# Patient Record
Sex: MALE | Race: WHITE | NOT HISPANIC OR LATINO | Employment: OTHER | URBAN - METROPOLITAN AREA
[De-identification: names, ages, dates, MRNs, and addresses within clinical notes are randomized per-mention and may not be internally consistent; named-entity substitution may affect disease eponyms.]

---

## 2018-08-07 ENCOUNTER — TRANSCRIBE ORDERS (OUTPATIENT)
Dept: LAB | Facility: MEDICAL CENTER | Age: 83
End: 2018-08-07

## 2018-08-07 ENCOUNTER — APPOINTMENT (OUTPATIENT)
Dept: LAB | Facility: MEDICAL CENTER | Age: 83
End: 2018-08-07
Payer: MEDICARE

## 2018-08-07 DIAGNOSIS — E13.8 DIABETES MELLITUS OF OTHER TYPE WITH COMPLICATION, UNSPECIFIED WHETHER LONG TERM INSULIN USE: ICD-10-CM

## 2018-08-07 DIAGNOSIS — I25.10 ASCVD (ARTERIOSCLEROTIC CARDIOVASCULAR DISEASE): ICD-10-CM

## 2018-08-07 DIAGNOSIS — E78.5 HYPERLIPIDEMIA, UNSPECIFIED HYPERLIPIDEMIA TYPE: ICD-10-CM

## 2018-08-07 DIAGNOSIS — I25.10 ASCVD (ARTERIOSCLEROTIC CARDIOVASCULAR DISEASE): Primary | ICD-10-CM

## 2018-08-07 DIAGNOSIS — I10 HYPERTENSION, UNSPECIFIED TYPE: ICD-10-CM

## 2018-08-07 LAB
ALBUMIN SERPL BCP-MCNC: 3.7 G/DL (ref 3.5–5)
ALP SERPL-CCNC: 74 U/L (ref 46–116)
ALT SERPL W P-5'-P-CCNC: 26 U/L (ref 12–78)
ANION GAP SERPL CALCULATED.3IONS-SCNC: 6 MMOL/L (ref 4–13)
AST SERPL W P-5'-P-CCNC: 21 U/L (ref 5–45)
BILIRUB SERPL-MCNC: 0.48 MG/DL (ref 0.2–1)
BUN SERPL-MCNC: 26 MG/DL (ref 5–25)
CALCIUM ALBUM COR SERPL-MCNC: 10.4 MG/DL (ref 8.3–10.1)
CALCIUM SERPL-MCNC: 10.2 MG/DL (ref 8.3–10.1)
CHLORIDE SERPL-SCNC: 106 MMOL/L (ref 100–108)
CHOLEST SERPL-MCNC: 79 MG/DL (ref 50–200)
CO2 SERPL-SCNC: 26 MMOL/L (ref 21–32)
CREAT SERPL-MCNC: 1.71 MG/DL (ref 0.6–1.3)
ERYTHROCYTE [DISTWIDTH] IN BLOOD BY AUTOMATED COUNT: 13.5 % (ref 11.6–15.1)
EST. AVERAGE GLUCOSE BLD GHB EST-MCNC: 126 MG/DL
GFR SERPL CREATININE-BSD FRML MDRD: 37 ML/MIN/1.73SQ M
GLUCOSE P FAST SERPL-MCNC: 129 MG/DL (ref 65–99)
HBA1C MFR BLD: 6 % (ref 4.2–6.3)
HCT VFR BLD AUTO: 42.1 % (ref 36.5–49.3)
HDLC SERPL-MCNC: 29 MG/DL (ref 40–60)
HGB BLD-MCNC: 13.5 G/DL (ref 12–17)
LDLC SERPL CALC-MCNC: 36 MG/DL (ref 0–100)
MCH RBC QN AUTO: 28 PG (ref 26.8–34.3)
MCHC RBC AUTO-ENTMCNC: 32.1 G/DL (ref 31.4–37.4)
MCV RBC AUTO: 87 FL (ref 82–98)
NONHDLC SERPL-MCNC: 50 MG/DL
PLATELET # BLD AUTO: 179 THOUSANDS/UL (ref 149–390)
PMV BLD AUTO: 10.3 FL (ref 8.9–12.7)
POTASSIUM SERPL-SCNC: 4.5 MMOL/L (ref 3.5–5.3)
PROT SERPL-MCNC: 7.4 G/DL (ref 6.4–8.2)
RBC # BLD AUTO: 4.82 MILLION/UL (ref 3.88–5.62)
SODIUM SERPL-SCNC: 138 MMOL/L (ref 136–145)
TRIGL SERPL-MCNC: 70 MG/DL
WBC # BLD AUTO: 8.36 THOUSAND/UL (ref 4.31–10.16)

## 2018-08-07 PROCEDURE — 80061 LIPID PANEL: CPT

## 2018-08-07 PROCEDURE — 85027 COMPLETE CBC AUTOMATED: CPT

## 2018-08-07 PROCEDURE — 80053 COMPREHEN METABOLIC PANEL: CPT

## 2018-08-07 PROCEDURE — 36415 COLL VENOUS BLD VENIPUNCTURE: CPT

## 2018-08-07 PROCEDURE — 83036 HEMOGLOBIN GLYCOSYLATED A1C: CPT

## 2018-08-15 ENCOUNTER — TRANSCRIBE ORDERS (OUTPATIENT)
Dept: ADMINISTRATIVE | Facility: HOSPITAL | Age: 83
End: 2018-08-15

## 2018-08-15 ENCOUNTER — HOSPITAL ENCOUNTER (OUTPATIENT)
Dept: RADIOLOGY | Facility: HOSPITAL | Age: 83
Discharge: HOME | End: 2018-08-15
Attending: INTERNAL MEDICINE
Payer: MEDICARE

## 2018-08-15 DIAGNOSIS — R06.00 DYSPNEA, UNSPECIFIED TYPE: Primary | ICD-10-CM

## 2018-08-15 DIAGNOSIS — R06.00 DYSPNEA, UNSPECIFIED TYPE: ICD-10-CM

## 2018-08-15 PROCEDURE — 71046 X-RAY EXAM CHEST 2 VIEWS: CPT

## 2018-08-26 PROBLEM — Z95.1 PRESENCE OF AORTOCORONARY BYPASS GRAFT: Status: ACTIVE | Noted: 2018-08-26

## 2018-08-26 PROBLEM — E66.9 OBESE: Status: ACTIVE | Noted: 2018-08-26

## 2018-08-26 PROBLEM — I65.23 OCCLUSION AND STENOSIS OF BILATERAL CAROTID ARTERIES: Status: ACTIVE | Noted: 2018-08-26

## 2018-08-26 PROBLEM — I10 BENIGN ESSENTIAL HYPERTENSION: Status: ACTIVE | Noted: 2018-08-26

## 2018-08-26 PROBLEM — E11.9 DIABETES MELLITUS, TYPE II (HCC): Status: ACTIVE | Noted: 2018-08-26

## 2018-08-26 PROBLEM — I25.10 CAD (CORONARY ARTERY DISEASE): Status: ACTIVE | Noted: 2018-08-26

## 2018-08-26 PROBLEM — K21.9 GERD (GASTROESOPHAGEAL REFLUX DISEASE): Status: ACTIVE | Noted: 2018-08-26

## 2018-08-26 PROBLEM — I25.2 OLD MI (MYOCARDIAL INFARCTION): Status: ACTIVE | Noted: 2018-08-26

## 2018-08-26 PROBLEM — E78.5 DYSLIPIDEMIA: Status: ACTIVE | Noted: 2018-08-26

## 2018-08-26 PROBLEM — I73.9 PVD (PERIPHERAL VASCULAR DISEASE) (HCC): Status: ACTIVE | Noted: 2018-08-26

## 2018-08-26 PROBLEM — E78.2 MIXED HYPERLIPIDEMIA: Status: ACTIVE | Noted: 2018-08-26

## 2018-08-26 RX ORDER — ESCITALOPRAM OXALATE 10 MG/1
10 TABLET ORAL DAILY
COMMUNITY

## 2018-08-26 RX ORDER — SUCRALFATE 1 G/1
1 TABLET ORAL 4 TIMES DAILY
Status: ON HOLD | COMMUNITY
End: 2019-01-23 | Stop reason: ALTCHOICE

## 2018-08-26 RX ORDER — ISOSORBIDE MONONITRATE 60 MG/1
60 TABLET, EXTENDED RELEASE ORAL DAILY
Status: ON HOLD | COMMUNITY
End: 2021-03-06 | Stop reason: SDUPTHER

## 2018-08-26 RX ORDER — CLOPIDOGREL BISULFATE 75 MG/1
75 TABLET ORAL DAILY
Status: ON HOLD | COMMUNITY
End: 2019-09-21 | Stop reason: SDUPTHER

## 2018-08-26 RX ORDER — OMEPRAZOLE 20 MG/1
20 CAPSULE, DELAYED RELEASE ORAL DAILY
COMMUNITY
End: 2022-02-22 | Stop reason: HOSPADM

## 2018-08-26 RX ORDER — ASPIRIN 81 MG/1
81 TABLET ORAL DAILY
Status: ON HOLD | COMMUNITY
End: 2021-03-25

## 2018-08-26 RX ORDER — ATORVASTATIN CALCIUM 20 MG/1
20 TABLET, FILM COATED ORAL DAILY
COMMUNITY
End: 2022-02-22 | Stop reason: HOSPADM

## 2018-08-26 RX ORDER — AMLODIPINE BESYLATE 5 MG/1
5 TABLET ORAL DAILY
Status: ON HOLD | COMMUNITY
End: 2021-03-06 | Stop reason: SDUPTHER

## 2018-08-26 RX ORDER — METOPROLOL TARTRATE 100 MG/1
100 TABLET ORAL 2 TIMES DAILY
COMMUNITY
End: 2019-09-19

## 2018-09-04 ENCOUNTER — TRANSCRIBE ORDERS (OUTPATIENT)
Dept: LAB | Facility: MEDICAL CENTER | Age: 83
End: 2018-09-04

## 2018-09-04 ENCOUNTER — APPOINTMENT (OUTPATIENT)
Dept: LAB | Facility: MEDICAL CENTER | Age: 83
End: 2018-09-04
Payer: MEDICARE

## 2018-09-04 DIAGNOSIS — E13.8 DIABETES MELLITUS OF OTHER TYPE WITH COMPLICATION, UNSPECIFIED WHETHER LONG TERM INSULIN USE: Primary | ICD-10-CM

## 2018-09-04 DIAGNOSIS — N19 RENAL FAILURE, UNSPECIFIED CHRONICITY: ICD-10-CM

## 2018-09-04 DIAGNOSIS — E13.8 DIABETES MELLITUS OF OTHER TYPE WITH COMPLICATION, UNSPECIFIED WHETHER LONG TERM INSULIN USE: ICD-10-CM

## 2018-09-04 LAB
ANION GAP SERPL CALCULATED.3IONS-SCNC: 6 MMOL/L (ref 4–13)
BUN SERPL-MCNC: 20 MG/DL (ref 5–25)
CALCIUM SERPL-MCNC: 9.7 MG/DL (ref 8.3–10.1)
CHLORIDE SERPL-SCNC: 106 MMOL/L (ref 100–108)
CO2 SERPL-SCNC: 29 MMOL/L (ref 21–32)
CREAT SERPL-MCNC: 1.49 MG/DL (ref 0.6–1.3)
GFR SERPL CREATININE-BSD FRML MDRD: 43 ML/MIN/1.73SQ M
GLUCOSE P FAST SERPL-MCNC: 127 MG/DL (ref 65–99)
POTASSIUM SERPL-SCNC: 3.9 MMOL/L (ref 3.5–5.3)
SODIUM SERPL-SCNC: 141 MMOL/L (ref 136–145)

## 2018-09-04 PROCEDURE — 36415 COLL VENOUS BLD VENIPUNCTURE: CPT

## 2018-09-04 PROCEDURE — 80048 BASIC METABOLIC PNL TOTAL CA: CPT

## 2018-09-18 ENCOUNTER — OFFICE VISIT (OUTPATIENT)
Dept: CARDIOLOGY CLINIC | Facility: CLINIC | Age: 83
End: 2018-09-18
Payer: MEDICARE

## 2018-09-18 VITALS
HEART RATE: 80 BPM | WEIGHT: 171 LBS | SYSTOLIC BLOOD PRESSURE: 128 MMHG | DIASTOLIC BLOOD PRESSURE: 72 MMHG | HEIGHT: 66 IN | BODY MASS INDEX: 27.48 KG/M2

## 2018-09-18 DIAGNOSIS — Z95.1 PRESENCE OF AORTOCORONARY BYPASS GRAFT: ICD-10-CM

## 2018-09-18 DIAGNOSIS — E78.5 DYSLIPIDEMIA: ICD-10-CM

## 2018-09-18 DIAGNOSIS — I65.23 OCCLUSION AND STENOSIS OF BILATERAL CAROTID ARTERIES: ICD-10-CM

## 2018-09-18 DIAGNOSIS — I25.10 CORONARY ARTERY DISEASE INVOLVING NATIVE CORONARY ARTERY OF NATIVE HEART WITHOUT ANGINA PECTORIS: Primary | ICD-10-CM

## 2018-09-18 DIAGNOSIS — I10 BENIGN ESSENTIAL HYPERTENSION: ICD-10-CM

## 2018-09-18 PROCEDURE — 99214 OFFICE O/P EST MOD 30 MIN: CPT | Performed by: INTERNAL MEDICINE

## 2018-09-18 PROCEDURE — 93000 ELECTROCARDIOGRAM COMPLETE: CPT | Performed by: INTERNAL MEDICINE

## 2018-09-18 RX ORDER — GLIMEPIRIDE 1 MG/1
1 TABLET ORAL 2 TIMES DAILY
Refills: 0 | COMMUNITY
Start: 2018-09-11 | End: 2022-02-22 | Stop reason: HOSPADM

## 2018-09-18 RX ORDER — NITROGLYCERIN 0.4 MG/1
TABLET SUBLINGUAL
Refills: 0 | COMMUNITY
Start: 2018-07-17 | End: 2022-02-22 | Stop reason: HOSPADM

## 2018-09-18 NOTE — PROGRESS NOTES
Patient ID: Aldo Anton is a 82 y o  male  Plan:      Presence of aortocoronary bypass graft  No clear symptoms  Benign essential hypertension  Well controlled  Occlusion and stenosis of bilateral carotid arteries  Prior L CEA  Followed by [facility]  Dyslipidemia  On atorvastatin  Couldn't take higher dose  Follow up Plan:  1 year EKG and follow-up visit  Given the extent of his original coronary disease, I do not plan on testing unless more anginal type symptoms dictate  HPI:   The patient is seen in follow-up today regarding CAD, hyperlipidemia, carotid stenosis, and hypertension  He continues to have atypical chest pain  Sometimes there are stickers in the front or pressure in the back but no association with effort  This is not dissimilar to prior descriptions  Results for orders placed or performed in visit on 09/18/18   POCT ECG    Impression    NSR  NSSTs  Past Surgical History:   Procedure Laterality Date    CARDIAC CATHETERIZATION  10/07/2009    Stent 99% stenosis SVG from the aorta to OM1  Patent mammary graft to the LAD    CORONARY ARTERY BYPASS GRAFT  1992    VG-CX, VG-RCA    CORONARY ARTERY BYPASS GRAFT  11/04/2005    LIMA-D1-LAD, VG-PDA-PLB, [id number]-[id number] TMR 9 Lesions     CMP:   Lab Results   Component Value Date     09/04/2018    K 3 9 09/04/2018     09/04/2018    CO2 29 09/04/2018    BUN 20 09/04/2018    CREATININE 1 49 (H) 09/04/2018    EGFR 43 09/04/2018       Lipid Profile:   Lab Results   Component Value Date    TRIG 70 08/07/2018    HDL 29 (L) 08/07/2018         Review of Systems   10  point ROS  was otherwise non pertinent or negative except as per HPI or as below  Gait:   Normal        Objective:     /72 (BP Location: Left arm)   Pulse 80   Ht 5' 6" (1 676 m)   Wt 77 6 kg (171 lb)   BMI 27 60 kg/m²     PHYSICAL EXAM:    General:  Normal appearance in no distress  Eyes:  Anicteric    Oral mucosa:  Moist   Neck: No JVD  Carotid upstrokes are brisk without bruits  No masses  Prior left carotid endarterectomy scar  Chest:  Clear to auscultation and percussion  Intact midline sternotomy scar  Cardiac:  Normal PMI  Normal S1 and S2  No murmur gallop or rub  Abdomen:  Soft and nontender  No palpable organomegaly or aortic enlargement  Extremities:  No peripheral edema  Musculoskeletal:  Symmetric  Vascular:  Femoral pulses are brisk without bruits  Popliteal pulses are intact bilaterally  Pedal pulses are intact  Neuro:  Grossly symmetric  Psych:  Alert and oriented x3  Current Outpatient Prescriptions:     amLODIPine (NORVASC) 5 mg tablet, Take 5 mg by mouth daily, Disp: , Rfl:     aspirin (ECOTRIN LOW STRENGTH) 81 mg EC tablet, Take 81 mg by mouth daily, Disp: , Rfl:     atorvastatin (LIPITOR) 20 mg tablet, Take 20 mg by mouth daily, Disp: , Rfl:     clopidogrel (PLAVIX) 75 mg tablet, Take 75 mg by mouth daily, Disp: , Rfl:     escitalopram (LEXAPRO) 10 mg tablet, Take 10 mg by mouth daily, Disp: , Rfl:     glimepiride (AMARYL) 1 mg tablet, daily, Disp: , Rfl: 0    isosorbide mononitrate (IMDUR) 60 mg 24 hr tablet, Take 60 mg by mouth daily In the morning, Disp: , Rfl:     metoprolol tartrate (LOPRESSOR) 100 mg tablet, Take 100 mg by mouth 2 (two) times a day, Disp: , Rfl:     nitroglycerin (NITROSTAT) 0 4 mg SL tablet, one tablet under tongue for angina, may repeat in 15 min  no more than three times, Disp: , Rfl: 0    omeprazole (PriLOSEC) 20 mg delayed release capsule, Take 20 mg by mouth daily, Disp: , Rfl:     sucralfate (CARAFATE) 1 g tablet, Take 1 g by mouth 4 (four) times a day On an empty stomach 1 hour before meals and at bedtime, Disp: , Rfl:   No Known Allergies  Past Medical History:   Diagnosis Date    Athscl 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     Stent OM1   Patent mammary graft to LAD 10/7/2009; CABG 1992 & 2005    Diabetes mellitus, type II ([facility] [address] ) without complication    Essential (primary) hypertension     Hx of cardiovascular stress test 07/23/2014    Eddie MPI; EF0 52 (52%) Lexiscan, mild LV systolic dysfunction; evidence for prior inferior wall MI; perfusion imaging consistant w mild lat wall ischemia and min torin-infart inferior ischemia   / EF0 51 (51%) evidence for prior inferior wall MI  Mild inferior and mild-moderate lateral wall ischemia  7/29/15    Hx of echocardiogram 11/07/2017    2D w/CFD;EF0 55 (55%) mild LVH, mitral valve prolapse with moderate regurgitation  left atrial enlargement  Mild tricuspid regurgitation        Mixed hyperlipidemia     Occlusion and stenosis of bilateral carotid arteries     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

## 2018-12-11 ENCOUNTER — TRANSCRIBE ORDERS (OUTPATIENT)
Dept: LAB | Facility: MEDICAL CENTER | Age: 83
End: 2018-12-11

## 2018-12-11 ENCOUNTER — APPOINTMENT (OUTPATIENT)
Dept: LAB | Facility: MEDICAL CENTER | Age: 83
End: 2018-12-11
Payer: MEDICARE

## 2018-12-11 DIAGNOSIS — I10 HYPERTENSION, UNSPECIFIED TYPE: ICD-10-CM

## 2018-12-11 DIAGNOSIS — Z79.4 OTHER SPECIFIED DIABETES MELLITUS WITH COMPLICATION, WITH LONG-TERM CURRENT USE OF INSULIN: ICD-10-CM

## 2018-12-11 DIAGNOSIS — M25.50 ARTHRALGIA, UNSPECIFIED JOINT: ICD-10-CM

## 2018-12-11 DIAGNOSIS — K74.60 CIRRHOSIS OF LIVER WITHOUT ASCITES, UNSPECIFIED HEPATIC CIRRHOSIS TYPE (HCC): ICD-10-CM

## 2018-12-11 DIAGNOSIS — E13.8 OTHER SPECIFIED DIABETES MELLITUS WITH COMPLICATION, WITH LONG-TERM CURRENT USE OF INSULIN: ICD-10-CM

## 2018-12-11 DIAGNOSIS — I25.10 ASCVD (ARTERIOSCLEROTIC CARDIOVASCULAR DISEASE): ICD-10-CM

## 2018-12-11 DIAGNOSIS — I25.10 ASCVD (ARTERIOSCLEROTIC CARDIOVASCULAR DISEASE): Primary | ICD-10-CM

## 2018-12-11 DIAGNOSIS — E78.5 HYPERLIPIDEMIA, UNSPECIFIED HYPERLIPIDEMIA TYPE: ICD-10-CM

## 2018-12-11 LAB
AFP-TM SERPL-MCNC: 1.8 NG/ML (ref 0.5–8)
ALBUMIN SERPL BCP-MCNC: 3.3 G/DL (ref 3.5–5)
ALP SERPL-CCNC: 81 U/L (ref 46–116)
ALT SERPL W P-5'-P-CCNC: 20 U/L (ref 12–78)
ANION GAP SERPL CALCULATED.3IONS-SCNC: 8 MMOL/L (ref 4–13)
AST SERPL W P-5'-P-CCNC: 14 U/L (ref 5–45)
BILIRUB SERPL-MCNC: 0.5 MG/DL (ref 0.2–1)
BUN SERPL-MCNC: 17 MG/DL (ref 5–25)
CALCIUM SERPL-MCNC: 9.1 MG/DL (ref 8.3–10.1)
CHLORIDE SERPL-SCNC: 105 MMOL/L (ref 100–108)
CHOLEST SERPL-MCNC: 142 MG/DL (ref 50–200)
CO2 SERPL-SCNC: 29 MMOL/L (ref 21–32)
CREAT SERPL-MCNC: 1.72 MG/DL (ref 0.6–1.3)
ERYTHROCYTE [DISTWIDTH] IN BLOOD BY AUTOMATED COUNT: 14 % (ref 11.6–15.1)
EST. AVERAGE GLUCOSE BLD GHB EST-MCNC: 157 MG/DL
GFR SERPL CREATININE-BSD FRML MDRD: 36 ML/MIN/1.73SQ M
GLUCOSE P FAST SERPL-MCNC: 135 MG/DL (ref 65–99)
HBA1C MFR BLD: 7.1 % (ref 4.2–6.3)
HCT VFR BLD AUTO: 44.2 % (ref 36.5–49.3)
HDLC SERPL-MCNC: 39 MG/DL (ref 40–60)
HGB BLD-MCNC: 13.6 G/DL (ref 12–17)
LDLC SERPL CALC-MCNC: 85 MG/DL (ref 0–100)
MCH RBC QN AUTO: 27.1 PG (ref 26.8–34.3)
MCHC RBC AUTO-ENTMCNC: 30.8 G/DL (ref 31.4–37.4)
MCV RBC AUTO: 88 FL (ref 82–98)
NONHDLC SERPL-MCNC: 103 MG/DL
PLATELET # BLD AUTO: 166 THOUSANDS/UL (ref 149–390)
PMV BLD AUTO: 9.6 FL (ref 8.9–12.7)
POTASSIUM SERPL-SCNC: 4.4 MMOL/L (ref 3.5–5.3)
PROT SERPL-MCNC: 7.3 G/DL (ref 6.4–8.2)
RBC # BLD AUTO: 5.01 MILLION/UL (ref 3.88–5.62)
SODIUM SERPL-SCNC: 142 MMOL/L (ref 136–145)
TRIGL SERPL-MCNC: 90 MG/DL
WBC # BLD AUTO: 8 THOUSAND/UL (ref 4.31–10.16)

## 2018-12-11 PROCEDURE — 80053 COMPREHEN METABOLIC PANEL: CPT

## 2018-12-11 PROCEDURE — 80061 LIPID PANEL: CPT

## 2018-12-11 PROCEDURE — 85027 COMPLETE CBC AUTOMATED: CPT

## 2018-12-11 PROCEDURE — 36415 COLL VENOUS BLD VENIPUNCTURE: CPT

## 2018-12-11 PROCEDURE — 83036 HEMOGLOBIN GLYCOSYLATED A1C: CPT

## 2018-12-11 PROCEDURE — 82105 ALPHA-FETOPROTEIN SERUM: CPT

## 2019-01-17 ENCOUNTER — TRANSCRIBE ORDERS (OUTPATIENT)
Dept: ADMINISTRATIVE | Facility: HOSPITAL | Age: 84
End: 2019-01-17

## 2019-01-17 DIAGNOSIS — K74.60 HEPATIC CIRRHOSIS, UNSPECIFIED HEPATIC CIRRHOSIS TYPE, UNSPECIFIED WHETHER ASCITES PRESENT (HCC): Primary | ICD-10-CM

## 2019-01-21 ENCOUNTER — LAB REQUISITION (OUTPATIENT)
Dept: LAB | Facility: HOSPITAL | Age: 84
End: 2019-01-21
Payer: MEDICARE

## 2019-01-21 ENCOUNTER — ANESTHESIA EVENT (OUTPATIENT)
Dept: PERIOP | Facility: HOSPITAL | Age: 84
End: 2019-01-21
Payer: MEDICARE

## 2019-01-21 DIAGNOSIS — D48.5 NEOPLASM OF UNCERTAIN BEHAVIOR OF SKIN: ICD-10-CM

## 2019-01-21 PROCEDURE — 88305 TISSUE EXAM BY PATHOLOGIST: CPT | Performed by: PATHOLOGY

## 2019-01-21 PROCEDURE — 1123F ACP DISCUSS/DSCN MKR DOCD: CPT | Performed by: PATHOLOGY

## 2019-01-21 NOTE — ANESTHESIA PREPROCEDURE EVALUATION
Review of Systems/Medical History  Patient summary reviewed  Chart reviewed      Cardiovascular  EKG reviewed, Hyperlipidemia, Hypertension , Past MI , CAD ,   Comment: EKG NSR  NSSTs ,  Pulmonary    Comment: 08/15/18 1057 XR chest pa & lateral   Impression:   COPD  Cardiomegaly  Otherwise normal chest evaluation for age  82 years  The lungs are clear  The chest is stable in the interval of more  than one year  GI/Hepatic    GERD ,             Endo/Other  Diabetes ,      GYN       Hematology   Musculoskeletal       Neurology   Psychology         Lab Results   Component Value Date    WBC 8 00 12/11/2018    HGB 13 6 12/11/2018    HCT 44 2 12/11/2018    MCV 88 12/11/2018     12/11/2018     Lab Results   Component Value Date    CALCIUM 9 1 12/11/2018    K 4 4 12/11/2018    CO2 29 12/11/2018     12/11/2018    BUN 17 12/11/2018    CREATININE 1 72 (H) 12/11/2018     No results found for: INR, PROTIME  No results found for: PTT    Physical Exam    Airway    Mallampati score: III  TM Distance: >3 FB  Neck ROM: full     Dental   lower dentures and upper dentures,     Cardiovascular  Cardiovascular exam normal    Pulmonary  Pulmonary exam normal     Other Findings        Anesthesia Plan  ASA Score- 3     Anesthesia Type- IV sedation with anesthesia with ASA Monitors  Additional Monitors:   Airway Plan:     Comment: Plan discussed is MAC with GA as backup  I personally discussed risks and benefits to this anesthetic  All patient questions were answered  Pt agrees with anesthesia plan        Plan Factors-    Induction- intravenous  Postoperative Plan-     Informed Consent- Anesthetic plan and risks discussed with patient

## 2019-01-23 ENCOUNTER — HOSPITAL ENCOUNTER (OUTPATIENT)
Facility: HOSPITAL | Age: 84
Setting detail: OUTPATIENT SURGERY
Discharge: HOME | End: 2019-01-23
Attending: INTERNAL MEDICINE | Admitting: INTERNAL MEDICINE
Payer: MEDICARE

## 2019-01-23 ENCOUNTER — ANESTHESIA (OUTPATIENT)
Dept: PERIOP | Facility: HOSPITAL | Age: 84
End: 2019-01-23
Payer: MEDICARE

## 2019-01-23 ENCOUNTER — APPOINTMENT (OUTPATIENT)
Dept: LAB | Facility: HOSPITAL | Age: 84
End: 2019-01-23
Attending: INTERNAL MEDICINE
Payer: MEDICARE

## 2019-01-23 ENCOUNTER — TRANSCRIBE ORDERS (OUTPATIENT)
Dept: ADMINISTRATIVE | Facility: HOSPITAL | Age: 84
End: 2019-01-23

## 2019-01-23 VITALS
DIASTOLIC BLOOD PRESSURE: 72 MMHG | TEMPERATURE: 97.8 F | WEIGHT: 175 LBS | OXYGEN SATURATION: 96 % | HEART RATE: 72 BPM | RESPIRATION RATE: 20 BRPM | SYSTOLIC BLOOD PRESSURE: 126 MMHG | HEIGHT: 66 IN | BODY MASS INDEX: 28.12 KG/M2

## 2019-01-23 DIAGNOSIS — R13.10 DYSPHAGIA, UNSPECIFIED TYPE: ICD-10-CM

## 2019-01-23 DIAGNOSIS — R18.8 CIRRHOSIS OF LIVER WITH ASCITES, UNSPECIFIED HEPATIC CIRRHOSIS TYPE (HCC): ICD-10-CM

## 2019-01-23 DIAGNOSIS — R10.13 EPIGASTRIC PAIN: ICD-10-CM

## 2019-01-23 DIAGNOSIS — K74.60 CIRRHOSIS OF LIVER (HCC): ICD-10-CM

## 2019-01-23 DIAGNOSIS — K74.60 CIRRHOSIS OF LIVER WITH ASCITES, UNSPECIFIED HEPATIC CIRRHOSIS TYPE (HCC): ICD-10-CM

## 2019-01-23 DIAGNOSIS — R10.13 EPIGASTRIC PAIN: Primary | ICD-10-CM

## 2019-01-23 LAB
ALBUMIN SERPL BCP-MCNC: 4.3 G/DL (ref 3.5–5.7)
ALP SERPL-CCNC: 68 U/L (ref 55–165)
ALT SERPL W P-5'-P-CCNC: 19 U/L (ref 7–52)
ANION GAP SERPL CALCULATED.3IONS-SCNC: 6 MMOL/L (ref 4–13)
AST SERPL W P-5'-P-CCNC: 17 U/L (ref 13–39)
BASOPHILS # BLD AUTO: 0.1 THOUSANDS/ΜL (ref 0–0.1)
BASOPHILS NFR BLD AUTO: 1 % (ref 0–2)
BILIRUB SERPL-MCNC: 0.6 MG/DL (ref 0.2–1)
BUN SERPL-MCNC: 29 MG/DL (ref 7–25)
CALCIUM ALBUM COR SERPL-MCNC: 10 MG/DL (ref 8.3–10.1)
CALCIUM SERPL-MCNC: 10.2 MG/DL (ref 8.6–10.5)
CHLORIDE SERPL-SCNC: 104 MMOL/L (ref 98–107)
CO2 SERPL-SCNC: 29 MMOL/L (ref 21–31)
CREAT SERPL-MCNC: 1.82 MG/DL (ref 0.7–1.3)
EOSINOPHIL # BLD AUTO: 0.4 THOUSAND/ΜL (ref 0–0.61)
EOSINOPHIL NFR BLD AUTO: 5 % (ref 0–5)
ERYTHROCYTE [DISTWIDTH] IN BLOOD BY AUTOMATED COUNT: 14.7 % (ref 11.5–14.5)
GFR SERPL CREATININE-BSD FRML MDRD: 34 ML/MIN/1.73SQ M
GLUCOSE P FAST SERPL-MCNC: 148 MG/DL (ref 65–99)
GLUCOSE SERPL-MCNC: 152 MG/DL (ref 65–140)
HCT VFR BLD AUTO: 45.9 % (ref 36.5–49.3)
HGB BLD-MCNC: 14.9 G/DL (ref 14–18)
INR PPP: 1.06 (ref 0.9–1.5)
IRON SATN MFR SERPL: 18 %
IRON SERPL-MCNC: 57 UG/DL (ref 65–175)
LYMPHOCYTES # BLD AUTO: 1.8 THOUSANDS/ΜL (ref 0.6–4.47)
LYMPHOCYTES NFR BLD AUTO: 21 % (ref 21–51)
MCH RBC QN AUTO: 27.4 PG (ref 26–34)
MCHC RBC AUTO-ENTMCNC: 32.5 G/DL (ref 31–37)
MCV RBC AUTO: 84 FL (ref 81–99)
MONOCYTES # BLD AUTO: 0.6 THOUSAND/ΜL (ref 0.17–1.22)
MONOCYTES NFR BLD AUTO: 7 % (ref 2–12)
NEUTROPHILS # BLD AUTO: 5.7 THOUSANDS/ΜL (ref 1.4–6.5)
NEUTS SEG NFR BLD AUTO: 66 % (ref 42–75)
NRBC BLD AUTO-RTO: 0 /100 WBCS
PLATELET # BLD AUTO: 182 THOUSANDS/UL (ref 149–390)
PMV BLD AUTO: 7.2 FL (ref 8.6–11.7)
POTASSIUM SERPL-SCNC: 4.7 MMOL/L (ref 3.5–5.5)
PROT SERPL-MCNC: 7.3 G/DL (ref 6.4–8.9)
PROTHROMBIN TIME: 12.3 SECONDS (ref 10.2–13)
RBC # BLD AUTO: 5.44 MILLION/UL (ref 4.3–5.9)
SODIUM SERPL-SCNC: 139 MMOL/L (ref 134–143)
TIBC SERPL-MCNC: 316 UG/DL (ref 250–450)
WBC # BLD AUTO: 8.7 THOUSAND/UL (ref 4.8–10.8)

## 2019-01-23 PROCEDURE — 88305 TISSUE EXAM BY PATHOLOGIST: CPT | Performed by: PATHOLOGY

## 2019-01-23 PROCEDURE — 83550 IRON BINDING TEST: CPT

## 2019-01-23 PROCEDURE — 85610 PROTHROMBIN TIME: CPT

## 2019-01-23 PROCEDURE — 83540 ASSAY OF IRON: CPT

## 2019-01-23 PROCEDURE — 82948 REAGENT STRIP/BLOOD GLUCOSE: CPT

## 2019-01-23 PROCEDURE — 85025 COMPLETE CBC W/AUTO DIFF WBC: CPT

## 2019-01-23 PROCEDURE — 88342 IMHCHEM/IMCYTCHM 1ST ANTB: CPT | Performed by: PATHOLOGY

## 2019-01-23 PROCEDURE — 36415 COLL VENOUS BLD VENIPUNCTURE: CPT

## 2019-01-23 PROCEDURE — 80053 COMPREHEN METABOLIC PANEL: CPT

## 2019-01-23 RX ORDER — SODIUM CHLORIDE, SODIUM LACTATE, POTASSIUM CHLORIDE, CALCIUM CHLORIDE 600; 310; 30; 20 MG/100ML; MG/100ML; MG/100ML; MG/100ML
125 INJECTION, SOLUTION INTRAVENOUS CONTINUOUS
Status: DISCONTINUED | OUTPATIENT
Start: 2019-01-23 | End: 2019-01-23 | Stop reason: HOSPADM

## 2019-01-23 RX ORDER — PROPOFOL 10 MG/ML
INJECTION, EMULSION INTRAVENOUS AS NEEDED
Status: DISCONTINUED | OUTPATIENT
Start: 2019-01-23 | End: 2019-01-23 | Stop reason: SURG

## 2019-01-23 RX ORDER — LIDOCAINE HYDROCHLORIDE 20 MG/ML
INJECTION, SOLUTION EPIDURAL; INFILTRATION; INTRACAUDAL; PERINEURAL AS NEEDED
Status: DISCONTINUED | OUTPATIENT
Start: 2019-01-23 | End: 2019-01-23 | Stop reason: SURG

## 2019-01-23 RX ADMIN — PROPOFOL 30 MG: 10 INJECTION, EMULSION INTRAVENOUS at 11:55

## 2019-01-23 RX ADMIN — PROPOFOL 110 MG: 10 INJECTION, EMULSION INTRAVENOUS at 11:50

## 2019-01-23 RX ADMIN — LIDOCAINE HYDROCHLORIDE 100 MG: 20 INJECTION, SOLUTION EPIDURAL; INFILTRATION; INTRACAUDAL; PERINEURAL at 11:50

## 2019-01-23 NOTE — OP NOTE
OPERATIVE REPORT  PATIENT NAME: Tiago Gamboa    :  1935  MRN: [id number]  Pt Location: [address] [place] [address] GI ROOM 01    SURGERY DATE: 2019    Surgeon(s) and Role:     * Alexi Larkin MD - Primary    Preop Diagnosis:  Epigastric pain [R10 13]  Cirrhosis of liver ([address] ) [K74 60]    Post-Op Diagnosis Codes:     * Epigastric pain [R10 13]     * Cirrhosis of liver (HCC) [K74 60]    Procedure(s) (LRB):  ESOPHAGOGASTRODUODENOSCOPY (EGD) with biopsy (N/A)    Specimen(s):  ID Type Source Tests Collected by Time Destination   1 : gastric polyp Tissue Stomach TISSUE EXAM Alexi Larkin MD 2019 115        Estimated Blood Loss:   Minimal    Drains:       Anesthesia Type:   IV Sedation with Anesthesia    Operative Indications:  Epigastric pain [R10 13]  Cirrhosis of liver (HCC) [K74 60]  History of gastric polyp    Operative Findings:  Antral erythema-mild, upper gastric body polyp-semi pedunculated-8 mm-biopsied, small hiatal hernia, grade 2 esophageal varices involving distal 2/3 of the esophagus    Complications:   None    Procedure and Technique:  After premedication, the Olympus video endoscope was passed without difficulty to the descending duodenum  Duodenum was normal   The scope was then withdrawn into the stomach where there was mild antral erythema  The remainder of the stomach revealed a small axial hiatal hernia and an 8 mm polyp in the upper gastric body, lesser curvature which was biopsied  This had been seen on previous EGD  The esophagus was notable for grade 2 esophageal varices that seemed to be scattered involving the distal 2/3 of the esophagus  These were not banded as there was no history of bleeding  I was present for the entire procedure    Patient Disposition:  PACU  continue Prilosec  Consider switching from metoprolol to either Inderal LA or nadolol, as a non cardioselective beta-blocker may help to prevent esophageal varices or bleeding in the future    Would recommend discussing with family doctor and/or cardiologist   Note --patient's baseline heart rate is approximately 58 today  Check pathology      SIGNATURE: Esperanza Alex MD  DATE: January 23, 2019  TIME: 12:02 PM

## 2019-01-23 NOTE — DISCHARGE INSTRUCTIONS
Upper Endoscopy   WHAT YOU NEED TO KNOW:   An upper endoscopy is also called an upper gastrointestinal (GI) endoscopy, or an esophagogastroduodenoscopy (EGD)  You may feel bloated, gassy, or have some abdominal discomfort after your procedure  Your throat may be sore for 24 to 36 hours  You may burp or pass gas from air that is still inside your body  DISCHARGE INSTRUCTIONS:   Call 911 if:   · You have sudden chest pain or trouble breathing  Seek care immediately if:   · You feel dizzy or faint  · You have trouble swallowing  · You have severe throat pain  · Your bowel movements are very dark or black  · Your abdomen is hard and firm and you have severe pain  · You vomit blood  Contact your healthcare provider if:   · You feel full or bloated and cannot burp or pass gas  · You have not had a bowel movement for 3 days after your procedure  · You have neck pain  · You have a fever or chills  · You have nausea or are vomiting  · You have a rash or hives  · You have questions or concerns about your endoscopy  Relieve a sore throat:  Suck on throat lozenges or crushed ice  Gargle with a small amount of warm salt water  Mix 1 teaspoon of salt and 1 cup of warm water to make salt water  Relieve gas and discomfort from bloating:  Lie on your right side with a heating pad on your abdomen  Take short walks to help pass gas  Eat small meals until bloating is relieved  Rest after your procedure:  Do not drive or make important decisions until the day after your procedure  Return to your normal activity as directed  You can usually return to work the day after your procedure  Follow up with your healthcare provider as directed:  Write down your questions so you remember to ask them during your visits  © 2017 [employer]  Information is for End User's use only and may not be sold, redistributed or otherwise used for commercial purposes   All illustrations and images included in GFRANQ 605 are the copyrighted property of [employer]  or [employer]  The above information is an  only  It is not intended as medical advice for individual conditions or treatments  Talk to your doctor, nurse or pharmacist before following any medical regimen to see if it is safe and effective for you

## 2019-01-23 NOTE — ANESTHESIA POSTPROCEDURE EVALUATION
Post-Op Assessment Note      CV Status:  Stable    Mental Status:  Alert    Hydration Status:  Stable    PONV Controlled:  None    Airway Patency:  Patent    Post Op Vitals Reviewed: Yes          Staff: Anesthesiologist           BP   114/60   Temp  97 8   Pulse  66   Resp   20   SpO2   93

## 2019-01-24 ENCOUNTER — HOSPITAL ENCOUNTER (OUTPATIENT)
Dept: ULTRASOUND IMAGING | Facility: HOSPITAL | Age: 84
Discharge: HOME | End: 2019-01-24
Payer: MEDICARE

## 2019-01-24 DIAGNOSIS — K74.60 HEPATIC CIRRHOSIS, UNSPECIFIED HEPATIC CIRRHOSIS TYPE, UNSPECIFIED WHETHER ASCITES PRESENT (HCC): ICD-10-CM

## 2019-01-24 PROCEDURE — 76700 US EXAM ABDOM COMPLETE: CPT

## 2019-04-23 ENCOUNTER — APPOINTMENT (OUTPATIENT)
Dept: LAB | Facility: MEDICAL CENTER | Age: 84
End: 2019-04-23
Payer: MEDICARE

## 2019-04-23 ENCOUNTER — TRANSCRIBE ORDERS (OUTPATIENT)
Dept: LAB | Facility: MEDICAL CENTER | Age: 84
End: 2019-04-23

## 2019-04-23 DIAGNOSIS — I25.10 ASCVD (ARTERIOSCLEROTIC CARDIOVASCULAR DISEASE): ICD-10-CM

## 2019-04-23 DIAGNOSIS — I10 HYPERTENSION, UNSPECIFIED TYPE: ICD-10-CM

## 2019-04-23 DIAGNOSIS — E13.8 OTHER SPECIFIED DIABETES MELLITUS WITH COMPLICATION, WITH LONG-TERM CURRENT USE OF INSULIN: ICD-10-CM

## 2019-04-23 DIAGNOSIS — Z79.4 OTHER SPECIFIED DIABETES MELLITUS WITH COMPLICATION, WITH LONG-TERM CURRENT USE OF INSULIN: ICD-10-CM

## 2019-04-23 DIAGNOSIS — E78.5 HYPERLIPIDEMIA, UNSPECIFIED HYPERLIPIDEMIA TYPE: ICD-10-CM

## 2019-04-23 DIAGNOSIS — I25.10 ASCVD (ARTERIOSCLEROTIC CARDIOVASCULAR DISEASE): Primary | ICD-10-CM

## 2019-04-23 LAB
ALBUMIN SERPL BCP-MCNC: 3.8 G/DL (ref 3.5–5)
ALP SERPL-CCNC: 88 U/L (ref 46–116)
ALT SERPL W P-5'-P-CCNC: 28 U/L (ref 12–78)
ANION GAP SERPL CALCULATED.3IONS-SCNC: 1 MMOL/L (ref 4–13)
AST SERPL W P-5'-P-CCNC: 18 U/L (ref 5–45)
BILIRUB SERPL-MCNC: 0.52 MG/DL (ref 0.2–1)
BUN SERPL-MCNC: 23 MG/DL (ref 5–25)
CALCIUM SERPL-MCNC: 9.7 MG/DL (ref 8.3–10.1)
CHLORIDE SERPL-SCNC: 107 MMOL/L (ref 100–108)
CHOLEST SERPL-MCNC: 142 MG/DL (ref 50–200)
CO2 SERPL-SCNC: 30 MMOL/L (ref 21–32)
CREAT SERPL-MCNC: 1.8 MG/DL (ref 0.6–1.3)
ERYTHROCYTE [DISTWIDTH] IN BLOOD BY AUTOMATED COUNT: 14 % (ref 11.6–15.1)
EST. AVERAGE GLUCOSE BLD GHB EST-MCNC: 154 MG/DL
GFR SERPL CREATININE-BSD FRML MDRD: 34 ML/MIN/1.73SQ M
GLUCOSE P FAST SERPL-MCNC: 148 MG/DL (ref 65–99)
HBA1C MFR BLD: 7 % (ref 4.2–6.3)
HCT VFR BLD AUTO: 47 % (ref 36.5–49.3)
HDLC SERPL-MCNC: 31 MG/DL (ref 40–60)
HGB BLD-MCNC: 14.8 G/DL (ref 12–17)
LDLC SERPL CALC-MCNC: 60 MG/DL (ref 0–100)
MCH RBC QN AUTO: 27.6 PG (ref 26.8–34.3)
MCHC RBC AUTO-ENTMCNC: 31.5 G/DL (ref 31.4–37.4)
MCV RBC AUTO: 88 FL (ref 82–98)
NONHDLC SERPL-MCNC: 111 MG/DL
PLATELET # BLD AUTO: 168 THOUSANDS/UL (ref 149–390)
PMV BLD AUTO: 9.8 FL (ref 8.9–12.7)
POTASSIUM SERPL-SCNC: 4.7 MMOL/L (ref 3.5–5.3)
PROT SERPL-MCNC: 7.5 G/DL (ref 6.4–8.2)
RBC # BLD AUTO: 5.36 MILLION/UL (ref 3.88–5.62)
SODIUM SERPL-SCNC: 138 MMOL/L (ref 136–145)
TRIGL SERPL-MCNC: 257 MG/DL
WBC # BLD AUTO: 7.45 THOUSAND/UL (ref 4.31–10.16)

## 2019-04-23 PROCEDURE — 80053 COMPREHEN METABOLIC PANEL: CPT

## 2019-04-23 PROCEDURE — 80061 LIPID PANEL: CPT

## 2019-04-23 PROCEDURE — 85027 COMPLETE CBC AUTOMATED: CPT

## 2019-04-23 PROCEDURE — 83036 HEMOGLOBIN GLYCOSYLATED A1C: CPT

## 2019-04-23 PROCEDURE — 36415 COLL VENOUS BLD VENIPUNCTURE: CPT

## 2019-07-16 ENCOUNTER — OFFICE VISIT (OUTPATIENT)
Dept: CARDIOLOGY CLINIC | Facility: CLINIC | Age: 84
End: 2019-07-16
Payer: MEDICARE

## 2019-07-16 VITALS
HEART RATE: 72 BPM | BODY MASS INDEX: 30.05 KG/M2 | SYSTOLIC BLOOD PRESSURE: 144 MMHG | WEIGHT: 187 LBS | HEIGHT: 66 IN | DIASTOLIC BLOOD PRESSURE: 78 MMHG

## 2019-07-16 DIAGNOSIS — I25.10 CORONARY ARTERY DISEASE INVOLVING NATIVE CORONARY ARTERY OF NATIVE HEART WITHOUT ANGINA PECTORIS: Primary | ICD-10-CM

## 2019-07-16 PROCEDURE — 93000 ELECTROCARDIOGRAM COMPLETE: CPT | Performed by: INTERNAL MEDICINE

## 2019-07-16 PROCEDURE — 99214 OFFICE O/P EST MOD 30 MIN: CPT | Performed by: INTERNAL MEDICINE

## 2019-07-16 PROCEDURE — 1124F ACP DISCUSS-NO DSCNMKR DOCD: CPT | Performed by: INTERNAL MEDICINE

## 2019-07-16 RX ORDER — SUCRALFATE 1 G/1
1 TABLET ORAL 2 TIMES DAILY
COMMUNITY
End: 2019-09-19

## 2019-07-16 RX ORDER — PROPRANOLOL HYDROCHLORIDE 120 MG/1
120 CAPSULE, EXTENDED RELEASE ORAL DAILY
Status: ON HOLD | COMMUNITY
End: 2022-05-09

## 2019-07-16 NOTE — PROGRESS NOTES
[facility]     Saritha Lainez / 82 y o  male / : 1935 / MRN: [id number]  Date of Visit: 19    ASSESSMENT/PLAN  1  Multivessel CAD   · S/p VG-CX, VG-RCA in   · S/p LIMA-D1-LAD, VG-PDA-PLB, [id number]-[id number]-VW6 in   · S/p PCI/stenting to SVG from the aorta to OM1; Patent LIMA 10/2009    · No plans to repeat ischemic testing unless symptoms become more prominent  · Continue DAPT, BB, statin and nitrate therapy    2  HTN  · Adequately controlled  Continue metoprolol, isosorbide and amlodipine    3  HLD  · On atorvastatin 20mg daily - he had intolerance to higher doses    4  Carotid artery stenosis - s/p left CEA   · Follows with [facility] (Dr Sloane Walsh)  · Continue anti-platelet and statin therapy      SUBJECTIVE:  HPI: Saritha Lainez is a 82 y o  male who presents for follow-up regarding CAD, HTN, HLD and carotid artery stenosis  He continues to have atypical chest pain - stabbing pain for seconds at a time, not associated with exertion  Recently he had 2 episodes of this pain while walking, also lasting only for seconds  He did not take NTG because it had resolved before he was able to take it  He has no other complaints today       Cardiac testing:    Carotid duplex 19- widely patent left CEA, 50-69% right carotid artery stenosis       Allergies   Allergen Reactions    Advil [Ibuprofen] Fatigue         Current Outpatient Medications:     amLODIPine (NORVASC) 5 mg tablet, Take 5 mg by mouth daily, Disp: , Rfl:     aspirin (ECOTRIN LOW STRENGTH) 81 mg EC tablet, Take 81 mg by mouth daily, Disp: , Rfl:     atorvastatin (LIPITOR) 20 mg tablet, Take 20 mg by mouth daily, Disp: , Rfl:     clopidogrel (PLAVIX) 75 mg tablet, Take 75 mg by mouth daily, Disp: , Rfl:     escitalopram (LEXAPRO) 10 mg tablet, Take 10 mg by mouth daily, Disp: , Rfl:     glimepiride (AMARYL) 1 mg tablet, daily, Disp: , Rfl: 0    isosorbide mononitrate (IMDUR) 60 mg 24 hr tablet, Take 60 mg by mouth daily In the morning, Disp: , Rfl:     nitroglycerin (NITROSTAT) 0 4 mg SL tablet, one tablet under tongue for angina, may repeat in 15 min  no more than three times, Disp: , Rfl: 0    propranolol (INDERAL LA) 120 mg 24 hr capsule, Take 120 mg by mouth daily, Disp: , Rfl:     sucralfate (CARAFATE) 1 g tablet, Take 1 g by mouth 2 (two) times a day, Disp: , Rfl:     metoprolol tartrate (LOPRESSOR) 100 mg tablet, Take 100 mg by mouth 2 (two) times a day, Disp: , Rfl:     omeprazole (PriLOSEC) 20 mg delayed release capsule, Take 20 mg by mouth daily, Disp: , Rfl:     Past Medical History:   Diagnosis Date    Athscl 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     Stent OM1  Patent mammary graft to LAD 10/7/2009; CABG 1992 & 2005    Cancer [facility])     skin    Diabetes mellitus, type II ([address] )     without complication    Essential (primary) hypertension     GERD (gastroesophageal reflux disease)     Hx of cardiovascular stress test 07/23/2014    Eddie MPI; EF0 52 (52%) Lexiscan, mild LV systolic dysfunction; evidence for prior inferior wall MI; perfusion imaging consistant w mild lat wall ischemia and min torin-infart inferior ischemia   / EF0 51 (51%) evidence for prior inferior wall MI  Mild inferior and mild-moderate lateral wall ischemia  7/29/15    Hx of echocardiogram 11/07/2017    2D w/CFD;EF0 55 (55%) mild LVH, mitral valve prolapse with moderate regurgitation  left atrial enlargement  Mild tricuspid regurgitation   Mixed hyperlipidemia     Occlusion and stenosis of bilateral carotid arteries     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 infarction)     Presence of aortocoronary bypass graft        Family History   Problem Relation Age of Onset    Heart disease Brother     Tuberculosis Mother     Tuberculosis Father        Past Surgical History:   Procedure Laterality Date    CARDIAC CATHETERIZATION  10/07/2009    Stent 99% stenosis SVG from the aorta to OM1   Patent mammary graft to the LAD    CORONARY ARTERY BYPASS GRAFT      VG-CX, VG-RCA    CORONARY ARTERY BYPASS GRAFT  2005    LIMA-D1-LAD, VG-PDA-PLB, Uflncg-IG5-RD2 TMR 9 Lesions    MD EGD TRANSORAL BIOPSY SINGLE/MULTIPLE N/A 2019    Procedure: ESOPHAGOGASTRODUODENOSCOPY (EGD) with biopsy;  Surgeon: Ubaldo Libman, MD;  Location: [facility]; Service: Gastroenterology       Social History     Socioeconomic History    Marital status:      Spouse name: Not on file    Number of children: Not on file    Years of education: Not on file    Highest education level: Not on file   Occupational History    Not on file   Social Needs    Financial resource strain: Not on file    Food insecurity:     Worry: Not on file     Inability: Not on file    Transportation needs:     Medical: Not on file     Non-medical: Not on file   Tobacco Use    Smoking status: Former Smoker     Packs/day: 1 00     Years: 40 00     Pack years: 40 00     Last attempt to quit: 1977     Years since quittin 5    Smokeless tobacco: Never Used   Substance and Sexual Activity    Alcohol use: No    Drug use: No    Sexual activity: Not on file   Lifestyle    Physical activity:     Days per week: Not on file     Minutes per session: Not on file    Stress: Not on file   Relationships    Social connections:     Talks on phone: Not on file     Gets together: Not on file     Attends Anabaptist service: Not on file     Active member of club or organization: Not on file     Attends meetings of clubs or organizations: Not on file     Relationship status: Not on file    Intimate partner violence:     Fear of current or ex partner: Not on file     Emotionally abused: Not on file     Physically abused: Not on file     Forced sexual activity: Not on file   Other Topics Concern    Not on file   Social History Narrative    Not on file       Review of Systems   Constitution: Negative  HENT: Negative  Eyes: Negative      Cardiovascular: Positive for chest pain (chronic, unchanged) and dyspnea on exertion  Negative for claudication, irregular heartbeat, leg swelling, near-syncope, orthopnea, palpitations, paroxysmal nocturnal dyspnea and syncope  Respiratory: Negative for cough, shortness of breath and wheezing  Endocrine: Negative  Skin: Negative  Musculoskeletal: Positive for joint pain  Gastrointestinal: Negative  Genitourinary: Negative  Neurological: Negative for dizziness and light-headedness  Psychiatric/Behavioral: Negative  OBJECTIVE:  Vitals: /78   Pulse 72   Ht 5' 6" (1 676 m)   Wt 84 8 kg (187 lb)   BMI 30 18 kg/m²     Physical exam:   Physical Exam   Constitutional: He is oriented to person, place, and time  He appears well-developed and well-nourished  No distress  HENT:   Head: Normocephalic and atraumatic  Eyes: EOM are normal  No scleral icterus  Neck: Normal range of motion  Neck supple  No JVD present  Left CEA scar   Cardiovascular: Normal rate, regular rhythm, S1 normal and S2 normal    No murmur heard  Midline sternotomy scar   Pulmonary/Chest: Effort normal and breath sounds normal  He has no wheezes  He has no rales  Abdominal: Soft  Bowel sounds are normal    Musculoskeletal: He exhibits no edema  Neurological: He is alert and oriented to person, place, and time  Skin: Skin is warm and dry  Psychiatric: He has a normal mood and affect   His behavior is normal        Lab Results:   Lab Results   Component Value Date    HGBA1C 7 0 (H) 04/23/2019    HGBA1C 7 1 (H) 12/11/2018    HGBA1C 6 0 08/07/2018     No results found for: CHOL  Lab Results   Component Value Date    HDL 31 (L) 04/23/2019    HDL 39 (L) 12/11/2018    HDL 29 (L) 08/07/2018     Lab Results   Component Value Date    LDLCALC 60 04/23/2019    LDLCALC 85 12/11/2018    LDLCALC 36 08/07/2018     Lab Results   Component Value Date    TRIG 257 (H) 04/23/2019    TRIG 90 12/11/2018    TRIG 70 08/07/2018     No results found for: CHOLHDL

## 2019-09-18 ENCOUNTER — HOSPITAL ENCOUNTER (EMERGENCY)
Facility: HOSPITAL | Age: 84
End: 2019-09-19
Attending: EMERGENCY MEDICINE | Admitting: EMERGENCY MEDICINE
Payer: MEDICARE

## 2019-09-18 DIAGNOSIS — R04.0 RIGHT-SIDED EPISTAXIS: Primary | ICD-10-CM

## 2019-09-18 LAB
ANION GAP SERPL CALCULATED.3IONS-SCNC: 9 MMOL/L (ref 4–13)
APTT PPP: 33 SECONDS (ref 23–37)
BASOPHILS # BLD AUTO: 0.08 THOUSANDS/ΜL (ref 0–0.1)
BASOPHILS NFR BLD AUTO: 1 % (ref 0–1)
BUN SERPL-MCNC: 22 MG/DL (ref 5–25)
CALCIUM SERPL-MCNC: 9.5 MG/DL (ref 8.3–10.1)
CHLORIDE SERPL-SCNC: 101 MMOL/L (ref 100–108)
CO2 SERPL-SCNC: 25 MMOL/L (ref 21–32)
CREAT SERPL-MCNC: 1.84 MG/DL (ref 0.6–1.3)
EOSINOPHIL # BLD AUTO: 1.07 THOUSAND/ΜL (ref 0–0.61)
EOSINOPHIL NFR BLD AUTO: 12 % (ref 0–6)
ERYTHROCYTE [DISTWIDTH] IN BLOOD BY AUTOMATED COUNT: 13.4 % (ref 11.6–15.1)
GFR SERPL CREATININE-BSD FRML MDRD: 33 ML/MIN/1.73SQ M
GLUCOSE SERPL-MCNC: 212 MG/DL (ref 65–140)
HCT VFR BLD AUTO: 46.1 % (ref 36.5–49.3)
HGB BLD-MCNC: 14.6 G/DL (ref 12–17)
IMM GRANULOCYTES # BLD AUTO: 0.04 THOUSAND/UL (ref 0–0.2)
IMM GRANULOCYTES NFR BLD AUTO: 1 % (ref 0–2)
INR PPP: 1.01 (ref 0.84–1.19)
LYMPHOCYTES # BLD AUTO: 1.66 THOUSANDS/ΜL (ref 0.6–4.47)
LYMPHOCYTES NFR BLD AUTO: 19 % (ref 14–44)
MCH RBC QN AUTO: 27.2 PG (ref 26.8–34.3)
MCHC RBC AUTO-ENTMCNC: 31.7 G/DL (ref 31.4–37.4)
MCV RBC AUTO: 86 FL (ref 82–98)
MONOCYTES # BLD AUTO: 0.58 THOUSAND/ΜL (ref 0.17–1.22)
MONOCYTES NFR BLD AUTO: 7 % (ref 4–12)
NEUTROPHILS # BLD AUTO: 5.35 THOUSANDS/ΜL (ref 1.85–7.62)
NEUTS SEG NFR BLD AUTO: 60 % (ref 43–75)
NRBC BLD AUTO-RTO: 0 /100 WBCS
PLATELET # BLD AUTO: 207 THOUSANDS/UL (ref 149–390)
PMV BLD AUTO: 8.3 FL (ref 8.9–12.7)
POTASSIUM SERPL-SCNC: 4.3 MMOL/L (ref 3.5–5.3)
PROTHROMBIN TIME: 13.3 SECONDS (ref 11.6–14.5)
RBC # BLD AUTO: 5.36 MILLION/UL (ref 3.88–5.62)
SODIUM SERPL-SCNC: 135 MMOL/L (ref 136–145)
WBC # BLD AUTO: 8.78 THOUSAND/UL (ref 4.31–10.16)

## 2019-09-18 PROCEDURE — 80048 BASIC METABOLIC PNL TOTAL CA: CPT | Performed by: PHYSICIAN ASSISTANT

## 2019-09-18 PROCEDURE — 85610 PROTHROMBIN TIME: CPT | Performed by: PHYSICIAN ASSISTANT

## 2019-09-18 PROCEDURE — 30903 CONTROL OF NOSEBLEED: CPT | Performed by: PHYSICIAN ASSISTANT

## 2019-09-18 PROCEDURE — 99284 EMERGENCY DEPT VISIT MOD MDM: CPT | Performed by: PHYSICIAN ASSISTANT

## 2019-09-18 PROCEDURE — 36415 COLL VENOUS BLD VENIPUNCTURE: CPT | Performed by: PHYSICIAN ASSISTANT

## 2019-09-18 PROCEDURE — 85025 COMPLETE CBC W/AUTO DIFF WBC: CPT | Performed by: PHYSICIAN ASSISTANT

## 2019-09-18 PROCEDURE — 85730 THROMBOPLASTIN TIME PARTIAL: CPT | Performed by: PHYSICIAN ASSISTANT

## 2019-09-18 PROCEDURE — 99284 EMERGENCY DEPT VISIT MOD MDM: CPT

## 2019-09-18 RX ORDER — TRANEXAMIC ACID 100 MG/ML
1000 INJECTION, SOLUTION INTRAVENOUS ONCE
Status: COMPLETED | OUTPATIENT
Start: 2019-09-18 | End: 2019-09-18

## 2019-09-18 RX ORDER — AMOXICILLIN 250 MG/1
500 CAPSULE ORAL ONCE
Status: COMPLETED | OUTPATIENT
Start: 2019-09-18 | End: 2019-09-18

## 2019-09-18 RX ORDER — OXYMETAZOLINE HYDROCHLORIDE 0.05 G/100ML
2 SPRAY NASAL ONCE
Status: COMPLETED | OUTPATIENT
Start: 2019-09-18 | End: 2019-09-18

## 2019-09-18 RX ADMIN — AMOXICILLIN 500 MG: 250 CAPSULE ORAL at 21:53

## 2019-09-18 RX ADMIN — OXYMETAZOLINE HYDROCHLORIDE 2 SPRAY: 5 SPRAY NASAL at 20:19

## 2019-09-18 RX ADMIN — TRANEXAMIC ACID 1000 MG: 1 INJECTION, SOLUTION INTRAVENOUS at 21:53

## 2019-09-19 ENCOUNTER — HOSPITAL ENCOUNTER (INPATIENT)
Facility: HOSPITAL | Age: 84
LOS: 2 days | Discharge: HOME | DRG: 813 | End: 2019-09-21
Attending: EMERGENCY MEDICINE | Admitting: INTERNAL MEDICINE
Payer: MEDICARE

## 2019-09-19 VITALS
TEMPERATURE: 98.1 F | RESPIRATION RATE: 18 BRPM | WEIGHT: 185.19 LBS | DIASTOLIC BLOOD PRESSURE: 84 MMHG | SYSTOLIC BLOOD PRESSURE: 178 MMHG | OXYGEN SATURATION: 93 % | HEART RATE: 91 BPM | BODY MASS INDEX: 29.89 KG/M2

## 2019-09-19 DIAGNOSIS — R04.0 RIGHT-SIDED EPISTAXIS: Primary | ICD-10-CM

## 2019-09-19 DIAGNOSIS — I25.810 CORONARY ARTERY DISEASE INVOLVING CORONARY BYPASS GRAFT OF NATIVE HEART WITHOUT ANGINA PECTORIS: ICD-10-CM

## 2019-09-19 PROBLEM — N18.9 CHRONIC KIDNEY DISEASE: Status: ACTIVE | Noted: 2019-09-19

## 2019-09-19 LAB
ANION GAP SERPL CALCULATED.3IONS-SCNC: 9 MMOL/L (ref 4–13)
BASOPHILS # BLD AUTO: 0.1 THOUSANDS/ΜL (ref 0–0.1)
BASOPHILS NFR BLD AUTO: 1 % (ref 0–1)
BUN SERPL-MCNC: 25 MG/DL (ref 5–25)
CALCIUM SERPL-MCNC: 9.6 MG/DL (ref 8.3–10.1)
CHLORIDE SERPL-SCNC: 101 MMOL/L (ref 100–108)
CO2 SERPL-SCNC: 27 MMOL/L (ref 21–32)
CREAT SERPL-MCNC: 1.57 MG/DL (ref 0.6–1.3)
EOSINOPHIL # BLD AUTO: 0.84 THOUSAND/ΜL (ref 0–0.61)
EOSINOPHIL NFR BLD AUTO: 9 % (ref 0–6)
ERYTHROCYTE [DISTWIDTH] IN BLOOD BY AUTOMATED COUNT: 13.3 % (ref 11.6–15.1)
GFR SERPL CREATININE-BSD FRML MDRD: 40 ML/MIN/1.73SQ M
GLUCOSE P FAST SERPL-MCNC: 199 MG/DL (ref 65–99)
GLUCOSE SERPL-MCNC: 127 MG/DL (ref 65–140)
GLUCOSE SERPL-MCNC: 177 MG/DL (ref 65–140)
GLUCOSE SERPL-MCNC: 199 MG/DL (ref 65–140)
GLUCOSE SERPL-MCNC: 216 MG/DL (ref 65–140)
GLUCOSE SERPL-MCNC: 268 MG/DL (ref 65–140)
HCT VFR BLD AUTO: 45.1 % (ref 36.5–49.3)
HGB BLD-MCNC: 14.2 G/DL (ref 12–17)
IMM GRANULOCYTES # BLD AUTO: 0.05 THOUSAND/UL (ref 0–0.2)
IMM GRANULOCYTES NFR BLD AUTO: 1 % (ref 0–2)
LYMPHOCYTES # BLD AUTO: 1.62 THOUSANDS/ΜL (ref 0.6–4.47)
LYMPHOCYTES NFR BLD AUTO: 17 % (ref 14–44)
MCH RBC QN AUTO: 27.4 PG (ref 26.8–34.3)
MCHC RBC AUTO-ENTMCNC: 31.5 G/DL (ref 31.4–37.4)
MCV RBC AUTO: 87 FL (ref 82–98)
MONOCYTES # BLD AUTO: 0.71 THOUSAND/ΜL (ref 0.17–1.22)
MONOCYTES NFR BLD AUTO: 8 % (ref 4–12)
NEUTROPHILS # BLD AUTO: 6.19 THOUSANDS/ΜL (ref 1.85–7.62)
NEUTS SEG NFR BLD AUTO: 64 % (ref 43–75)
NRBC BLD AUTO-RTO: 0 /100 WBCS
PLATELET # BLD AUTO: 202 THOUSANDS/UL (ref 149–390)
PMV BLD AUTO: 9 FL (ref 8.9–12.7)
POTASSIUM SERPL-SCNC: 4.3 MMOL/L (ref 3.5–5.3)
RBC # BLD AUTO: 5.19 MILLION/UL (ref 3.88–5.62)
SODIUM SERPL-SCNC: 137 MMOL/L (ref 136–145)
WBC # BLD AUTO: 9.51 THOUSAND/UL (ref 4.31–10.16)

## 2019-09-19 PROCEDURE — 30901 CONTROL OF NOSEBLEED: CPT | Performed by: PHYSICIAN ASSISTANT

## 2019-09-19 PROCEDURE — 80048 BASIC METABOLIC PNL TOTAL CA: CPT | Performed by: PHYSICIAN ASSISTANT

## 2019-09-19 PROCEDURE — 82948 REAGENT STRIP/BLOOD GLUCOSE: CPT

## 2019-09-19 PROCEDURE — G8979 MOBILITY GOAL STATUS: HCPCS

## 2019-09-19 PROCEDURE — 99232 SBSQ HOSP IP/OBS MODERATE 35: CPT | Performed by: INTERNAL MEDICINE

## 2019-09-19 PROCEDURE — G8980 MOBILITY D/C STATUS: HCPCS

## 2019-09-19 PROCEDURE — 99220 PR INITIAL OBSERVATION CARE/DAY 70 MINUTES: CPT | Performed by: INTERNAL MEDICINE

## 2019-09-19 PROCEDURE — 99284 EMERGENCY DEPT VISIT MOD MDM: CPT

## 2019-09-19 PROCEDURE — G8978 MOBILITY CURRENT STATUS: HCPCS

## 2019-09-19 PROCEDURE — 97163 PT EVAL HIGH COMPLEX 45 MIN: CPT

## 2019-09-19 PROCEDURE — 85025 COMPLETE CBC W/AUTO DIFF WBC: CPT | Performed by: PHYSICIAN ASSISTANT

## 2019-09-19 PROCEDURE — 99284 EMERGENCY DEPT VISIT MOD MDM: CPT | Performed by: PHYSICIAN ASSISTANT

## 2019-09-19 RX ORDER — AMOXICILLIN AND CLAVULANATE POTASSIUM 500; 125 MG/1; MG/1
1 TABLET, FILM COATED ORAL EVERY 12 HOURS SCHEDULED
Status: DISCONTINUED | OUTPATIENT
Start: 2019-09-19 | End: 2019-09-21 | Stop reason: HOSPADM

## 2019-09-19 RX ORDER — PROPRANOLOL HCL 60 MG
120 CAPSULE, EXTENDED RELEASE 24HR ORAL DAILY
Status: DISCONTINUED | OUTPATIENT
Start: 2019-09-19 | End: 2019-09-21 | Stop reason: HOSPADM

## 2019-09-19 RX ORDER — ACETAMINOPHEN 325 MG/1
650 TABLET ORAL EVERY 6 HOURS PRN
Status: DISCONTINUED | OUTPATIENT
Start: 2019-09-19 | End: 2019-09-21 | Stop reason: HOSPADM

## 2019-09-19 RX ORDER — ESCITALOPRAM OXALATE 10 MG/1
10 TABLET ORAL DAILY
Status: DISCONTINUED | OUTPATIENT
Start: 2019-09-19 | End: 2019-09-21 | Stop reason: HOSPADM

## 2019-09-19 RX ORDER — AMLODIPINE BESYLATE 5 MG/1
5 TABLET ORAL DAILY
Status: DISCONTINUED | OUTPATIENT
Start: 2019-09-19 | End: 2019-09-21 | Stop reason: HOSPADM

## 2019-09-19 RX ORDER — ATORVASTATIN CALCIUM 20 MG/1
20 TABLET, FILM COATED ORAL
Status: DISCONTINUED | OUTPATIENT
Start: 2019-09-19 | End: 2019-09-21 | Stop reason: HOSPADM

## 2019-09-19 RX ORDER — PANTOPRAZOLE SODIUM 40 MG/1
40 TABLET, DELAYED RELEASE ORAL
Status: DISCONTINUED | OUTPATIENT
Start: 2019-09-19 | End: 2019-09-21 | Stop reason: HOSPADM

## 2019-09-19 RX ORDER — ONDANSETRON 2 MG/ML
4 INJECTION INTRAMUSCULAR; INTRAVENOUS EVERY 6 HOURS PRN
Status: DISCONTINUED | OUTPATIENT
Start: 2019-09-19 | End: 2019-09-21 | Stop reason: HOSPADM

## 2019-09-19 RX ORDER — ISOSORBIDE MONONITRATE 60 MG/1
60 TABLET, EXTENDED RELEASE ORAL DAILY
Status: DISCONTINUED | OUTPATIENT
Start: 2019-09-19 | End: 2019-09-21 | Stop reason: HOSPADM

## 2019-09-19 RX ADMIN — PROPRANOLOL HYDROCHLORIDE 120 MG: 60 CAPSULE, EXTENDED RELEASE ORAL at 08:33

## 2019-09-19 RX ADMIN — ESCITALOPRAM OXALATE 10 MG: 10 TABLET ORAL at 08:33

## 2019-09-19 RX ADMIN — ISOSORBIDE MONONITRATE 60 MG: 60 TABLET, EXTENDED RELEASE ORAL at 08:33

## 2019-09-19 RX ADMIN — AMOXICILLIN AND CLAVULANATE POTASSIUM 1 TABLET: 500; 125 TABLET, FILM COATED ORAL at 09:00

## 2019-09-19 RX ADMIN — INSULIN LISPRO 2 UNITS: 100 INJECTION, SOLUTION INTRAVENOUS; SUBCUTANEOUS at 05:50

## 2019-09-19 RX ADMIN — AMOXICILLIN AND CLAVULANATE POTASSIUM 1 TABLET: 500; 125 TABLET, FILM COATED ORAL at 22:03

## 2019-09-19 RX ADMIN — AMLODIPINE BESYLATE 5 MG: 5 TABLET ORAL at 08:33

## 2019-09-19 RX ADMIN — ATORVASTATIN CALCIUM 20 MG: 20 TABLET, FILM COATED ORAL at 16:59

## 2019-09-19 RX ADMIN — PANTOPRAZOLE SODIUM 40 MG: 40 TABLET, DELAYED RELEASE ORAL at 05:51

## 2019-09-19 RX ADMIN — INSULIN LISPRO 1 UNITS: 100 INJECTION, SOLUTION INTRAVENOUS; SUBCUTANEOUS at 16:59

## 2019-09-19 RX ADMIN — INSULIN LISPRO 2 UNITS: 100 INJECTION, SOLUTION INTRAVENOUS; SUBCUTANEOUS at 12:07

## 2019-09-19 NOTE — PLAN OF CARE
Problem: INFECTION - ADULT  Goal: Absence or prevention of progression during hospitalization  Description  INTERVENTIONS:  - Assess and monitor for signs and symptoms of infection  - Monitor lab/diagnostic results  - Monitor all insertion sites, i e  indwelling lines, tubes, and drains  - Monitor endotracheal if appropriate and nasal secretions for changes in amount and color  - Ryder appropriate cooling/warming therapies per order  - Administer medications as ordered  - Instruct and encourage patient and family to use good hand hygiene technique  - Identify and instruct in appropriate isolation precautions for identified infection/condition  Outcome: Progressing  Goal: Absence of fever/infection during neutropenic period  Description  INTERVENTIONS:  - Monitor WBC    Outcome: Progressing     Problem: SAFETY ADULT  Goal: Patient will remain free of falls  Description  INTERVENTIONS:  - Assess patient frequently for physical needs  -  Identify cognitive and physical deficits and behaviors that affect risk of falls    -  Ryder fall precautions as indicated by assessment   - Educate patient/family on patient safety including physical limitations  - Instruct patient to call for assistance with activity based on assessment  - Modify environment to reduce risk of injury  - Consider OT/PT consult to assist with strengthening/mobility  Outcome: Progressing  Goal: Maintain or return to baseline ADL function  Description  INTERVENTIONS:  -  Assess patient's ability to carry out ADLs; assess patient's baseline for ADL function and identify physical deficits which impact ability to perform ADLs (bathing, care of mouth/teeth, toileting, grooming, dressing, etc )  - Assess/evaluate cause of self-care deficits   - Assess range of motion  - Assess patient's mobility; develop plan if impaired  - Assess patient's need for assistive devices and provide as appropriate  - Encourage maximum independence but intervene and supervise when necessary  - Involve family in performance of ADLs  - Assess for home care needs following discharge   - Consider OT consult to assist with ADL evaluation and planning for discharge  - Provide patient education as appropriate  Outcome: Progressing     Problem: DISCHARGE PLANNING  Goal: Discharge to home or other facility with appropriate resources  Description  INTERVENTIONS:  - Identify barriers to discharge w/patient and caregiver  - Arrange for needed discharge resources and transportation as appropriate  - Identify discharge learning needs (meds, wound care, etc )  - Arrange for interpretive services to assist at discharge as needed  - Refer to Case Management Department for coordinating discharge planning if the patient needs post-hospital services based on physician/advanced practitioner order or complex needs related to functional status, cognitive ability, or social support system  Outcome: Progressing     Problem: Knowledge Deficit  Goal: Patient/family/caregiver demonstrates understanding of disease process, treatment plan, medications, and discharge instructions  Description  Complete learning assessment and assess knowledge base    Interventions:  - Provide teaching at level of understanding  - Provide teaching via preferred learning methods  Outcome: Progressing     Problem: SKIN/TISSUE INTEGRITY - ADULT  Goal: Skin integrity remains intact  Description  INTERVENTIONS  - Identify patients at risk for skin breakdown  - Assess and monitor skin integrity  - Assess and monitor nutrition and hydration status  - Monitor labs (i e  albumin)  - Assess for incontinence   - Turn and reposition patient  - Assist with mobility/ambulation  - Relieve pressure over bony prominences  - Avoid friction and shearing  - Provide appropriate hygiene as needed including keeping skin clean and dry  - Evaluate need for skin moisturizer/barrier cream  - Collaborate with interdisciplinary team (i e  Nutrition, Rehabilitation, etc )   - Patient/family teaching  Outcome: Progressing  Goal: Incision(s), wounds(s) or drain site(s) healing without S/S of infection  Description  INTERVENTIONS  - Assess and document risk factors for skin impairment   - Assess and document dressing, incision, wound bed, drain sites and surrounding tissue  - Consider nutrition services referral as needed  - Oral mucous membranes remain intact  - Provide patient/ family education  Outcome: Progressing  Goal: Oral mucous membranes remain intact  Description  INTERVENTIONS  - Assess oral mucosa and hygiene practices  - Implement preventative oral hygiene regimen  - Implement oral medicated treatments as ordered  - Initiate Nutrition services referral as needed  Outcome: Progressing     Problem: HEMATOLOGIC - ADULT  Goal: Maintains hematologic stability  Description  INTERVENTIONS  - Assess for signs and symptoms of bleeding or hemorrhage  - Monitor labs  - Administer supportive blood products/factors as ordered and appropriate  Outcome: Progressing

## 2019-09-19 NOTE — CONSULTS
Consultation - ENT   Saritha Lainez 82 y o  male MRN: [id number]  Unit/Bed#: [facility] 68 2 -01 Encounter: [id number]      Assessment/Plan     Assessment:  Epistaxis  Plan:  Since pack was changed to rhino rocket from prior Merocel placed at [facility], patient has had no bleeding  Recommend antibiotic prophylaxis while packing in place  May advance diet today; Make patient NPO overnight starting at midnight  Will see patient 1st thing 09/20/2019 and remove pack and determine if cautery in OR is necessary  History of Present Illness   Physician Requesting Consult: Bryn Holliday MD  Reason for Consult / Principal Problem:  Epistaxis  HPI: Saritha Lainez is a 82y o  year old male who presents with right-sided epistaxis that began at 7:30 a m  On 09/18/2019  Patient has no history of nose bleeds  He does take aspirin and Plavix chronically  He was seen at [place] [address] Emergency room where the patient was packed, T HX was applied and after he sneezed developed bleeding anteriorly around the pack  I was contacted by the transfer service and accepted the patient with request to have the patient delivered to the emergency room at [address] [address] to determine if he required to go to the OR  On arrival to the ER, persistent oozing from the right side was noted  Case discussed with ER attending this morning  Pack in place was a Merocel which was removed and a rhino rocket was placed with all bleeding stopped  Inpatient consult to ENT  Consult performed by: Eusebio Acosta DO  Consult ordered by: Gaurang Lerner PA-C          Review of Systems   Constitutional: Negative  HENT: Positive for nosebleeds  Respiratory: Negative  Cardiovascular: Negative  Historical Information   Past Medical History:   Diagnosis Date    Athscl 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     Stent OM1   Patent mammary graft to LAD 10/7/2009; CABG 1992 & 2005    Cancer [facility]) skin    Diabetes mellitus, type II ([facility] [address] )     without complication    Essential (primary) hypertension     GERD (gastroesophageal reflux disease)     Hx of cardiovascular stress test 2014    Eddie MPI; EF0 52 (52%) Lexiscan, mild LV systolic dysfunction; evidence for prior inferior wall MI; perfusion imaging consistant w mild lat wall ischemia and min torin-infart inferior ischemia   / EF0 51 (51%) evidence for prior inferior wall MI  Mild inferior and mild-moderate lateral wall ischemia  7/29/15    Hx of echocardiogram 2017    2D w/CFD;EF0 55 (55%) mild LVH, mitral valve prolapse with moderate regurgitation  left atrial enlargement  Mild tricuspid regurgitation   Mixed hyperlipidemia     Occlusion and stenosis of bilateral carotid arteries     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 infarction)     Presence of aortocoronary bypass graft      Past Surgical History:   Procedure Laterality Date    CARDIAC CATHETERIZATION  10/07/2009    Stent 99% stenosis SVG from the aorta to OM1  Patent mammary graft to the LAD    CORONARY ARTERY BYPASS GRAFT      VG-CX, VG-RCA    CORONARY ARTERY BYPASS GRAFT  2005    LIMA-D1-LAD, VG-PDA-PLB, Ojtghg-JF2-IC1 TMR 9 Lesions    FL EGD TRANSORAL BIOPSY SINGLE/MULTIPLE N/A 2019    Procedure: ESOPHAGOGASTRODUODENOSCOPY (EGD) with biopsy;  Surgeon: Mariah Fajardo MD;  Location: [facility] GI [facility];   Service: Gastroenterology     Social History   Social History     Substance and Sexual Activity   Alcohol Use No     Social History     Substance and Sexual Activity   Drug Use No     Social History     Tobacco Use   Smoking Status Former Smoker    Packs/day: 1 00    Years: 40 00    Pack years: 40 00    Last attempt to quit: 1977    Years since quittin 6   Smokeless Tobacco Never Used     Family History: non-contributory    Meds/Allergies   all current active meds have been reviewed, current meds:   Current Facility-Administered Medications   Medication Dose Route Frequency    acetaminophen (TYLENOL) tablet 650 mg  650 mg Oral Q6H PRN    amLODIPine (NORVASC) tablet 5 mg  5 mg Oral Daily    amoxicillin-clavulanate (AUGMENTIN) 500-125 mg per tablet 1 tablet  1 tablet Oral Q12H [address]    atorvastatin (LIPITOR) tablet 20 mg  20 mg Oral Daily With Dinner    escitalopram (LEXAPRO) tablet 10 mg  10 mg Oral Daily    insulin lispro (HumaLOG) 100 units/mL subcutaneous injection 1-5 Units  1-5 Units Subcutaneous Q6H [address]    isosorbide mononitrate (IMDUR) 24 hr tablet 60 mg  60 mg Oral Daily    ondansetron (ZOFRAN) injection 4 mg  4 mg Intravenous Q6H PRN    pantoprazole (PROTONIX) EC tablet 40 mg  40 mg Oral Early Morning    propranolol (INDERAL LA) 24 hr capsule 120 mg  120 mg Oral Daily    and PTA meds:   Prior to Admission Medications   Prescriptions Last Dose Informant Patient Reported? Taking? amLODIPine (NORVASC) 5 mg tablet   Yes Yes   Sig: Take 5 mg by mouth daily   aspirin (ECOTRIN LOW STRENGTH) 81 mg EC tablet   Yes Yes   Sig: Take 81 mg by mouth daily   atorvastatin (LIPITOR) 20 mg tablet   Yes Yes   Sig: Take 20 mg by mouth daily   clopidogrel (PLAVIX) 75 mg tablet   Yes Yes   Sig: Take 75 mg by mouth daily   escitalopram (LEXAPRO) 10 mg tablet   Yes Yes   Sig: Take 10 mg by mouth daily   glimepiride (AMARYL) 1 mg tablet   Yes Yes   Sig: daily   isosorbide mononitrate (IMDUR) 60 mg 24 hr tablet   Yes Yes   Sig: Take 60 mg by mouth daily In the morning   nitroglycerin (NITROSTAT) 0 4 mg SL tablet   Yes No   Sig: one tablet under tongue for angina, may repeat in 15 min   no more than three times   omeprazole (PriLOSEC) 20 mg delayed release capsule   Yes No   Sig: Take 20 mg by mouth daily   propranolol (INDERAL LA) 120 mg 24 hr capsule   Yes No   Sig: Take 120 mg by mouth daily      Facility-Administered Medications: None       Allergies   Allergen Reactions    Advil [Ibuprofen] Fatigue       Objective     No intake or output data in the 24 hours ending 09/19/19 0544    Invasive Devices     Peripheral Intravenous Line            Peripheral IV 09/19/19 Left Wrist less than 1 day                Physical Exam   Constitutional: He appears well-developed and well-nourished  No distress  HENT:   Head: Normocephalic and atraumatic  Right Ear: External ear normal    Left Ear: External ear normal    Skin: He is not diaphoretic  Nose: pack in place right - no active bleeding  Oropharynx: clear and moist  Patient edentulous  Neck supple, no lymphadenopathy  Lab Results:   CBC:   Lab Results   Component Value Date    WBC 8 78 09/18/2019    HGB 14 6 09/18/2019    HCT 46 1 09/18/2019    MCV 86 09/18/2019     09/18/2019    MCH 27 2 09/18/2019    MCHC 31 7 09/18/2019    RDW 13 4 09/18/2019    MPV 8 3 (L) 09/18/2019    NRBC 0 09/18/2019   , CMP:   Lab Results   Component Value Date    SODIUM 135 (L) 09/18/2019    K 4 3 09/18/2019     09/18/2019    CO2 25 09/18/2019    BUN 22 09/18/2019    CREATININE 1 84 (H) 09/18/2019    CALCIUM 9 5 09/18/2019    EGFR 33 09/18/2019   , Coags:   Lab Results   Component Value Date    PTT 33 09/18/2019    INR 1 01 09/18/2019     Imaging Studies: I have personally reviewed pertinent reports  EKG, Pathology, and Other Studies: I have personally reviewed pertinent reports  Code Status: Level 1 - Full Code  Advance Directive and Living Will:      Power of :    POLST:      Counseling/Coordination of Care: Total floor / unit time spent today 25  minutes  Greater than 50% of total time was spent with the patient and / or family counseling and / or coordination of care  A description of the counseling / coordination of care: Plan reviewed with patient  info provided so his daughter may call once she is awake to discuss treatment plan

## 2019-09-19 NOTE — EMTALA/ACUTE CARE TRANSFER
[facility] EMERGENCY DEPARTMENT  [address] [place] [id number]  Dept: [phone]      EMTALA TRANSFER CONSENT    NAME Angel KING 1935                              MRN [id number]    I have been informed of my rights regarding examination, treatment, and transfer   by Dr Jim Benson DO, Gomez Ho PA-C    Benefits:    Specialty consultation by ENT    Risks:   delay in receiving care, traffic risks associated with transfer, continued bleeding during transfer, deterioration of condition      Consent for Transfer:  I acknowledge that my medical condition has been evaluated and explained to me by the emergency department physician or other qualified medical person and/or my attending physician, who has recommended that I be transferred to the service of    at [facility]    The above potential benefits of such transfer, the potential risks associated with such transfer, and the probable risks of not being transferred have been explained to me, and I fully understand them  The doctor has explained that, in my case, the benefits of transfer outweigh the risks  I agree to be transferred  I authorize the performance of emergency medical procedures and treatments upon me in both transit and upon arrival at the receiving facility  Additionally, I authorize the release of any and all medical records to the receiving facility and request they be transported with me, if possible  I understand that the safest mode of transportation during a medical emergency is an ambulance and that the Hospital advocates the use of this mode of transport  Risks of traveling to the receiving facility by car, including absence of medical control, life sustaining equipment, such as oxygen, and medical personnel has been explained to me and I fully understand them      (TU CORRECT BOX BELOW)  [  ]  I consent to the stated transfer and to be transported by ambulance/helicopter  [  ]  I consent to the stated transfer, but refuse transportation by ambulance and accept full responsibility for my transportation by car  I understand the risks of non-ambulance transfers and I exonerate the Hospital and its staff from any deterioration in my condition that results from this refusal     X___________________________________________    DATE  19  TIME________  Signature of patient or legally responsible individual signing on patient behalf           RELATIONSHIP TO PATIENT_________________________          Provider Certification    NAME Joni Gross                                         1935                              MRN [id number]    A medical screening exam was performed on the above named patient  Based on the examination:    Condition Necessitating Transfer The encounter diagnosis was Right-sided epistaxis  Patient Condition:   stable    Reason for Transfer:   need for ENT consultation, speciality not available here at [facility]    Transfer Requirements: Facility     · Space available and qualified personnel available for treatment as acknowledged by   [facility]  · Agreed to accept transfer and to provide appropriate medical treatment as acknowledged by          · Appropriate medical records of the examination and treatment of the patient are provided at the time of transfer   [address], [address] _______  · Transfer will be performed by qualified personnel from    and appropriate transfer equipment as required, including the use of necessary and appropriate life support measures      Provider Certification: I have examined the patient and explained the following risks and benefits of being transferred/refusing transfer to the patient/family:         Based on these reasonable risks and benefits to the patient and/or the unborn child(hermilo), and based upon the information available at the time of the patients examination, I certify that the medical benefits reasonably to be expected from the provision of appropriate medical treatments at another medical facility outweigh the increasing risks, if any, to the individuals medical condition, and in the case of labor to the unborn child, from effecting the transfer      X____________________________________________ DATE 09/18/19        TIME_______      ORIGINAL - SEND TO MEDICAL RECORDS   COPY - SEND WITH PATIENT DURING TRANSFER

## 2019-09-19 NOTE — PLAN OF CARE
Problem: INFECTION - ADULT  Goal: Absence or prevention of progression during hospitalization  Description  INTERVENTIONS:  - Assess and monitor for signs and symptoms of infection  - Monitor lab/diagnostic results  - Monitor all insertion sites, i e  indwelling lines, tubes, and drains  - Monitor endotracheal if appropriate and nasal secretions for changes in amount and color  - [place] appropriate cooling/warming therapies per order  - Administer medications as ordered  - Instruct and encourage patient and family to use good hand hygiene technique  - Identify and instruct in appropriate isolation precautions for identified infection/condition  Outcome: Progressing  Goal: Absence of fever/infection during neutropenic period  Description  INTERVENTIONS:  - Monitor WBC    Outcome: Progressing     Problem: SAFETY ADULT  Goal: Patient will remain free of falls  Description  INTERVENTIONS:  - Assess patient frequently for physical needs  -  Identify cognitive and physical deficits and behaviors that affect risk of falls    -  [place] fall precautions as indicated by assessment   - Educate patient/family on patient safety including physical limitations  - Instruct patient to call for assistance with activity based on assessment  - Modify environment to reduce risk of injury  - Consider OT/PT consult to assist with strengthening/mobility  Outcome: Progressing  Goal: Maintain or return to baseline ADL function  Description  INTERVENTIONS:  -  Assess patient's ability to carry out ADLs; assess patient's baseline for ADL function and identify physical deficits which impact ability to perform ADLs (bathing, care of mouth/teeth, toileting, grooming, dressing, etc )  - Assess/evaluate cause of self-care deficits   - Assess range of motion  - Assess patient's mobility; develop plan if impaired  - Assess patient's need for assistive devices and provide as appropriate  - Encourage maximum independence but intervene and supervise when necessary  - Involve family in performance of ADLs  - Assess for home care needs following discharge   - Consider OT consult to assist with ADL evaluation and planning for discharge  - Provide patient education as appropriate  Outcome: Progressing     Problem: DISCHARGE PLANNING  Goal: Discharge to home or other facility with appropriate resources  Description  INTERVENTIONS:  - Identify barriers to discharge w/patient and caregiver  - Arrange for needed discharge resources and transportation as appropriate  - Identify discharge learning needs (meds, wound care, etc )  - Arrange for interpretive services to assist at discharge as needed  - Refer to Case Management Department for coordinating discharge planning if the patient needs post-hospital services based on physician/advanced practitioner order or complex needs related to functional status, cognitive ability, or social support system  Outcome: Progressing     Problem: Knowledge Deficit  Goal: Patient/family/caregiver demonstrates understanding of disease process, treatment plan, medications, and discharge instructions  Description  Complete learning assessment and assess knowledge base    Interventions:  - Provide teaching at level of understanding  - Provide teaching via preferred learning methods  Outcome: Progressing     Problem: SKIN/TISSUE INTEGRITY - ADULT  Goal: Skin integrity remains intact  Description  INTERVENTIONS  - Identify patients at risk for skin breakdown  - Assess and monitor skin integrity  - Assess and monitor nutrition and hydration status  - Monitor labs (i e  albumin)  - Assess for incontinence   - Turn and reposition patient  - Assist with mobility/ambulation  - Relieve pressure over bony prominences  - Avoid friction and shearing  - Provide appropriate hygiene as needed including keeping skin clean and dry  - Evaluate need for skin moisturizer/barrier cream  - Collaborate with interdisciplinary team (i e  Nutrition, Rehabilitation, etc )   - Patient/family teaching  Outcome: Progressing  Goal: Incision(s), wounds(s) or drain site(s) healing without S/S of infection  Description  INTERVENTIONS  - Assess and document risk factors for skin impairment   - Assess and document dressing, incision, wound bed, drain sites and surrounding tissue  - Consider nutrition services referral as needed  - Oral mucous membranes remain intact  - Provide patient/ family education  Outcome: Progressing  Goal: Oral mucous membranes remain intact  Description  INTERVENTIONS  - Assess oral mucosa and hygiene practices  - Implement preventative oral hygiene regimen  - Implement oral medicated treatments as ordered  - Initiate Nutrition services referral as needed  Outcome: Progressing     Problem: HEMATOLOGIC - ADULT  Goal: Maintains hematologic stability  Description  INTERVENTIONS  - Assess for signs and symptoms of bleeding or hemorrhage  - Monitor labs  - Administer supportive blood products/factors as ordered and appropriate  Outcome: Progressing     Problem: Potential for Falls  Goal: Patient will remain free of falls  Description  INTERVENTIONS:  - Assess patient frequently for physical needs  -  Identify cognitive and physical deficits and behaviors that affect risk of falls    -  [place] fall precautions as indicated by assessment   - Educate patient/family on patient safety including physical limitations  - Instruct patient to call for assistance with activity based on assessment  - Modify environment to reduce risk of injury  - Consider OT/PT consult to assist with strengthening/mobility  Outcome: Progressing

## 2019-09-19 NOTE — H&P
H&P- Brooke Gibson 1935, 82 y o  male MRN: [id number]    Unit/Bed#: TRAY Encounter: [id number]    Primary Care Provider: Skinny Gaston DO   Date and time admitted to hospital: 9/19/2019  1:49 AM    * Right-sided epistaxis  Assessment & Plan  · Presented to the emergency department at [facility] with right-sided epistaxis that was difficult to control  ED at [facility] had discussed with ENT and recommended transfer for follow up while inpatient  · Vital signs stable at time of admission  · CBC and CMP stable from prior  · Re-packed in the ED and observed for 1 hour  · Re-check CBC in AM  · Hold ASA and plavix for now in setting of epistaxis  · Continue Augmentin while packing is in place  · Consult ENT    Chronic kidney disease  Assessment & Plan  · Baseline creatinine appears to be 1 7-1 8  · Creatinine stable at time of admission  · Avoid nephrotoxic agents    CAD (coronary artery disease)  Assessment & Plan  · History of prior stent and bypass graft  · Maintained on ASA, plavix, statin, beta-blocker  · Hold ASA and plavix for now in setting of epistaxis consider restarting in AM    Diabetes mellitus, type II ([facility] [address] )  Assessment & Plan  Lab Results   Component Value Date    HGBA1C 7 0 (H) 04/23/2019       No results for input(s): POCGLU in the last 72 hours  Blood Sugar Average: Last 72 hrs:  · On glimepiride as an outpatient  · SSI + accuchek while inpatient    Benign essential hypertension  Assessment & Plan  · Blood pressure initially elevated on arrival to ED  · Currently improved without intervention  · Continue amlodipine, propanolol    VTE Prophylaxis: Pharmacologic VTE Prophylaxis contraindicated due to epistaxis  / sequential compression device   Code Status: Level 1 - Full Code  POLST: There is no POLST form on file for this patient (pre-hospital)  Discussion with family: Discussed with patient directly at bedside      Anticipated Length of Stay:  Patient will be admitted on an Observation basis with an anticipated length of stay of  Less than 2 midnights  Justification for Hospital Stay: epistaxis    Total Time for Visit, including Counseling / Coordination of Care: 45 minutes  Greater than 50% of this total time spent on direct patient counseling and coordination of care  Chief Complaint:   "I blew my nose and it started bleeding"    History of Present Illness:    Marcia Abad is a 82 y o  male who presents with nose bleed  Past medical history significant for coronary artery disease, type 2 diabetes, essential hypertension, GERD, hyperlipidemia  Patient states around 6:00 PM yesterday he blew his nose while eating dinner  His nose started to spontaneously bleed at that time, primarily from the anterior portion  He denies feeling any blood trickling down the back of his throat  He attempted to apply pressure but when this did not improve, he presented to the ED  Otherwise denies any headaches, visual changes, chest pain/palpitations, shortness of breath, nausea/vomiting, abdominal pain  Denies any prior significant nose bleeds however notes he is on aspirin and plavix  Only complaint currently is mild discomfort from the nasal packing  Review of Systems:    Review of Systems   Constitutional: Negative for chills, fatigue, fever and unexpected weight change  HENT: Positive for nosebleeds  Negative for congestion, sore throat and trouble swallowing  Eyes: Negative for photophobia, pain and visual disturbance  Respiratory: Negative for cough, shortness of breath and wheezing  Cardiovascular: Negative for chest pain, palpitations and leg swelling  Gastrointestinal: Negative for abdominal pain, constipation, diarrhea, nausea and vomiting  Endocrine: Negative for polyuria  Genitourinary: Negative for difficulty urinating, dysuria, flank pain, hematuria and urgency  Musculoskeletal: Negative for back pain, myalgias, neck pain and neck stiffness     Skin: Negative for pallor and rash  Neurological: Negative for dizziness, tremors, syncope, speech difficulty, weakness, light-headedness and headaches  Hematological: Does not bruise/bleed easily  Psychiatric/Behavioral: Negative for agitation and confusion  Past Medical and Surgical History:     Past Medical History:   Diagnosis Date    Athscl 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     Stent OM1  Patent mammary graft to LAD 10/7/2009; CABG 1992 & 2005    Cancer [facility])     skin    Diabetes mellitus, type II ([address] )     without complication    Essential (primary) hypertension     GERD (gastroesophageal reflux disease)     Hx of cardiovascular stress test 07/23/2014    Eddie MPI; EF0 52 (52%) Lexiscan, mild LV systolic dysfunction; evidence for prior inferior wall MI; perfusion imaging consistant w mild lat wall ischemia and min torin-infart inferior ischemia   / EF0 51 (51%) evidence for prior inferior wall MI  Mild inferior and mild-moderate lateral wall ischemia  7/29/15    Hx of echocardiogram 11/07/2017    2D w/CFD;EF0 55 (55%) mild LVH, mitral valve prolapse with moderate regurgitation  left atrial enlargement  Mild tricuspid regurgitation   Mixed hyperlipidemia     Occlusion and stenosis of bilateral carotid arteries     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 infarction)     Presence of aortocoronary bypass graft        Past Surgical History:   Procedure Laterality Date    CARDIAC CATHETERIZATION  10/07/2009    Stent 99% stenosis SVG from the aorta to OM1  Patent mammary graft to the LAD    CORONARY ARTERY BYPASS GRAFT  1992    VG-CX, VG-RCA    CORONARY ARTERY BYPASS GRAFT  11/04/2005    LIMA-D1-LAD, VG-PDA-PLB, [id number] TMR 9 Lesions    [place] EGD TRANSORAL BIOPSY SINGLE/MULTIPLE N/A 1/23/2019    Procedure: ESOPHAGOGASTRODUODENOSCOPY (EGD) with biopsy;  Surgeon: Ronald Ventura MD;  Location: [address] [place] [address] GI LAB;   Service: Gastroenterology       Meds/Allergies:    Prior to Admission medications    Medication Sig Start Date End Date Taking? Authorizing Provider   amLODIPine (NORVASC) 5 mg tablet Take 5 mg by mouth daily   Yes Historical Provider, MD   aspirin (ECOTRIN LOW STRENGTH) 81 mg EC tablet Take 81 mg by mouth daily   Yes Historical Provider, MD   atorvastatin (LIPITOR) 20 mg tablet Take 20 mg by mouth daily   Yes Historical Provider, MD   clopidogrel (PLAVIX) 75 mg tablet Take 75 mg by mouth daily   Yes Historical Provider, MD   escitalopram (LEXAPRO) 10 mg tablet Take 10 mg by mouth daily   Yes Historical Provider, MD   glimepiride (AMARYL) 1 mg tablet daily 9/11/18  Yes Historical Provider, MD   isosorbide mononitrate (IMDUR) 60 mg 24 hr tablet Take 60 mg by mouth daily In the morning   Yes Historical Provider, MD   nitroglycerin (NITROSTAT) 0 4 mg SL tablet one tablet under tongue for angina, may repeat in 15 min  no more than three times 7/17/18   Historical Provider, MD   omeprazole (PriLOSEC) 20 mg delayed release capsule Take 20 mg by mouth daily    Historical Provider, MD   propranolol (INDERAL LA) 120 mg 24 hr capsule Take 120 mg by mouth daily    Historical Provider, MD   metoprolol tartrate (LOPRESSOR) 100 mg tablet Take 100 mg by mouth 2 (two) times a day  9/19/19  Historical Provider, MD   sucralfate (CARAFATE) 1 g tablet Take 1 g by mouth 2 (two) times a day  9/19/19  Historical Provider, MD     I have reviewed home medications with patient personally  Allergies: Allergies   Allergen Reactions    Advil [Ibuprofen] Fatigue       Social History:     Marital Status:     Occupation: Retired  Patient Pre-hospital Living Situation: Lives alone  Patient Pre-hospital Level of Mobility: Ambulatory  Patient Pre-hospital Diet Restrictions: None  Substance Use History:   Social History     Substance and Sexual Activity   Alcohol Use No     Social History     Tobacco Use   Smoking Status Former Smoker    Packs/day: 1 00    Years: 40 00    Pack years: 40 00    Last attempt to quit: 1/22/1977    Years since quittin 6   Smokeless Tobacco Never Used     Social History     Substance and Sexual Activity   Drug Use No       Family History:    Family History   Problem Relation Age of Onset    Heart disease Brother     Tuberculosis Mother     Tuberculosis Father        Physical Exam:     Vitals:   Blood Pressure: 153/70 (19)  Pulse: 90 (19)  Temperature: 98 5 °F (36 9 °C) (19)  Temp Source: Oral (19)  Respirations: 18 (19)  Weight - Scale: 80 1 kg (176 lb 9 4 oz) (19)  SpO2: 93 % (19)    Physical Exam   Constitutional: He is oriented to person, place, and time  Vital signs are normal  He appears well-developed  Appears comfortable, no acute distress   HENT:   Head: Normocephalic  Nasal packing in place, right nare  Dried blood surrounding external nare  Dried blood posterior pharynx, no sign of active bleeding currently   Eyes: Pupils are equal, round, and reactive to light  Conjunctivae and EOM are normal  No scleral icterus  Neck: Normal range of motion  Cardiovascular: Normal rate, regular rhythm and normal heart sounds  No murmur heard  Pulmonary/Chest: Effort normal and breath sounds normal  No respiratory distress  He has no wheezes  He has no rhonchi  He has no rales  Abdominal: Soft  Bowel sounds are normal  There is no tenderness  There is no rigidity, no rebound and no guarding  Musculoskeletal: He exhibits no edema  Able to move upper and lower extremities bilaterally   Neurological: He is alert and oriented to person, place, and time  Skin: Skin is warm and dry  Psychiatric: He has a normal mood and affect  His speech is normal and behavior is normal    Nursing note and vitals reviewed  Additional Data:     Lab Results: I have personally reviewed pertinent reports        Results from last 7 days   Lab Units 19  2054   WBC Thousand/uL 8 78   HEMOGLOBIN g/dL 14 6   HEMATOCRIT % 46 1   PLATELETS Thousands/uL 207   NEUTROS PCT % 60   LYMPHS PCT % 19   MONOS PCT % 7   EOS PCT % 12*     Results from last 7 days   Lab Units 09/18/19 2054   SODIUM mmol/L 135*   POTASSIUM mmol/L 4 3   CHLORIDE mmol/L 101   CO2 mmol/L 25   BUN mg/dL 22   CREATININE mg/dL 1 84*   ANION GAP mmol/L 9   CALCIUM mg/dL 9 5   GLUCOSE RANDOM mg/dL 212*     Results from last 7 days   Lab Units 09/18/19 2054   INR  1 01                   Imaging: I have personally reviewed pertinent reports  No orders to display       EKG, Pathology, and Other Studies Reviewed on Admission:   · Labs reviewed    [facility]\"" / Epic Records Reviewed: Yes     ** Please Note: This note has been constructed using a voice recognition system   **

## 2019-09-19 NOTE — ASSESSMENT & PLAN NOTE
· Blood pressure initially elevated on arrival to ED  · Currently improved without intervention  · Continue amlodipine, propanolol

## 2019-09-19 NOTE — ASSESSMENT & PLAN NOTE
Lab Results   Component Value Date    HGBA1C 7 0 (H) 04/23/2019       Recent Labs     09/19/19  0549 09/19/19  1125 09/19/19  1619   POCGLU 216* 268* 177*       Blood Sugar Average: Last 72 hrs:  · (P) 220 [id number] glimepiride as an outpatient  · SSI + accuchek while inpatient

## 2019-09-19 NOTE — PHYSICIAN ADVISOR
Current patient class: Observation  The patient is currently on Hospital Day: 1 at [facility]      This patient was originally admitted to the hospital under observation status  After admission the patient was reevaluated and determined to require further hospitalization  The patient is now documented to require at least a 2nd midnight in the hospital  As such the patient is now expected to satisfy the 2 midnight benchmark and is therefore eligible for inpatient admission  After review of the relevant documentation, labs, vital signs and test results, the patient is appropriate for INPATIENT ADMISSION  Admission to the hospital as an inpatient is a complex decision making process which requires the practitioner to consider the patients presenting complaint, history and physical examination and all relevant testing  With this in mind, in this case, the patient was deemed appropriate for INPATIENT ADMISSION  After review of the documentation and testing available at the time of the admission I concur with this clinical determination of medical necessity  Rationale is as follows: The patient is an 82-year-old male who began care yesterday in the emergency department  He presented with epistaxis  He is on aspirin and Plavix  No prior epistaxis  Bleeding was described as difficult to control  The patient had rebleeding  He was transferred to [facility] for ENT evaluation  He had nasal packing  It seems that packing was changed to rhino rocket  The patient will be NPO overnight  ENT will reassess tomorrow and determine if cautery in the OR as necessary  The patient will remain in the hospital given his difficult to control epistaxis  He is not yet medically ready for discharge and requires reassessment tomorrow  The patient has coronary artery disease and other comorbidities  He is at significant risk for rebleeding or other complications      The patients vitals on arrival were ED Triage Vitals [09/19/19 0204]   Temperature Pulse Respirations Blood Pressure SpO2   98 5 °F (36 9 °C) 93 18 (!) 180/84 94 %      Temp Source Heart Rate Source Patient Position - Orthostatic VS BP Location FiO2 (%)   Oral Monitor Sitting Right arm --      Pain Score       No Pain           Past Medical History:   Diagnosis Date    Athscl 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     Stent OM1  Patent mammary graft to LAD 10/7/2009; CABG 1992 & 2005    Cancer [facility])     skin    Diabetes mellitus, type II ([address] )     without complication    Essential (primary) hypertension     GERD (gastroesophageal reflux disease)     Hx of cardiovascular stress test 07/23/2014    Eddie MPI; EF0 52 (52%) Lexiscan, mild LV systolic dysfunction; evidence for prior inferior wall MI; perfusion imaging consistant w mild lat wall ischemia and min torin-infart inferior ischemia   / EF0 51 (51%) evidence for prior inferior wall MI  Mild inferior and mild-moderate lateral wall ischemia  7/29/15    Hx of echocardiogram 11/07/2017    2D w/CFD;EF0 55 (55%) mild LVH, mitral valve prolapse with moderate regurgitation  left atrial enlargement  Mild tricuspid regurgitation   Mixed hyperlipidemia     Occlusion and stenosis of bilateral carotid arteries     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 infarction)     Presence of aortocoronary bypass graft      Past Surgical History:   Procedure Laterality Date    CARDIAC CATHETERIZATION  10/07/2009    Stent 99% stenosis SVG from the aorta to OM1  Patent mammary graft to the LAD    CORONARY ARTERY BYPASS GRAFT  1992    VG-CX, VG-RCA    CORONARY ARTERY BYPASS GRAFT  11/04/2005    LIMA-D1-LAD, VG-PDA-PLB, [id number] TMR 9 Lesions    [place] EGD TRANSORAL BIOPSY SINGLE/MULTIPLE N/A 1/23/2019    Procedure: ESOPHAGOGASTRODUODENOSCOPY (EGD) with biopsy;  Surgeon: Kathy Smith MD;  Location: [address] [place] [address] GI LAB;   Service: Gastroenterology       The patient was admitted to the hospital at N/A on N/A for the following diagnosis:  Bleeding from the nose [R04 0]  Right-sided epistaxis [R04 0]    Consults have been placed to:   IP CONSULT TO ENT    Vitals:    09/19/19 0357 09/19/19 0600 09/19/19 0706 09/19/19 1503   BP: 147/85  147/85 135/70   BP Location: Right arm      Pulse: 88  82 76   Resp: 18  18    Temp: 98 1 °F (36 7 °C)  97 9 °F (36 6 °C) 97 6 °F (36 4 °C)   TempSrc: Oral      SpO2: 95%  93% 94%   Weight:  82 7 kg (182 lb 5 1 oz)         Most recent labs:    Recent Labs     09/18/19 2054 09/19/19  0442   WBC 8 78 9 51   HGB 14 6 14 2   HCT 46 1 45 1    202   K 4 3 4 3   CALCIUM 9 5 9 6   BUN 22 25   CREATININE 1 84* 1 57*   INR 1 01  --        Scheduled Meds:  Current Facility-Administered Medications:  acetaminophen 650 mg Oral Q6H PRN Severino Chapman PA-C   amLODIPine 5 mg Oral Daily Jack Chaney PA-C   amoxicillin-clavulanate 1 tablet Oral Q12H [facility] Sevreino Chapman PA-C   atorvastatin 20 mg Oral Daily With Darrelfarzana Mendieta PA-C   escitalopram 10 mg Oral Daily Jack Chaney PA-C   insulin lispro 1-5 Units Subcutaneous Q6H [facility] Jack Mendieta PA-C   isosorbide mononitrate 60 mg Oral Daily Jack Chaney PA-C   ondansetron 4 mg Intravenous Q6H PRN Jack Mendieta PA-C   pantoprazole 40 mg Oral Early Morning Jack Chaney PA-C   propranolol 120 mg Oral Daily Jack Mendieta PA-C     Continuous Infusions:   PRN Meds:   acetaminophen    ondansetron    Surgical procedures (if appropriate):

## 2019-09-19 NOTE — ASSESSMENT & PLAN NOTE
· Baseline creatinine appears to be 1 7-1 8  · Creatinine stable at time of admission  · Avoid nephrotoxic agents

## 2019-09-19 NOTE — ASSESSMENT & PLAN NOTE
· History of prior stent and bypass graft  · Maintained on ASA, plavix, statin, beta-blocker  · Hold ASA and plavix for now in setting of epistaxis consider restarting in AM

## 2019-09-19 NOTE — ASSESSMENT & PLAN NOTE
Patient has a history of prior stent and bypass graft  Patient on aspirin and Plavix, he took his medications yesterday, it was held today due to bleeding  Will restarted tomorrow after his evaluated by ENT  Bleeding stoped versus cauterization    Continue statin, Imdur and propranolol

## 2019-09-19 NOTE — ED RE-EVALUATION NOTE
2346: Dr Natanael Renteria accepting to [facility] ED  PAC to arrange transport  19 Sept 2019  0058: EMS in ED to transfer patient       Shania DO Sree  09/19/19 150

## 2019-09-19 NOTE — PROGRESS NOTES
Progress Note - Magdalena Sommers 1935, 82 y o  male MRN: [id number]    Unit/Bed#: [address] 216[address]01 Encounter: [id number]    Primary Care Provider: Preet Ramos DO   Date and time admitted to hospital: 9/19/2019  1:49 AM        * Right-sided epistaxis  Assessment & Plan  Presented to the emergency department at [facility] with right-sided epistaxis that was difficult to control  ED at [facility] had discussed with ENT and recommended transfer for follow up while inpatient  Seen by ENT in the morning, appreciate recommendations  His back was changed at [facility] ED, patient has had no bleeding  ENT recommended antibiotic prophylaxis while packing in place  He will be evaluated tomorrow morning, remove the pack and see if cauterization in OR as necessary  Will continue to monitor    CAD (coronary artery disease)  Assessment & Plan  Patient has a history of prior stent and bypass graft  Patient on aspirin and Plavix, he took his medications yesterday, it was held today due to bleeding  Will restarted tomorrow after his evaluated by ENT  Bleeding stoped versus cauterization    Continue statin, Imdur and propranolol    Benign essential hypertension  Assessment & Plan  Blood pressure is stable continue amlodipine  Will continue to monitor    Diabetes mellitus, type II [facility])  Assessment & Plan  Lab Results   Component Value Date    HGBA1C 7 0 (H) 04/23/2019       Recent Labs     09/19/19  0549 09/19/19  1125 09/19/19  1619   POCGLU 216* 268* 177*       Blood Sugar Average: Last 72 hrs:  · (P) 220 [id number] glimepiride as an outpatient  · SSI + accuchek while inpatient    Chronic kidney disease  Assessment & Plan  Baseline creatinine appears to be 1 7-1 8  Creatinine 157 on admission  Continue to monitor      VTE Pharmacologic Prophylaxis:   Pharmacologic: No pharmacologic anticoagulation due to epistaxis  Mechanical VTE Prophylaxis in Place: Yes    Patient Centered Rounds: I have performed bedside rounds with nursing staff today  Discussions with Specialists or Other Care Team Provider: nursing    Education and Discussions with Family / Patient:  Patient    Time Spent for Care: 30 minutes  More than 50% of total time spent on counseling and coordination of care as described above  Current Length of Stay: 0 day(s)    Current Patient Status: Inpatient   Certification Statement: The patient will continue to require additional inpatient hospital stay due to Further evaluation by ENT tomorrow morning    Discharge Plan: keep inpatient    Code Status: Level 1 - Full Code      Subjective:   Patient was seen examined this morning bedside, has no issues  Objective:     Vitals:   Temp (24hrs), Av °F (36 7 °C), Min:97 6 °F (36 4 °C), Max:98 5 °F (36 9 °C)    Temp:  [97 6 °F (36 4 °C)-98 5 °F (36 9 °C)] 97 6 °F (36 4 °C)  HR:  [76-93] 76  Resp:  [18-19] 18  BP: (123-180)/(63-85) 135/70  SpO2:  [90 %-95 %] 94 %  Body mass index is 29 43 kg/m²  Input and Output Summary (last 24 hours): Intake/Output Summary (Last 24 hours) at 2019 1824  Last data filed at 2019 1630  Gross per 24 hour   Intake 840 ml   Output    Net 840 ml       Physical Exam:     Physical Exam   Constitutional: He is oriented to person, place, and time  He appears well-developed and well-nourished  No distress  HENT:   Packing in the right nostril   Eyes: EOM are normal    Neck: Normal range of motion  Cardiovascular: Normal rate, regular rhythm and normal heart sounds  Exam reveals no gallop and no friction rub  No murmur heard  Pulmonary/Chest: Effort normal and breath sounds normal  No respiratory distress  He has no wheezes  He has no rales  Abdominal: Soft  Bowel sounds are normal  He exhibits no distension  There is no tenderness  Musculoskeletal: Normal range of motion  Neurological: He is alert and oriented to person, place, and time  No cranial nerve deficit  Skin: Skin is warm and dry     Psychiatric: He has a normal mood and affect  Additional Data:     Labs:    Results from last 7 days   Lab Units 09/19/19  0442   WBC Thousand/uL 9 51   HEMOGLOBIN g/dL 14 2   HEMATOCRIT % 45 1   PLATELETS Thousands/uL 202   NEUTROS PCT % 64   LYMPHS PCT % 17   MONOS PCT % 8   EOS PCT % 9*     Results from last 7 days   Lab Units 09/19/19  0442   SODIUM mmol/L 137   POTASSIUM mmol/L 4 3   CHLORIDE mmol/L 101   CO2 mmol/L 27   BUN mg/dL 25   CREATININE mg/dL 1 57*   ANION GAP mmol/L 9   CALCIUM mg/dL 9 6   GLUCOSE RANDOM mg/dL 199*     Results from last 7 days   Lab Units 09/18/19  2054   INR  1 01     Results from last 7 days   Lab Units 09/19/19  1619 09/19/19  1125 09/19/19  0549   POC GLUCOSE mg/dl 177* 268* 216*                   * I Have Reviewed All Lab Data Listed Above  * Additional Pertinent Lab Tests Reviewed: [address] Admission Reviewed    Imaging:    Imaging Reports Reviewed Today Include: all  Imaging Personally Reviewed by Myself Includes:      Recent Cultures (last 7 days):           Last 24 Hours Medication List:     Current Facility-Administered Medications:  acetaminophen 650 mg Oral Q6H PRN [facility], PA-C   amLODIPine 5 mg Oral Daily [facility], PA-C   amoxicillin-clavulanate 1 tablet Oral Q12H [facility], PA-C   atorvastatin 20 mg Oral Daily With [employer]  Anam, PA-C   escitalopram 10 mg Oral Daily [facility], PA-C   insulin lispro 1-5 Units Subcutaneous Q6H [facility], PA-C   isosorbide mononitrate 60 mg Oral Daily [facility], PA-C   ondansetron 4 mg Intravenous Q6H PRN [facility], PA-C   pantoprazole 40 mg Oral Early Morning [facility], PA-C   propranolol 120 mg Oral Daily [facility], PA-C        Today, Patient Was Seen By: Monie Leavitt MD    ** Please Note: Dictation voice to text software may have been used in the creation of this document   **

## 2019-09-19 NOTE — ED PROVIDER NOTES
History  Chief Complaint   Patient presents with   [address] [facility]     Patient reports a nose bleed that started at approximately 1800 tonight while he was eating soup  Patient denies any pain and reports that he is on aspirin and plavix  Patient is an 65-year-old male who presents today from [address] [facility] after right-sided epistaxis which was uncontrolled  Patient takes aspirin and Plavix on a daily basis  Ear nose and throat specialist was contacted in regards this patient and recommended admission  Patient arrived in stable condition however bleeding around the [place] packing was noted  Patient's right nare was packed with a rhino rocket, please see procedure note  Patient reports his nose bleed started spontaneously at approximately 5:30 p m  This evening when he was drinking soup  Patient denies any trauma noted to the area and denies any sneezing or digital disruption  Patient reports pressure at home was not enough to staunch the bleeding which prompted his presentation to the emergency department earlier this evening  Patient denies any lightheadedness, dizziness, shortness of breath or palpitations  History provided by:  Patient  Nose Bleed   Location:  R nare  Severity:  Moderate  Duration:  5 hours  Timing:  Constant  Progression:  Partially resolved  Chronicity:  New  Context: aspirin use    Relieved by:  Nasal tampon  Worsened by:  Nothing  Ineffective treatments:  Applying pressure and nasal tampon  Associated symptoms: blood in oropharynx    Associated symptoms: no congestion, no dizziness, no fever and no sore throat        Prior to Admission Medications   Prescriptions Last Dose Informant Patient Reported? Taking?    amLODIPine (NORVASC) 5 mg tablet   Yes Yes   Sig: Take 5 mg by mouth daily   aspirin (ECOTRIN LOW STRENGTH) 81 mg EC tablet   Yes Yes   Sig: Take 81 mg by mouth daily   atorvastatin (LIPITOR) 20 mg tablet   Yes Yes   Sig: Take 20 mg by mouth daily   clopidogrel (PLAVIX) 75 mg tablet   Yes Yes   Sig: Take 75 mg by mouth daily   escitalopram (LEXAPRO) 10 mg tablet   Yes Yes   Sig: Take 10 mg by mouth daily   glimepiride (AMARYL) 1 mg tablet   Yes Yes   Sig: daily   isosorbide mononitrate (IMDUR) 60 mg 24 hr tablet   Yes Yes   Sig: Take 60 mg by mouth daily In the morning   nitroglycerin (NITROSTAT) 0 4 mg SL tablet   Yes No   Sig: one tablet under tongue for angina, may repeat in 15 min  no more than three times   omeprazole (PriLOSEC) 20 mg delayed release capsule   Yes No   Sig: Take 20 mg by mouth daily   propranolol (INDERAL LA) 120 mg 24 hr capsule   Yes No   Sig: Take 120 mg by mouth daily      Facility-Administered Medications: None       Past Medical History:   Diagnosis Date    Athscl 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     Stent OM1  Patent mammary graft to LAD 10/7/2009; CABG 1992 & 2005    Cancer [facility])     skin    Diabetes mellitus, type II ([address] )     without complication    Essential (primary) hypertension     GERD (gastroesophageal reflux disease)     Hx of cardiovascular stress test 07/23/2014    Eddie MPI; EF0 52 (52%) Lexiscan, mild LV systolic dysfunction; evidence for prior inferior wall MI; perfusion imaging consistant w mild lat wall ischemia and min torin-infart inferior ischemia   / EF0 51 (51%) evidence for prior inferior wall MI  Mild inferior and mild-moderate lateral wall ischemia  7/29/15    Hx of echocardiogram 11/07/2017    2D w/CFD;EF0 55 (55%) mild LVH, mitral valve prolapse with moderate regurgitation  left atrial enlargement  Mild tricuspid regurgitation   Mixed hyperlipidemia     Occlusion and stenosis of bilateral carotid arteries     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 infarction)     Presence of aortocoronary bypass graft        Past Surgical History:   Procedure Laterality Date    CARDIAC CATHETERIZATION  10/07/2009    Stent 99% stenosis SVG from the aorta to OM1   Patent mammary graft to the LAD    CORONARY ARTERY BYPASS GRAFT  1992    VG-CX, VG-RCA    CORONARY ARTERY BYPASS GRAFT  2005    LIMA-D1-LAD, VG-PDA-PLB, Zaxvyu-LC3-IQ6 TMR 9 Lesions    ME EGD TRANSORAL BIOPSY SINGLE/MULTIPLE N/A 2019    Procedure: ESOPHAGOGASTRODUODENOSCOPY (EGD) with biopsy;  Surgeon: Jeremy Momin MD;  Location: [address] [place] [address] GI LAB; Service: Gastroenterology       Family History   Problem Relation Age of Onset    Heart disease Brother     Tuberculosis Mother     Tuberculosis Father      I have reviewed and agree with the history as documented  Social History     Tobacco Use    Smoking status: Former Smoker     Packs/day: 1 00     Years: 40 00     Pack years: 40 00     Last attempt to quit: 1977     Years since quittin 6    Smokeless tobacco: Never Used   Substance Use Topics    Alcohol use: No    Drug use: No        Review of Systems   Constitutional: Negative for chills, fatigue and fever  HENT: Positive for nosebleeds  Negative for congestion, ear pain, rhinorrhea and sore throat  Eyes: Negative for redness  Respiratory: Negative for chest tightness and shortness of breath  Cardiovascular: Negative for chest pain and palpitations  Gastrointestinal: Negative for abdominal pain, nausea and vomiting  Genitourinary: Negative for dysuria and hematuria  Musculoskeletal: Negative  Skin: Negative for rash  Neurological: Negative for dizziness, syncope, light-headedness and numbness  Physical Exam  Physical Exam   Constitutional: He is oriented to person, place, and time  He appears well-developed and well-nourished  HENT:   Head: Normocephalic and atraumatic  Nose:       No blood noted in the posterior pharynx  Patient reports he can feel blood dripping down the posterior pharynx prior to repacking  Eyes: Pupils are equal, round, and reactive to light  Neck: Normal range of motion  Cardiovascular: Normal rate, regular rhythm and normal heart sounds     Pulmonary/Chest: Effort normal and breath sounds normal    Abdominal: Soft  There is no tenderness  There is no guarding  Lymphadenopathy:     He has no cervical adenopathy  Neurological: He is alert and oriented to person, place, and time  Skin: Skin is warm and dry  Capillary refill takes less than 2 seconds  Psychiatric: He has a normal mood and affect  Nursing note and vitals reviewed        Vital Signs  ED Triage Vitals [09/19/19 0204]   Temperature Pulse Respirations Blood Pressure SpO2   98 5 °F (36 9 °C) 93 18 (!) 180/84 94 %      Temp Source Heart Rate Source Patient Position - Orthostatic VS BP Location FiO2 (%)   Oral Monitor Sitting Right arm --      Pain Score       No Pain           Vitals:    09/19/19 0204 09/19/19 0305 09/19/19 0357   BP: (!) 180/84 153/70 147/85   Pulse: 93 90 88   Patient Position - Orthostatic VS: Sitting Lying          Visual Acuity      ED Medications  Medications   amLODIPine (NORVASC) tablet 5 mg (has no administration in time range)   atorvastatin (LIPITOR) tablet 20 mg (has no administration in time range)   escitalopram (LEXAPRO) tablet 10 mg (has no administration in time range)   isosorbide mononitrate (IMDUR) 24 hr tablet 60 mg (has no administration in time range)   pantoprazole (PROTONIX) EC tablet 40 mg (has no administration in time range)   propranolol (INDERAL LA) 24 hr capsule 120 mg (has no administration in time range)   ondansetron (ZOFRAN) injection 4 mg (has no administration in time range)   acetaminophen (TYLENOL) tablet 650 mg (has no administration in time range)   insulin lispro (HumaLOG) 100 units/mL subcutaneous injection 1-5 Units (has no administration in time range)   amoxicillin-clavulanate (AUGMENTIN) 500-125 mg per tablet 1 tablet (has no administration in time range)       Diagnostic Studies  Results Reviewed     Procedure Component Value Units Date/Time    Basic metabolic panel [[id number]]     Lab Status:  No result Specimen:  Blood     CBC and differential [[id number]]     Lab Status:  No result Specimen:  Blood                  No orders to display              Procedures  Epistaxis management  Date/Time: 9/19/2019 4:17 AM  Performed by: Hardeep Stephens PA-C  Authorized by: Hardeep Stephens PA-C     Patient location:  ED  Other Assisting Provider: Yes (comment) (Dr Comfort Hermosillo)    Consent:     Consent obtained:  Verbal    Consent given by:  Patient    Risks discussed:  Bleeding, nasal injury and pain    Alternatives discussed:  No treatment  Universal protocol:     Procedure explained and questions answered to patient or proxy's satisfaction: yes      Patient identity confirmed:  Verbally with patient  Anesthesia (see MAR for exact dosages): Anesthesia method:  None  Procedure details:     Treatment site:  R anterior    Hemostasis method:  Rhino rocket    Approach:  External    Spec Headlamp used: No      Treatment complexity:  Limited  Post-procedure details:     Assessment:  Bleeding stopped    Patient tolerance of procedure: Tolerated well, no immediate complications           ED Course                               MDM    Disposition  Final diagnoses:   Right-sided epistaxis     Time reflects when diagnosis was documented in both MDM as applicable and the Disposition within this note     Time User Action Codes Description Comment    9/19/2019  3:23 AM Smiley Crane Add [R04 0] Right-sided epistaxis       ED Disposition     ED Disposition Condition Date/Time Comment    Admit Stable Thu Sep 19, 2019  3:23 AM Case was discussed with Gely Ramirez and the patient's admission status was agreed to be Admission Status: observation status to the service of Dr Catracho Middleton           Follow-up Information    None         Current Discharge Medication List      CONTINUE these medications which have NOT CHANGED    Details   amLODIPine (NORVASC) 5 mg tablet Take 5 mg by mouth daily      aspirin (ECOTRIN LOW STRENGTH) 81 mg EC tablet Take 81 mg by mouth daily      atorvastatin (LIPITOR) 20 mg tablet Take 20 mg by mouth daily      clopidogrel (PLAVIX) 75 mg tablet Take 75 mg by mouth daily      escitalopram (LEXAPRO) 10 mg tablet Take 10 mg by mouth daily      glimepiride (AMARYL) 1 mg tablet daily  Refills: 0      isosorbide mononitrate (IMDUR) 60 mg 24 hr tablet Take 60 mg by mouth daily In the morning      nitroglycerin (NITROSTAT) 0 4 mg SL tablet one tablet under tongue for angina, may repeat in 15 min  no more than three times  Refills: 0      omeprazole (PriLOSEC) 20 mg delayed release capsule Take 20 mg by mouth daily      propranolol (INDERAL LA) 120 mg 24 hr capsule Take 120 mg by mouth daily           No discharge procedures on file      ED Provider  Electronically Signed by           Radha Summers PA-C  09/19/19 2208

## 2019-09-19 NOTE — ASSESSMENT & PLAN NOTE
Presented to the emergency department at [facility] with right-sided epistaxis that was difficult to control  ED at [facility] had discussed with ENT and recommended transfer for follow up while inpatient  Seen by ENT in the morning, appreciate recommendations  His back was changed at [facility] ED, patient has had no bleeding    ENT recommended antibiotic prophylaxis while packing in place  He will be evaluated tomorrow morning, remove the pack and see if cauterization in OR as necessary  Will continue to monitor

## 2019-09-19 NOTE — ED PROVIDER NOTES
History  Chief Complaint   Patient presents with   [address]     Patient presents to ED with nose bleed since 1930  Admits to being on blood thinners and denies injury/trauma      82year old male presents from home with granddaughter for evaluation of a right sided nosebleed  He notes this started around 7:30 pm while he was eating soup  This occurred spontaneously  Denies injury or trauma to the area  Has not been sick recently  Denies fever, chills, cough or congestion  Denies prior history of nosebleeds  Pt is on aspirin and plavix daily  Denies any recent change in medication  He notes the area continues to drip from the right side however has improved from initial onset  He tried applying pressure without success  He notes initially it felt like he had some blood in his throat when the area was bleeding heavier, but denies that at this time  History provided by:  Patient   used: No    Nose Bleed   Location:  R nare  Duration:  45 minutes  Progression:  Partially resolved  Chronicity:  New  Context: anticoagulants, aspirin use and hypertension    Context: not bleeding disorder, not nose picking, not recent infection, not thrombocytopenia and not trauma    Relieved by:  Applying pressure  Associated symptoms: no blood in oropharynx, no congestion, no cough, no dizziness, no facial pain, no fever, no headaches, no sinus pain, no sneezing, no sore throat and no syncope    Risk factors: no change in medication, no frequent nosebleeds, no intranasal steroids, no recent nasal surgery and no sinus problems        Prior to Admission Medications   Prescriptions Last Dose Informant Patient Reported? Taking?    amLODIPine (NORVASC) 5 mg tablet 9/18/2019 at Unknown time  Yes Yes   Sig: Take 5 mg by mouth daily   aspirin (ECOTRIN LOW STRENGTH) 81 mg EC tablet 9/18/2019 at Unknown time  Yes Yes   Sig: Take 81 mg by mouth daily   atorvastatin (LIPITOR) 20 mg tablet 9/17/2019 at Unknown time  Yes Yes   Sig: Take 20 mg by mouth daily   clopidogrel (PLAVIX) 75 mg tablet 9/18/2019 at Unknown time  Yes Yes   Sig: Take 75 mg by mouth daily   escitalopram (LEXAPRO) 10 mg tablet 9/18/2019 at Unknown time  Yes Yes   Sig: Take 10 mg by mouth daily   glimepiride (AMARYL) 1 mg tablet 9/18/2019 at Unknown time  Yes Yes   Sig: daily   isosorbide mononitrate (IMDUR) 60 mg 24 hr tablet 9/18/2019 at Unknown time  Yes Yes   Sig: Take 60 mg by mouth daily In the morning   metoprolol tartrate (LOPRESSOR) 100 mg tablet Not Taking at Unknown time  Yes No   Sig: Take 100 mg by mouth 2 (two) times a day   nitroglycerin (NITROSTAT) 0 4 mg SL tablet  at prn  Yes Yes   Sig: one tablet under tongue for angina, may repeat in 15 min  no more than three times   omeprazole (PriLOSEC) 20 mg delayed release capsule 9/18/2019 at Unknown time  Yes Yes   Sig: Take 20 mg by mouth daily   propranolol (INDERAL LA) 120 mg 24 hr capsule 9/18/2019 at Unknown time  Yes Yes   Sig: Take 120 mg by mouth daily   sucralfate (CARAFATE) 1 g tablet 9/17/2019 at Unknown time  Yes Yes   Sig: Take 1 g by mouth 2 (two) times a day      Facility-Administered Medications: None       Past Medical History:   Diagnosis Date    Athscl 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     Stent OM1  Patent mammary graft to LAD 10/7/2009; CABG 1992 & 2005    Cancer [facility])     skin    Diabetes mellitus, type II ([facility] [address] )     without complication    Essential (primary) hypertension     GERD (gastroesophageal reflux disease)     Hx of cardiovascular stress test 07/23/2014    Eddie MPI; EF0 52 (52%) Lexiscan, mild LV systolic dysfunction; evidence for prior inferior wall MI; perfusion imaging consistant w mild lat wall ischemia and min torin-infart inferior ischemia   / EF0 51 (51%) evidence for prior inferior wall MI  Mild inferior and mild-moderate lateral wall ischemia   7/29/15    Hx of echocardiogram 11/07/2017    2D w/CFD;EF0 55 (55%) mild LVH, mitral valve prolapse with moderate regurgitation  left atrial enlargement  Mild tricuspid regurgitation   Mixed hyperlipidemia     Occlusion and stenosis of bilateral carotid arteries     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 infarction)     Presence of aortocoronary bypass graft        Past Surgical History:   Procedure Laterality Date    CARDIAC CATHETERIZATION  10/07/2009    Stent 99% stenosis SVG from the aorta to OM1  Patent mammary graft to the LAD    CORONARY ARTERY BYPASS GRAFT      VG-CX, VG-RCA    CORONARY ARTERY BYPASS GRAFT  2005    LIMA-D1-LAD, VG-PDA-PLB, Busial-MK1-HS4 TMR 9 Lesions    HI EGD TRANSORAL BIOPSY SINGLE/MULTIPLE N/A 2019    Procedure: ESOPHAGOGASTRODUODENOSCOPY (EGD) with biopsy;  Surgeon: Kavitha Choi MD;  Location: [address] [place] [address] GI LAB; Service: Gastroenterology       Family History   Problem Relation Age of Onset    Heart disease Brother     Tuberculosis Mother     Tuberculosis Father      I have reviewed and agree with the history as documented  Social History     Tobacco Use    Smoking status: Former Smoker     Packs/day: 1 00     Years: 40 00     Pack years: 40 00     Last attempt to quit: 1977     Years since quittin 6    Smokeless tobacco: Never Used   Substance Use Topics    Alcohol use: No    Drug use: No        Review of Systems   Constitutional: Negative  Negative for chills, fatigue and fever  HENT: Positive for nosebleeds  Negative for congestion, ear pain, rhinorrhea, sinus pain, sneezing and sore throat  Eyes: Negative  Negative for visual disturbance  Respiratory: Negative  Negative for cough, shortness of breath and wheezing  Cardiovascular: Negative  Negative for chest pain, palpitations, leg swelling and syncope  Gastrointestinal: Negative  Negative for abdominal pain, constipation, diarrhea, nausea and vomiting  Genitourinary: Negative  Negative for dysuria, flank pain and hematuria  Musculoskeletal: Negative    Negative for back pain, myalgias and neck pain  Skin: Negative  Negative for rash  Neurological: Negative  Negative for dizziness, light-headedness, numbness and headaches  Psychiatric/Behavioral: Negative  Negative for confusion  All other systems reviewed and are negative  Physical Exam  Physical Exam   Constitutional: He is oriented to person, place, and time  He appears well-developed and well-nourished  Non-toxic appearance  No distress  Elderly male   HENT:   Head: Normocephalic and atraumatic  Right Ear: Hearing, tympanic membrane, external ear and ear canal normal    Left Ear: Hearing, tympanic membrane, external ear and ear canal normal    Nose: No nasal septal hematoma  Epistaxis (dripping anteriorly from right nares) is observed  Mouth/Throat: Uvula is midline, oropharynx is clear and moist and mucous membranes are normal  Abnormal dentition (edentulous)  No oropharyngeal exudate  No active posterior pharyngeal bleeding noted although pt does have some clots present posteriorly; dried blood present on face, around mouth and on shirt  Eyes: Pupils are equal, round, and reactive to light  Conjunctivae and EOM are normal  No scleral icterus  Neck: Trachea normal and normal range of motion  Neck supple  No JVD present  No tracheal deviation present  Cardiovascular: Normal rate, regular rhythm, normal heart sounds and normal pulses  No murmur heard  Pulmonary/Chest: Effort normal and breath sounds normal  No respiratory distress  He has no wheezes  He has no rhonchi  He has no rales  Abdominal: Soft  Normal appearance and bowel sounds are normal  There is no hepatosplenomegaly  There is no tenderness  There is no rebound, no guarding and no CVA tenderness  No hernia  Musculoskeletal: Normal range of motion  He exhibits no edema or tenderness  Neurological: He is alert and oriented to person, place, and time  No cranial nerve deficit or sensory deficit  He exhibits normal muscle tone  Skin: Skin is warm and dry  No rash noted  He is not diaphoretic  No cyanosis  Nails show no clubbing  Psychiatric: He has a normal mood and affect  His behavior is normal    Nursing note and vitals reviewed        Vital Signs  ED Triage Vitals [09/18/19 2003]   Temperature Pulse Respirations Blood Pressure SpO2   98 1 °F (36 7 °C) 87 19 123/63 90 %      Temp Source Heart Rate Source Patient Position - Orthostatic VS BP Location FiO2 (%)   Temporal Monitor Sitting Left arm --      Pain Score       No Pain           Vitals:    09/18/19 2003 09/18/19 2030 09/18/19 2130 09/18/19 2200   BP: 123/63 130/65 145/67 (!) 171/79   Pulse: 87 82 80 84   Patient Position - Orthostatic VS: Sitting Sitting Lying Lying         Visual Acuity      ED Medications  Medications   oxymetazoline (AFRIN) 0 05 % nasal spray 2 spray (2 sprays Right Nare Given 9/18/19 2019)   tranexamic acid 100mg/mL (for epistaxis) 1,000 mg (1,000 mg Nasal Given 9/18/19 2153)   amoxicillin (AMOXIL) capsule 500 mg (500 mg Oral Given 9/18/19 2153)       Diagnostic Studies  Results Reviewed     Procedure Component Value Units Date/Time    Basic metabolic panel [[id number]]  (Abnormal) Collected:  09/18/19 2054    Lab Status:  Final result Specimen:  Blood from Arm, Left Updated:  09/18/19 2111     Sodium 135 mmol/L      Potassium 4 3 mmol/L      Chloride 101 mmol/L      CO2 25 mmol/L      ANION GAP 9 mmol/L      BUN 22 mg/dL      Creatinine 1 84 mg/dL      Glucose 212 mg/dL      Calcium 9 5 mg/dL      eGFR 33 ml/min/1 73sq m     Narrative:       [facility] guidelines for Chronic Kidney Disease (CKD):     Stage 1 with normal or high GFR (GFR > 90 mL/min/1 73 square meters)    Stage 2 Mild CKD (GFR = 60-89 mL/min/1 73 square meters)    Stage 3A Moderate CKD (GFR = 45-59 mL/min/1 73 square meters)    Stage 3B Moderate CKD (GFR = 30-44 mL/min/1 73 square meters)    Stage 4 Severe CKD (GFR = 15-29 mL/min/1 73 square meters)    Stage 5 End Stage CKD (GFR <15 mL/min/1 73 square meters)  Note: GFR calculation is accurate only with a steady state creatinine    Protime-INR [[id number]]  (Normal) Collected:  09/18/19 2054    Lab Status:  Final result Specimen:  Blood from Arm, Left Updated:  09/18/19 2109     Protime 13 3 seconds      INR 1 01    APTT [[id number]]  (Normal) Collected:  09/18/19 2054    Lab Status:  Final result Specimen:  Blood from Arm, Left Updated:  09/18/19 2109     PTT 33 seconds     CBC and differential [[id number]]  (Abnormal) Collected:  09/18/19 2054    Lab Status:  Final result Specimen:  Blood from Arm, Left Updated:  09/18/19 2100     WBC 8 78 Thousand/uL      RBC 5 36 Million/uL      Hemoglobin 14 6 g/dL      Hematocrit 46 1 %      MCV 86 fL      MCH 27 2 pg      MCHC 31 7 g/dL      RDW 13 4 %      MPV 8 3 fL      Platelets 763 Thousands/uL      nRBC 0 /100 WBCs      Neutrophils Relative 60 %      Immat GRANS % 1 %      Lymphocytes Relative 19 %      Monocytes Relative 7 %      Eosinophils Relative 12 %      Basophils Relative 1 %      Neutrophils Absolute 5 35 Thousands/µL      Immature Grans Absolute 0 04 Thousand/uL      Lymphocytes Absolute 1 66 Thousands/µL      Monocytes Absolute 0 58 Thousand/µL      Eosinophils Absolute 1 07 Thousand/µL      Basophils Absolute 0 08 Thousands/µL                  No orders to display              Procedures  Epistaxis management  Date/Time: 9/18/2019 8:15 PM  Performed by: Eileen Carrel, PA-C  Authorized by: Eileen Carrel, PA-C     Patient location:  ED  Other Assisting Provider: Yes (comment) (RN)    Consent:     Consent obtained:  Verbal    Consent given by:  Patient    Risks discussed:  Bleeding, infection, nasal injury and pain    Alternatives discussed:  Alternative treatment, referral and observation  Universal protocol:     Procedure explained and questions answered to patient or proxy's satisfaction: yes      Site/side marked: yes      Time out called: yes      Patient identity confirmed:  Verbally with patient and arm band  Anesthesia (see MAR for exact dosages): Anesthesia method:  Topical application    Local Therapeutics:  Oxymetazoline (Afrin)  Procedure details:     Treatment site: R anterior/posterior  Hemostasis method:  Rhino rocket (large)    Approach:  External    Treatment complexity:  Limited    Treatment episode: initial    Post-procedure details:     Assessment:  Bleeding stopped    Patient tolerance of procedure: Tolerated well, no immediate complications  Comments:      Approximately 20 minutes after insertion of nasal packing, area began to rebleed and leak out around the packing  Epistaxis management  Date/Time: 9/18/2019 9:00 PM  Performed by: Paolo Augustin PA-C  Authorized by: Paolo Augustin PA-C     Patient location:  ED  Consent:     Consent obtained:  Verbal    Consent given by:  Patient    Risks discussed:  Bleeding, infection, nasal injury and pain    Alternatives discussed:  Alternative treatment and referral  Universal protocol:     Procedure explained and questions answered to patient or proxy's satisfaction: yes      Site/side marked: yes      Patient identity confirmed:  Arm band and verbally with patient  Anesthesia (see MAR for exact dosages): Anesthesia method:  Topical application    Topical anesthesia: 4x4 gauze soaked in tranexamic acid placed in right nares  Procedure details:     Treatment site: R anterior/posterior  Hemostasis method:  Rhino rocket (large)    Approach:  External    Treatment episode: recurring    Post-procedure details:     Assessment:  Bleeding stopped    Patient tolerance of procedure: Tolerated well, no immediate complications           ED Course  ED Course as of Sep 18 2316   Wed Sep 18, 2019   2037 A large rhino rocket was placed in the right nares  At this time bleeding stopped  Will monitor here and check basic labs  2048 Area began to rebleed around the nasal packing externally  Case discussed with attending  Will remove packing, apply saturated gauze with TXA, pressure x 20 min, reassess and then reinsert packing  Pt agreeable to treatment plan  2107 A single piece of 4x4 gauze soaked in tranexamic acid was placed into right nares  A nose clip also placed to apply pressure  Pt tolerated well  Will allow this in place for 20 minutes, reassess  2110 WBC: 8 78   2110 Hemoglobin: 14 6   2110 Platelet Count: 550   2110 INR: 1 01   2110 Protime: 13 3   2110 PTT: 33   2114 Glucose, Random(!): 212   2114 Chronic and stable from baseline  Creatinine(!): 1 84   2115 BUN: 22   2115 Sodium(!): 135   2115 Potassium: 4 3   2115 Chloride: 101   2115 CO2: 25   2115 Anion Gap: 9   2115 Calcium: 9 5   2115 eGFR: 33   2115 Findings reviewed with pt and caregiver  He is resting comfortably in no distress  He is tolerating the gauze with TXA, no rebleeding noted  2142 The TXA soaked gauze was removed  A large clot was removed with this  Another large rhino rocket was placed  Pt tolerated well  A few minutes after this was placed, pt sneezed and area began to rebleed around the distal packing  Direct pressure was applied, 2 sprays afrin applied to distal end of packing  Hemostasis re-achieved  200 Pt was monitored here, he is awaiting his granddaughter to return  While waiting, area began to ooze/rebleed around the packing  Again direct pressure applied with nasal clip  2219 I spoke with attending here, Dr Denisse Robertson who also personally evaluated pt  Recommended ENT consult and transfer as specialty unavailable here  At this time, area is controlled with packing and pressure, however not satisfactorily controlled with measures performed here as area continues to ooze around the packing and the blood drips from his upper lip  No posterior bleeding noted on re-examination        2224 [facility], appropriate pt information relayed, will reach out to ENT and call us back  2242 Spoke to on call ENT Dr Jagruti Smith, will consult on patient and accept to their service, however due to active bleeding requests transfer to ED for evaluation/consultation  2250 Patient and granddaughter updated on conservation with ENT; pending call back from ER physician  2253 Dr Marisela Faith attending updated on pt condition/pending transfer arrangements and my conservation with ENT       [phone] At this time, still pending call back from ER physician  2314 Dr Marisela Faith updated; will take call from ER physician to accept in transfer  Pt and family updated  He is resting comfortably with mustache dressing in place  He has not bleed through this at this time  Pt hemodynamically stable  Pt and family in agreeance with proposed transfer/treatment plan  MDM  Number of Diagnoses or Management Options  Right-sided epistaxis: new and requires workup     Amount and/or Complexity of Data Reviewed  Clinical lab tests: ordered and reviewed  Decide to obtain previous medical records or to obtain history from someone other than the patient: yes  Obtain history from someone other than the patient: yes  Review and summarize past medical records: yes  Discuss the patient with other providers: yes    Patient Progress  Patient progress: stable      Disposition  Final diagnoses:   Right-sided epistaxis     Time reflects when diagnosis was documented in both MDM as applicable and the Disposition within this note     Time User Action Codes Description Comment    9/18/2019  8:32 PM Agustin Sandhoff Add [R04 0] Right-sided epistaxis       ED Disposition     ED Disposition Condition Date/Time Comment    Transfer to Another Facility-In Network  Wed Sep 18, 2019 10:56 PM Michelle Grider should be transferred out to [facility]        Follow-up Information    None         Patient's Medications   Discharge Prescriptions    No medications on file     No discharge procedures on file     ED Provider  Electronically Signed by           Darshan Vázquez PA-C  09/18/19 4637

## 2019-09-19 NOTE — EMTALA/ACUTE CARE TRANSFER
[address] [facility] EMERGENCY DEPARTMENT  [address] [facility] [address]  Dept: [phone]      EMTALA TRANSFER CONSENT    NAME Philly KING 1935                              MRN [id number]    I have been informed of my rights regarding examination, treatment, and transfer   by Dr Milton Hankins DO    Benefits: Specialized equipment and/or services available at the receiving facility (Include comment)________________________(ENT)    Risks: Potential for delay in receiving treatment, Possible worsening of condition or death during transfer, Other: (Include comment)__________________________, Potential deterioration of medical condition, Loss of IV, Increased discomfort during transfer(motor vehicle collision)      Consent for Transfer:  I acknowledge that my medical condition has been evaluated and explained to me by the emergency department physician or other qualified medical person and/or my attending physician, who has recommended that I be transferred to the service of  Accepting Physician: Dr Sinan Milligan at [address] [facility] Name, [address] : [facility]  The above potential benefits of such transfer, the potential risks associated with such transfer, and the probable risks of not being transferred have been explained to me, and I fully understand them  The doctor has explained that, in my case, the benefits of transfer outweigh the risks  I agree to be transferred  I authorize the performance of emergency medical procedures and treatments upon me in both transit and upon arrival at the receiving facility  Additionally, I authorize the release of any and all medical records to the receiving facility and request they be transported with me, if possible  I understand that the safest mode of transportation during a medical emergency is an ambulance and that the Hospital advocates the use of this mode of transport   Risks of traveling to the receiving facility by car, including absence of medical control, life sustaining equipment, such as oxygen, and medical personnel has been explained to me and I fully understand them  (TU CORRECT BOX BELOW)  [  ]  I consent to the stated transfer and to be transported by ambulance/helicopter  [  ]  I consent to the stated transfer, but refuse transportation by ambulance and accept full responsibility for my transportation by car  I understand the risks of non-ambulance transfers and I exonerate the Hospital and its staff from any deterioration in my condition that results from this refusal     X___________________________________________    DATE  19  TIME________  Signature of patient or legally responsible individual signing on patient behalf           RELATIONSHIP TO PATIENT_________________________          Provider Certification    NAME Terrell Murillo                                         1935                              MRN [id number]    A medical screening exam was performed on the above named patient  Based on the examination:    Condition Necessitating Transfer The encounter diagnosis was Right-sided epistaxis      Patient Condition: The patient has been stabilized such that within reasonable medical probability, no material deterioration of the patient condition or the condition of the unborn child(hermilo) is likely to result from the transfer    Reason for Transfer: Level of Care needed not available at this facility(ENT)    [facility] [place]   · Space available and qualified personnel available for treatment as acknowledged by    · Agreed to accept transfer and to provide appropriate medical treatment as acknowledged by       Dr Grayson Cid  · Appropriate medical records of the examination and treatment of the patient are provided at the time of transfer   [address], [address] _______  · Transfer will be performed by qualified personnel from    and appropriate transfer equipment as required, including the use of necessary and appropriate life support measures  Provider Certification: I have examined the patient and explained the following risks and benefits of being transferred/refusing transfer to the patient/family:  General risk, such as traffic hazards, adverse weather conditions, rough terrain or turbulence, possible failure of equipment (including vehicle or aircraft), or consequences of actions of persons outside the control of the transport personnel      Based on these reasonable risks and benefits to the patient and/or the unborn child(hermilo), and based upon the information available at the time of the patients examination, I certify that the medical benefits reasonably to be expected from the provision of appropriate medical treatments at another medical facility outweigh the increasing risks, if any, to the individuals medical condition, and in the case of labor to the unborn child, from effecting the transfer      X____________________________________________ DATE 09/18/19        TIME_______      ORIGINAL - SEND TO MEDICAL RECORDS   COPY - SEND WITH PATIENT DURING TRANSFER

## 2019-09-19 NOTE — ASSESSMENT & PLAN NOTE
· Presented to the emergency department at [facility] with right-sided epistaxis that was difficult to control  ED at [facility] had discussed with ENT and recommended transfer for follow up while inpatient  · Vital signs stable at time of admission  · CBC and CMP stable from prior  · Re-packed in the ED and observed for 1 hour      · Re-check CBC in AM  · Hold ASA and plavix for now in setting of epistaxis  · Continue Augmentin while packing is in place  · Consult ENT

## 2019-09-19 NOTE — UTILIZATION REVIEW
Initial Clinical Review    Admission: Date/Time/Statement:  9/19 @ 0303 to OBS     Orders Placed This Encounter   Procedures    Place in Observation (expected length of stay for this patient is less than two midnights)     Standing Status:   Standing     Number of Occurrences:   1     Order Specific Question:   Admitting Physician     Answer:   Suellyn Landau     Order Specific Question:   Level of Care     Answer:   Med Surg [16]     ED Arrival Information     Expected Arrival Acuity Means of Arrival Escorted By Service Admission Type    - 9/19/2019 01:49 Urgent Ambulance - Hospitalist Urgent    Arrival Complaint    right sided epistaxis         Chief Complaint   Patient presents with   [address]     Patient reports a nose bleed that started at approximately 1800 tonight while he was eating soup  Patient denies any pain and reports that he is on aspirin and plavix  Pt Transferred from [facility] ED  to ED @ [facility] for further management by ENT    Assessment/Plan:   81 yo male presented to ED @ [facility] with R sided nosebleed that started about 7:30 pm  He tried applying pressure w/o success  He is on asa & plavix daily  Rhino rocket was placed in the ED  and bleeding stopped but began to rebleed around nasal packing  Removed packing and applied saturated gauze with TXA, pressure x 20 min, reassess and then reinsert packing  Rebled again  ENT recommended consult & transfer  Admitted to OBS with Right sided Epistaxis  Hold asa & plavix, continue Augmentin while packing in place  Consult ENT, CBC in am   H/O CAD, CKD - Cr @ baseline, DM - accu checks with SSI  Continue amlodipine, propanolol for HTN  Per ENT 9/19:  No further bleeding noted  Recommend antibiotic prophylaxis while packing in place  OK to advance diet but make NPO @ midnite    Pt to be seen 9/20 am and have packing removed and determine if cautery in OR is necessary         ED Triage Vitals [09/19/19 0204] Temperature Pulse Respirations Blood Pressure SpO2   98 5 °F (36 9 °C) 93 18 (!) 180/84 94 %      Temp Source Heart Rate Source Patient Position - Orthostatic VS BP Location FiO2 (%)   Oral Monitor Sitting Right arm --      Pain Score       No Pain        Wt Readings from Last 1 Encounters:   09/19/19 82 7 kg (182 lb 5 1 oz)     Additional Vital Signs:   09/19/19 07:06:10  97 9 °F (36 6 °C)  82  18  147/85  106  93 %       09/19/19 03:57:22  98 1 °F (36 7 °C)  88  18  147/85  106  95 %    Lying       Pertinent Labs/Diagnostic Test Results:   Results from last 7 days   Lab Units 09/19/19  0442 09/18/19 2054   WBC Thousand/uL 9 51 8 78   HEMOGLOBIN g/dL 14 2 14 6   HEMATOCRIT % 45 1 46 1   PLATELETS Thousands/uL 202 207   NEUTROS ABS Thousands/µL 6 19 5 35         Results from last 7 days   Lab Units 09/19/19  0442 09/18/19 2054   SODIUM mmol/L 137 135*   POTASSIUM mmol/L 4 3 4 3   CHLORIDE mmol/L 101 101   CO2 mmol/L 27 25   ANION GAP mmol/L 9 9   BUN mg/dL 25 22   CREATININE mg/dL 1 57* 1 84*   EGFR ml/min/1 73sq m 40 33   CALCIUM mg/dL 9 6 9 5         Results from last 7 days   Lab Units 09/19/19  1125 09/19/19  0549   POC GLUCOSE mg/dl 268* 216*     Results from last 7 days   Lab Units 09/19/19  0442 09/18/19 2054   GLUCOSE RANDOM mg/dL 199* 212*     Results from last 7 days   Lab Units 09/18/19 2054   PROTIME seconds 13 3   INR  1 01   PTT seconds 33       Past Medical History:   Diagnosis Date    Athscl 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     Stent OM1   Patent mammary graft to LAD 10/7/2009; CABG 1992 & 2005    Cancer [facility])     skin    Diabetes mellitus, type II ([address] )     without complication    Essential (primary) hypertension     GERD (gastroesophageal reflux disease)     Hx of cardiovascular stress test 07/23/2014    Eddie MPI; EF0 52 (52%) Lexiscan, mild LV systolic dysfunction; evidence for prior inferior wall MI; perfusion imaging consistant w mild lat wall ischemia and min torin-infart inferior ischemia   / EF0 51 (51%) evidence for prior inferior wall MI  Mild inferior and mild-moderate lateral wall ischemia  7/29/15    Hx of echocardiogram 11/07/2017    2D w/CFD;EF0 55 (55%) mild LVH, mitral valve prolapse with moderate regurgitation  left atrial enlargement  Mild tricuspid regurgitation   Mixed hyperlipidemia     Occlusion and stenosis of bilateral carotid arteries     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 infarction)     Presence of aortocoronary bypass graft      Present on Admission:   Right-sided epistaxis   Diabetes mellitus, type II ([address] )   CAD (coronary artery disease)   Benign essential hypertension   Chronic kidney disease      Admitting Diagnosis: Bleeding from the nose [R04 0]  Right-sided epistaxis [R04 0]  Age/Sex: 82 y o  male  Admission Orders:    Current Facility-Administered Medications:  acetaminophen 650 mg Oral Q6H PRN    amLODIPine 5 mg Oral Daily    amoxicillin-clavulanate 1 tablet Oral Q12H [address]    atorvastatin 20 mg Oral Daily With Dinner    escitalopram 10 mg Oral Daily    insulin lispro 1-5 Units Subcutaneous Q6H [address]    isosorbide mononitrate 60 mg Oral Daily    ondansetron 4 mg Intravenous Q6H PRN    pantoprazole 40 mg Oral Early Morning    propranolol 120 mg Oral Daily        IP CONSULT TO ENT  SCD's    Network Utilization Review Department  Phone: [phone]; Fax [phone]  [email or web address]  ATTENTION: Please call with any questions or concerns to [phone]  and carefully listen to the prompts so that you are directed to the right person  Send all requests for admission clinical reviews, approved or denied determinations and any other requests to fax [phone]   All voicemails are confidential

## 2019-09-19 NOTE — ASSESSMENT & PLAN NOTE
Lab Results   Component Value Date    HGBA1C 7 0 (H) 04/23/2019       No results for input(s): POCGLU in the last 72 hours      Blood Sugar Average: Last 72 hrs:  · On glimepiride as an outpatient  · SSI + accuchek while inpatient

## 2019-09-19 NOTE — PHYSICAL THERAPY NOTE
PT EVALUATION    Pt  Name: Royce Duncan  Pt  Age: 82 y o  MRN: [id number]  LENGTH OF STAY: 0    Patient Active Problem List   Diagnosis    Benign essential hypertension    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 infarction)    Mixed hyperlipidemia    Diabetes mellitus, type II [address] [address] )    CAD (coronary artery disease)    GERD (gastroesophageal reflux disease)    Obese    Presence of aortocoronary bypass graft    Occlusion and stenosis of bilateral carotid arteries    Dyslipidemia    Right-sided epistaxis    Chronic kidney disease       Admitting Diagnoses:   Bleeding from the nose [R04 0]  Right-sided epistaxis [R04 0]    Past Medical History:   Diagnosis Date    Athscl 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     Stent OM1  Patent mammary graft to LAD 10/7/2009; CABG 1992 & 2005    Cancer [facility])     skin    Diabetes mellitus, type II ([facility] )     without complication    Essential (primary) hypertension     GERD (gastroesophageal reflux disease)     Hx of cardiovascular stress test 07/23/2014    Eddie MPI; EF0 52 (52%) Lexiscan, mild LV systolic dysfunction; evidence for prior inferior wall MI; perfusion imaging consistant w mild lat wall ischemia and min torin-infart inferior ischemia   / EF0 51 (51%) evidence for prior inferior wall MI  Mild inferior and mild-moderate lateral wall ischemia  7/29/15    Hx of echocardiogram 11/07/2017    2D w/CFD;EF0 55 (55%) mild LVH, mitral valve prolapse with moderate regurgitation  left atrial enlargement  Mild tricuspid regurgitation   Mixed hyperlipidemia     Occlusion and stenosis of bilateral carotid arteries     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 infarction)     Presence of aortocoronary bypass graft        Past Surgical History:   Procedure Laterality Date    CARDIAC CATHETERIZATION  10/07/2009    Stent 99% stenosis SVG from the aorta to OM1   Patent mammary graft to the LAD    CORONARY ARTERY BYPASS GRAFT  1992    VG-CX, VG-RCA    CORONARY ARTERY BYPASS GRAFT 11/04/2005    LIMA-D1-LAD, VG-PDA-PLB, Hjphjo-HV6-XZ1 TMR 9 Lesions    PA EGD TRANSORAL BIOPSY SINGLE/MULTIPLE N/A 1/23/2019    Procedure: ESOPHAGOGASTRODUODENOSCOPY (EGD) with biopsy;  Surgeon: Nadia Blanca MD;  Location: [address] [place] [address] GI LAB; Service: Gastroenterology       Imaging Studies:  No orders to display        09/19/19 1503   Note Type   Note type Eval only   Pain Assessment   Pain Score No Pain   Home Living   Type of [address] One level;Stairs to enter with rails  ([address])   Home Equipment Other (Comment)  (no DME)   Prior Function   Level of [place] Independent with ADLs and functional mobility  (w/o AD)   Lives With Alone   Receives Help From Family   ADL Assistance Independent   Falls in the last 6 months 0   Comments (-) ; pt's daughter takes him shopping & to his appointments   Restrictions/Precautions   Weight Bearing Precautions Per Order No   General   Family/Caregiver Present No   Cognition   Overall Cognitive Status WFL   Arousal/Participation Alert   Orientation Level Oriented X4   Following Commands Follows multistep commands without difficulty   RUE Assessment   RUE Assessment WFL  (4+/5 grossly)   LUE Assessment   LUE Assessment WFL  (4+/5 grossly)   RLE Assessment   RLE Assessment WFL  (4+/5 grossly)   LLE Assessment   LLE Assessment WFL  (4+/5 grossly)   Coordination   Movements are Fluid and Coordinated 1   Sensation WFL   Bed Mobility   Supine to Sit Unable to assess   Additional Comments pt OOB in chair pre & post session   Transfers   Sit to Stand 6  Modified independent   Stand to Sit 6  Modified independent   Ambulation/Elevation   Gait pattern WNL   Gait Assistance 7  Independent   Assistive Device None   Distance 180'x1   Stair Management Assistance 6  Modified independent   Stair Management Technique One rail R;Alternating pattern; Foreward;Reciprocal   Number of Stairs 9   Balance   Static Sitting Normal   Static Standing Normal   Ambulatory Good Endurance Deficit   Endurance Deficit No   Activity Tolerance   Activity Tolerance Patient tolerated treatment well   Nurse Made Aware STUART Clayton Slight   Assessment   Prognosis Excellent   Assessment Pt 82 y o  male admitted for Right-sided epistaxis  Pending final POC re: cauterization in OR  PTA, pt reports being I w/o AD  Pt lives alone on the 2nd level apartment w/ [address]STE  Pt referred to PT for mobility assessment & D/C planning  Pt demonstrates no significant decline in function to warrant skilled PT at this time  Pt  I w/ overall functional mobility including amb w/o AD & stair management  Balance & gait WFL  Pt able to negotiate stairs w/ modified I  No dizziness reported t/o session  Pt states being at his functional baseline and states no concerns about going back home when medically cleared  Will D/C PT  Pt may return home when medically cleared  Please advise PT when needs change  Pt to ambulate in unit as tolerated to prevent decline in function  Nsg notified      Barriers to Discharge None   Goals   Patient Goals to go home   Treatment Day 0   Plan   Treatment/Interventions Spoke to nursing  (PT eval only)   PT Frequency Other (Comment)  (D/C PT)   Recommendation   Recommendation Home independently;D/C PT   PT - OK to Discharge Yes  (when medically cleared)   Barthel Index   Feeding 10   Bathing 5   Grooming Score 5   Dressing Score 10   Bladder Score 10   Bowels Score 10   Toilet Use Score 10   Transfers (Bed/Chair) Score 15   Mobility (Level Surface) Score 15   Stairs Score 10   Barthel Index Score 100   Hx/personal factors: co-morbidities, inaccessible home, home alone, advanced age and fall risk  Examination: low fall risk, assessed body system, balance, endurance, amb, D/C disposition & fall risk  Clinical: unpredictable (ongoing medical status, abnormal lab values and low fall risk)  Complexity: high     Larrie Pac, PT

## 2019-09-20 LAB
ANION GAP SERPL CALCULATED.3IONS-SCNC: 8 MMOL/L (ref 4–13)
BASOPHILS # BLD AUTO: 0.08 THOUSANDS/ΜL (ref 0–0.1)
BASOPHILS NFR BLD AUTO: 1 % (ref 0–1)
BUN SERPL-MCNC: 26 MG/DL (ref 5–25)
CALCIUM SERPL-MCNC: 10 MG/DL (ref 8.3–10.1)
CHLORIDE SERPL-SCNC: 102 MMOL/L (ref 100–108)
CO2 SERPL-SCNC: 28 MMOL/L (ref 21–32)
CREAT SERPL-MCNC: 1.62 MG/DL (ref 0.6–1.3)
EOSINOPHIL # BLD AUTO: 0.69 THOUSAND/ΜL (ref 0–0.61)
EOSINOPHIL NFR BLD AUTO: 8 % (ref 0–6)
ERYTHROCYTE [DISTWIDTH] IN BLOOD BY AUTOMATED COUNT: 13.5 % (ref 11.6–15.1)
GFR SERPL CREATININE-BSD FRML MDRD: 39 ML/MIN/1.73SQ M
GLUCOSE SERPL-MCNC: 122 MG/DL (ref 65–140)
GLUCOSE SERPL-MCNC: 130 MG/DL (ref 65–140)
GLUCOSE SERPL-MCNC: 136 MG/DL (ref 65–140)
GLUCOSE SERPL-MCNC: 198 MG/DL (ref 65–140)
GLUCOSE SERPL-MCNC: 237 MG/DL (ref 65–140)
HCT VFR BLD AUTO: 44.7 % (ref 36.5–49.3)
HGB BLD-MCNC: 14 G/DL (ref 12–17)
IMM GRANULOCYTES # BLD AUTO: 0.04 THOUSAND/UL (ref 0–0.2)
IMM GRANULOCYTES NFR BLD AUTO: 0 % (ref 0–2)
LYMPHOCYTES # BLD AUTO: 2.18 THOUSANDS/ΜL (ref 0.6–4.47)
LYMPHOCYTES NFR BLD AUTO: 24 % (ref 14–44)
MCH RBC QN AUTO: 27.2 PG (ref 26.8–34.3)
MCHC RBC AUTO-ENTMCNC: 31.3 G/DL (ref 31.4–37.4)
MCV RBC AUTO: 87 FL (ref 82–98)
MONOCYTES # BLD AUTO: 0.71 THOUSAND/ΜL (ref 0.17–1.22)
MONOCYTES NFR BLD AUTO: 8 % (ref 4–12)
NEUTROPHILS # BLD AUTO: 5.33 THOUSANDS/ΜL (ref 1.85–7.62)
NEUTS SEG NFR BLD AUTO: 59 % (ref 43–75)
NRBC BLD AUTO-RTO: 0 /100 WBCS
PLATELET # BLD AUTO: 184 THOUSANDS/UL (ref 149–390)
PMV BLD AUTO: 9.3 FL (ref 8.9–12.7)
POTASSIUM SERPL-SCNC: 4.6 MMOL/L (ref 3.5–5.3)
RBC # BLD AUTO: 5.14 MILLION/UL (ref 3.88–5.62)
SODIUM SERPL-SCNC: 138 MMOL/L (ref 136–145)
WBC # BLD AUTO: 9.03 THOUSAND/UL (ref 4.31–10.16)

## 2019-09-20 PROCEDURE — 80048 BASIC METABOLIC PNL TOTAL CA: CPT | Performed by: INTERNAL MEDICINE

## 2019-09-20 PROCEDURE — 093K7ZZ CONTROL BLEEDING IN NASAL MUCOSA AND SOFT TISSUE, VIA NATURAL OR ARTIFICIAL OPENING: ICD-10-PCS | Performed by: INTERNAL MEDICINE

## 2019-09-20 PROCEDURE — 09JK8ZZ INSPECTION OF NASAL MUCOSA AND SOFT TISSUE, VIA NATURAL OR ARTIFICIAL OPENING ENDOSCOPIC: ICD-10-PCS | Performed by: INTERNAL MEDICINE

## 2019-09-20 PROCEDURE — 82948 REAGENT STRIP/BLOOD GLUCOSE: CPT

## 2019-09-20 PROCEDURE — 99232 SBSQ HOSP IP/OBS MODERATE 35: CPT | Performed by: INTERNAL MEDICINE

## 2019-09-20 PROCEDURE — 85025 COMPLETE CBC W/AUTO DIFF WBC: CPT | Performed by: INTERNAL MEDICINE

## 2019-09-20 RX ADMIN — AMOXICILLIN AND CLAVULANATE POTASSIUM 1 TABLET: 500; 125 TABLET, FILM COATED ORAL at 22:02

## 2019-09-20 RX ADMIN — PROPRANOLOL HYDROCHLORIDE 120 MG: 60 CAPSULE, EXTENDED RELEASE ORAL at 08:43

## 2019-09-20 RX ADMIN — ATORVASTATIN CALCIUM 20 MG: 20 TABLET, FILM COATED ORAL at 16:01

## 2019-09-20 RX ADMIN — ISOSORBIDE MONONITRATE 60 MG: 60 TABLET, EXTENDED RELEASE ORAL at 13:02

## 2019-09-20 RX ADMIN — INSULIN LISPRO 1 UNITS: 100 INJECTION, SOLUTION INTRAVENOUS; SUBCUTANEOUS at 17:03

## 2019-09-20 RX ADMIN — ESCITALOPRAM OXALATE 10 MG: 10 TABLET ORAL at 08:43

## 2019-09-20 RX ADMIN — AMOXICILLIN AND CLAVULANATE POTASSIUM 1 TABLET: 500; 125 TABLET, FILM COATED ORAL at 10:37

## 2019-09-20 RX ADMIN — AMLODIPINE BESYLATE 5 MG: 5 TABLET ORAL at 13:02

## 2019-09-20 NOTE — ASSESSMENT & PLAN NOTE
Lab Results   Component Value Date    HGBA1C 7 0 (H) 04/23/2019       Recent Labs     09/19/19  2329 09/20/19  0621 09/20/19  1158 09/20/19  1606   POCGLU 127 130 136 198*       Blood Sugar Average: Last 72 hrs:  · (P) 178 [id number] glimepiride as an outpatient  · SSI + accuchek while inpatient

## 2019-09-20 NOTE — DISCHARGE INSTRUCTIONS
Home Treatment of Epistaxis / Nosebleed    1  Do not blow the nose  If you must sneeze please sneeze with mouth open  Do not pick the nose  2   If the nose was cauterized do not pick at or blow out the scab which will form  3   Begin using saline or  Ayr Nasal Gel in the nose 2-3 times daily to keep the mucosal membrane moist     4   Use saline lavage in the nose - you may use ocean nasal spray, leonidas pot or Fluidigm Med sinus rinse  You should rinse 2-3 times daily  5   If bleeding starts, soak a cotton ball with over the counter Afrin (Oxymetazoline) and place this in the affected side  Pinch the nose and lean forward  If bleeding does not stop within 10 minutes call [facility] at [phone] or [phone]  If bleeding is severe call 911 or go to closest Emergency Room  6   Stop taking any Asprin, NSAIDs or Blood Thinners if instructed by the doctor  7   Call [facility] at the above number for any questions or concerns  8   No smoking  Avoid any second hand smoke exposure

## 2019-09-20 NOTE — ASSESSMENT & PLAN NOTE
Presented to the emergency department at [facility] with right-sided epistaxis that was difficult to control after blowing his nose  patient is also on ASA and Plavix which could contribute to the uncontrolled bleeding  ED at [facility] had discussed with ENT and recommended transfer for follow up while inpatient  Seen by ENT, appreciate recommendations  He had rigid nasal endoscopy with cautery right septum and floor spur  We can resume aspirin tomorrow  He will have a follow-up appointment with ENT on Monday and if there is no bleeding he can resume his Plavix on Monday    Will continue to monitor

## 2019-09-20 NOTE — PROGRESS NOTES
Progress Note - [facility] 1935, 82 y o  male MRN: [id number]    Unit/Bed#: [address] Encounter: [id number]    Primary Care Provider: Willy Fonesca DO   Date and time admitted to hospital: 9/19/2019  1:49 AM        * Right-sided epistaxis  Assessment & Plan  Presented to the emergency department at [place] with right-sided epistaxis that was difficult to control after blowing his nose  patient is also on ASA and Plavix which could contribute to the uncontrolled bleeding  ED at [place] had discussed with ENT and recommended transfer for follow up while inpatient  Seen by ENT, appreciate recommendations  He had rigid nasal endoscopy with cautery right septum and floor spur  We can resume aspirin tomorrow  He will have a follow-up appointment with ENT on Monday and if there is no bleeding he can resume his Plavix on Monday  Will continue to monitor    CAD (coronary artery disease)  Assessment & Plan  Patient has a history of prior stents and bypass graft  Patient on aspirin and Plavix  Will restarted ASA tomorrow  Then he will resume Plavix on Monday if there is no bleeding    Continue statin, Imdur and propranolol    Benign essential hypertension  Assessment & Plan  Blood pressure is stable continue amlodipine  Will continue to monitor    Diabetes mellitus, type II [facility])  Assessment & Plan  Lab Results   Component Value Date    HGBA1C 7 0 (H) 04/23/2019       Recent Labs     09/19/19  2329 09/20/19  0621 09/20/19  1158 09/20/19  1606   POCGLU 127 130 136 198*       Blood Sugar Average: Last 72 hrs:  · (P) 178 [id number] glimepiride as an outpatient  · SSI + accuchek while inpatient    Chronic kidney disease  Assessment & Plan  Baseline creatinine appears to be 1 7-1 8  Creatinine at baseline  Continue to monitor  Encourage oral intake        VTE Pharmacologic Prophylaxis:   Pharmacologic: No pharmacologic DVT prophylaxis due to epistaxis  Mechanical VTE Prophylaxis in Place: Yes    Patient Centered Rounds: I have performed bedside rounds with nursing staff today  Discussions with Specialists or Other Care Team Provider: ENT    Education and Discussions with Family / Patient:  Patient    Time Spent for Care: 30 minutes  More than 50% of total time spent on counseling and coordination of care as described above  Current Length of Stay: 1 day(s)    Current Patient Status: Inpatient   Certification Statement: The patient will continue to require additional inpatient hospital stay due to Monitor for bleeding    Discharge Plan: keep inpatient    Code Status: Level 1 - Full Code      Subjective:   Patient was seen and examined at bedside this morning with nursing staff in ENT  He is feeling well and denies any issues  Objective:     Vitals:   Temp (24hrs), Av 1 °F (36 7 °C), Min:98 °F (36 7 °C), Max:98 3 °F (36 8 °C)    Temp:  [98 °F (36 7 °C)-98 3 °F (36 8 °C)] 98 3 °F (36 8 °C)  HR:  [77-82] 78  Resp:  [16-18] 16  BP: (130-148)/(79-81) 130/79  SpO2:  [91 %-93 %] 92 %  Body mass index is 29 82 kg/m²  Input and Output Summary (last 24 hours): Intake/Output Summary (Last 24 hours) at 2019 1615  Last data filed at 2019 1630  Gross per 24 hour   Intake 480 ml   Output    Net 480 ml       Physical Exam:     Physical Exam   Constitutional: He is oriented to person, place, and time  He appears well-developed and well-nourished  No distress  HENT:   Head: Normocephalic and atraumatic  Nasal pack in place   Eyes: EOM are normal    Neck: Normal range of motion  Cardiovascular: Normal rate, regular rhythm and normal heart sounds  Exam reveals no gallop and no friction rub  No murmur heard  Pulmonary/Chest: Effort normal and breath sounds normal  No respiratory distress  He has no wheezes  Abdominal: Soft  Bowel sounds are normal  He exhibits no distension  There is no tenderness  There is no rebound and no guarding     Musculoskeletal: Normal range of motion  Neurological: He is alert and oriented to person, place, and time  No cranial nerve deficit  Skin: Skin is warm and dry  Psychiatric: He has a normal mood and affect  Additional Data:     Labs:    Results from last 7 days   Lab Units 09/20/19  0444   WBC Thousand/uL 9 03   HEMOGLOBIN g/dL 14 0   HEMATOCRIT % 44 7   PLATELETS Thousands/uL 184   NEUTROS PCT % 59   LYMPHS PCT % 24   MONOS PCT % 8   EOS PCT % 8*     Results from last 7 days   Lab Units 09/20/19  0444   SODIUM mmol/L 138   POTASSIUM mmol/L 4 6   CHLORIDE mmol/L 102   CO2 mmol/L 28   BUN mg/dL 26*   CREATININE mg/dL 1 62*   ANION GAP mmol/L 8   CALCIUM mg/dL 10 0   GLUCOSE RANDOM mg/dL 122     Results from last 7 days   Lab Units 09/18/19  2054   INR  1 01     Results from last 7 days   Lab Units 09/20/19  1606 09/20/19  1158 09/20/19  0621 09/19/19  2329 09/19/19  1619 09/19/19  1125 09/19/19  0549   POC GLUCOSE mg/dl 198* 136 130 127 177* 268* 216*                   * I Have Reviewed All Lab Data Listed Above  * Additional Pertinent Lab Tests Reviewed:  [address] Admission Reviewed    Imaging:    Imaging Reports Reviewed Today Include: all  Imaging Personally Reviewed by Myself Includes:      Recent Cultures (last 7 days):           Last 24 Hours Medication List:     Current Facility-Administered Medications:  acetaminophen 650 mg Oral Q6H PRN Ignacio Mayo PA-C   amLODIPine 5 mg Oral Daily Vedia Hand Ritu, PA-C   amoxicillin-clavulanate 1 tablet Oral Q12H [facility] Ignacio Mayo PA-C   atorvastatin 20 mg Oral Daily With Flight Steward M [facility] Mendieta   escitalopram 10 mg Oral Daily Vedia Hand Ritu, PA-MILES   insulin lispro 1-5 Units Subcutaneous Q6H [facility] Vedia Hand Gyarmamena, PA-C   isosorbide mononitrate 60 mg Oral Daily Vedia Hand Ritu, PA-C   ondansetron 4 mg Intravenous Q6H PRN Vedia Hand Gyarmaty, PA-C   pantoprazole 40 mg Oral Early Morning Vedia Hand Gyarmaty, PA-C   propranolol 120 mg Oral Daily Vedia Hand Justine Vasquez PA-C        Today, Patient Was Seen By: July Sahni MD    ** Please Note: Dictation voice to text software may have been used in the creation of this document   **

## 2019-09-20 NOTE — OCCUPATIONAL THERAPY NOTE
Occupational Therapy Screen        Patient Name: Jeremy Lozano  [id number] Date: 9/20/2019      OT consult received  Per PT pt independent w/ functional transfers and mobility and independent w/ ADLs  D/C OT      Abbey Larson MS, OTR/L

## 2019-09-20 NOTE — ASSESSMENT & PLAN NOTE
Patient has a history of prior stents and bypass graft  Patient on aspirin and Plavix  Will restarted ASA tomorrow  Then he will resume Plavix on Monday if there is no bleeding    Continue statin, Imdur and propranolol

## 2019-09-20 NOTE — PROGRESS NOTES
Progress Note - ENT Surgery   Sona Rodríguez 82 y o  male MRN: [id number]  Unit/Bed#: [place] 68 2 -01 Encounter: [id number]    Assessment:  Epistaxis    Plan:  Patient tolerated rigid nasal endoscopy with cautery right septum and floor spur  Patient was observed for over 3 hours with no active bleeding  May resume his aspirin tomorrow  We will check him on Monday in the office and if there is no bleeding he will resume his Plavix on Monday  My office will contact his daughter to make arrangements for the office visit on Monday  May advance patient's diet  I will follow the patient as an outpatient  Subjective/Objective   Chief Complaint: nosebleed right    Subjective:  Patient doing well with no bleeding with pack in place    Objective:     Blood pressure 138/81, pulse 77, temperature 98 °F (36 7 °C), resp  rate 18, weight 83 8 kg (184 lb 11 9 oz), SpO2 91 %  ,Body mass index is 29 82 kg/m²  Intake/Output Summary (Last 24 hours) at 9/20/2019 1302  Last data filed at 9/19/2019 1630  Gross per 24 hour   Intake 480 ml   Output    Net 480 ml       Invasive Devices     Peripheral Intravenous Line            Peripheral IV 09/19/19 Left Wrist 1 day                Physical Exam:     Rhino rocket right naris was deflated and removed at 8 AM this morning  Consent was charted for endoscopic nasal cautery with possible sphenoid palatine artery ligation  Patient was aware that if I could not control the bleeding, he would require to go to the operating room     Informed time-out was completed and confirmed by floor nurse and patient  Nasal Cautery - Complex with Endoscopy    Consent was obtained from the patient / parent  Risks of the procedure including continued bleeding and septal perforation were discussed  The right side of the nose was sprayed with Phenylephrine:Lidocaine (1:1) mixture  The solution was then placed on a cottonoid pledget and inserted into the same side of the nose    After an appropriate amount of time had passed for anesthetic effect the cottonoid was removed  Nasal endoscopy was completed to evaluate the entire nasal cavity including the mid and posterior septum, nasal floor, middle turbinate, superior turbinate, middle meatus, nasal roof and nasopharyngeal area  Significant findings included large clots suctioned from sphenoid face with apparent source of bleeding right mid septum at posterior margin of floor spur, on posterior inferior surface with arborized vessels on anterior septum      Visible vessels and a bleeding site was identified on the spur and septum noted above     The  cottonoid was reinserted for another 5 minutes  The cottonoid was then removed and using endoscopic guidance the site was cauterized with AgNO3 75%  No further bleeding was noted at the site  The patient tolerated the procedure  Postoperative cauterization instructions were reviewed and a copy was given to the patient   Lab, Imaging and other studies:  I have personally reviewed pertinent lab results    , CBC:   Lab Results   Component Value Date    WBC 9 03 09/20/2019    HGB 14 0 09/20/2019    HCT 44 7 09/20/2019    MCV 87 09/20/2019     09/20/2019    MCH 27 2 09/20/2019    MCHC 31 3 (L) 09/20/2019    RDW 13 5 09/20/2019    MPV 9 3 09/20/2019    NRBC 0 09/20/2019   , CMP:   Lab Results   Component Value Date    SODIUM 138 09/20/2019    K 4 6 09/20/2019     09/20/2019    CO2 28 09/20/2019    BUN 26 (H) 09/20/2019    CREATININE 1 62 (H) 09/20/2019    CALCIUM 10 0 09/20/2019    EGFR 39 09/20/2019   , Coagulation: No results found for: PT, INR, APTT  VTE Pharmacologic Prophylaxis: Sequential compression device (Venodyne)   VTE Mechanical Prophylaxis: sequential compression device

## 2019-09-20 NOTE — ASSESSMENT & PLAN NOTE
Baseline creatinine appears to be 1 7-1 8  Creatinine at baseline  Continue to monitor  Encourage oral intake

## 2019-09-21 VITALS
HEART RATE: 99 BPM | BODY MASS INDEX: 29.43 KG/M2 | OXYGEN SATURATION: 93 % | TEMPERATURE: 98.4 F | WEIGHT: 182.32 LBS | DIASTOLIC BLOOD PRESSURE: 76 MMHG | SYSTOLIC BLOOD PRESSURE: 139 MMHG | RESPIRATION RATE: 24 BRPM

## 2019-09-21 PROBLEM — R04.0 RIGHT-SIDED EPISTAXIS: Status: RESOLVED | Noted: 2019-09-19 | Resolved: 2019-09-21

## 2019-09-21 LAB
ANION GAP SERPL CALCULATED.3IONS-SCNC: 8 MMOL/L (ref 4–13)
BASOPHILS # BLD AUTO: 0.08 THOUSANDS/ΜL (ref 0–0.1)
BASOPHILS NFR BLD AUTO: 1 % (ref 0–1)
BUN SERPL-MCNC: 29 MG/DL (ref 5–25)
CALCIUM SERPL-MCNC: 10.3 MG/DL (ref 8.3–10.1)
CHLORIDE SERPL-SCNC: 100 MMOL/L (ref 100–108)
CO2 SERPL-SCNC: 30 MMOL/L (ref 21–32)
CREAT SERPL-MCNC: 1.69 MG/DL (ref 0.6–1.3)
EOSINOPHIL # BLD AUTO: 0.69 THOUSAND/ΜL (ref 0–0.61)
EOSINOPHIL NFR BLD AUTO: 6 % (ref 0–6)
ERYTHROCYTE [DISTWIDTH] IN BLOOD BY AUTOMATED COUNT: 13.5 % (ref 11.6–15.1)
GFR SERPL CREATININE-BSD FRML MDRD: 37 ML/MIN/1.73SQ M
GLUCOSE SERPL-MCNC: 119 MG/DL (ref 65–140)
GLUCOSE SERPL-MCNC: 143 MG/DL (ref 65–140)
GLUCOSE SERPL-MCNC: 153 MG/DL (ref 65–140)
GLUCOSE SERPL-MCNC: 336 MG/DL (ref 65–140)
HCT VFR BLD AUTO: 44.8 % (ref 36.5–49.3)
HGB BLD-MCNC: 14.4 G/DL (ref 12–17)
IMM GRANULOCYTES # BLD AUTO: 0.05 THOUSAND/UL (ref 0–0.2)
IMM GRANULOCYTES NFR BLD AUTO: 0 % (ref 0–2)
LYMPHOCYTES # BLD AUTO: 2.43 THOUSANDS/ΜL (ref 0.6–4.47)
LYMPHOCYTES NFR BLD AUTO: 21 % (ref 14–44)
MCH RBC QN AUTO: 27.8 PG (ref 26.8–34.3)
MCHC RBC AUTO-ENTMCNC: 32.1 G/DL (ref 31.4–37.4)
MCV RBC AUTO: 87 FL (ref 82–98)
MONOCYTES # BLD AUTO: 0.97 THOUSAND/ΜL (ref 0.17–1.22)
MONOCYTES NFR BLD AUTO: 8 % (ref 4–12)
NEUTROPHILS # BLD AUTO: 7.36 THOUSANDS/ΜL (ref 1.85–7.62)
NEUTS SEG NFR BLD AUTO: 64 % (ref 43–75)
NRBC BLD AUTO-RTO: 0 /100 WBCS
PLATELET # BLD AUTO: 216 THOUSANDS/UL (ref 149–390)
PMV BLD AUTO: 9 FL (ref 8.9–12.7)
POTASSIUM SERPL-SCNC: 4.7 MMOL/L (ref 3.5–5.3)
RBC # BLD AUTO: 5.18 MILLION/UL (ref 3.88–5.62)
SODIUM SERPL-SCNC: 138 MMOL/L (ref 136–145)
WBC # BLD AUTO: 11.58 THOUSAND/UL (ref 4.31–10.16)

## 2019-09-21 PROCEDURE — 85025 COMPLETE CBC W/AUTO DIFF WBC: CPT | Performed by: INTERNAL MEDICINE

## 2019-09-21 PROCEDURE — 82948 REAGENT STRIP/BLOOD GLUCOSE: CPT

## 2019-09-21 PROCEDURE — 80048 BASIC METABOLIC PNL TOTAL CA: CPT | Performed by: INTERNAL MEDICINE

## 2019-09-21 PROCEDURE — 99239 HOSP IP/OBS DSCHRG MGMT >30: CPT | Performed by: INTERNAL MEDICINE

## 2019-09-21 RX ORDER — ASPIRIN 81 MG/1
81 TABLET ORAL DAILY
Status: DISCONTINUED | OUTPATIENT
Start: 2019-09-21 | End: 2019-09-21 | Stop reason: HOSPADM

## 2019-09-21 RX ORDER — CLOPIDOGREL BISULFATE 75 MG/1
75 TABLET ORAL DAILY
Qty: 30 TABLET | Refills: 0 | Status: SHIPPED | OUTPATIENT
Start: 2019-09-23 | End: 2020-02-14

## 2019-09-21 RX ORDER — CLOPIDOGREL BISULFATE 75 MG/1
75 TABLET ORAL DAILY
Qty: 30 TABLET | Refills: 0 | Status: SHIPPED | OUTPATIENT
Start: 2019-09-21 | End: 2019-09-21 | Stop reason: SDUPTHER

## 2019-09-21 RX ADMIN — ISOSORBIDE MONONITRATE 60 MG: 60 TABLET, EXTENDED RELEASE ORAL at 08:12

## 2019-09-21 RX ADMIN — PANTOPRAZOLE SODIUM 40 MG: 40 TABLET, DELAYED RELEASE ORAL at 05:44

## 2019-09-21 RX ADMIN — PROPRANOLOL HYDROCHLORIDE 120 MG: 60 CAPSULE, EXTENDED RELEASE ORAL at 08:11

## 2019-09-21 RX ADMIN — ESCITALOPRAM OXALATE 10 MG: 10 TABLET ORAL at 08:11

## 2019-09-21 RX ADMIN — ASPIRIN 81 MG: 81 TABLET, COATED ORAL at 09:27

## 2019-09-21 RX ADMIN — AMOXICILLIN AND CLAVULANATE POTASSIUM 1 TABLET: 500; 125 TABLET, FILM COATED ORAL at 09:27

## 2019-09-21 RX ADMIN — AMLODIPINE BESYLATE 5 MG: 5 TABLET ORAL at 08:12

## 2019-09-21 NOTE — PLAN OF CARE
Problem: INFECTION - ADULT  Goal: Absence or prevention of progression during hospitalization  Description  INTERVENTIONS:  - Assess and monitor for signs and symptoms of infection  - Monitor lab/diagnostic results  - Monitor all insertion sites, i e  indwelling lines, tubes, and drains  - Monitor endotracheal if appropriate and nasal secretions for changes in amount and color  - [place] appropriate cooling/warming therapies per order  - Administer medications as ordered  - Instruct and encourage patient and family to use good hand hygiene technique  - Identify and instruct in appropriate isolation precautions for identified infection/condition  Outcome: Progressing     Problem: SAFETY ADULT  Goal: Patient will remain free of falls  Description  INTERVENTIONS:  - Assess patient frequently for physical needs  -  Identify cognitive and physical deficits and behaviors that affect risk of falls    -  [place] fall precautions as indicated by assessment   - Educate patient/family on patient safety including physical limitations  - Instruct patient to call for assistance with activity based on assessment  - Modify environment to reduce risk of injury  - Consider OT/PT consult to assist with strengthening/mobility  Outcome: Progressing  Goal: Maintain or return to baseline ADL function  Description  INTERVENTIONS:  -  Assess patient's ability to carry out ADLs; assess patient's baseline for ADL function and identify physical deficits which impact ability to perform ADLs (bathing, care of mouth/teeth, toileting, grooming, dressing, etc )  - Assess/evaluate cause of self-care deficits   - Assess range of motion  - Assess patient's mobility; develop plan if impaired  - Assess patient's need for assistive devices and provide as appropriate  - Encourage maximum independence but intervene and supervise when necessary  - Involve family in performance of ADLs  - Assess for home care needs following discharge   - Consider OT consult to assist with ADL evaluation and planning for discharge  - Provide patient education as appropriate  Outcome: Progressing     Problem: DISCHARGE PLANNING  Goal: Discharge to home or other facility with appropriate resources  Description  INTERVENTIONS:  - Identify barriers to discharge w/patient and caregiver  - Arrange for needed discharge resources and transportation as appropriate  - Identify discharge learning needs (meds, wound care, etc )  - Arrange for interpretive services to assist at discharge as needed  - Refer to Case Management Department for coordinating discharge planning if the patient needs post-hospital services based on physician/advanced practitioner order or complex needs related to functional status, cognitive ability, or social support system  Outcome: Progressing     Problem: Knowledge Deficit  Goal: Patient/family/caregiver demonstrates understanding of disease process, treatment plan, medications, and discharge instructions  Description  Complete learning assessment and assess knowledge base    Interventions:  - Provide teaching at level of understanding  - Provide teaching via preferred learning methods  Outcome: Progressing     Problem: SKIN/TISSUE INTEGRITY - ADULT  Goal: Skin integrity remains intact  Description  INTERVENTIONS  - Identify patients at risk for skin breakdown  - Assess and monitor skin integrity  - Assess and monitor nutrition and hydration status  - Monitor labs (i e  albumin)  - Assess for incontinence   - Turn and reposition patient  - Assist with mobility/ambulation  - Relieve pressure over bony prominences  - Avoid friction and shearing  - Provide appropriate hygiene as needed including keeping skin clean and dry  - Evaluate need for skin moisturizer/barrier cream  - Collaborate with interdisciplinary team (i e  Nutrition, Rehabilitation, etc )   - Patient/family teaching  Outcome: Progressing  Goal: Incision(s), wounds(s) or drain site(s) healing without S/S of infection  Description  INTERVENTIONS  - Assess and document risk factors for skin impairment   - Assess and document dressing, incision, wound bed, drain sites and surrounding tissue  - Consider nutrition services referral as needed  - Oral mucous membranes remain intact  - Provide patient/ family education  Outcome: Progressing  Goal: Oral mucous membranes remain intact  Description  INTERVENTIONS  - Assess oral mucosa and hygiene practices  - Implement preventative oral hygiene regimen  - Implement oral medicated treatments as ordered  - Initiate Nutrition services referral as needed  Outcome: Progressing     Problem: HEMATOLOGIC - ADULT  Goal: Maintains hematologic stability  Description  INTERVENTIONS  - Assess for signs and symptoms of bleeding or hemorrhage  - Monitor labs  - Administer supportive blood products/factors as ordered and appropriate  Outcome: Progressing     Problem: Potential for Falls  Goal: Patient will remain free of falls  Description  INTERVENTIONS:  - Assess patient frequently for physical needs  -  Identify cognitive and physical deficits and behaviors that affect risk of falls    -  [place] fall precautions as indicated by assessment   - Educate patient/family on patient safety including physical limitations  - Instruct patient to call for assistance with activity based on assessment  - Modify environment to reduce risk of injury  - Consider OT/PT consult to assist with strengthening/mobility  Outcome: Progressing

## 2019-09-21 NOTE — PLAN OF CARE
Problem: INFECTION - ADULT  Goal: Absence or prevention of progression during hospitalization  Description  INTERVENTIONS:  - Assess and monitor for signs and symptoms of infection  - Monitor lab/diagnostic results  - Monitor all insertion sites, i e  indwelling lines, tubes, and drains  - Monitor endotracheal if appropriate and nasal secretions for changes in amount and color  - [place] appropriate cooling/warming therapies per order  - Administer medications as ordered  - Instruct and encourage patient and family to use good hand hygiene technique  - Identify and instruct in appropriate isolation precautions for identified infection/condition  Outcome: Progressing     Problem: SAFETY ADULT  Goal: Patient will remain free of falls  Description  INTERVENTIONS:  - Assess patient frequently for physical needs  -  Identify cognitive and physical deficits and behaviors that affect risk of falls    -  [place] fall precautions as indicated by assessment   - Educate patient/family on patient safety including physical limitations  - Instruct patient to call for assistance with activity based on assessment  - Modify environment to reduce risk of injury  - Consider OT/PT consult to assist with strengthening/mobility  Outcome: Progressing  Goal: Maintain or return to baseline ADL function  Description  INTERVENTIONS:  -  Assess patient's ability to carry out ADLs; assess patient's baseline for ADL function and identify physical deficits which impact ability to perform ADLs (bathing, care of mouth/teeth, toileting, grooming, dressing, etc )  - Assess/evaluate cause of self-care deficits   - Assess range of motion  - Assess patient's mobility; develop plan if impaired  - Assess patient's need for assistive devices and provide as appropriate  - Encourage maximum independence but intervene and supervise when necessary  - Involve family in performance of ADLs  - Assess for home care needs following discharge   - Consider OT consult to assist with ADL evaluation and planning for discharge  - Provide patient education as appropriate  Outcome: Progressing     Problem: DISCHARGE PLANNING  Goal: Discharge to home or other facility with appropriate resources  Description  INTERVENTIONS:  - Identify barriers to discharge w/patient and caregiver  - Arrange for needed discharge resources and transportation as appropriate  - Identify discharge learning needs (meds, wound care, etc )  - Arrange for interpretive services to assist at discharge as needed  - Refer to Case Management Department for coordinating discharge planning if the patient needs post-hospital services based on physician/advanced practitioner order or complex needs related to functional status, cognitive ability, or social support system  Outcome: Progressing     Problem: Knowledge Deficit  Goal: Patient/family/caregiver demonstrates understanding of disease process, treatment plan, medications, and discharge instructions  Description  Complete learning assessment and assess knowledge base    Interventions:  - Provide teaching at level of understanding  - Provide teaching via preferred learning methods  Outcome: Progressing     Problem: SKIN/TISSUE INTEGRITY - ADULT  Goal: Skin integrity remains intact  Description  INTERVENTIONS  - Identify patients at risk for skin breakdown  - Assess and monitor skin integrity  - Assess and monitor nutrition and hydration status  - Monitor labs (i e  albumin)  - Assess for incontinence   - Turn and reposition patient  - Assist with mobility/ambulation  - Relieve pressure over bony prominences  - Avoid friction and shearing  - Provide appropriate hygiene as needed including keeping skin clean and dry  - Evaluate need for skin moisturizer/barrier cream  - Collaborate with interdisciplinary team (i e  Nutrition, Rehabilitation, etc )   - Patient/family teaching  Outcome: Progressing  Goal: Incision(s), wounds(s) or drain site(s) healing without S/S of infection  Description  INTERVENTIONS  - Assess and document risk factors for skin impairment   - Assess and document dressing, incision, wound bed, drain sites and surrounding tissue  - Consider nutrition services referral as needed  - Oral mucous membranes remain intact  - Provide patient/ family education  Outcome: Progressing  Goal: Oral mucous membranes remain intact  Description  INTERVENTIONS  - Assess oral mucosa and hygiene practices  - Implement preventative oral hygiene regimen  - Implement oral medicated treatments as ordered  - Initiate Nutrition services referral as needed  Outcome: Progressing     Problem: HEMATOLOGIC - ADULT  Goal: Maintains hematologic stability  Description  INTERVENTIONS  - Assess for signs and symptoms of bleeding or hemorrhage  - Monitor labs  - Administer supportive blood products/factors as ordered and appropriate  Outcome: Progressing     Problem: Potential for Falls  Goal: Patient will remain free of falls  Description  INTERVENTIONS:  - Assess patient frequently for physical needs  -  Identify cognitive and physical deficits and behaviors that affect risk of falls    -  [place] fall precautions as indicated by assessment   - Educate patient/family on patient safety including physical limitations  - Instruct patient to call for assistance with activity based on assessment  - Modify environment to reduce risk of injury  - Consider OT/PT consult to assist with strengthening/mobility  Outcome: Progressing

## 2019-09-21 NOTE — DISCHARGE SUMMARY
Discharge Summary - Purvi Moore 82 y o  male MRN: [id number]    Unit/Bed#: [place] 2 [place] 216-01 Encounter: [id number]    Admission Date:   Admission Orders (From admission, onward)     Ordered        09/19/19 1517  Inpatient Admission  Once         09/19/19 0303  Place in Observation (expected length of stay for this patient is less than two midnights)  Once                     Admitting Diagnosis: Bleeding from the nose [R04 0]  Right-sided epistaxis [R04 0]    HPI: Purvi Moore is a 82 y o  male who presents with nose bleed      Past medical history significant for coronary artery disease, type 2 diabetes, essential hypertension, GERD, hyperlipidemia  Patient states around 6:00 PM yesterday he blew his nose while eating dinner  His nose started to spontaneously bleed at that time, primarily from the anterior portion  He denies feeling any blood trickling down the back of his throat  He attempted to apply pressure but when this did not improve, he presented to the ED  Otherwise denies any headaches, visual changes, chest pain/palpitations, shortness of breath, nausea/vomiting, abdominal pain  Denies any prior significant nose bleeds however notes he is on aspirin and plavix  Only complaint currently is mild discomfort from the nasal packing        Procedures Performed:   Orders Placed This Encounter   Procedures    Epistaxis management       Summary of Hospital Course:  Patient developed right-sided epistaxis after blowing his nose  Presented to [facility] emergency department and he was transferred to [facility] ED to be evaluated by ENT   09/20/2019 patient had rigid nasal endoscopy with cautery of right septum and floor spur  Patient tolerated the procedure well  No bleeding in the past 24 hours  Aspirin and Plavix was held during inpatient stay  We resumed aspirin today 09/21/2019    Patient will have a follow-up appointment with ENT on Monday 09/23/2019 and if there is no bleeding he can resume his Plavix too  Follow-up with ENT as scheduled  Follow up with PCP     Significant Findings, Care, Treatment and Services Provided:  Rigid nasal endoscopy with cautery of right septum and floor spur  Complications:  None    Discharge Diagnosis:   Right-sided epistaxis-resolved  Coronary artery disease  Hypertension  Type 2 diabetes  CKD stage IIIB    Resolved Problems  Date Reviewed: 9/20/2019          Resolved    * (Principal) Right-sided epistaxis 9/21/2019     Resolved by  Ivana Harvey MD          Condition at Discharge: good         Discharge instructions/Information to patient and family:   See after visit summary for information provided to patient and family  Provisions for Follow-Up Care:  See after visit summary for information related to follow-up care and any pertinent home health orders  PCP: Nati Lawler DO    Disposition: Home    Planned Readmission: No      Discharge Statement   I spent 34 minutes discharging the patient  This time was spent on the day of discharge  I had direct contact with the patient on the day of discharge  Additional documentation is required if more than 30 minutes were spent on discharge  Discharge Medications:  See after visit summary for reconciled discharge medications provided to patient and family

## 2019-09-26 ENCOUNTER — HOSPITAL ENCOUNTER (EMERGENCY)
Facility: HOSPITAL | Age: 84
Discharge: HOME | End: 2019-09-26
Attending: EMERGENCY MEDICINE
Payer: MEDICARE

## 2019-09-26 ENCOUNTER — TELEPHONE (OUTPATIENT)
Dept: CARDIOLOGY CLINIC | Facility: CLINIC | Age: 84
End: 2019-09-26

## 2019-09-26 VITALS
DIASTOLIC BLOOD PRESSURE: 68 MMHG | OXYGEN SATURATION: 93 % | TEMPERATURE: 98.8 F | RESPIRATION RATE: 17 BRPM | HEIGHT: 66 IN | BODY MASS INDEX: 28.42 KG/M2 | WEIGHT: 176.81 LBS | SYSTOLIC BLOOD PRESSURE: 140 MMHG | HEART RATE: 82 BPM

## 2019-09-26 DIAGNOSIS — R04.0 RECURRENT EPISTAXIS: Primary | ICD-10-CM

## 2019-09-26 LAB
HOLD SPECIMEN: NORMAL

## 2019-09-26 PROCEDURE — 99283 EMERGENCY DEPT VISIT LOW MDM: CPT | Performed by: EMERGENCY MEDICINE

## 2019-09-26 PROCEDURE — 99284 EMERGENCY DEPT VISIT MOD MDM: CPT

## 2019-09-26 PROCEDURE — 36415 COLL VENOUS BLD VENIPUNCTURE: CPT

## 2019-09-26 PROCEDURE — 30901 CONTROL OF NOSEBLEED: CPT | Performed by: EMERGENCY MEDICINE

## 2019-09-26 RX ORDER — SUCRALFATE 1 G/1
1 TABLET ORAL 2 TIMES DAILY
COMMUNITY

## 2019-09-26 RX ORDER — TRANEXAMIC ACID 100 MG/ML
500 INJECTION, SOLUTION INTRAVENOUS ONCE
Status: COMPLETED | OUTPATIENT
Start: 2019-09-26 | End: 2019-09-26

## 2019-09-26 RX ADMIN — TRANEXAMIC ACID 500 MG: 1 INJECTION, SOLUTION INTRAVENOUS at 07:38

## 2019-09-26 NOTE — ED PROVIDER NOTES
History  Chief Complaint   Patient presents with    Nose Bleed     Pt reports he woke up with a nosebleed; pt reports it was cauterized on Saturday  Pt is on blood thinners     Patient returns to the emergency department for recurrent epistaxis right  Patient was seen on 18 September for similar complaint after blowing his nose and having spontaneous bleeding  Patient takes aspirin and Plavix daily  Nasal packing, first soaked with Afrin then TXA, was placed at that time, with persistent bleeding  Therefore he was transferred to [facility] for ENT evaluation  Dr Katlyn Meek cauterized his nose and he was allowed to resume his aspirin and Plavix  He was discharged 5 days ago  Today he woke in his normal state states he is taking his medications when the no started to spontaneously bleed again  He minimally applied pressure and called 911  EMS applied a nasal clamp with good clot formation upon arrival to the ED  There is minimal oozing bleeding at this time  He has a moderate dried blood on his face and his pharynx but none from the left nare  He denies any other symptoms of acute blood loss or bleeding from other sites  During the admission last week, his hemoglobin remained in the 14s and his platelets remained in the 200s without significant variation  No recent travel or sick contacts  Denies f/c, HA, CP, SOB, abdominal pain, n/v/d  12 system ROS o/w negative  History provided by:  Patient and medical records  Nose Bleed   Location:  R nare  Duration:  30 minutes  Timing:  Sporadic  Progression:  Partially resolved  Chronicity:  Recurrent  Context: anticoagulants and aspirin use    Context: not bleeding disorder and not thrombocytopenia  Nose-picking: Denies      Relieved by:  Applying pressure  Associated symptoms: blood in oropharynx    Associated symptoms: no congestion, no cough, no dizziness, no facial pain, no fever, no headaches, no sinus pain, no sneezing, no sore throat and no syncope    Risk factors: no alcohol use, no change in medication, no head and neck surgery, no head and neck tumor, no intranasal steroids, no radiation treatment, no recent chemotherapy and no sinus problems        Prior to Admission Medications   Prescriptions Last Dose Informant Patient Reported? Taking? amLODIPine (NORVASC) 5 mg tablet  Self Yes Yes   Sig: Take 5 mg by mouth daily   aspirin (ECOTRIN LOW STRENGTH) 81 mg EC tablet  Self Yes Yes   Sig: Take 81 mg by mouth daily   atorvastatin (LIPITOR) 20 mg tablet  Self Yes Yes   Sig: Take 20 mg by mouth daily   clopidogrel (PLAVIX) 75 mg tablet  Self No Yes   Sig: Take 1 tablet (75 mg total) by mouth daily Hold until seen by ENT on 9/23/19  Patient taking differently: Take 75 mg by mouth daily Pt did not take this morning 9/26   escitalopram (LEXAPRO) 10 mg tablet  Self Yes Yes   Sig: Take 10 mg by mouth daily   glimepiride (AMARYL) 1 mg tablet  Self Yes Yes   Sig: Take 1 mg by mouth daily with breakfast    isosorbide mononitrate (IMDUR) 60 mg 24 hr tablet  Self Yes Yes   Sig: Take 60 mg by mouth daily In the morning   nitroglycerin (NITROSTAT) 0 4 mg SL tablet  Self Yes Yes   Sig: one tablet under tongue for angina, may repeat in 15 min  no more than three times   omeprazole (PriLOSEC) 20 mg delayed release capsule  Self Yes Yes   Sig: Take 20 mg by mouth daily   propranolol (INDERAL LA) 120 mg 24 hr capsule  Self Yes Yes   Sig: Take 120 mg by mouth daily   sucralfate (CARAFATE) 1 g tablet   Yes Yes   Sig: Take 1 g by mouth 2 (two) times a day      Facility-Administered Medications: None       Past Medical History:   Diagnosis Date    Athscl 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     Stent OM1   Patent mammary graft to LAD 10/7/2009; CABG 1992 & 2005    Cancer [facility])     skin    Diabetes mellitus, type II ([address] )     without complication    Essential (primary) hypertension     GERD (gastroesophageal reflux disease)     Hx of cardiovascular stress test 2014    Eddie MPI; EF0 52 (52%) Lexiscan, mild LV systolic dysfunction; evidence for prior inferior wall MI; perfusion imaging consistant w mild lat wall ischemia and min torin-infart inferior ischemia   / EF0 51 (51%) evidence for prior inferior wall MI  Mild inferior and mild-moderate lateral wall ischemia  7/29/15    Hx of echocardiogram 2017    2D w/CFD;EF0 55 (55%) mild LVH, mitral valve prolapse with moderate regurgitation  left atrial enlargement  Mild tricuspid regurgitation   Mixed hyperlipidemia     Occlusion and stenosis of bilateral carotid arteries     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 infarction)     Presence of aortocoronary bypass graft        Past Surgical History:   Procedure Laterality Date    CARDIAC CATHETERIZATION  10/07/2009    Stent 99% stenosis SVG from the aorta to OM1  Patent mammary graft to the LAD    CORONARY ARTERY BYPASS GRAFT      VG-CX, VG-RCA    CORONARY ARTERY BYPASS GRAFT  2005    LIMA-D1-LAD, VG-PDA-PLB, [id number] TMR 9 Lesions    [place] EGD TRANSORAL BIOPSY SINGLE/MULTIPLE N/A 2019    Procedure: ESOPHAGOGASTRODUODENOSCOPY (EGD) with biopsy;  Surgeon: Elías Sanders MD;  Location: [address] [place] [address] GI LAB; Service: Gastroenterology    TONSILLECTOMY         Family History   Problem Relation Age of Onset    Heart disease Brother     Tuberculosis Mother     Tuberculosis Father      I have reviewed and agree with the history as documented  Social History     Tobacco Use    Smoking status: Former Smoker     Packs/day: 1 00     Years: 40 00     Pack years: 40 00     Last attempt to quit: 1977     Years since quittin 7    Smokeless tobacco: Never Used   Substance Use Topics    Alcohol use: No    Drug use: No        Review of Systems   Constitutional: Negative for fever  HENT: Positive for nosebleeds  Negative for congestion, mouth sores, sinus pain, sneezing, sore throat and trouble swallowing  Eyes: Negative      Respiratory: Negative for cough, chest tightness and shortness of breath  Cardiovascular: Negative for chest pain, palpitations and syncope  Gastrointestinal: Negative for abdominal pain, blood in stool, nausea and vomiting  Genitourinary: Negative for hematuria  Musculoskeletal: Negative for back pain and neck pain  Skin: Negative for pallor  Neurological: Negative for dizziness, syncope, weakness, light-headedness and headaches  Psychiatric/Behavioral: Negative  Physical Exam  Physical Exam   Constitutional: He is oriented to person, place, and time  He appears well-developed and well-nourished  No distress  HENT:   Head: Normocephalic and atraumatic  Large round clot in right nare with minimal oozing bleeding  Moderate dried blood on the face, extending from the right nare, as well as in the posterior oropharynx and coding the tongue  No bleeding in the left nare  No active bleeding in the posterior pharynx suggestive of posterior epistaxis  Eyes: Pupils are equal, round, and reactive to light  Conjunctivae and EOM are normal  No scleral icterus  Neck: Normal range of motion  Neck supple  Cardiovascular: Normal rate and regular rhythm  No murmur heard  Pulmonary/Chest: Effort normal and breath sounds normal    Abdominal: Soft  Bowel sounds are normal  He exhibits no distension  There is no tenderness  Musculoskeletal: Normal range of motion  He exhibits no edema or tenderness  Lymphadenopathy:     He has no cervical adenopathy  Neurological: He is alert and oriented to person, place, and time  He has normal reflexes  No cranial nerve deficit  Skin: Skin is warm and dry  No rash noted  He is not diaphoretic  No erythema  No pallor  Psychiatric: He has a normal mood and affect  His behavior is normal  Thought content normal    Vitals reviewed        Vital Signs  ED Triage Vitals [09/26/19 0701]   Temperature Pulse Respirations Blood Pressure SpO2   98 8 °F (37 1 °C) 78 17 166/76 95 % Temp Source Heart Rate Source Patient Position - Orthostatic VS BP Location FiO2 (%)   Temporal Monitor Lying Left arm --      Pain Score       3           Vitals:    09/26/19 0701 09/26/19 0730   BP: 166/76 149/80   Pulse: 78 77   Patient Position - Orthostatic VS: Lying Sitting         Visual Acuity      ED Medications  Medications   tranexamic acid 100mg/mL (for epistaxis) 500 mg (500 mg Nasal Given 9/26/19 0738)       Diagnostic Studies  Results Reviewed     Procedure Component Value Units Date/Time    [place] draw [[id number]] Collected:  09/26/19 0705    Lab Status: In process Specimen:  Blood from Arm, Right Updated:  09/26/19 8181    Narrative: The following orders were created for panel order [place] draw  Procedure                               Abnormality         Status                     ---------                               -----------         ------                     Mondamin  Top on XZHY[[id number]]                           In process                 Gold top on JAND[[id number]]                                 In process                 Green / Yellow tube on KZPL[[id number]]                      In process                 Green / Black tube on TRWM[[id number]]                       In process                 Lavender Top 3 ml on ZLSN[[id number]]                        In process                 Lavender Top 7ml on DXIE[[id number]]                         Final result                 Please view results for these tests on the individual orders  No orders to display              Procedures  Procedures       ED Course  ED Course as of Sep 26 0813   u Sep 26, 2019   4937 Despite nasal clamping for 30 minutes, patient continues to have minor oozing from the left nare  Will reinsert a TXA-Merocele sponge and refer to ENT        0253 Spoke with Dr Marissa Olguin  Called [facility] and scheduled an appointment for 0900 on Monday 30 September  [facility]  Number of Diagnoses or Management Options  Diagnosis management comments: DDx: Epistaxis - spontaneous, dry mucosa, digital exploration, doubt elevated INR, abnormal coags or significant blood loss  A/P: Will treat symptoms, reevaluate  Amount and/or Complexity of Data Reviewed  Clinical lab tests: reviewed  Review and summarize past medical records: yes        Disposition  Final diagnoses:   Recurrent right-sided     Time reflects when diagnosis was documented in both MDM as applicable and the Disposition within this note     Time User Action Codes Description Comment    9/26/2019  8:11 AM [address] [place] [address] [address] [R04 0] Recurrent epistaxis     9/26/2019  8:11 AM Elizabeth Schilling Modify [R04 0] Recurrent right-sided       ED Disposition     ED Disposition Condition Date/Time Comment    Discharge Stable u Sep 26, 2019  8:11 AM Alfie Bradley discharge to home/self care  Follow-up Information     Follow up With Specialties Details Why [address] [facility] Go on 9/30/2019 as scheduled, for further evaluation and treatment [address] [facility]  [phone]            Patient's Medications   Discharge Prescriptions    No medications on file     No discharge procedures on file      ED Provider  Electronically Signed by           Milka Kendrick DO  09/26/19 5596

## 2019-09-26 NOTE — TELEPHONE ENCOUNTER
Jose Isaac had very bad nose bleeds, went to the ER  They cauterized it    But it happened again  Went to ER again, saw ENT, they said to check with cardio regarding his ASA and plavix  They packed it  He held both meds for 1 day

## 2019-09-26 NOTE — ED PROCEDURE NOTE
PROCEDURE  Epistaxis management  Date/Time: 9/26/2019 7:50 AM  Performed by: Fadumo Mayberry DO  Authorized by: Fadumo Mayberry DO     Patient location:  ED  Consent:     Consent obtained:  Verbal    Consent given by:  Patient    Risks discussed:  Bleeding, infection, nasal injury and pain  Universal protocol:     Procedure explained and questions answered to patient or proxy's satisfaction: yes      Relevant documents present and verified: yes      Patient identity confirmed:  Verbally with patient and arm band  Anesthesia (see MAR for exact dosages): Anesthesia method:  None  Procedure details:     Treatment site:  R anterior    Hemostasis method:  Merocel sponge and thrombin    Approach:  External    Treatment complexity:  Limited    Treatment episode: recurring    Post-procedure details:     Assessment:  Bleeding stopped    Patient tolerance of procedure:   Tolerated well, no immediate complications         Fadumo Mayberry DO  09/26/19 4380

## 2019-10-21 ENCOUNTER — TRANSCRIBE ORDERS (OUTPATIENT)
Dept: LAB | Facility: MEDICAL CENTER | Age: 84
End: 2019-10-21

## 2019-10-21 ENCOUNTER — APPOINTMENT (OUTPATIENT)
Dept: LAB | Facility: MEDICAL CENTER | Age: 84
End: 2019-10-21
Payer: MEDICARE

## 2019-10-21 DIAGNOSIS — I10 HYPERTENSION, UNSPECIFIED TYPE: ICD-10-CM

## 2019-10-21 DIAGNOSIS — I25.10 ASCVD (ARTERIOSCLEROTIC CARDIOVASCULAR DISEASE): ICD-10-CM

## 2019-10-21 DIAGNOSIS — E78.5 HYPERLIPIDEMIA, UNSPECIFIED HYPERLIPIDEMIA TYPE: ICD-10-CM

## 2019-10-21 DIAGNOSIS — I25.10 ASCVD (ARTERIOSCLEROTIC CARDIOVASCULAR DISEASE): Primary | ICD-10-CM

## 2019-10-21 DIAGNOSIS — R53.83 OTHER FATIGUE: ICD-10-CM

## 2019-10-21 LAB
ALBUMIN SERPL BCP-MCNC: 4 G/DL (ref 3.5–5)
ALP SERPL-CCNC: 99 U/L (ref 46–116)
ALT SERPL W P-5'-P-CCNC: 16 U/L (ref 12–78)
ANION GAP SERPL CALCULATED.3IONS-SCNC: 5 MMOL/L (ref 4–13)
AST SERPL W P-5'-P-CCNC: 8 U/L (ref 5–45)
BILIRUB SERPL-MCNC: 0.78 MG/DL (ref 0.2–1)
BUN SERPL-MCNC: 19 MG/DL (ref 5–25)
CALCIUM SERPL-MCNC: 10 MG/DL (ref 8.3–10.1)
CHLORIDE SERPL-SCNC: 105 MMOL/L (ref 100–108)
CHOLEST SERPL-MCNC: 124 MG/DL (ref 50–200)
CO2 SERPL-SCNC: 28 MMOL/L (ref 21–32)
CREAT SERPL-MCNC: 2.17 MG/DL (ref 0.6–1.3)
ERYTHROCYTE [DISTWIDTH] IN BLOOD BY AUTOMATED COUNT: 13.8 % (ref 11.6–15.1)
EST. AVERAGE GLUCOSE BLD GHB EST-MCNC: 166 MG/DL
GFR SERPL CREATININE-BSD FRML MDRD: 27 ML/MIN/1.73SQ M
GLUCOSE P FAST SERPL-MCNC: 156 MG/DL (ref 65–99)
HBA1C MFR BLD: 7.4 % (ref 4.2–6.3)
HCT VFR BLD AUTO: 45.5 % (ref 36.5–49.3)
HDLC SERPL-MCNC: 36 MG/DL
HGB BLD-MCNC: 14 G/DL (ref 12–17)
LDLC SERPL CALC-MCNC: 59 MG/DL (ref 0–100)
MCH RBC QN AUTO: 26.8 PG (ref 26.8–34.3)
MCHC RBC AUTO-ENTMCNC: 30.8 G/DL (ref 31.4–37.4)
MCV RBC AUTO: 87 FL (ref 82–98)
NONHDLC SERPL-MCNC: 88 MG/DL
PLATELET # BLD AUTO: 215 THOUSANDS/UL (ref 149–390)
PMV BLD AUTO: 9.2 FL (ref 8.9–12.7)
POTASSIUM SERPL-SCNC: 4.2 MMOL/L (ref 3.5–5.3)
PROT SERPL-MCNC: 7.7 G/DL (ref 6.4–8.2)
RBC # BLD AUTO: 5.22 MILLION/UL (ref 3.88–5.62)
SODIUM SERPL-SCNC: 138 MMOL/L (ref 136–145)
TRIGL SERPL-MCNC: 147 MG/DL
WBC # BLD AUTO: 9.69 THOUSAND/UL (ref 4.31–10.16)

## 2019-10-21 PROCEDURE — 80053 COMPREHEN METABOLIC PANEL: CPT

## 2019-10-21 PROCEDURE — 80061 LIPID PANEL: CPT

## 2019-10-21 PROCEDURE — 36415 COLL VENOUS BLD VENIPUNCTURE: CPT

## 2019-10-21 PROCEDURE — 83036 HEMOGLOBIN GLYCOSYLATED A1C: CPT

## 2019-10-21 PROCEDURE — 85027 COMPLETE CBC AUTOMATED: CPT

## 2020-02-11 ENCOUNTER — TRANSCRIBE ORDERS (OUTPATIENT)
Dept: LAB | Facility: MEDICAL CENTER | Age: 85
End: 2020-02-11

## 2020-02-11 ENCOUNTER — APPOINTMENT (OUTPATIENT)
Dept: LAB | Facility: MEDICAL CENTER | Age: 85
End: 2020-02-11
Payer: MEDICARE

## 2020-02-11 DIAGNOSIS — I25.10 ASCVD (ARTERIOSCLEROTIC CARDIOVASCULAR DISEASE): ICD-10-CM

## 2020-02-11 DIAGNOSIS — E78.5 HYPERLIPIDEMIA, UNSPECIFIED HYPERLIPIDEMIA TYPE: ICD-10-CM

## 2020-02-11 DIAGNOSIS — Z79.4 TYPE 2 DIABETES MELLITUS WITH OTHER SPECIFIED COMPLICATION, WITH LONG-TERM CURRENT USE OF INSULIN (HCC): ICD-10-CM

## 2020-02-11 DIAGNOSIS — K74.60 CIRRHOSIS OF LIVER WITHOUT ASCITES, UNSPECIFIED HEPATIC CIRRHOSIS TYPE (HCC): ICD-10-CM

## 2020-02-11 DIAGNOSIS — I10 HYPERTENSION, UNSPECIFIED TYPE: ICD-10-CM

## 2020-02-11 DIAGNOSIS — E11.69 TYPE 2 DIABETES MELLITUS WITH OTHER SPECIFIED COMPLICATION, WITH LONG-TERM CURRENT USE OF INSULIN (HCC): ICD-10-CM

## 2020-02-11 DIAGNOSIS — I25.10 ASCVD (ARTERIOSCLEROTIC CARDIOVASCULAR DISEASE): Primary | ICD-10-CM

## 2020-02-11 LAB
ALBUMIN SERPL BCP-MCNC: 3.6 G/DL (ref 3.5–5)
ALP SERPL-CCNC: 101 U/L (ref 46–116)
ALT SERPL W P-5'-P-CCNC: 44 U/L (ref 12–78)
ANION GAP SERPL CALCULATED.3IONS-SCNC: 6 MMOL/L (ref 4–13)
AST SERPL W P-5'-P-CCNC: 29 U/L (ref 5–45)
BILIRUB SERPL-MCNC: 0.56 MG/DL (ref 0.2–1)
BUN SERPL-MCNC: 27 MG/DL (ref 5–25)
CALCIUM SERPL-MCNC: 9.7 MG/DL (ref 8.3–10.1)
CHLORIDE SERPL-SCNC: 108 MMOL/L (ref 100–108)
CHOLEST SERPL-MCNC: 150 MG/DL (ref 50–200)
CO2 SERPL-SCNC: 28 MMOL/L (ref 21–32)
CREAT SERPL-MCNC: 1.53 MG/DL (ref 0.6–1.3)
CREAT UR-MCNC: 81.8 MG/DL
ERYTHROCYTE [DISTWIDTH] IN BLOOD BY AUTOMATED COUNT: 14.7 % (ref 11.6–15.1)
EST. AVERAGE GLUCOSE BLD GHB EST-MCNC: 177 MG/DL
GFR SERPL CREATININE-BSD FRML MDRD: 42 ML/MIN/1.73SQ M
GLUCOSE P FAST SERPL-MCNC: 154 MG/DL (ref 65–99)
HBA1C MFR BLD: 7.8 %
HCT VFR BLD AUTO: 47.2 % (ref 36.5–49.3)
HDLC SERPL-MCNC: 38 MG/DL
HGB BLD-MCNC: 14.6 G/DL (ref 12–17)
LDLC SERPL CALC-MCNC: 84 MG/DL (ref 0–100)
MCH RBC QN AUTO: 27 PG (ref 26.8–34.3)
MCHC RBC AUTO-ENTMCNC: 30.9 G/DL (ref 31.4–37.4)
MCV RBC AUTO: 87 FL (ref 82–98)
MICROALBUMIN UR-MCNC: 37.3 MG/L (ref 0–20)
MICROALBUMIN/CREAT 24H UR: 46 MG/G CREATININE (ref 0–30)
NONHDLC SERPL-MCNC: 112 MG/DL
PLATELET # BLD AUTO: 182 THOUSANDS/UL (ref 149–390)
PMV BLD AUTO: 9.4 FL (ref 8.9–12.7)
POTASSIUM SERPL-SCNC: 4.6 MMOL/L (ref 3.5–5.3)
PROT SERPL-MCNC: 7.6 G/DL (ref 6.4–8.2)
RBC # BLD AUTO: 5.4 MILLION/UL (ref 3.88–5.62)
SODIUM SERPL-SCNC: 142 MMOL/L (ref 136–145)
TRIGL SERPL-MCNC: 140 MG/DL
WBC # BLD AUTO: 10.13 THOUSAND/UL (ref 4.31–10.16)

## 2020-02-11 PROCEDURE — 80061 LIPID PANEL: CPT

## 2020-02-11 PROCEDURE — 83036 HEMOGLOBIN GLYCOSYLATED A1C: CPT

## 2020-02-11 PROCEDURE — 82043 UR ALBUMIN QUANTITATIVE: CPT

## 2020-02-11 PROCEDURE — 85027 COMPLETE CBC AUTOMATED: CPT

## 2020-02-11 PROCEDURE — 80053 COMPREHEN METABOLIC PANEL: CPT

## 2020-02-11 PROCEDURE — 36415 COLL VENOUS BLD VENIPUNCTURE: CPT

## 2020-02-11 PROCEDURE — 82570 ASSAY OF URINE CREATININE: CPT

## 2020-02-14 ENCOUNTER — CONSULT (OUTPATIENT)
Dept: NEPHROLOGY | Facility: CLINIC | Age: 85
End: 2020-02-14
Payer: MEDICARE

## 2020-02-14 VITALS
SYSTOLIC BLOOD PRESSURE: 130 MMHG | HEART RATE: 76 BPM | OXYGEN SATURATION: 98 % | DIASTOLIC BLOOD PRESSURE: 64 MMHG | BODY MASS INDEX: 29.6 KG/M2 | HEIGHT: 66 IN | WEIGHT: 184.2 LBS

## 2020-02-14 DIAGNOSIS — E55.9 VITAMIN D DEFICIENCY: ICD-10-CM

## 2020-02-14 DIAGNOSIS — E11.21 DIABETIC NEPHROPATHY ASSOCIATED WITH TYPE 2 DIABETES MELLITUS (HCC): ICD-10-CM

## 2020-02-14 DIAGNOSIS — R60.9 EDEMA, UNSPECIFIED TYPE: ICD-10-CM

## 2020-02-14 DIAGNOSIS — K21.9 GASTROESOPHAGEAL REFLUX DISEASE WITHOUT ESOPHAGITIS: ICD-10-CM

## 2020-02-14 DIAGNOSIS — N11.9 CHRONIC TUBULOINTERSTITIAL NEPHRITIS: ICD-10-CM

## 2020-02-14 DIAGNOSIS — N18.30 STAGE 3 CHRONIC KIDNEY DISEASE (HCC): Primary | ICD-10-CM

## 2020-02-14 DIAGNOSIS — R80.9 PROTEINURIA, UNSPECIFIED TYPE: ICD-10-CM

## 2020-02-14 DIAGNOSIS — I70.90 ATHEROSCLEROSIS: ICD-10-CM

## 2020-02-14 DIAGNOSIS — I25.810 CORONARY ARTERY DISEASE INVOLVING CORONARY BYPASS GRAFT OF NATIVE HEART WITHOUT ANGINA PECTORIS: ICD-10-CM

## 2020-02-14 DIAGNOSIS — N25.81 SECONDARY HYPERPARATHYROIDISM OF RENAL ORIGIN (HCC): ICD-10-CM

## 2020-02-14 DIAGNOSIS — N20.0 NEPHROLITHIASIS: ICD-10-CM

## 2020-02-14 PROCEDURE — 99204 OFFICE O/P NEW MOD 45 MIN: CPT | Performed by: INTERNAL MEDICINE

## 2020-02-14 RX ORDER — CLOPIDOGREL BISULFATE 75 MG/1
75 TABLET ORAL DAILY
Start: 2020-02-14 | End: 2022-05-13

## 2020-02-14 NOTE — ASSESSMENT & PLAN NOTE
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

## 2020-02-14 NOTE — ASSESSMENT & PLAN NOTE
Patient is on high sodium diet, he will try to transition to lower sodium foods  He enjoys eating a lot of breads as well as cheese

## 2020-02-14 NOTE — ASSESSMENT & PLAN NOTE
Continue current medications, patient's diet needs to be significantly improved from the carbohydrate standpoint  He is aware that he should not be eating breads as well as other starches on a regular basis  Ultimately, the patient may benefit from being placed on angiotensin receptor blocker versus an ACE-inhibitor, but will defer for now  Will follow patient's labs and if appropriate at his next appointment will potentially initiate 1 of these medications      Lab Results   Component Value Date    HGBA1C 7 8 (H) 02/11/2020

## 2020-02-14 NOTE — ASSESSMENT & PLAN NOTE
Patient appears to have an estimated GFR around 40 mL/minute as baseline  He was noted to have decreased kidney function back in October of 2019 which bounced back to his baseline as mentioned above  Avoid all nonsteroidal anti-inflammatory medications, thankfully the patient has an allergy to Advil which assists in that capacity  Today we discussed the importance of maintaining proper blood sugar control as is the most likely cause and driving force of the patient's chronic kidney disease

## 2020-02-14 NOTE — PATIENT INSTRUCTIONS
Patient most likely has chronic kidney disease due to diabetic effect on the kidneys as well as long-term use of Omeprazole  More likely the diabetes affecting greater than the Omeprazole though  If we can discontinue the omeprazole at some point in the future, that would be great and it will hopefully allow patient's kidney function to be prolonged for as long as possible  Of the 2 issues, however, the diabetes is the most pressing issue  His blood sugar control will need to be as good as it could possibly be as this will be the driving force of how well his kidneys perform over the next several years  We will consider starting a different type of blood pressure pill which may protect his kidneys, but may ultimately cause more issues given his age  I will defer starting this medication at this time and wants to potentially started at her next appointment

## 2020-02-14 NOTE — PROGRESS NOTES
[facility] [place], [place]    Name: Gael Harris  YOB: 1935      Assessment/Plan:    Stage 3 chronic kidney disease [facility])  Patient appears to have an estimated GFR around 40 mL/minute as baseline  He was noted to have decreased kidney function back in October of 2019 which bounced back to his baseline as mentioned above  Avoid all nonsteroidal anti-inflammatory medications, thankfully the patient has an allergy to Advil which assists in that capacity  Today we discussed the importance of maintaining proper blood sugar control as is the most likely cause and driving force of the patient's chronic kidney disease  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 ([facility] [address] )  Continue current medications, patient's diet needs to be significantly improved from the carbohydrate standpoint  He is aware that he should not be eating breads as well as other starches on a regular basis  Ultimately, the patient may benefit from being placed on angiotensin receptor blocker versus an ACE-inhibitor, but will defer for now  Will follow patient's labs and if appropriate at his next appointment will potentially initiate 1 of these medications  Lab Results   Component Value Date    HGBA1C 7 8 (H) 02/11/2020       Proteinuria  Will rule out a monoclonal gammopathy by checking the patient's urine serum electrophoresis  Anticipate that this will be probably ruled out and the likely cause the proteinuria will be diabetic nephropathy  Chronic tubulointerstitial nephritis  Patient most likely has at least a mild-to-moderate component of chronic tubulointerstitial nephritis  Etiology is chronic use of Omeprazole  If possible, but patient would benefit from being transition to an H2 blocker from a proton pump inhibitor  Will need to defer to Gastroenterology whether this is possible      GERD (gastroesophageal reflux disease)  As mentioned above, if possible, transition the patient home Omeprazole to an H2 blocker  Edema  Patient is on high sodium diet, he will try to transition to lower sodium foods  He enjoys eating a lot of breads as well as cheese  Problem List Items Addressed This Visit        Digestive    GERD (gastroesophageal reflux disease)     As mentioned above, if possible, transition the patient home Omeprazole to an H2 blocker  Endocrine    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 ([facility] )     Continue current medications, patient's diet needs to be significantly improved from the carbohydrate standpoint  He is aware that he should not be eating breads as well as other starches on a regular basis  Ultimately, the patient may benefit from being placed on angiotensin receptor blocker versus an ACE-inhibitor, but will defer for now  Will follow patient's labs and if appropriate at his next appointment will potentially initiate 1 of these medications  Lab Results   Component Value Date    HGBA1C 7 8 (H) 02/11/2020               Cardiovascular and Mediastinum    CAD (coronary artery disease)    Relevant Medications    clopidogrel (PLAVIX) 75 mg tablet       Genitourinary    Stage 3 chronic kidney disease ([facility] ) - Primary     Patient appears to have an estimated GFR around 40 mL/minute as baseline  He was noted to have decreased kidney function back in October of 2019 which bounced back to his baseline as mentioned above  Avoid all nonsteroidal anti-inflammatory medications, thankfully the patient has an allergy to Advil which assists in that capacity  Today we discussed the importance of maintaining proper blood sugar control as is the most likely cause and driving force of the patient's chronic kidney disease           Relevant Orders    Protein electrophoresis, urine    Protein electrophoresis, serum    Nephrolithiasis    Chronic tubulointerstitial nephritis     Patient most likely has at least a mild-to-moderate component of chronic tubulointerstitial nephritis  Etiology is chronic use of Omeprazole  If possible, but patient would benefit from being transition to an H2 blocker from a proton pump inhibitor  Will need to defer to Gastroenterology whether this is possible  Other    Edema     Patient is on high sodium diet, he will try to transition to lower sodium foods  He enjoys eating a lot of breads as well as cheese  Proteinuria     Will rule out a monoclonal gammopathy by checking the patient's urine serum electrophoresis  Anticipate that this will be probably ruled out and the likely cause the proteinuria will be diabetic nephropathy  Relevant Orders    Protein electrophoresis, urine    Protein electrophoresis, serum      Other Visit Diagnoses     Secondary hyperparathyroidism of renal origin ([facility] )        Relevant Orders    PTH, intact    Vitamin D deficiency        Relevant Orders    Vitamin D 25 hydroxy          Thank you for allowing us to participate in the care of your patient  We will see him back for regular appointment in approximately 1 month to review labs  Subjective:      Patient ID: Lenora James is a 82 y o  male  Patient appears to have fairly stable chronic kidney disease from blood work which is available over last 2 years  That being said, I was able to find a chemistry from 2013 which noted a creatinine around 0 9 mg/dL  Since 2018, patient's creatinine has tend to be around 1 5-1 6 mg/dL  Patient had an episode of acute kidney injury previously which appeared to have spontaneously recovered  Ultrasound of the kidneys was done that time which did not note any anatomic issues  Patient was noted to have a benign cyst unchanged when he was checked 8 years prior  Patient had a microalbumin creatinine ratio performed which noted mild albuminuria of less than 300 mcg/mg  Patient has a history of DM II for several years now    He denies proliferative retinopathy, no photocoagulation  Patient's significant cardiac history, status post initial bypass surgery x1 bypass, followed by stent placement followed by a x6 bypass  Patient denies significant use of nonsteroidal inflammatory medications for pain control  Tends to use Tylenol  Hypertension   This is a chronic problem  The current episode started more than 1 year ago  The problem is unchanged  The problem is controlled  Pertinent negatives include no chest pain or orthopnea  There are no associated agents to hypertension  Risk factors for coronary artery disease include male gender and diabetes mellitus  Past treatments include calcium channel blockers and beta blockers  Compliance problems include diet  Hypertensive end-organ damage includes kidney disease  Identifiable causes of hypertension include chronic renal disease  The following portions of the patient's history were reviewed and updated as appropriate: allergies, current medications, past family history, past medical history, past social history, past surgical history and problem list     Review of Systems   Cardiovascular: Negative for chest pain and orthopnea  All other systems reviewed and are negative  Social History     Socioeconomic History    Marital status:       Spouse name: None    Number of children: None    Years of education: None    Highest education level: None   Occupational History    None   Social Needs    Financial resource strain: None    Food insecurity:     Worry: None     Inability: None    Transportation needs:     Medical: None     Non-medical: None   Tobacco Use    Smoking status: Former Smoker     Packs/day: 1 00     Years: 40 00     Pack years: 40 00     Last attempt to quit: 1977     Years since quittin 0    Smokeless tobacco: Never Used   Substance and Sexual Activity    Alcohol use: No    Drug use: No    Sexual activity: None   Lifestyle    Physical activity:     Days per week: None     Minutes per session: None    Stress: None   Relationships    Social connections:     Talks on phone: None     Gets together: None     Attends [employer] service: None     Active member of club or organization: None     Attends meetings of clubs or organizations: None     Relationship status: None    Intimate partner violence:     Fear of current or ex partner: None     Emotionally abused: None     Physically abused: None     Forced sexual activity: None   Other Topics Concern    None   Social History Narrative    None     Past Medical History:   Diagnosis Date    Athscl 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     Stent OM1  Patent mammary graft to LAD 10/7/2009; CABG 1992 & 2005    Cancer [facility])     skin    Diabetes mellitus, type II ([address] )     without complication    Epistaxis     Essential (primary) hypertension     GERD (gastroesophageal reflux disease)     Hx of cardiovascular stress test 07/23/2014    [facility]; EF0 52 (52%) Lexiscan, mild LV systolic dysfunction; evidence for prior inferior wall MI; perfusion imaging consistant w mild lat wall ischemia and min torin-infart inferior ischemia   / EF0 51 (51%) evidence for prior inferior wall MI  Mild inferior and mild-moderate lateral wall ischemia  7/29/15    Hx of echocardiogram 11/07/2017    2D w/CFD;EF0 55 (55%) mild LVH, mitral valve prolapse with moderate regurgitation  left atrial enlargement  Mild tricuspid regurgitation   Mixed hyperlipidemia     Occlusion and stenosis of bilateral carotid arteries     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 infarction)     Presence of aortocoronary bypass graft      Past Surgical History:   Procedure Laterality Date    CARDIAC CATHETERIZATION  10/07/2009    Stent 99% stenosis SVG from the aorta to OM1   Patent mammary graft to the LAD    CORONARY ARTERY BYPASS GRAFT  1992    VG-CX, VG-RCA    CORONARY ARTERY BYPASS GRAFT  11/04/2005    LIMA-D1-LAD, VG-PDA-PLB, Ekwitv-RP1-OO5 TMR 9 Lesions    [place] EGD TRANSORAL BIOPSY SINGLE/MULTIPLE N/A 1/23/2019    Procedure: ESOPHAGOGASTRODUODENOSCOPY (EGD) with biopsy;  Surgeon: Demar Hazel MD;  Location: [facility]; Service: Gastroenterology    TONSILLECTOMY         Current Outpatient Medications:     amLODIPine (NORVASC) 5 mg tablet, Take 5 mg by mouth daily, Disp: , Rfl:     aspirin (ECOTRIN LOW STRENGTH) 81 mg EC tablet, Take 81 mg by mouth daily, Disp: , Rfl:     atorvastatin (LIPITOR) 20 mg tablet, Take 20 mg by mouth daily, Disp: , Rfl:     clopidogrel (PLAVIX) 75 mg tablet, Take 1 tablet (75 mg total) by mouth daily Hold until seen by ENT on 9/23/19 , Disp: , Rfl:     escitalopram (LEXAPRO) 10 mg tablet, Take 10 mg by mouth daily, Disp: , Rfl:     glimepiride (AMARYL) 1 mg tablet, Take 1 mg by mouth daily with breakfast , Disp: , Rfl: 0    isosorbide mononitrate (IMDUR) 60 mg 24 hr tablet, Take 60 mg by mouth daily In the morning, Disp: , Rfl:     nitroglycerin (NITROSTAT) 0 4 mg SL tablet, one tablet under tongue for angina, may repeat in 15 min   no more than three times, Disp: , Rfl: 0    omeprazole (PriLOSEC) 20 mg delayed release capsule, Take 20 mg by mouth daily, Disp: , Rfl:     sucralfate (CARAFATE) 1 g tablet, Take 1 g by mouth 2 (two) times a day, Disp: , Rfl:     propranolol (INDERAL LA) 120 mg 24 hr capsule, Take 120 mg by mouth daily, Disp: , Rfl:     Lab Results   Component Value Date    SODIUM 142 02/11/2020    K 4 6 02/11/2020     02/11/2020    CO2 28 02/11/2020    AGAP 6 02/11/2020    BUN 27 (H) 02/11/2020    CREATININE 1 53 (H) 02/11/2020    GLUC 119 09/21/2019    GLUF 154 (H) 02/11/2020    CALCIUM 9 7 02/11/2020    AST 29 02/11/2020    ALT 44 02/11/2020    ALKPHOS 101 02/11/2020    TP 7 6 02/11/2020    TBILI 0 56 02/11/2020    EGFR 42 02/11/2020     Lab Results   Component Value Date    WBC 10 13 02/11/2020    HGB 14 6 02/11/2020    HCT 47 2 02/11/2020    MCV 87 02/11/2020     02/11/2020     Lab Results   Component Value Date    CHOLESTEROL 150 02/11/2020    CHOLESTEROL 124 10/21/2019    CHOLESTEROL 142 04/23/2019     Lab Results   Component Value Date    HDL 38 (L) 02/11/2020    HDL 36 (L) 10/21/2019    HDL 31 (L) 04/23/2019     Lab Results   Component Value Date    LDLCALC 84 02/11/2020    LDLCALC 59 10/21/2019    LDLCALC 60 04/23/2019     Lab Results   Component Value Date    TRIG 140 02/11/2020    TRIG 147 10/21/2019    TRIG 257 (H) 04/23/2019     No results found for: CHOLHDL  No results found for: [id number], TSH  Lab Results   Component Value Date    CALCIUM 9 7 02/11/2020     No results found for: SPEP, UPEP  No results found for: ANAIS WILLIAMSON4HUR        Objective:      /64   Pulse 76   Ht 5' 6" (1 676 m)   Wt 83 6 kg (184 lb 3 2 oz)   SpO2 98%   BMI 29 73 kg/m²          Physical Exam   Constitutional: He is oriented to person, place, and time  He appears well-developed and well-nourished  No distress  HENT:   Head: Normocephalic and atraumatic  Eyes: Conjunctivae are normal    Neck: Neck supple  Cardiovascular: Normal rate and regular rhythm  Pulmonary/Chest: Effort normal and breath sounds normal    Abdominal: Soft  Musculoskeletal: He exhibits edema (Moderate bilateral lower extremity edema)  Neurological: He is alert and oriented to person, place, and time  No cranial nerve deficit  Skin: Skin is warm  No rash noted  Psychiatric: He has a normal mood and affect   His behavior is normal

## 2020-02-14 NOTE — ASSESSMENT & PLAN NOTE
Will rule out a monoclonal gammopathy by checking the patient's urine serum electrophoresis  Anticipate that this will be probably ruled out and the likely cause the proteinuria will be diabetic nephropathy

## 2020-02-28 ENCOUNTER — APPOINTMENT (OUTPATIENT)
Dept: LAB | Facility: MEDICAL CENTER | Age: 85
End: 2020-02-28
Payer: MEDICARE

## 2020-02-28 DIAGNOSIS — R80.9 PROTEINURIA, UNSPECIFIED TYPE: ICD-10-CM

## 2020-02-28 DIAGNOSIS — N18.30 STAGE 3 CHRONIC KIDNEY DISEASE (HCC): ICD-10-CM

## 2020-02-28 DIAGNOSIS — E55.9 VITAMIN D DEFICIENCY: ICD-10-CM

## 2020-02-28 DIAGNOSIS — N25.81 SECONDARY HYPERPARATHYROIDISM OF RENAL ORIGIN (HCC): ICD-10-CM

## 2020-02-28 LAB
25(OH)D3 SERPL-MCNC: 13.7 NG/ML (ref 30–100)
PTH-INTACT SERPL-MCNC: 227.9 PG/ML (ref 18.4–80.1)

## 2020-02-28 PROCEDURE — 36415 COLL VENOUS BLD VENIPUNCTURE: CPT

## 2020-02-28 PROCEDURE — 84165 PROTEIN E-PHORESIS SERUM: CPT | Performed by: PATHOLOGY

## 2020-02-28 PROCEDURE — 84165 PROTEIN E-PHORESIS SERUM: CPT

## 2020-02-28 PROCEDURE — 86334 IMMUNOFIX E-PHORESIS SERUM: CPT | Performed by: PATHOLOGY

## 2020-02-28 PROCEDURE — 84166 PROTEIN E-PHORESIS/URINE/CSF: CPT | Performed by: INTERNAL MEDICINE

## 2020-02-28 PROCEDURE — 83970 ASSAY OF PARATHORMONE: CPT

## 2020-02-28 PROCEDURE — 84166 PROTEIN E-PHORESIS/URINE/CSF: CPT | Performed by: PATHOLOGY

## 2020-02-28 PROCEDURE — 86334 IMMUNOFIX E-PHORESIS SERUM: CPT

## 2020-02-28 PROCEDURE — 82306 VITAMIN D 25 HYDROXY: CPT

## 2020-03-03 LAB
ALBUMIN SERPL ELPH-MCNC: 4.19 G/DL (ref 3.5–5)
ALBUMIN SERPL ELPH-MCNC: 57.4 % (ref 52–65)
ALBUMIN UR ELPH-MCNC: 14.3 %
ALPHA1 GLOB MFR UR ELPH: 41.4 %
ALPHA1 GLOB SERPL ELPH-MCNC: 0.32 G/DL (ref 0.1–0.4)
ALPHA1 GLOB SERPL ELPH-MCNC: 4.4 % (ref 2.5–5)
ALPHA2 GLOB MFR UR ELPH: 10.3 %
ALPHA2 GLOB SERPL ELPH-MCNC: 0.89 G/DL (ref 0.4–1.2)
ALPHA2 GLOB SERPL ELPH-MCNC: 12.2 % (ref 7–13)
B-GLOBULIN MFR UR ELPH: 16.2 %
BETA GLOB ABNORMAL SERPL ELPH-MCNC: 0.42 G/DL (ref 0.4–0.8)
BETA1 GLOB SERPL ELPH-MCNC: 5.8 % (ref 5–13)
BETA2 GLOB SERPL ELPH-MCNC: 5.8 % (ref 2–8)
BETA2+GAMMA GLOB SERPL ELPH-MCNC: 0.42 G/DL (ref 0.2–0.5)
GAMMA GLOB ABNORMAL SERPL ELPH-MCNC: 1.05 G/DL (ref 0.5–1.6)
GAMMA GLOB MFR UR ELPH: 17.8 %
GAMMA GLOB SERPL ELPH-MCNC: 14.4 % (ref 12–22)
IGG/ALB SER: 1.35 {RATIO} (ref 1.1–1.8)
INTERPRETATION UR IFE-IMP: NORMAL
M PEAK ID 2: 1.3 %
M PROTEIN 2 SERPL ELPH-MCNC: 0.09 G/DL
M PROTEIN 3 MFR SERPL ELPH: 1.8 %
M PROTEIN 3 SERPL ELPH-MCNC: 0.13 G/DL
PROT PATTERN SERPL ELPH-IMP: NORMAL
PROT PATTERN UR ELPH-IMP: ABNORMAL
PROT SERPL-MCNC: 7.3 G/DL (ref 6.4–8.2)
PROT UR-MCNC: 46 MG/DL

## 2020-03-04 ENCOUNTER — TELEPHONE (OUTPATIENT)
Dept: NEPHROLOGY | Facility: CLINIC | Age: 85
End: 2020-03-04

## 2020-03-04 DIAGNOSIS — E55.9 VITAMIN D DEFICIENCY: Primary | ICD-10-CM

## 2020-03-04 NOTE — TELEPHONE ENCOUNTER
----- Message from Sydna Bosworth, DO sent at 3/4/2020  9:37 AM EST -----    Labs reviewed, vitamin-D is low and I recommend that he start high dose of vitamin D once a week, if he is okay with this I was sent in to the pharmacy to see if it is covered by his insurance  His other labs showed a mild positive, we will review this in detail at his upcoming appointment later this month  No medication changes or other intervention currently needed for that finding  Will simply review it and discussed plan

## 2020-03-04 NOTE — TELEPHONE ENCOUNTER
Supplement sent in  If his insurance does not cover then I would recommend that he take 2 tablets of vitamin D3 5000 units on Monday Tuesday Wednesday Thursday and Friday, this will give him a total of 62607 units for the week  I sent in for the patient take once a week 08699 unit tablet, but if unavailable please ask him to do what was mentioned above

## 2020-03-27 ENCOUNTER — TELEMEDICINE (OUTPATIENT)
Dept: NEPHROLOGY | Facility: CLINIC | Age: 85
End: 2020-03-27
Payer: MEDICARE

## 2020-03-27 DIAGNOSIS — E55.9 VITAMIN D DEFICIENCY: ICD-10-CM

## 2020-03-27 DIAGNOSIS — N11.9 CHRONIC TUBULOINTERSTITIAL NEPHRITIS: ICD-10-CM

## 2020-03-27 DIAGNOSIS — I10 BENIGN ESSENTIAL HYPERTENSION: ICD-10-CM

## 2020-03-27 DIAGNOSIS — N25.81 SECONDARY HYPERPARATHYROIDISM OF RENAL ORIGIN (HCC): ICD-10-CM

## 2020-03-27 DIAGNOSIS — R80.9 PROTEINURIA, UNSPECIFIED TYPE: ICD-10-CM

## 2020-03-27 DIAGNOSIS — N18.30 STAGE 3 CHRONIC KIDNEY DISEASE (HCC): Primary | ICD-10-CM

## 2020-03-27 DIAGNOSIS — R60.9 EDEMA, UNSPECIFIED TYPE: ICD-10-CM

## 2020-03-27 DIAGNOSIS — E11.21 DIABETIC NEPHROPATHY ASSOCIATED WITH TYPE 2 DIABETES MELLITUS (HCC): ICD-10-CM

## 2020-03-27 PROCEDURE — 99442 PR PHYS/QHP TELEPHONE EVALUATION 11-20 MIN: CPT | Performed by: INTERNAL MEDICINE

## 2020-03-27 NOTE — ASSESSMENT & PLAN NOTE
Patient continue to work on reducing sodium in his diet  In addition to this he has chronic bilateral lower extremity edema potentially due to reduction in kidney function complicated by high sodium diet in addition to this he also has bilateral trauma to both legs, fractures, in the past   Diet control was stressed, once again I feel hesitant placing the patient on loop diuretics as we may volume deplete the patient in effort to control edema which may cause decreased kidney function and other hemodynamic issues  Of note the patient is on amlodipine which tends to worsen underlying edema, this may be medication to eliminate once we are able to start the patient on Ace inhibitor or angiotensin receptor blocker

## 2020-03-27 NOTE — ASSESSMENT & PLAN NOTE
Reviewed the patient's A1c which was increased compared to prior checks  He is aware that his diet needs to be improved  This is the most likely cause of the patient's overall declining kidney function  I stressed the importance of trying to control A1c as well as postprandial blood sugars as best as possible  With respect initiation of Ace inhibitor/angiotensin receptor blocker  Due to the nature this specific encounter, number not able to confirm that blood pressures were appropriate and we will instead wait until our next appointment in August 2020 potentially initiate use of these medications      Lab Results   Component Value Date    HGBA1C 7 8 (H) 02/11/2020

## 2020-03-27 NOTE — ASSESSMENT & PLAN NOTE
Patient's blood work from February noted a mild improvement in his overall estimated GFR, currently up to 42 mL/minute  Continue avoid all nonsteroidal anti-inflammatory medications and control of blood sugars as best as possible

## 2020-03-27 NOTE — PROGRESS NOTES
Virtual Regular Visit    Problem List Items Addressed This Visit        Endocrine    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 ([facility] [address] )     Reviewed the patient's A1c which was increased compared to prior checks  He is aware that his diet needs to be improved  This is the most likely cause of the patient's overall declining kidney function  I stressed the importance of trying to control A1c as well as postprandial blood sugars as best as possible  With respect initiation of Ace inhibitor/angiotensin receptor blocker  Due to the nature this specific encounter, number not able to confirm that blood pressures were appropriate and we will instead wait until our next appointment in August 2020 potentially initiate use of these medications  Lab Results   Component Value Date    HGBA1C 7 8 (H) 02/11/2020            Secondary hyperparathyroidism of renal origin [facility])     Patient noted to have elevated parathyroid hormone  Will not directly intervene with this abnormality at this time, instead allow for supplementation of vitamin-D deficiency and simply continue to monitor PTH levels  It may be appropriate some point in the future to initiate the patient on activated vitamin D agent to more directly address  Relevant Orders    PTH, intact       Cardiovascular and Mediastinum    Benign essential hypertension       Genitourinary    Stage 3 chronic kidney disease ([facility] ) - Primary     Patient's blood work from February noted a mild improvement in his overall estimated GFR, currently up to 42 mL/minute  Continue avoid all nonsteroidal anti-inflammatory medications and control of blood sugars as best as possible  Relevant Orders    Microalbumin / creatinine urine ratio    Urinalysis with microscopic    Comprehensive metabolic panel    Immunofixation IgA,IgG,IgM Qt  Immunofixation Serum Immunoglobulin    Protein electrophoresis, urine    Protein electrophoresis, serum    IMMUNOFIXATION (KODAK) AND PROTEIN ELECTROPHORESIS, RANDOM URINE    Chronic tubulointerstitial nephritis       Other    Edema     Patient continue to work on reducing sodium in his diet  In addition to this he has chronic bilateral lower extremity edema potentially due to reduction in kidney function complicated by high sodium diet in addition to this he also has bilateral trauma to both legs, fractures, in the past   Diet control was stressed, once again I feel hesitant placing the patient on loop diuretics as we may volume deplete the patient in effort to control edema which may cause decreased kidney function and other hemodynamic issues  Of note the patient is on amlodipine which tends to worsen underlying edema, this may be medication to eliminate once we are able to start the patient on Ace inhibitor or angiotensin receptor blocker  Proteinuria     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  This will need to be re-evaluated, if this persists or a monoclonal gammopathy is found, patient will be a refer to Hematology  Otherwise my hope is to be able to rule us out as a potential issue and go from there  Relevant Orders    Microalbumin / creatinine urine ratio    Urinalysis with microscopic    Comprehensive metabolic panel    Immunofixation IgA,IgG,IgM Qt  Immunofixation Serum Immunoglobulin    Protein electrophoresis, urine    Protein electrophoresis, serum    IMMUNOFIXATION (KODAK) AND PROTEIN ELECTROPHORESIS, RANDOM URINE    Vitamin D deficiency     Continue with once a week vitamin-D supplementation, reassess vitamin D next calendar year  Patient is doing well at this time, we will see him back in 5 months for regular appointment, sooner if indicated  Labs again prior to next appointment         Reason for visit is CKD 3/Diabetic nephropathy    Encounter provider Julio Alegria DO    Provider located at [place] [facility] [place] [id number] [address] [facility] [id number] [phone]      Recent Visits  No visits were found meeting these conditions  Showing recent visits within past 7 days and meeting all other requirements     Today's Visits  Date Type Provider Dept   03/27/20 Telemedicine Jillian Sutherland, Stephanie Abramsemmanuel today's visits and meeting all other requirements     Future Appointments  No visits were found meeting these conditions  Showing future appointments within next 150 days and meeting all other requirements        After connecting through [employer], the patient was identified by name and date of birth  Andrés Jama was informed that this is a telemedicine visit and that the visit is being conducted through telephone and patient was informed that this is not a secure, HIPAA-complaint platform  he agrees to proceed  which may not be secure and therefore, might not be HIPAA-compliant  My office door was closed  No one else was in the room  He acknowledged consent and understanding of privacy and security of the video platform  The patient has agreed to participate and understands they can discontinue the visit at any time  Subjective  Andrés Jama is a 82 y o  male  We reviewed the patient's labs and medications  Patient has no significant complaints at this time  He is taking all medications as prescribed  We reviewed his labs which had the notable issues of a biclonal immunofixation test, elevated parathyroid hormone test, and decreased vitamin-D level  Past Medical History:   Diagnosis Date    Athscl 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     Stent OM1   Patent mammary graft to LAD 10/7/2009; CABG 1992 & 2005    Cancer [facility])     skin    Diabetes mellitus, type II ([address] )     without complication    Epistaxis     Essential (primary) hypertension     GERD (gastroesophageal reflux disease)     Hx of cardiovascular stress test 07/23/2014    Eddie MPI; EF0 52 (52%) Lexiscan, mild LV systolic dysfunction; evidence for prior inferior wall MI; perfusion imaging consistant w mild lat wall ischemia and min torin-infart inferior ischemia   / EF0 51 (51%) evidence for prior inferior wall MI  Mild inferior and mild-moderate lateral wall ischemia  7/29/15    Hx of echocardiogram 11/07/2017    2D w/CFD;EF0 55 (55%) mild LVH, mitral valve prolapse with moderate regurgitation  left atrial enlargement  Mild tricuspid regurgitation   Mixed hyperlipidemia     Occlusion and stenosis of bilateral carotid arteries     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 infarction)     Presence of aortocoronary bypass graft        Past Surgical History:   Procedure Laterality Date    CARDIAC CATHETERIZATION  10/07/2009    Stent 99% stenosis SVG from the aorta to OM1  Patent mammary graft to the LAD    CORONARY ARTERY BYPASS GRAFT  1992    VG-CX, VG-RCA    CORONARY ARTERY BYPASS GRAFT  11/04/2005    LIMA-D1-LAD, VG-PDA-PLB, [id number] TMR 9 Lesions    ME EGD TRANSORAL BIOPSY SINGLE/MULTIPLE N/A 1/23/2019    Procedure: ESOPHAGOGASTRODUODENOSCOPY (EGD) with biopsy;  Surgeon: Kendra Woodruff MD;  Location: [address] [place] [address] GI LAB; Service: Gastroenterology    TONSILLECTOMY         Current Outpatient Medications   Medication Sig Dispense Refill    amLODIPine (NORVASC) 5 mg tablet Take 5 mg by mouth daily      aspirin (ECOTRIN LOW STRENGTH) 81 mg EC tablet Take 81 mg by mouth daily      atorvastatin (LIPITOR) 20 mg tablet Take 20 mg by mouth daily      Cholecalciferol (VITAMIN D3) 1 25 MG (50250 UT) TABS Take 1 tablet (50,000 Units total) by mouth once a week 4 tablet 5    clopidogrel (PLAVIX) 75 mg tablet Take 1 tablet (75 mg total) by mouth daily Hold until seen by ENT on 9/23/19        escitalopram (LEXAPRO) 10 mg tablet Take 10 mg by mouth daily      glimepiride (AMARYL) 1 mg tablet Take 1 mg by mouth daily with breakfast   0    isosorbide mononitrate (IMDUR) 60 mg 24 hr tablet Take 60 mg by mouth daily In the morning      nitroglycerin (NITROSTAT) 0 4 mg SL tablet one tablet under tongue for angina, may repeat in 15 min  no more than three times  0    omeprazole (PriLOSEC) 20 mg delayed release capsule Take 20 mg by mouth daily      propranolol (INDERAL LA) 120 mg 24 hr capsule Take 120 mg by mouth daily      sucralfate (CARAFATE) 1 g tablet Take 1 g by mouth 2 (two) times a day       No current facility-administered medications for this visit  Allergies   Allergen Reactions    Advil [Ibuprofen] Fatigue       Review of Systems   Respiratory: Negative for shortness of breath  Cardiovascular: Positive for leg swelling  All other systems reviewed and are negative  Physical exam  Patient in no acute distress  Affect is appropriate  No audible wheezing noted over the phone    I spent 12 minutes with the patient during this visit

## 2020-03-27 NOTE — ASSESSMENT & PLAN NOTE
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

## 2020-03-27 NOTE — ASSESSMENT & PLAN NOTE
Patient noted to have elevated parathyroid hormone  Will not directly intervene with this abnormality at this time, instead allow for supplementation of vitamin-D deficiency and simply continue to monitor PTH levels  It may be appropriate some point in the future to initiate the patient on activated vitamin D agent to more directly address

## 2020-06-24 ENCOUNTER — OFFICE VISIT (OUTPATIENT)
Dept: CARDIOLOGY CLINIC | Facility: CLINIC | Age: 85
End: 2020-06-24
Payer: MEDICARE

## 2020-06-24 VITALS
WEIGHT: 194 LBS | SYSTOLIC BLOOD PRESSURE: 140 MMHG | HEIGHT: 66 IN | DIASTOLIC BLOOD PRESSURE: 80 MMHG | BODY MASS INDEX: 31.18 KG/M2 | HEART RATE: 68 BPM

## 2020-06-24 DIAGNOSIS — E78.2 MIXED HYPERLIPIDEMIA: ICD-10-CM

## 2020-06-24 DIAGNOSIS — I25.810 CORONARY ARTERY DISEASE INVOLVING CORONARY BYPASS GRAFT OF NATIVE HEART WITHOUT ANGINA PECTORIS: Primary | ICD-10-CM

## 2020-06-24 DIAGNOSIS — I10 BENIGN ESSENTIAL HYPERTENSION: ICD-10-CM

## 2020-06-24 PROCEDURE — 93000 ELECTROCARDIOGRAM COMPLETE: CPT | Performed by: INTERNAL MEDICINE

## 2020-06-24 PROCEDURE — 99214 OFFICE O/P EST MOD 30 MIN: CPT | Performed by: INTERNAL MEDICINE

## 2020-06-24 RX ORDER — ERGOCALCIFEROL 1.25 MG/1
50000 CAPSULE ORAL WEEKLY
COMMUNITY
Start: 2020-05-24 | End: 2020-11-24 | Stop reason: SDUPTHER

## 2020-07-24 ENCOUNTER — TRANSCRIBE ORDERS (OUTPATIENT)
Dept: LAB | Facility: MEDICAL CENTER | Age: 85
End: 2020-07-24

## 2020-07-24 ENCOUNTER — APPOINTMENT (OUTPATIENT)
Dept: LAB | Facility: MEDICAL CENTER | Age: 85
End: 2020-07-24
Payer: MEDICARE

## 2020-07-24 DIAGNOSIS — E78.5 HYPERLIPIDEMIA, UNSPECIFIED HYPERLIPIDEMIA TYPE: ICD-10-CM

## 2020-07-24 DIAGNOSIS — I10 HYPERTENSION, UNSPECIFIED TYPE: ICD-10-CM

## 2020-07-24 DIAGNOSIS — I25.10 ASCVD (ARTERIOSCLEROTIC CARDIOVASCULAR DISEASE): Primary | ICD-10-CM

## 2020-07-24 DIAGNOSIS — R80.9 PROTEINURIA, UNSPECIFIED TYPE: ICD-10-CM

## 2020-07-24 DIAGNOSIS — I25.10 ASCVD (ARTERIOSCLEROTIC CARDIOVASCULAR DISEASE): ICD-10-CM

## 2020-07-24 DIAGNOSIS — E11.9 TYPE 2 DIABETES MELLITUS WITHOUT COMPLICATION, UNSPECIFIED WHETHER LONG TERM INSULIN USE (HCC): ICD-10-CM

## 2020-07-24 DIAGNOSIS — N18.30 STAGE 3 CHRONIC KIDNEY DISEASE (HCC): ICD-10-CM

## 2020-07-24 DIAGNOSIS — N25.81 SECONDARY HYPERPARATHYROIDISM OF RENAL ORIGIN (HCC): ICD-10-CM

## 2020-07-24 LAB
ALBUMIN SERPL BCP-MCNC: 3.7 G/DL (ref 3.5–5)
ALP SERPL-CCNC: 91 U/L (ref 46–116)
ALT SERPL W P-5'-P-CCNC: 23 U/L (ref 12–78)
ANION GAP SERPL CALCULATED.3IONS-SCNC: 7 MMOL/L (ref 4–13)
AST SERPL W P-5'-P-CCNC: 14 U/L (ref 5–45)
BACTERIA UR QL AUTO: ABNORMAL /HPF
BASOPHILS # BLD AUTO: 0.08 THOUSANDS/ΜL (ref 0–0.1)
BASOPHILS NFR BLD AUTO: 1 % (ref 0–1)
BILIRUB SERPL-MCNC: 0.99 MG/DL (ref 0.2–1)
BILIRUB UR QL STRIP: NEGATIVE
BUN SERPL-MCNC: 30 MG/DL (ref 5–25)
CALCIUM SERPL-MCNC: 10.1 MG/DL (ref 8.3–10.1)
CHLORIDE SERPL-SCNC: 103 MMOL/L (ref 100–108)
CHOLEST SERPL-MCNC: 119 MG/DL (ref 50–200)
CLARITY UR: CLEAR
CO2 SERPL-SCNC: 27 MMOL/L (ref 21–32)
COLOR UR: YELLOW
CREAT SERPL-MCNC: 2.21 MG/DL (ref 0.6–1.3)
CREAT UR-MCNC: 97.5 MG/DL
EOSINOPHIL # BLD AUTO: 0.96 THOUSAND/ΜL (ref 0–0.61)
EOSINOPHIL NFR BLD AUTO: 9 % (ref 0–6)
ERYTHROCYTE [DISTWIDTH] IN BLOOD BY AUTOMATED COUNT: 13.4 % (ref 11.6–15.1)
EST. AVERAGE GLUCOSE BLD GHB EST-MCNC: 232 MG/DL
GFR SERPL CREATININE-BSD FRML MDRD: 27 ML/MIN/1.73SQ M
GLUCOSE P FAST SERPL-MCNC: 272 MG/DL (ref 65–99)
GLUCOSE UR STRIP-MCNC: ABNORMAL MG/DL
HBA1C MFR BLD: 9.7 %
HCT VFR BLD AUTO: 51.4 % (ref 36.5–49.3)
HDLC SERPL-MCNC: 33 MG/DL
HGB BLD-MCNC: 16.6 G/DL (ref 12–17)
HGB UR QL STRIP.AUTO: NEGATIVE
HYALINE CASTS #/AREA URNS LPF: ABNORMAL /LPF
IMM GRANULOCYTES # BLD AUTO: 0.05 THOUSAND/UL (ref 0–0.2)
IMM GRANULOCYTES NFR BLD AUTO: 1 % (ref 0–2)
KETONES UR STRIP-MCNC: NEGATIVE MG/DL
LDLC SERPL CALC-MCNC: 64 MG/DL (ref 0–100)
LEUKOCYTE ESTERASE UR QL STRIP: NEGATIVE
LYMPHOCYTES # BLD AUTO: 1.75 THOUSANDS/ΜL (ref 0.6–4.47)
LYMPHOCYTES NFR BLD AUTO: 17 % (ref 14–44)
MCH RBC QN AUTO: 28.4 PG (ref 26.8–34.3)
MCHC RBC AUTO-ENTMCNC: 32.3 G/DL (ref 31.4–37.4)
MCV RBC AUTO: 88 FL (ref 82–98)
MICROALBUMIN UR-MCNC: 45.1 MG/L (ref 0–20)
MICROALBUMIN/CREAT 24H UR: 46 MG/G CREATININE (ref 0–30)
MONOCYTES # BLD AUTO: 0.84 THOUSAND/ΜL (ref 0.17–1.22)
MONOCYTES NFR BLD AUTO: 8 % (ref 4–12)
NEUTROPHILS # BLD AUTO: 6.81 THOUSANDS/ΜL (ref 1.85–7.62)
NEUTS SEG NFR BLD AUTO: 64 % (ref 43–75)
NITRITE UR QL STRIP: NEGATIVE
NON-SQ EPI CELLS URNS QL MICRO: ABNORMAL /HPF
NONHDLC SERPL-MCNC: 86 MG/DL
NRBC BLD AUTO-RTO: 0 /100 WBCS
PH UR STRIP.AUTO: 6 [PH]
PLATELET # BLD AUTO: 218 THOUSANDS/UL (ref 149–390)
PMV BLD AUTO: 9.6 FL (ref 8.9–12.7)
POTASSIUM SERPL-SCNC: 4.3 MMOL/L (ref 3.5–5.3)
PROT SERPL-MCNC: 8.1 G/DL (ref 6.4–8.2)
PROT UR STRIP-MCNC: ABNORMAL MG/DL
PTH-INTACT SERPL-MCNC: 87.7 PG/ML (ref 18.4–80.1)
RBC # BLD AUTO: 5.84 MILLION/UL (ref 3.88–5.62)
RBC #/AREA URNS AUTO: ABNORMAL /HPF
SODIUM SERPL-SCNC: 137 MMOL/L (ref 136–145)
SP GR UR STRIP.AUTO: 1.02 (ref 1–1.03)
TRIGL SERPL-MCNC: 111 MG/DL
UROBILINOGEN UR QL STRIP.AUTO: 0.2 E.U./DL
WBC # BLD AUTO: 10.49 THOUSAND/UL (ref 4.31–10.16)
WBC #/AREA URNS AUTO: ABNORMAL /HPF

## 2020-07-24 PROCEDURE — 84166 PROTEIN E-PHORESIS/URINE/CSF: CPT | Performed by: PATHOLOGY

## 2020-07-24 PROCEDURE — 80053 COMPREHEN METABOLIC PANEL: CPT

## 2020-07-24 PROCEDURE — 82570 ASSAY OF URINE CREATININE: CPT | Performed by: INTERNAL MEDICINE

## 2020-07-24 PROCEDURE — 36415 COLL VENOUS BLD VENIPUNCTURE: CPT

## 2020-07-24 PROCEDURE — 82043 UR ALBUMIN QUANTITATIVE: CPT | Performed by: INTERNAL MEDICINE

## 2020-07-24 PROCEDURE — 84166 PROTEIN E-PHORESIS/URINE/CSF: CPT | Performed by: INTERNAL MEDICINE

## 2020-07-24 PROCEDURE — 84165 PROTEIN E-PHORESIS SERUM: CPT | Performed by: PATHOLOGY

## 2020-07-24 PROCEDURE — 84165 PROTEIN E-PHORESIS SERUM: CPT

## 2020-07-24 PROCEDURE — 81001 URINALYSIS AUTO W/SCOPE: CPT | Performed by: INTERNAL MEDICINE

## 2020-07-24 PROCEDURE — 83970 ASSAY OF PARATHORMONE: CPT

## 2020-07-24 PROCEDURE — 80061 LIPID PANEL: CPT

## 2020-07-24 PROCEDURE — 85025 COMPLETE CBC W/AUTO DIFF WBC: CPT

## 2020-07-24 PROCEDURE — 83036 HEMOGLOBIN GLYCOSYLATED A1C: CPT

## 2020-07-27 DIAGNOSIS — N17.9 AKI (ACUTE KIDNEY INJURY) (HCC): Primary | ICD-10-CM

## 2020-07-27 LAB
ALBUMIN SERPL ELPH-MCNC: 4.3 G/DL (ref 3.5–5)
ALBUMIN SERPL ELPH-MCNC: 56.6 % (ref 52–65)
ALBUMIN UR ELPH-MCNC: 28.6 %
ALPHA1 GLOB MFR UR ELPH: 27.5 %
ALPHA1 GLOB SERPL ELPH-MCNC: 0.33 G/DL (ref 0.1–0.4)
ALPHA1 GLOB SERPL ELPH-MCNC: 4.4 % (ref 2.5–5)
ALPHA2 GLOB MFR UR ELPH: 9.8 %
ALPHA2 GLOB SERPL ELPH-MCNC: 0.91 G/DL (ref 0.4–1.2)
ALPHA2 GLOB SERPL ELPH-MCNC: 12 % (ref 7–13)
B-GLOBULIN MFR UR ELPH: 6.6 %
BETA GLOB ABNORMAL SERPL ELPH-MCNC: 0.43 G/DL (ref 0.4–0.8)
BETA1 GLOB SERPL ELPH-MCNC: 5.6 % (ref 5–13)
BETA2 GLOB SERPL ELPH-MCNC: 5.5 % (ref 2–8)
BETA2+GAMMA GLOB SERPL ELPH-MCNC: 0.42 G/DL (ref 0.2–0.5)
GAMMA GLOB ABNORMAL SERPL ELPH-MCNC: 1.21 G/DL (ref 0.5–1.6)
GAMMA GLOB MFR UR ELPH: 27.5 %
GAMMA GLOB SERPL ELPH-MCNC: 15.9 % (ref 12–22)
IGG/ALB SER: 1.3 {RATIO} (ref 1.1–1.8)
M PEAK ID 2: 1.5 %
M PROTEIN 1 MFR SERPL ELPH: 2.4 %
M PROTEIN 1 SERPL ELPH-MCNC: 0.18 G/DL
M PROTEIN 2 SERPL ELPH-MCNC: 0.11 G/DL
PROT PATTERN SERPL ELPH-IMP: NORMAL
PROT PATTERN UR ELPH-IMP: ABNORMAL
PROT SERPL-MCNC: 7.6 G/DL (ref 6.4–8.2)
PROT UR-MCNC: 29 MG/DL

## 2020-08-10 ENCOUNTER — TRANSCRIBE ORDERS (OUTPATIENT)
Dept: NON INVASIVE DIAGNOSTICS | Facility: CLINIC | Age: 85
End: 2020-08-10

## 2020-08-10 DIAGNOSIS — R09.89 BRUIT OF LEFT CAROTID ARTERY: Primary | ICD-10-CM

## 2020-08-12 ENCOUNTER — TRANSCRIBE ORDERS (OUTPATIENT)
Dept: URGENT CARE | Facility: CLINIC | Age: 85
End: 2020-08-12

## 2020-08-12 ENCOUNTER — APPOINTMENT (OUTPATIENT)
Dept: RADIOLOGY | Facility: CLINIC | Age: 85
End: 2020-08-12
Payer: MEDICARE

## 2020-08-12 DIAGNOSIS — R06.02 SOB (SHORTNESS OF BREATH): Primary | ICD-10-CM

## 2020-08-12 PROCEDURE — 71046 X-RAY EXAM CHEST 2 VIEWS: CPT

## 2020-09-09 ENCOUNTER — OFFICE VISIT (OUTPATIENT)
Dept: NEPHROLOGY | Facility: CLINIC | Age: 85
End: 2020-09-09
Payer: MEDICARE

## 2020-09-09 VITALS
HEART RATE: 76 BPM | SYSTOLIC BLOOD PRESSURE: 130 MMHG | DIASTOLIC BLOOD PRESSURE: 74 MMHG | WEIGHT: 188.8 LBS | OXYGEN SATURATION: 98 % | BODY MASS INDEX: 30.34 KG/M2 | TEMPERATURE: 98.2 F | HEIGHT: 66 IN

## 2020-09-09 DIAGNOSIS — E55.9 VITAMIN D DEFICIENCY: ICD-10-CM

## 2020-09-09 DIAGNOSIS — N25.81 SECONDARY HYPERPARATHYROIDISM OF RENAL ORIGIN (HCC): ICD-10-CM

## 2020-09-09 DIAGNOSIS — N40.1 BENIGN PROSTATIC HYPERPLASIA WITH LOWER URINARY TRACT SYMPTOMS, SYMPTOM DETAILS UNSPECIFIED: ICD-10-CM

## 2020-09-09 DIAGNOSIS — R80.9 PROTEINURIA, UNSPECIFIED TYPE: Primary | ICD-10-CM

## 2020-09-09 DIAGNOSIS — E11.21 DIABETIC NEPHROPATHY ASSOCIATED WITH TYPE 2 DIABETES MELLITUS (HCC): ICD-10-CM

## 2020-09-09 DIAGNOSIS — K21.9 GASTROESOPHAGEAL REFLUX DISEASE WITHOUT ESOPHAGITIS: ICD-10-CM

## 2020-09-09 DIAGNOSIS — E11.65 TYPE 2 DIABETES MELLITUS WITH HYPERGLYCEMIA, WITHOUT LONG-TERM CURRENT USE OF INSULIN (HCC): ICD-10-CM

## 2020-09-09 DIAGNOSIS — N18.30 STAGE 3 CHRONIC KIDNEY DISEASE (HCC): ICD-10-CM

## 2020-09-09 PROCEDURE — 99215 OFFICE O/P EST HI 40 MIN: CPT | Performed by: INTERNAL MEDICINE

## 2020-09-09 NOTE — PATIENT INSTRUCTIONS
Your blood pressure is really elevated which caused her creatinine to be elevated  After blood sugar is better controlled in 2 weeks repeat your blood work renal function panel as ordered  If creatinine consult down will consider Lisinopril to be added which will decrease your protein in the urine  To dose so we might have to take Norvasc to be removed from your regimen to avoid hypotension  Please keep your blood pressure record at home so that we can adjust the medication dose accordingly  Return to clinic with blood work and urine were in 2 months  As well as avoid any kind of over-the-counter pain medication including ibuprofen, Motrin, Aleve, diclofenac etc   Avoid IV contrast dye for any test   Mainstay of the treatment plan is to control the blood sugar and body weight  Ultrasound of the kidney was ordered to see whether chronicity of your kidney as well as the prostate for your frequent micturition at night  Will follow it  Chronic Kidney Disease Diet   AMBULATORY CARE:   A chronic kidney disease diet  limits protein, phosphorus, sodium, and potassium  Liquids may also need to be limited in later stages of chronic kidney disease  This diet can help slow down the rate of damage to your kidneys  Your diet may change over time as your health condition changes  You may also need to make other diet changes if you have other health problems, such as diabetes  Diet changes: There are 5 stages of chronic kidney disease  The diet changes you need to make are based on your stage of kidney disease  Work with your dietitian or healthcare provider to plan meals that are right for you  You may need any of the following:  · Limit protein  in all stages of kidney disease  Limit the portion sizes of protein you eat to limit the amount of work your kidneys have to do  Foods that are high in protein are meat, poultry (chicken and turkey), fish, eggs, and dairy (milk, cheese, yogurt)   Your healthcare provider will tell you how much protein to eat each day  · Limit sodium  if you have high blood pressure  Limit your sodium intake to less than 2,300 milligrams (mg) each day  Ask your dietitian or healthcare provider how much sodium you can have each day  The amount of sodium you should have depends on your stage of kidney disease  Table salt, canned foods, soups, salted snacks, and processed meats, like deli meats and sausage, are high in sodium  · Limit the amount of phosphorus  you eat  Your kidneys cannot get rid of extra phosphorus that builds up in your blood  This may cause your bones to lose calcium and weaken  Foods that are high in phosphorus are dairy products, beans, peas, nuts, and whole grains  Phosphorus is also found in cocoa, beer, and cola drinks  Your healthcare provider will tell you how much phosphorus you can have each day  · Limit potassium  if your potassium blood levels are too high  Your dietitian or healthcare provider will tell you if you need to limit potassium  Potassium is found in fruits and vegetables  · Limit liquids  as directed  Your healthcare provider may recommend that you limit liquids in stages 4 and 5 of kidney disease  If your body retains fluids, you will have swelling and fluid may build up in your lungs  This can cause other health problems, such as shortness of breath  Foods you may include: Your dietitian will tell you how many servings you can have from each of the food groups below  The approximate amount of these nutrients is listed next to each food group  Read the food label to find the exact amount  · Bread, cereal, and grains: These foods contain about 80 calories, 2 grams (g) of protein, 150 mg of sodium, 50 mg of potassium, and 30 mg of phosphorus      ¨ 1 slice (1 ounce) of bread ([place], [place], raisin, light rye, or sourdough white), small dinner roll, or 6-inch tortilla    ¨ ½ of a hamburger bun, hot dog bun, or English muffin or ¼ of a bagel    ¨ 1 cup of unsweetened cereal or ½ cup of cooked cereal, such as cream of wheat    ¨ ? cup of cooked pasta (noodles, macaroni, or spaghetti) or rice    ¨ 4 (2-inch) unsalted crackers or 3 squares of cristiano crackers    ¨ 3 cups of air-popped, unsalted popcorn    ¨ ¾ ounce of unsalted pretzels    · Vegetables:  A serving of these foods contains about 30 calories, 2 g of protein, 50 mg of sodium, and 50 mg of phosphorus  ¨ Low potassium (less than 150 mg):      ¨ ½ cup cooked green beans, cabbage, cauliflower, beets, or corn    ¨ 1 cup raw cucumber, endive, alfalfa sprouts, cabbage, cauliflower, or watercress    ¨ 1 cup of all types of lettuce    ¨ ¼ cup cooked or ½ cup raw mushrooms or onions    ¨ 1 cup cooked eggplant    ¨ Medium potassium (150 to 250 mg):      ¨ 1 cup raw broccoli, celery, or zucchini    ¨ ½ cup cooked asparagus, broccoli, celery, green peas, summer squash, zucchini, or peppers    ¨ 1 cup cooked kale or turnips    · Fruits:  A serving of these foods contains about 60 calories, 0 g protein, 0 mg sodium, and 150 mg of phosphorus  Each serving is ½ cup, unless another amount is given  ¨ Low potassium (less than 150 mg): ¨ Apple juice, applesauce, or 1 small apple    ¨ Blueberries    ¨ Cranberries or cranberry juice cocktail    ¨ Fresh or canned pears (light syrup or packed in water)    ¨ Grapes or grape juice    ¨ Canned peaches (light syrup or packed in water)    ¨ Pineapple or strawberries    ¨ 1 tangerine     ¨ Watermelon    ¨ Medium potassium (150 to 250 mg):      ¨ Fresh peaches or pears    ¨ Cherries    ¨ Cantaloupe, jae, or papaya    ¨ Grapefruit or grapefruit juice    · Meat, poultry, and fish: These foods have about 75 calories, 7 g of protein, an average of 65 mg of sodium, 115 mg of potassium, and 70 mg of phosphorus  Do not use salt to prepare these foods       ¨ 1 ounce of cooked beef, pork, or poultry    ¨ 1 ounce of any fresh or frozen fish, lobster, shrimp, crab, clams, tuna, unsalted canned salmon, or unsalted sardines    · Other protein foods: These foods have about 90 calories, 7 g of protein, an average of 100 mg of sodium, 100 mg of potassium, and 120 mg of phosphorus  ¨ 1 large whole egg or ¼ cup of low-cholesterol egg substitute    ¨ 1 ounce of cheese    ¨ ¼ cup of cottage cheese or tofu    ¨ 1 ounce of unsalted nuts or 2 tablespoons of peanut butter    · Fats: These foods have very little protein and about 45 calories, 55 milligrams of sodium, 10 milligrams of potassium, and 5 milligrams of phosphorus  Include healthy fats, such as unsaturated fats, which are listed below  ¨ 1 teaspoon margarine or mayonnaise     ¨ 1 teaspoon oil (safflower, sunflower, corn, soybean, olive, peanut, canola)    ¨ 1 tablespoon oil-based salad dressing (such as [place]) or 2 tablespoons mayonnaise-based salad dressing (such as ranch)  Foods to limit or avoid:   · Starches: The following foods have higher amounts of sodium, potassium, or phosphorus  ¨ Biscuits, muffins, pancakes, and waffles     ¨ Cake and cornbread from boxed mixes    ¨ Oatmeal and whole-wheat cereals    ¨ Salted pretzel sticks or rings and sandwich cookies    · Meat and protein foods: The following are high in sodium and phosphorus  ¨ Deli-style meat, such as roast beef, ham, and turkey    ¨ Canned salmon and sardines    ¨ Processed cheese, such as American cheese and cheese spreads    ¨ Smoked or cured meat, such as corned beef, lewis, ham, hot dogs, and sausage    · Legumes: These foods have about 90 calories, 6 g of protein, less than 10 mg of sodium, 250 mg of potassium, and 100 mg of phosphorus  ¨ ? cup of black beans, red kidney beans, black-eye peas, garbanzos, and lentils    ¨ ¼ cup of green or mature soybeans    · Dairy: The following foods have about 8 g of protein, an average of 120 mg of sodium, 350 mg of potassium, and 220 mg of phosphorus       ¨ 1 cup of milk (fat-free, low-fat, whole, buttermilk, or chocolate milk)    ¨ 1 cup of low-fat plain or sugar-free yogurt or ice cream    ¨ ½ cup of pudding or custard    ¨ Nondairy milk substitutes: These foods have 75 calories, 1 gram of protein, and an average of 40 mg of sodium, 60 mg of potassium, and 60 mg of phosphorus  A serving is ½ cup of almond, rice, or soy milk, or nondairy creamer  · Vegetables: The following vegetables are high in potassium  Each serving has more than 250 mg of potassium  A serving is ½ cup, unless another amount is given  ¨ Artichoke or ¼ of a medium avocado    ¨ [place] sprouts, beets, chard, jackie or mustard greens    ¨ Potatoes, sweet potatoes, pumpkin, and yams    ¨ ¾ cup of okra    ¨ Raw tomatoes and low-sodium tomato juice, or tomato sauce    ¨ Winter squash, cooked asparagus, and cooked spinach    · Fruit:  The following fruits are high in potassium  Each serving has more than 250 mg of potassium  ¨ 3 fresh apricots    ¨ 1 small nectarine (2 inches across)    ¨ 1 small orange and ½ cup of orange juice    ¨ ¼ cup of dates     ¨ ? of a small honeydew melon    ¨ 1 six-inch banana     ¨ ½ cup of prune juice or prunes and kiwifruit    · Fats:  Limit unhealthy fats, such as saturated fats, which are listed below  ¨ Butter, lard, cream cheese, whipped cream, and sour cream    ¨ Powdered coffee creamer    · Other: The following foods are high in sodium  ¨ Frozen dinners, soups, and fast foods, such as hamburgers and pizza (see the food label for serving sizes)    ¨ Table salt and seasoned salts, such as onion or garlic salt    ¨ Barbecue sauce, ketchup, mustard, soy sauce, steak sauce, and teriyaki sauce  Contact your healthcare provider if:   · You are gaining or losing weight very quickly  · You have shortness of breath  · You have nausea and are vomiting  · You feel very weak and tired  · You are having trouble following the chronic kidney disease diet    © 2017 [employer] [facility] is for End User's use only and may not be sold, redistributed or otherwise used for commercial purposes  All illustrations and images included in CareNotes® are the copyrighted property of A D [employer] M [employer]  or [employer]  The above information is an  only  It is not intended as medical advice for individual conditions or treatments  Talk to your doctor, nurse or pharmacist before following any medical regimen to see if it is safe and effective for you

## 2020-09-09 NOTE — PROGRESS NOTES
Nephrology   Office [address] 82 y o  male MRN: [id number]    Encounter: [id number]        [address] was seen today for follow-up  Diagnoses and all orders for this visit:    Stage 3 chronic kidney disease ([address] )  Most likely due to diabetic nephropathy, hemoglobin A1c is running higher even 9 7  With a recent blood work which was done in July 24th did show elevated blood sugar with elevated creatinine  Since then he was advised to have repeat blood work after blood sugar better control which is not done yet  Baseline creatinine 1 5-1 8 mg/dL  Which peaked 2 2 1 mg/dL the last blood work  CKD stage IIIB GFR 37-42 cc per minute per 1 73 sq meter  IV stressed the blood sugar control is mainstay of treatment  Stat Hakeem contrast not complying with diet probably which is making blood sugar be uncontrolled  Once euglycemic state has been achieved will repeat the blood work in 2 weeks and if creatinine remains stable and for proteinuria ACE-inhibitor can be considered  Requested to keep blood pressure record at home to being at next visit so it can be adjusted accordingly on top of Norvasc and Propranolol what his takes currently for blood pressure control  Although proteinuria noted not significant with total protein is not elevated  No anemia  SPEP noted biclonal gammopathy  Will continue to monitor for any anemia or renal failure worsening in the next visit  Or hypercalcemia  Acid-base electrolytes are stable  Bone mineral disease stable  Avoid all nonsteroidal anti-inflammatory medications  Type 2 diabetes mellitus with hyperglycemia, without long-term current use of insulin ([facility])  -     Renal function panel; Future  -     Uric acid; Future  -     Urinalysis with microscopic; Future  -     Protein, urine, random; Future  -     Protein / creatinine ratio, urine; Future  -     Microalbumin / creatinine urine ratio;  Future    Secondary hyperparathyroidism of renal origin [facility])  Secondary hyperparathyroidism is better controlled stable at this time  Vitamin-D supplementation continued since February  PTH is normalized now  Will recheck the vitamin-D level and most likely we can decrease the dose to lower dose the next visit  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 ([address] )  As I mentioned above please continue working with primary care physician Dr Abdiel Zamarripa for better sugar controlled  Comply with the medication and diet  Dietary counseling and list of dietary information was given  Benign prostatic hyperplasia with lower urinary tract symptoms, symptom details unspecified  Nocturia suspect hyperglycemia versus prostate  Will get a ultrasound to check chronicity of CKD as well as prostate enlargement  May need Urology involvement if BPH diagnosed  -    Vitamin D deficiency  As above will check vitamin D  PTH came down  Will decrease the dose vitamin D if levels are normalized  Vitamin D 25 hydroxy; Future    Proteinuria  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  Will order free light chain for next visit  If it is elevated need hematology referral     Edema  Continue low-sodium diet  Chronic bilateral lower extremity edema  Feel has attended again to place on loop diuretics at this time given the hyperglycemic episodes     Will address it in the next visit  Hemodynamically stable  If patient continued to have underlying edema with amlodipine on board it can be substituted with ACE-inhibitor next visit  I do not see any echocardiogram on CHART follows with Dr Lety Cutler in will ask to evaluate for cardiac ejection fraction and then probably will address further      HPI: Macho Bronson is a 82 y o  male with an active problem list significant for CKD stage 3 who is here for Follow-up   Chayoguillermo Maria De Jesus has history of extensive vascular disease, coronary artery disease in 1990 to has history of CABG but no ejection fraction or echo done recently  He has some lower extremity edema and mild rales at the bases but denies any orthopnea or paroxysmal nocturnal dyspnea  Denies any dysuria but has frequent urination at night  Blood sugar is not well controlled  Is been following with nephrology because of proteinuria CKD  He denies any nausea vomiting or volume loss recently, no signs of asterixis or uremia  The medical record, including Care Everywhere and media tabs were reviewed  ROS:   All the systems were reviewed and were negative except as documented on the HPI  Allergies: Advil [ibuprofen]    Medications:   Current Outpatient Medications:     amLODIPine (NORVASC) 5 mg tablet, Take 5 mg by mouth daily, Disp: , Rfl:     aspirin (ECOTRIN LOW STRENGTH) 81 mg EC tablet, Take 81 mg by mouth daily, Disp: , Rfl:     atorvastatin (LIPITOR) 20 mg tablet, Take 20 mg by mouth daily, Disp: , Rfl:     Cholecalciferol (VITAMIN D3) 1 25 MG (47557 UT) TABS, Take 1 tablet (50,000 Units total) by mouth once a week, Disp: 4 tablet, Rfl: 5    clopidogrel (PLAVIX) 75 mg tablet, Take 1 tablet (75 mg total) by mouth daily Hold until seen by ENT on 9/23/19 , Disp: , Rfl:     ergocalciferol (VITAMIN D2) 50,000 units, Take 50,000 Units by mouth once a week, Disp: , Rfl:     escitalopram (LEXAPRO) 10 mg tablet, Take 10 mg by mouth daily, Disp: , Rfl:     glimepiride (AMARYL) 1 mg tablet, Take 1 mg by mouth daily with breakfast , Disp: , Rfl: 0    isosorbide mononitrate (IMDUR) 60 mg 24 hr tablet, Take 60 mg by mouth daily In the morning, Disp: , Rfl:     nitroglycerin (NITROSTAT) 0 4 mg SL tablet, one tablet under tongue for angina, may repeat in 15 min   no more than three times, Disp: , Rfl: 0    omeprazole (PriLOSEC) 20 mg delayed release capsule, Take 20 mg by mouth daily, Disp: , Rfl:     propranolol (INDERAL LA) 120 mg 24 hr capsule, Take 120 mg by mouth daily, Disp: , Rfl:     sucralfate (CARAFATE) 1 g tablet, Take 1 g by mouth 2 (two) times a day, Disp: , Rfl:     Past Medical History:   Diagnosis Date    Athscl 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     Stent OM1  Patent mammary graft to LAD 10/7/2009; CABG 1992 & 2005    Cancer [facility])     skin    Diabetes mellitus, type II ([address] )     without complication    Epistaxis     Essential (primary) hypertension     GERD (gastroesophageal reflux disease)     Hx of cardiovascular stress test 07/23/2014    Eddie MPI; EF0 52 (52%) Lexiscan, mild LV systolic dysfunction; evidence for prior inferior wall MI; perfusion imaging consistant w mild lat wall ischemia and min torin-infart inferior ischemia   / EF0 51 (51%) evidence for prior inferior wall MI  Mild inferior and mild-moderate lateral wall ischemia  7/29/15    Hx of echocardiogram 11/07/2017    2D w/CFD;EF0 55 (55%) mild LVH, mitral valve prolapse with moderate regurgitation  left atrial enlargement  Mild tricuspid regurgitation   Mixed hyperlipidemia     Occlusion and stenosis of bilateral carotid arteries     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 infarction)     Presence of aortocoronary bypass graft      Past Surgical History:   Procedure Laterality Date    CARDIAC CATHETERIZATION  10/07/2009    Stent 99% stenosis SVG from the aorta to OM1  Patent mammary graft to the LAD    CORONARY ARTERY BYPASS GRAFT  1992    VG-CX, VG-RCA    CORONARY ARTERY BYPASS GRAFT  11/04/2005    LIMA-D1-LAD, VG-PDA-PLB, [id number] TMR 9 Lesions    [place] EGD TRANSORAL BIOPSY SINGLE/MULTIPLE N/A 1/23/2019    Procedure: ESOPHAGOGASTRODUODENOSCOPY (EGD) with biopsy;  Surgeon: Tobias Washington MD;  Location: [address] [place] [address] GI LAB; Service: Gastroenterology    TONSILLECTOMY       Family History   Problem Relation Age of Onset    Heart disease Brother     Tuberculosis Mother     Tuberculosis Father       reports that he quit smoking about 43 years ago  He has a 40 00 pack-year smoking history   He has never used smokeless tobacco  He reports that he does not drink alcohol or use drugs  Physical Exam:   Vitals:    09/09/20 1134   BP: 130/74   Pulse: 76   Temp: 98 2 °F (36 8 °C)   SpO2: 98%   Weight: 85 6 kg (188 lb 12 8 oz)   Height: 5' 6" (1 676 m)     Body mass index is 30 47 kg/m²  General: conscious, cooperative, in no acute distress  Eyes: conjunctivae pink, anicteric sclerae, left eye cataract status post surgery  ENT: lips and mucous membranes moist  Neck: no masses, flat neck veins  Chest:  P rhonchi breath sounds bilateral, no crackles, no wheezings  CVS: distinct S1 & S2, normal rate, regular rhythm  Abdomen: soft, non-tender, non-distended, normoactive bowel sounds  Extremities chronic stasis with 1+ edema of both legs  Skin:  Chronic stasis dermatitis rash bilateral lower extremity  Neuro: awake, alert, oriented      Diagnostic Data:  Lab: I have personally reviewed pertinent lab results  ,   CBC:       CMP: No results found for: SODIUM, K, CL, CO2, ANIONGAP, BUN, CREATININE, GLUCOSE, CALCIUM, AST, ALT, ALKPHOS, PROT, BILITOT, EGFR,   PT/INR: No results found for: PT, INR,   Magnesium: No components found for: MAG,  Phosphorous: No results found for: PHOS    Discussion:    Patient Instructions     Your blood pressure is really elevated which caused her creatinine to be elevated  After blood sugar is better controlled in 2 weeks repeat your blood work renal function panel as ordered  If creatinine consult down will consider Lisinopril to be added which will decrease your protein in the urine  To dose so we might have to take Norvasc to be removed from your regimen to avoid hypotension  Please keep your blood pressure record at home so that we can adjust the medication dose accordingly  Return to clinic with blood work and urine were in 2 months    As well as avoid any kind of over-the-counter pain medication including ibuprofen, Motrin, Aleve, diclofenac etc   Avoid IV contrast dye for any test   Mainstay of the treatment plan is to control the blood sugar and body weight  Ultrasound of the kidney was ordered to see whether chronicity of your kidney as well as the prostate for your frequent micturition at night  Will follow it  Chronic Kidney Disease Diet   AMBULATORY CARE:   A chronic kidney disease diet  limits protein, phosphorus, sodium, and potassium  Liquids may also need to be limited in later stages of chronic kidney disease  This diet can help slow down the rate of damage to your kidneys  Your diet may change over time as your health condition changes  You may also need to make other diet changes if you have other health problems, such as diabetes  Diet changes: There are 5 stages of chronic kidney disease  The diet changes you need to make are based on your stage of kidney disease  Work with your dietitian or healthcare provider to plan meals that are right for you  You may need any of the following:  · Limit protein  in all stages of kidney disease  Limit the portion sizes of protein you eat to limit the amount of work your kidneys have to do  Foods that are high in protein are meat, poultry (chicken and turkey), fish, eggs, and dairy (milk, cheese, yogurt)  Your healthcare provider will tell you how much protein to eat each day  · Limit sodium  if you have high blood pressure  Limit your sodium intake to less than 2,300 milligrams (mg) each day  Ask your dietitian or healthcare provider how much sodium you can have each day  The amount of sodium you should have depends on your stage of kidney disease  Table salt, canned foods, soups, salted snacks, and processed meats, like deli meats and sausage, are high in sodium  · Limit the amount of phosphorus  you eat  Your kidneys cannot get rid of extra phosphorus that builds up in your blood  This may cause your bones to lose calcium and weaken  Foods that are high in phosphorus are dairy products, beans, peas, nuts, and whole grains  Phosphorus is also found in cocoa, beer, and cola drinks  Your healthcare provider will tell you how much phosphorus you can have each day  · Limit potassium  if your potassium blood levels are too high  Your dietitian or healthcare provider will tell you if you need to limit potassium  Potassium is found in fruits and vegetables  · Limit liquids  as directed  Your healthcare provider may recommend that you limit liquids in stages 4 and 5 of kidney disease  If your body retains fluids, you will have swelling and fluid may build up in your lungs  This can cause other health problems, such as shortness of breath  Foods you may include: Your dietitian will tell you how many servings you can have from each of the food groups below  The approximate amount of these nutrients is listed next to each food group  Read the food label to find the exact amount  · Bread, cereal, and grains: These foods contain about 80 calories, 2 grams (g) of protein, 150 mg of sodium, 50 mg of potassium, and 30 mg of phosphorus  ¨ 1 slice (1 ounce) of bread ([place], [place], raisin, light rye, or sourdough white), small dinner roll, or 6-inch tortilla    ¨ ½ of a hamburger bun, hot dog bun, or English muffin or ¼ of a bagel    ¨ 1 cup of unsweetened cereal or ½ cup of cooked cereal, such as cream of wheat    ¨ ? cup of cooked pasta (noodles, macaroni, or spaghetti) or rice    ¨ 4 (2-inch) unsalted crackers or 3 squares of cristiano crackers    ¨ 3 cups of air-popped, unsalted popcorn    ¨ ¾ ounce of unsalted pretzels    · Vegetables:  A serving of these foods contains about 30 calories, 2 g of protein, 50 mg of sodium, and 50 mg of phosphorus       ¨ Low potassium (less than 150 mg):      ¨ ½ cup cooked green beans, cabbage, cauliflower, beets, or corn    ¨ 1 cup raw cucumber, endive, alfalfa sprouts, cabbage, cauliflower, or watercress    ¨ 1 cup of all types of lettuce    ¨ ¼ cup cooked or ½ cup raw mushrooms or onions    ¨ 1 cup cooked eggplant    ¨ Medium potassium (150 to 250 mg): ¨ 1 cup raw broccoli, celery, or zucchini    ¨ ½ cup cooked asparagus, broccoli, celery, green peas, summer squash, zucchini, or peppers    ¨ 1 cup cooked kale or turnips    · Fruits:  A serving of these foods contains about 60 calories, 0 g protein, 0 mg sodium, and 150 mg of phosphorus  Each serving is ½ cup, unless another amount is given  ¨ Low potassium (less than 150 mg): ¨ Apple juice, applesauce, or 1 small apple    ¨ Blueberries    ¨ Cranberries or cranberry juice cocktail    ¨ Fresh or canned pears (light syrup or packed in water)    ¨ Grapes or grape juice    ¨ Canned peaches (light syrup or packed in water)    ¨ Pineapple or strawberries    ¨ 1 tangerine     ¨ Watermelon    ¨ Medium potassium (150 to 250 mg):      ¨ Fresh peaches or pears    ¨ Cherries    ¨ Cantaloupe, jae, or papaya    ¨ Grapefruit or grapefruit juice    · Meat, poultry, and fish: These foods have about 75 calories, 7 g of protein, an average of 65 mg of sodium, 115 mg of potassium, and 70 mg of phosphorus  Do not use salt to prepare these foods  ¨ 1 ounce of cooked beef, pork, or poultry    ¨ 1 ounce of any fresh or frozen fish, lobster, shrimp, crab, clams, tuna, unsalted canned salmon, or unsalted sardines    · Other protein foods: These foods have about 90 calories, 7 g of protein, an average of 100 mg of sodium, 100 mg of potassium, and 120 mg of phosphorus  ¨ 1 large whole egg or ¼ cup of low-cholesterol egg substitute    ¨ 1 ounce of cheese    ¨ ¼ cup of cottage cheese or tofu    ¨ 1 ounce of unsalted nuts or 2 tablespoons of peanut butter    · Fats: These foods have very little protein and about 45 calories, 55 milligrams of sodium, 10 milligrams of potassium, and 5 milligrams of phosphorus  Include healthy fats, such as unsaturated fats, which are listed below      ¨ 1 teaspoon margarine or mayonnaise     ¨ 1 teaspoon oil (safflower, sunflower, corn, soybean, olive, peanut, canola)    ¨ 1 tablespoon oil-based salad dressing (such as [place]) or 2 tablespoons mayonnaise-based salad dressing (such as ranch)  Foods to limit or avoid:   · Starches: The following foods have higher amounts of sodium, potassium, or phosphorus  ¨ Biscuits, muffins, pancakes, and waffles     ¨ Cake and cornbread from boxed mixes    ¨ Oatmeal and whole-wheat cereals    ¨ Salted pretzel sticks or rings and sandwich cookies    · Meat and protein foods: The following are high in sodium and phosphorus  ¨ Deli-style meat, such as roast beef, ham, and turkey    ¨ Canned salmon and sardines    ¨ Processed cheese, such as American cheese and cheese spreads    ¨ Smoked or cured meat, such as corned beef, lewis, ham, hot dogs, and sausage    · Legumes: These foods have about 90 calories, 6 g of protein, less than 10 mg of sodium, 250 mg of potassium, and 100 mg of phosphorus  ¨ ? cup of black beans, red kidney beans, black-eye peas, garbanzos, and lentils    ¨ ¼ cup of green or mature soybeans    · Dairy: The following foods have about 8 g of protein, an average of 120 mg of sodium, 350 mg of potassium, and 220 mg of phosphorus  ¨ 1 cup of milk (fat-free, low-fat, whole, buttermilk, or chocolate milk)    ¨ 1 cup of low-fat plain or sugar-free yogurt or ice cream    ¨ ½ cup of pudding or custard    ¨ Nondairy milk substitutes: These foods have 75 calories, 1 gram of protein, and an average of 40 mg of sodium, 60 mg of potassium, and 60 mg of phosphorus  A serving is ½ cup of almond, rice, or soy milk, or nondairy creamer  · Vegetables: The following vegetables are high in potassium  Each serving has more than 250 mg of potassium  A serving is ½ cup, unless another amount is given      ¨ Artichoke or ¼ of a medium avocado    ¨ [place] sprouts, beets, chard, jackie or mustard greens    ¨ Potatoes, sweet potatoes, pumpkin, and yams    ¨ ¾ cup of okra    ¨ Raw tomatoes and low-sodium tomato juice, or tomato sauce    ¨ Winter squash, cooked asparagus, and cooked spinach    · Fruit:  The following fruits are high in potassium  Each serving has more than 250 mg of potassium  ¨ 3 fresh apricots    ¨ 1 small nectarine (2 inches across)    ¨ 1 small orange and ½ cup of orange juice    ¨ ¼ cup of dates     ¨ ? of a small honeydew melon    ¨ 1 six-inch banana     ¨ ½ cup of prune juice or prunes and kiwifruit    · Fats:  Limit unhealthy fats, such as saturated fats, which are listed below  ¨ Butter, lard, cream cheese, whipped cream, and sour cream    ¨ Powdered coffee creamer    · Other: The following foods are high in sodium  ¨ Frozen dinners, soups, and fast foods, such as hamburgers and pizza (see the food label for serving sizes)    ¨ Table salt and seasoned salts, such as onion or garlic salt    ¨ Barbecue sauce, ketchup, mustard, soy sauce, steak sauce, and teriyaki sauce  Contact your healthcare provider if:   · You are gaining or losing weight very quickly  · You have shortness of breath  · You have nausea and are vomiting  · You feel very weak and tired  · You are having trouble following the chronic kidney disease diet  © 2017 [employer]  Information is for End User's use only and may not be sold, redistributed or otherwise used for commercial purposes  All illustrations and images included in CareNotes® are the copyrighted property of [employer]  or [employer]  The above information is an  only  It is not intended as medical advice for individual conditions or treatments  Talk to your doctor, nurse or pharmacist before following any medical regimen to see if it is safe and effective for you  Portions of the record may have been created with voice recognition software  Occasional wrong word or "sound a like" substitutions may have occurred due to the inherent limitations of voice recognition software   Read the chart carefully and recognize, using context, where substitutions have occurred  If you have any questions, please contact the dictating provider

## 2020-09-11 DIAGNOSIS — E55.9 VITAMIN D DEFICIENCY: ICD-10-CM

## 2020-09-14 RX ORDER — ERGOCALCIFEROL 1.25 MG/1
CAPSULE ORAL
Qty: 4 CAPSULE | Refills: 5 | Status: ON HOLD | OUTPATIENT
Start: 2020-09-14 | End: 2021-03-25

## 2020-09-28 DIAGNOSIS — E55.9 VITAMIN D DEFICIENCY: ICD-10-CM

## 2020-09-28 DIAGNOSIS — E11.65 TYPE 2 DIABETES MELLITUS WITH HYPERGLYCEMIA, WITHOUT LONG-TERM CURRENT USE OF INSULIN (HCC): ICD-10-CM

## 2020-09-28 DIAGNOSIS — E11.21 DIABETIC NEPHROPATHY ASSOCIATED WITH TYPE 2 DIABETES MELLITUS (HCC): ICD-10-CM

## 2020-09-28 LAB
25(OH)D3 SERPL-MCNC: 47.6 NG/ML (ref 30–100)
ALBUMIN SERPL BCP-MCNC: 3.7 G/DL (ref 3.5–5)
ANION GAP SERPL CALCULATED.3IONS-SCNC: 5 MMOL/L (ref 4–13)
BUN SERPL-MCNC: 35 MG/DL (ref 5–25)
CALCIUM SERPL-MCNC: 10.7 MG/DL (ref 8.3–10.1)
CHLORIDE SERPL-SCNC: 104 MMOL/L (ref 100–108)
CO2 SERPL-SCNC: 27 MMOL/L (ref 21–32)
CREAT SERPL-MCNC: 2.1 MG/DL (ref 0.6–1.3)
GFR SERPL CREATININE-BSD FRML MDRD: 28 ML/MIN/1.73SQ M
GLUCOSE P FAST SERPL-MCNC: 190 MG/DL (ref 65–99)
PHOSPHATE SERPL-MCNC: 2.8 MG/DL (ref 2.3–4.1)
POTASSIUM SERPL-SCNC: 5.6 MMOL/L (ref 3.5–5.3)
SODIUM SERPL-SCNC: 136 MMOL/L (ref 136–145)

## 2020-09-28 PROCEDURE — 82306 VITAMIN D 25 HYDROXY: CPT | Performed by: INTERNAL MEDICINE

## 2020-09-28 PROCEDURE — 80069 RENAL FUNCTION PANEL: CPT | Performed by: INTERNAL MEDICINE

## 2020-10-15 ENCOUNTER — TELEPHONE (OUTPATIENT)
Dept: NEPHROLOGY | Facility: CLINIC | Age: 85
End: 2020-10-15

## 2020-10-16 DIAGNOSIS — N18.32 STAGE 3B CHRONIC KIDNEY DISEASE (HCC): Primary | ICD-10-CM

## 2020-11-23 DIAGNOSIS — N18.32 STAGE 3B CHRONIC KIDNEY DISEASE (HCC): ICD-10-CM

## 2020-11-23 LAB
ANION GAP SERPL CALCULATED.3IONS-SCNC: 3 MMOL/L (ref 4–13)
BUN SERPL-MCNC: 32 MG/DL (ref 5–25)
CALCIUM SERPL-MCNC: 10.3 MG/DL (ref 8.3–10.1)
CHLORIDE SERPL-SCNC: 108 MMOL/L (ref 100–108)
CO2 SERPL-SCNC: 29 MMOL/L (ref 21–32)
CREAT SERPL-MCNC: 2.1 MG/DL (ref 0.6–1.3)
GFR SERPL CREATININE-BSD FRML MDRD: 28 ML/MIN/1.73SQ M
GLUCOSE P FAST SERPL-MCNC: 209 MG/DL (ref 65–99)
POTASSIUM SERPL-SCNC: 5.4 MMOL/L (ref 3.5–5.3)
SODIUM SERPL-SCNC: 140 MMOL/L (ref 136–145)

## 2020-11-23 PROCEDURE — 80048 BASIC METABOLIC PNL TOTAL CA: CPT | Performed by: INTERNAL MEDICINE

## 2020-11-24 DIAGNOSIS — E55.9 VITAMIN D DEFICIENCY: ICD-10-CM

## 2020-11-24 DIAGNOSIS — N18.32 STAGE 3B CHRONIC KIDNEY DISEASE (HCC): Primary | ICD-10-CM

## 2020-11-24 RX ORDER — ERGOCALCIFEROL 1.25 MG/1
50000 CAPSULE ORAL WEEKLY
Qty: 5 CAPSULE | Refills: 5 | Status: ON HOLD | OUTPATIENT
Start: 2020-11-24 | End: 2021-03-05

## 2020-12-31 ENCOUNTER — TRANSCRIBE ORDERS (OUTPATIENT)
Dept: LAB | Facility: MEDICAL CENTER | Age: 85
End: 2020-12-31

## 2020-12-31 DIAGNOSIS — N18.32 STAGE 3B CHRONIC KIDNEY DISEASE (HCC): ICD-10-CM

## 2020-12-31 DIAGNOSIS — E78.5 HYPERLIPIDEMIA, UNSPECIFIED HYPERLIPIDEMIA TYPE: ICD-10-CM

## 2020-12-31 DIAGNOSIS — I10 HYPERTENSION, UNSPECIFIED TYPE: ICD-10-CM

## 2020-12-31 DIAGNOSIS — I25.119 ATHEROSCLEROSIS OF NATIVE CORONARY ARTERY OF NATIVE HEART WITH ANGINA PECTORIS (HCC): Primary | ICD-10-CM

## 2020-12-31 DIAGNOSIS — E11.9 TYPE 2 DIABETES MELLITUS WITHOUT COMPLICATION, WITHOUT LONG-TERM CURRENT USE OF INSULIN (HCC): ICD-10-CM

## 2021-01-07 ENCOUNTER — LAB (OUTPATIENT)
Dept: LAB | Facility: MEDICAL CENTER | Age: 86
End: 2021-01-07
Payer: MEDICARE

## 2021-01-07 DIAGNOSIS — I25.10 ASCVD (ARTERIOSCLEROTIC CARDIOVASCULAR DISEASE): ICD-10-CM

## 2021-01-07 DIAGNOSIS — E78.5 HYPERLIPIDEMIA, UNSPECIFIED HYPERLIPIDEMIA TYPE: ICD-10-CM

## 2021-01-07 DIAGNOSIS — E11.65 TYPE 2 DIABETES MELLITUS WITH HYPERGLYCEMIA, WITHOUT LONG-TERM CURRENT USE OF INSULIN (HCC): Primary | ICD-10-CM

## 2021-01-07 DIAGNOSIS — I10 HYPERTENSION, UNSPECIFIED TYPE: ICD-10-CM

## 2021-01-07 LAB
ALBUMIN SERPL BCP-MCNC: 4 G/DL (ref 3.5–5)
ALP SERPL-CCNC: 99 U/L (ref 46–116)
ALT SERPL W P-5'-P-CCNC: 32 U/L (ref 12–78)
ANION GAP SERPL CALCULATED.3IONS-SCNC: 6 MMOL/L (ref 4–13)
AST SERPL W P-5'-P-CCNC: 20 U/L (ref 5–45)
BILIRUB SERPL-MCNC: 0.69 MG/DL (ref 0.2–1)
BUN SERPL-MCNC: 30 MG/DL (ref 5–25)
CALCIUM SERPL-MCNC: 11.8 MG/DL (ref 8.3–10.1)
CHLORIDE SERPL-SCNC: 107 MMOL/L (ref 100–108)
CO2 SERPL-SCNC: 27 MMOL/L (ref 21–32)
CREAT SERPL-MCNC: 1.69 MG/DL (ref 0.6–1.3)
GFR SERPL CREATININE-BSD FRML MDRD: 37 ML/MIN/1.73SQ M
GLUCOSE P FAST SERPL-MCNC: 191 MG/DL (ref 65–99)
POTASSIUM SERPL-SCNC: 4.7 MMOL/L (ref 3.5–5.3)
PROT SERPL-MCNC: 8.4 G/DL (ref 6.4–8.2)
SODIUM SERPL-SCNC: 140 MMOL/L (ref 136–145)

## 2021-01-07 PROCEDURE — 80053 COMPREHEN METABOLIC PANEL: CPT

## 2021-02-24 ENCOUNTER — TRANSCRIBE ORDERS (OUTPATIENT)
Dept: URGENT CARE | Facility: CLINIC | Age: 86
End: 2021-02-24

## 2021-02-24 ENCOUNTER — APPOINTMENT (OUTPATIENT)
Dept: RADIOLOGY | Facility: CLINIC | Age: 86
End: 2021-02-24
Payer: MEDICARE
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
## 2021-02-24 PROCEDURE — 72100 X-RAY EXAM L-S SPINE 2/3 VWS: CPT

## 2021-03-04 ENCOUNTER — APPOINTMENT (EMERGENCY)
Dept: RADIOLOGY | Facility: HOSPITAL | Age: 86
DRG: 305 | End: 2021-03-04
Payer: MEDICARE

## 2021-03-04 ENCOUNTER — HOSPITAL ENCOUNTER (INPATIENT)
Facility: HOSPITAL | Age: 86
LOS: 2 days | Discharge: HOME HEALTH | DRG: 305 | End: 2021-03-06
Attending: EMERGENCY MEDICINE | Admitting: STUDENT IN AN ORGANIZED HEALTH CARE EDUCATION/TRAINING PROGRAM
Payer: MEDICARE

## 2021-03-04 ENCOUNTER — APPOINTMENT (INPATIENT)
Dept: CT IMAGING | Facility: HOSPITAL | Age: 86
DRG: 305 | End: 2021-03-04
Payer: MEDICARE

## 2021-03-04 DIAGNOSIS — I16.9 HYPERTENSIVE CRISIS: ICD-10-CM

## 2021-03-04 DIAGNOSIS — R06.02 SHORTNESS OF BREATH: ICD-10-CM

## 2021-03-04 DIAGNOSIS — R06.00 DOE (DYSPNEA ON EXERTION): Primary | ICD-10-CM

## 2021-03-04 LAB
ALBUMIN SERPL BCP-MCNC: 4.4 G/DL (ref 3.5–5.7)
ALP SERPL-CCNC: 76 U/L (ref 55–165)
ALT SERPL W P-5'-P-CCNC: 17 U/L (ref 7–52)
ANION GAP SERPL CALCULATED.3IONS-SCNC: 9 MMOL/L (ref 4–13)
APTT PPP: 30 SECONDS (ref 23–37)
AST SERPL W P-5'-P-CCNC: 17 U/L (ref 13–39)
BASOPHILS # BLD AUTO: 0.1 THOUSANDS/ΜL (ref 0–0.1)
BASOPHILS NFR BLD AUTO: 1 % (ref 0–2)
BILIRUB SERPL-MCNC: 1 MG/DL (ref 0.2–1)
BNP SERPL-MCNC: 138 PG/ML (ref 1–100)
BUN SERPL-MCNC: 26 MG/DL (ref 7–25)
CALCIUM SERPL-MCNC: 10.8 MG/DL (ref 8.6–10.5)
CHLORIDE SERPL-SCNC: 98 MMOL/L (ref 98–107)
CO2 SERPL-SCNC: 29 MMOL/L (ref 21–31)
CREAT SERPL-MCNC: 1.7 MG/DL (ref 0.7–1.3)
EOSINOPHIL # BLD AUTO: 0.7 THOUSAND/ΜL (ref 0–0.61)
EOSINOPHIL NFR BLD AUTO: 8 % (ref 0–5)
ERYTHROCYTE [DISTWIDTH] IN BLOOD BY AUTOMATED COUNT: 14.1 % (ref 11.5–14.5)
FLUAV RNA RESP QL NAA+PROBE: NEGATIVE
FLUBV RNA RESP QL NAA+PROBE: NEGATIVE
GFR SERPL CREATININE-BSD FRML MDRD: 36 ML/MIN/1.73SQ M
GLUCOSE SERPL-MCNC: 156 MG/DL (ref 65–140)
GLUCOSE SERPL-MCNC: 233 MG/DL (ref 65–99)
HCT VFR BLD AUTO: 50.8 % (ref 42–47)
HGB BLD-MCNC: 16.7 G/DL (ref 14–18)
INR PPP: 1.07 (ref 0.84–1.19)
LYMPHOCYTES # BLD AUTO: 1.9 THOUSANDS/ΜL (ref 0.6–4.47)
LYMPHOCYTES NFR BLD AUTO: 20 % (ref 21–51)
MAGNESIUM SERPL-MCNC: 2 MG/DL (ref 1.9–2.7)
MCH RBC QN AUTO: 28.2 PG (ref 26–34)
MCHC RBC AUTO-ENTMCNC: 32.9 G/DL (ref 31–37)
MCV RBC AUTO: 86 FL (ref 81–99)
MONOCYTES # BLD AUTO: 0.9 THOUSAND/ΜL (ref 0.17–1.22)
MONOCYTES NFR BLD AUTO: 9 % (ref 2–12)
NEUTROPHILS # BLD AUTO: 5.9 THOUSANDS/ΜL (ref 1.4–6.5)
NEUTS SEG NFR BLD AUTO: 62 % (ref 42–75)
PLATELET # BLD AUTO: 206 THOUSANDS/UL (ref 149–390)
PMV BLD AUTO: 7.2 FL (ref 8.6–11.7)
POTASSIUM SERPL-SCNC: 4.6 MMOL/L (ref 3.5–5.5)
PROT SERPL-MCNC: 7.7 G/DL (ref 6.4–8.9)
PROTHROMBIN TIME: 13.8 SECONDS (ref 11.6–14.5)
RBC # BLD AUTO: 5.93 MILLION/UL (ref 4.3–5.9)
RSV RNA RESP QL NAA+PROBE: NEGATIVE
SARS-COV-2 RNA RESP QL NAA+PROBE: NEGATIVE
SODIUM SERPL-SCNC: 136 MMOL/L (ref 134–143)
TROPONIN I SERPL-MCNC: <0.03 NG/ML
WBC # BLD AUTO: 9.5 THOUSAND/UL (ref 4.8–10.8)

## 2021-03-04 PROCEDURE — 0241U HB NFCT DS VIR RESP RNA 4 TRGT: CPT | Performed by: PHYSICIAN ASSISTANT

## 2021-03-04 PROCEDURE — 84484 ASSAY OF TROPONIN QUANT: CPT | Performed by: PHYSICIAN ASSISTANT

## 2021-03-04 PROCEDURE — 83735 ASSAY OF MAGNESIUM: CPT | Performed by: PHYSICIAN ASSISTANT

## 2021-03-04 PROCEDURE — 85730 THROMBOPLASTIN TIME PARTIAL: CPT | Performed by: PHYSICIAN ASSISTANT

## 2021-03-04 PROCEDURE — 85610 PROTHROMBIN TIME: CPT | Performed by: PHYSICIAN ASSISTANT

## 2021-03-04 PROCEDURE — 99223 1ST HOSP IP/OBS HIGH 75: CPT | Performed by: STUDENT IN AN ORGANIZED HEALTH CARE EDUCATION/TRAINING PROGRAM

## 2021-03-04 PROCEDURE — 99285 EMERGENCY DEPT VISIT HI MDM: CPT | Performed by: PHYSICIAN ASSISTANT

## 2021-03-04 PROCEDURE — 82948 REAGENT STRIP/BLOOD GLUCOSE: CPT

## 2021-03-04 PROCEDURE — 36415 COLL VENOUS BLD VENIPUNCTURE: CPT | Performed by: PHYSICIAN ASSISTANT

## 2021-03-04 PROCEDURE — 99285 EMERGENCY DEPT VISIT HI MDM: CPT

## 2021-03-04 PROCEDURE — 83880 ASSAY OF NATRIURETIC PEPTIDE: CPT | Performed by: PHYSICIAN ASSISTANT

## 2021-03-04 PROCEDURE — 71101 X-RAY EXAM UNILAT RIBS/CHEST: CPT

## 2021-03-04 PROCEDURE — 85025 COMPLETE CBC W/AUTO DIFF WBC: CPT | Performed by: PHYSICIAN ASSISTANT

## 2021-03-04 PROCEDURE — 93005 ELECTROCARDIOGRAM TRACING: CPT

## 2021-03-04 PROCEDURE — 80053 COMPREHEN METABOLIC PANEL: CPT | Performed by: PHYSICIAN ASSISTANT

## 2021-03-04 PROCEDURE — 1123F ACP DISCUSS/DSCN MKR DOCD: CPT | Performed by: PHYSICIAN ASSISTANT

## 2021-03-04 PROCEDURE — G1004 CDSM NDSC: HCPCS

## 2021-03-04 PROCEDURE — 71250 CT THORAX DX C-: CPT

## 2021-03-04 RX ORDER — ASPIRIN 81 MG/1
81 TABLET ORAL DAILY
Status: DISCONTINUED | OUTPATIENT
Start: 2021-03-05 | End: 2021-03-06 | Stop reason: HOSPADM

## 2021-03-04 RX ORDER — ISOSORBIDE MONONITRATE 30 MG/1
60 TABLET, EXTENDED RELEASE ORAL DAILY
Status: DISCONTINUED | OUTPATIENT
Start: 2021-03-05 | End: 2021-03-06 | Stop reason: HOSPADM

## 2021-03-04 RX ORDER — ONDANSETRON 2 MG/ML
4 INJECTION INTRAMUSCULAR; INTRAVENOUS EVERY 6 HOURS PRN
Status: DISCONTINUED | OUTPATIENT
Start: 2021-03-04 | End: 2021-03-06 | Stop reason: HOSPADM

## 2021-03-04 RX ORDER — ATORVASTATIN CALCIUM 20 MG/1
20 TABLET, FILM COATED ORAL DAILY
Status: DISCONTINUED | OUTPATIENT
Start: 2021-03-05 | End: 2021-03-06 | Stop reason: HOSPADM

## 2021-03-04 RX ORDER — DOCUSATE SODIUM 100 MG/1
100 CAPSULE, LIQUID FILLED ORAL 2 TIMES DAILY
Status: DISCONTINUED | OUTPATIENT
Start: 2021-03-04 | End: 2021-03-06 | Stop reason: HOSPADM

## 2021-03-04 RX ORDER — ACETAMINOPHEN 325 MG/1
650 TABLET ORAL EVERY 6 HOURS PRN
Status: DISCONTINUED | OUTPATIENT
Start: 2021-03-04 | End: 2021-03-06 | Stop reason: HOSPADM

## 2021-03-04 RX ORDER — SUCRALFATE 1 G/1
1 TABLET ORAL 2 TIMES DAILY
Status: DISCONTINUED | OUTPATIENT
Start: 2021-03-04 | End: 2021-03-06 | Stop reason: HOSPADM

## 2021-03-04 RX ORDER — CALCIUM CARBONATE 200(500)MG
1000 TABLET,CHEWABLE ORAL DAILY PRN
Status: DISCONTINUED | OUTPATIENT
Start: 2021-03-04 | End: 2021-03-06 | Stop reason: HOSPADM

## 2021-03-04 RX ORDER — FUROSEMIDE 10 MG/ML
20 INJECTION INTRAMUSCULAR; INTRAVENOUS ONCE
Status: DISCONTINUED | OUTPATIENT
Start: 2021-03-04 | End: 2021-03-04

## 2021-03-04 RX ORDER — ESCITALOPRAM OXALATE 10 MG/1
10 TABLET ORAL DAILY
Status: DISCONTINUED | OUTPATIENT
Start: 2021-03-05 | End: 2021-03-06 | Stop reason: HOSPADM

## 2021-03-04 RX ORDER — PROPRANOLOL HCL 60 MG
120 CAPSULE, EXTENDED RELEASE 24HR ORAL DAILY
Status: DISCONTINUED | OUTPATIENT
Start: 2021-03-05 | End: 2021-03-06 | Stop reason: HOSPADM

## 2021-03-04 RX ORDER — AMLODIPINE BESYLATE 5 MG/1
5 TABLET ORAL DAILY
Status: DISCONTINUED | OUTPATIENT
Start: 2021-03-04 | End: 2021-03-06 | Stop reason: HOSPADM

## 2021-03-04 RX ORDER — NITROGLYCERIN 0.4 MG/1
0.4 TABLET SUBLINGUAL
Status: DISCONTINUED | OUTPATIENT
Start: 2021-03-04 | End: 2021-03-06 | Stop reason: HOSPADM

## 2021-03-04 RX ORDER — CLOPIDOGREL BISULFATE 75 MG/1
75 TABLET ORAL DAILY
Status: DISCONTINUED | OUTPATIENT
Start: 2021-03-05 | End: 2021-03-06 | Stop reason: HOSPADM

## 2021-03-04 RX ORDER — HYDRALAZINE HYDROCHLORIDE 20 MG/ML
5 INJECTION INTRAMUSCULAR; INTRAVENOUS EVERY 6 HOURS PRN
Status: DISCONTINUED | OUTPATIENT
Start: 2021-03-04 | End: 2021-03-06 | Stop reason: HOSPADM

## 2021-03-04 RX ORDER — HEPARIN SODIUM 5000 [USP'U]/ML
5000 INJECTION, SOLUTION INTRAVENOUS; SUBCUTANEOUS EVERY 8 HOURS SCHEDULED
Status: DISCONTINUED | OUTPATIENT
Start: 2021-03-04 | End: 2021-03-06 | Stop reason: HOSPADM

## 2021-03-04 RX ORDER — SENNOSIDES 8.6 MG
1 TABLET ORAL DAILY
Status: DISCONTINUED | OUTPATIENT
Start: 2021-03-05 | End: 2021-03-06 | Stop reason: HOSPADM

## 2021-03-04 RX ORDER — PANTOPRAZOLE SODIUM 40 MG/1
40 TABLET, DELAYED RELEASE ORAL
Status: DISCONTINUED | OUTPATIENT
Start: 2021-03-05 | End: 2021-03-06 | Stop reason: HOSPADM

## 2021-03-04 RX ADMIN — HEPARIN SODIUM 5000 UNITS: 5000 INJECTION INTRAVENOUS; SUBCUTANEOUS at 17:12

## 2021-03-04 RX ADMIN — HYDRALAZINE HYDROCHLORIDE 5 MG: 20 INJECTION INTRAMUSCULAR; INTRAVENOUS at 17:15

## 2021-03-04 RX ADMIN — DOCUSATE SODIUM 100 MG: 100 CAPSULE, LIQUID FILLED ORAL at 17:10

## 2021-03-04 RX ADMIN — INSULIN LISPRO 1 UNITS: 100 INJECTION, SOLUTION INTRAVENOUS; SUBCUTANEOUS at 17:12

## 2021-03-04 RX ADMIN — INSULIN LISPRO 1 UNITS: 100 INJECTION, SOLUTION INTRAVENOUS; SUBCUTANEOUS at 21:36

## 2021-03-04 RX ADMIN — SUCRALFATE 1 G: 1 TABLET ORAL at 17:11

## 2021-03-04 RX ADMIN — HEPARIN SODIUM 5000 UNITS: 5000 INJECTION INTRAVENOUS; SUBCUTANEOUS at 21:36

## 2021-03-04 NOTE — H&P
H&P- London Garo 1935, 82 y o  male MRN: [id number]    Unit/Bed#: -02 Encounter: [id number]    Primary Care Provider: Ryder Grissom DO   Date and time admitted to hospital: 3/4/2021  1:52 PM        Hypertensive crisis  Assessment & Plan  · Did take home meds prior to ED arrival  · Continue home dose amlodipine and propanolol   · Start hydralazine prn for BP>180    * Shortness of breath  Assessment & Plan  · Unclear etiology, CXR documented as negative acute cardiopulmonary disease but Left lower lung base pleural effusion with vascular congestion  · BNP is minimally elevated, doubtful of chf exacerbation  · Covid 19 negative, no leukocytosis, no history of copd  · Order CT chest, follow up results  · Monitor oxygen status for now   · Could be flash pulmonary edema in setting of hypertensive crisis  · monitor intake/output, daily weights    Stage 3 chronic kidney disease  Assessment & Plan  Lab Results   Component Value Date    EGFR 36 03/04/2021    EGFR 37 01/07/2021    EGFR 28 11/23/2020    CREATININE 1 70 (H) 03/04/2021    CREATININE 1 69 (H) 01/07/2021    CREATININE 2 10 (H) 11/23/2020   · Creatinine at baseline  · Monitor daily while admitted    GERD (gastroesophageal reflux disease)  Assessment & Plan  · Controlled, continue protonix and carafate    CAD (coronary artery disease)  Assessment & Plan  · History of CAD status post CABG in the 1990's  · Follows with cardiology as an outpatient  · Continue home statin and aspirin    Diabetes mellitus, type II ([facility] [address] )  Assessment & Plan  Lab Results   Component Value Date    HGBA1C 8 9 (H) 01/07/2021       No results for input(s): POCGLU in the last 72 hours      Blood Sugar Average: Last 72 hrs:  · Start insulin sliding scale, diabetic diet, monitor glucose with meals/bedtime    Mixed hyperlipidemia  Assessment & Plan  · Continue home dose statin      VTE Prophylaxis: Heparin  / sequential compression device   Code Status: Full code  POLST: POLST form is not discussed and not completed at this time  Anticipated Length of Stay:  Patient will be admitted on an Inpatient basis with an anticipated length of stay of   2 midnights  Justification for Hospital Stay: shortness of breath     Total Time for Visit, including Counseling / Coordination of Care: 30 minutes  Greater than 50% of this total time spent on direct patient counseling and coordination of care  Chief Complaint:   Shortness of breath     History of Present Illness:    Paolo Ledezma is a 82 y o  male who presents with worsening shortness of breath over last several days  Had a fall on the ice several weeks ago and has been experiencing associated rib pain  He has been following up with his PCP for pain management  Due to persistent symptoms he came to the ED for further management  No other associated symptoms  Reports compliance with all his medications  Non smoker, no weight loss, no chest pain or chest palpitations  In the ED, there was concern for pleural effusions and elevated blood pressure, and he was admitted to medicine for further management       Review of Systems:    Review of Systems   Constitutional: Negative for chills and fever  HENT: Negative for ear pain and sore throat  Eyes: Negative for pain and visual disturbance  Respiratory: Positive for shortness of breath  Negative for cough  Cardiovascular: Negative for chest pain and palpitations  Gastrointestinal: Negative for abdominal pain and vomiting  Genitourinary: Negative for dysuria and hematuria  Musculoskeletal: Negative for arthralgias and back pain  Skin: Negative for color change and rash  Neurological: Negative for seizures and syncope  Psychiatric/Behavioral: Negative for agitation and behavioral problems  All other systems reviewed and are negative        Past Medical and Surgical History:     Past Medical History:   Diagnosis Date    Arthritis     Athscl 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     Stent OM1  Patent mammary graft to LAD 10/7/2009; CABG 1992 & 2005    Cancer [facility])     skin    Coronary artery disease     Diabetes mellitus ([facility] [address] )     Diabetes mellitus, type II ([facility] [address] )     without complication    Epistaxis     Essential (primary) hypertension     GERD (gastroesophageal reflux disease)     Hx of cardiovascular stress test 07/23/2014    Eddie MPI; EF0 52 (52%) Lexiscan, mild LV systolic dysfunction; evidence for prior inferior wall MI; perfusion imaging consistant w mild lat wall ischemia and min torin-infart inferior ischemia   / EF0 51 (51%) evidence for prior inferior wall MI  Mild inferior and mild-moderate lateral wall ischemia  7/29/15    Hx of echocardiogram 11/07/2017    2D w/CFD;EF0 55 (55%) mild LVH, mitral valve prolapse with moderate regurgitation  left atrial enlargement  Mild tricuspid regurgitation   Hypertension     Mixed hyperlipidemia     Occlusion and stenosis of bilateral carotid arteries     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 infarction)     Presence of aortocoronary bypass graft        Past Surgical History:   Procedure Laterality Date    CARDIAC CATHETERIZATION  10/07/2009    Stent 99% stenosis SVG from the aorta to OM1  Patent mammary graft to the LAD    CORONARY ARTERY BYPASS GRAFT  1992    VG-CX, VG-RCA    CORONARY ARTERY BYPASS GRAFT  11/04/2005    LIMA-D1-LAD, VG-PDA-PLB, [id number] TMR 9 Lesions    AL EGD TRANSORAL BIOPSY SINGLE/MULTIPLE N/A 1/23/2019    Procedure: ESOPHAGOGASTRODUODENOSCOPY (EGD) with biopsy;  Surgeon: Talon Fonseca MD;  Location: [facility] GI [facility]; Service: Gastroenterology    TONSILLECTOMY         Meds/Allergies:    Prior to Admission medications    Medication Sig Start Date End Date Taking?  Authorizing Provider   amLODIPine (NORVASC) 5 mg tablet Take 5 mg by mouth daily    Historical Provider, MD   aspirin (ECOTRIN LOW STRENGTH) 81 mg EC tablet Take 81 mg by mouth daily    Historical Provider, MD   atorvastatin (LIPITOR) 20 mg tablet Take 20 mg by mouth daily    Historical Provider, MD   clopidogrel (PLAVIX) 75 mg tablet Take 1 tablet (75 mg total) by mouth daily Hold until seen by ENT on 19   Luann Market, DO   ergocalciferol (VITAMIN D2) 50,000 units take 1 capsule by mouth every week 20   Luann Market, DO   ergocalciferol (VITAMIN D2) 50,000 units Take 1 capsule (50,000 Units total) by mouth once a week 20   Luann Market, DO   escitalopram (LEXAPRO) 10 mg tablet Take 10 mg by mouth daily    Historical Provider, MD   glimepiride (AMARYL) 1 mg tablet Take 1 mg by mouth daily with breakfast  18   Historical Provider, MD   isosorbide mononitrate (IMDUR) 60 mg 24 hr tablet Take 60 mg by mouth daily In the morning    Historical Provider, MD   nitroglycerin (NITROSTAT) 0 4 mg SL tablet one tablet under tongue for angina, may repeat in 15 min  no more than three times 18   Historical Provider, MD   omeprazole (PriLOSEC) 20 mg delayed release capsule Take 20 mg by mouth daily    Historical Provider, MD   propranolol (INDERAL LA) 120 mg 24 hr capsule Take 120 mg by mouth daily    Historical Provider, MD   sucralfate (CARAFATE) 1 g tablet Take 1 g by mouth 2 (two) times a day    Historical Provider, MD     I have reviewed home medications with patient personally  Allergies: Allergies   Allergen Reactions    Advil [Ibuprofen] Fatigue       Social History:     Marital Status:     Occupation: na  Patient Pre-hospital Living Situation: home  Patient Pre-hospital Level of Mobility: fully ambulatory without assistance   Patient Pre-hospital Diet Restrictions: heart healthy diet   Substance Use History:   Social History     Substance and Sexual Activity   Alcohol Use No     Social History     Tobacco Use   Smoking Status Former Smoker    Packs/day: 1 00    Years: 40 00    Pack years: 40 00    Quit date: 1977    Years since quittin 1   Smokeless Tobacco Never Used Social History     Substance and Sexual Activity   Drug Use No       Family History:    Family History   Problem Relation Age of Onset    Heart disease Brother     Tuberculosis Mother     Tuberculosis Father        Physical Exam:     Vitals:   Blood Pressure: (!) 188/85 (03/04/21 1627)  Pulse: 86 (03/04/21 1627)  Temperature: 97 5 °F (36 4 °C) (03/04/21 1627)  Temp Source: Temporal (03/04/21 1627)  Respirations: 22 (03/04/21 1627)  Height: 5' 6" (167 6 cm) (03/04/21 1353)  Weight - Scale: 78 9 kg (173 lb 15 1 oz) (03/04/21 1500)  SpO2: 97 % (03/04/21 1627)    Physical Exam  Vitals signs and nursing note reviewed  Constitutional:       Appearance: Normal appearance  HENT:      Head: Normocephalic and atraumatic  Nose: Nose normal  No congestion  Mouth/Throat:      Mouth: Mucous membranes are dry  Pharynx: Oropharynx is clear  Eyes:      General:         Right eye: No discharge  Left eye: No discharge  Neck:      Musculoskeletal: Normal range of motion and neck supple  Cardiovascular:      Rate and Rhythm: Normal rate and regular rhythm  Pulmonary:      Effort: Pulmonary effort is normal  No respiratory distress  Breath sounds: Rales present  Abdominal:      General: Abdomen is flat  Palpations: Abdomen is soft  Musculoskeletal:      Right lower leg: No edema  Left lower leg: No edema  Skin:     General: Skin is warm and dry  Neurological:      General: No focal deficit present  Mental Status: He is alert  Mental status is at baseline  Psychiatric:         Mood and Affect: Mood normal          Behavior: Behavior normal              Additional Data:     Lab Results: I have personally reviewed pertinent reports        Results from last 7 days   Lab Units 03/04/21  1417   WBC Thousand/uL 9 50   HEMOGLOBIN g/dL 16 7   HEMATOCRIT % 50 8*   PLATELETS Thousands/uL 206   NEUTROS PCT % 62   LYMPHS PCT % 20*   MONOS PCT % 9   EOS PCT % 8*     Results from last 7 days   Lab Units 03/04/21  1417   SODIUM mmol/L 136   POTASSIUM mmol/L 4 6   CHLORIDE mmol/L 98   CO2 mmol/L 29   BUN mg/dL 26*   CREATININE mg/dL 1 70*   ANION GAP mmol/L 9   CALCIUM mg/dL 10 8*   ALBUMIN g/dL 4 4   TOTAL BILIRUBIN mg/dL 1 00   ALK PHOS U/L 76   ALT U/L 17   AST U/L 17   GLUCOSE RANDOM mg/dL 233*     Results from last 7 days   Lab Units 03/04/21  1417   INR  1 07                   Imaging: I have personally reviewed pertinent reports  XR ribs with pa chest min 3 views RIGHT   Final Result by Honey Fatima MD (03/04 1539)      No acute cardiopulmonary disease  No evidence of rib fractures  Workstation performed: [id number]         CT chest wo contrast    (Results Pending)       EKG, Pathology, and Other Studies Reviewed on Admission:   · EKG: na    Allscripts / Epic Records Reviewed: Yes     ** Please Note: This note has been constructed using a voice recognition system   **

## 2021-03-04 NOTE — ASSESSMENT & PLAN NOTE
· History of CAD status post CABG in the 1990's  · Follows with cardiology as an outpatient  · Continue home statin and aspirin

## 2021-03-04 NOTE — ASSESSMENT & PLAN NOTE
· Unclear etiology, CXR documented as negative acute cardiopulmonary disease but Left lower lung base pleural effusion with vascular congestion  · BNP is minimally elevated, doubtful of chf exacerbation  · Covid 19 negative, no leukocytosis, no history of copd  · Order CT chest, follow up results  · Monitor oxygen status for now   · Could be flash pulmonary edema in setting of hypertensive crisis  · monitor intake/output, daily weights

## 2021-03-04 NOTE — ASSESSMENT & PLAN NOTE
· Did take home meds prior to ED arrival  · Continue home dose amlodipine and propanolol   · Start hydralazine prn for BP>180

## 2021-03-04 NOTE — ASSESSMENT & PLAN NOTE
Lab Results   Component Value Date    HGBA1C 8 9 (H) 01/07/2021       No results for input(s): POCGLU in the last 72 hours      Blood Sugar Average: Last 72 hrs:  · Start insulin sliding scale, diabetic diet, monitor glucose with meals/bedtime

## 2021-03-04 NOTE — ASSESSMENT & PLAN NOTE
Lab Results   Component Value Date    EGFR 36 03/04/2021    EGFR 37 01/07/2021    EGFR 28 11/23/2020    CREATININE 1 70 (H) 03/04/2021    CREATININE 1 69 (H) 01/07/2021    CREATININE 2 10 (H) 11/23/2020   · Creatinine at baseline  · Monitor daily while admitted

## 2021-03-04 NOTE — ED PROVIDER NOTES
History  Chief Complaint   Patient presents with    Shortness of Breath     SOB for 2 days, intermittently  "It happens every once in a while "     47-year-old male history of CAD presents complaining of shortness of breath  Patient reports that this happens intermittently for the past few years  He reports that is worse with exertion  He denies any chest pain but states that when he is having difficulty breathing that he feels pain in his epigastric region  Denies nausea, vomiting, constipation or diarrhea  Per chart review, patient has a history of cardiac catheterization and prior MI however he states Shari Blind say had a heart attack but I dont think I did  Patient reports that he slipped and fell on ice approximately 2 weeks ago  Saw his family doctor that obtain x-rays that were negative for any fractures but he states having they said may have bruised my right side while pointing to his ribs however per chart review, patient had lumbar spine x-rays performed  He has been taking a medication for pain that per PDMP review demonstrates is likely tramadol  Patient denies any head strike during his fall 2 weeks ago and reports that it was mechanical in nature from slipping on ice  Prior to Admission Medications   Prescriptions Last Dose Informant Patient Reported? Taking? amLODIPine (NORVASC) 5 mg tablet  Self Yes No   Sig: Take 5 mg by mouth daily   aspirin (ECOTRIN LOW STRENGTH) 81 mg EC tablet  Self Yes No   Sig: Take 81 mg by mouth daily   atorvastatin (LIPITOR) 20 mg tablet  Self Yes No   Sig: Take 20 mg by mouth daily   clopidogrel (PLAVIX) 75 mg tablet   No No   Sig: Take 1 tablet (75 mg total) by mouth daily Hold until seen by ENT on 9/23/19     ergocalciferol (VITAMIN D2) 50,000 units   No No   Sig: take 1 capsule by mouth every week   ergocalciferol (VITAMIN D2) 50,000 units   No No   Sig: Take 1 capsule (50,000 Units total) by mouth once a week   escitalopram (LEXAPRO) 10 mg tablet Self Yes No   Sig: Take 10 mg by mouth daily   glimepiride (AMARYL) 1 mg tablet  Self Yes No   Sig: Take 1 mg by mouth daily with breakfast    isosorbide mononitrate (IMDUR) 60 mg 24 hr tablet  Self Yes No   Sig: Take 60 mg by mouth daily In the morning   nitroglycerin (NITROSTAT) 0 4 mg SL tablet  Self Yes No   Sig: one tablet under tongue for angina, may repeat in 15 min  no more than three times   omeprazole (PriLOSEC) 20 mg delayed release capsule  Self Yes No   Sig: Take 20 mg by mouth daily   propranolol (INDERAL LA) 120 mg 24 hr capsule  Self Yes No   Sig: Take 120 mg by mouth daily   sucralfate (CARAFATE) 1 g tablet  Self Yes No   Sig: Take 1 g by mouth 2 (two) times a day      Facility-Administered Medications: None       Past Medical History:   Diagnosis Date    Athscl 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     Stent OM1  Patent mammary graft to LAD 10/7/2009; CABG 1992 & 2005    Cancer [facility])     skin    Diabetes mellitus, type II ([address] )     without complication    Epistaxis     Essential (primary) hypertension     GERD (gastroesophageal reflux disease)     Hx of cardiovascular stress test 07/23/2014    Eddie MPI; EF0 52 (52%) Lexiscan, mild LV systolic dysfunction; evidence for prior inferior wall MI; perfusion imaging consistant w mild lat wall ischemia and min torin-infart inferior ischemia   / EF0 51 (51%) evidence for prior inferior wall MI  Mild inferior and mild-moderate lateral wall ischemia  7/29/15    Hx of echocardiogram 11/07/2017    2D w/CFD;EF0 55 (55%) mild LVH, mitral valve prolapse with moderate regurgitation  left atrial enlargement  Mild tricuspid regurgitation        Mixed hyperlipidemia     Occlusion and stenosis of bilateral carotid arteries     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 infarction)     Presence of aortocoronary bypass graft        Past Surgical History:   Procedure Laterality Date    CARDIAC CATHETERIZATION  10/07/2009    Stent 99% stenosis SVG from the aorta to OM1  Patent mammary graft to the LAD    CORONARY ARTERY BYPASS GRAFT      VG-CX, VG-RCA    CORONARY ARTERY BYPASS GRAFT  2005    LIMA-D1-LAD, VG-PDA-PLB, [place]-KR8-CY9 TMR 9 Lesions    TX EGD TRANSORAL BIOPSY SINGLE/MULTIPLE N/A 2019    Procedure: ESOPHAGOGASTRODUODENOSCOPY (EGD) with biopsy;  Surgeon: Pamela Anguiano MD;  Location: [address] [place] [address] GI LAB; Service: Gastroenterology    TONSILLECTOMY         Family History   Problem Relation Age of Onset    Heart disease Brother     Tuberculosis Mother     Tuberculosis Father      I have reviewed and agree with the history as documented  E-Cigarette/Vaping     E-Cigarette/Vaping Substances     Social History     Tobacco Use    Smoking status: Former Smoker     Packs/day: 1 00     Years: 40 00     Pack years: 40 00     Quit date: 1977     Years since quittin 1    Smokeless tobacco: Never Used   Substance Use Topics    Alcohol use: No    Drug use: No       Review of Systems   Constitutional: Negative for chills, fatigue and fever  HENT: Negative for congestion and sore throat  Eyes: Negative for pain  Respiratory: Positive for shortness of breath  Negative for cough, chest tightness and wheezing  Cardiovascular: Negative for chest pain, palpitations and leg swelling  Gastrointestinal: Negative for abdominal pain, constipation, diarrhea, nausea and vomiting  Endocrine: Negative for polyuria  Genitourinary: Negative for dysuria  Musculoskeletal: Negative for arthralgias, back pain, myalgias and neck pain  Skin: Negative for rash  Neurological: Negative for dizziness, syncope, light-headedness and headaches  All other systems reviewed and are negative  Physical Exam  Physical Exam  Vitals signs reviewed  Constitutional:       Appearance: He is well-developed  HENT:      Head: Normocephalic and atraumatic        Mouth/Throat:      Mouth: Mucous membranes are moist    Eyes:      Conjunctiva/sclera: Conjunctivae normal    Neck:      Musculoskeletal: Normal range of motion  Cardiovascular:      Rate and Rhythm: Normal rate and regular rhythm  Heart sounds: Normal heart sounds  Pulmonary:      Effort: Pulmonary effort is normal       Breath sounds: Normal breath sounds  Abdominal:      General: Bowel sounds are normal       Palpations: Abdomen is soft  Tenderness: There is no abdominal tenderness  Musculoskeletal: Normal range of motion  Skin:     General: Skin is warm and dry  Capillary Refill: Capillary refill takes less than 2 seconds  Neurological:      General: No focal deficit present  Mental Status: He is alert and oriented to person, place, and time  Vital Signs  ED Triage Vitals [03/04/21 1353]   Temperature Pulse Respirations Blood Pressure SpO2   97 5 °F (36 4 °C) 87 (!) 24 (!) 193/92 91 %      Temp Source Heart Rate Source Patient Position - Orthostatic VS BP Location FiO2 (%)   Temporal -- Lying Left arm --      Pain Score       --           Vitals:    03/04/21 1353   BP: (!) 193/92   Pulse: 87   Patient Position - Orthostatic VS: Lying         Visual Acuity      ED Medications  Medications - No data to display    Diagnostic Studies  Results Reviewed     Procedure Component Value Units Date/Time    COVID19, Influenza A/B, RSV PCR, [facility] [[id number]]  (Normal) Collected: 03/04/21 1417    Lab Status: Final result Specimen: Nasopharyngeal Swab Updated: 03/04/21 1504     SARS-CoV-2 Negative     INFLUENZA A PCR Negative     INFLUENZA B PCR Negative     RSV PCR Negative    Narrative: This test has been authorized by FDA under an EUA (Emergency Use Assay) for use by authorized laboratories  Clinical caution and judgement should be used with the interpretation of these results with consideration of the clinical impression and other laboratory testing    Testing reported as "Positive" or "Negative" has been proven to be accurate according to standard laboratory validation requirements  All testing is performed with control materials showing appropriate reactivity at standard intervals      B-Type Natriuretic Peptide [facility] ONLY) [[id number]]  (Abnormal) Collected: 03/04/21 1417    Lab Status: Final result Specimen: Blood from Arm, Right Updated: 03/04/21 1451      pg/mL     Comprehensive metabolic panel [[id number]]  (Abnormal) Collected: 03/04/21 1417    Lab Status: Final result Specimen: Blood from Arm, Right Updated: 03/04/21 1447     Sodium 136 mmol/L      Potassium 4 6 mmol/L      Chloride 98 mmol/L      CO2 29 mmol/L      ANION GAP 9 mmol/L      BUN 26 mg/dL      Creatinine 1 70 mg/dL      Glucose 233 mg/dL      Calcium 10 8 mg/dL      AST 17 U/L      ALT 17 U/L      Alkaline Phosphatase 76 U/L      Total Protein 7 7 g/dL      Albumin 4 4 g/dL      Total Bilirubin 1 00 mg/dL      eGFR 36 ml/min/1 73sq m     Narrative:      [place] guidelines for Chronic Kidney Disease (CKD):     Stage 1 with normal or high GFR (GFR > 90 mL/min/1 73 square meters)    Stage 2 Mild CKD (GFR = 60-89 mL/min/1 73 square meters)    Stage 3A Moderate CKD (GFR = 45-59 mL/min/1 73 square meters)    Stage 3B Moderate CKD (GFR = 30-44 mL/min/1 73 square meters)    Stage 4 Severe CKD (GFR = 15-29 mL/min/1 73 square meters)    Stage 5 End Stage CKD (GFR <15 mL/min/1 73 square meters)  Note: GFR calculation is accurate only with a steady state creatinine    Magnesium [[id number]]  (Normal) Collected: 03/04/21 1417    Lab Status: Final result Specimen: Blood from Arm, Right Updated: 03/04/21 1446     Magnesium 2 0 mg/dL     Troponin I [[id number]]  (Normal) Collected: 03/04/21 1417    Lab Status: Final result Specimen: Blood from Arm, Right Updated: 03/04/21 1446     Troponin I <0 03 ng/mL     Protime-INR [[id number]]  (Normal) Collected: 03/04/21 1417    Lab Status: Final result Specimen: Blood from Arm, Right Updated: 03/04/21 1439     Protime 13 8 seconds INR 1 07    APTT [[id number]]  (Normal) Collected: 03/04/21 1417    Lab Status: Final result Specimen: Blood from Arm, Right Updated: 03/04/21 1439     PTT 30 seconds     CBC and differential [[id number]]  (Abnormal) Collected: 03/04/21 1417    Lab Status: Final result Specimen: Blood from Arm, Right Updated: 03/04/21 1430     WBC 9 50 Thousand/uL      RBC 5 93 Million/uL      Hemoglobin 16 7 g/dL      Hematocrit 50 8 %      MCV 86 fL      MCH 28 2 pg      MCHC 32 9 g/dL      RDW 14 1 %      MPV 7 2 fL      Platelets 160 Thousands/uL      Neutrophils Relative 62 %      Lymphocytes Relative 20 %      Monocytes Relative 9 %      Eosinophils Relative 8 %      Basophils Relative 1 %      Neutrophils Absolute 5 90 Thousands/µL      Lymphocytes Absolute 1 90 Thousands/µL      Monocytes Absolute 0 90 Thousand/µL      Eosinophils Absolute 0 70 Thousand/µL      Basophils Absolute 0 10 Thousands/µL                  XR ribs with pa chest min 3 views RIGHT   Final Result by Abbie Miller MD (03/04 1539)      No acute cardiopulmonary disease  No evidence of rib fractures  Workstation performed: [id number]                    Procedures  ECG 12 Lead Documentation Only    Date/Time: 3/4/2021 3:48 PM  Performed by: Senia Bishop PA-C  Authorized by:  Seina Bishop PA-C     ECG reviewed by me, the ED Provider: yes    Patient location:  ED  Previous ECG:     Previous ECG:  Compared to current    Similarity:  No change    Comparison to cardiac monitor: Yes    Interpretation:     Interpretation: normal    Rate:     ECG rate assessment: normal    Rhythm:     Rhythm: sinus rhythm    Ectopy:     Ectopy: none    QRS:     QRS axis:  Normal  Conduction:     Conduction: normal    ST segments:     ST segments:  Abnormal    Elevation:  II, III, V4, V5 and V6  T waves:     T waves: normal    Comments:      No evidence of acute cardiac ischemia             ED Course                                           MDM  Number of Diagnoses or Management Options  BREAUX (dyspnea on exertion):   Diagnosis management comments: Patient presented complaining of dyspnea on exertion  Chest x-ray appears mildly congested  Patient denied chest pain but he did report pain in his epigastric region with exertion which was concerning for unstable angina  Patient has nitroglycerin tabs in his med list but denies using them recently  Patient is relatively poor historian denying significant cardiac history however per chart review has had prior cardiac catheterizations and sternotomy wires seen on chest x-ray  Patient is agreeable to admission  Disposition  Final diagnoses:   BREAUX (dyspnea on exertion)     Time reflects when diagnosis was documented in both MDM as applicable and the Disposition within this note     Time User Action Codes Description Comment    3/4/2021  3:46 PM Rise Locker [R06 00] BREAUX (dyspnea on exertion)       ED Disposition     ED Disposition Condition Date/Time Comment    Admit Stable Thu Mar 4, 2021  3:46 PM Case was discussed with Dr Daly Weiss and the patient's admission status was agreed to be Admission Status: inpatient status to the service of Dr Saknet Will  Follow-up Information    None         Patient's Medications   Discharge Prescriptions    No medications on file     No discharge procedures on file      PDMP Review     None          ED Provider  Electronically Signed by           Lanette Castro PA-C  03/04/21 7763

## 2021-03-05 LAB
ANION GAP SERPL CALCULATED.3IONS-SCNC: 10 MMOL/L (ref 4–13)
ATRIAL RATE: 83 BPM
BUN SERPL-MCNC: 24 MG/DL (ref 7–25)
CALCIUM SERPL-MCNC: 10.4 MG/DL (ref 8.6–10.5)
CHLORIDE SERPL-SCNC: 99 MMOL/L (ref 98–107)
CO2 SERPL-SCNC: 26 MMOL/L (ref 21–31)
CREAT SERPL-MCNC: 1.53 MG/DL (ref 0.7–1.3)
ERYTHROCYTE [DISTWIDTH] IN BLOOD BY AUTOMATED COUNT: 14 % (ref 11.5–14.5)
GFR SERPL CREATININE-BSD FRML MDRD: 41 ML/MIN/1.73SQ M
GLUCOSE SERPL-MCNC: 153 MG/DL (ref 65–140)
GLUCOSE SERPL-MCNC: 156 MG/DL (ref 65–140)
GLUCOSE SERPL-MCNC: 161 MG/DL (ref 65–140)
GLUCOSE SERPL-MCNC: 161 MG/DL (ref 65–140)
GLUCOSE SERPL-MCNC: 162 MG/DL (ref 65–140)
GLUCOSE SERPL-MCNC: 164 MG/DL (ref 65–140)
GLUCOSE SERPL-MCNC: 166 MG/DL (ref 65–140)
GLUCOSE SERPL-MCNC: 177 MG/DL (ref 65–99)
GLUCOSE SERPL-MCNC: 272 MG/DL (ref 65–140)
HCT VFR BLD AUTO: 49 % (ref 42–47)
HGB BLD-MCNC: 16.6 G/DL (ref 14–18)
MAGNESIUM SERPL-MCNC: 1.9 MG/DL (ref 1.9–2.7)
MCH RBC QN AUTO: 28.7 PG (ref 26–34)
MCHC RBC AUTO-ENTMCNC: 33.9 G/DL (ref 31–37)
MCV RBC AUTO: 85 FL (ref 81–99)
P AXIS: 41 DEGREES
PLATELET # BLD AUTO: 181 THOUSANDS/UL (ref 149–390)
PMV BLD AUTO: 7.6 FL (ref 8.6–11.7)
POTASSIUM SERPL-SCNC: 4 MMOL/L (ref 3.5–5.5)
PR INTERVAL: 192 MS
QRS AXIS: 42 DEGREES
QRSD INTERVAL: 86 MS
QT INTERVAL: 380 MS
QTC INTERVAL: 446 MS
RBC # BLD AUTO: 5.77 MILLION/UL (ref 4.3–5.9)
SODIUM SERPL-SCNC: 135 MMOL/L (ref 134–143)
T WAVE AXIS: -11 DEGREES
VENTRICULAR RATE: 83 BPM
WBC # BLD AUTO: 9.6 THOUSAND/UL (ref 4.8–10.8)

## 2021-03-05 PROCEDURE — 85027 COMPLETE CBC AUTOMATED: CPT | Performed by: STUDENT IN AN ORGANIZED HEALTH CARE EDUCATION/TRAINING PROGRAM

## 2021-03-05 PROCEDURE — 97166 OT EVAL MOD COMPLEX 45 MIN: CPT

## 2021-03-05 PROCEDURE — 99232 SBSQ HOSP IP/OBS MODERATE 35: CPT | Performed by: HOSPITALIST

## 2021-03-05 PROCEDURE — 97163 PT EVAL HIGH COMPLEX 45 MIN: CPT

## 2021-03-05 PROCEDURE — 82948 REAGENT STRIP/BLOOD GLUCOSE: CPT

## 2021-03-05 PROCEDURE — 83735 ASSAY OF MAGNESIUM: CPT | Performed by: STUDENT IN AN ORGANIZED HEALTH CARE EDUCATION/TRAINING PROGRAM

## 2021-03-05 PROCEDURE — 80048 BASIC METABOLIC PNL TOTAL CA: CPT | Performed by: STUDENT IN AN ORGANIZED HEALTH CARE EDUCATION/TRAINING PROGRAM

## 2021-03-05 PROCEDURE — 93010 ELECTROCARDIOGRAM REPORT: CPT | Performed by: INTERNAL MEDICINE

## 2021-03-05 RX ORDER — HYDRALAZINE HYDROCHLORIDE 25 MG/1
25 TABLET, FILM COATED ORAL EVERY 8 HOURS SCHEDULED
Status: DISCONTINUED | OUTPATIENT
Start: 2021-03-05 | End: 2021-03-06 | Stop reason: HOSPADM

## 2021-03-05 RX ORDER — LANOLIN ALCOHOL/MO/W.PET/CERES
6 CREAM (GRAM) TOPICAL
Status: DISCONTINUED | OUTPATIENT
Start: 2021-03-06 | End: 2021-03-05

## 2021-03-05 RX ORDER — LANOLIN ALCOHOL/MO/W.PET/CERES
6 CREAM (GRAM) TOPICAL
Status: DISCONTINUED | OUTPATIENT
Start: 2021-03-05 | End: 2021-03-06 | Stop reason: HOSPADM

## 2021-03-05 RX ORDER — HYDROCODONE POLISTIREX AND CHLORPHENIRAMINE POLISTIREX 10; 8 MG/5ML; MG/5ML
5 SUSPENSION, EXTENDED RELEASE ORAL EVERY 12 HOURS PRN
Status: DISCONTINUED | OUTPATIENT
Start: 2021-03-05 | End: 2021-03-06 | Stop reason: HOSPADM

## 2021-03-05 RX ORDER — DIPHENHYDRAMINE HYDROCHLORIDE 50 MG/ML
25 INJECTION INTRAMUSCULAR; INTRAVENOUS ONCE
Status: COMPLETED | OUTPATIENT
Start: 2021-03-05 | End: 2021-03-05

## 2021-03-05 RX ADMIN — HEPARIN SODIUM 5000 UNITS: 5000 INJECTION INTRAVENOUS; SUBCUTANEOUS at 21:22

## 2021-03-05 RX ADMIN — ISOSORBIDE MONONITRATE 60 MG: 30 TABLET, EXTENDED RELEASE ORAL at 10:09

## 2021-03-05 RX ADMIN — MELATONIN 6 MG: at 21:22

## 2021-03-05 RX ADMIN — HEPARIN SODIUM 5000 UNITS: 5000 INJECTION INTRAVENOUS; SUBCUTANEOUS at 05:05

## 2021-03-05 RX ADMIN — HYDROCODONE POLISTIREX AND CHLORPHENIRAMINE POLISTIREX 5 ML: 10; 8 SUSPENSION, EXTENDED RELEASE ORAL at 02:38

## 2021-03-05 RX ADMIN — CLOPIDOGREL BISULFATE 75 MG: 75 TABLET ORAL at 10:10

## 2021-03-05 RX ADMIN — INSULIN LISPRO 1 UNITS: 100 INJECTION, SOLUTION INTRAVENOUS; SUBCUTANEOUS at 10:08

## 2021-03-05 RX ADMIN — HEPARIN SODIUM 5000 UNITS: 5000 INJECTION INTRAVENOUS; SUBCUTANEOUS at 14:27

## 2021-03-05 RX ADMIN — AMLODIPINE BESYLATE 5 MG: 5 TABLET ORAL at 10:10

## 2021-03-05 RX ADMIN — HYDRALAZINE HYDROCHLORIDE 25 MG: 25 TABLET, FILM COATED ORAL at 21:23

## 2021-03-05 RX ADMIN — INSULIN LISPRO 2 UNITS: 100 INJECTION, SOLUTION INTRAVENOUS; SUBCUTANEOUS at 12:02

## 2021-03-05 RX ADMIN — HYDRALAZINE HYDROCHLORIDE 25 MG: 25 TABLET, FILM COATED ORAL at 14:27

## 2021-03-05 RX ADMIN — SUCRALFATE 1 G: 1 TABLET ORAL at 10:09

## 2021-03-05 RX ADMIN — INSULIN LISPRO 1 UNITS: 100 INJECTION, SOLUTION INTRAVENOUS; SUBCUTANEOUS at 17:21

## 2021-03-05 RX ADMIN — MELATONIN 6 MG: at 01:34

## 2021-03-05 RX ADMIN — PROPRANOLOL HYDROCHLORIDE 120 MG: 60 CAPSULE, EXTENDED RELEASE ORAL at 10:10

## 2021-03-05 RX ADMIN — ESCITALOPRAM OXALATE 10 MG: 10 TABLET ORAL at 10:10

## 2021-03-05 RX ADMIN — CALCIUM CARBONATE (ANTACID) CHEW TAB 500 MG 1000 MG: 500 CHEW TAB at 03:24

## 2021-03-05 RX ADMIN — ATORVASTATIN CALCIUM 20 MG: 20 TABLET, FILM COATED ORAL at 10:10

## 2021-03-05 RX ADMIN — SUCRALFATE 1 G: 1 TABLET ORAL at 17:21

## 2021-03-05 RX ADMIN — INSULIN LISPRO 1 UNITS: 100 INJECTION, SOLUTION INTRAVENOUS; SUBCUTANEOUS at 21:22

## 2021-03-05 RX ADMIN — PANTOPRAZOLE SODIUM 40 MG: 40 TABLET, DELAYED RELEASE ORAL at 05:05

## 2021-03-05 RX ADMIN — DIPHENHYDRAMINE HYDROCHLORIDE 25 MG: 50 INJECTION, SOLUTION INTRAMUSCULAR; INTRAVENOUS at 00:33

## 2021-03-05 NOTE — CASE MANAGEMENT
Pt is not a 30 day readmission , risk score 12, pt was admitted ofr SOB, cm met with the pt and he was made aware of cm role, pt is independent except for driving, his family transports the pt, pt lives alone in a 2nd floor apartment 25 steps outside and no steps inside, DME: cane , glucometer, RX plan [facility] [place], no hx of HHC, pt does not smoke or drink alcohol, pt's family will transport the pt home, A post acute care recommendation was made by your care team for [address]  Discussed Freedom of Choice with patient  List of agencies given to patient via in person  patient aware the list is custom filtered for them by zip code location and that [facility] post acute providers are designated  Cm met again with the pt and gave him the hhc list, he requested a referral be placed to [facility], referral was sentt, pt is not stable for d/c , pt remains on oxygen and he does not have home oxygen, family will transport when stable for d/ c, cm will continue to work on a safe d/c plan, CM reviewed d/c planning process including the following: identifying help at home, patient preference for d/c planning needs, availability of treatment team to discuss questions or concerns patient and/or family may have regarding understanding medications and recognizing signs and symptoms once discharged  CM also encouraged patient to follow up with all recommended appointments after discharge  Patient advised of importance for patient and family to participate in managing patients medical well being  Patient/caregiver received discharge checklist   Content reviewed  Patient/caregiver encouraged to participate in discharge plan of care prior to discharge home

## 2021-03-05 NOTE — ASSESSMENT & PLAN NOTE
Lab Results   Component Value Date    HGBA1C 8 9 (H) 01/07/2021       Recent Labs     03/04/21 2030 03/05/21  0600   POCGLU 156* 161*       Blood Sugar Average: Last 72 hrs:  · (P) 158 5   · -continue SSI, diabetic diet, monitor glucose with meals/bedtime

## 2021-03-05 NOTE — PHYSICAL THERAPY NOTE
Physical Therapy Evaluation     Patient's Name: Carlos Rios    Admitting Diagnosis  Shortness of breath [R06 02]  BREAUX (dyspnea on exertion) [R06 00]    Problem List  Patient Active Problem List   Diagnosis    Hypertensive crisis    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 infarction)    Mixed hyperlipidemia    Diabetes mellitus, type II ([facility] )    CAD (coronary artery disease)    GERD (gastroesophageal reflux disease)    Obese    Presence of aortocoronary bypass graft    Occlusion and stenosis of bilateral carotid arteries    Dyslipidemia    Stage 3 chronic kidney disease    Nephrolithiasis    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 (HCC)    Chronic tubulointerstitial nephritis    Edema    Proteinuria    Atherosclerosis    Secondary hyperparathyroidism of renal origin ([facility] )    Vitamin D deficiency    Shortness of breath       Past Medical History  Past Medical History:   Diagnosis Date    Arthritis     Athscl 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     Stent OM1  Patent mammary graft to LAD 10/7/2009; CABG 1992 & 2005    Cancer [facility])     skin    Coronary artery disease     Diabetes mellitus ([place] [address] )     Diabetes mellitus, type II ([place] [address] )     without complication    Epistaxis     Essential (primary) hypertension     GERD (gastroesophageal reflux disease)     Hx of cardiovascular stress test 07/23/2014    Eddie MPI; EF0 52 (52%) Lexiscan, mild LV systolic dysfunction; evidence for prior inferior wall MI; perfusion imaging consistant w mild lat wall ischemia and min torin-infart inferior ischemia   / EF0 51 (51%) evidence for prior inferior wall MI  Mild inferior and mild-moderate lateral wall ischemia  7/29/15    Hx of echocardiogram 11/07/2017    2D w/CFD;EF0 55 (55%) mild LVH, mitral valve prolapse with moderate regurgitation  left atrial enlargement  Mild tricuspid regurgitation        Hypertension     Mixed hyperlipidemia     Occlusion and stenosis of bilateral carotid arteries     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 infarction)     Presence of aortocoronary bypass graft        Past Surgical History  Past Surgical History:   Procedure Laterality Date    CARDIAC CATHETERIZATION  10/07/2009    Stent 99% stenosis SVG from the aorta to OM1  Patent mammary graft to the LAD    CORONARY ARTERY BYPASS GRAFT  1992    VG-CX, VG-RCA    CORONARY ARTERY BYPASS GRAFT  11/04/2005    LIMA-D1-LAD, VG-PDA-PLB, [id number] TMR 9 Lesions    NC EGD TRANSORAL BIOPSY SINGLE/MULTIPLE N/A 1/23/2019    Procedure: ESOPHAGOGASTRODUODENOSCOPY (EGD) with biopsy;  Surgeon: Mine De La Paz MD;  Location: [address] [place] [address] GI LAB; Service: Gastroenterology    TONSILLECTOMY          03/05/21 0747   PT Last Visit   PT Visit Date 03/05/21   Note Type   Note type Evaluation   Pain Assessment   Pain Assessment Tool 0-10   Pain Score 2   Pain Location/Orientation Orientation: Bilateral;Location: Generalized   Pain Onset/Description Onset: Ongoing;Frequency: Constant/Continuous; Descriptor: Aching   Effect of Pain on Daily Activities no   Patient's Stated Pain Goal No pain   Hospital Pain Intervention(s) Medication (See MAR); Repositioned; Ambulation/increased activity   Multiple Pain Sites Yes   Home Living   Type of [address] One level;Performs ADLs on one level; Able to live on main level with bedroom/bathroom;Stairs to enter with rails  (Apt  on 2nd floor, [address])   Bathroom Shower/Tub Tub/shower unit   Bathroom Toilet Standard   Bathroom Equipment   (no DME usage PTA)   [facility]  (has [facility] available, did not utilize PTA)   Prior Function   Level of [facility] Independent with ADLs and functional mobility   Lives With Alone   Receives Help From Family  (prn)   ADL Assistance Independent   IADLs Needs assistance  (transportation)   Falls in the last 6 months 1 to 4  (x 2 total, one fall 2 weeks ago/another~month ago)   Vocational Retired   Restrictions/Precautions   [facility] Bearing Precautions Per Order No   Other Precautions Chair Alarm; Bed Alarm; Fall Risk;Pain;O2  (2 L O2 via NC, did not utilize PTA)   General   Family/Caregiver Present No   Cognition   Overall Cognitive Status WFL   Arousal/Participation Alert   Attention Within functional limits   Orientation Level Oriented X4   Memory Decreased short term memory   Following Commands Follows one step commands without difficulty   Comments Pt agreeable to PT assessment, pleasant  Mini cog completed w/OT, score of 2 out of possible 5    RUE Assessment   RUE Assessment WFL   LUE Assessment   LUE Assessment WFL   RLE Assessment   RLE Assessment X  (4-/ 5 gross musculature)   LLE Assessment   LLE Assessment X  (4-/5 gross musculature)   Coordination   Movements are Fluid and Coordinated 1   Light Touch   RLE Light Touch Grossly intact   LLE Light Touch Grossly intact   Bed Mobility   Supine to Sit 5  Supervision   Additional items Assist x 1;Bedrails; Increased time required;Verbal cues;HOB elevated   Sit to Supine   (DNT as pt  was sitting OOB on chair upon conclusion )   Additional Comments Pt  denied lightheadedness/dizziness with positional change  Transfers   Sit to Stand 5  Supervision   Additional items Assist x 1;Bedrails; Increased time required;Verbal cues   Stand to Sit 5  Supervision   Additional items Assist x 1;Bedrails; Increased time required;Verbal cues   Stand pivot 5  Supervision   Additional items Assist x 1; Increased time required;Verbal cues   Ambulation/Elevation   Gait pattern Improper Weight shift; Short stride   Gait Assistance 5  Supervision   Additional items Assist x 1;Verbal cues   Assistive Device None   Distance 15 feet  (around bed->chair for breakfast)   Stair Management Assistance Not tested   Balance   Static Sitting Good   Dynamic Sitting Fair +   Static Standing Fair +   Dynamic Standing Fair   Ambulatory Fair   Endurance Deficit   Endurance Deficit No   Activity Tolerance   Activity Tolerance Patient tolerated treatment well   Medical Staff Made Aware yes, Sonam POTTER & Aurea Santoyo CM   Nurse Made Aware Yes, nursing staff was informed of assessment outcome  Assessment   Prognosis Good   Problem List Decreased strength; Impaired balance;Decreased mobility; Decreased safety awareness;Pain   Assessment Pt is 82 y o  male seen for PT evaluation s/p admit to [address] [place] [address] on 3/4/2021 w/ Shortness of breath  PT consulted to assess pt's functional mobility and d/c needs  Order placed for PT eval and tx, w/ activity as tolerated order  Comorbidities affecting pt's physical performance at time of assessment include: weakness,SOB, DM,GERD,hypertensive crisis,CKD, hyperlipidemia   PTA, pt was independent w/ all functional mobility w/ o AD usage  Personal factors affecting pt at time of IE include: stairs to enter home, inability to ambulate household distances, inability to navigate community distances, unable to perform dynamic tasks in community, positive fall history, limited insight into impairments, inability to perform IADLs and inability to perform ADLs  Please find objective findings from PT assessment regarding body systems outlined above with impairments and limitations including weakness, impaired balance, decreased endurance, gait deviations, pain, decreased activity tolerance, decreased functional mobility tolerance, decreased safety awareness and fall risk  The following objective measures performed on IE also reveal limitations: AM-PAC 6-Clicks: 25/88  Pt's clinical presentation is currently unstable/unpredictable seen in pt's presentation of pain, abnormal lab values, supplemental O2 usage requirement, progressive symptoms prior to hospitalization  Pt to benefit from continued PT tx to address deficits as defined above and maximize level of functional independent mobility and consistency   From PT/mobility standpoint, recommendation at time of d/c would be Home PT with family support pending progress in order to facilitate return to PLOF  Goals   Patient Goals to go home soon   LTG Expiration Date 03/15/21   Long Term Goal #1 1 )Patient will complete bed mobility modified I for decrease need for caregiver assistance, decrease burden of care  2 ) Patient will complete transfers modified I  to decrease risk of falls, facilitate upright standing posture  3 ) BLE strength to greater than/equal to 4/5 gross musculature to increase ability to safely transfer, control descent to chair  4 ) Patient will exhibit increase static standing balance to Fair+  without LOB and supervision of 1 to improve safety  5 ) Patient will exhibit increase dynamic ambulatory balance to Fair+ >125 feet w/AD prn supervision of 1 to improve ability to mobilize to toilet, chair and decrease risk for additional medical complications  6 ) Patient will exhibit good self monitoring and ability to follow 2 step commands to increase complexity of tasks and resume ADL's without LOB  PT Treatment Day 0   Plan   Treatment/Interventions ADL retraining;Functional transfer training;LE strengthening/ROM; Therapeutic exercise; Endurance training;Patient/family training;Equipment eval/education; Bed mobility;Gait training;Spoke to nursing;OT;Spoke to case management   PT Frequency Other (Comment)  (3-5x/wk)   Recommendation   PT Discharge Recommendation Home with skilled therapy; Return to previous environment with social support   PT - OK to Discharge No   Additional Comments Upon conclusion, pt  was resting OOB on chair w/chair alarm engaged and all needs within reach  Additional Comments 2 AMPAC raw score of 18, standardized score of 41 05  As per the data, this score is correlated most closely with a  HHPT recommendation  However,the therapist's discharge disposition recommendation may vary as numerous social factors and objective findings contribute to the suggested destination     AM-PAC Basic Mobility Inpatient   Turning in Bed Without Bedrails 3   Lying on Back to Sitting on Edge of Flat Bed 3   Moving Bed to Chair 3   Standing Up From Chair 3   Walk in Room 3   Climb 3-5 Stairs 3   Basic Mobility Inpatient Raw Score 18   Basic Mobility Standardized Score 41 05     Santo Machuca, PT

## 2021-03-05 NOTE — ASSESSMENT & PLAN NOTE
· Did take home meds prior to ED arrival  · Continue home dose amlodipine and propanolol   · Blood pressure is improving but not optimal  · Add hydralazine 25 mg p o   Q 8

## 2021-03-05 NOTE — ASSESSMENT & PLAN NOTE
Lab Results   Component Value Date    EGFR 41 03/05/2021    EGFR 36 03/04/2021    EGFR 37 01/07/2021    CREATININE 1 53 (H) 03/05/2021    CREATININE 1 70 (H) 03/04/2021    CREATININE 1 69 (H) 01/07/2021   · Creatinine at baseline  · Monitor daily while admitted

## 2021-03-05 NOTE — ASSESSMENT & PLAN NOTE
· Unclear etiology  · Patient notes that he has been short of breath for years but it is worsened over the last few days  He did fall on February 21st and hit his right lower chest   Since that time he says he has remained active and has not been sedentary  · CT chest: No significant abnormality   · CXR: No acute cardiopulmonary disease  No evidence of rib fractures  · BNP is minimally elevated, but no CHF exacerbation based on chest x-ray and physical exam  · Covid 19 negative, no leukocytosis, no history of copd   · Could related to hypertensive crisis  · Was placed on 2 L nasal cannula  Check pulse ox on room air now

## 2021-03-05 NOTE — PLAN OF CARE
Problem: Potential for Falls  Goal: Patient will remain free of falls  Description: INTERVENTIONS:  - Assess patient frequently for physical needs  -  Identify cognitive and physical deficits and behaviors that affect risk of falls    -  [place] fall precautions as indicated by assessment   - Educate patient/family on patient safety including physical limitations  - Instruct patient to call for assistance with activity based on assessment  - Modify environment to reduce risk of injury  - Consider OT/PT consult to assist with strengthening/mobility  Outcome: Progressing     Problem: Prexisting or High Potential for Compromised Skin Integrity  Goal: Skin integrity is maintained or improved  Description: INTERVENTIONS:  - Identify patients at risk for skin breakdown  - Assess and monitor skin integrity  - Assess and monitor nutrition and hydration status  - Monitor labs   - Assess for incontinence   - Turn and reposition patient  - Assist with mobility/ambulation  - Relieve pressure over bony prominences  - Avoid friction and shearing  - Provide appropriate hygiene as needed including keeping skin clean and dry  - Evaluate need for skin moisturizer/barrier cream  - Collaborate with interdisciplinary team   - Patient/family teaching  - Consider wound care consult   Outcome: Progressing     Problem: PAIN - ADULT  Goal: Verbalizes/displays adequate comfort level or baseline comfort level  Description: Interventions:  - Encourage patient to monitor pain and request assistance  - Assess pain using appropriate pain scale  - Administer analgesics based on type and severity of pain and evaluate response  - Implement non-pharmacological measures as appropriate and evaluate response  - Consider cultural and social influences on pain and pain management  - Notify physician/advanced practitioner if interventions unsuccessful or patient reports new pain  Outcome: Progressing     Problem: INFECTION - ADULT  Goal: Absence or prevention of progression during hospitalization  Description: INTERVENTIONS:  - Assess and monitor for signs and symptoms of infection  - Monitor lab/diagnostic results  - Monitor all insertion sites, i e  indwelling lines, tubes, and drains  - Monitor endotracheal if appropriate and nasal secretions for changes in amount and color  - [place] appropriate cooling/warming therapies per order  - Administer medications as ordered  - Instruct and encourage patient and family to use good hand hygiene technique  - Identify and instruct in appropriate isolation precautions for identified infection/condition  Outcome: Progressing     Problem: SAFETY ADULT  Goal: Patient will remain free of falls  Description: INTERVENTIONS:  - Assess patient frequently for physical needs  -  Identify cognitive and physical deficits and behaviors that affect risk of falls    -  [place] fall precautions as indicated by assessment   - Educate patient/family on patient safety including physical limitations  - Instruct patient to call for assistance with activity based on assessment  - Modify environment to reduce risk of injury  - Consider OT/PT consult to assist with strengthening/mobility  Outcome: Progressing  Goal: Maintain or return to baseline ADL function  Description: INTERVENTIONS:  -  Assess patient's ability to carry out ADLs; assess patient's baseline for ADL function and identify physical deficits which impact ability to perform ADLs (bathing, care of mouth/teeth, toileting, grooming, dressing, etc )  - Assess/evaluate cause of self-care deficits   - Assess range of motion  - Assess patient's mobility; develop plan if impaired  - Assess patient's need for assistive devices and provide as appropriate  - Encourage maximum independence but intervene and supervise when necessary  - Involve family in performance of ADLs  - Assess for home care needs following discharge   - Consider OT consult to assist with ADL evaluation and planning for discharge  - Provide patient education as appropriate  Outcome: Progressing  Goal: Maintain or return mobility status to optimal level  Description: INTERVENTIONS:  - Assess patient's baseline mobility status (ambulation, transfers, stairs, etc )    - Identify cognitive and physical deficits and behaviors that affect mobility  - Identify mobility aids required to assist with transfers and/or ambulation (gait belt, sit-to-stand, lift, walker, cane, etc )  - [place] fall precautions as indicated by assessment  - Record patient progress and toleration of activity level on Mobility SBAR; progress patient to next Phase/Stage  - Instruct patient to call for assistance with activity based on assessment  - Consider rehabilitation consult to assist with strengthening/weightbearing, etc   Outcome: Progressing     Problem: DISCHARGE PLANNING  Goal: Discharge to home or other facility with appropriate resources  Description: INTERVENTIONS:  - Identify barriers to discharge w/patient and caregiver  - Arrange for needed discharge resources and transportation as appropriate  - Identify discharge learning needs (meds, wound care, etc )  - Arrange for interpretive services to assist at discharge as needed  - Refer to Case Management Department for coordinating discharge planning if the patient needs post-hospital services based on physician/advanced practitioner order or complex needs related to functional status, cognitive ability, or social support system  Outcome: Progressing     Problem: Knowledge Deficit  Goal: Patient/family/caregiver demonstrates understanding of disease process, treatment plan, medications, and discharge instructions  Description: Complete learning assessment and assess knowledge base    Interventions:  - Provide teaching at level of understanding  - Provide teaching via preferred learning methods  Outcome: Progressing

## 2021-03-05 NOTE — PROGRESS NOTES
Progress Note - Patel Sierra 1935, 82 y o  male MRN: [id number]    Unit/Bed#: -02 Encounter: [id number]    Primary Care Provider: Guillermo Gold DO   Date and time admitted to hospital: 3/4/2021  1:52 PM        Stage 3 chronic kidney disease  Assessment & Plan  Lab Results   Component Value Date    EGFR 41 03/05/2021    EGFR 36 03/04/2021    EGFR 37 01/07/2021    CREATININE 1 53 (H) 03/05/2021    CREATININE 1 70 (H) 03/04/2021    CREATININE 1 69 (H) 01/07/2021   · Creatinine at baseline  · Monitor daily while admitted    GERD (gastroesophageal reflux disease)  Assessment & Plan  · Controlled, continue protonix and carafate    CAD (coronary artery disease)  Assessment & Plan  · History of CAD status post CABG in the 1990's  · Follows with cardiology as an outpatient  · Continue home statin, aspirin, beta-blocker    Diabetes mellitus, type II ([facility] [address] )  Assessment & Plan  Lab Results   Component Value Date    HGBA1C 8 9 (H) 01/07/2021       Recent Labs     03/04/21 2030 03/05/21  0600   POCGLU 156* 161*       Blood Sugar Average: Last 72 hrs:  · (P) 158 5   · -continue SSI, diabetic diet, monitor glucose with meals/bedtime    Mixed hyperlipidemia  Assessment & Plan  · Continue home dose statin    Hypertensive crisis  Assessment & Plan  · Did take home meds prior to ED arrival  · Continue home dose amlodipine and propanolol   · Blood pressure is improving but not optimal  · Add hydralazine 25 mg p o  Q 8    * Shortness of breath  Assessment & Plan  · Unclear etiology  · Patient notes that he has been short of breath for years but it is worsened over the last few days  He did fall on February 21st and hit his right lower chest   Since that time he says he has remained active and has not been sedentary  · CT chest: No significant abnormality   · CXR: No acute cardiopulmonary disease  No evidence of rib fractures     · BNP is minimally elevated, but no CHF exacerbation based on chest x-ray and physical exam  · Covid 19 negative, no leukocytosis, no history of copd   · Could related to hypertensive crisis  · Was placed on 2 L nasal cannula  Check pulse ox on room air now  VTE Pharmacologic Prophylaxis:   Pharmacologic: Heparin  Mechanical VTE Prophylaxis in Place: Yes    Patient Centered Rounds: I have performed bedside rounds with nursing staff today  Education and Discussions with Family / Patient: Pt's granddaughter updated over phone  Time Spent for Care: 30 minutes  More than 50% of total time spent on counseling and coordination of care as described above  Current Length of Stay: 1 day(s)    Current Patient Status: Inpatient   Certification Statement: The patient will continue to require additional inpatient hospital stay due to elevated BP, SOB    Discharge Plan: hopefully tomorrow    Code Status: Level 1 - Full Code      Subjective:   Patient denies shortness of breath at this time  Objective:     Vitals:   Temp (24hrs), Av 5 °F (36 4 °C), Min:97 5 °F (36 4 °C), Max:97 5 °F (36 4 °C)    Temp:  [97 5 °F (36 4 °C)] 97 5 °F (36 4 °C)  HR:  [] 108  Resp:  [18-24] 18  BP: (154-193)/(77-92) 154/80  SpO2:  [91 %-98 %] 98 %  Body mass index is 28 36 kg/m²  Input and Output Summary (last 24 hours):        Intake/Output Summary (Last 24 hours) at 3/5/2021 1323  Last data filed at 3/5/2021 1239  Gross per 24 hour   Intake 480 ml   Output 940 ml   Net -460 ml       Physical Exam:     Physical Exam  Gen: NAD, AAOx3, well developed, well nourished  Eyes: EOMI, PERRLA, no scleral icterus  ENMT:   no nasal discharge, no otic discharge, moist mucous membranes  Neck:  Supple  Cardiovascular:  Regular rate and rhythm, normal S1-S2, no murmurs, rubs, or gallops  Lungs:  Clear to auscultation bilaterally, no wheezes, or rales, or rhonchi  Abdomen:  Positive bowel sounds, soft, nontender, nondistended, no palpable organomegaly   Skin:  Intact, no obvious lesions or rashes, no edema  Neuro: Cranial nerves 2-12 are intact, non-focal, 5/5 strength in all 4 extremities      Additional Data:     Labs:    Results from last 7 days   Lab Units 03/05/21  0518 03/04/21  1417   WBC Thousand/uL 9 60 9 50   HEMOGLOBIN g/dL 16 6 16 7   HEMATOCRIT % 49 0* 50 8*   PLATELETS Thousands/uL 181 206   NEUTROS PCT %  --  62   LYMPHS PCT %  --  20*   MONOS PCT %  --  9   EOS PCT %  --  8*     Results from last 7 days   Lab Units 03/05/21  0518 03/04/21  1417   SODIUM mmol/L 135 136   POTASSIUM mmol/L 4 0 4 6   CHLORIDE mmol/L 99 98   CO2 mmol/L 26 29   BUN mg/dL 24 26*   CREATININE mg/dL 1 53* 1 70*   ANION GAP mmol/L 10 9   CALCIUM mg/dL 10 4 10 8*   ALBUMIN g/dL  --  4 4   TOTAL BILIRUBIN mg/dL  --  1 00   ALK PHOS U/L  --  76   ALT U/L  --  17   AST U/L  --  17   GLUCOSE RANDOM mg/dL 177* 233*     Results from last 7 days   Lab Units 03/04/21  1417   INR  1 07     Results from last 7 days   Lab Units 03/05/21  0600 03/04/21  2030   POC GLUCOSE mg/dl 161* 156*                   * I Have Reviewed All Lab Data Listed Above  * Additional Pertinent Lab Tests Reviewed:  [facility] Admission Reviewed    Recent Cultures (last 7 days):           Last 24 Hours Medication List:   Current Facility-Administered Medications   Medication Dose Route Frequency Provider Last Rate    acetaminophen  650 mg Oral Q6H PRN Marilou Pizarro MD      amLODIPine  5 mg Oral Daily Marilou Pizarro MD      aspirin  81 mg Oral Daily Marilou Pizarro MD      atorvastatin  20 mg Oral Daily Marilou Pizarro MD      calcium carbonate  1,000 mg Oral Daily PRN Marilou Pizarro MD      clopidogrel  75 mg Oral Daily Marilou Pizarro MD      docusate sodium  100 mg Oral BID Marilou Pizarro MD      escitalopram  10 mg Oral Daily Marilou Pizarro MD      heparin (porcine)  5,000 Units Subcutaneous [address] [facility] Marilou Pizarro MD      hydrALAZINE  5 mg Intravenous Q6H PRN Marilou Pizarro MD      hydrALAZINE  25 mg Oral [facility] Jolane Both, MD Julieann Frankel hydrocodone-chlorpheniramine polistirex  5 mL Oral Q12H PRN Abhijit Landry PA-C      insulin lispro  1-5 Units Subcutaneous TID [facility] Kayla Pina MD      insulin lispro  1-5 Units Subcutaneous HS Kayla Pina MD      isosorbide mononitrate  60 mg Oral Daily Kayla Pina MD      melatonin  6 mg Oral HS Abhijit Landry PA-C      nitroglycerin  0 4 mg Sublingual Q5 Min PRN Kayla Pina MD      ondansetron  4 mg Intravenous Q6H PRN Kayla Pina MD      pantoprazole  40 mg Oral Early Morning Kayla Pina MD      propranolol  120 mg Oral Daily Kayla Pina MD      senna  1 tablet Oral Daily Kayla Pina MD      sucralfate  1 g Oral BID Kayla Pina MD          Today, Patient Was Seen By: Jordi Haddad MD    ** Please Note: Dictation voice to text software may have been used in the creation of this document   **

## 2021-03-05 NOTE — OCCUPATIONAL THERAPY NOTE
Occupational Therapy Evaluation      Aurora Aponte    3/5/2021    Patient Active Problem List   Diagnosis    Hypertensive crisis    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 infarction)    Mixed hyperlipidemia    Diabetes mellitus, type II ([facility] )    CAD (coronary artery disease)    GERD (gastroesophageal reflux disease)    Obese    Presence of aortocoronary bypass graft    Occlusion and stenosis of bilateral carotid arteries    Dyslipidemia    Stage 3 chronic kidney disease    Nephrolithiasis    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 (HCC)    Chronic tubulointerstitial nephritis    Edema    Proteinuria    Atherosclerosis    [facility]    Vitamin D deficiency    Shortness of breath       Past Medical History:   Diagnosis Date    Arthritis     Athscl 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     Stent OM1  Patent mammary graft to LAD 10/7/2009; CABG 1992 & 2005    [facility])     skin    Coronary artery disease     Diabetes mellitus ([facility] )     Diabetes mellitus, type II ([facility] )     without complication    Epistaxis     Essential (primary) hypertension     GERD (gastroesophageal reflux disease)     Hx of cardiovascular stress test 07/23/2014    Eddie MPI; EF0 52 (52%) Lexiscan, mild LV systolic dysfunction; evidence for prior inferior wall MI; perfusion imaging consistant w mild lat wall ischemia and min torin-infart inferior ischemia   / EF0 51 (51%) evidence for prior inferior wall MI  Mild inferior and mild-moderate lateral wall ischemia  7/29/15    Hx of echocardiogram 11/07/2017    2D w/CFD;EF0 55 (55%) mild LVH, mitral valve prolapse with moderate regurgitation  left atrial enlargement  Mild tricuspid regurgitation        Hypertension     Mixed hyperlipidemia     Occlusion and stenosis of bilateral carotid arteries     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 infarction)     Presence of aortocoronary bypass graft        Past Surgical History: Procedure Laterality Date    CARDIAC CATHETERIZATION  10/07/2009    Stent 99% stenosis SVG from the aorta to OM1  Patent mammary graft to the LAD    CORONARY ARTERY BYPASS GRAFT  1992    VG-CX, VG-RCA    CORONARY ARTERY BYPASS GRAFT  11/04/2005    LIMA-D1-LAD, VG-PDA-PLB, [id number] TMR 9 Lesions    GA EGD TRANSORAL BIOPSY SINGLE/MULTIPLE N/A 1/23/2019    Procedure: ESOPHAGOGASTRODUODENOSCOPY (EGD) with biopsy;  Surgeon: Kendra Woodruff MD;  Location: [address] [place] [address] GI LAB; Service: Gastroenterology    TONSILLECTOMY          03/05/21 0856   OT Last Visit   OT Visit Date 03/05/21   Note Type   Note type Evaluation   Restrictions/Precautions   Weight Bearing Precautions Per Order No   Other Precautions Fall Risk;O2;Chair Alarm   Pain Assessment   Pain Assessment Tool Pain Assessment not indicated - pt denies pain   Pain Score No Pain   Home Living   Type of Home Apartment  (2nd floor apartment )   Home Layout One level;Performs ADLs on one level; Able to live on main level with bedroom/bathroom;Stairs to enter with rails  ([address] )   Bathroom Shower/Tub Tub/shower unit   Bathroom Toilet Standard   Bathroom Equipment   (no DME at baseline )   [address]  (no AD used at baseline )   Prior Function   Level of [place] Independent with ADLs and functional mobility   Lives With Alone   Receives Help From Family   ADL Assistance Independent   IADLs Independent   Falls in the last 6 months 1 to 4  (2 falls )   Vocational Retired   Comments (-) driving, family assists with transportation    Lifestyle   Autonomy Patient reporting being independent with ADLs/IADLs, ambulatory with no AD and lives alone in a one level apartment with [address]   Reciprocal Relationships supportive daughter, son-in-law and granddaughter    Service to Others retired    [address] enjoys building [employer] 6  Modified independent   [facility] 6  Modified Independent   UB Bathing Assistance 5  Supervision/Setup   LB Bathing Assistance 4  Minimal Assistance   UB Dressing Assistance 5  Supervision/Setup   LB Dressing Assistance 4  Minimal Assistance   Toileting Assistance  5  Supervision/Setup   Bed Mobility   Supine to Sit 5  Supervision   Additional items Assist x 1;HOB elevated;Verbal cues   Sit to Supine   (DNT: pt seated OOB in chair at end of eval )   Transfers   Sit to Stand 5  Supervision   Additional items Assist x 1; Increased time required;Verbal cues   Stand to Sit 5  Supervision   Additional items Assist x 1; Increased time required;Verbal cues   Functional Mobility   Functional Mobility 5  Supervision   Additional Comments Pt ambulated to chair with no SOB  Additional items   (Pt ambulated with no AD)   Balance   Static Sitting Good   Dynamic Sitting Fair +   Static Standing Fair +   Dynamic Standing Fair   Activity Tolerance   Activity Tolerance Patient tolerated treatment well   Nurse Made Aware RN made aware of outcomes    RUE Assessment   RUE Assessment WFL  (full AROM, grossly 4/5 MMT)   LUE Assessment   LUE Assessment WFL  (full AROM, grossly 4/5 MMT)   Hand Function   Gross Motor Coordination Functional   Fine Motor Coordination Functional   Sensation   Light Touch Partial deficits in the RUE;Partial deficits in the LUE  (tingles in B hands reported )   Vision-Basic Assessment   Current Vision Wears glasses all the time   Visual History Corrective eye surgery; Cataracts   Cognition   Overall Cognitive Status WFL   Arousal/Participation Alert; Responsive; Cooperative   Attention Within functional limits   Orientation Level Oriented X4   Memory Decreased short term memory   Following Commands Follows one step commands without difficulty   Comments Mini-Cog assessment performed today  Version 1 was given  Patient able to recall 1/3 words  Patient able to draw a normal clock with the incorrect time    Total score of test was 2/5 indicating  further screening of cognition is recommended  Cognition Assessment Tools   (Mini-cog )   Score 2   Assessment   Limitation Decreased ADL status; Decreased UE strength;Decreased Safe judgement during ADL;Decreased cognition;Decreased endurance;Decreased sensation;Decreased self-care trans;Decreased high-level ADLs   Prognosis Good   Assessment Patient is a 82 y o  male seen for OT evaluation s/p admit to [address] [facility]  on 3/4/2021 w/Shortness of breath  Commorbidities affecting patient's functional performance at time of assessment include: CAD, DM2, GERD, hypertensive crisis, HLD and CKD  Orders placed for OT evaluation and treatment and activity as tolerated   Performed at least two patient identifiers during session including name and wristband  Prior to admission, Patient reporting being independent with ADLs/IADLs, ambulatory with no AD and lives alone in a one level apartment with [address]  Personal factors affecting patient at time of initial evaluation include: limited caregiver support, steps to enter, limited insight into deficits and difficulty performing IADLs  Upon evaluation, patient requires supervision assist for UB ADLs, minimal  assist for LB ADLs, transfers and functional ambulation in room and bathroom with supervision assist, with the use of no AD  Patient is oriented x 4 and presents with limited ability to recall information after a brief period of time (short term memory) / working memory   Occupational performance is affected by the following deficits: decreased muscle strength, dynamic sit/ stand balance deficit with poor standing tolerance time for self care and functional mobility, decreased activity tolerance, impaired sensation, impaired memory, impaired problem solving, decreased safety awareness and delayed righting and equilibrium reactions   Patient to benefit from continued Occupational Therapy treatment while in the hospital to address deficits as defined above and maximize level of functional independence with ADLs and functional mobility  Occupational Performance areas to address include: grooming , bathing/ shower, dressing, toilet hygiene, transfer to all surfaces, functional ambulation, functional mobility, medication routine/ management, IADLS: Household maintenance, IADLs: safety procedures and IADLs: meal prep/ clean up  From OT standpoint, recommendation at time of d/c would be Home with skilled therapy  Goals   Patient Goals to go home soon   Plan   Treatment Interventions ADL retraining;Functional transfer training;UE strengthening/ROM; Endurance training;Patient/family training; Compensatory technique education; Activityengagement;Cognitive reorientation   Goal Expiration Date 03/15/21   OT Treatment Day 0   OT Frequency 3-5x/wk   Recommendation   OT Discharge Recommendation Home with skilled therapy   OT - OK to Discharge Yes  (Once medically cleared )   Additional Comments  The patient's raw score on the AM-PAC Daily Activity inpatient short form is 20, standardized score is 42 03, greater than 39 4  Patients at this level are likely to benefit from discharge to home  Please refer to the recommendation of the Occupational Therapist for safe discharge planning     AM-PAC Daily Activity Inpatient   Lower Body Dressing 3   Bathing 3   Toileting 3   Upper Body Dressing 3   Grooming 4   Eating 4   Daily Activity Raw Score 20   Daily Activity Standardized Score (Calc for Raw Score >=11) 42 03   AM-PAC Applied Cognition Inpatient   Following a Speech/Presentation 3   Understanding Ordinary Conversation 3   Taking Medications 2   Remembering Where Things Are Placed or Put Away 3   Remembering List of 4-5 Errands 2   Taking Care of Complicated Tasks 3   Applied Cognition Raw Score 16   Applied Cognition Standardized Score 35 03     GOALS:    *ADL transfers with (I) for inc'd independence with ADLs/purposeful tasks    *UB ADL with (I) for inc'd independence with self cares    *LB ADL with (I) using AE prn for inc'd independence with self cares    *Toileting with (I) for clothing management and hygiene for return to PLOF with personal care    *Increase stand tolerance x5 m for inc'd tolerance with standing purposeful tasks    *Participate in 10m UE therex to increase overall stamina/activity tolerance for purposeful tasks    *Bed mobility- (I) for inc'd independence to manage own comfort and initiate EOB & OOB purposeful tasks    *Patient will verbalize 3 safety awareness/ principles to prevent falls in the home setting  *Patient will verbalize and demonstrate use of energy conservation/deep breathing techniques and work simplification skills during functional activities with no verbal cues  *Patient will increase OOB/sitting tolerance to 2-4 hours per day to increase participation in self-care and leisure tasks with no s/s of exertion  *Patient will engage in ongoing cognitive assessment to assist with safe discharge planning/recommendations         Des Ferrera MS, OTR/L

## 2021-03-05 NOTE — PLAN OF CARE
Problem: OCCUPATIONAL THERAPY ADULT  Goal: Performs self-care activities at highest level of function for planned discharge setting  See evaluation for individualized goals  Description: Treatment Interventions: ADL retraining, Functional transfer training, UE strengthening/ROM, Endurance training, Patient/family training, Compensatory technique education, Activityengagement, Cognitive reorientation          See flowsheet documentation for full assessment, interventions and recommendations  Note: Limitation: Decreased ADL status, Decreased UE strength, Decreased Safe judgement during ADL, Decreased cognition, Decreased endurance, Decreased sensation, Decreased self-care trans, Decreased high-level ADLs  Prognosis: Good  Assessment: Patient is a 82 y o  male seen for OT evaluation s/p admit to [address] [facility]  on 3/4/2021 w/Shortness of breath  Commorbidities affecting patient's functional performance at time of assessment include: CAD, DM2, GERD, hypertensive crisis, HLD and CKD  Orders placed for OT evaluation and treatment and activity as tolerated   Performed at least two patient identifiers during session including name and wristband  Prior to admission, Patient reporting being independent with ADLs/IADLs, ambulatory with no AD and lives alone in a one level apartment with [address]  Personal factors affecting patient at time of initial evaluation include: limited caregiver support, steps to enter, limited insight into deficits and difficulty performing IADLs  Upon evaluation, patient requires supervision assist for UB ADLs, minimal  assist for LB ADLs, transfers and functional ambulation in room and bathroom with supervision assist, with the use of no AD  Patient is oriented x 4 and presents with limited ability to recall information after a brief period of time (short term memory) / working memory     Occupational performance is affected by the following deficits: decreased muscle strength, dynamic sit/ stand balance deficit with poor standing tolerance time for self care and functional mobility, decreased activity tolerance, impaired sensation, impaired memory, impaired problem solving, decreased safety awareness and delayed righting and equilibrium reactions  Patient to benefit from continued Occupational Therapy treatment while in the hospital to address deficits as defined above and maximize level of functional independence with ADLs and functional mobility  Occupational Performance areas to address include: grooming , bathing/ shower, dressing, toilet hygiene, transfer to all surfaces, functional ambulation, functional mobility, medication routine/ management, IADLS: Household maintenance, IADLs: safety procedures and IADLs: meal prep/ clean up  From OT standpoint, recommendation at time of d/c would be Home with skilled therapy         OT Discharge Recommendation: Home with skilled therapy  OT - OK to Discharge: Yes(Once medically cleared )     Erik Baez OT

## 2021-03-05 NOTE — ASSESSMENT & PLAN NOTE
· History of CAD status post CABG in the 1990's  · Follows with cardiology as an outpatient  · Continue home statin, aspirin, beta-blocker

## 2021-03-05 NOTE — PLAN OF CARE
Problem: PHYSICAL THERAPY ADULT  Goal: Performs mobility at highest level of function for planned discharge setting  See evaluation for individualized goals  Description: Treatment/Interventions: ADL retraining, Functional transfer training, LE strengthening/ROM, Therapeutic exercise, Endurance training, Patient/family training, Equipment eval/education, Bed mobility, Gait training, Spoke to nursing, OT, Spoke to case management          See flowsheet documentation for full assessment, interventions and recommendations  Note: Prognosis: Good  Problem List: Decreased strength, Impaired balance, Decreased mobility, Decreased safety awareness, Pain  Assessment: Pt is 82 y o  male seen for PT evaluation s/p admit to [address] [place] [address] on 3/4/2021 w/ Shortness of breath  PT consulted to assess pt's functional mobility and d/c needs  Order placed for PT eval and tx, w/ activity as tolerated order  Comorbidities affecting pt's physical performance at time of assessment include: weakness,SOB, DM,GERD,hypertensive crisis,CKD, hyperlipidemia   PTA, pt was independent w/ all functional mobility w/ o AD usage  Personal factors affecting pt at time of IE include: stairs to enter home, inability to ambulate household distances, inability to navigate community distances, unable to perform dynamic tasks in community, positive fall history, limited insight into impairments, inability to perform IADLs and inability to perform ADLs  Please find objective findings from PT assessment regarding body systems outlined above with impairments and limitations including weakness, impaired balance, decreased endurance, gait deviations, pain, decreased activity tolerance, decreased functional mobility tolerance, decreased safety awareness and fall risk  The following objective measures performed on IE also reveal limitations: AM-PAC 6-Clicks: 36/56   Pt's clinical presentation is currently unstable/unpredictable seen in pt's presentation of pain, abnormal lab values, supplemental O2 usage requirement, progressive symptoms prior to hospitalization  Pt to benefit from continued PT tx to address deficits as defined above and maximize level of functional independent mobility and consistency  From PT/mobility standpoint, recommendation at time of d/c would be Home PT with family support pending progress in order to facilitate return to PLOF  PT Discharge Recommendation: Home with skilled therapy, Return to previous environment with social support     PT - OK to Discharge: No    See flowsheet documentation for full assessment

## 2021-03-06 VITALS
RESPIRATION RATE: 18 BRPM | HEART RATE: 98 BPM | DIASTOLIC BLOOD PRESSURE: 77 MMHG | BODY MASS INDEX: 28.27 KG/M2 | WEIGHT: 175.93 LBS | SYSTOLIC BLOOD PRESSURE: 149 MMHG | HEIGHT: 66 IN | TEMPERATURE: 96.9 F | OXYGEN SATURATION: 92 %

## 2021-03-06 LAB
GLUCOSE SERPL-MCNC: 142 MG/DL (ref 65–140)
GLUCOSE SERPL-MCNC: 142 MG/DL (ref 65–140)

## 2021-03-06 PROCEDURE — 99239 HOSP IP/OBS DSCHRG MGMT >30: CPT | Performed by: STUDENT IN AN ORGANIZED HEALTH CARE EDUCATION/TRAINING PROGRAM

## 2021-03-06 PROCEDURE — 82948 REAGENT STRIP/BLOOD GLUCOSE: CPT

## 2021-03-06 RX ORDER — AMLODIPINE BESYLATE 10 MG/1
10 TABLET ORAL DAILY
Qty: 30 TABLET | Refills: 0 | Status: ON HOLD | OUTPATIENT
Start: 2021-03-06 | End: 2022-02-22 | Stop reason: SDUPTHER

## 2021-03-06 RX ORDER — ISOSORBIDE MONONITRATE 60 MG/1
120 TABLET, EXTENDED RELEASE ORAL DAILY
Qty: 60 TABLET | Refills: 0 | Status: ON HOLD | OUTPATIENT
Start: 2021-03-06 | End: 2022-02-22 | Stop reason: SDUPTHER

## 2021-03-06 RX ADMIN — ESCITALOPRAM OXALATE 10 MG: 10 TABLET ORAL at 09:34

## 2021-03-06 RX ADMIN — SUCRALFATE 1 G: 1 TABLET ORAL at 09:34

## 2021-03-06 RX ADMIN — PANTOPRAZOLE SODIUM 40 MG: 40 TABLET, DELAYED RELEASE ORAL at 05:15

## 2021-03-06 RX ADMIN — HEPARIN SODIUM 5000 UNITS: 5000 INJECTION INTRAVENOUS; SUBCUTANEOUS at 05:15

## 2021-03-06 RX ADMIN — ATORVASTATIN CALCIUM 20 MG: 20 TABLET, FILM COATED ORAL at 09:34

## 2021-03-06 RX ADMIN — CLOPIDOGREL BISULFATE 75 MG: 75 TABLET ORAL at 09:34

## 2021-03-06 RX ADMIN — HYDRALAZINE HYDROCHLORIDE 25 MG: 25 TABLET, FILM COATED ORAL at 05:15

## 2021-03-06 RX ADMIN — PROPRANOLOL HYDROCHLORIDE 120 MG: 60 CAPSULE, EXTENDED RELEASE ORAL at 09:34

## 2021-03-06 RX ADMIN — AMLODIPINE BESYLATE 5 MG: 5 TABLET ORAL at 09:34

## 2021-03-06 RX ADMIN — ISOSORBIDE MONONITRATE 60 MG: 30 TABLET, EXTENDED RELEASE ORAL at 09:34

## 2021-03-06 NOTE — ASSESSMENT & PLAN NOTE
Lab Results   Component Value Date    HGBA1C 8 9 (H) 01/07/2021       Recent Labs     03/05/21  2100 03/05/21  2118 03/06/21  0532 03/06/21  0533   POCGLU 162*  162* 162* 142* 142*       Blood Sugar Average: Last 72 hrs:  · (P) 244 [id number]     Continue home dose of glimepiride 1 mg daily

## 2021-03-06 NOTE — ASSESSMENT & PLAN NOTE
· Patient notes that he has been short of breath for years but it is worsened over the last few days  He did fall on February 21st and hit his right lower chest   Since that time he says he has remained active and has not been sedentary  · CT chest: No significant abnormality   · CXR: No acute cardiopulmonary disease  No evidence of rib fractures     · BNP is minimally elevated, but no CHF exacerbation based on chest x-ray and physical exam  · Covid 19 negative, no leukocytosis, no history of copd   · Likely related to hypertensive crisis, now resolved  · Was placed on 2 L nasal cannula but now on room air

## 2021-03-06 NOTE — DISCHARGE SUMMARY
Discharge- Nicci Schultz 1935, 82 y o  male MRN: [id number]    Unit/Bed#: -01 Encounter: [id number]    Primary Care Provider: Malorie Cardoso DO   Date and time admitted to hospital: 3/4/2021  1:52 PM        Stage 3 chronic kidney disease  Assessment & Plan  Lab Results   Component Value Date    EGFR 41 03/05/2021    EGFR 36 03/04/2021    EGFR 37 01/07/2021    CREATININE 1 53 (H) 03/05/2021    CREATININE 1 70 (H) 03/04/2021    CREATININE 1 69 (H) 01/07/2021   · Creatinine at baseline      GERD (gastroesophageal reflux disease)  Assessment & Plan  · Controlled, continue protonix and carafate    CAD (coronary artery disease)  Assessment & Plan  · History of CAD status post CABG in the 1990's  · Follows with cardiology as an outpatient  · Continue home statin, aspirin, beta-blocker    Diabetes mellitus, type II [facility])  Assessment & Plan  Lab Results   Component Value Date    HGBA1C 8 9 (H) 01/07/2021       Recent Labs     03/05/21  2100 03/05/21  2118 03/06/21  0532 03/06/21  0533   POCGLU 162*  162* 162* 142* 142*       Blood Sugar Average: Last 72 hrs:  · (P) 851 [id number]     Continue home dose of glimepiride 1 mg daily    Mixed hyperlipidemia  Assessment & Plan  · Continue home dose statin    Hypertensive crisis  Assessment & Plan  · Did take home meds prior to ED arrival  · Continue home dose amlodipine and propanolol   · Blood pressure improved, will discharge to increased dose of amlodipine to 10 mg, continue propanolol at current dosage and increased Imdur from  mg daily    * Shortness of breath  Assessment & Plan    · Patient notes that he has been short of breath for years but it is worsened over the last few days  He did fall on February 21st and hit his right lower chest   Since that time he says he has remained active and has not been sedentary  · CT chest: No significant abnormality   · CXR: No acute cardiopulmonary disease  No evidence of rib fractures     · BNP is minimally elevated, but no CHF exacerbation based on chest x-ray and physical exam  · Covid 19 negative, no leukocytosis, no history of copd   · Likely related to hypertensive crisis, now resolved  · Was placed on 2 L nasal cannula but now on room air              Discharging Physician / Practitioner: Chester Villasenor MD  PCP: Jenniffer Resendez DO  Admission Date:   Admission Orders (From admission, onward)     Ordered        03/04/21 1548  Inpatient Admission  Once                   Discharge Date: 03/06/21    Resolved Problems  Date Reviewed: 3/6/2021    None          Consultations During Hospital Stay:  · None    Procedures Performed:   · None    Significant Findings / Test Results:   Xr Ribs With Pa Chest Min 3 Views Right    Result Date: 3/4/2021  Impression: No acute cardiopulmonary disease  No evidence of rib fractures  Workstation performed: [id number]     Ct Chest Wo Contrast    Result Date: 3/5/2021  · Impression: No significant abnormality Workstation performed: [id number]     Incidental Findings:   · None     Test Results Pending at Discharge (will require follow up): · None     Outpatient Tests Requested:  · None    Complications:  None    Reason for Admission: SOB    Hospital Course:     Shmuel Singh is a 82 y o  male patient who originally presented to the hospital on 3/4/2021 due to shortness of breath  Patient was evaluated for his breathing with CT imaging and x-ray which was negative for any acute findings  He was not in heart failure are as history of COPD  His blood pressure was significantly elevated upon presentation in the ED  Patient blood pressure was treated and his symptoms improved  Patient was discharged to taking increased dose of amlodipine 10 mg up from 5 mg  Continue his propanolol at current dose and increase dose of Imdur from 60 to 120 mg  Follow with PCP in 1-2 weeks with blood pressure check to consider further titration of blood pressure medications      Please see above list of diagnoses and related plan for additional information  Condition at Discharge: fair     Discharge Day Visit / Exam:     Subjective:  Patient today reports doing well, denies SOB  Requesting to go home today  Vitals: Blood Pressure: 149/77 (03/06/21 0707)  Pulse: 98 (03/06/21 0707)  Temperature: (!) 96 9 °F (36 1 °C) (03/06/21 0707)  Temp Source: Temporal (03/06/21 0707)  Respirations: 18 (03/06/21 0707)  Height: 5' 6" (167 6 cm) (03/04/21 1353)  Weight - Scale: 79 8 kg (175 lb 14 8 oz) (03/06/21 0600)  SpO2: 92 % (03/06/21 0707)  Exam:   Physical Exam  Vitals signs and nursing note reviewed  HENT:      Head: Normocephalic and atraumatic  Eyes:      Conjunctiva/sclera: Conjunctivae normal       Pupils: Pupils are equal, round, and reactive to light  Cardiovascular:      Rate and Rhythm: Normal rate and regular rhythm  Heart sounds: Normal heart sounds  Pulmonary:      Breath sounds: Normal breath sounds  No wheezing or rhonchi  Abdominal:      General: Bowel sounds are normal  There is no distension  Palpations: Abdomen is soft  Tenderness: There is no abdominal tenderness  Musculoskeletal: Normal range of motion  General: No swelling  Skin:     General: Skin is warm and dry  Neurological:      General: No focal deficit present  Mental Status: He is alert  Discussion with Family: Patient, left VM for daughter    Discharge instructions/Information to patient and family:   See after visit summary for information provided to patient and family  Provisions for Follow-Up Care:  See after visit summary for information related to follow-up care and any pertinent home health orders        Disposition:     Home with [facility] Services (Reminder: Complete face to face encounter)    For Discharges to [facility]:   · Not Applicable to this Patient - Not Applicable to this Patient    Planned Readmission: None     Discharge Statement:  I spent 35 minutes discharging the patient  This time was spent on the day of discharge  I had direct contact with the patient on the day of discharge  Greater than 50% of the total time was spent examining patient, answering all patient questions, arranging and discussing plan of care with patient as well as directly providing post-discharge instructions  Additional time then spent on discharge activities  Discharge Medications:  See after visit summary for reconciled discharge medications provided to patient and family        ** Please Note: This note has been constructed using a voice recognition system **

## 2021-03-06 NOTE — ASSESSMENT & PLAN NOTE
· Did take home meds prior to ED arrival  · Continue home dose amlodipine and propanolol   · Blood pressure improved, will discharge to increased dose of amlodipine to 10 mg, continue propanolol at current dosage and increased Imdur from  mg daily

## 2021-03-06 NOTE — PLAN OF CARE
Problem: Potential for Falls  Goal: Patient will remain free of falls  Description: INTERVENTIONS:  - Assess patient frequently for physical needs  -  Identify cognitive and physical deficits and behaviors that affect risk of falls    -  [place] fall precautions as indicated by assessment   - Educate patient/family on patient safety including physical limitations  - Instruct patient to call for assistance with activity based on assessment  - Modify environment to reduce risk of injury  - Consider OT/PT consult to assist with strengthening/mobility  Outcome: Progressing     Problem: Prexisting or High Potential for Compromised Skin Integrity  Goal: Skin integrity is maintained or improved  Description: INTERVENTIONS:  - Identify patients at risk for skin breakdown  - Assess and monitor skin integrity  - Assess and monitor nutrition and hydration status  - Monitor labs   - Assess for incontinence   - Turn and reposition patient  - Assist with mobility/ambulation  - Relieve pressure over bony prominences  - Avoid friction and shearing  - Provide appropriate hygiene as needed including keeping skin clean and dry  - Evaluate need for skin moisturizer/barrier cream  - Collaborate with interdisciplinary team   - Patient/family teaching  - Consider wound care consult   Outcome: Progressing     Problem: PAIN - ADULT  Goal: Verbalizes/displays adequate comfort level or baseline comfort level  Description: Interventions:  - Encourage patient to monitor pain and request assistance  - Assess pain using appropriate pain scale  - Administer analgesics based on type and severity of pain and evaluate response  - Implement non-pharmacological measures as appropriate and evaluate response  - Consider cultural and social influences on pain and pain management  - Notify physician/advanced practitioner if interventions unsuccessful or patient reports new pain  Outcome: Progressing     Problem: INFECTION - ADULT  Goal: Absence or prevention of progression during hospitalization  Description: INTERVENTIONS:  - Assess and monitor for signs and symptoms of infection  - Monitor lab/diagnostic results  - Monitor all insertion sites, i e  indwelling lines, tubes, and drains  - Monitor endotracheal if appropriate and nasal secretions for changes in amount and color  - [place] appropriate cooling/warming therapies per order  - Administer medications as ordered  - Instruct and encourage patient and family to use good hand hygiene technique  - Identify and instruct in appropriate isolation precautions for identified infection/condition  Outcome: Progressing     Problem: SAFETY ADULT  Goal: Patient will remain free of falls  Description: INTERVENTIONS:  - Assess patient frequently for physical needs  -  Identify cognitive and physical deficits and behaviors that affect risk of falls    -  [place] fall precautions as indicated by assessment   - Educate patient/family on patient safety including physical limitations  - Instruct patient to call for assistance with activity based on assessment  - Modify environment to reduce risk of injury  - Consider OT/PT consult to assist with strengthening/mobility  Outcome: Progressing  Goal: Maintain or return to baseline ADL function  Description: INTERVENTIONS:  -  Assess patient's ability to carry out ADLs; assess patient's baseline for ADL function and identify physical deficits which impact ability to perform ADLs (bathing, care of mouth/teeth, toileting, grooming, dressing, etc )  - Assess/evaluate cause of self-care deficits   - Assess range of motion  - Assess patient's mobility; develop plan if impaired  - Assess patient's need for assistive devices and provide as appropriate  - Encourage maximum independence but intervene and supervise when necessary  - Involve family in performance of ADLs  - Assess for home care needs following discharge   - Consider OT consult to assist with ADL evaluation and planning for discharge  - Provide patient education as appropriate  Outcome: Progressing  Goal: Maintain or return mobility status to optimal level  Description: INTERVENTIONS:  - Assess patient's baseline mobility status (ambulation, transfers, stairs, etc )    - Identify cognitive and physical deficits and behaviors that affect mobility  - Identify mobility aids required to assist with transfers and/or ambulation (gait belt, sit-to-stand, lift, walker, cane, etc )  - [place] fall precautions as indicated by assessment  - Record patient progress and toleration of activity level on Mobility SBAR; progress patient to next Phase/Stage  - Instruct patient to call for assistance with activity based on assessment  - Consider rehabilitation consult to assist with strengthening/weightbearing, etc   Outcome: Progressing     Problem: DISCHARGE PLANNING  Goal: Discharge to home or other facility with appropriate resources  Description: INTERVENTIONS:  - Identify barriers to discharge w/patient and caregiver  - Arrange for needed discharge resources and transportation as appropriate  - Identify discharge learning needs (meds, wound care, etc )  - Arrange for interpretive services to assist at discharge as needed  - Refer to Case Management Department for coordinating discharge planning if the patient needs post-hospital services based on physician/advanced practitioner order or complex needs related to functional status, cognitive ability, or social support system  Outcome: Progressing     Problem: Knowledge Deficit  Goal: Patient/family/caregiver demonstrates understanding of disease process, treatment plan, medications, and discharge instructions  Description: Complete learning assessment and assess knowledge base    Interventions:  - Provide teaching at level of understanding  - Provide teaching via preferred learning methods  Outcome: Progressing

## 2021-03-06 NOTE — ASSESSMENT & PLAN NOTE
Lab Results   Component Value Date    EGFR 41 03/05/2021    EGFR 36 03/04/2021    EGFR 37 01/07/2021    CREATININE 1 53 (H) 03/05/2021    CREATININE 1 70 (H) 03/04/2021    CREATININE 1 69 (H) 01/07/2021   · Creatinine at baseline

## 2021-03-06 NOTE — CASE MANAGEMENT
Cm met with the pt and he was buddyne the [facility] to call to see if they can help him get some cleaning services for him, pt was accepted by [facility], pt was made aware the rn will call him with the time the nurse will arrive, pt called his daughter and she transported the pt home, pt denied any additional d/c need, pt is in agreement with the d/c an dd/c plan home with [facility]

## 2021-03-09 DIAGNOSIS — Z23 ENCOUNTER FOR IMMUNIZATION: ICD-10-CM

## 2021-03-25 ENCOUNTER — HOSPITAL ENCOUNTER (INPATIENT)
Facility: HOSPITAL | Age: 86
LOS: 1 days | Discharge: HOME HEALTH | DRG: 303 | End: 2021-03-26
Attending: EMERGENCY MEDICINE | Admitting: INTERNAL MEDICINE
Payer: MEDICARE

## 2021-03-25 ENCOUNTER — APPOINTMENT (EMERGENCY)
Dept: RADIOLOGY | Facility: HOSPITAL | Age: 86
DRG: 303 | End: 2021-03-25
Payer: MEDICARE

## 2021-03-25 DIAGNOSIS — N18.30 CKD (CHRONIC KIDNEY DISEASE) STAGE 3, GFR 30-59 ML/MIN (HCC): ICD-10-CM

## 2021-03-25 DIAGNOSIS — I25.10 CAD (CORONARY ARTERY DISEASE): ICD-10-CM

## 2021-03-25 DIAGNOSIS — E11.65 HYPERGLYCEMIA DUE TO DIABETES MELLITUS (HCC): ICD-10-CM

## 2021-03-25 DIAGNOSIS — I20.9 ANGINAL SYNDROME (HCC): ICD-10-CM

## 2021-03-25 DIAGNOSIS — R06.00 DYSPNEA: Primary | ICD-10-CM

## 2021-03-25 PROBLEM — I65.29 CAROTID ARTERY STENOSIS: Status: ACTIVE | Noted: 2021-03-25

## 2021-03-25 LAB
ALBUMIN SERPL BCP-MCNC: 3.8 G/DL (ref 3.5–5)
ALP SERPL-CCNC: 85 U/L (ref 46–116)
ALT SERPL W P-5'-P-CCNC: 35 U/L (ref 12–78)
ANION GAP SERPL CALCULATED.3IONS-SCNC: 10 MMOL/L (ref 4–13)
APTT PPP: 23 SECONDS (ref 23–37)
AST SERPL W P-5'-P-CCNC: 30 U/L (ref 5–45)
BASOPHILS # BLD AUTO: 0.12 THOUSANDS/ΜL (ref 0–0.1)
BASOPHILS NFR BLD AUTO: 1 % (ref 0–1)
BILIRUB SERPL-MCNC: 1.2 MG/DL (ref 0.2–1)
BUN SERPL-MCNC: 27 MG/DL (ref 5–25)
CALCIUM SERPL-MCNC: 9.9 MG/DL (ref 8.3–10.1)
CHLORIDE SERPL-SCNC: 100 MMOL/L (ref 100–108)
CO2 SERPL-SCNC: 26 MMOL/L (ref 21–32)
CREAT SERPL-MCNC: 1.9 MG/DL (ref 0.6–1.3)
D DIMER PPP FEU-MCNC: 0.75 UG/ML FEU
EOSINOPHIL # BLD AUTO: 0.65 THOUSAND/ΜL (ref 0–0.61)
EOSINOPHIL NFR BLD AUTO: 7 % (ref 0–6)
ERYTHROCYTE [DISTWIDTH] IN BLOOD BY AUTOMATED COUNT: 12.7 % (ref 11.6–15.1)
FLUAV RNA RESP QL NAA+PROBE: NEGATIVE
FLUBV RNA RESP QL NAA+PROBE: NEGATIVE
GFR SERPL CREATININE-BSD FRML MDRD: 32 ML/MIN/1.73SQ M
GLUCOSE SERPL-MCNC: 202 MG/DL (ref 65–140)
GLUCOSE SERPL-MCNC: 217 MG/DL (ref 65–140)
GLUCOSE SERPL-MCNC: 349 MG/DL (ref 65–140)
HCT VFR BLD AUTO: 50.2 % (ref 36.5–49.3)
HGB BLD-MCNC: 16.3 G/DL (ref 12–17)
IMM GRANULOCYTES # BLD AUTO: 0.05 THOUSAND/UL (ref 0–0.2)
IMM GRANULOCYTES NFR BLD AUTO: 1 % (ref 0–2)
INR PPP: 1.06 (ref 0.84–1.19)
LYMPHOCYTES # BLD AUTO: 1.67 THOUSANDS/ΜL (ref 0.6–4.47)
LYMPHOCYTES NFR BLD AUTO: 17 % (ref 14–44)
MAGNESIUM SERPL-MCNC: 2.2 MG/DL (ref 1.6–2.6)
MCH RBC QN AUTO: 28 PG (ref 26.8–34.3)
MCHC RBC AUTO-ENTMCNC: 32.5 G/DL (ref 31.4–37.4)
MCV RBC AUTO: 86 FL (ref 82–98)
MONOCYTES # BLD AUTO: 0.68 THOUSAND/ΜL (ref 0.17–1.22)
MONOCYTES NFR BLD AUTO: 7 % (ref 4–12)
NEUTROPHILS # BLD AUTO: 6.58 THOUSANDS/ΜL (ref 1.85–7.62)
NEUTS SEG NFR BLD AUTO: 67 % (ref 43–75)
NRBC BLD AUTO-RTO: 0 /100 WBCS
NT-PROBNP SERPL-MCNC: 644 PG/ML
PLATELET # BLD AUTO: 219 THOUSANDS/UL (ref 149–390)
PLATELET # BLD AUTO: 237 THOUSANDS/UL (ref 149–390)
PMV BLD AUTO: 8.8 FL (ref 8.9–12.7)
PMV BLD AUTO: 9.2 FL (ref 8.9–12.7)
POTASSIUM SERPL-SCNC: 4.6 MMOL/L (ref 3.5–5.3)
PROT SERPL-MCNC: 8 G/DL (ref 6.4–8.2)
PROTHROMBIN TIME: 13.6 SECONDS (ref 11.6–14.5)
RBC # BLD AUTO: 5.83 MILLION/UL (ref 3.88–5.62)
RSV RNA RESP QL NAA+PROBE: NEGATIVE
SARS-COV-2 RNA RESP QL NAA+PROBE: NEGATIVE
SODIUM SERPL-SCNC: 136 MMOL/L (ref 136–145)
TROPONIN I SERPL-MCNC: 0.02 NG/ML
TROPONIN I SERPL-MCNC: <0.02 NG/ML
TROPONIN I SERPL-MCNC: <0.02 NG/ML
WBC # BLD AUTO: 9.75 THOUSAND/UL (ref 4.31–10.16)

## 2021-03-25 PROCEDURE — 71045 X-RAY EXAM CHEST 1 VIEW: CPT

## 2021-03-25 PROCEDURE — 99285 EMERGENCY DEPT VISIT HI MDM: CPT

## 2021-03-25 PROCEDURE — 85730 THROMBOPLASTIN TIME PARTIAL: CPT | Performed by: PHYSICIAN ASSISTANT

## 2021-03-25 PROCEDURE — 82948 REAGENT STRIP/BLOOD GLUCOSE: CPT

## 2021-03-25 PROCEDURE — 99223 1ST HOSP IP/OBS HIGH 75: CPT | Performed by: INTERNAL MEDICINE

## 2021-03-25 PROCEDURE — 80053 COMPREHEN METABOLIC PANEL: CPT | Performed by: PHYSICIAN ASSISTANT

## 2021-03-25 PROCEDURE — 84484 ASSAY OF TROPONIN QUANT: CPT | Performed by: INTERNAL MEDICINE

## 2021-03-25 PROCEDURE — 85049 AUTOMATED PLATELET COUNT: CPT | Performed by: INTERNAL MEDICINE

## 2021-03-25 PROCEDURE — 36415 COLL VENOUS BLD VENIPUNCTURE: CPT | Performed by: PHYSICIAN ASSISTANT

## 2021-03-25 PROCEDURE — 83735 ASSAY OF MAGNESIUM: CPT | Performed by: PHYSICIAN ASSISTANT

## 2021-03-25 PROCEDURE — 94664 DEMO&/EVAL PT USE INHALER: CPT

## 2021-03-25 PROCEDURE — 0241U HB NFCT DS VIR RESP RNA 4 TRGT: CPT | Performed by: PHYSICIAN ASSISTANT

## 2021-03-25 PROCEDURE — 83880 ASSAY OF NATRIURETIC PEPTIDE: CPT | Performed by: PHYSICIAN ASSISTANT

## 2021-03-25 PROCEDURE — 93005 ELECTROCARDIOGRAM TRACING: CPT

## 2021-03-25 PROCEDURE — 85025 COMPLETE CBC W/AUTO DIFF WBC: CPT | Performed by: PHYSICIAN ASSISTANT

## 2021-03-25 PROCEDURE — 84484 ASSAY OF TROPONIN QUANT: CPT | Performed by: PHYSICIAN ASSISTANT

## 2021-03-25 PROCEDURE — 85379 FIBRIN DEGRADATION QUANT: CPT | Performed by: PHYSICIAN ASSISTANT

## 2021-03-25 PROCEDURE — 99285 EMERGENCY DEPT VISIT HI MDM: CPT | Performed by: PHYSICIAN ASSISTANT

## 2021-03-25 PROCEDURE — 85610 PROTHROMBIN TIME: CPT | Performed by: PHYSICIAN ASSISTANT

## 2021-03-25 RX ORDER — HEPARIN SODIUM 5000 [USP'U]/ML
5000 INJECTION, SOLUTION INTRAVENOUS; SUBCUTANEOUS EVERY 8 HOURS SCHEDULED
Status: DISCONTINUED | OUTPATIENT
Start: 2021-03-25 | End: 2021-03-26 | Stop reason: HOSPADM

## 2021-03-25 RX ORDER — CLOPIDOGREL BISULFATE 75 MG/1
75 TABLET ORAL DAILY
Status: DISCONTINUED | OUTPATIENT
Start: 2021-03-26 | End: 2021-03-26 | Stop reason: HOSPADM

## 2021-03-25 RX ORDER — PROPRANOLOL HCL 60 MG
120 CAPSULE, EXTENDED RELEASE 24HR ORAL DAILY
Status: DISCONTINUED | OUTPATIENT
Start: 2021-03-26 | End: 2021-03-26 | Stop reason: HOSPADM

## 2021-03-25 RX ORDER — PANTOPRAZOLE SODIUM 20 MG/1
20 TABLET, DELAYED RELEASE ORAL
Status: DISCONTINUED | OUTPATIENT
Start: 2021-03-26 | End: 2021-03-26 | Stop reason: HOSPADM

## 2021-03-25 RX ORDER — ATORVASTATIN CALCIUM 40 MG/1
40 TABLET, FILM COATED ORAL EVERY EVENING
Status: DISCONTINUED | OUTPATIENT
Start: 2021-03-26 | End: 2021-03-26 | Stop reason: HOSPADM

## 2021-03-25 RX ORDER — NITROGLYCERIN 0.4 MG/1
0.4 TABLET SUBLINGUAL
Status: DISCONTINUED | OUTPATIENT
Start: 2021-03-25 | End: 2021-03-26 | Stop reason: HOSPADM

## 2021-03-25 RX ORDER — ESCITALOPRAM OXALATE 10 MG/1
10 TABLET ORAL DAILY
Status: DISCONTINUED | OUTPATIENT
Start: 2021-03-26 | End: 2021-03-26 | Stop reason: HOSPADM

## 2021-03-25 RX ORDER — ALBUTEROL SULFATE 2.5 MG/3ML
2.5 SOLUTION RESPIRATORY (INHALATION) EVERY 6 HOURS PRN
Status: DISCONTINUED | OUTPATIENT
Start: 2021-03-25 | End: 2021-03-26 | Stop reason: HOSPADM

## 2021-03-25 RX ORDER — SENNOSIDES 8.6 MG
1 TABLET ORAL DAILY
Status: DISCONTINUED | OUTPATIENT
Start: 2021-03-26 | End: 2021-03-26 | Stop reason: HOSPADM

## 2021-03-25 RX ORDER — AMLODIPINE BESYLATE 10 MG/1
10 TABLET ORAL DAILY
Status: DISCONTINUED | OUTPATIENT
Start: 2021-03-26 | End: 2021-03-26 | Stop reason: HOSPADM

## 2021-03-25 RX ORDER — SUCRALFATE 1 G/1
1 TABLET ORAL 2 TIMES DAILY
Status: DISCONTINUED | OUTPATIENT
Start: 2021-03-25 | End: 2021-03-26 | Stop reason: HOSPADM

## 2021-03-25 RX ORDER — ACETAMINOPHEN 325 MG/1
650 TABLET ORAL EVERY 6 HOURS PRN
Status: DISCONTINUED | OUTPATIENT
Start: 2021-03-25 | End: 2021-03-26 | Stop reason: HOSPADM

## 2021-03-25 RX ORDER — ASPIRIN 81 MG/1
81 TABLET ORAL DAILY
Status: DISCONTINUED | OUTPATIENT
Start: 2021-03-26 | End: 2021-03-26 | Stop reason: HOSPADM

## 2021-03-25 RX ORDER — ISOSORBIDE MONONITRATE 60 MG/1
120 TABLET, EXTENDED RELEASE ORAL DAILY
Status: DISCONTINUED | OUTPATIENT
Start: 2021-03-26 | End: 2021-03-26 | Stop reason: HOSPADM

## 2021-03-25 RX ORDER — ATORVASTATIN CALCIUM 10 MG/1
20 TABLET, FILM COATED ORAL ONCE
Status: COMPLETED | OUTPATIENT
Start: 2021-03-25 | End: 2021-03-25

## 2021-03-25 RX ORDER — POLYETHYLENE GLYCOL 3350 17 G/17G
17 POWDER, FOR SOLUTION ORAL DAILY PRN
Status: DISCONTINUED | OUTPATIENT
Start: 2021-03-25 | End: 2021-03-26 | Stop reason: HOSPADM

## 2021-03-25 RX ORDER — ATORVASTATIN CALCIUM 10 MG/1
20 TABLET, FILM COATED ORAL EVERY EVENING
Status: DISCONTINUED | OUTPATIENT
Start: 2021-03-25 | End: 2021-03-25

## 2021-03-25 RX ADMIN — ATORVASTATIN CALCIUM 20 MG: 10 TABLET, FILM COATED ORAL at 18:08

## 2021-03-25 RX ADMIN — INSULIN LISPRO 1 UNITS: 100 INJECTION, SOLUTION INTRAVENOUS; SUBCUTANEOUS at 18:12

## 2021-03-25 RX ADMIN — HEPARIN SODIUM 5000 UNITS: 5000 INJECTION INTRAVENOUS; SUBCUTANEOUS at 22:28

## 2021-03-25 RX ADMIN — INSULIN LISPRO 1 UNITS: 100 INJECTION, SOLUTION INTRAVENOUS; SUBCUTANEOUS at 22:27

## 2021-03-25 RX ADMIN — ATORVASTATIN CALCIUM 20 MG: 10 TABLET, FILM COATED ORAL at 19:23

## 2021-03-25 RX ADMIN — SUCRALFATE 1 G: 1 TABLET ORAL at 18:09

## 2021-03-25 NOTE — ASSESSMENT & PLAN NOTE
Lab Results   Component Value Date    HGBA1C 8 9 (H) 01/07/2021     No results for input(s): POCGLU in the last 72 hours    Blood Sugar Average: Last 72 hrs:  Hold amaryl   SSI and accuchecks

## 2021-03-25 NOTE — ASSESSMENT & PLAN NOTE
Pt presents with episode of SOB associated with chest pressure and diaphoresis x 30 min   Notes few episodes a week for the last year  Recent admission for the same 3/4-3/6  Work up negative     Suspicion for cardiac etiology given hx of CAD, MI   Continue cardiac meds   Trend trop, initial neg and repeat EKG, appears stable    Tele monitoring   Consult to cardiology   Check VQ scan

## 2021-03-25 NOTE — H&P
[address] [address], 82 y o  male MRN: [id number]  Unit/Bed#: TR05 Encounter: [id number]  Primary Care Provider: Guillermo Gold DO   Date and time admitted to hospital: 3/25/2021  1:16 PM    * Shortness of breath  Assessment & Plan  Pt presents with episode of SOB associated with chest pressure and diaphoresis x 30 min   Notes few episodes a week for the last year  Recent admission for the same 3/4-3/6  Work up negative  Suspicion for cardiac etiology given hx of CAD, MI   Continue cardiac meds   Trend trop, initial neg and repeat EKG, appears stable    Tele monitoring   Consult to cardiology   Check VQ scan     Stage 3 chronic kidney disease  Assessment & Plan  Lab Results   Component Value Date    EGFR 32 03/25/2021    EGFR 41 03/05/2021    EGFR 36 03/04/2021    CREATININE 1 90 (H) 03/25/2021    CREATININE 1 53 (H) 03/05/2021    CREATININE 1 70 (H) 03/04/2021     Cr within baseline    GERD (gastroesophageal reflux disease)  Assessment & Plan  Continue prilosec, carafate     CAD (coronary artery disease)  Assessment & Plan  Hx of MI x 2 per pt   Continue imdur, plavix, asa, statin   On propranolol     Diabetes mellitus, type II ([facility] [address] )  Assessment & Plan  Lab Results   Component Value Date    HGBA1C 8 9 (H) 01/07/2021     No results for input(s): POCGLU in the last 72 hours  Blood Sugar Average: Last 72 hrs:  Hold amaryl   SSI and accuchecks     VTE Prophylaxis: Heparin  / sequential compression device   Code Status: full code   POLST: POLST form is not discussed and not completed at this time  Discussion with family: dtr at bedside     Anticipated Length of Stay:  Patient will be admitted on an Inpatient basis with an anticipated length of stay of  > 2 midnights  Justification for Hospital Stay: chest pain r/o acs     Total Time for Visit, including Counseling / Coordination of Care: 45 minutes    Greater than 50% of this total time spent on direct patient counseling and coordination of care  Chief Complaint:   CP, SOB today     History of Present Illness:    Mimi Bryan is a 82 y o  male with past medical history significant for diabetes, CAD, hypertension, GERD, CKD, carotid artery stenosis who presents with episode of shortness of breath and chest discomfort  Patient reports being startled when he woke up this morning, feeling short of breath and diaphoretic  He also reports chest pressure that was nonradiating in the left side of his chest   States he has been getting this dyspnea and chest discomfort for about 1 year with more frequent episodes the last month occurring a few times a week  States episodes occur at rest and with exertion  States he has had 2 heart attacks one in the 90s and the other in 2007  States he was short of breath with both heart attacks  Follows Dr Jose Luis Llanos  Compliant with medications, lives alone and independent  Recently admitted to [facility] with similar symptoms and treated for hypertensive crisis and discharged home  States he has been unable to tolerate stress tests in the past      Review of Systems:    Review of Systems   Constitutional: Positive for diaphoresis  Negative for chills and fever  HENT: Negative for congestion  Respiratory: Positive for shortness of breath  Cardiovascular: Positive for chest pain  Negative for palpitations and leg swelling  Gastrointestinal: Negative for abdominal pain and nausea  Genitourinary: Negative for difficulty urinating  Musculoskeletal: Negative for back pain and gait problem  Skin: Negative for pallor  Neurological: Negative for dizziness, weakness and light-headedness  Psychiatric/Behavioral: Negative for confusion  Past Medical and Surgical History:     Past Medical History:   Diagnosis Date    Arthritis     Athscl 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     Stent OM1   Patent mammary graft to LAD 10/7/2009; CABG 1992 & 2005    Cancer [facility]) skin    Coronary artery disease     Diabetes mellitus ([facility] [address] )     Diabetes mellitus, type II ([facility] [address] )     without complication    Epistaxis     Essential (primary) hypertension     GERD (gastroesophageal reflux disease)     Hx of cardiovascular stress test 07/23/2014    Eddie MPI; EF0 52 (52%) Lexiscan, mild LV systolic dysfunction; evidence for prior inferior wall MI; perfusion imaging consistant w mild lat wall ischemia and min torin-infart inferior ischemia   / EF0 51 (51%) evidence for prior inferior wall MI  Mild inferior and mild-moderate lateral wall ischemia  7/29/15    Hx of echocardiogram 11/07/2017    2D w/CFD;EF0 55 (55%) mild LVH, mitral valve prolapse with moderate regurgitation  left atrial enlargement  Mild tricuspid regurgitation   Hypertension     Mixed hyperlipidemia     Occlusion and stenosis of bilateral carotid arteries     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 infarction)     Presence of aortocoronary bypass graft      Past Surgical History:   Procedure Laterality Date    CARDIAC CATHETERIZATION  10/07/2009    Stent 99% stenosis SVG from the aorta to OM1  Patent mammary graft to the LAD    CORONARY ARTERY BYPASS GRAFT  1992    VG-CX, VG-RCA    CORONARY ARTERY BYPASS GRAFT  11/04/2005    LIMA-D1-LAD, VG-PDA-PLB, [id number] TMR 9 Lesions    NH EGD TRANSORAL BIOPSY SINGLE/MULTIPLE N/A 1/23/2019    Procedure: ESOPHAGOGASTRODUODENOSCOPY (EGD) with biopsy;  Surgeon: Regi Clifford MD;  Location: [address] [place] [address] GI LAB; Service: Gastroenterology    TONSILLECTOMY       Meds/Allergies:    Prior to Admission medications    Medication Sig Start Date End Date Taking?  Authorizing Provider   amLODIPine (NORVASC) 10 mg tablet Take 1 tablet (10 mg total) by mouth daily 3/6/21  Yes Ajith Avilez MD   aspirin (ECOTRIN LOW STRENGTH) 81 mg EC tablet Take 81 mg by mouth daily   Yes Historical Provider, MD   atorvastatin (LIPITOR) 20 mg tablet Take 20 mg by mouth daily   Yes Historical Provider, MD   clopidogrel (PLAVIX) 75 mg tablet Take 1 tablet (75 mg total) by mouth daily Hold until seen by ENT on 19  Yes Yamilex Rajput, DO   ergocalciferol (VITAMIN D2) 50,000 units take 1 capsule by mouth every week 20  Yes Jamie Hodges DO   escitalopram (LEXAPRO) 10 mg tablet Take 10 mg by mouth daily   Yes Historical Provider, MD   glimepiride (AMARYL) 1 mg tablet Take 1 mg by mouth daily with breakfast  18  Yes Historical Provider, MD   isosorbide mononitrate (IMDUR) 60 mg 24 hr tablet Take 2 tablets (120 mg total) by mouth daily In the morning 3/6/21  Yes Nikky Alamo MD   nitroglycerin (NITROSTAT) 0 4 mg SL tablet one tablet under tongue for angina, may repeat in 15 min  no more than three times 18  Yes Historical Provider, MD   omeprazole (PriLOSEC) 20 mg delayed release capsule Take 20 mg by mouth daily   Yes Historical Provider, MD   propranolol (INDERAL LA) 120 mg 24 hr capsule Take 120 mg by mouth daily   Yes Historical Provider, MD   sucralfate (CARAFATE) 1 g tablet Take 1 g by mouth 2 (two) times a day   Yes Historical Provider, MD     I have reviewed home medications with patient personally  Allergies: Allergies   Allergen Reactions    Advil [Ibuprofen] Fatigue     Social History:     Marital Status:     Occupation: unknown  Patient Pre-hospital Living Situation: alone   Patient Pre-hospital Level of Mobility: no limits   Patient Pre-hospital Diet Restrictions: none   Substance Use History:   Social History     Substance and Sexual Activity   Alcohol Use Never    Frequency: Never     Social History     Tobacco Use   Smoking Status Former Smoker    Packs/day: 1 00    Years: 40 00    Pack years: 40 00    Quit date: 1977    Years since quittin 2   Smokeless Tobacco Never Used     Social History     Substance and Sexual Activity   Drug Use No     Family History:    Family History   Problem Relation Age of Onset    Heart disease Brother     Tuberculosis Mother    Clayton Cottrell Tuberculosis Father      Physical Exam:     Vitals:   Blood Pressure: 156/75 (03/25/21 1515)  Pulse: 81 (03/25/21 1515)  Temperature: (!) 97 °F (36 1 °C) (03/25/21 1319)  Temp Source: Temporal (03/25/21 1319)  Respirations: 18 (03/25/21 1515)  Height: 5' 10" (177 8 cm) (03/25/21 1319)  Weight - Scale: 76 8 kg (169 lb 5 oz) (03/25/21 1319)  SpO2: 94 % (03/25/21 1515)    Physical Exam  Constitutional:       Appearance: Normal appearance  He is obese  HENT:      Head: Normocephalic and atraumatic  Mouth/Throat:      Mouth: Mucous membranes are moist    Eyes:      Extraocular Movements: Extraocular movements intact  Comments: keeps left eye closed    Neck:      Musculoskeletal: Normal range of motion and neck supple  Cardiovascular:      Rate and Rhythm: Normal rate and regular rhythm  Pulmonary:      Effort: Pulmonary effort is normal       Breath sounds: Normal breath sounds  No wheezing or rales  Abdominal:      General: Bowel sounds are normal       Palpations: Abdomen is soft  Musculoskeletal: Normal range of motion  General: No swelling  Skin:     General: Skin is warm and dry  Neurological:      General: No focal deficit present  Mental Status: He is alert and oriented to person, place, and time  Psychiatric:         Mood and Affect: Mood normal          Behavior: Behavior normal        Additional Data:     Lab Results: I have personally reviewed pertinent reports        Results from last 7 days   Lab Units 03/25/21  1349   WBC Thousand/uL 9 75   HEMOGLOBIN g/dL 16 3   HEMATOCRIT % 50 2*   PLATELETS Thousands/uL 237   NEUTROS PCT % 67   LYMPHS PCT % 17   MONOS PCT % 7   EOS PCT % 7*     Results from last 7 days   Lab Units 03/25/21  1349   SODIUM mmol/L 136   POTASSIUM mmol/L 4 6   CHLORIDE mmol/L 100   CO2 mmol/L 26   BUN mg/dL 27*   CREATININE mg/dL 1 90*   ANION GAP mmol/L 10   CALCIUM mg/dL 9 9   ALBUMIN g/dL 3 8   TOTAL BILIRUBIN mg/dL 1 20*   ALK PHOS U/L 85   ALT U/L 35   AST U/L 30   GLUCOSE RANDOM mg/dL 349*     Results from last 7 days   Lab Units 03/25/21  1349   INR  1 06                   Imaging: I have personally reviewed pertinent reports  XR chest 1 view portable    (Results Pending)       EKG, Pathology, and Other Studies Reviewed on Admission:   · EKG:     Allscripts / Epic Records Reviewed: Yes     ** Please Note: This note has been constructed using a voice recognition system   **

## 2021-03-25 NOTE — ED PROVIDER NOTES
History  Chief Complaint   Patient presents with    Shortness of Breath     SOB since discharge from hospital last week  82year old male presents with daughter from home for evaluation of shortness of breath  Pt reports this has been an ongoing issue for a couple of weeks  He was recently admitted at [facility] for the same  He denies chest pain but reports chest tightness at times associated with the dyspnea  He reports he was short of breath when he came in but now feels improved  Denies cough or congestion  Denies wheezing  Denies fever, chills or recent illness  Denies N/V/D, abdominal pain  At times feels lightheaded with it  No reported syncope  Denies PND  No reported aggravating or alleviating factors  Pt reports that these episodes are random and can occur at rest   He reports that his legs do swell but denies any increased swelling  Denies lower extremity pain  He reports he saw Dr Poli Camilo after hospitalization  He is scheduled to see his cardiologist in June  He is unsure of what his diagnosis was during last admission  He denies any recent medication changes  PMH includes CAD, DM, HTN, HLD, CKD  Pt has had prior CABG  Pt is a former smoker  Denies h/o COPD or emphysema  History provided by:  Patient and medical records   used: No        Prior to Admission Medications   Prescriptions Last Dose Informant Patient Reported? Taking? amLODIPine (NORVASC) 10 mg tablet   No Yes   Sig: Take 1 tablet (10 mg total) by mouth daily   aspirin (ECOTRIN LOW STRENGTH) 81 mg EC tablet  Self Yes Yes   Sig: Take 81 mg by mouth daily   atorvastatin (LIPITOR) 20 mg tablet  Self Yes Yes   Sig: Take 20 mg by mouth daily   clopidogrel (PLAVIX) 75 mg tablet   No Yes   Sig: Take 1 tablet (75 mg total) by mouth daily Hold until seen by ENT on 9/23/19     ergocalciferol (VITAMIN D2) 50,000 units   No Yes   Sig: take 1 capsule by mouth every week   escitalopram (Sandra Lipa) 10 mg tablet  Self Yes Yes   Sig: Take 10 mg by mouth daily   glimepiride (AMARYL) 1 mg tablet  Self Yes Yes   Sig: Take 1 mg by mouth daily with breakfast    isosorbide mononitrate (IMDUR) 60 mg 24 hr tablet   No Yes   Sig: Take 2 tablets (120 mg total) by mouth daily In the morning   nitroglycerin (NITROSTAT) 0 4 mg SL tablet  Self Yes Yes   Sig: one tablet under tongue for angina, may repeat in 15 min  no more than three times   omeprazole (PriLOSEC) 20 mg delayed release capsule  Self Yes Yes   Sig: Take 20 mg by mouth daily   propranolol (INDERAL LA) 120 mg 24 hr capsule  Self Yes Yes   Sig: Take 120 mg by mouth daily   sucralfate (CARAFATE) 1 g tablet  Self Yes Yes   Sig: Take 1 g by mouth 2 (two) times a day      Facility-Administered Medications: None       Past Medical History:   Diagnosis Date    Arthritis     Athscl 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     Stent OM1  Patent mammary graft to LAD 10/7/2009; CABG 1992 & 2005    Cancer [facility])     skin    Coronary artery disease     [facility] mellitus [facility] [address] [facility]     Diabetes mellitus, type II ([facility] [address] )     without complication    Epistaxis     Essential (primary) hypertension     GERD (gastroesophageal reflux disease)     Hx of cardiovascular stress test 07/23/2014    Eddie MPI; EF0 52 (52%) Lexiscan, mild LV systolic dysfunction; evidence for prior inferior wall MI; perfusion imaging consistant w mild lat wall ischemia and min torin-infart inferior ischemia   / EF0 51 (51%) evidence for prior inferior wall MI  Mild inferior and mild-moderate lateral wall ischemia  7/29/15    Hx of echocardiogram 11/07/2017    2D w/CFD;EF0 55 (55%) mild LVH, mitral valve prolapse with moderate regurgitation  left atrial enlargement  Mild tricuspid regurgitation        Hypertension     Mixed hyperlipidemia     Occlusion and stenosis of bilateral carotid arteries     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 infarction)     Presence of aortocoronary bypass graft        Past Surgical History:   Procedure Laterality Date    CARDIAC CATHETERIZATION  10/07/2009    Stent 99% stenosis SVG from the aorta to OM1  Patent mammary graft to the LAD    CORONARY ARTERY BYPASS GRAFT      VG-CX, VG-RCA    CORONARY ARTERY BYPASS GRAFT  2005    LIMA-D1-LAD, VG-PDA-PLB, Xvnvnw-HQ0-MW8 TMR 9 Lesions    NE EGD TRANSORAL BIOPSY SINGLE/MULTIPLE N/A 2019    Procedure: ESOPHAGOGASTRODUODENOSCOPY (EGD) with biopsy;  Surgeon: Mookie Guerrero MD;  Location: [facility]; Service: Gastroenterology    TONSILLECTOMY         Family History   Problem Relation Age of Onset    Heart disease Brother     Tuberculosis Mother     Tuberculosis Father      I have reviewed and agree with the history as documented  E-Cigarette/Vaping    E-Cigarette Use Never User      E-Cigarette/Vaping Substances     Social History     Tobacco Use    Smoking status: Former Smoker     Packs/day: 1 00     Years: 40 00     Pack years: 40 00     Quit date: 1977     Years since quittin 2    Smokeless tobacco: Never Used   Substance Use Topics    Alcohol use: Never     Frequency: Never    Drug use: No       Review of Systems   Constitutional: Negative  Negative for chills, fatigue and fever  HENT: Negative  Negative for congestion, rhinorrhea and sore throat  Eyes: Negative  Negative for visual disturbance  Respiratory: Positive for chest tightness and shortness of breath  Negative for cough and wheezing  Cardiovascular: Negative  Negative for chest pain, palpitations and leg swelling  Gastrointestinal: Positive for constipation  Negative for abdominal pain, diarrhea, nausea and vomiting  Genitourinary: Negative  Negative for dysuria, flank pain, frequency and hematuria  Musculoskeletal: Negative  Negative for back pain and neck pain  Skin: Negative  Negative for rash  Neurological: Positive for light-headedness  Negative for dizziness, numbness and headaches     Psychiatric/Behavioral: Negative  Negative for confusion  All other systems reviewed and are negative  Physical Exam  Physical Exam  Vitals signs and nursing note reviewed  Constitutional:       General: He is not in acute distress  Appearance: Normal appearance  He is well-developed  He is not toxic-appearing or diaphoretic  Comments: Elderly male   HENT:      Head: Normocephalic and atraumatic  Right Ear: Hearing and external ear normal       Left Ear: Hearing and external ear normal       Nose: Nose normal       Mouth/Throat:      Pharynx: Oropharynx is clear  Uvula midline  No oropharyngeal exudate  Eyes:      General: No scleral icterus  Extraocular Movements: Extraocular movements intact  Conjunctiva/sclera: Conjunctivae normal       Pupils: Pupils are equal, round, and reactive to light  Neck:      Musculoskeletal: Normal range of motion and neck supple  Trachea: Trachea and phonation normal  No tracheal deviation  Cardiovascular:      Rate and Rhythm: Normal rate and regular rhythm  Pulses: Normal pulses  Radial pulses are 2+ on the right side and 2+ on the left side  Dorsalis pedis pulses are 2+ on the right side and 2+ on the left side  Posterior tibial pulses are 2+ on the right side and 2+ on the left side  Heart sounds: Normal heart sounds, S1 normal and S2 normal  No murmur  Comments: Trace lower extremity edema  No calf tenderness  Pulmonary:      Effort: Pulmonary effort is normal  No tachypnea, accessory muscle usage or respiratory distress  Breath sounds: Normal breath sounds  No wheezing, rhonchi or rales  Comments: No conversational dyspnea  Abdominal:      General: Bowel sounds are normal  There is no distension  Palpations: Abdomen is soft  Tenderness: There is no abdominal tenderness  There is no guarding or rebound  Musculoskeletal: Normal range of motion  General: No tenderness        Right lower leg: He exhibits no tenderness  Edema present  Left lower leg: He exhibits no tenderness  Edema present  Skin:     General: Skin is warm and dry  Capillary Refill: Capillary refill takes less than 2 seconds  Findings: No rash  Neurological:      General: No focal deficit present  Mental Status: He is alert and oriented to person, place, and time  GCS: GCS eye subscore is 4  GCS verbal subscore is 5  GCS motor subscore is 6  Cranial Nerves: No cranial nerve deficit  Sensory: No sensory deficit  Motor: No weakness or abnormal muscle tone  Psychiatric:         Mood and Affect: Mood normal          Speech: Speech normal          Behavior: Behavior normal          Vital Signs  ED Triage Vitals   Temperature Pulse Respirations Blood Pressure SpO2   03/25/21 1319 03/25/21 1319 03/25/21 1319 03/25/21 1319 03/25/21 1319   (!) 97 °F (36 1 °C) 81 20 141/70 92 %      Temp Source Heart Rate Source Patient Position - Orthostatic VS BP Location FiO2 (%)   03/25/21 1319 03/25/21 1319 03/25/21 1330 03/25/21 1330 --   Temporal Monitor Lying Right arm       Pain Score       --                  Vitals:    03/25/21 1345 03/25/21 1415 03/25/21 1445 03/25/21 1515   BP: 129/66 117/63 127/66 156/75   Pulse: 84 78 81 81   Patient Position - Orthostatic VS: Lying Lying Lying Lying         Visual Acuity      ED Medications  Medications - No data to display    Diagnostic Studies  Results Reviewed     Procedure Component Value Units Date/Time    COVID19, Influenza A/B, RSV PCR, [facility] [[id number]]  (Normal) Collected: 03/25/21 1349    Lab Status: Final result Specimen: Nasopharyngeal Swab Updated: 03/25/21 1447     SARS-CoV-2 Negative     INFLUENZA A PCR Negative     INFLUENZA B PCR Negative     RSV PCR Negative    Narrative: This test has been authorized by FDA under an EUA (Emergency Use Assay) for use by authorized laboratories    Clinical caution and judgement should be used with the interpretation of these results with consideration of the clinical impression and other laboratory testing  Testing reported as "Positive" or "Negative" has been proven to be accurate according to standard laboratory validation requirements  All testing is performed with control materials showing appropriate reactivity at standard intervals      NT-BNP PRO [[id number]]  (Abnormal) Collected: 03/25/21 1349    Lab Status: Final result Specimen: Blood from Arm, Right Updated: 03/25/21 1429     NT-proBNP 644 pg/mL     Magnesium [[id number]]  (Normal) Collected: 03/25/21 1349    Lab Status: Final result Specimen: Blood from Arm, Right Updated: 03/25/21 1429     Magnesium 2 2 mg/dL     Troponin I [[id number]]  (Normal) Collected: 03/25/21 1349    Lab Status: Final result Specimen: Blood from Arm, Right Updated: 03/25/21 1415     Troponin I <0 02 ng/mL     Comprehensive metabolic panel [[id number]]  (Abnormal) Collected: 03/25/21 1349    Lab Status: Final result Specimen: Blood from Arm, Right Updated: 03/25/21 1415     Sodium 136 mmol/L      Potassium 4 6 mmol/L      Chloride 100 mmol/L      CO2 26 mmol/L      ANION GAP 10 mmol/L      BUN 27 mg/dL      Creatinine 1 90 mg/dL      Glucose 349 mg/dL      Calcium 9 9 mg/dL      AST 30 U/L      ALT 35 U/L      Alkaline Phosphatase 85 U/L      Total Protein 8 0 g/dL      Albumin 3 8 g/dL      Total Bilirubin 1 20 mg/dL      eGFR 32 ml/min/1 73sq m     Narrative:      Paulino guidelines for Chronic Kidney Disease (CKD):     Stage 1 with normal or high GFR (GFR > 90 mL/min/1 73 square meters)    Stage 2 Mild CKD (GFR = 60-89 mL/min/1 73 square meters)    Stage 3A Moderate CKD (GFR = 45-59 mL/min/1 73 square meters)    Stage 3B Moderate CKD (GFR = 30-44 mL/min/1 73 square meters)    Stage 4 Severe CKD (GFR = 15-29 mL/min/1 73 square meters)    Stage 5 End Stage CKD (GFR <15 mL/min/1 73 square meters)  Note: GFR calculation is accurate only with a steady state creatinine    D-Dimer [[id number]]  (Abnormal) Collected: 03/25/21 1349    Lab Status: Final result Specimen: Blood from Arm, Right Updated: 03/25/21 1410     D-Dimer, Quant 0 75 ug/ml FEU     Protime-INR [[id number]]  (Normal) Collected: 03/25/21 1349    Lab Status: Final result Specimen: Blood from Arm, Right Updated: 03/25/21 1407     Protime 13 6 seconds      INR 1 06    APTT [[id number]]  (Normal) Collected: 03/25/21 1349    Lab Status: Final result Specimen: Blood from Arm, Right Updated: 03/25/21 1407     PTT 23 seconds     CBC and differential [[id number]]  (Abnormal) Collected: 03/25/21 1349    Lab Status: Final result Specimen: Blood from Arm, Right Updated: 03/25/21 1357     WBC 9 75 Thousand/uL      RBC 5 83 Million/uL      Hemoglobin 16 3 g/dL      Hematocrit 50 2 %      MCV 86 fL      MCH 28 0 pg      MCHC 32 5 g/dL      RDW 12 7 %      MPV 9 2 fL      Platelets 100 Thousands/uL      nRBC 0 /100 WBCs      Neutrophils Relative 67 %      Immat GRANS % 1 %      Lymphocytes Relative 17 %      Monocytes Relative 7 %      Eosinophils Relative 7 %      Basophils Relative 1 %      Neutrophils Absolute 6 58 Thousands/µL      Immature Grans Absolute 0 05 Thousand/uL      Lymphocytes Absolute 1 67 Thousands/µL      Monocytes Absolute 0 68 Thousand/µL      Eosinophils Absolute 0 65 Thousand/µL      Basophils Absolute 0 12 Thousands/µL                  XR chest 1 view portable    (Results Pending)              Procedures  ECG 12 Lead Documentation Only    Date/Time: 3/25/2021 1:25 PM  Performed by: Ephraim Gomes PA-C  Authorized by: Ephraim Gomes PA-C     Indications / Diagnosis:  Dyspnea  ECG reviewed by me, the ED Provider: yes    Patient location:  ED  Previous ECG:     Previous ECG:  Compared to current    Comparison ECG info:  3/4/21    Similarity:  No change    Comparison to cardiac monitor: Yes    Interpretation:     Interpretation: abnormal    Rate:     ECG rate:  83    ECG rate assessment: normal Rhythm:     Rhythm: sinus rhythm    Ectopy:     Ectopy: none    QRS:     QRS axis:  Normal    QRS intervals:  Normal  Conduction:     Conduction: normal    ST segments:     ST segments:  Normal  T waves:     T waves: non-specific    Comments:      , QRS 92, QT/QTc 398/467; no acute ischemic changes, no significant interval change  ED Course  ED Course as of Mar 25 1549   Thu Mar 25, 2021   1344 Reviewed ED visit and admission from 3/4/21 at [facility]  Was diagnosed with hypertensive crisis  Had CT chest w/o contrast which showed no acute findings  1357 WBC: 9 75   1357 Hemoglobin: 16 3   1357 Platelet Count: 076   1407 INR: 1 06   1407 Protime: 13 6   1407 PTT: 23   1421 Known diabetic   Glucose, Random(!): 349   1421 Increased from 1 53 two weeks ago; baseline appears to be around 1 7  Creatinine(!): 1 90   1421 BUN(!): 27   1421 Sodium: 136   1421 Potassium: 4 6   1421 Chloride: 100   1421 CO2: 26   1421 Anion Gap: 10   1421 Calcium: 9 9   1421 AST: 30   1421 ALT: 35   1421 Alkaline Phosphatase: 85   1421 Total Protein: 8 0   1421 Albumin: 3 8   1421 TOTAL BILIRUBIN(!): 1 20   1421 eGFR: 32   1421 Troponin I: <0 02   65 Age corrected negative  Pt is not tachycardic, not tachypneic, not hypoxic  PE felt to be less likely  Due to underlying kidney disease, CTA deferred at this time as age corrected value is negative  D-Dimer, Quant(!): 0 75   1426 Independently viewed and interpreted by me - no acute cardiopulmonary process or significant interval change; pending official read  XR chest 1 view portable   1432 Magnesium: 2 2   1433 NT-proBNP(!): 644   1440 Pt updated on results  Resting comfortably, no distress  Recommended admission, pt and daughter agreeable  2025 [facility] text sent to on call [facility] Dr Sera Lopez  56 Dr Sera Lopez accepts for full inpatient admission        1452 SARS-COV-2: Negative   1452 INFLU A PCR: Negative   1452 INFLU B PCR: Negative   1452 RSV PCR: Negative   1500 Pt again updated and agreeable to admission/treatment plan  Will admit for his dypsnea and chest tightness  He feels improved at present  Vital signs remain stable  I am unable to locate a recent echo  Pt isn't scheduled to see his cardiologist until June  Pt has significant CAD history  HEART Risk Score      Most Recent Value   Heart Score Risk Calculator   History  1 Filed at: 03/25/2021 1407   ECG  1 Filed at: 03/25/2021 1407   Age  2 Filed at: 03/25/2021 1407   Risk Factors  2 Filed at: 03/25/2021 1407   Troponin  0 Filed at: 03/25/2021 1407   HEART Score  6 Filed at: 03/25/2021 1407                      SBIRT 22yo+      Most Recent Value   SBIRT (25 yo +)   In order to provide better care to our patients, we are screening all of our patients for alcohol and drug use  Would it be okay to ask you these screening questions? Yes Filed at: 03/25/2021 1338   Initial Alcohol Screen: US AUDIT-C    1  How often do you have a drink containing alcohol?  0 Filed at: 03/25/2021 1338   2  How many drinks containing alcohol do you have on a typical day you are drinking? 0 Filed at: 03/25/2021 1338   3b  FEMALE Any Age, or MALE 65+: How often do you have 4 or more drinks on one occassion? 0 Filed at: 03/25/2021 1338   Audit-C Score  0 Filed at: 03/25/2021 1338   PERRY: How many times in the past year have you    Used an illegal drug or used a prescription medication for non-medical reasons?   Never Filed at: 03/25/2021 1338          Wells' Criteria for PE      Most Recent Value   Wells' Criteria for PE   Clinical signs and symptoms of DVT  0 Filed at: 03/25/2021 1407   PE is primary diagnosis or equally likely  0 Filed at: 03/25/2021 1407   HR >100  0 Filed at: 03/25/2021 1407   Immobilization at least 3 days or Surgery in the previous 4 weeks  0 Filed at: 03/25/2021 1407   Previous, objectively diagnosed PE or DVT  0 Filed at: 03/25/2021 1407   Hemoptysis  0 Filed at: 03/25/2021 1407   Malignancy with treatment within 6 months or palliative  0 Filed at: 03/25/2021 1407   Wells' Criteria Total  0 Filed at: 03/25/2021 1407                [facility]  Number of Diagnoses or Management Options  CAD (coronary artery disease):   CKD (chronic kidney disease) stage 3, GFR 30-59 ml/min: established and worsening  Dyspnea: new and requires workup  Hyperglycemia due to diabetes mellitus ([facility] [address] ): established and worsening     Amount and/or Complexity of Data Reviewed  Clinical lab tests: ordered and reviewed  Tests in the radiology section of CPT®: ordered and reviewed  Decide to obtain previous medical records or to obtain history from someone other than the patient: yes  Obtain history from someone other than the patient: yes  Review and summarize past medical records: yes  Discuss the patient with other providers: yes (Attending, [facility])  Independent visualization of images, tracings, or specimens: yes    Patient Progress  Patient progress: improved      Disposition  Final diagnoses:   Dyspnea   CKD (chronic kidney disease) stage 3, GFR 30-59 ml/min   Hyperglycemia due to diabetes mellitus ([facility] [address] )   CAD (coronary artery disease)     Time reflects when diagnosis was documented in both MDM as applicable and the Disposition within this note     Time User Action Codes Description Comment    3/25/2021  2:59 PM Milady Monteiro [R06 00] Dyspnea     3/25/2021  2:59 PM Milady Lozada Add [N18 30] CKD (chronic kidney disease) stage 3, GFR 30-59 ml/min     3/25/2021  2:59 PM Milady Lozada Add [E11 65] Hyperglycemia due to diabetes mellitus ([facility] [address] )     3/25/2021  3:00 PM Milady Monteiro [I25 10] CAD (coronary artery disease)       ED Disposition     ED Disposition Condition Date/Time Comment    Admit Stable Thu Mar 25, 2021  2:59 PM Case was discussed with Dr Shanon Silva and the patient's admission status was agreed to be Admission Status: observation status to the service of Dr Shanon Silva          Follow-up Information    None         Patient's Medications   Discharge Prescriptions    No medications on file     No discharge procedures on file      PDMP Review     None          ED Provider  Electronically Signed by           Orquidea Mims PA-C  03/25/21 0995

## 2021-03-25 NOTE — PLAN OF CARE
Problem: PAIN - ADULT  Goal: Verbalizes/displays adequate comfort level or baseline comfort level  Description: Interventions:  - Encourage patient to monitor pain and request assistance  - Assess pain using appropriate pain scale  - Administer analgesics based on type and severity of pain and evaluate response  - Implement non-pharmacological measures as appropriate and evaluate response  - Consider cultural and social influences on pain and pain management  - Notify physician/advanced practitioner if interventions unsuccessful or patient reports new pain  Outcome: Progressing     Problem: INFECTION - ADULT  Goal: Absence or prevention of progression during hospitalization  Description: INTERVENTIONS:  - Assess and monitor for signs and symptoms of infection  - Monitor lab/diagnostic results  - Monitor all insertion sites, i e  indwelling lines, tubes, and drains  - Monitor endotracheal if appropriate and nasal secretions for changes in amount and color  - [place] appropriate cooling/warming therapies per order  - Administer medications as ordered  - Instruct and encourage patient and family to use good hand hygiene technique  - Identify and instruct in appropriate isolation precautions for identified infection/condition  Outcome: Progressing     Problem: SAFETY ADULT  Goal: Patient will remain free of falls  Description: INTERVENTIONS:  - Assess patient frequently for physical needs  -  Identify cognitive and physical deficits and behaviors that affect risk of falls    -  [place] fall precautions as indicated by assessment   - Educate patient/family on patient safety including physical limitations  - Instruct patient to call for assistance with activity based on assessment  - Modify environment to reduce risk of injury  - Consider OT/PT consult to assist with strengthening/mobility  Outcome: Progressing  Goal: Maintain or return to baseline ADL function  Description: INTERVENTIONS:  -  Assess patient's ability to carry out ADLs; assess patient's baseline for ADL function and identify physical deficits which impact ability to perform ADLs (bathing, care of mouth/teeth, toileting, grooming, dressing, etc )  - Assess/evaluate cause of self-care deficits   - Assess range of motion  - Assess patient's mobility; develop plan if impaired  - Assess patient's need for assistive devices and provide as appropriate  - Encourage maximum independence but intervene and supervise when necessary  - Involve family in performance of ADLs  - Assess for home care needs following discharge   - Consider OT consult to assist with ADL evaluation and planning for discharge  - Provide patient education as appropriate  Outcome: Progressing  Goal: Maintain or return mobility status to optimal level  Description: INTERVENTIONS:  - Assess patient's baseline mobility status (ambulation, transfers, stairs, etc )    - Identify cognitive and physical deficits and behaviors that affect mobility  - Identify mobility aids required to assist with transfers and/or ambulation (gait belt, sit-to-stand, lift, walker, cane, etc )  - Bieber fall precautions as indicated by assessment  - Record patient progress and toleration of activity level on Mobility SBAR; progress patient to next Phase/Stage  - Instruct patient to call for assistance with activity based on assessment  - Consider rehabilitation consult to assist with strengthening/weightbearing, etc   Outcome: Progressing     Problem: DISCHARGE PLANNING  Goal: Discharge to home or other facility with appropriate resources  Description: INTERVENTIONS:  - Identify barriers to discharge w/patient and caregiver  - Arrange for needed discharge resources and transportation as appropriate  - Identify discharge learning needs (meds, wound care, etc )  - Arrange for interpretive services to assist at discharge as needed  - Refer to Case Management Department for coordinating discharge planning if the patient needs post-hospital services based on physician/advanced practitioner order or complex needs related to functional status, cognitive ability, or social support system  Outcome: Progressing     Problem: Knowledge Deficit  Goal: Patient/family/caregiver demonstrates understanding of disease process, treatment plan, medications, and discharge instructions  Description: Complete learning assessment and assess knowledge base    Interventions:  - Provide teaching at level of understanding  - Provide teaching via preferred learning methods  Outcome: Progressing

## 2021-03-25 NOTE — RESPIRATORY THERAPY NOTE
RT Protocol Note  Freida Larson 82 y o  male MRN: [id number]  Unit/Bed#: 406-01 Encounter: [id number]    Assessment    Principal Problem:    Shortness of breath  Active Problems:    Diabetes mellitus, type II ([facility] )    CAD (coronary artery disease)    GERD (gastroesophageal reflux disease)    Stage 3 chronic kidney disease      Home Pulmonary Medications:  Patient doesn't use any breathing medications at home       Past Medical History:   Diagnosis Date    Arthritis     Athscl 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     Stent OM1  Patent mammary graft to LAD 10/7/2009; CABG 1992 & 2005    Cancer [facility])     skin    Coronary artery disease     Diabetes mellitus ([facility] )     Diabetes mellitus, type II ([place] [address] )     without complication    Epistaxis     Essential (primary) hypertension     GERD (gastroesophageal reflux disease)     Hx of cardiovascular stress test 07/23/2014    Eddie MPI; EF0 52 (52%) Lexiscan, mild LV systolic dysfunction; evidence for prior inferior wall MI; perfusion imaging consistant w mild lat wall ischemia and min torin-infart inferior ischemia   / EF0 51 (51%) evidence for prior inferior wall MI  Mild inferior and mild-moderate lateral wall ischemia  7/29/15    Hx of echocardiogram 11/07/2017    2D w/CFD;EF0 55 (55%) mild LVH, mitral valve prolapse with moderate regurgitation  left atrial enlargement  Mild tricuspid regurgitation   Hypertension     Mixed hyperlipidemia     Occlusion and stenosis of bilateral carotid arteries     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 infarction)     Presence of aortocoronary bypass graft      Social History     Socioeconomic History    Marital status:       Spouse name: None    Number of children: None    Years of education: None    Highest education level: None   Occupational History    None   Social Needs    Financial resource strain: None    Food insecurity     Worry: None     Inability: None    Transportation needs     Medical: None Non-medical: None   Tobacco Use    Smoking status: Former Smoker     Packs/day: 1 00     Years: 40 00     Pack years: 40 00     Quit date: 1977     Years since quittin 2    Smokeless tobacco: Never Used   Substance and Sexual Activity    Alcohol use: Never     Frequency: Never    Drug use: No    Sexual activity: None   Lifestyle    Physical activity     Days per week: None     Minutes per session: None    Stress: None   Relationships    Social connections     Talks on phone: None     Gets together: None     Attends Caodaism service: None     Active member of club or organization: None     Attends meetings of clubs or organizations: None     Relationship status: None    Intimate partner violence     Fear of current or ex partner: None     Emotionally abused: None     Physically abused: None     Forced sexual activity: None   Other Topics Concern    None   Social History Narrative    None       Subjective         Objective  No wheezing present, normal WOB    Physical Exam:   Assessment Type: Assess only  General Appearance: Alert  Respiratory Pattern: Normal  Chest Assessment: Chest expansion symmetrical, Trachea midline  Bilateral Breath Sounds: Clear  R Breath Sounds: Clear  L Breath Sounds: Clear  Location Specific: No  Cough: None  O2 Device: RA    Vitals:  Blood pressure 146/86, pulse 85, temperature 98 4 °F (36 9 °C), resp  rate 16, height 5' 10" (1 778 m), weight 76 8 kg (169 lb 5 oz), SpO2 93 %  Imaging and other studies: I have personally reviewed pertinent reports        O2 Device: RA     Plan  Give patient albuterol treatments PRN if wheezing or SOB is present

## 2021-03-25 NOTE — ASSESSMENT & PLAN NOTE
Lab Results   Component Value Date    EGFR 32 03/25/2021    EGFR 41 03/05/2021    EGFR 36 03/04/2021    CREATININE 1 90 (H) 03/25/2021    CREATININE 1 53 (H) 03/05/2021    CREATININE 1 70 (H) 03/04/2021     Cr within baseline

## 2021-03-26 ENCOUNTER — APPOINTMENT (INPATIENT)
Dept: NON INVASIVE DIAGNOSTICS | Facility: HOSPITAL | Age: 86
DRG: 303 | End: 2021-03-26
Payer: MEDICARE

## 2021-03-26 ENCOUNTER — APPOINTMENT (INPATIENT)
Dept: NUCLEAR MEDICINE | Facility: HOSPITAL | Age: 86
DRG: 303 | End: 2021-03-26
Payer: MEDICARE

## 2021-03-26 VITALS
TEMPERATURE: 97.7 F | WEIGHT: 169.31 LBS | OXYGEN SATURATION: 90 % | DIASTOLIC BLOOD PRESSURE: 72 MMHG | BODY MASS INDEX: 24.24 KG/M2 | RESPIRATION RATE: 18 BRPM | HEART RATE: 86 BPM | HEIGHT: 70 IN | SYSTOLIC BLOOD PRESSURE: 130 MMHG

## 2021-03-26 DIAGNOSIS — Z71.89 COMPLEX CARE COORDINATION: Primary | ICD-10-CM

## 2021-03-26 PROBLEM — I20.9 ANGINAL SYNDROME (HCC): Status: ACTIVE | Noted: 2021-03-04

## 2021-03-26 PROBLEM — R79.89 ELEVATED D-DIMER: Status: ACTIVE | Noted: 2021-03-26

## 2021-03-26 LAB
ALBUMIN SERPL BCP-MCNC: 3.7 G/DL (ref 3.5–5)
ALP SERPL-CCNC: 85 U/L (ref 46–116)
ALT SERPL W P-5'-P-CCNC: 37 U/L (ref 12–78)
ANION GAP SERPL CALCULATED.3IONS-SCNC: 9 MMOL/L (ref 4–13)
AST SERPL W P-5'-P-CCNC: 26 U/L (ref 5–45)
ATRIAL RATE: 79 BPM
ATRIAL RATE: 83 BPM
BASOPHILS # BLD AUTO: 0.09 THOUSANDS/ΜL (ref 0–0.1)
BASOPHILS NFR BLD AUTO: 1 % (ref 0–1)
BILIRUB SERPL-MCNC: 0.9 MG/DL (ref 0.2–1)
BUN SERPL-MCNC: 25 MG/DL (ref 5–25)
CALCIUM SERPL-MCNC: 10.2 MG/DL (ref 8.3–10.1)
CHLORIDE SERPL-SCNC: 103 MMOL/L (ref 100–108)
CHOLEST SERPL-MCNC: 104 MG/DL (ref 50–200)
CK SERPL-CCNC: 19 U/L (ref 39–308)
CO2 SERPL-SCNC: 27 MMOL/L (ref 21–32)
CREAT SERPL-MCNC: 1.63 MG/DL (ref 0.6–1.3)
EOSINOPHIL # BLD AUTO: 0.67 THOUSAND/ΜL (ref 0–0.61)
EOSINOPHIL NFR BLD AUTO: 7 % (ref 0–6)
ERYTHROCYTE [DISTWIDTH] IN BLOOD BY AUTOMATED COUNT: 12.7 % (ref 11.6–15.1)
EST. AVERAGE GLUCOSE BLD GHB EST-MCNC: 194 MG/DL
GFR SERPL CREATININE-BSD FRML MDRD: 38 ML/MIN/1.73SQ M
GLUCOSE SERPL-MCNC: 114 MG/DL (ref 65–140)
GLUCOSE SERPL-MCNC: 121 MG/DL (ref 65–140)
GLUCOSE SERPL-MCNC: 175 MG/DL (ref 65–140)
GLUCOSE SERPL-MCNC: 186 MG/DL (ref 65–140)
GLUCOSE SERPL-MCNC: 241 MG/DL (ref 65–140)
HBA1C MFR BLD: 8.4 %
HCT VFR BLD AUTO: 50.2 % (ref 36.5–49.3)
HDLC SERPL-MCNC: 34 MG/DL
HGB BLD-MCNC: 16.3 G/DL (ref 12–17)
IMM GRANULOCYTES # BLD AUTO: 0.02 THOUSAND/UL (ref 0–0.2)
IMM GRANULOCYTES NFR BLD AUTO: 0 % (ref 0–2)
LACTATE SERPL-SCNC: 0.9 MMOL/L (ref 0.5–2)
LDLC SERPL CALC-MCNC: 46 MG/DL (ref 0–100)
LYMPHOCYTES # BLD AUTO: 2.77 THOUSANDS/ΜL (ref 0.6–4.47)
LYMPHOCYTES NFR BLD AUTO: 30 % (ref 14–44)
MAGNESIUM SERPL-MCNC: 2.3 MG/DL (ref 1.6–2.6)
MCH RBC QN AUTO: 27.9 PG (ref 26.8–34.3)
MCHC RBC AUTO-ENTMCNC: 32.5 G/DL (ref 31.4–37.4)
MCV RBC AUTO: 86 FL (ref 82–98)
MONOCYTES # BLD AUTO: 0.66 THOUSAND/ΜL (ref 0.17–1.22)
MONOCYTES NFR BLD AUTO: 7 % (ref 4–12)
NEUTROPHILS # BLD AUTO: 5.16 THOUSANDS/ΜL (ref 1.85–7.62)
NEUTS SEG NFR BLD AUTO: 55 % (ref 43–75)
NONHDLC SERPL-MCNC: 70 MG/DL
NRBC BLD AUTO-RTO: 0 /100 WBCS
P AXIS: 39 DEGREES
P AXIS: 5 DEGREES
PHOSPHATE SERPL-MCNC: 3 MG/DL (ref 2.3–4.1)
PLATELET # BLD AUTO: 190 THOUSANDS/UL (ref 149–390)
PMV BLD AUTO: 8.8 FL (ref 8.9–12.7)
POTASSIUM SERPL-SCNC: 4 MMOL/L (ref 3.5–5.3)
PR INTERVAL: 184 MS
PR INTERVAL: 190 MS
PROCALCITONIN SERPL-MCNC: 0.07 NG/ML
PROT SERPL-MCNC: 7.9 G/DL (ref 6.4–8.2)
QRS AXIS: 45 DEGREES
QRS AXIS: 55 DEGREES
QRSD INTERVAL: 86 MS
QRSD INTERVAL: 92 MS
QT INTERVAL: 390 MS
QT INTERVAL: 398 MS
QTC INTERVAL: 447 MS
QTC INTERVAL: 467 MS
RBC # BLD AUTO: 5.85 MILLION/UL (ref 3.88–5.62)
SODIUM SERPL-SCNC: 139 MMOL/L (ref 136–145)
T WAVE AXIS: 55 DEGREES
T WAVE AXIS: 82 DEGREES
TRIGL SERPL-MCNC: 119 MG/DL
TROPONIN I SERPL-MCNC: <0.02 NG/ML
VENTRICULAR RATE: 79 BPM
VENTRICULAR RATE: 83 BPM
WBC # BLD AUTO: 9.37 THOUSAND/UL (ref 4.31–10.16)

## 2021-03-26 PROCEDURE — 93306 TTE W/DOPPLER COMPLETE: CPT | Performed by: INTERNAL MEDICINE

## 2021-03-26 PROCEDURE — 84100 ASSAY OF PHOSPHORUS: CPT | Performed by: INTERNAL MEDICINE

## 2021-03-26 PROCEDURE — 93970 EXTREMITY STUDY: CPT | Performed by: SURGERY

## 2021-03-26 PROCEDURE — 93970 EXTREMITY STUDY: CPT

## 2021-03-26 PROCEDURE — 78580 LUNG PERFUSION IMAGING: CPT

## 2021-03-26 PROCEDURE — 99239 HOSP IP/OBS DSCHRG MGMT >30: CPT | Performed by: PHYSICIAN ASSISTANT

## 2021-03-26 PROCEDURE — 84145 PROCALCITONIN (PCT): CPT | Performed by: INTERNAL MEDICINE

## 2021-03-26 PROCEDURE — 80061 LIPID PANEL: CPT | Performed by: INTERNAL MEDICINE

## 2021-03-26 PROCEDURE — A9540 TC99M MAA: HCPCS

## 2021-03-26 PROCEDURE — 83605 ASSAY OF LACTIC ACID: CPT | Performed by: INTERNAL MEDICINE

## 2021-03-26 PROCEDURE — 84484 ASSAY OF TROPONIN QUANT: CPT | Performed by: INTERNAL MEDICINE

## 2021-03-26 PROCEDURE — 80053 COMPREHEN METABOLIC PANEL: CPT | Performed by: INTERNAL MEDICINE

## 2021-03-26 PROCEDURE — 97167 OT EVAL HIGH COMPLEX 60 MIN: CPT

## 2021-03-26 PROCEDURE — 83735 ASSAY OF MAGNESIUM: CPT | Performed by: INTERNAL MEDICINE

## 2021-03-26 PROCEDURE — G1004 CDSM NDSC: HCPCS

## 2021-03-26 PROCEDURE — 97163 PT EVAL HIGH COMPLEX 45 MIN: CPT

## 2021-03-26 PROCEDURE — 82948 REAGENT STRIP/BLOOD GLUCOSE: CPT

## 2021-03-26 PROCEDURE — 83036 HEMOGLOBIN GLYCOSYLATED A1C: CPT | Performed by: INTERNAL MEDICINE

## 2021-03-26 PROCEDURE — 82550 ASSAY OF CK (CPK): CPT | Performed by: INTERNAL MEDICINE

## 2021-03-26 PROCEDURE — 85025 COMPLETE CBC W/AUTO DIFF WBC: CPT | Performed by: INTERNAL MEDICINE

## 2021-03-26 PROCEDURE — 93005 ELECTROCARDIOGRAM TRACING: CPT

## 2021-03-26 PROCEDURE — 99222 1ST HOSP IP/OBS MODERATE 55: CPT | Performed by: INTERNAL MEDICINE

## 2021-03-26 PROCEDURE — 93010 ELECTROCARDIOGRAM REPORT: CPT | Performed by: INTERNAL MEDICINE

## 2021-03-26 PROCEDURE — 93306 TTE W/DOPPLER COMPLETE: CPT

## 2021-03-26 RX ORDER — RANOLAZINE 500 MG/1
500 TABLET, EXTENDED RELEASE ORAL EVERY 12 HOURS SCHEDULED
Status: DISCONTINUED | OUTPATIENT
Start: 2021-03-26 | End: 2021-03-26 | Stop reason: HOSPADM

## 2021-03-26 RX ORDER — ASPIRIN 81 MG/1
81 TABLET ORAL DAILY
Refills: 0
Start: 2021-03-26 | End: 2021-05-12

## 2021-03-26 RX ORDER — LANOLIN ALCOHOL/MO/W.PET/CERES
3 CREAM (GRAM) TOPICAL ONCE
Status: COMPLETED | OUTPATIENT
Start: 2021-03-26 | End: 2021-03-26

## 2021-03-26 RX ORDER — RANOLAZINE 500 MG/1
500 TABLET, EXTENDED RELEASE ORAL EVERY 12 HOURS SCHEDULED
Qty: 60 TABLET | Refills: 0 | Status: SHIPPED | OUTPATIENT
Start: 2021-03-26 | End: 2021-05-12

## 2021-03-26 RX ADMIN — MELATONIN TAB 3 MG 3 MG: 3 TAB at 00:38

## 2021-03-26 RX ADMIN — ISOSORBIDE MONONITRATE 120 MG: 60 TABLET, EXTENDED RELEASE ORAL at 10:38

## 2021-03-26 RX ADMIN — SUCRALFATE 1 G: 1 TABLET ORAL at 18:08

## 2021-03-26 RX ADMIN — INSULIN LISPRO 2 UNITS: 100 INJECTION, SOLUTION INTRAVENOUS; SUBCUTANEOUS at 16:50

## 2021-03-26 RX ADMIN — HEPARIN SODIUM 5000 UNITS: 5000 INJECTION INTRAVENOUS; SUBCUTANEOUS at 05:49

## 2021-03-26 RX ADMIN — INSULIN LISPRO 1 UNITS: 100 INJECTION, SOLUTION INTRAVENOUS; SUBCUTANEOUS at 13:32

## 2021-03-26 RX ADMIN — RANOLAZINE 500 MG: 500 TABLET, FILM COATED, EXTENDED RELEASE ORAL at 10:41

## 2021-03-26 RX ADMIN — HEPARIN SODIUM 5000 UNITS: 5000 INJECTION INTRAVENOUS; SUBCUTANEOUS at 13:29

## 2021-03-26 RX ADMIN — ESCITALOPRAM OXALATE 10 MG: 10 TABLET ORAL at 10:42

## 2021-03-26 RX ADMIN — SUCRALFATE 1 G: 1 TABLET ORAL at 10:41

## 2021-03-26 RX ADMIN — PROPRANOLOL HYDROCHLORIDE 120 MG: 60 CAPSULE, EXTENDED RELEASE ORAL at 10:38

## 2021-03-26 RX ADMIN — PANTOPRAZOLE SODIUM 20 MG: 20 TABLET, DELAYED RELEASE ORAL at 05:49

## 2021-03-26 RX ADMIN — AMLODIPINE BESYLATE 10 MG: 10 TABLET ORAL at 10:42

## 2021-03-26 RX ADMIN — CLOPIDOGREL BISULFATE 75 MG: 75 TABLET ORAL at 10:42

## 2021-03-26 RX ADMIN — SENNOSIDES 8.6 MG: 8.6 TABLET, FILM COATED ORAL at 10:41

## 2021-03-26 RX ADMIN — ATORVASTATIN CALCIUM 40 MG: 40 TABLET, FILM COATED ORAL at 18:08

## 2021-03-26 NOTE — ASSESSMENT & PLAN NOTE
Lab Results   Component Value Date    HGBA1C 8 4 (H) 03/26/2021     Recent Labs     03/25/21  2033 03/26/21  0720 03/26/21  1128 03/26/21  1331   POCGLU 217* 121 186* 175*     Blood Sugar Average: Last 72 hrs:  (P) 180 2Holding amaryl while here  SSI and accuchecks   Will need close outpatient follow up

## 2021-03-26 NOTE — DISCHARGE SUMMARY
[facility] [address]  Discharge- Devonte Exon 1935, 82 y o  male MRN: [id number]  Unit/Bed#: 406-01 Encounter: [id number]  Primary Care Provider: Ashley Mckay DO   Date and time admitted to hospital: 3/25/2021  1:16 PM    * Anginal syndrome ([address] )  Assessment & Plan  Pt presents with episode of SOB associated with chest pressure and diaphoresis x 30 min  Notes few episodes a week for the last year  Recent admission for the same 3/4-3/6  Work up negative  Troponin negative x 3;  repeat EKG appears stable  Telemetry monitoring stable  Consult to cardiology appreciated - likely angina  Ranexa added to the patient's medication regimen  "Patient has multivessel coronary disease with prior CABG and PCI  He has what sounds like chronic stable angina which has been slowly progressing over time  Agree with initiation of Ranexa 500 mg b I d  and see if this can improve his symptomatology  Troponins are negative no evidence of acute coronary syndrome  Will need close outpatient follow-up with his primary cardiologist "    VQ scan - low probability for PE  Echocardiogram showing EF 65% no regional wall motion abnormalities  Elevated d-dimer  Assessment & Plan  No evidence of DVT  VQ scan - low probability PE  Carotid artery stenosis  Assessment & Plan  Continue plavix, statin  Stage 3 chronic kidney disease  Assessment & Plan  Lab Results   Component Value Date    EGFR 38 03/26/2021    EGFR 32 03/25/2021    EGFR 41 03/05/2021    CREATININE 1 63 (H) 03/26/2021    CREATININE 1 90 (H) 03/25/2021    CREATININE 1 53 (H) 03/05/2021     Cr within baseline    GERD (gastroesophageal reflux disease)  Assessment & Plan  Continue prilosec, carafate      CAD (coronary artery disease)  Assessment & Plan  Hx of MI x 2 per pt   Continue imdur, plavix, ASA, statin   On propranolol     Diabetes mellitus, type II [facility])  Assessment & Plan  Lab Results   Component Value Date    HGBA1C 8 4 (H) 03/26/2021     Recent Labs     03/25/21  2033 03/26/21  0720 03/26/21  1128 03/26/21  1331   POCGLU 217* 121 186* 175*     Blood Sugar Average: Last 72 hrs:  (P) 180 2Holding amaryl while here  SSI and accuchecks   Will need close outpatient follow up  Discharging Physician / Practitioner: Lisa Madrid PA-C  PCP: Magaly Raines DO  Admission Date:   Admission Orders (From admission, onward)     Ordered        03/25/21 1452  Inpatient Admission  Once                   Discharge Date: 03/26/21    Resolved Problems  Date Reviewed: 3/26/2021    None          Consultations During Hospital Stay:  · Cardiology    Procedures Performed:   · none    Significant Findings / Test Results:   Xr Chest 1 View Portable  Result Date: 3/25/2021  Impression: No acute cardiopulmonary disease  Workstation performed: [id number]     Nm Lung Perfusion Imaging (particulate)  Result Date: 3/26/2021  Impression: The probability for pulmonary embolus is low  Workstation performed: [id number]       Echocardiogram 3/26/21:  SUMMARY  LEFT VENTRICLE:  Systolic function was normal  Ejection fraction was estimated to be 65 %  There were no regional wall motion abnormalities  Wall thickness was mildly to moderately increased  There was assymetrical hypertrophy of the septum  Doppler parameters were consistent with abnormal left ventricular relaxation (grade 1 diastolic dysfunction)  LEFT ATRIUM:  The atrium was mildly dilated  MITRAL VALVE:  There was moderate annular calcification  There was mild to moderate regurgitation  TRICUSPID VALVE:  There was mild regurgitation          Incidental Findings:   · none    Test Results Pending at Discharge (will require follow up):   · none     Outpatient Tests Requested:  · none    Complications:  none    Reason for Admission: shortness of breath    Hospital Course:   Daria Gamble is a 82 y o  male patient who originally presented to the hospital on 3/25/2021 due to Shortness of Breath (SOB since discharge from hospital last week )    Patient was essentially admitted for workup of chest pain  Echocardiogram, telemetry and troponin x 3 were all unremarkable  He was seen in consultation by cardiology who felt this was likely due to angina - Ranexa added to his medication regimen  Part of workup did include VQ scan, which was negative  Ultimately the patient needs close outpatient follow up with his cardiologist, which he is agreeable to  Please see above list of diagnoses and related plan for additional information  Condition at Discharge: good     Discharge Day Visit / Exam:     Subjective:  Patient is feeling well today  No c/o SOB today  Vitals: Blood Pressure: 130/72 (03/26/21 1541)  Pulse: 86 (03/26/21 1541)  Temperature: 97 7 °F (36 5 °C) (03/26/21 1541)  Temp Source: Oral (03/26/21 1541)  Respirations: 18 (03/26/21 1541)  Height: 5' 10" (177 8 cm) (03/25/21 1319)  Weight - Scale: 76 8 kg (169 lb 5 oz) (03/25/21 1319)  SpO2: 90 % (03/26/21 1541)  Exam:   Physical Exam  Vitals signs and nursing note reviewed  Constitutional:       General: He is not in acute distress  Appearance: He is not ill-appearing  HENT:      Head: Normocephalic  Neck:      Musculoskeletal: Neck supple  Cardiovascular:      Rate and Rhythm: Normal rate and regular rhythm  Heart sounds: No murmur  Pulmonary:      Effort: Pulmonary effort is normal  No respiratory distress  Breath sounds: Normal breath sounds  No wheezing  Abdominal:      General: There is no distension  Tenderness: There is no abdominal tenderness  Musculoskeletal:      Right lower leg: No edema  Left lower leg: No edema  Skin:     General: Skin is warm and dry  Neurological:      Mental Status: He is alert  Psychiatric:         Mood and Affect: Mood normal            Discharge instructions/Information to patient and family:   See after visit summary for information provided to patient and family  Provisions for Follow-Up Care:  See after visit summary for information related to follow-up care and any pertinent home health orders  Disposition:   Home    Planned Readmission: no     Discharge Statement:  I spent 40 minutes discharging the patient  This time was spent on the day of discharge  I had direct contact with the patient on the day of discharge  Greater than 50% of the total time was spent examining patient, answering all patient questions, arranging and discussing plan of care with patient as well as directly providing post-discharge instructions  Additional time then spent on discharge activities  Discharge Medications:  See after visit summary for reconciled discharge medications provided to patient and family        ** Please Note: This note has been constructed using a voice recognition system **

## 2021-03-26 NOTE — CASE MANAGEMENT
As per physician during inner disciplinary discharge planning meeting patient is not ready for discharge  Pt needs a VQ scan  Pt is Active with [facility]  Pt is a readmission

## 2021-03-26 NOTE — PLAN OF CARE
Problem: PHYSICAL THERAPY ADULT  Goal: Performs mobility at highest level of function for planned discharge setting  See evaluation for individualized goals  Description: Treatment/Interventions: Functional transfer training, LE strengthening/ROM, Elevations, Therapeutic exercise, Endurance training, Bed mobility, Gait training          See flowsheet documentation for full assessment, interventions and recommendations  Note: Prognosis: Good  Problem List: Decreased strength, Decreased endurance, Impaired balance, Decreased mobility  Assessment: Patient is a 82 y o  male evaluated by Physical Therapy s/p admit to [address] [place] [address] on 3/25/2021 with admitting diagnosis of: CAD (coronary artery disease), Dyspnea, SOB (shortness of breath), CKD (chronic kidney disease) stage 3, GFR 30-59 ml/min, Hyperglycemia due to diabetes mellitus, and principal problem of: Shortness of breath  PT was consulted to assess patient's functional mobility and discharge needs  Ordered are PT Evaluation and treatment with activity level of: up and OOB as tolerated  Comorbidities affecting patient's physical performance at time of assessment include: DM, HLD, HTN, athscl heart disease, hx of MI, cancer, GERD, CAD, DM, HTN  Personal factors affecting the patient at time of IE include: ambulating with assistive device, step(s) to enter home, history of fall(s) and inability/difficulty performing IADLs  Please locate objective findings from PT assessment regarding body systems outlined above  Upon evaluation, pt able to perform all functional mobility with SUP and increased time  Pt ambulating throughout room without AD but requesting RW for hallway distances  Pt able to ambulate 200' before requiring seated rest break  SOB reported with ambulation, however SpO2 and HR remained WFL on RA throughout  No LOB experienced  The patient's AM-PAC Basic Mobility Inpatient Short Form Raw Score is 23, Standardized Score is 50  88   A standardized score greater than 42 9 suggests the patient may benefit from discharge to home  Please also refer to the recommendation of the Physical Therapist for safe discharge planning  Pt will benefit from continued PT intervention during LOS to address current deficits, increase LOF, and facilitate safe d/c to next level of care when medically appropriate  D/c recommendation at this time is home with no needs  PT Discharge Recommendation: Return to previous environment with no needs          See flowsheet documentation for full assessment

## 2021-03-26 NOTE — CONSULTS
Consultation - Cardiology   Verda Boeck 82 y o  male MRN: [id number]  Unit/Bed#: 406-01 Encounter: [id number]    Inpatient consult to Cardiology  Consult performed by: Justina Valle PA-C  Consult ordered by: Pablo Loco DO            Physician Requesting Consult: Pablo Loco DO  Reason for Consult / Principal Problem: coronary artery disease, dyspnea    Assessment/Plan:  1  Shortness of breath   - likely anginal equivalent, shortness of breath has been associated with chest discomfort   - troponin has been negative, EKG is without acute ischemic changes   - CXR without vascular congestion/effusion, BNP minimally elevated at 644, no signs of volume overload on exam   - given elevated d-dimer he will undergo a V/Q scan   - see plan for coronary artery disease below    2  Coronary artery disease   - with extensive cardiac history as detailed below   - troponin has been negative, EKG is without acute ischemic changes   - would simply attempt to manage medically for now - given nature of grafts, age, CKD, would avoid cath unless absolutely necessary   - continue plavix, statin, beta-blocker, Imdur   - will add Ranexa 500 mg BID   - needs outpatient f/u with his primary cardiologist at discharge    3  Hypertension   - overall acceptably controlled    4  Carotid artery stenosis   - continue plavix, statin   - follows with [facility]      History of Present Illness   HPI: Verda Boeck is a 82y o  year old male with coronary artery disease s/p CABG x 2 in 1992 - VG-circumflex, VG-RCA, CABG x 3 in 2005 - LIMA-D1 and LAD, VG -PDA and PLB, radial graft to OM2 and OM1, and PCI/stenting to the graft to OM1 in 2009  LIMA was patent at that time, carotid artery stenosis s/p left CEA in 2013, hypertension, dyslipidemia, diabetes who follows with Dr Saba Teresa  The patient presented to the ER yesterday for the evaluation of dyspnea on exertion and chest discomfort   For the past 1-2 months patient has noticed worsening dyspnea on exertion  He has had difficulty walking from room to room and ascending stairs without becoming short of breath  When he ambulates he also will typically get a pressure in the center of his chest  He has had a few incidences of chest discomfort at rest as well  He reports the discomfort improves with deep breathing and resting  He denies any lower extremity edema, orthopnea, and PND  He denies lightheadedness, dizziness, palpitations, and syncope  Upon admission he had a CXR which did not show any acute abnormality  Troponin has been negative x 3  EKG showed sinus rhythm with prior inferior infarct, unchanged  D-dimer was elevated and he is to undergo a V/Q scan and bilateral lower extremity venous duplex today  Cardiology was consulted for further evaluation and management  Review of Systems   Constitution: Negative for chills and fever  HENT: Negative  Cardiovascular: Positive for chest pain and dyspnea on exertion  Negative for leg swelling, near-syncope, orthopnea, palpitations, paroxysmal nocturnal dyspnea and syncope  Respiratory: Negative for cough and shortness of breath  Gastrointestinal: Negative for diarrhea, nausea and vomiting  Genitourinary: Negative  Neurological: Negative for dizziness and light-headedness  All other systems reviewed and are negative  Historical Information   Past Medical History:   Diagnosis Date    Arthritis     Athscl 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     Stent OM1   Patent mammary graft to LAD 10/7/2009; CABG 1992 & 2005    Cancer [facility])     skin    Coronary artery disease     Diabetes mellitus [facility] [address] )     Diabetes mellitus, type II ([facility] [address] )     without complication    Epistaxis     Essential (primary) hypertension     GERD (gastroesophageal reflux disease)     Hx of cardiovascular stress test 07/23/2014    Eddie MPI; EF0 52 (52%) Lexiscan, mild LV systolic dysfunction; evidence for prior inferior wall MI; perfusion imaging consistant w mild lat wall ischemia and min torin-infart inferior ischemia   / EF0 51 (51%) evidence for prior inferior wall MI  Mild inferior and mild-moderate lateral wall ischemia  7/29/15    Hx of echocardiogram 2017    2D w/CFD;EF0 55 (55%) mild LVH, mitral valve prolapse with moderate regurgitation  left atrial enlargement  Mild tricuspid regurgitation   Hypertension     Mixed hyperlipidemia     Occlusion and stenosis of bilateral carotid arteries     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 infarction)     Presence of aortocoronary bypass graft      Past Surgical History:   Procedure Laterality Date    CARDIAC CATHETERIZATION  10/07/2009    Stent 99% stenosis SVG from the aorta to OM1  Patent mammary graft to the LAD    CORONARY ARTERY BYPASS GRAFT      VG-CX, VG-RCA    CORONARY ARTERY BYPASS GRAFT  2005    LIMA-D1-LAD, VG-PDA-PLB, Hnensa-TC8-LD6 TMR 9 Lesions    [place] EGD TRANSORAL BIOPSY SINGLE/MULTIPLE N/A 2019    Procedure: ESOPHAGOGASTRODUODENOSCOPY (EGD) with biopsy;  Surgeon: Pamela Anguiano MD;  Location: [address] [place] [address] GI LAB; Service: Gastroenterology    TONSILLECTOMY       Social History     Substance and Sexual Activity   Alcohol Use Never    Frequency: Never     Social History     Substance and Sexual Activity   Drug Use No     Social History     Tobacco Use   Smoking Status Former Smoker    Packs/day: 1 00    Years: 40 00    Pack years: 40 00    Quit date: 1977    Years since quittin 2   Smokeless Tobacco Never Used     Family History: non-contributory    Meds/Allergies   all current active meds have been reviewed       Allergies   Allergen Reactions    Advil [Ibuprofen] Fatigue       Objective   Vitals: Blood pressure 131/72, pulse 75, temperature 98 4 °F (36 9 °C), temperature source Oral, resp   rate 18, height 5' 10" (1 778 m), weight 76 8 kg (169 lb 5 oz), SpO2 91 % , Body mass index is 24 29 kg/m² ,   Orthostatic Blood Pressures      Most Recent Value   Blood Pressure  131/72 filed at 03/26/2021 0720   Patient Position - Orthostatic VS  Lying filed at 03/26/2021 5028        Systolic (82ZPD), SZV:527 , Min:113 , JULISSA:068     Diastolic (37VHR), IMB:86, Min:55, Max:86      Intake/Output Summary (Last 24 hours) at 3/26/2021 0934  Last data filed at 3/26/2021 0855  Gross per 24 hour   Intake 480 ml   Output --   Net 480 ml       Weight (last 2 days)     Date/Time   Weight    03/25/21 1319   76 8 (169 31)              Invasive Devices     Peripheral Intravenous Line            Peripheral IV 03/25/21 Right Antecubital less than 1 day                  Physical Exam  Vitals signs reviewed  Constitutional:       General: He is not in acute distress  Appearance: He is well-developed  He is not diaphoretic  HENT:      Head: Normocephalic and atraumatic  Eyes:      Pupils: Pupils are equal, round, and reactive to light  Neck:      Musculoskeletal: Normal range of motion  Vascular: No carotid bruit  Cardiovascular:      Rate and Rhythm: Normal rate and regular rhythm  Pulses:           Radial pulses are 2+ on the right side and 2+ on the left side  Heart sounds: S1 normal and S2 normal  No murmur  Pulmonary:      Effort: Pulmonary effort is normal  No respiratory distress  Breath sounds: Normal breath sounds  No wheezing or rales  Abdominal:      General: There is no distension  Palpations: Abdomen is soft  Tenderness: There is no abdominal tenderness  Musculoskeletal: Normal range of motion  Right lower leg: No edema  Left lower leg: No edema  Skin:     General: Skin is warm and dry  Findings: No erythema  Neurological:      General: No focal deficit present  Mental Status: He is alert and oriented to person, place, and time     Psychiatric:         Mood and Affect: Mood normal          Behavior: Behavior normal              Laboratory Results:  Results from last 7 days   Lab Units 03/26/21  0604 03/26/21  0122 03/25/21  2223 03/25/21  1900   CK TOTAL U/L 19*  --   --   --    TROPONIN I ng/mL  --  <0 02 <0 02 0 02       CBC with diff:   Results from last 7 days   Lab Units 03/26/21  0604 03/25/21  1900 03/25/21  1349   WBC Thousand/uL 9 37  --  9 75   HEMOGLOBIN g/dL 16 3  --  16 3   HEMATOCRIT % 50 2*  --  50 2*   MCV fL 86  --  86   PLATELETS Thousands/uL 190 219 237   MCH pg 27 9  --  28 0   MCHC g/dL 32 5  --  32 5   RDW % 12 7  --  12 7   MPV fL 8 8* 8 8* 9 2   NRBC AUTO /100 WBCs 0  --  0         CMP:  Results from last 7 days   Lab Units 03/26/21  0604 03/25/21  1349   POTASSIUM mmol/L 4 0 4 6   CHLORIDE mmol/L 103 100   CO2 mmol/L 27 26   BUN mg/dL 25 27*   CREATININE mg/dL 1 63* 1 90*   CALCIUM mg/dL 10 2* 9 9   AST U/L 26 30   ALT U/L 37 35   ALK PHOS U/L 85 85   EGFR ml/min/1 73sq m 38 32         BMP:  Results from last 7 days   Lab Units 03/26/21  0604 03/25/21  1349   POTASSIUM mmol/L 4 0 4 6   CHLORIDE mmol/L 103 100   CO2 mmol/L 27 26   BUN mg/dL 25 27*   CREATININE mg/dL 1 63* 1 90*   CALCIUM mg/dL 10 2* 9 9       BNP:  No results for input(s): BNP in the last 72 hours  Magnesium:   Results from last 7 days   Lab Units 03/26/21  0604 03/25/21  1349   MAGNESIUM mg/dL 2 3 2 2       Coags:   Results from last 7 days   Lab Units 03/25/21  1349   PTT seconds 23   INR  1 06       TSH:       Hemoglobin A1C       Lipid Profile:   Results from last 7 days   Lab Units 03/26/21  0604   TRIGLYCERIDES mg/dL 119   HDL mg/dL 34*       Cardiac testing:   No results found for this or any previous visit  No results found for this or any previous visit  No results found for this or any previous visit  No results found for this or any previous visit  Imaging: I have personally reviewed pertinent reports  Xr Chest 1 View Portable    Result Date: 3/25/2021  Narrative: CHEST INDICATION:  Dyspnea   COMPARISON:  Chest radiograph and CT chest 3/4/2021 EXAM PERFORMED/VIEWS:  XR CHEST PORTABLE FINDINGS:  The patient is slightly rotated to the right  Cardiomediastinal silhouette appears stable  Status post median sternotomy  No acute infiltrates  No pneumothorax  Blunted left costophrenic angle, unchanged  This may reflect chronic pleural thickening/scarring on previous CT  Osseous structures appear within normal limits for patient age  Impression: No acute cardiopulmonary disease  Workstation performed: [id number]     Xr Ribs With Pa Chest Min 3 Views Right    Result Date: 3/4/2021  Narrative: RIGHT RIBS AND CHEST INDICATION:   r/o fx, fall 2 weeks ago  COMPARISON:  Two-view chest 8/12/2020  VIEWS:  XR RIBS RIGHT W PA CHEST MIN 3 VIEWS FINDINGS: Median sternotomy wires  Cardiomegaly  Central vascular congestion  Probable left basilar pleural effusion  There is no pneumothorax  No rib fractures are identified  Impression: No acute cardiopulmonary disease  No evidence of rib fractures  Workstation performed: [id number]     Xr Spine Lumbar 2 Or 3 Views Injury    Addendum Date: 2/26/2021 Addendum:   ADDENDUM: Additional history obtained of right posterior thoracolumbar costal margin tenderness  Potential nondisplaced fracture posterior medial right 11th rib on the frontal view  I personally discussed additional history and finding on this study with Aquilino Flower on 2/26/2021 at 10:27 AM      Result Date: 2/26/2021  Narrative: LUMBAR SPINE INDICATION:   W19  XXXA: Unspecified fall, initial encounter  COMPARISON:  Lumbar spine radiograph 5/30/2011 VIEWS:  XR SPINE LUMBAR 2 OR 3 VIEWS INJURY FINDINGS: There are 5 non rib bearing lumbar vertebral bodies  There is no evidence of acute fracture or destructive osseous lesion  Alignment is unremarkable  Tiny marginal osteophytes at all lumbar levels and lower thoracic spine  Disc spaces fairly well maintained  Minimal facet degenerative changes at L4-5 and L5-S1  The pedicles appear intact  Atherosclerotic vascular disease is present       Impression: No acute osseous abnormality  Degenerative changes as described  Workstation performed: [id number]     Ct Chest Wo Contrast    Result Date: 3/5/2021  Narrative: CT CHEST WITHOUT IV CONTRAST INDICATION:   Shortness of breath abnormal chest xray findings  Shikha Smith note reviewed, being treated for hypertensive crisis COMPARISON:  Rib x-ray from the same day chest CT 1/29/12 TECHNIQUE: CT examination of the chest was performed without intravenous contrast   Axial, sagittal, and coronal 2D reformatted images were created from the source data and submitted for interpretation  Radiation dose length product (DLP) for this visit:  361 5 mGy-cm   This examination, like all CT scans performed in the [facility], was performed utilizing techniques to minimize radiation dose exposure, including the use of iterative reconstruction and automated exposure control  FINDINGS: LUNGS:  Chronic interstitial septal markings are noted in the periphery PLEURA:  Unremarkable  HEART/GREAT VESSELS:  Unremarkable for patient's age  MEDIASTINUM AND RENARD:  Unremarkable  CHEST WALL AND LOWER NECK:   Unremarkable  VISUALIZED STRUCTURES IN THE UPPER ABDOMEN:  Unremarkable  OSSEOUS STRUCTURES:  No acute fracture or destructive osseous lesion    Sternotomy wires     Impression: No significant abnormality Workstation performed: [id number]       EKG reviewed personally: sinus rhythm, prior inferior infarct  Telemetry reviewed personally: sinus rhythm with PAC'S/PVC's    Assessment:  Principal Problem:    Shortness of breath  Active Problems:    Diabetes mellitus, type II (HCC)    CAD (coronary artery disease)    GERD (gastroesophageal reflux disease)    Stage 3 chronic kidney disease    Carotid artery stenosis      Code Status: Level 1 - Full Code

## 2021-03-26 NOTE — NURSING NOTE
Went over AVS with pateint  Verbalized understanding of instructions  Left via wheelchair down to private vehicle with PCA

## 2021-03-26 NOTE — PLAN OF CARE
Problem: PAIN - ADULT  Goal: Verbalizes/displays adequate comfort level or baseline comfort level  Description: Interventions:  - Encourage patient to monitor pain and request assistance  - Assess pain using appropriate pain scale  - Administer analgesics based on type and severity of pain and evaluate response  - Implement non-pharmacological measures as appropriate and evaluate response  - Consider cultural and social influences on pain and pain management  - Notify physician/advanced practitioner if interventions unsuccessful or patient reports new pain  Outcome: Progressing     Problem: INFECTION - ADULT  Goal: Absence or prevention of progression during hospitalization  Description: INTERVENTIONS:  - Assess and monitor for signs and symptoms of infection  - Monitor lab/diagnostic results  - Monitor all insertion sites, i e  indwelling lines, tubes, and drains  - Monitor endotracheal if appropriate and nasal secretions for changes in amount and color  - [place] appropriate cooling/warming therapies per order  - Administer medications as ordered  - Instruct and encourage patient and family to use good hand hygiene technique  - Identify and instruct in appropriate isolation precautions for identified infection/condition  Outcome: Progressing     Problem: SAFETY ADULT  Goal: Patient will remain free of falls  Description: INTERVENTIONS:  - Assess patient frequently for physical needs  -  Identify cognitive and physical deficits and behaviors that affect risk of falls    -  [place] fall precautions as indicated by assessment   - Educate patient/family on patient safety including physical limitations  - Instruct patient to call for assistance with activity based on assessment  - Modify environment to reduce risk of injury  - Consider OT/PT consult to assist with strengthening/mobility  Outcome: Progressing  Goal: Maintain or return to baseline ADL function  Description: INTERVENTIONS:  -  Assess patient's ability to carry out ADLs; assess patient's baseline for ADL function and identify physical deficits which impact ability to perform ADLs (bathing, care of mouth/teeth, toileting, grooming, dressing, etc )  - Assess/evaluate cause of self-care deficits   - Assess range of motion  - Assess patient's mobility; develop plan if impaired  - Assess patient's need for assistive devices and provide as appropriate  - Encourage maximum independence but intervene and supervise when necessary  - Involve family in performance of ADLs  - Assess for home care needs following discharge   - Consider OT consult to assist with ADL evaluation and planning for discharge  - Provide patient education as appropriate  Outcome: Progressing  Goal: Maintain or return mobility status to optimal level  Description: INTERVENTIONS:  - Assess patient's baseline mobility status (ambulation, transfers, stairs, etc )    - Identify cognitive and physical deficits and behaviors that affect mobility  - Identify mobility aids required to assist with transfers and/or ambulation (gait belt, sit-to-stand, lift, walker, cane, etc )  - [place] fall precautions as indicated by assessment  - Record patient progress and toleration of activity level on Mobility SBAR; progress patient to next Phase/Stage  - Instruct patient to call for assistance with activity based on assessment  - Consider rehabilitation consult to assist with strengthening/weightbearing, etc   Outcome: Progressing     Problem: DISCHARGE PLANNING  Goal: Discharge to home or other facility with appropriate resources  Description: INTERVENTIONS:  - Identify barriers to discharge w/patient and caregiver  - Arrange for needed discharge resources and transportation as appropriate  - Identify discharge learning needs (meds, wound care, etc )  - Arrange for interpretive services to assist at discharge as needed  - Refer to Case Management Department for coordinating discharge planning if the patient needs post-hospital services based on physician/advanced practitioner order or complex needs related to functional status, cognitive ability, or social support system  Outcome: Progressing     Problem: Knowledge Deficit  Goal: Patient/family/caregiver demonstrates understanding of disease process, treatment plan, medications, and discharge instructions  Description: Complete learning assessment and assess knowledge base  Interventions:  - Provide teaching at level of understanding  - Provide teaching via preferred learning methods  Outcome: Progressing     Problem: Potential for Falls  Goal: Patient will remain free of falls  Description: INTERVENTIONS:  - Assess patient frequently for physical needs  -  Identify cognitive and physical deficits and behaviors that affect risk of falls    -  [place] fall precautions as indicated by assessment   - Educate patient/family on patient safety including physical limitations  - Instruct patient to call for assistance with activity based on assessment  - Modify environment to reduce risk of injury  - Consider OT/PT consult to assist with strengthening/mobility  Outcome: Progressing

## 2021-03-26 NOTE — CASE MANAGEMENT
Met with pt to discuss role as  in helping pt to develop discharge plan and to help pt carry out their plan  Pt's current LOS is  1 day   Pt is a Risk of Unplanned Readmission score of  14    Pt is a 30 day readmission  Pt lives in a 2nd floor apartment alone  Pt has [address] with a railing  Pt said he sometimes has problems getting up the steps   He is in the process of trying to find another apartment  Pt has a walker, cane and glucometer at Home  Recently patient has been using both the walker and cane depending on how he feels   Pt is getting a life alert delivered on Tuesday  Pt is independent with ADL's and functional mobility  Pt does his own cooking and cleaning  Pt's son , DIL and adult grand daughter live close by and are very supportive  His son drives him to appts  [facility] is active with [facility]  Pt has never been to [facility] before  PCP: Tabby Mora  Cardiologist : Dr Cl Mckeon  Preferred Pharmacy : [facility]   Pt with no barriers to getting his medications  Pt is a readmission  Pt was discharged on 3/6/21 from [facility] after being treated for Hypertensive Urgency and SOB  Pt was active with  [facility]  I will continue to follow for any Case Management needs

## 2021-03-26 NOTE — OCCUPATIONAL THERAPY NOTE
Occupational Therapy Evaluation     Patient Name: Aurora Aponte  QJRHS'N Date: 3/26/2021  Problem List  Principal Problem:    Shortness of breath  Active Problems:    Diabetes mellitus, type II ([facility] [address] )    CAD (coronary artery disease)    GERD (gastroesophageal reflux disease)    Stage 3 chronic kidney disease    Carotid artery stenosis    Past Medical History  Past Medical History:   Diagnosis Date    Arthritis     Athscl 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     Stent OM1  Patent mammary graft to LAD 10/7/2009; CABG 1992 & 2005    Cancer [facility])     skin    Coronary artery disease     Diabetes mellitus ([facility] [address] )     [facility], [facility]     without complication    Epistaxis     Essential (primary) hypertension     GERD (gastroesophageal reflux disease)     Hx of cardiovascular stress test 07/23/2014    Eddie MPI; EF0 52 (52%) Lexiscan, mild LV systolic dysfunction; evidence for prior inferior wall MI; perfusion imaging consistant w mild lat wall ischemia and min torin-infart inferior ischemia   / EF0 51 (51%) evidence for prior inferior wall MI  Mild inferior and mild-moderate lateral wall ischemia  7/29/15    Hx of echocardiogram 11/07/2017    2D w/CFD;EF0 55 (55%) mild LVH, mitral valve prolapse with moderate regurgitation  left atrial enlargement  Mild tricuspid regurgitation   Hypertension     Mixed hyperlipidemia     Occlusion and stenosis of bilateral carotid arteries     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 infarction)     Presence of aortocoronary bypass graft      Past Surgical History  Past Surgical History:   Procedure Laterality Date    CARDIAC CATHETERIZATION  10/07/2009    Stent 99% stenosis SVG from the aorta to OM1   Patent mammary graft to the LAD    CORONARY ARTERY BYPASS GRAFT  1992    VG-CX, VG-RCA    CORONARY ARTERY BYPASS GRAFT  11/04/2005    LIMA-D1-LAD, VG-PDA-PLB, [id number] TMR 9 Lesions    MA EGD TRANSORAL BIOPSY SINGLE/MULTIPLE N/A 1/23/2019    Procedure: ESOPHAGOGASTRODUODENOSCOPY (EGD) with biopsy;  Surgeon: Hector Helton MD;  Location: [facility] GI LAB; Service: Gastroenterology    TONSILLECTOMY               03/26/21 0905   OT Last Visit   OT Visit Date 03/26/21   Note Type   Note type Evaluation   Restrictions/Precautions   Weight Bearing Precautions Per Order No   Other Precautions Fall Risk   Pain Assessment   Pain Assessment Tool Pain Assessment not indicated - pt denies pain   Home Living   Type of [address] One level;Performs ADLs on one level; Able to live on main level with bedroom/bathroom;Stairs to enter with rails; Other (Comment)  (25 LEONARD c HR)   Bathroom Shower/Tub Tub/shower unit   Bathroom Toilet Standard   Bathroom Accessibility [place]; Other (Comment)  (4ww)   Additional Comments pt performs functional mobility with SPC at baseline    Prior Function   Level of [place] Independent with ADLs and functional mobility   Lives With Alone   ADL Assistance Independent   IADLs Independent   Falls in the last 6 months 1 to 4   Vocational Retired   Comments pt does not drive; famiy (A)   Psychosocial   Psychosocial (WDL) WDL   Subjective   Subjective "I get winded easily"   ADL   Where Assessed Other (Comment)  (bathroom)   LB Dressing Assistance 5  Supervision/Setup   Toileting Assistance  5  Supervision/Setup   Additional Comments pt performs functional tasks in bathroom with (S) level    Bed Mobility   Supine to Sit   (pt standing at start of session)   Sit to Supine   (pt seated in chair at end of session)   Additional Comments pt on RA during session with minimal SOB during session   Transfers   Sit to Stand 5  Supervision   Additional items Increased time required;Verbal cues  (RW)   Stand to Sit 5  Supervision   Additional items Increased time required;Verbal cues  (RW)   Additional Comments pt performed with (S) and mild instability at times with ability to self-correct   Functional Mobility   Functional Mobility 5  Supervision   Additional Comments pt performed functional mobility with RW; pt with no significant LOB or instability; performed ~200ft   Additional items Rolling walker   Balance   Static Sitting Good   Dynamic Sitting Good   Static Standing Fair +   Dynamic Standing Fair +   Ambulatory Fair +   Activity Tolerance   Activity Tolerance Patient limited by fatigue   RUE Assessment   RUE Assessment WFL   LUE Assessment   LUE Assessment WFL   Hand Function   Gross Motor Coordination Functional   Fine Motor Coordination Functional   Sensation   Light Touch No apparent deficits   Sharp/Dull No apparent deficits   Cognition   Overall Cognitive Status WFL   Arousal/Participation Alert   Attention Within functional limits   Orientation Level Oriented X4   Memory Within functional limits   Following Commands Follows all commands and directions without difficulty   Assessment   Limitation Decreased ADL status; Decreased UE strength;Decreased Safe judgement during ADL;Decreased endurance;Decreased self-care trans;Decreased high-level ADLs   Assessment Pt is a 82 y o  male seen for OT evaluation s/p admit to [facility] on 3/25/2021 w/ Shortness of breath  Comorbidities affecting pt's functional performance at time of assessment include: DM, hyperlipidemia, HTN, bilateral carotid arteries, MI, GERD, arthritis  Personal factors affecting pt at time of IE include:limited home support, difficulty performing ADLS, difficulty performing IADLS , limited insight into deficits, decreased initiation and engagement  and health management   Prior to admission, pt was (I) with ADLs and (A) with IADLs with use of SPC during mobility  Upon evaluation: Pt requires (S) level with use of RW during mobility 2* the following deficits impacting occupational performance: weakness, decreased strength, decreased balance, decreased tolerance, impaired initiation and decreased safety awareness   Pt to benefit from continued skilled OT tx while in the hospital to address deficits as defined above and maximize level of functional independence w ADL's and functional mobility  Occupational Performance areas to address include: grooming, bathing/shower, toilet hygiene, dressing, functional mobility, community mobility and clothing management  The patient's raw score on the AM-PAC Daily Activity inpatient short form is 22, standardized score is 47 1, greater than 39 4  Patients at this level are likely to benefit from discharge to home  Please refer to the recommendation of the Occupational Therapist for safe discharge planning  Goals   Patient Goals to go home    Short Term Goal  pt will perform UE strengthening exercises    Long Term Goal #1 pt will demonstrate UB/LB bathing tasks at (I) level   Long Term Goal #2 pt will demonstrate toilet transfers and hygiene at (I) level    Long Term Goal pt will demonstrate functional mobility with RW at mod (I) level   Plan   Treatment Interventions ADL retraining;Functional transfer training;UE strengthening/ROM; Endurance training;Patient/family training;Equipment evaluation/education; Activityengagement   Goal Expiration Date 04/09/21   OT Frequency 3-5x/wk   Recommendation   OT Discharge Recommendation Return to previous environment with no needs   AM-PAC Daily Activity Inpatient   Lower Body Dressing 3   Bathing 3   Toileting 4   Upper Body Dressing 4   Grooming 4   Eating 4   Daily Activity Raw Score 22   Daily Activity Standardized Score (Calc for Raw Score >=11) 47  1   AM-[facility] Applied Cognition Inpatient   Following a Speech/Presentation 4   Understanding Ordinary Conversation 4   Taking Medications 4   Remembering Where Things Are Placed or Put Away 3   Remembering List of 4-5 Errands 3   Taking Care of Complicated Tasks 3   Applied Cognition Raw Score 21   Applied Cognition Standardized Score 44 3     Pt will benefit from continued OT services in order to maximize (I) c ADL performance, FM c RW, and improve overall endurance/strength required to complete functional tasks in preparation for d/c  Pt left seated in chair at end of session; all needs within reach; all lines intact

## 2021-03-26 NOTE — PLAN OF CARE
Problem: OCCUPATIONAL THERAPY ADULT  Goal: Performs self-care activities at highest level of function for planned discharge setting  See evaluation for individualized goals  Description: Treatment Interventions: ADL retraining, Functional transfer training, UE strengthening/ROM, Endurance training, Patient/family training, Equipment evaluation/education, Activityengagement          See flowsheet documentation for full assessment, interventions and recommendations  Note: Limitation: Decreased ADL status, Decreased UE strength, Decreased Safe judgement during ADL, Decreased endurance, Decreased self-care trans, Decreased high-level ADLs     Assessment: Pt is a 82 y o  male seen for OT evaluation s/p admit to [facility] on 3/25/2021 w/ Shortness of breath  Comorbidities affecting pt's functional performance at time of assessment include: DM, hyperlipidemia, HTN, bilateral carotid arteries, MI, GERD, arthritis  Personal factors affecting pt at time of IE include:limited home support, difficulty performing ADLS, difficulty performing IADLS , limited insight into deficits, decreased initiation and engagement  and health management   Prior to admission, pt was (I) with ADLs and (A) with IADLs with use of SPC during mobility  Upon evaluation: Pt requires (S) level with use of RW during mobility 2* the following deficits impacting occupational performance: weakness, decreased strength, decreased balance, decreased tolerance, impaired initiation and decreased safety awareness  Pt to benefit from continued skilled OT tx while in the hospital to address deficits as defined above and maximize level of functional independence w ADL's and functional mobility  Occupational Performance areas to address include: grooming, bathing/shower, toilet hygiene, dressing, functional mobility, community mobility and clothing management   The patient's raw score on the AM-PAC Daily Activity inpatient short form is 22, standardized score is 47 1, greater than 39 4  Patients at this level are likely to benefit from discharge to home  Please refer to the recommendation of the Occupational Therapist for safe discharge planning       OT Discharge Recommendation: Return to previous environment with no needs

## 2021-03-26 NOTE — ASSESSMENT & PLAN NOTE
Pt presents with episode of SOB associated with chest pressure and diaphoresis x 30 min  Notes few episodes a week for the last year  Recent admission for the same 3/4-3/6  Work up negative  Troponin negative x 3;  repeat EKG appears stable  Telemetry monitoring stable  Consult to cardiology appreciated - likely angina  Ranexa added to the patient's medication regimen  "Patient has multivessel coronary disease with prior CABG and PCI  He has what sounds like chronic stable angina which has been slowly progressing over time  Agree with initiation of Ranexa 500 mg b I d  and see if this can improve his symptomatology  Troponins are negative no evidence of acute coronary syndrome  Will need close outpatient follow-up with his primary cardiologist "    VQ scan - low probability for PE  Echocardiogram showing EF 65% no regional wall motion abnormalities

## 2021-03-26 NOTE — ASSESSMENT & PLAN NOTE
Lab Results   Component Value Date    EGFR 38 03/26/2021    EGFR 32 03/25/2021    EGFR 41 03/05/2021    CREATININE 1 63 (H) 03/26/2021    CREATININE 1 90 (H) 03/25/2021    CREATININE 1 53 (H) 03/05/2021     Cr within baseline

## 2021-03-26 NOTE — PHYSICAL THERAPY NOTE
Physical Therapy Evaluation    Patient Name: Devonte Heard    JKQHE'O Date: 3/26/2021     Problem List  Principal Problem:    Shortness of breath  Active Problems:    Diabetes mellitus, type II ([facility] [address] )    CAD (coronary artery disease)    GERD (gastroesophageal reflux disease)    Stage 3 chronic kidney disease    Carotid artery stenosis       Past Medical History  Past Medical History:   Diagnosis Date    Arthritis     Athscl 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     Stent OM1  Patent mammary graft to LAD 10/7/2009; CABG 1992 & 2005    Cancer [facility])     skin    Coronary artery disease     Diabetes mellitus ([facility] )     Diabetes mellitus, type II ([facility] )     without complication    Epistaxis     Essential (primary) hypertension     GERD (gastroesophageal reflux disease)     Hx of cardiovascular stress test 07/23/2014    Eddie MPI; EF0 52 (52%) Lexiscan, mild LV systolic dysfunction; evidence for prior inferior wall MI; perfusion imaging consistant w mild lat wall ischemia and min torin-infart inferior ischemia   / EF0 51 (51%) evidence for prior inferior wall MI  Mild inferior and mild-moderate lateral wall ischemia  7/29/15    Hx of echocardiogram 11/07/2017    2D w/CFD;EF0 55 (55%) mild LVH, mitral valve prolapse with moderate regurgitation  left atrial enlargement  Mild tricuspid regurgitation   Hypertension     Mixed hyperlipidemia     Occlusion and stenosis of bilateral carotid arteries     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 infarction)     Presence of aortocoronary bypass graft         Past Surgical History  Past Surgical History:   Procedure Laterality Date    CARDIAC CATHETERIZATION  10/07/2009    Stent 99% stenosis SVG from the aorta to OM1   Patent mammary graft to the LAD    CORONARY ARTERY BYPASS GRAFT  1992    VG-CX, VG-RCA    CORONARY ARTERY BYPASS GRAFT  11/04/2005    LIMA-D1-LAD, VG-PDA-PLB, [id number] TMR 9 Lesions    [place] EGD TRANSORAL BIOPSY SINGLE/MULTIPLE N/A 1/23/2019    Procedure: ESOPHAGOGASTRODUODENOSCOPY (EGD) with biopsy;  Surgeon: Garry Soler MD;  Location: [address] [place] [address] GI LAB; Service: Gastroenterology    TONSILLECTOMY             03/26/21 0906   PT Last Visit   PT Visit Date 03/26/21   Note Type   Note type Evaluation   Pain Assessment   Pain Assessment Tool Pain Assessment not indicated - pt denies pain   Pain Score No Pain   Home Living   Type of [address] One level;Stairs to enter with rails  (25 LEONARD c HR)   Bathroom Shower/Tub Tub/shower unit   Bathroom Toilet Standard   Bathroom Accessibility Accessible   Home Equipment Walker;Cane   Additional Comments pt reports use of cane at baseline   Prior Function   Level of [place] Independent with ADLs and functional mobility   Lives With Alone   ADL Assistance Independent   IADLs Independent   Falls in the last 6 months 1 to 4   Vocational Retired   Comments (-) driving   Restrictions/Precautions   [place] Bearing Precautions Per Order No   Other Precautions Fall Risk   General   Family/Caregiver Present No   Cognition   Overall Cognitive Status WFL   Arousal/Participation Alert   Attention Within functional limits   Orientation Level Oriented X4   Following Commands Follows all commands and directions without difficulty   RLE Assessment   RLE Assessment WFL   LLE Assessment   LLE Assessment WFL   Bed Mobility   Additional Comments pt OOB at start/end of session   Transfers   Sit to Stand 5  Supervision   Additional items Verbal cues   Stand to Sit 5  Supervision   Additional items Verbal cues   Additional Comments performed with and without RW   Ambulation/Elevation   Gait pattern Short stride; Foward flexed;Decreased foot clearance   Gait Assistance 5  Supervision   Additional items Verbal cues   Assistive Device Rolling walker   Distance 200'   Balance   Static Sitting Good   Dynamic Sitting Good   Static Standing Fair +   Dynamic Standing Fair + Ambulatory Fair +   Endurance Deficit   Endurance Deficit Yes   Endurance Deficit Description pt becoming SOB with ambulation   Activity Tolerance   Activity Tolerance Patient limited by fatigue   Assessment   Prognosis Good   Problem List Decreased strength;Decreased endurance; Impaired balance;Decreased mobility   Assessment Patient is a 82 y o  male evaluated by Physical Therapy s/p admit to [address] [facility] on 3/25/2021 with admitting diagnosis of: CAD (coronary artery disease), Dyspnea, SOB (shortness of breath), CKD (chronic kidney disease) stage 3, GFR 30-59 ml/min, Hyperglycemia due to diabetes mellitus, and principal problem of: Shortness of breath  PT was consulted to assess patient's functional mobility and discharge needs  Ordered are PT Evaluation and treatment with activity level of: up and OOB as tolerated  Comorbidities affecting patient's physical performance at time of assessment include: DM, HLD, HTN, athscl heart disease, hx of MI, cancer, GERD, CAD, DM, HTN  Personal factors affecting the patient at time of IE include: ambulating with assistive device, step(s) to enter home, history of fall(s) and inability/difficulty performing IADLs  Please locate objective findings from PT assessment regarding body systems outlined above  Upon evaluation, pt able to perform all functional mobility with SUP and increased time  Pt ambulating throughout room without AD but requesting RW for hallway distances  Pt able to ambulate 200' before requiring seated rest break  SOB reported with ambulation, however SpO2 and HR remained WFL on RA throughout  No LOB experienced  The patient's AM-PAC Basic Mobility Inpatient Short Form Raw Score is 23, Standardized Score is 50  88  A standardized score greater than 42 9 suggests the patient may benefit from discharge to home  Please also refer to the recommendation of the Physical Therapist for safe discharge planning   Pt will benefit from continued PT intervention during LOS to address current deficits, increase LOF, and facilitate safe d/c to next level of care when medically appropriate  D/c recommendation at this time is home with no needs  Goals   Patient Goals to go home   Short Term Goal #1 Pt will participate in B LE strengthening exercises to facilitate improved funcitonal activity tolerance  Short Term Goal #2 Pt will perform all functional transfers and bed mobility mod(I) with good safety awareness  LTG Expiration Date 04/09/21   Long Term Goal #1 Pt will ambulate 450' with RW and SUP while maintaining good functional dynamic balance  Long Term Goal #2 Pt will ascend/descend 25 stairs with HR and SUP to reflect the ability to safely enter his apartment  Plan   Treatment/Interventions Functional transfer training;LE strengthening/ROM; Elevations; Therapeutic exercise; Endurance training;Bed mobility;Gait training   PT Frequency Other (Comment)  (3-5x/week)   Recommendation   PT Discharge Recommendation Return to previous environment with no needs   AM-PAC Basic Mobility Inpatient   Turning in Bed Without Bedrails 4   Lying on Back to Sitting on Edge of Flat Bed 4   Moving Bed to Chair 4   Standing Up From Chair 4   Walk in Room 4   Climb 3-5 Stairs 3   Basic Mobility Inpatient Raw Score 23   Basic Mobility Standardized Score 50 88     Pt seated in recliner at end of session with all needs in reach

## 2021-03-31 NOTE — PHYSICIAN ADVISOR
Current patient class: Inpatient  The patient is currently on Hospital Day: 2 at [address]      The patient was admitted to the hospital  on 3/25/21 at 1452 for the following diagnosis:  CAD (coronary artery disease) [I25 10]  Dyspnea [R06 00]  SOB (shortness of breath) [R06 02]  CKD (chronic kidney disease) stage 3, GFR 30-59 ml/min [N18 30]  Hyperglycemia due to diabetes mellitus ([facility] [address] ) [E11 65]     After review of the relevant documentation, labs, vital signs and test results, the patient is a provider liable case and is most appropriate for OBSERVATION STATUS  In this particular case the patient was admitted to the hospital as an inpatient  The patient however fails to satisfy the 2 midnight benchmark and closer scrutiny of the case is warranted  After review of the patient presentation and relevant labs the patient was most appropriate for observation status on admission  Given that this patient has already been discharged prior to this review they become a provider liable case  Rationale is as follows: The patient is a 82 yrs  Male who presented to the ED at 3/25/2021  1:16 PM with a chief complaint of Shortness of Breath (SOB since discharge from hospital last week ) Pt presents with episode of SOB associated with chest pressure and diaphoresis x 30 min  Recent admission for same 3/4-3/6  Work up negative  Troponin negative x 3;  repeat EKG appears stable  Telemetry monitoring stable  Consult to cardiology appreciated - likely angina  Ranexa added to the patient's medication regimen  VQ scan - low probability for PE  Echocardiogram showing EF 65% no regional wall motion abnormalities    Given recent negative cardiac workup, he is observation appropriate          The patients vitals on arrival were   ED Triage Vitals   Temperature Pulse Respirations Blood Pressure SpO2   03/25/21 1319 03/25/21 1319 03/25/21 1319 03/25/21 1319 03/25/21 1319   (!) 97 °F (36 1 °C) 81 20 141/70 92 %      Temp Source Heart Rate Source Patient Position - Orthostatic VS BP Location FiO2 (%)   03/25/21 1319 03/25/21 1319 03/25/21 1330 03/25/21 1330 --   Temporal Monitor Lying Right arm       Pain Score       03/25/21 1917       No Pain           Past Medical History:   Diagnosis Date    Arthritis     Athscl 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     Stent OM1  Patent mammary graft to LAD 10/7/2009; CABG 1992 & 2005    Cancer [facility])     skin    Coronary artery disease     Diabetes mellitus ([facility] [address] )     Diabetes mellitus, type II ([facility] [address] )     without complication    Epistaxis     Essential (primary) hypertension     GERD (gastroesophageal reflux disease)     Hx of cardiovascular stress test 07/23/2014    Eddie MPI; EF0 52 (52%) Lexiscan, mild LV systolic dysfunction; evidence for prior inferior wall MI; perfusion imaging consistant w mild lat wall ischemia and min torin-infart inferior ischemia   / EF0 51 (51%) evidence for prior inferior wall MI  Mild inferior and mild-moderate lateral wall ischemia  7/29/15    Hx of echocardiogram 11/07/2017    2D w/CFD;EF0 55 (55%) mild LVH, mitral valve prolapse with moderate regurgitation  left atrial enlargement  Mild tricuspid regurgitation   Hypertension     Mixed hyperlipidemia     Occlusion and stenosis of bilateral carotid arteries     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 infarction)     Presence of aortocoronary bypass graft      Past Surgical History:   Procedure Laterality Date    CARDIAC CATHETERIZATION  10/07/2009    Stent 99% stenosis SVG from the aorta to OM1  Patent mammary graft to the LAD    CORONARY ARTERY BYPASS GRAFT  1992    VG-CX, VG-RCA    CORONARY ARTERY BYPASS GRAFT  11/04/2005    LIMA-D1-LAD, VG-PDA-PLB, [id number] TMR 9 Lesions    [place] EGD TRANSORAL BIOPSY SINGLE/MULTIPLE N/A 1/23/2019    Procedure: ESOPHAGOGASTRODUODENOSCOPY (EGD) with biopsy;  Surgeon: Janel Saleh MD;  Location: [address] [place] [address] GI LAB;   Service: Gastroenterology   [place] TONSILLECTOMY             Consults have been placed to:   IP CONSULT TO CARDIOLOGY    Vitals:    03/25/21 2221 03/26/21 0720 03/26/21 1037 03/26/21 1541   BP: 130/71 131/72 132/72 130/72   BP Location:  Right arm  Left arm   Pulse: 78 75 82 86   Resp: 18 18  18   Temp: 98 3 °F (36 8 °C) 98 4 °F (36 9 °C)  97 7 °F (36 5 °C)   TempSrc:  Oral  Oral   SpO2: 91% 91% 93% 90%   Weight:       Height:           Most recent labs:    No results for input(s): WBC, HGB, HCT, PLT, K, NA, CALCIUM, BUN, CREATININE, LIPASE, AMYLASE, INR, TROPONINI, CKTOTAL, AST, ALT, ALKPHOS, BILITOT in the last 72 hours  Scheduled Meds:  Continuous Infusions:No current facility-administered medications for this encounter  PRN Meds:      Surgical procedures (if appropriate):

## 2021-04-09 ENCOUNTER — OFFICE VISIT (OUTPATIENT)
Dept: CARDIOLOGY CLINIC | Facility: CLINIC | Age: 86
End: 2021-04-09
Payer: MEDICARE

## 2021-04-09 VITALS
HEIGHT: 66 IN | SYSTOLIC BLOOD PRESSURE: 126 MMHG | HEART RATE: 80 BPM | DIASTOLIC BLOOD PRESSURE: 58 MMHG | WEIGHT: 175 LBS | BODY MASS INDEX: 28.12 KG/M2

## 2021-04-09 DIAGNOSIS — E78.2 MIXED HYPERLIPIDEMIA: Primary | ICD-10-CM

## 2021-04-09 DIAGNOSIS — I25.118 CORONARY ARTERY DISEASE OF NATIVE ARTERY OF NATIVE HEART WITH STABLE ANGINA PECTORIS (HCC): ICD-10-CM

## 2021-04-09 PROCEDURE — 99214 OFFICE O/P EST MOD 30 MIN: CPT | Performed by: INTERNAL MEDICINE

## 2021-04-09 NOTE — PROGRESS NOTES
Patient ID: Mari Keen is a 82 y o  male  Plan:      Coronary artery disease of native artery of native heart with stable angina pectoris ([address] )  Resolved angina since ranexa added  Mixed hyperlipidemia  Tolerating statin tx  Stage 3 chronic kidney disease  Seeing Dr Lisa Truong next week  Follow up Plan/Other summary comments:  1 year ecg and f/u visit  HPI:  Patient is here for follow-up regarding the above  Since the last visit he was hospitalized for what sounds like true angina  In the past he had lots of atypical angina  Ranexa has been added to his regimen and symptoms seem to have resolved fortunately  To reiterate, in 1992 he underwent 2 vessel CABG with vein graft to the circumflex and vein graft to the right coronary artery  In November 2005 he underwent repeat heart surgery with left internal mammary to the 1st diagonal and LAD, vein graft to the posterior descending and posterolateral branch, radial artery graft to obtuse marginal to obtuse marginal 1, and 9 TMR lesions were created  In 2009 he had stenting of high-grade stenosis of the graft to the 1st obtuse marginal vessel  The mammary graft was patent  At this point he is not interested in further stress testing for surveillance  There has been a left carotid endarterectomy as well  Followed by [facility] vascular  Most recent or relevant cardiac/vascular testing:    TTE 03/26/2021:  Normal LV systolic function  Mild to moderate mitral regurgitation  Mild to moderate LVH  Past Surgical History:   Procedure Laterality Date    CARDIAC CATHETERIZATION  10/07/2009    Stent 99% stenosis SVG from the aorta to OM1   Patent mammary graft to the LAD    CORONARY ARTERY BYPASS GRAFT  1992    VG-CX, VG-RCA    CORONARY ARTERY BYPASS GRAFT  11/04/2005    LIMA-D1-LAD, VG-PDA-PLB, [id number] TMR 9 Lesions    FL EGD TRANSORAL BIOPSY SINGLE/MULTIPLE N/A 1/23/2019    Procedure: ESOPHAGOGASTRODUODENOSCOPY (EGD) with biopsy;  Surgeon: Willy Cheek MD;  Location: [address] [place] [address] GI LAB; Service: Gastroenterology    TONSILLECTOMY       CMP:   Lab Results   Component Value Date    K 4 0 03/26/2021     03/26/2021    CO2 27 03/26/2021    BUN 25 03/26/2021    CREATININE 1 63 (H) 03/26/2021    EGFR 38 03/26/2021       Lipid Profile:   Lab Results   Component Value Date    TRIG 119 03/26/2021    HDL 34 (L) 03/26/2021         Review of Systems   10  point ROS  was otherwise non pertinent or negative except as per HPI or as below  Gait: Uses a cane  Objective:     /58   Pulse 80   Ht 5' 6" (1 676 m)   Wt 79 4 kg (175 lb)   BMI 28 25 kg/m²     PHYSICAL EXAM:     General:  Normal appearance in no distress  Eyes:  Anicteric  Oral mucosa:  Moist   Neck:  No JVD  Carotid upstrokes are brisk without bruits  No masses  Chest:  Clear to auscultation and percussion  Well-healed midline sternotomy scar  Cardiac:  Normal PMI  Normal S1 and S2  No murmur gallop or rub  Abdomen:  Soft and nontender  No palpable organomegaly or aortic enlargement  Extremities:  No peripheral edema  Musculoskeletal:  Symmetric  Vascular:  Femoral pulses are brisk without bruits  Popliteal pulses are intact bilaterally  Pedal pulses are intact  Neuro:  Grossly symmetric  Psych:  Alert and oriented x3            Current Outpatient Medications:     amLODIPine (NORVASC) 10 mg tablet, Take 1 tablet (10 mg total) by mouth daily, Disp: 30 tablet, Rfl: 0    atorvastatin (LIPITOR) 20 mg tablet, Take 20 mg by mouth daily, Disp: , Rfl:     clopidogrel (PLAVIX) 75 mg tablet, Take 1 tablet (75 mg total) by mouth daily Hold until seen by ENT on 9/23/19 , Disp: , Rfl:     escitalopram (LEXAPRO) 10 mg tablet, Take 10 mg by mouth daily, Disp: , Rfl:     glimepiride (AMARYL) 1 mg tablet, Take 1 mg by mouth daily with breakfast , Disp: , Rfl: 0    isosorbide mononitrate (IMDUR) 60 mg 24 hr tablet, Take 2 tablets (120 mg total) by mouth daily In the morning, Disp: 60 tablet, Rfl: 0    nitroglycerin (NITROSTAT) 0 4 mg SL tablet, one tablet under tongue for angina, may repeat in 15 min  no more than three times, Disp: , Rfl: 0    omeprazole (PriLOSEC) 20 mg delayed release capsule, Take 20 mg by mouth daily, Disp: , Rfl:     propranolol (INDERAL LA) 120 mg 24 hr capsule, Take 120 mg by mouth daily, Disp: , Rfl:     ranolazine (RANEXA) 500 mg 12 hr tablet, Take 1 tablet (500 mg total) by mouth every 12 (twelve) hours, Disp: 60 tablet, Rfl: 0    sucralfate (CARAFATE) 1 g tablet, Take 1 g by mouth 2 (two) times a day, Disp: , Rfl:     aspirin (ECOTRIN LOW STRENGTH) 81 mg EC tablet, Take 1 tablet (81 mg total) by mouth daily (Patient not taking: Reported on 4/9/2021), Disp:  , Rfl: 0  Allergies   Allergen Reactions    Advil [Ibuprofen] Fatigue     Past Medical History:   Diagnosis Date    Arthritis     Athscl 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     Stent OM1  Patent mammary graft to LAD 10/7/2009; CABG 1992 & 2005    Cancer [facility])     skin    Coronary artery disease     Diabetes mellitus ([facility] )     Diabetes mellitus, type II ([facility] )     without complication    Epistaxis     Essential (primary) hypertension     GERD (gastroesophageal reflux disease)     Hx of cardiovascular stress test 07/23/2014    Eddie MPI; EF0 52 (52%) Lexiscan, mild LV systolic dysfunction; evidence for prior inferior wall MI; perfusion imaging consistant w mild lat wall ischemia and min torin-infart inferior ischemia   / EF0 51 (51%) evidence for prior inferior wall MI  Mild inferior and mild-moderate lateral wall ischemia  7/29/15    Hx of echocardiogram 11/07/2017    2D w/CFD;EF0 55 (55%) mild LVH, mitral valve prolapse with moderate regurgitation  left atrial enlargement  Mild tricuspid regurgitation        Hypertension     Mixed hyperlipidemia     Occlusion and stenosis of bilateral carotid arteries     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 infarction)     Presence of aortocoronary bypass graft            Social History     Tobacco Use   Smoking Status Former Smoker    Packs/day: 1     Years: 40 00    Pack years: 40 00    Quit date: 1977    Years since quittin 2   Smokeless Tobacco Never Used

## 2021-05-12 ENCOUNTER — OFFICE VISIT (OUTPATIENT)
Dept: NEPHROLOGY | Facility: CLINIC | Age: 86
End: 2021-05-12
Payer: MEDICARE

## 2021-05-12 VITALS
BODY MASS INDEX: 28.77 KG/M2 | OXYGEN SATURATION: 96 % | DIASTOLIC BLOOD PRESSURE: 82 MMHG | WEIGHT: 179 LBS | HEART RATE: 76 BPM | SYSTOLIC BLOOD PRESSURE: 158 MMHG | HEIGHT: 66 IN

## 2021-05-12 DIAGNOSIS — N11.9 CHRONIC TUBULOINTERSTITIAL NEPHRITIS: ICD-10-CM

## 2021-05-12 DIAGNOSIS — I11.9 HYPERTENSIVE LEFT VENTRICULAR HYPERTROPHY, WITHOUT HEART FAILURE: ICD-10-CM

## 2021-05-12 DIAGNOSIS — N40.1 BENIGN PROSTATIC HYPERPLASIA WITH URINARY FREQUENCY: ICD-10-CM

## 2021-05-12 DIAGNOSIS — N18.32 STAGE 3B CHRONIC KIDNEY DISEASE (HCC): Primary | ICD-10-CM

## 2021-05-12 DIAGNOSIS — E11.21 DIABETIC NEPHROPATHY ASSOCIATED WITH TYPE 2 DIABETES MELLITUS (HCC): ICD-10-CM

## 2021-05-12 DIAGNOSIS — R35.0 BENIGN PROSTATIC HYPERPLASIA WITH URINARY FREQUENCY: ICD-10-CM

## 2021-05-12 DIAGNOSIS — N25.81 SECONDARY HYPERPARATHYROIDISM OF RENAL ORIGIN (HCC): ICD-10-CM

## 2021-05-12 PROCEDURE — 99214 OFFICE O/P EST MOD 30 MIN: CPT | Performed by: INTERNAL MEDICINE

## 2021-05-12 RX ORDER — TAMSULOSIN HYDROCHLORIDE 0.4 MG/1
0.4 CAPSULE ORAL
Qty: 30 CAPSULE | Refills: 5 | Status: SHIPPED | OUTPATIENT
Start: 2021-05-12 | End: 2021-06-07 | Stop reason: SDUPTHER

## 2021-05-12 NOTE — PROGRESS NOTES
[facility] [place], [place]    Name: Lavell Mandujano  YOB: 1935      Assessment/Plan:    Stage 3 chronic kidney disease  Lab Results   Component Value Date    EGFR 38 03/26/2021    EGFR 32 03/25/2021    EGFR 41 03/05/2021    CREATININE 1 63 (H) 03/26/2021    CREATININE 1 90 (H) 03/25/2021    CREATININE 1 53 (H) 03/05/2021      kidney function has been stable, patient will have repeat labs in the very near future  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 ([facility] )    Lab Results   Component Value Date    HGBA1C 8 4 (H) 03/26/2021       Continue current medications, patient have a repeat A1c near future  Will need to ensure that can his kidney function is stable, if it is, we can consider initiation of an angiotensin receptor blocker versus an SGLT -2 inhibitor  Will need to await lab results 1st     Secondary hyperparathyroidism of renal origin [facility])    Continue monitor parathyroid hormone levels, if indicated will initiate an activated vitamin-D aged  Continue monitor parathyroid hormone levels on regular basis  Hypertensive left ventricular hypertrophy, without heart failure    Patient appears to be compensated at this time, he would potentially benefit from being placed on angiotensin receptor blocker from both a cardiac and renal standpoint  When he 1st confirm that kidney function is stable  Chronic tubulointerstitial nephritis    Continue to avoid all NSAIDs, patient likely has at least a mild component of this specific disorder causing chronic kidney disease  Problem List Items Addressed This Visit        Endocrine    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 ([facility] )       Lab Results   Component Value Date    HGBA1C 8 4 (H) 03/26/2021       Continue current medications, patient have a repeat A1c near future    Will need to ensure that can his kidney function is stable, if it is, we can consider initiation of an angiotensin receptor blocker versus an SGLT -2 inhibitor  Will need to await lab results 1st          Secondary hyperparathyroidism of renal origin [facility])       Continue monitor parathyroid hormone levels, if indicated will initiate an activated vitamin-D aged  Continue monitor parathyroid hormone levels on regular basis  Cardiovascular and Mediastinum    Hypertensive left ventricular hypertrophy, without heart failure       Patient appears to be compensated at this time, he would potentially benefit from being placed on angiotensin receptor blocker from both a cardiac and renal standpoint  When he 1st confirm that kidney function is stable  Genitourinary    Stage 3 chronic kidney disease ([facility] [address] ) - Primary     Lab Results   Component Value Date    EGFR 38 03/26/2021    EGFR 32 03/25/2021    EGFR 41 03/05/2021    CREATININE 1 63 (H) 03/26/2021    CREATININE 1 90 (H) 03/25/2021    CREATININE 1 53 (H) 03/05/2021      kidney function has been stable, patient will have repeat labs in the very near future  Relevant Orders    US kidney and bladder    Magnesium    PTH, intact (Completed)    Chronic tubulointerstitial nephritis       Continue to avoid all NSAIDs, patient likely has at least a mild component of this specific disorder causing chronic kidney disease  Other Visit Diagnoses     Benign prostatic hyperplasia with urinary frequency        Relevant Medications    tamsulosin (FLOMAX) 0 4 mg    Other Relevant Orders    US kidney and bladder            Subjective:      Patient ID: Bel Lloyd is a 82 y o  male  Patient presents for follow-up regarding chronic kidney disease  He is doing fairly well at this time and has no acute complaints  He is taking all medications as prescribed with no specific side effects        Patient's last kidney function test was in March of 2021, labs were good initial days kidney function was slightly better than usual  There were no significant electrolyte abnormalities at that time  Patient continues to avoid all NSAIDs  Hypertension  This is a chronic problem  The current episode started more than 1 year ago  The problem is unchanged  The problem is controlled  Pertinent negatives include no chest pain, orthopnea, peripheral edema or shortness of breath  There are no associated agents to hypertension  Risk factors for coronary artery disease include male gender  Past treatments include beta blockers, calcium channel blockers and lifestyle changes  There are no compliance problems  Hypertensive end-organ damage includes kidney disease  Identifiable causes of hypertension include chronic renal disease  The following portions of the patient's history were reviewed and updated as appropriate: allergies, current medications, past family history, past medical history, past social history, past surgical history and problem list     Review of Systems   Respiratory: Negative for shortness of breath  Cardiovascular: Negative for chest pain and orthopnea  All other systems reviewed and are negative  Social History     Socioeconomic History    Marital status:       Spouse name: Not on file    Number of children: Not on file    Years of education: Not on file    Highest education level: Not on file   Occupational History    Not on file   Social Needs    Financial resource strain: Not on file    Food insecurity     Worry: Not on file     Inability: Not on file    Transportation needs     Medical: Not on file     Non-medical: Not on file   Tobacco Use    Smoking status: Former Smoker     Packs/day: 1 00     Years: 40 00     Pack years: 40 00     Quit date: 1977     Years since quittin 3    Smokeless tobacco: Never Used   Substance and Sexual Activity    Alcohol use: Never     Frequency: Never    Drug use: No    Sexual activity: Not on file   Lifestyle    Physical activity     Days per week: Not on file     Minutes per session: Not on file    Stress: Not on file   Relationships    Social connections     Talks on phone: Not on file     Gets together: Not on file     Attends Scientologist service: Not on file     Active member of club or organization: Not on file     Attends meetings of clubs or organizations: Not on file     Relationship status: Not on file    Intimate partner violence     Fear of current or ex partner: Not on file     Emotionally abused: Not on file     Physically abused: Not on file     Forced sexual activity: Not on file   Other Topics Concern    Not on file   Social History Narrative    Not on file     Past Medical History:   Diagnosis Date    Arthritis     Athscl 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     Stent OM1  Patent mammary graft to LAD 10/7/2009; CABG 1992 & 2005    Cancer [facility])     skin    Coronary artery disease     Diabetes mellitus ([facility] )     Diabetes mellitus, type II ([facility] )     without complication    Epistaxis     Essential (primary) hypertension     GERD (gastroesophageal reflux disease)     Hx of cardiovascular stress test 07/23/2014    Eddie MPI; EF0 52 (52%) Lexiscan, mild LV systolic dysfunction; evidence for prior inferior wall MI; perfusion imaging consistant w mild lat wall ischemia and min torin-infart inferior ischemia   / EF0 51 (51%) evidence for prior inferior wall MI  Mild inferior and mild-moderate lateral wall ischemia  7/29/15    Hx of echocardiogram 11/07/2017    2D w/CFD;EF0 55 (55%) mild LVH, mitral valve prolapse with moderate regurgitation  left atrial enlargement  Mild tricuspid regurgitation   Hypertension     Mixed hyperlipidemia     Occlusion and stenosis of bilateral carotid arteries     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 infarction)     Presence of aortocoronary bypass graft      Past Surgical History:   Procedure Laterality Date    CARDIAC CATHETERIZATION  10/07/2009    Stent 99% stenosis SVG from the aorta to OM1   Patent mammary graft to the LAD    CORONARY ARTERY BYPASS GRAFT  1992    VG-CX, VG-RCA    CORONARY ARTERY BYPASS GRAFT  11/04/2005    LIMA-D1-LAD, VG-PDA-PLB, [id number] TMR 9 Lesions    CO EGD TRANSORAL BIOPSY SINGLE/MULTIPLE N/A 1/23/2019    Procedure: ESOPHAGOGASTRODUODENOSCOPY (EGD) with biopsy;  Surgeon: Willy Cheek MD;  Location: [facility] GI LAB; Service: Gastroenterology    TONSILLECTOMY         Current Outpatient Medications:     amLODIPine (NORVASC) 10 mg tablet, Take 1 tablet (10 mg total) by mouth daily, Disp: 30 tablet, Rfl: 0    atorvastatin (LIPITOR) 20 mg tablet, Take 20 mg by mouth daily, Disp: , Rfl:     clopidogrel (PLAVIX) 75 mg tablet, Take 1 tablet (75 mg total) by mouth daily Hold until seen by ENT on 9/23/19 , Disp: , Rfl:     escitalopram (LEXAPRO) 10 mg tablet, Take 10 mg by mouth daily, Disp: , Rfl:     glimepiride (AMARYL) 1 mg tablet, Take 1 mg by mouth 2 (two) times a day , Disp: , Rfl: 0    isosorbide mononitrate (IMDUR) 60 mg 24 hr tablet, Take 2 tablets (120 mg total) by mouth daily In the morning, Disp: 60 tablet, Rfl: 0    nitroglycerin (NITROSTAT) 0 4 mg SL tablet, one tablet under tongue for angina, may repeat in 15 min   no more than three times, Disp: , Rfl: 0    omeprazole (PriLOSEC) 20 mg delayed release capsule, Take 20 mg by mouth daily, Disp: , Rfl:     propranolol (INDERAL LA) 120 mg 24 hr capsule, Take 120 mg by mouth daily, Disp: , Rfl:     sucralfate (CARAFATE) 1 g tablet, Take 1 g by mouth 2 (two) times a day, Disp: , Rfl:     tamsulosin (FLOMAX) 0 4 mg, Take 1 capsule (0 4 mg total) by mouth daily with dinner, Disp: 30 capsule, Rfl: 5    Lab Results   Component Value Date    SODIUM 139 03/26/2021    K 4 0 03/26/2021     03/26/2021    CO2 27 03/26/2021    AGAP 9 03/26/2021    BUN 25 03/26/2021    CREATININE 1 63 (H) 03/26/2021    GLUC 114 03/26/2021    GLUF 191 (H) 01/07/2021    CALCIUM 10 2 (H) 03/26/2021    AST 26 03/26/2021    ALT 37 03/26/2021 ALKPHOS 85 03/26/2021    TP 7 9 03/26/2021    TBILI 0 90 03/26/2021    EGFR 38 03/26/2021     Lab Results   Component Value Date    WBC 12 15 (H) 05/13/2021    HGB 15 8 05/13/2021    HCT 50 0 (H) 05/13/2021    MCV 88 05/13/2021     05/13/2021     Lab Results   Component Value Date    CHOLESTEROL 104 03/26/2021    CHOLESTEROL 144 01/07/2021    CHOLESTEROL 119 07/24/2020     Lab Results   Component Value Date    HDL 34 (L) 03/26/2021    HDL 32 (L) 01/07/2021    HDL 33 (L) 07/24/2020     Lab Results   Component Value Date    LDLCALC 46 03/26/2021    LDLCALC 78 01/07/2021    LDLCALC 64 07/24/2020     Lab Results   Component Value Date    TRIG 119 03/26/2021    TRIG 169 (H) 01/07/2021    TRIG 111 07/24/2020     No results found for: CHOLHDL  No results found for: [id number], TSH  Lab Results   Component Value Date     7 (H) 05/13/2021    CALCIUM 10 2 (H) 03/26/2021    PHOS 3 0 03/26/2021     Lab Results   Component Value Date    SPEP  07/24/2020     The SPEP shows a biclonal gammopathy  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  Reviewed by: Rory Swift MD **Electronic Signature**    UPEP  07/24/2020     The UPEP shows non-selective proteinuria   No monoclonal bands noted  Reviewed by: Rory Swift MD ** Electronic Signature**     No results found for: Fuad WILLIAMSON        Objective:      /82   Pulse 76   Ht 5' 6" (1 676 m)   Wt 81 2 kg (179 lb)   SpO2 96%   BMI 28 89 kg/m²          Physical Exam  Vitals signs reviewed  Constitutional:       General: He is not in acute distress  Appearance: He is well-developed  HENT:      Head: Normocephalic and atraumatic  Eyes:      Conjunctiva/sclera: Conjunctivae normal    Neck:      Musculoskeletal: Neck supple  Cardiovascular:      Rate and Rhythm: Normal rate and regular rhythm     Pulmonary:      Effort: Pulmonary effort is normal       Breath sounds: Normal breath sounds  Abdominal:      Palpations: Abdomen is soft  Skin:     General: Skin is warm  Findings: No rash  Neurological:      Mental Status: He is alert and oriented to person, place, and time  Cranial Nerves: No cranial nerve deficit     Psychiatric:         Behavior: Behavior normal

## 2021-05-12 NOTE — PATIENT INSTRUCTIONS
Patient noted to have moderate concentric hypertrophy of the left ventricle, in order to better address this I went initiate an angiotensin receptor blocker specifically losartan  This will cause a decrease in the kidney function, which is expected  We will 1st check his kidney function, once we know that it is stable we will plan to initiate this medication, if it is not stable then we will not be able to start  If that is the case, will need to discuss with Cardiology the best mode of treatment in order to improve the hypertrophy of the left ventricle

## 2021-05-13 ENCOUNTER — APPOINTMENT (OUTPATIENT)
Dept: LAB | Facility: MEDICAL CENTER | Age: 86
End: 2021-05-13
Payer: MEDICARE

## 2021-05-13 DIAGNOSIS — Z79.4 TYPE 2 DIABETES MELLITUS WITH OTHER SPECIFIED COMPLICATION, WITH LONG-TERM CURRENT USE OF INSULIN (HCC): Primary | ICD-10-CM

## 2021-05-13 DIAGNOSIS — N18.32 STAGE 3B CHRONIC KIDNEY DISEASE (HCC): ICD-10-CM

## 2021-05-13 DIAGNOSIS — E11.69 TYPE 2 DIABETES MELLITUS WITH OTHER SPECIFIED COMPLICATION, WITH LONG-TERM CURRENT USE OF INSULIN (HCC): Primary | ICD-10-CM

## 2021-05-13 DIAGNOSIS — E78.5 HYPERLIPIDEMIA, UNSPECIFIED HYPERLIPIDEMIA TYPE: ICD-10-CM

## 2021-05-13 DIAGNOSIS — N18.32 STAGE 3B CHRONIC KIDNEY DISEASE (HCC): Primary | ICD-10-CM

## 2021-05-13 DIAGNOSIS — I10 HYPERTENSION, UNSPECIFIED TYPE: ICD-10-CM

## 2021-05-13 LAB
ALBUMIN SERPL BCP-MCNC: 3.7 G/DL (ref 3.5–5)
ALP SERPL-CCNC: 95 U/L (ref 46–116)
ALT SERPL W P-5'-P-CCNC: 33 U/L (ref 12–78)
ANION GAP SERPL CALCULATED.3IONS-SCNC: 4 MMOL/L (ref 4–13)
AST SERPL W P-5'-P-CCNC: 14 U/L (ref 5–45)
BASOPHILS # BLD AUTO: 0.1 THOUSANDS/ΜL (ref 0–0.1)
BASOPHILS NFR BLD AUTO: 1 % (ref 0–1)
BILIRUB SERPL-MCNC: 0.91 MG/DL (ref 0.2–1)
BUN SERPL-MCNC: 27 MG/DL (ref 5–25)
CALCIUM SERPL-MCNC: 10 MG/DL (ref 8.3–10.1)
CHLORIDE SERPL-SCNC: 105 MMOL/L (ref 100–108)
CHOLEST SERPL-MCNC: 145 MG/DL (ref 50–200)
CO2 SERPL-SCNC: 28 MMOL/L (ref 21–32)
CREAT SERPL-MCNC: 1.67 MG/DL (ref 0.6–1.3)
EOSINOPHIL # BLD AUTO: 0.88 THOUSAND/ΜL (ref 0–0.61)
EOSINOPHIL NFR BLD AUTO: 7 % (ref 0–6)
ERYTHROCYTE [DISTWIDTH] IN BLOOD BY AUTOMATED COUNT: 13.7 % (ref 11.6–15.1)
EST. AVERAGE GLUCOSE BLD GHB EST-MCNC: 209 MG/DL
GFR SERPL CREATININE-BSD FRML MDRD: 37 ML/MIN/1.73SQ M
GLUCOSE P FAST SERPL-MCNC: 219 MG/DL (ref 65–99)
HBA1C MFR BLD: 8.9 %
HCT VFR BLD AUTO: 50 % (ref 36.5–49.3)
HDLC SERPL-MCNC: 43 MG/DL
HGB BLD-MCNC: 15.8 G/DL (ref 12–17)
IMM GRANULOCYTES # BLD AUTO: 0.07 THOUSAND/UL (ref 0–0.2)
IMM GRANULOCYTES NFR BLD AUTO: 1 % (ref 0–2)
LDLC SERPL CALC-MCNC: 76 MG/DL (ref 0–100)
LYMPHOCYTES # BLD AUTO: 2.63 THOUSANDS/ΜL (ref 0.6–4.47)
LYMPHOCYTES NFR BLD AUTO: 22 % (ref 14–44)
MAGNESIUM SERPL-MCNC: 2.3 MG/DL (ref 1.6–2.6)
MCH RBC QN AUTO: 27.8 PG (ref 26.8–34.3)
MCHC RBC AUTO-ENTMCNC: 31.6 G/DL (ref 31.4–37.4)
MCV RBC AUTO: 88 FL (ref 82–98)
MONOCYTES # BLD AUTO: 0.9 THOUSAND/ΜL (ref 0.17–1.22)
MONOCYTES NFR BLD AUTO: 7 % (ref 4–12)
NEUTROPHILS # BLD AUTO: 7.57 THOUSANDS/ΜL (ref 1.85–7.62)
NEUTS SEG NFR BLD AUTO: 62 % (ref 43–75)
NONHDLC SERPL-MCNC: 102 MG/DL
NRBC BLD AUTO-RTO: 0 /100 WBCS
PLATELET # BLD AUTO: 224 THOUSANDS/UL (ref 149–390)
PMV BLD AUTO: 9.6 FL (ref 8.9–12.7)
POTASSIUM SERPL-SCNC: 4.5 MMOL/L (ref 3.5–5.3)
PROT SERPL-MCNC: 8 G/DL (ref 6.4–8.2)
PTH-INTACT SERPL-MCNC: 113.7 PG/ML (ref 18.4–80.1)
RBC # BLD AUTO: 5.69 MILLION/UL (ref 3.88–5.62)
SODIUM SERPL-SCNC: 137 MMOL/L (ref 136–145)
TRIGL SERPL-MCNC: 128 MG/DL
WBC # BLD AUTO: 12.15 THOUSAND/UL (ref 4.31–10.16)

## 2021-05-13 PROCEDURE — 85025 COMPLETE CBC W/AUTO DIFF WBC: CPT

## 2021-05-13 PROCEDURE — 83735 ASSAY OF MAGNESIUM: CPT

## 2021-05-13 PROCEDURE — 83970 ASSAY OF PARATHORMONE: CPT

## 2021-05-13 PROCEDURE — 80061 LIPID PANEL: CPT

## 2021-05-13 PROCEDURE — 83036 HEMOGLOBIN GLYCOSYLATED A1C: CPT

## 2021-05-13 PROCEDURE — 36415 COLL VENOUS BLD VENIPUNCTURE: CPT

## 2021-05-13 PROCEDURE — 80053 COMPREHEN METABOLIC PANEL: CPT

## 2021-05-13 NOTE — ASSESSMENT & PLAN NOTE
Lab Results   Component Value Date    HGBA1C 8 4 (H) 03/26/2021       Continue current medications, patient have a repeat A1c near future  Will need to ensure that can his kidney function is stable, if it is, we can consider initiation of an angiotensin receptor blocker versus an SGLT -2 inhibitor    Will need to await lab results 1st

## 2021-05-13 NOTE — ASSESSMENT & PLAN NOTE
Lab Results   Component Value Date    EGFR 38 03/26/2021    EGFR 32 03/25/2021    EGFR 41 03/05/2021    CREATININE 1 63 (H) 03/26/2021    CREATININE 1 90 (H) 03/25/2021    CREATININE 1 53 (H) 03/05/2021      kidney function has been stable, patient will have repeat labs in the very near future

## 2021-05-13 NOTE — ASSESSMENT & PLAN NOTE
Continue monitor parathyroid hormone levels, if indicated will initiate an activated vitamin-D aged  Continue monitor parathyroid hormone levels on regular basis

## 2021-05-13 NOTE — ASSESSMENT & PLAN NOTE
Continue to avoid all NSAIDs, patient likely has at least a mild component of this specific disorder causing chronic kidney disease

## 2021-05-13 NOTE — ASSESSMENT & PLAN NOTE
Patient appears to be compensated at this time, he would potentially benefit from being placed on angiotensin receptor blocker from both a cardiac and renal standpoint  When he 1st confirm that kidney function is stable

## 2021-06-07 DIAGNOSIS — N40.1 BENIGN PROSTATIC HYPERPLASIA WITH URINARY FREQUENCY: ICD-10-CM

## 2021-06-07 DIAGNOSIS — R35.0 BENIGN PROSTATIC HYPERPLASIA WITH URINARY FREQUENCY: ICD-10-CM

## 2021-06-07 RX ORDER — TAMSULOSIN HYDROCHLORIDE 0.4 MG/1
0.4 CAPSULE ORAL
Qty: 90 CAPSULE | Refills: 3 | Status: SHIPPED | OUTPATIENT
Start: 2021-06-07 | End: 2021-10-11 | Stop reason: SDUPTHER

## 2021-08-10 PROCEDURE — 88305 TISSUE EXAM BY PATHOLOGIST: CPT | Performed by: PATHOLOGY

## 2021-08-10 PROCEDURE — 88342 IMHCHEM/IMCYTCHM 1ST ANTB: CPT | Performed by: PATHOLOGY

## 2021-08-11 ENCOUNTER — LAB REQUISITION (OUTPATIENT)
Dept: LAB | Facility: HOSPITAL | Age: 86
End: 2021-08-11
Payer: MEDICARE

## 2021-08-11 DIAGNOSIS — C44.319 BASAL CELL CARCINOMA OF SKIN OF OTHER PARTS OF FACE: ICD-10-CM

## 2021-09-29 ENCOUNTER — TELEPHONE (OUTPATIENT)
Dept: NEPHROLOGY | Facility: CLINIC | Age: 86
End: 2021-09-29

## 2021-09-29 NOTE — TELEPHONE ENCOUNTER
Spoke with pt and he will have labs done tomorrow  He also said he would like to reschedule his ultrasound at his appointment on Friday

## 2021-10-01 ENCOUNTER — OFFICE VISIT (OUTPATIENT)
Dept: NEPHROLOGY | Facility: CLINIC | Age: 86
End: 2021-10-01
Payer: MEDICARE

## 2021-10-01 VITALS
HEART RATE: 84 BPM | BODY MASS INDEX: 27.48 KG/M2 | HEIGHT: 66 IN | WEIGHT: 171 LBS | DIASTOLIC BLOOD PRESSURE: 78 MMHG | OXYGEN SATURATION: 97 % | SYSTOLIC BLOOD PRESSURE: 136 MMHG

## 2021-10-01 DIAGNOSIS — N25.81 SECONDARY HYPERPARATHYROIDISM OF RENAL ORIGIN (HCC): ICD-10-CM

## 2021-10-01 DIAGNOSIS — R80.9 PROTEINURIA, UNSPECIFIED TYPE: ICD-10-CM

## 2021-10-01 DIAGNOSIS — N18.32 STAGE 3B CHRONIC KIDNEY DISEASE (HCC): Primary | ICD-10-CM

## 2021-10-01 DIAGNOSIS — R39.89 SENSATION OF PRESSURE IN BLADDER AREA: ICD-10-CM

## 2021-10-01 DIAGNOSIS — E11.21 DIABETIC NEPHROPATHY ASSOCIATED WITH TYPE 2 DIABETES MELLITUS (HCC): ICD-10-CM

## 2021-10-01 DIAGNOSIS — N11.9 CHRONIC TUBULOINTERSTITIAL NEPHRITIS: ICD-10-CM

## 2021-10-01 DIAGNOSIS — I11.9 HYPERTENSIVE LEFT VENTRICULAR HYPERTROPHY, WITHOUT HEART FAILURE: ICD-10-CM

## 2021-10-01 PROCEDURE — 99213 OFFICE O/P EST LOW 20 MIN: CPT | Performed by: NURSE PRACTITIONER

## 2021-10-04 ENCOUNTER — APPOINTMENT (OUTPATIENT)
Dept: LAB | Facility: MEDICAL CENTER | Age: 86
End: 2021-10-04
Payer: MEDICARE

## 2021-10-04 DIAGNOSIS — N18.32 STAGE 3B CHRONIC KIDNEY DISEASE (HCC): ICD-10-CM

## 2021-10-04 LAB
ALBUMIN SERPL BCP-MCNC: 3.8 G/DL (ref 3.5–5)
ALP SERPL-CCNC: 97 U/L (ref 46–116)
ALT SERPL W P-5'-P-CCNC: 32 U/L (ref 12–78)
ANION GAP SERPL CALCULATED.3IONS-SCNC: 2 MMOL/L (ref 4–13)
AST SERPL W P-5'-P-CCNC: 19 U/L (ref 5–45)
BACTERIA UR QL AUTO: ABNORMAL /HPF
BASOPHILS # BLD AUTO: 0.11 THOUSANDS/ΜL (ref 0–0.1)
BASOPHILS NFR BLD AUTO: 1 % (ref 0–1)
BILIRUB SERPL-MCNC: 0.69 MG/DL (ref 0.2–1)
BILIRUB UR QL STRIP: NEGATIVE
BUN SERPL-MCNC: 19 MG/DL (ref 5–25)
CALCIUM SERPL-MCNC: 10.2 MG/DL (ref 8.3–10.1)
CHLORIDE SERPL-SCNC: 106 MMOL/L (ref 100–108)
CLARITY UR: CLEAR
CO2 SERPL-SCNC: 28 MMOL/L (ref 21–32)
COLOR UR: ABNORMAL
CREAT SERPL-MCNC: 1.72 MG/DL (ref 0.6–1.3)
CREAT UR-MCNC: 179 MG/DL
EOSINOPHIL # BLD AUTO: 0.82 THOUSAND/ΜL (ref 0–0.61)
EOSINOPHIL NFR BLD AUTO: 10 % (ref 0–6)
ERYTHROCYTE [DISTWIDTH] IN BLOOD BY AUTOMATED COUNT: 13.6 % (ref 11.6–15.1)
GFR SERPL CREATININE-BSD FRML MDRD: 36 ML/MIN/1.73SQ M
GLUCOSE P FAST SERPL-MCNC: 192 MG/DL (ref 65–99)
GLUCOSE UR STRIP-MCNC: NEGATIVE MG/DL
HCT VFR BLD AUTO: 52.9 % (ref 36.5–49.3)
HGB BLD-MCNC: 16.9 G/DL (ref 12–17)
HGB UR QL STRIP.AUTO: NEGATIVE
IMM GRANULOCYTES # BLD AUTO: 0.03 THOUSAND/UL (ref 0–0.2)
IMM GRANULOCYTES NFR BLD AUTO: 0 % (ref 0–2)
KETONES UR STRIP-MCNC: NEGATIVE MG/DL
LEUKOCYTE ESTERASE UR QL STRIP: NEGATIVE
LYMPHOCYTES # BLD AUTO: 2.15 THOUSANDS/ΜL (ref 0.6–4.47)
LYMPHOCYTES NFR BLD AUTO: 26 % (ref 14–44)
MCH RBC QN AUTO: 27.8 PG (ref 26.8–34.3)
MCHC RBC AUTO-ENTMCNC: 31.9 G/DL (ref 31.4–37.4)
MCV RBC AUTO: 87 FL (ref 82–98)
MICROALBUMIN UR-MCNC: 63.4 MG/L (ref 0–20)
MICROALBUMIN/CREAT 24H UR: 35 MG/G CREATININE (ref 0–30)
MONOCYTES # BLD AUTO: 0.59 THOUSAND/ΜL (ref 0.17–1.22)
MONOCYTES NFR BLD AUTO: 7 % (ref 4–12)
NEUTROPHILS # BLD AUTO: 4.62 THOUSANDS/ΜL (ref 1.85–7.62)
NEUTS SEG NFR BLD AUTO: 56 % (ref 43–75)
NITRITE UR QL STRIP: NEGATIVE
NON-SQ EPI CELLS URNS QL MICRO: ABNORMAL /HPF
NRBC BLD AUTO-RTO: 0 /100 WBCS
PH UR STRIP.AUTO: 6 [PH]
PLATELET # BLD AUTO: 206 THOUSANDS/UL (ref 149–390)
PMV BLD AUTO: 9.9 FL (ref 8.9–12.7)
POTASSIUM SERPL-SCNC: 4.1 MMOL/L (ref 3.5–5.3)
PROT SERPL-MCNC: 8 G/DL (ref 6.4–8.2)
PROT UR STRIP-MCNC: ABNORMAL MG/DL
PTH-INTACT SERPL-MCNC: 113.6 PG/ML (ref 18.4–80.1)
RBC # BLD AUTO: 6.08 MILLION/UL (ref 3.88–5.62)
RBC #/AREA URNS AUTO: ABNORMAL /HPF
SODIUM SERPL-SCNC: 136 MMOL/L (ref 136–145)
SP GR UR STRIP.AUTO: 1.02 (ref 1–1.03)
UROBILINOGEN UR QL STRIP.AUTO: 1 E.U./DL
WBC # BLD AUTO: 8.32 THOUSAND/UL (ref 4.31–10.16)
WBC #/AREA URNS AUTO: ABNORMAL /HPF

## 2021-10-04 PROCEDURE — 83970 ASSAY OF PARATHORMONE: CPT

## 2021-10-04 PROCEDURE — 80053 COMPREHEN METABOLIC PANEL: CPT

## 2021-10-04 PROCEDURE — 85025 COMPLETE CBC W/AUTO DIFF WBC: CPT

## 2021-10-04 PROCEDURE — 82570 ASSAY OF URINE CREATININE: CPT | Performed by: INTERNAL MEDICINE

## 2021-10-04 PROCEDURE — 81001 URINALYSIS AUTO W/SCOPE: CPT | Performed by: INTERNAL MEDICINE

## 2021-10-04 PROCEDURE — 36415 COLL VENOUS BLD VENIPUNCTURE: CPT

## 2021-10-04 PROCEDURE — 82043 UR ALBUMIN QUANTITATIVE: CPT | Performed by: INTERNAL MEDICINE

## 2021-10-06 ENCOUNTER — HOSPITAL ENCOUNTER (OUTPATIENT)
Dept: ULTRASOUND IMAGING | Facility: HOSPITAL | Age: 86
Discharge: HOME | End: 2021-10-06
Attending: INTERNAL MEDICINE
Payer: MEDICARE

## 2021-10-06 DIAGNOSIS — R35.0 BENIGN PROSTATIC HYPERPLASIA WITH URINARY FREQUENCY: ICD-10-CM

## 2021-10-06 DIAGNOSIS — N18.32 STAGE 3B CHRONIC KIDNEY DISEASE (HCC): ICD-10-CM

## 2021-10-06 DIAGNOSIS — N40.1 BENIGN PROSTATIC HYPERPLASIA WITH URINARY FREQUENCY: ICD-10-CM

## 2021-10-06 PROCEDURE — 76770 US EXAM ABDO BACK WALL COMP: CPT

## 2021-10-14 ENCOUNTER — TELEPHONE (OUTPATIENT)
Dept: NEPHROLOGY | Facility: CLINIC | Age: 86
End: 2021-10-14

## 2021-10-29 DIAGNOSIS — R35.0 BENIGN PROSTATIC HYPERPLASIA WITH URINARY FREQUENCY: ICD-10-CM

## 2021-10-29 DIAGNOSIS — N40.1 BENIGN PROSTATIC HYPERPLASIA WITH URINARY FREQUENCY: ICD-10-CM

## 2021-10-29 RX ORDER — TAMSULOSIN HYDROCHLORIDE 0.4 MG/1
0.8 CAPSULE ORAL
Qty: 180 CAPSULE | Refills: 3 | Status: SHIPPED | OUTPATIENT
Start: 2021-10-29

## 2021-11-09 ENCOUNTER — APPOINTMENT (OUTPATIENT)
Dept: LAB | Facility: MEDICAL CENTER | Age: 86
End: 2021-11-09
Payer: MEDICARE

## 2021-11-09 DIAGNOSIS — E78.5 HYPERLIPIDEMIA, UNSPECIFIED HYPERLIPIDEMIA TYPE: ICD-10-CM

## 2021-11-09 DIAGNOSIS — E11.69 TYPE 2 DIABETES MELLITUS WITH OTHER SPECIFIED COMPLICATION, WITH LONG-TERM CURRENT USE OF INSULIN (HCC): ICD-10-CM

## 2021-11-09 DIAGNOSIS — Z79.4 TYPE 2 DIABETES MELLITUS WITH OTHER SPECIFIED COMPLICATION, WITH LONG-TERM CURRENT USE OF INSULIN (HCC): ICD-10-CM

## 2021-11-09 LAB
CHOLEST SERPL-MCNC: 121 MG/DL (ref 50–200)
EST. AVERAGE GLUCOSE BLD GHB EST-MCNC: 263 MG/DL
HBA1C MFR BLD: 10.8 %
HDLC SERPL-MCNC: 40 MG/DL
LDLC SERPL CALC-MCNC: 59 MG/DL (ref 0–100)
NONHDLC SERPL-MCNC: 81 MG/DL
TRIGL SERPL-MCNC: 112 MG/DL

## 2021-11-09 PROCEDURE — 80061 LIPID PANEL: CPT

## 2021-11-09 PROCEDURE — 36415 COLL VENOUS BLD VENIPUNCTURE: CPT

## 2021-11-09 PROCEDURE — 83036 HEMOGLOBIN GLYCOSYLATED A1C: CPT

## 2021-12-15 ENCOUNTER — HOSPITAL ENCOUNTER (OUTPATIENT)
Dept: RADIOLOGY | Facility: HOSPITAL | Age: 86
Discharge: HOME | End: 2021-12-15
Attending: PHYSICIAN ASSISTANT
Payer: MEDICARE

## 2021-12-15 ENCOUNTER — HOSPITAL ENCOUNTER (OUTPATIENT)
Dept: RADIOLOGY | Facility: HOSPITAL | Age: 86
Discharge: HOME | End: 2021-12-15
Payer: MEDICARE

## 2021-12-15 DIAGNOSIS — I85.00 ESOPHAGEAL VARICES WITHOUT BLEEDING, UNSPECIFIED ESOPHAGEAL VARICES TYPE (HCC): ICD-10-CM

## 2021-12-15 DIAGNOSIS — R13.10 DYSPHAGIA, UNSPECIFIED TYPE: ICD-10-CM

## 2021-12-15 DIAGNOSIS — R10.816 EPIGASTRIC ABDOMINAL TENDERNESS, REBOUND TENDERNESS PRESENCE NOT SPECIFIED: ICD-10-CM

## 2021-12-15 DIAGNOSIS — K21.9 GASTROESOPHAGEAL REFLUX DISEASE WITHOUT ESOPHAGITIS: ICD-10-CM

## 2021-12-15 DIAGNOSIS — K74.60 HEPATIC CIRRHOSIS, UNSPECIFIED HEPATIC CIRRHOSIS TYPE, UNSPECIFIED WHETHER ASCITES PRESENT (HCC): ICD-10-CM

## 2021-12-15 PROCEDURE — 74240 X-RAY XM UPR GI TRC 1CNTRST: CPT

## 2022-01-30 ENCOUNTER — APPOINTMENT (EMERGENCY)
Dept: CT IMAGING | Facility: HOSPITAL | Age: 87
End: 2022-01-30
Payer: MEDICARE

## 2022-01-30 ENCOUNTER — HOSPITAL ENCOUNTER (OUTPATIENT)
Facility: HOSPITAL | Age: 87
Setting detail: OBSERVATION
Discharge: HOME | End: 2022-01-31
Attending: EMERGENCY MEDICINE | Admitting: HOSPITALIST
Payer: MEDICARE

## 2022-01-30 ENCOUNTER — APPOINTMENT (EMERGENCY)
Dept: RADIOLOGY | Facility: HOSPITAL | Age: 87
End: 2022-01-30
Payer: MEDICARE

## 2022-01-30 ENCOUNTER — OFFICE VISIT (OUTPATIENT)
Dept: URGENT CARE | Facility: CLINIC | Age: 87
End: 2022-01-30
Payer: MEDICARE

## 2022-01-30 VITALS
TEMPERATURE: 97.9 F | WEIGHT: 171 LBS | RESPIRATION RATE: 20 BRPM | OXYGEN SATURATION: 88 % | HEART RATE: 110 BPM | BODY MASS INDEX: 27.6 KG/M2

## 2022-01-30 DIAGNOSIS — R09.02 HYPOXIA: Primary | ICD-10-CM

## 2022-01-30 DIAGNOSIS — R06.02 SHORTNESS OF BREATH: ICD-10-CM

## 2022-01-30 DIAGNOSIS — I50.33 ACUTE ON CHRONIC DIASTOLIC CONGESTIVE HEART FAILURE (HCC): ICD-10-CM

## 2022-01-30 DIAGNOSIS — R00.0 TACHYCARDIA: ICD-10-CM

## 2022-01-30 DIAGNOSIS — R10.84 GENERALIZED ABDOMINAL PAIN: ICD-10-CM

## 2022-01-30 DIAGNOSIS — I50.9 CHF EXACERBATION (HCC): Primary | ICD-10-CM

## 2022-01-30 PROBLEM — N18.30 STAGE 3 CHRONIC KIDNEY DISEASE (HCC): Chronic | Status: ACTIVE | Noted: 2019-09-19

## 2022-01-30 PROBLEM — E11.9 DIABETES MELLITUS, TYPE II (HCC): Chronic | Status: ACTIVE | Noted: 2018-08-26

## 2022-01-30 PROBLEM — I25.118 CORONARY ARTERY DISEASE OF NATIVE ARTERY OF NATIVE HEART WITH STABLE ANGINA PECTORIS (HCC): Chronic | Status: ACTIVE | Noted: 2018-08-26

## 2022-01-30 PROBLEM — K21.9 GERD (GASTROESOPHAGEAL REFLUX DISEASE): Chronic | Status: ACTIVE | Noted: 2018-08-26

## 2022-01-30 PROBLEM — S22.41XD CLOSED FRACTURE OF MULTIPLE RIBS OF RIGHT SIDE WITH ROUTINE HEALING: Status: ACTIVE | Noted: 2022-01-30

## 2022-01-30 LAB
2HR DELTA HS TROPONIN: 2 NG/L
4HR DELTA HS TROPONIN: 3 NG/L
ALBUMIN SERPL BCP-MCNC: 4.3 G/DL (ref 3.5–5)
ALP SERPL-CCNC: 88 U/L (ref 34–104)
ALT SERPL W P-5'-P-CCNC: 13 U/L (ref 7–52)
ANION GAP SERPL CALCULATED.3IONS-SCNC: 10 MMOL/L (ref 4–13)
AST SERPL W P-5'-P-CCNC: 14 U/L (ref 13–39)
BASOPHILS # BLD AUTO: 0.08 THOUSANDS/ΜL (ref 0–0.1)
BASOPHILS NFR BLD AUTO: 1 % (ref 0–1)
BILIRUB SERPL-MCNC: 1.45 MG/DL (ref 0.2–1)
BILIRUB UR QL STRIP: ABNORMAL
BNP SERPL-MCNC: 437 PG/ML (ref 1–100)
BUN SERPL-MCNC: 18 MG/DL (ref 5–25)
CALCIUM SERPL-MCNC: 10.5 MG/DL (ref 8.4–10.2)
CARDIAC TROPONIN I PNL SERPL HS: 15 NG/L
CARDIAC TROPONIN I PNL SERPL HS: 17 NG/L
CARDIAC TROPONIN I PNL SERPL HS: 18 NG/L
CHLORIDE SERPL-SCNC: 96 MMOL/L (ref 96–108)
CLARITY UR: CLEAR
CO2 SERPL-SCNC: 26 MMOL/L (ref 21–32)
COLOR UR: YELLOW
CREAT SERPL-MCNC: 1.75 MG/DL (ref 0.6–1.3)
EOSINOPHIL # BLD AUTO: 0.26 THOUSAND/ΜL (ref 0–0.61)
EOSINOPHIL NFR BLD AUTO: 3 % (ref 0–6)
ERYTHROCYTE [DISTWIDTH] IN BLOOD BY AUTOMATED COUNT: 13 % (ref 11.6–15.1)
FLUAV RNA RESP QL NAA+PROBE: NEGATIVE
FLUBV RNA RESP QL NAA+PROBE: NEGATIVE
GFR SERPL CREATININE-BSD FRML MDRD: 34 ML/MIN/1.73SQ M
GLUCOSE SERPL-MCNC: 335 MG/DL (ref 65–140)
GLUCOSE SERPL-MCNC: 409 MG/DL (ref 65–140)
GLUCOSE UR STRIP-MCNC: ABNORMAL MG/DL
HCT VFR BLD AUTO: 50.5 % (ref 36.5–49.3)
HGB BLD-MCNC: 16.6 G/DL (ref 12–17)
HGB UR QL STRIP.AUTO: NEGATIVE
IMM GRANULOCYTES # BLD AUTO: 0.02 THOUSAND/UL (ref 0–0.2)
IMM GRANULOCYTES NFR BLD AUTO: 0 % (ref 0–2)
KETONES UR STRIP-MCNC: ABNORMAL MG/DL
LEUKOCYTE ESTERASE UR QL STRIP: NEGATIVE
LIPASE SERPL-CCNC: 11 U/L (ref 11–82)
LYMPHOCYTES # BLD AUTO: 0.89 THOUSANDS/ΜL (ref 0.6–4.47)
LYMPHOCYTES NFR BLD AUTO: 11 % (ref 14–44)
MCH RBC QN AUTO: 28.7 PG (ref 26.8–34.3)
MCHC RBC AUTO-ENTMCNC: 32.9 G/DL (ref 31.4–37.4)
MCV RBC AUTO: 87 FL (ref 82–98)
MONOCYTES # BLD AUTO: 0.75 THOUSAND/ΜL (ref 0.17–1.22)
MONOCYTES NFR BLD AUTO: 10 % (ref 4–12)
NEUTROPHILS # BLD AUTO: 5.89 THOUSANDS/ΜL (ref 1.85–7.62)
NEUTS SEG NFR BLD AUTO: 75 % (ref 43–75)
NITRITE UR QL STRIP: NEGATIVE
NRBC BLD AUTO-RTO: 0 /100 WBCS
PH UR STRIP.AUTO: 5.5 [PH]
PLATELET # BLD AUTO: 209 THOUSANDS/UL (ref 149–390)
PMV BLD AUTO: 9.5 FL (ref 8.9–12.7)
POTASSIUM SERPL-SCNC: 4.2 MMOL/L (ref 3.5–5.3)
PROT SERPL-MCNC: 8.1 G/DL (ref 6.4–8.4)
PROT UR STRIP-MCNC: NEGATIVE MG/DL
RBC # BLD AUTO: 5.79 MILLION/UL (ref 3.88–5.62)
RSV RNA RESP QL NAA+PROBE: NEGATIVE
SARS-COV-2 RNA RESP QL NAA+PROBE: NEGATIVE
SODIUM SERPL-SCNC: 132 MMOL/L (ref 135–147)
SP GR UR STRIP.AUTO: 1.02 (ref 1–1.03)
UROBILINOGEN UR QL STRIP.AUTO: 0.2 E.U./DL
WBC # BLD AUTO: 7.89 THOUSAND/UL (ref 4.31–10.16)

## 2022-01-30 PROCEDURE — 0241U HB NFCT DS VIR RESP RNA 4 TRGT: CPT | Performed by: EMERGENCY MEDICINE

## 2022-01-30 PROCEDURE — 74176 CT ABD & PELVIS W/O CONTRAST: CPT

## 2022-01-30 PROCEDURE — 85025 COMPLETE CBC W/AUTO DIFF WBC: CPT | Performed by: EMERGENCY MEDICINE

## 2022-01-30 PROCEDURE — 84484 ASSAY OF TROPONIN QUANT: CPT | Performed by: EMERGENCY MEDICINE

## 2022-01-30 PROCEDURE — 83690 ASSAY OF LIPASE: CPT | Performed by: EMERGENCY MEDICINE

## 2022-01-30 PROCEDURE — 99215 OFFICE O/P EST HI 40 MIN: CPT | Performed by: NURSE PRACTITIONER

## 2022-01-30 PROCEDURE — 82948 REAGENT STRIP/BLOOD GLUCOSE: CPT

## 2022-01-30 PROCEDURE — G0463 HOSPITAL OUTPT CLINIC VISIT: HCPCS | Performed by: NURSE PRACTITIONER

## 2022-01-30 PROCEDURE — 81003 URINALYSIS AUTO W/O SCOPE: CPT | Performed by: EMERGENCY MEDICINE

## 2022-01-30 PROCEDURE — 99285 EMERGENCY DEPT VISIT HI MDM: CPT | Performed by: EMERGENCY MEDICINE

## 2022-01-30 PROCEDURE — 83880 ASSAY OF NATRIURETIC PEPTIDE: CPT | Performed by: EMERGENCY MEDICINE

## 2022-01-30 PROCEDURE — 93005 ELECTROCARDIOGRAM TRACING: CPT | Performed by: NURSE PRACTITIONER

## 2022-01-30 PROCEDURE — 71045 X-RAY EXAM CHEST 1 VIEW: CPT

## 2022-01-30 PROCEDURE — 93005 ELECTROCARDIOGRAM TRACING: CPT

## 2022-01-30 PROCEDURE — 80053 COMPREHEN METABOLIC PANEL: CPT | Performed by: EMERGENCY MEDICINE

## 2022-01-30 PROCEDURE — 96374 THER/PROPH/DIAG INJ IV PUSH: CPT

## 2022-01-30 PROCEDURE — 99220 PR INITIAL OBSERVATION CARE/DAY 70 MINUTES: CPT | Performed by: PHYSICIAN ASSISTANT

## 2022-01-30 PROCEDURE — 36415 COLL VENOUS BLD VENIPUNCTURE: CPT | Performed by: EMERGENCY MEDICINE

## 2022-01-30 PROCEDURE — 99285 EMERGENCY DEPT VISIT HI MDM: CPT

## 2022-01-30 PROCEDURE — G1004 CDSM NDSC: HCPCS

## 2022-01-30 RX ORDER — PANTOPRAZOLE SODIUM 40 MG/1
40 TABLET, DELAYED RELEASE ORAL DAILY
COMMUNITY
Start: 2021-11-24 | End: 2022-02-22 | Stop reason: HOSPADM

## 2022-01-30 RX ORDER — HEPARIN SODIUM 5000 [USP'U]/ML
5000 INJECTION, SOLUTION INTRAVENOUS; SUBCUTANEOUS EVERY 8 HOURS SCHEDULED
Status: DISCONTINUED | OUTPATIENT
Start: 2022-01-30 | End: 2022-01-31 | Stop reason: HOSPADM

## 2022-01-30 RX ORDER — FUROSEMIDE 10 MG/ML
20 INJECTION INTRAMUSCULAR; INTRAVENOUS ONCE
Status: COMPLETED | OUTPATIENT
Start: 2022-01-30 | End: 2022-01-30

## 2022-01-30 RX ORDER — ISOSORBIDE MONONITRATE 30 MG/1
120 TABLET, EXTENDED RELEASE ORAL DAILY
Status: DISCONTINUED | OUTPATIENT
Start: 2022-01-31 | End: 2022-01-31 | Stop reason: HOSPADM

## 2022-01-30 RX ORDER — ACETAMINOPHEN 325 MG/1
650 TABLET ORAL EVERY 4 HOURS PRN
Status: DISCONTINUED | OUTPATIENT
Start: 2022-01-30 | End: 2022-01-31 | Stop reason: HOSPADM

## 2022-01-30 RX ORDER — LANOLIN ALCOHOL/MO/W.PET/CERES
6 CREAM (GRAM) TOPICAL
Status: DISCONTINUED | OUTPATIENT
Start: 2022-01-30 | End: 2022-01-31 | Stop reason: HOSPADM

## 2022-01-30 RX ORDER — NITROGLYCERIN 0.4 MG/1
0.4 TABLET SUBLINGUAL
Status: DISCONTINUED | OUTPATIENT
Start: 2022-01-30 | End: 2022-01-31 | Stop reason: HOSPADM

## 2022-01-30 RX ORDER — PROPRANOLOL HCL 60 MG
120 CAPSULE, EXTENDED RELEASE 24HR ORAL DAILY
Status: DISCONTINUED | OUTPATIENT
Start: 2022-01-31 | End: 2022-01-31 | Stop reason: HOSPADM

## 2022-01-30 RX ORDER — SODIUM CHLORIDE 9 MG/ML
3 INJECTION INTRAVENOUS
Status: DISCONTINUED | OUTPATIENT
Start: 2022-01-30 | End: 2022-01-31 | Stop reason: HOSPADM

## 2022-01-30 RX ORDER — CLOPIDOGREL BISULFATE 75 MG/1
75 TABLET ORAL DAILY
Status: DISCONTINUED | OUTPATIENT
Start: 2022-01-31 | End: 2022-01-31 | Stop reason: HOSPADM

## 2022-01-30 RX ORDER — TAMSULOSIN HYDROCHLORIDE 0.4 MG/1
0.8 CAPSULE ORAL
Status: DISCONTINUED | OUTPATIENT
Start: 2022-01-31 | End: 2022-01-31 | Stop reason: HOSPADM

## 2022-01-30 RX ORDER — AMLODIPINE BESYLATE 5 MG/1
10 TABLET ORAL DAILY
Status: DISCONTINUED | OUTPATIENT
Start: 2022-01-31 | End: 2022-01-31 | Stop reason: HOSPADM

## 2022-01-30 RX ORDER — ESCITALOPRAM OXALATE 10 MG/1
10 TABLET ORAL DAILY
Status: DISCONTINUED | OUTPATIENT
Start: 2022-01-31 | End: 2022-01-31 | Stop reason: HOSPADM

## 2022-01-30 RX ORDER — PANTOPRAZOLE SODIUM 40 MG/1
40 TABLET, DELAYED RELEASE ORAL DAILY
Status: DISCONTINUED | OUTPATIENT
Start: 2022-01-31 | End: 2022-01-31 | Stop reason: HOSPADM

## 2022-01-30 RX ORDER — FUROSEMIDE 10 MG/ML
40 INJECTION INTRAMUSCULAR; INTRAVENOUS DAILY
Status: DISCONTINUED | OUTPATIENT
Start: 2022-01-31 | End: 2022-01-31

## 2022-01-30 RX ORDER — FUROSEMIDE 10 MG/ML
INJECTION INTRAMUSCULAR; INTRAVENOUS
Status: COMPLETED
Start: 2022-01-30 | End: 2022-01-30

## 2022-01-30 RX ORDER — ATORVASTATIN CALCIUM 10 MG/1
20 TABLET, FILM COATED ORAL DAILY
Status: DISCONTINUED | OUTPATIENT
Start: 2022-01-31 | End: 2022-01-31 | Stop reason: HOSPADM

## 2022-01-30 RX ORDER — SUCRALFATE 1 G/1
1 TABLET ORAL 2 TIMES DAILY
Status: DISCONTINUED | OUTPATIENT
Start: 2022-01-30 | End: 2022-01-31 | Stop reason: HOSPADM

## 2022-01-30 RX ADMIN — INSULIN LISPRO 5 UNITS: 100 INJECTION, SOLUTION INTRAVENOUS; SUBCUTANEOUS at 22:29

## 2022-01-30 RX ADMIN — FUROSEMIDE 20 MG: 10 INJECTION, SOLUTION INTRAMUSCULAR; INTRAVENOUS at 20:04

## 2022-01-30 RX ADMIN — HEPARIN SODIUM 5000 UNITS: 5000 INJECTION INTRAVENOUS; SUBCUTANEOUS at 22:28

## 2022-01-30 RX ADMIN — SUCRALFATE 1 G: 1 TABLET ORAL at 22:36

## 2022-01-30 RX ADMIN — FUROSEMIDE 20 MG: 10 INJECTION INTRAMUSCULAR; INTRAVENOUS at 20:04

## 2022-01-30 RX ADMIN — INSULIN DETEMIR 20 UNITS: 100 INJECTION, SOLUTION SUBCUTANEOUS at 22:27

## 2022-01-30 NOTE — PATIENT INSTRUCTIONS
You are being transferred to the ED for a higher level of care due to shortness of breath, abdominal pain and low oxygen  Follow up with your PCP after ED visit

## 2022-01-30 NOTE — PROGRESS NOTES
[facility]        NAME: Haresh Rose is a 82 y o  male  : 1935    MRN: [id number]  DATE: 2022  TIME: 6:48 PM    Assessment and Plan   Hypoxia [R09 02]  1  Hypoxia     2  Shortness of breath  ECG 12 lead   3  Generalized abdominal pain     4  Tachycardia           Patient Instructions       Follow up with PCP in 3-5 days  Proceed to  ER if symptoms worsen  You are being transferred to the ED for a higher level of care due to shortness of breath, abdominal pain and low oxygen  Follow up with your PCP after ED visit       Chief Complaint     Chief Complaint   Patient presents with    Shortness of Breath    Abdominal Pain         History of Present Illness       This is an 82year old male who comes to care now with his granddaughter and has c/o 4-5 days of runny nose, abdominal pain, shortness of breath and fatigue  He has an extensive cardiac history and DM  He states he is more SOB than normal  He does not use home oxygen  He was covid boostered a few weeks ago  States he doesn't go anywhere and if he does he wears his mask  States last was 2 weeks ago at the grocery store  States family doesn't always wear a mask when they visit  Denies CP, cough, weight gain or leg swelling  Pt states that his abdominal pain is normal pain for him and he is scheduled for a scope - states is chronic  Review of Systems   Review of Systems   Constitutional: Positive for fatigue  HENT: Positive for congestion and rhinorrhea  Eyes: Negative  Respiratory: Positive for shortness of breath  Cardiovascular: Negative  Gastrointestinal: Positive for abdominal pain  Endocrine: Negative  Genitourinary: Negative  Musculoskeletal: Negative  Skin: Negative  Allergic/Immunologic: Negative  Neurological: Negative  Hematological: Negative  Psychiatric/Behavioral: Negative            Current Medications     No current facility-administered medications for this visit  Current Outpatient Medications:     amLODIPine (NORVASC) 10 mg tablet, Take 1 tablet (10 mg total) by mouth daily, Disp: 30 tablet, Rfl: 0    atorvastatin (LIPITOR) 20 mg tablet, Take 20 mg by mouth daily, Disp: , Rfl:     clopidogrel (PLAVIX) 75 mg tablet, Take 1 tablet (75 mg total) by mouth daily Hold until seen by ENT on 9/23/19 , Disp: , Rfl:     escitalopram (LEXAPRO) 10 mg tablet, Take 10 mg by mouth daily, Disp: , Rfl:     glimepiride (AMARYL) 1 mg tablet, Take 1 mg by mouth 2 (two) times a day , Disp: , Rfl: 0    isosorbide mononitrate (IMDUR) 60 mg 24 hr tablet, Take 2 tablets (120 mg total) by mouth daily In the morning, Disp: 60 tablet, Rfl: 0    nitroglycerin (NITROSTAT) 0 4 mg SL tablet, one tablet under tongue for angina, may repeat in 15 min   no more than three times, Disp: , Rfl: 0    omeprazole (PriLOSEC) 20 mg delayed release capsule, Take 20 mg by mouth daily, Disp: , Rfl:     pantoprazole (PROTONIX) 40 mg tablet, Take 40 mg by mouth daily, Disp: , Rfl:     propranolol (INDERAL LA) 120 mg 24 hr capsule, Take 120 mg by mouth daily, Disp: , Rfl:     sucralfate (CARAFATE) 1 g tablet, Take 1 g by mouth 2 (two) times a day, Disp: , Rfl:     tamsulosin (FLOMAX) 0 4 mg, Take 2 capsules (0 8 mg total) by mouth daily with dinner, Disp: 180 capsule, Rfl: 3    Facility-Administered Medications Ordered in Other Visits:     Insert peripheral IV, , , Once **AND** sodium chloride (PF) 0 9 % injection 3 mL, 3 mL, Intravenous, Q1H PRN, Shaheen Rand MD    Current Allergies     Allergies as of 01/30/2022 - Reviewed 01/30/2022   Allergen Reaction Noted    Advil [ibuprofen] Fatigue 01/22/2019            The following portions of the patient's history were reviewed and updated as appropriate: allergies, current medications, past family history, past medical history, past social history, past surgical history and problem list      Past Medical History:   Diagnosis Date    Arthritis     Athscl 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     Stent OM1  Patent mammary graft to LAD 10/7/2009; CABG 1992 & 2005    Cancer [facility])     skin    Coronary artery disease     Diabetes mellitus ([facility] [address] )     Diabetes mellitus, type II ([facility] [address] )     without complication    Epistaxis     Essential (primary) hypertension     GERD (gastroesophageal reflux disease)     Hx of cardiovascular stress test 07/23/2014    Eddie MPI; EF0 52 (52%) Lexiscan, mild LV systolic dysfunction; evidence for prior inferior wall MI; perfusion imaging consistant w mild lat wall ischemia and min torin-infart inferior ischemia   / EF0 51 (51%) evidence for prior inferior wall MI  Mild inferior and mild-moderate lateral wall ischemia  7/29/15    Hx of echocardiogram 11/07/2017    2D w/CFD;EF0 55 (55%) mild LVH, mitral valve prolapse with moderate regurgitation  left atrial enlargement  Mild tricuspid regurgitation   Hypertension     Mixed hyperlipidemia     Occlusion and stenosis of bilateral carotid arteries     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 infarction)     Presence of aortocoronary bypass graft        Past Surgical History:   Procedure Laterality Date    CARDIAC CATHETERIZATION  10/07/2009    Stent 99% stenosis SVG from the aorta to OM1  Patent mammary graft to the LAD    CORONARY ARTERY BYPASS GRAFT  1992    VG-CX, VG-RCA    CORONARY ARTERY BYPASS GRAFT  11/04/2005    LIMA-D1-LAD, VG-PDA-PLB, [id number] TMR 9 Lesions    [place] EGD TRANSORAL BIOPSY SINGLE/MULTIPLE N/A 1/23/2019    Procedure: ESOPHAGOGASTRODUODENOSCOPY (EGD) with biopsy;  Surgeon: Lolis Mendoza MD;  Location: [address] [place] [address] GI LAB; Service: Gastroenterology    TONSILLECTOMY         Family History   Problem Relation Age of Onset    Heart disease Brother     Tuberculosis Mother     Tuberculosis Father          Medications have been verified          Objective   Pulse (!) 110   Temp 97 9 °F (36 6 °C)   Resp 20   Wt 77 6 kg (171 lb)   SpO2 (!) 88%   BMI 27 60 kg/m² No LMP for male patient  Physical Exam     Physical Exam  Vitals and nursing note reviewed  Constitutional:       General: He is not in acute distress  Appearance: He is well-developed and normal weight  He is not ill-appearing, toxic-appearing or diaphoretic  HENT:      Head: Normocephalic and atraumatic  Mouth/Throat:      Mouth: Mucous membranes are moist    Cardiovascular:      Rate and Rhythm: Normal rate  Rhythm irregular  Pulmonary:      Effort: Tachypnea present  Breath sounds: Examination of the right-lower field reveals rales  Examination of the left-lower field reveals rales  Rales present  Abdominal:      Palpations: Abdomen is soft  Tenderness: There is abdominal tenderness  Comments: Generalized    Musculoskeletal:         General: Normal range of motion  Skin:     General: Skin is warm and dry  Capillary Refill: Capillary refill takes less than 2 seconds  Neurological:      General: No focal deficit present  Mental Status: He is alert and oriented to person, place, and time     Psychiatric:         Mood and Affect: Mood normal          Behavior: Behavior normal                Ambulatory pulse ox 90-94 with -117  Resting pulse ox 87-88%    o2 2 liters applied   called for transfer to ED     EKG - NSR with SA  95

## 2022-01-31 VITALS
WEIGHT: 171 LBS | OXYGEN SATURATION: 92 % | HEIGHT: 66 IN | DIASTOLIC BLOOD PRESSURE: 71 MMHG | BODY MASS INDEX: 27.48 KG/M2 | TEMPERATURE: 97.8 F | HEART RATE: 80 BPM | RESPIRATION RATE: 20 BRPM | SYSTOLIC BLOOD PRESSURE: 131 MMHG

## 2022-01-31 LAB
ANION GAP SERPL CALCULATED.3IONS-SCNC: 7 MMOL/L (ref 4–13)
ATRIAL RATE: 90 BPM
ATRIAL RATE: 95 BPM
BUN SERPL-MCNC: 17 MG/DL (ref 5–25)
CALCIUM SERPL-MCNC: 10.6 MG/DL (ref 8.4–10.2)
CHLORIDE SERPL-SCNC: 98 MMOL/L (ref 96–108)
CO2 SERPL-SCNC: 31 MMOL/L (ref 21–32)
CREAT SERPL-MCNC: 1.65 MG/DL (ref 0.6–1.3)
GFR SERPL CREATININE-BSD FRML MDRD: 37 ML/MIN/1.73SQ M
GLUCOSE SERPL-MCNC: 100 MG/DL (ref 65–140)
GLUCOSE SERPL-MCNC: 109 MG/DL (ref 65–140)
MAGNESIUM SERPL-MCNC: 2 MG/DL (ref 1.9–2.7)
P AXIS: -7 DEGREES
P AXIS: 17 DEGREES
POTASSIUM SERPL-SCNC: 4.1 MMOL/L (ref 3.5–5.3)
PR INTERVAL: 196 MS
PR INTERVAL: 198 MS
QRS AXIS: 62 DEGREES
QRS AXIS: 69 DEGREES
QRSD INTERVAL: 86 MS
QRSD INTERVAL: 86 MS
QT INTERVAL: 352 MS
QT INTERVAL: 354 MS
QTC INTERVAL: 433 MS
QTC INTERVAL: 442 MS
SODIUM SERPL-SCNC: 136 MMOL/L (ref 135–147)
T WAVE AXIS: 130 DEGREES
T WAVE AXIS: 138 DEGREES
VENTRICULAR RATE: 90 BPM
VENTRICULAR RATE: 95 BPM

## 2022-01-31 PROCEDURE — 82948 REAGENT STRIP/BLOOD GLUCOSE: CPT

## 2022-01-31 PROCEDURE — 93010 ELECTROCARDIOGRAM REPORT: CPT | Performed by: INTERNAL MEDICINE

## 2022-01-31 PROCEDURE — 99217 PR OBSERVATION CARE DISCHARGE MANAGEMENT: CPT | Performed by: HOSPITALIST

## 2022-01-31 PROCEDURE — 80048 BASIC METABOLIC PNL TOTAL CA: CPT | Performed by: PHYSICIAN ASSISTANT

## 2022-01-31 PROCEDURE — 36415 COLL VENOUS BLD VENIPUNCTURE: CPT | Performed by: PHYSICIAN ASSISTANT

## 2022-01-31 PROCEDURE — 83735 ASSAY OF MAGNESIUM: CPT | Performed by: PHYSICIAN ASSISTANT

## 2022-01-31 RX ORDER — FUROSEMIDE 40 MG/1
20 TABLET ORAL DAILY
Status: DISCONTINUED | OUTPATIENT
Start: 2022-02-01 | End: 2022-01-31 | Stop reason: HOSPADM

## 2022-01-31 RX ORDER — FUROSEMIDE 20 MG/1
20 TABLET ORAL DAILY
Qty: 30 TABLET | Refills: 0 | Status: SHIPPED | OUTPATIENT
Start: 2022-02-01 | End: 2022-02-22 | Stop reason: HOSPADM

## 2022-01-31 RX ADMIN — HEPARIN SODIUM 5000 UNITS: 5000 INJECTION INTRAVENOUS; SUBCUTANEOUS at 06:25

## 2022-01-31 RX ADMIN — PROPRANOLOL HYDROCHLORIDE 120 MG: 60 CAPSULE, EXTENDED RELEASE ORAL at 09:10

## 2022-01-31 RX ADMIN — CLOPIDOGREL BISULFATE 75 MG: 75 TABLET ORAL at 08:33

## 2022-01-31 RX ADMIN — FUROSEMIDE 40 MG: 10 INJECTION, SOLUTION INTRAMUSCULAR; INTRAVENOUS at 08:32

## 2022-01-31 RX ADMIN — SUCRALFATE 1 G: 1 TABLET ORAL at 08:32

## 2022-01-31 RX ADMIN — AMLODIPINE BESYLATE 10 MG: 5 TABLET ORAL at 08:31

## 2022-01-31 RX ADMIN — ATORVASTATIN CALCIUM 20 MG: 10 TABLET, FILM COATED ORAL at 08:32

## 2022-01-31 RX ADMIN — PANTOPRAZOLE SODIUM 40 MG: 40 TABLET, DELAYED RELEASE ORAL at 06:24

## 2022-01-31 RX ADMIN — ISOSORBIDE MONONITRATE 120 MG: 30 TABLET, EXTENDED RELEASE ORAL at 08:32

## 2022-01-31 RX ADMIN — ESCITALOPRAM OXALATE 10 MG: 10 TABLET ORAL at 08:32

## 2022-01-31 RX ADMIN — INSULIN LISPRO 5 UNITS: 100 INJECTION, SOLUTION INTRAVENOUS; SUBCUTANEOUS at 08:35

## 2022-01-31 NOTE — ASSESSMENT & PLAN NOTE
· With a history of coronary artery bypass grafting surgery  · DC home on pre-admit meds at pre-admit dosages which include Plavix, propanolol, isosorbide mononitrate, Lipitor, Lasix, and p r n  nitroglycerin  · Outpatient follow-up with PCP, and Cardiology

## 2022-01-31 NOTE — ASSESSMENT & PLAN NOTE
Lab Results   Component Value Date    EGFR 34 01/30/2022    EGFR 36 10/04/2021    EGFR 37 05/13/2021    CREATININE 1 75 (H) 01/30/2022    CREATININE 1 72 (H) 10/04/2021    CREATININE 1 67 (H) 05/13/2021   · creat seems at baseline  · monitor

## 2022-01-31 NOTE — DISCHARGE SUMMARY
Korey 47  Discharge- Lavell Mandujano 1935, 82 y o  male MRN: [id number]  Unit/Bed#: ED 20 Encounter: [id number]  Primary Care Provider: Tessy Stevens DO   Date and time admitted to hospital: 1/30/2022  6:10 PM    * Acute on chronic diastolic congestive heart failure [facility])  Assessment & Plan  Wt Readings from Last 3 Encounters:   01/30/22 77 6 kg (171 lb)   01/30/22 77 6 kg (171 lb)   10/01/21 77 6 kg (171 lb)   · With an initial acute hypoxic respiratory failure as manifested by the 88% on room air prior to arrival  · Status post treatment with 2 doses of IV Lasix, 20 mg last evening, and 40 mg this a m    · Patient feels much better, his O2 sat is now anywhere between 93 and 98% on room air  · Patient is medically stable for discharge  · No further inpatient testing, treatment, and or workup is warranted  · New prescription has been provided for Lasix 20 mg daily p o   · Okay to resume all other pre-admission medications, at the preadmission dosages  · Outpatient follow-up with PCP, and Cardiology          Coronary artery disease of native artery of native heart with stable angina pectoris ([facility] [address] )  Assessment & Plan  · With a history of coronary artery bypass grafting surgery  · DC home on pre-admit meds at pre-admit dosages which include Plavix, propanolol, isosorbide mononitrate, Lipitor, Lasix, and p r n  nitroglycerin  · Outpatient follow-up with PCP, and Cardiology    Stage 3 chronic kidney disease [facility])  Assessment & Plan  Lab Results   Component Value Date    EGFR 37 01/31/2022    EGFR 34 01/30/2022    EGFR 36 10/04/2021    CREATININE 1 65 (H) 01/31/2022    CREATININE 1 75 (H) 01/30/2022    CREATININE 1 72 (H) 10/04/2021   · Renal function is at baseline  · Outpatient follow-up with Nephrology, PCP    Diabetes mellitus, type II [facility])  Assessment & Plan  Lab Results   Component Value Date    HGBA1C 10 8 (H) 11/09/2021       Recent Labs     01/30/22  2104 01/31/22  0740   POCGLU 335* 100       Blood Sugar Average: Last 72 hrs:  · (P) 217  5BS uncontrolled on admission   · Discharge home on pre-admit meds at pre-admit dosages  · Potential transition to insulin in the outpatient setting via his PCP-Dr Michelle Lozano    Closed fracture of multiple ribs of right side with routine healing  Assessment & Plan  · CT: Posterior right 11th and 12th rib fractures are new from prior imaging, but have subacute appearance  Correlate for point tenderness  No acute-appearing fractures  · Incentive spirometer  · Subacute appearance  · I personally reviewed these findings with the patient, he reports falling approximately 2 years ago on some ice, and reports that his ribs were fractured at that time  He has no pain, discomfort, and or any other concerns from this standpoint  No point tenderness on exam either    GERD (gastroesophageal reflux disease)  Assessment & Plan  · DC home on pre-admit meds at pre-admit dosages  · Patient is scheduled for an outpatient EGD via [facility] gastroenterology within the next 10 days        Discharging Physician / Practitioner: Davidson Vergara MD  PCP: Kita Daniels DO  Admission Date:   Admission Orders (From admission, onward)     Ordered        01/30/22 2015  Place in Observation  Once                      Discharge Date: 01/31/22    Medical Problems             Resolved Problems  Date Reviewed: 1/30/2022    None                Consultations During Hospital Stay:  · None    Procedures Performed:   · None    Significant Findings / Test Results:   · Chest x-ray:-Mild peripheral interstitial prominence   Correlate clinically for early vascular congestion  · CT scan abdomen pelvis without contrast:-1   Posterior right 11th and 12th rib fractures are new from prior imaging, but have subacute appearance  Correlate for point tenderness  No acute-appearing fractures  2   Otherwise, no acute abdominopelvic findings   3   Indeterminate density contour distorting lesion at the posterior left kidney  Recommend nonurgent, outpatient evaluation with ultrasound  Incidental Findings:   ·   3   Indeterminate density contour distorting lesion at the posterior left kidney  Recommend nonurgent, outpatient evaluation with ultrasound  · Incidental findings report has been sent to the patient's PCP    Test Results Pending at Discharge (will require follow up): · None     Outpatient Tests Requested:  · None    Complications:     None    Reason for Admission:  Acute exacerbation of diastolic dysfunction congestive heart failure    Hospital Course:     Haresh Rose is a 82 y o  male patient who originally presented to the hospital on 1/30/2022 due to shortness of breath, and abdominal pain  Please refer to the initial history and physical examination completed by Haroon Lamas for the initial presenting features and complaints  In brief, the patient is a pleasant 47-year-old male, that was sent over to the emergency room for further evaluation from a local urgent care setting after he presented there with some shortness of breath  He was noted to have an O2 sat of 88% on room air  After being sent into the emergency room, he was evaluated further  He was diagnosed with an acute on chronic diastolic dysfunction congestive heart failure exacerbation  He was treated with IV Lasix  A chest x-ray was performed with the results as outlined above  Of note, the patient was found to have right-sided rib fractures as outlined above, however upon further discussion with the patient, these rib fractures are old and chronic  He reported having a fall approximately 2 years ago  He received 20 mg of IV Lasix in the emergency room, and an additional 40 mg of IV Lasix the next morning  He was weaned off of his supplemental oxygen, and was put on to room air  Additionally, there was a concern for an abnormal lesion around his left kidney    This will be followed up in the outpatient setting via his PCP  An incidental findings report was sent to his PCP  He was given a prescription for 20 mg worth of Lasix to be taken daily by mouth at time of discharge  He will follow up in the near future in the outpatient setting with his PCP, Cardiology, and Nephrology  With the exception of the addition of the Lasix, no changes were made to any of his other pre-admission medications, and or to the preadmission dosages  Please refer to the assessment/plan portion of this discharge summary as outlined above for the remainder of the details in regards to his stay    Please see above list of diagnoses and related plan for additional information  Condition at Discharge: good     Discharge Day Visit / Exam:     Subjective:  Patient seen and examined, looks and feels well, wants to go home  Vitals: Blood Pressure: 131/71 (01/31/22 0800)  Pulse: 77 (01/31/22 0904)  Temperature: 97 8 °F (36 6 °C) (01/30/22 1815)  Respirations: 20 (01/31/22 0000)  Height: 5' 6" (167 6 cm) (01/30/22 1810)  Weight - Scale: 77 6 kg (171 lb) (01/30/22 1810)  SpO2: 93 % (01/31/22 0904)  Exam:   Physical Exam  Vitals and nursing note reviewed  Constitutional:       General: He is not in acute distress  Appearance: Normal appearance  He is not ill-appearing  HENT:      Head: Normocephalic and atraumatic  Nose: Nose normal    Eyes:      Extraocular Movements: Extraocular movements intact  Pupils: Pupils are equal, round, and reactive to light  Cardiovascular:      Rate and Rhythm: Normal rate and regular rhythm  Pulses: Normal pulses  Heart sounds: Normal heart sounds  No murmur heard  No friction rub  No gallop  Pulmonary:      Effort: Pulmonary effort is normal       Breath sounds: Normal breath sounds  Comments: Decreased breath sounds bilaterally at the bases, remainder of the lung fields are clear  Abdominal:      General: There is no distension  Palpations: Abdomen is soft  There is no mass  Tenderness: There is no abdominal tenderness  There is no guarding or rebound  Musculoskeletal:         General: No swelling or tenderness  Normal range of motion  Cervical back: Normal range of motion and neck supple  No rigidity  No muscular tenderness  Right lower leg: No edema  Left lower leg: No edema  Skin:     General: Skin is warm  Capillary Refill: Capillary refill takes less than 2 seconds  Findings: No erythema or rash  Neurological:      General: No focal deficit present  Mental Status: He is alert and oriented to person, place, and time  Mental status is at baseline  Psychiatric:         Mood and Affect: Mood normal          Behavior: Behavior normal          Discussion with Family:  Attempts to call Sona and Santosh Lees at approximately 9:20 a m  at the listed numbers on the face sheet both proved futile, no answer  Discharge instructions/Information to patient and family:   See after visit summary for information provided to patient and family  Provisions for Follow-Up Care:  See after visit summary for information related to follow-up care and any pertinent home health orders  Disposition:     Home      Planned Readmission:    None     Discharge Statement:  I spent 45 minutes discharging the patient  This time was spent on the day of discharge  I had direct contact with the patient on the day of discharge  Greater than 50% of the total time was spent examining patient, answering all patient questions, arranging and discussing plan of care with patient as well as directly providing post-discharge instructions  Additional time then spent on discharge activities  Discharge Medications:  See after visit summary for reconciled discharge medications provided to patient and family        ** Please Note: This note has been constructed using a voice recognition system **

## 2022-01-31 NOTE — DISCHARGE INSTR - AVS FIRST PAGE
Dear Madina Garcia,     It was our pleasure to care for you here at [facility]  It is our hope that we were always able to exceed the expected standards for your care during your stay  You were hospitalized due to shortness of breath, and congestive heart failure  Tammy Pepper were cared for on the medical/surgical floor by Maritza Harrison MD with the [address] [place] [address] [facility] who covers for your primary care physician (PCP), Fco Healy DO, while you were hospitalized  If you have any questions or concerns related to this hospitalization, you may contact us at [phone]  For follow up as well as any medication refills, we recommend that you follow up with your primary care physician  A registered nurse will reach out to you by phone within a few days after your discharge to answer any additional questions that you may have after going home  However, at this time we provide for you here, the most important instructions / recommendations at discharge:     Notable Medication Adjustments -   New prescription-Lasix 20 mg-take 1 tablet daily by mouth  Okay to resume all other pre-admission medications, at the preadmission dosages  Testing Required after Discharge -   To be further determined in the outpatient setting by your PCP  Important follow up information -   Please follow-up with the providers as outlined in this discharge package  Other Instructions -   Please maintain a healthy low-sodium diet  Please review this entire after visit summary as additional general instructions including medication list, appointments, activity, diet, any pertinent wound care, and other additional recommendations from your care team that may be provided for you        Sincerely,     Maritza Harrison MD

## 2022-01-31 NOTE — ASSESSMENT & PLAN NOTE
Wt Readings from Last 3 Encounters:   01/30/22 77 6 kg (171 lb)   01/30/22 77 6 kg (171 lb)   10/01/21 77 6 kg (171 lb)   · patient w/ documented hypoxia at Urgent care of 88% on RA - sent to ED to evaluate  · Lasix 20mg given in ED  · Has echo 3/2021 w/ EF 09%, grade 1 diastolic dysfunction  · BNP   · Diuretic naive  · F/u cards as OP if stable in AM  · Covid negative

## 2022-01-31 NOTE — ASSESSMENT & PLAN NOTE
Lab Results   Component Value Date    HGBA1C 10 8 (H) 11/09/2021       No results for input(s): POCGLU in the last 72 hours      Blood Sugar Average: Last 72 hrs:  · BS uncontrolled on admission   · Start insulin - if patient discharged home on Insulin, daughter requests pen w/ his poor vision  · Hold Amaryl

## 2022-01-31 NOTE — ASSESSMENT & PLAN NOTE
· CT: Posterior right 11th and 12th rib fractures are new from prior imaging, but have subacute appearance  Correlate for point tenderness  No acute-appearing fractures     · Incentive spirometer  · Subacute appearance

## 2022-01-31 NOTE — ASSESSMENT & PLAN NOTE
· DC home on pre-admit meds at pre-admit dosages  · Patient is scheduled for an outpatient EGD via [facility] gastroenterology within the next 10 days

## 2022-01-31 NOTE — CASE MANAGEMENT
Case Management Assessment & Discharge Planning Note    Patient name Pati Jackson  Location ED 20/ED 20 MRN [id number]  : 1935 Date 2022       Current Admission Date: 2022  Current Admission Diagnosis:Acute on chronic diastolic congestive heart failure [facility])   Patient Active Problem List    Diagnosis Date Noted    Acute on chronic diastolic congestive heart failure ([facility] ) 2022    Closed fracture of multiple ribs of right side with routine healing 2022    Sensation of pressure in bladder area 10/01/2021    Hypertensive left ventricular hypertrophy, without heart failure 2021    Elevated d-dimer 2021    Carotid artery stenosis 2021    Anginal syndrome ([place] [address] ) 2021    Secondary hyperparathyroidism of renal origin ([place] [address] ) 2020    Vitamin D deficiency 2020    Nephrolithiasis 2020    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 ([place] [address] ) 2020    Chronic tubulointerstitial nephritis 2020    Edema 2020    Proteinuria 2020    Atherosclerosis 2020    Stage 3 chronic kidney disease ([place] [address] ) 2019    Hypertensive crisis 2018    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 infarction) 2018    Mixed hyperlipidemia 2018    Diabetes mellitus, type II ([place] [address] ) 2018    Coronary artery disease of native artery of native heart with stable angina pectoris ([place] [address] ) 2018    GERD (gastroesophageal reflux disease) 2018    Obese 2018    Presence of aortocoronary bypass graft 2018    Occlusion and stenosis of bilateral carotid arteries 2018    Dyslipidemia 2018      LOS (days): 0  Geometric Mean LOS (GMLOS) (days):   Days to GMLOS:     OBJECTIVE:              Current admission status: Observation       Preferred Pharmacy:   [facility] [address] [facility] [place] [address] [place] [address]  Phone: [phone] Fax: [phone]    Primary Care Provider: Tyler Estrada DO    Primary Insurance: MEDICARE  Secondary Insurance: [employer]    ASSESSMENT:  Active Health Care Agents    There are no active Health Care Agents on file         Advance Directives  Does patient have a [address]?: Yes  Does patient have Advance Directives?: Yes  Primary Contact: Prudence Precise              Patient Information  Admitted from[de-identified] Home  Mental Status: Alert  During Assessment patient was accompanied by: Not accompanied during assessment  Assessment information provided by[de-identified] Patient  Primary Caregiver: Self  Support Systems: Children  Type of Current Residence: Apartment  Floor Level: 1  Upon entering residence, is there a bedroom on the main floor (no further steps)?: Yes  Upon entering residence, is there a bathroom on the main floor (no further steps)?: Yes  In the last 12 months, was there a time when you were not able to pay the mortgage or rent on time?: No  In the last 12 months, was there a time when you did not have a steady place to sleep or slept in a shelter (including now)?: No  Homeless/housing insecurity resource given?: N/A  Living Arrangements: Lives Alone    Activities of Daily Living Prior to Admission  Functional Status: Independent  Completes ADLs independently?: Yes  Ambulates independently?: Yes  Does patient use assisted devices?: Yes  Assisted Devices (DME) used: [employer]  Does patient currently own DME?: Yes  What DME does the patient currently own?: [employer]  Does patient have a history of Outpatient Therapy (PT/OT)?: No  Does the patient have a history of Short-Term Rehab?: No  Does patient have a history of HHC?: Yes ([facility])         Patient Information Continued  Income Source: Pension/snf  Within the past 12 months, you worried that your food would run out before you got the money to buy more : Never true  Within the past 12 months, the food you bought just didnt last and you didnt have money to get more : Never true  Food insecurity resource given?: N/A  Does patient receive dialysis treatments?: No  Does patient have a history of substance abuse?: No  Does patient have a history of Mental Health Diagnosis?: No         Means of Transportation  Means of Transport to Appts[de-identified] Family transport  In the past 12 months, has lack of transportation kept you from medical appointments or from getting medications?: No  In the past 12 months, has lack of transportation kept you from meetings, work, or from getting things needed for daily living?: No  Was application for public transport provided?: N/A        DISCHARGE DETAILS:    Discharge planning discussed with[de-identified] patient  Freedom of Choice: Yes                   Contacts  Patient Contacts: anu Magallanes  Relationship to Patient[de-identified] Family  Contact Method: Phone  Phone Number: [phone]  Reason/Outcome: Continuity of [address]                   Would you like to participate in our [address] service program?  : No - Declined    Treatment Team Recommendation: Home  Discharge Destination Plan[de-identified] Home  Transport at Discharge : Family           ETA of Transport (Date): 01/31/22     CM reviewed d/c planning process including the following: identifying help at home, patient preference for d/c planning needs, Discharge Lounge, [facility] Meds to Bed program, availability of treatment team to discuss questions or concerns patient and/or family may have regarding understanding medications and recognizing signs and symptoms once discharged  CM also encouraged patient to follow up with all recommended appointments after discharge  Patient advised of importance for patient and family to participate in managing patients medical well being  Patient/caregiver received discharge checklist  Content reviewed   Patient/caregiver encouraged to participate in discharge plan of care prior to discharge home

## 2022-01-31 NOTE — ASSESSMENT & PLAN NOTE
· CT: Posterior right 11th and 12th rib fractures are new from prior imaging, but have subacute appearance  Correlate for point tenderness  No acute-appearing fractures  · Incentive spirometer  · Subacute appearance  · I personally reviewed these findings with the patient, he reports falling approximately 2 years ago on some ice, and reports that his ribs were fractured at that time  He has no pain, discomfort, and or any other concerns from this standpoint    No point tenderness on exam either

## 2022-01-31 NOTE — ASSESSMENT & PLAN NOTE
Lab Results   Component Value Date    EGFR 37 01/31/2022    EGFR 34 01/30/2022    EGFR 36 10/04/2021    CREATININE 1 65 (H) 01/31/2022    CREATININE 1 75 (H) 01/30/2022    CREATININE 1 72 (H) 10/04/2021   · Renal function is at baseline  · Outpatient follow-up with Nephrology, PCP

## 2022-01-31 NOTE — ASSESSMENT & PLAN NOTE
Wt Readings from Last 3 Encounters:   01/30/22 77 6 kg (171 lb)   01/30/22 77 6 kg (171 lb)   10/01/21 77 6 kg (171 lb)   · With an initial acute hypoxic respiratory failure as manifested by the 88% on room air prior to arrival  · Status post treatment with 2 doses of IV Lasix, 20 mg last evening, and 40 mg this a m    · Patient feels much better, his O2 sat is now anywhere between 93 and 98% on room air  · Patient is medically stable for discharge  · No further inpatient testing, treatment, and or workup is warranted  · New prescription has been provided for Lasix 20 mg daily p o   · Okay to resume all other pre-admission medications, at the preadmission dosages  · Outpatient follow-up with PCP, and Cardiology

## 2022-01-31 NOTE — ASSESSMENT & PLAN NOTE
· Continue PPI, Carafate  · Patient to have EGD in future to evaluate abdominal pain - no acute findings on CT

## 2022-01-31 NOTE — INCIDENTAL FINDINGS
The following findings require follow up:  Radiographic finding   Finding: 3   Indeterminate density contour distorting lesion at the posterior left kidney  Recommend nonurgent,                                 outpatient evaluation with ultrasound      Follow up required:  Renal ultrasound   Follow up should be done within as soon as possible    Please notify the following clinician to assist with the follow up:   Dr Antoine England

## 2022-01-31 NOTE — ASSESSMENT & PLAN NOTE
Lab Results   Component Value Date    HGBA1C 10 8 (H) 11/09/2021       Recent Labs     01/30/22  2104 01/31/22  0740   POCGLU 335* 100       Blood Sugar Average: Last 72 hrs:  · (P) 217  5BS uncontrolled on admission   · Discharge home on pre-admit meds at pre-admit dosages  · Potential transition to insulin in the outpatient setting via his PCP-Dr Brandie Mackenzie

## 2022-01-31 NOTE — H&P
[address] [place] [facility] 1935, 82 y o  male MRN: [id number]  Unit/Bed#: ED 20 Encounter: [id number]  Primary Care Provider: Yocasta Ureña DO   Date and time admitted to hospital: 1/30/2022  6:10 PM    * Acute on chronic diastolic congestive heart failure (HCC)  Assessment & Plan  Wt Readings from Last 3 Encounters:   01/30/22 77 6 kg (171 lb)   01/30/22 77 6 kg (171 lb)   10/01/21 77 6 kg (171 lb)   · patient w/ documented hypoxia at Urgent care of 88% on RA - sent to ED to evaluate  · Lasix 20mg given in ED  · Has echo 3/2021 w/ EF 09%, grade 1 diastolic dysfunction  · BNP   · Diuretic naive  · F/u cards as OP if stable in AM  · Covid negative          Closed fracture of multiple ribs of right side with routine healing  Assessment & Plan  · CT: Posterior right 11th and 12th rib fractures are new from prior imaging, but have subacute appearance  Correlate for point tenderness  No acute-appearing fractures  · Incentive spirometer  · Subacute appearance    Stage 3 chronic kidney disease [facility])  Assessment & Plan  Lab Results   Component Value Date    EGFR 34 01/30/2022    EGFR 36 10/04/2021    EGFR 37 05/13/2021    CREATININE 1 75 (H) 01/30/2022    CREATININE 1 72 (H) 10/04/2021    CREATININE 1 67 (H) 05/13/2021   · creat seems at baseline  · monitor    GERD (gastroesophageal reflux disease)  Assessment & Plan  · Continue PPI, Carafate  · Patient to have EGD in future to evaluate abdominal pain - no acute findings on CT    Coronary artery disease of native artery of native heart with stable angina pectoris ([facility] )  Assessment & Plan  · Continue home meds and monitor  · Hx CABG  · F/u cardiology as OP    Diabetes mellitus, type II ([facility] )  Assessment & Plan  Lab Results   Component Value Date    HGBA1C 10 8 (H) 11/09/2021       No results for input(s): POCGLU in the last 72 hours      Blood Sugar Average: Last 72 hrs:  · BS uncontrolled on admission   · Start insulin - if patient discharged home on Insulin, daughter requests pen w/ his poor vision  · Hold Amaryl    VTE Pharmacologic Prophylaxis: VTE Score: 5 High Risk (Score >/= 5) - Pharmacological DVT Prophylaxis Ordered: enoxaparin (Lovenox)  Sequential Compression Devices Ordered  Code Status: Level 1 - Full Code   Discussion with family: Updated  (daughter) at bedside  Anticipated Length of Stay: Patient will be admitted on an observation basis with an anticipated length of stay of less than 2 midnights secondary to IV lasix  Chief Complaint: Shortness of breath and abdominal pain    History of Present Illness:  Macho Bronson is a 82 y o  male with a PMH of CAD with history of CABG, diabetes mellitus type 2 not on insulin with neuropathy, hypertension, GERD, diastolic CHF presents with shortness of breath  Patient was seen at care now secondary to shortness of breath, abdominal pain reported 3-5 days of worsening symptoms  He was found have pulse ox of 88%, rales and irregular rhythm  He was sent to ED for further evaluation  He is scheduled as an outpatient for workup for the abdominal pain w/ GI  There is no acute findings on imaging  He does have known diastolic CHF  He was given Lasix 20 mg in the ED    Pain is epigastric and goes up under breastbone  He noted frequent BM's  He called daughter and felt should be checked at urgent care and then sent here  He is noted to be fatigued and has slept more last 4 days  Feels BREAUX with getting up to go to the bathroom  Has EGD 2/10 w/ Dr Mary Velasco  Patient reports frequent urination at night and vision changes, takes 2 different diabetes meds  Not on insulin  Notes leg swelling, cold legs at night w/ his bypass surgery, has been eating soup  Review of Systems:  Review of Systems   Constitutional: Positive for chills and fatigue  Negative for fever  HENT: Positive for trouble swallowing  Negative for rhinorrhea and sore throat      Eyes: Negative for pain and discharge  Respiratory: Positive for shortness of breath  Negative for cough  Cardiovascular: Positive for leg swelling  Negative for chest pain  Gastrointestinal: Positive for abdominal pain  Negative for diarrhea, nausea and vomiting  Endocrine: Positive for polyuria  Genitourinary: Negative for dysuria and hematuria  Musculoskeletal: Positive for back pain and neck pain  Skin: Negative for rash and wound  Neurological: Positive for weakness  Negative for dizziness and headaches  Psychiatric/Behavioral: Negative for agitation and confusion  Past Medical and Surgical History:   Past Medical History:   Diagnosis Date    Arthritis     Athscl 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     Stent OM1  Patent mammary graft to LAD 10/7/2009; CABG 1992 & 2005    Cancer [facility])     skin    Coronary artery disease     Diabetes mellitus ([facility] )     Diabetes mellitus, type II ([facility] )     without complication    Epistaxis     Essential (primary) hypertension     GERD (gastroesophageal reflux disease)     Hx of cardiovascular stress test 07/23/2014    Eddie MPI; EF0 52 (52%) Lexiscan, mild LV systolic dysfunction; evidence for prior inferior wall MI; perfusion imaging consistant w mild lat wall ischemia and min torin-infart inferior ischemia   / EF0 51 (51%) evidence for prior inferior wall MI  Mild inferior and mild-moderate lateral wall ischemia  7/29/15    Hx of echocardiogram 11/07/2017    2D w/CFD;EF0 55 (55%) mild LVH, mitral valve prolapse with moderate regurgitation  left atrial enlargement  Mild tricuspid regurgitation   Hypertension     Mixed hyperlipidemia     Occlusion and stenosis of bilateral carotid arteries     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 infarction)     Presence of aortocoronary bypass graft        Past Surgical History:   Procedure Laterality Date    CARDIAC CATHETERIZATION  10/07/2009    Stent 99% stenosis SVG from the aorta to OM1   Patent mammary graft to the LAD  CORONARY ARTERY BYPASS GRAFT  1992    VG-CX, VG-RCA    CORONARY ARTERY BYPASS GRAFT  11/04/2005    LIMA-D1-LAD, VG-PDA-PLB, Bjfzme-ML3-BF5 TMR 9 Lesions    AK EGD TRANSORAL BIOPSY SINGLE/MULTIPLE N/A 1/23/2019    Procedure: ESOPHAGOGASTRODUODENOSCOPY (EGD) with biopsy;  Surgeon: Lolis Mendoza MD;  Location: [address] [place] [address] GI LAB; Service: Gastroenterology    TONSILLECTOMY         Meds/Allergies:  Prior to Admission medications    Medication Sig Start Date End Date Taking? Authorizing Provider   amLODIPine (NORVASC) 10 mg tablet Take 1 tablet (10 mg total) by mouth daily 3/6/21   Jason Lynn MD   atorvastatin (LIPITOR) 20 mg tablet Take 20 mg by mouth daily    Historical Provider, MD   clopidogrel (PLAVIX) 75 mg tablet Take 1 tablet (75 mg total) by mouth daily Hold until seen by ENT on 9/23/19 2/14/20   Branden Mcrae DO   escitalopram (LEXAPRO) 10 mg tablet Take 10 mg by mouth daily    Historical Provider, MD   glimepiride (AMARYL) 1 mg tablet Take 1 mg by mouth 2 (two) times a day  9/11/18   Historical Provider, MD   isosorbide mononitrate (IMDUR) 60 mg 24 hr tablet Take 2 tablets (120 mg total) by mouth daily In the morning 3/6/21   Jason Lynn MD   nitroglycerin (NITROSTAT) 0 4 mg SL tablet one tablet under tongue for angina, may repeat in 15 min   no more than three times 7/17/18   Historical Provider, MD   omeprazole (PriLOSEC) 20 mg delayed release capsule Take 20 mg by mouth daily    Historical Provider, MD   pantoprazole (PROTONIX) 40 mg tablet Take 40 mg by mouth daily 11/24/21   Historical Provider, MD   propranolol (INDERAL LA) 120 mg 24 hr capsule Take 120 mg by mouth daily    Historical Provider, MD   sucralfate (CARAFATE) 1 g tablet Take 1 g by mouth 2 (two) times a day    Historical Provider, MD   tamsulosin (FLOMAX) 0 4 mg Take 2 capsules (0 8 mg total) by mouth daily with dinner 10/29/21   Branden Mcrae, DO     I have reviewed home medications with patient personally  Allergies: Allergies   Allergen Reactions    Advil [Ibuprofen] Fatigue       Social History:  Marital Status:    Occupation: retired  Patient Pre-hospital Living Situation: Home  Patient Pre-hospital Level of Mobility: walks with cane/walker  Patient Pre-hospital Diet Restrictions: None  Substance Use History:   Social History     Substance and Sexual Activity   Alcohol Use Never     Social History     Tobacco Use   Smoking Status Former Smoker    Packs/day: 1 00    Years: 40 00    Pack years: 40 00    Quit date: 1977    Years since quittin 0   Smokeless Tobacco Never Used     Social History     Substance and Sexual Activity   Drug Use No       Family History:  Family History   Problem Relation Age of Onset    Heart disease Brother     No Known Problems Mother     Tuberculosis Father        Physical Exam:     Vitals:   Blood Pressure: 129/63 (22)  Pulse: 75 (22)  Temperature: 97 8 °F (36 6 °C) (22)  Respirations: 20 (22)  Height: 5' 6" (167 6 cm) (22)  Weight - Scale: 77 6 kg (171 lb) (22)  SpO2: 94 % (22)    Physical Exam  Vitals reviewed  Constitutional:       General: He is not in acute distress  Appearance: Normal appearance  Comments: Pleasant elderly  male   HENT:      Head: Normocephalic and atraumatic  Right Ear: External ear normal       Left Ear: External ear normal       Nose: Nose normal    Eyes:      General:         Right eye: No discharge  Left eye: No discharge  Conjunctiva/sclera: Conjunctivae normal       Comments: Glasses in place   Cardiovascular:      Rate and Rhythm: Normal rate and regular rhythm  Heart sounds: Normal heart sounds  No murmur heard  Pulmonary:      Effort: Pulmonary effort is normal  No respiratory distress  Breath sounds: Rales present  No wheezing     Abdominal:      General: Bowel sounds are normal  There is no distension  Palpations: Abdomen is soft  Tenderness: There is no abdominal tenderness  There is no guarding  Musculoskeletal:         General: Normal range of motion  Cervical back: Normal range of motion  Skin:     General: Skin is warm and dry  Neurological:      Mental Status: He is alert and oriented to person, place, and time  Mental status is at baseline  Psychiatric:         Mood and Affect: Mood normal          Behavior: Behavior normal          Thought Content: Thought content normal          Judgment: Judgment normal           Additional Data:     Lab Results:  Results from last 7 days   Lab Units 01/30/22  1850   WBC Thousand/uL 7 89   HEMOGLOBIN g/dL 16 6   HEMATOCRIT % 50 5*   PLATELETS Thousands/uL 209   NEUTROS PCT % 75   LYMPHS PCT % 11*   MONOS PCT % 10   EOS PCT % 3     Results from last 7 days   Lab Units 01/30/22  1850   SODIUM mmol/L 132*   POTASSIUM mmol/L 4 2   CHLORIDE mmol/L 96   CO2 mmol/L 26   BUN mg/dL 18   CREATININE mg/dL 1 75*   ANION GAP mmol/L 10   CALCIUM mg/dL 10 5*   ALBUMIN g/dL 4 3   TOTAL BILIRUBIN mg/dL 1 45*   ALK PHOS U/L 88   ALT U/L 13   AST U/L 14   GLUCOSE RANDOM mg/dL 409*       Imaging: Reviewed radiology reports from this admission including: abdominal/pelvic CT  CT abdomen pelvis wo contrast   Final Result by Melissa Mcmillan MD (01/30 1954)      1  Posterior right 11th and 12th rib fractures are new from prior imaging, but have subacute appearance  Correlate for point tenderness  No acute-appearing fractures  2   Otherwise, no acute abdominopelvic findings  3   Indeterminate density contour distorting lesion at the posterior left kidney  Recommend nonurgent, outpatient evaluation with ultrasound  Note: The study was marked in EPIC for significant notification  Imaging follow-up reminder notification was scheduled in the electronic medical record              Workstation performed: [id number]         X-ray chest 1 view portable    (Results Pending)       ** Please Note: This note has been constructed using a voice recognition system   **

## 2022-02-02 NOTE — ED PROVIDER NOTES
History  Chief Complaint   Patient presents with    Shortness of Breath     UPPER ABDOMEN LOWER CHEST PAIN     Patient is an 21-year-old male with history of CAD, diabetes mellitus, hypertension, hyperlipidemia that presents for evaluation of dyspnea and chest pain  Patient says that this is an ongoing issue  He endorses worsening exertional dyspnea and mild dull/achy intermittent substernal chest pain  Patient endorses some associated rhinorrhea congestion over the past several days  He does have a COVID vaccine  He denies significant fevers, chills, rigors, cough  Chest pain does seem to be exertional in nature  Not take anything for symptoms  He has been able to handle p o  Denies abdominal pain, urinary or bowel symptoms  Prior to Admission Medications   Prescriptions Last Dose Informant Patient Reported? Taking? amLODIPine (NORVASC) 10 mg tablet   No No   Sig: Take 1 tablet (10 mg total) by mouth daily   atorvastatin (LIPITOR) 20 mg tablet  Self Yes No   Sig: Take 20 mg by mouth daily   clopidogrel (PLAVIX) 75 mg tablet   No No   Sig: Take 1 tablet (75 mg total) by mouth daily Hold until seen by ENT on 9/23/19    escitalopram (LEXAPRO) 10 mg tablet  Self Yes No   Sig: Take 10 mg by mouth daily   glimepiride (AMARYL) 1 mg tablet  Self Yes No   Sig: Take 1 mg by mouth 2 (two) times a day    isosorbide mononitrate (IMDUR) 60 mg 24 hr tablet   No No   Sig: Take 2 tablets (120 mg total) by mouth daily In the morning   nitroglycerin (NITROSTAT) 0 4 mg SL tablet  Self Yes No   Sig: one tablet under tongue for angina, may repeat in 15 min   no more than three times   omeprazole (PriLOSEC) 20 mg delayed release capsule  Self Yes No   Sig: Take 20 mg by mouth daily   pantoprazole (PROTONIX) 40 mg tablet   Yes No   Sig: Take 40 mg by mouth daily   propranolol (INDERAL LA) 120 mg 24 hr capsule  Self Yes No   Sig: Take 120 mg by mouth daily   sucralfate (CARAFATE) 1 g tablet  Self Yes No   Sig: Take 1 g by mouth 2 (two) times a day   tamsulosin (FLOMAX) 0 4 mg   No No   Sig: Take 2 capsules (0 8 mg total) by mouth daily with dinner      Facility-Administered Medications: None       Past Medical History:   Diagnosis Date    Arthritis     Athscl 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     Stent OM1  Patent mammary graft to LAD 10/7/2009; CABG 1992 & 2005    Cancer [facility])     skin    Coronary artery disease     Diabetes mellitus ([facility] [address] )     Diabetes mellitus, type II ([facility] [address] )     without complication    Epistaxis     Essential (primary) hypertension     GERD (gastroesophageal reflux disease)     Hx of cardiovascular stress test 07/23/2014    Eddie MPI; EF0 52 (52%) Lexiscan, mild LV systolic dysfunction; evidence for prior inferior wall MI; perfusion imaging consistant w mild lat wall ischemia and min torin-infart inferior ischemia   / EF0 51 (51%) evidence for prior inferior wall MI  Mild inferior and mild-moderate lateral wall ischemia  7/29/15    Hx of echocardiogram 11/07/2017    2D w/CFD;EF0 55 (55%) mild LVH, mitral valve prolapse with moderate regurgitation  left atrial enlargement  Mild tricuspid regurgitation   Hypertension     Mixed hyperlipidemia     Occlusion and stenosis of bilateral carotid arteries     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 infarction)     Presence of aortocoronary bypass graft        Past Surgical History:   Procedure Laterality Date    CARDIAC CATHETERIZATION  10/07/2009    Stent 99% stenosis SVG from the aorta to OM1  Patent mammary graft to the LAD    CORONARY ARTERY BYPASS GRAFT  1992    VG-CX, VG-RCA    CORONARY ARTERY BYPASS GRAFT  11/04/2005    LIMA-D1-LAD, VG-PDA-PLB, Irnoeq-GV9-GR4 TMR 9 Lesions    [place] EGD TRANSORAL BIOPSY SINGLE/MULTIPLE N/A 1/23/2019    Procedure: ESOPHAGOGASTRODUODENOSCOPY (EGD) with biopsy;  Surgeon: Pearl Mcfarland MD;  Location: [facility];   Service: Gastroenterology    TONSILLECTOMY         Family History   Problem Relation Age of Onset    Heart disease Brother     No Known Problems Mother     Tuberculosis Father      I have reviewed and agree with the history as documented  E-Cigarette/Vaping    E-Cigarette Use Never User      E-Cigarette/Vaping Substances     Social History     Tobacco Use    Smoking status: Former Smoker     Packs/day: 1 00     Years: 40 00     Pack years: 40 00     Quit date: 1977     Years since quittin 0    Smokeless tobacco: Never Used   Vaping Use    Vaping Use: Never used   Substance Use Topics    Alcohol use: Never    Drug use: No       Review of Systems   Constitutional: Negative for fever  HENT: Positive for congestion and rhinorrhea  Negative for sore throat  Eyes: Negative for photophobia  Respiratory: Positive for shortness of breath  Cardiovascular: Positive for chest pain  Gastrointestinal: Negative for abdominal pain  Genitourinary: Negative for dysuria  Musculoskeletal: Negative for back pain  Skin: Negative for rash  Neurological: Negative for light-headedness  Hematological: Negative for adenopathy  Psychiatric/Behavioral: Negative for agitation  All other systems reviewed and are negative  Physical Exam  Physical Exam  Vitals reviewed  Constitutional:       General: He is not in acute distress  Appearance: He is well-developed  HENT:      Head: Normocephalic  Eyes:      Pupils: Pupils are equal, round, and reactive to light  Cardiovascular:      Rate and Rhythm: Normal rate and regular rhythm  Heart sounds: Normal heart sounds  No murmur heard  No friction rub  No gallop  Pulmonary:      Effort: Pulmonary effort is normal       Breath sounds: Normal breath sounds  Abdominal:      General: Bowel sounds are normal  There is no distension  Palpations: Abdomen is soft  Tenderness: There is no abdominal tenderness  There is no guarding  Musculoskeletal:         General: Normal range of motion        Cervical back: Normal range of motion and neck supple  Skin:     Capillary Refill: Capillary refill takes less than 2 seconds  Neurological:      Mental Status: He is alert and oriented to person, place, and time  Cranial Nerves: No cranial nerve deficit  Sensory: No sensory deficit  Motor: No abnormal muscle tone  Psychiatric:         Behavior: Behavior normal          Thought Content:  Thought content normal          Judgment: Judgment normal          Vital Signs  ED Triage Vitals   Temperature Pulse Respirations Blood Pressure SpO2   01/30/22 1815 01/30/22 1810 01/30/22 1810 01/30/22 1810 01/30/22 1810   97 8 °F (36 6 °C) 92 20 161/75 96 %      Temp src Heart Rate Source Patient Position - Orthostatic VS BP Location FiO2 (%)   -- 01/30/22 2000 01/30/22 1810 01/30/22 1810 --    Monitor Lying Left arm       Pain Score       01/30/22 1810       No Pain           Vitals:    01/31/22 0400 01/31/22 0800 01/31/22 0904 01/31/22 1008   BP: 131/64 131/71  131/71   Pulse: 76 74 77 80   Patient Position - Orthostatic VS:    Lying         Visual Acuity      ED Medications  Medications   furosemide (LASIX) injection 20 mg (20 mg Intravenous Given 1/30/22 2004)       Diagnostic Studies  Results Reviewed     Procedure Component Value Units Date/Time    Fingerstick Glucose (POCT) [[id number]]  (Normal) Collected: 01/31/22 0740    Lab Status: Final result Updated: 01/31/22 0742     POC Glucose 100 mg/dl     Basic metabolic panel [[id number]]  (Abnormal) Collected: 01/31/22 0631    Lab Status: Final result Specimen: Blood from Arm, Left Updated: 01/31/22 0700     Sodium 136 mmol/L      Potassium 4 1 mmol/L      Chloride 98 mmol/L      CO2 31 mmol/L      ANION GAP 7 mmol/L      BUN 17 mg/dL      Creatinine 1 65 mg/dL      Glucose 109 mg/dL      Calcium 10 6 mg/dL      eGFR 37 ml/min/1 73sq m     Narrative:      [place] guidelines for Chronic Kidney Disease (CKD):     Stage 1 with normal or high GFR (GFR > 90 mL/min/1 73 square meters)    Stage 2 Mild CKD (GFR = 60-89 mL/min/1 73 square meters)    Stage 3A Moderate CKD (GFR = 45-59 mL/min/1 73 square meters)    Stage 3B Moderate CKD (GFR = 30-44 mL/min/1 73 square meters)    Stage 4 Severe CKD (GFR = 15-29 mL/min/1 73 square meters)    Stage 5 End Stage CKD (GFR <15 mL/min/1 73 square meters)  Note: GFR calculation is accurate only with a steady state creatinine    Magnesium [[id number]]  (Normal) Collected: 01/31/22 0631    Lab Status: Final result Specimen: Blood from Arm, Left Updated: 01/31/22 0700     Magnesium 2 0 mg/dL     HS Troponin I 4hr [[id number]]  (Normal) Collected: 01/30/22 2241    Lab Status: Final result Specimen: Blood from Arm, Right Updated: 01/30/22 2310     hs TnI 4hr 18 ng/L      Delta 4hr hsTnI 3 ng/L     HS Troponin I 2hr [[id number]]  (Normal) Collected: 01/30/22 2110    Lab Status: Final result Specimen: Blood from Arm, Right Updated: 01/30/22 2149     hs TnI 2hr 17 ng/L      Delta 2hr hsTnI 2 ng/L     Fingerstick Glucose (POCT) [[id number]]  (Abnormal) Collected: 01/30/22 2104    Lab Status: Final result Updated: 01/30/22 2113     POC Glucose 335 mg/dl     COVID/FLU/RSV [[id number]]  (Normal) Collected: 01/30/22 1902    Lab Status: Final result Specimen: Nares from Nasopharyngeal Swab Updated: 01/30/22 2031     SARS-CoV-2 Negative     INFLUENZA A PCR Negative     INFLUENZA B PCR Negative     RSV PCR Negative    Narrative:      FOR PEDIATRIC PATIENTS - copy/paste COVID Guidelines URL to browser: [email or web address]    SARS-CoV-2 assay is a Nucleic Acid Amplification assay intended for the  qualitative detection of nucleic acid from SARS-CoV-2 in nasopharyngeal  swabs  Results are for the presumptive identification of SARS-CoV-2 RNA      Positive results are indicative of infection with SARS-CoV-2, the virus  causing COVID-19, but do not rule out bacterial infection or co-infection  with other viruses  Laboratories within the [facility] and its  territories are required to report all positive results to the appropriate  public health authorities  Negative results do not preclude SARS-CoV-2  infection and should not be used as the sole basis for treatment or other  patient management decisions  Negative results must be combined with  clinical observations, patient history, and epidemiological information  This test has not been FDA cleared or approved  This test has been authorized by FDA under an Emergency Use Authorization  (EUA)  This test is only authorized for the duration of time the  declaration that circumstances exist justifying the authorization of the  emergency use of an in vitro diagnostic tests for detection of SARS-CoV-2  virus and/or diagnosis of COVID-19 infection under section 564(b)(1) of  the Act, 21 U  S C  244FHI-4(V)(8), unless the authorization is terminated  or revoked sooner  The test has been validated but independent review by FDA  and CLIA is pending  Test performed using [employer] Gene[employer]pert: This RT-PCR assay targets N2,  a region unique to SARS-CoV-2  A conserved region in the E-gene was chosen  for pan-Sarbecovirus detection which includes SARS-CoV-2      B-Type Natriuretic Peptide(BNP) CA, ,  Campuses Only [[id number]]  (Abnormal) Collected: 01/30/22 1850    Lab Status: Final result Specimen: Blood from Arm, Right Updated: 01/30/22 2019      pg/mL     UA w Reflex to Microscopic w Reflex to Culture [[id number]]  (Abnormal) Collected: 01/30/22 1902    Lab Status: Final result Specimen: Urine, Clean Catch Updated: 01/30/22 1936     Color, UA Yellow     Clarity, UA Clear     Specific [place], UA 1 020     pH, UA 5 5     Leukocytes, UA Negative     Nitrite, UA Negative     Protein, UA Negative mg/dl      Glucose, UA 3+ mg/dl      Ketones, UA Trace mg/dl      Urobilinogen, UA 0 2 E U /dl      Bilirubin, UA 1+     Blood, UA Negative    HS Troponin 0hr (reflex protocol) [[id number]]  (Normal) Collected: 01/30/22 1850    Lab Status: Final result Specimen: Blood from Arm, Right Updated: 01/30/22 1932     hs TnI 0hr 15 ng/L     Lipase [[id number]]  (Normal) Collected: 01/30/22 1850    Lab Status: Final result Specimen: Blood from Arm, Right Updated: 01/30/22 1928     Lipase 11 u/L     Comprehensive metabolic panel [[id number]]  (Abnormal) Collected: 01/30/22 1850    Lab Status: Final result Specimen: Blood from Arm, Right Updated: 01/30/22 1927     Sodium 132 mmol/L      Potassium 4 2 mmol/L      Chloride 96 mmol/L      CO2 26 mmol/L      ANION GAP 10 mmol/L      BUN 18 mg/dL      Creatinine 1 75 mg/dL      Glucose 409 mg/dL      Calcium 10 5 mg/dL      AST 14 U/L      ALT 13 U/L      Alkaline Phosphatase 88 U/L      Total Protein 8 1 g/dL      Albumin 4 3 g/dL      Total Bilirubin 1 45 mg/dL      eGFR 34 ml/min/1 73sq m     Narrative:      National Kidney Disease Foundation guidelines for Chronic Kidney Disease (CKD):     Stage 1 with normal or high GFR (GFR > 90 mL/min/1 73 square meters)    Stage 2 Mild CKD (GFR = 60-89 mL/min/1 73 square meters)    Stage 3A Moderate CKD (GFR = 45-59 mL/min/1 73 square meters)    Stage 3B Moderate CKD (GFR = 30-44 mL/min/1 73 square meters)    Stage 4 Severe CKD (GFR = 15-29 mL/min/1 73 square meters)    Stage 5 End Stage CKD (GFR <15 mL/min/1 73 square meters)  Note: GFR calculation is accurate only with a steady state creatinine    CBC and differential [[id number]]  (Abnormal) Collected: 01/30/22 1850    Lab Status: Final result Specimen: Blood from Arm, Right Updated: 01/30/22 1900     WBC 7 89 Thousand/uL      RBC 5 79 Million/uL      Hemoglobin 16 6 g/dL      Hematocrit 50 5 %      MCV 87 fL      MCH 28 7 pg      MCHC 32 9 g/dL      RDW 13 0 %      MPV 9 5 fL      Platelets 305 Thousands/uL      nRBC 0 /100 WBCs      Neutrophils Relative 75 %      Immat GRANS % 0 %      Lymphocytes Relative 11 %      Monocytes Relative 10 % Eosinophils Relative 3 %      Basophils Relative 1 %      Neutrophils Absolute 5 89 Thousands/µL      Immature Grans Absolute 0 02 Thousand/uL      Lymphocytes Absolute 0 89 Thousands/µL      Monocytes Absolute 0 75 Thousand/µL      Eosinophils Absolute 0 26 Thousand/µL      Basophils Absolute 0 08 Thousands/µL                  CT abdomen pelvis wo contrast   Final Result by Bridget Dougherty MD (01/30 1954)      1  Posterior right 11th and 12th rib fractures are new from prior imaging, but have subacute appearance  Correlate for point tenderness  No acute-appearing fractures  2   Otherwise, no acute abdominopelvic findings  3   Indeterminate density contour distorting lesion at the posterior left kidney  Recommend nonurgent, outpatient evaluation with ultrasound  Note: The study was marked in EPIC for significant notification  Imaging follow-up reminder notification was scheduled in the electronic medical record  Workstation performed: [id number]         X-ray chest 1 view portable   Final Result by Yesenia Mccarty DO (01/31 0845)      Mild peripheral interstitial prominence  Correlate clinically for early vascular congestion  Workstation performed: [id number]                    Procedures  Procedures         ED Course                               SBIRT 22yo+      Most Recent Value   SBIRT (22 yo +)    In order to provide better care to our patients, we are screening all of our patients for alcohol and drug use  Would it be okay to ask you these screening questions? Yes Filed at: 01/30/2022 2356   Initial Alcohol Screen: US AUDIT-C     1  How often do you have a drink containing alcohol? 0 Filed at: 01/30/2022 2356   2  How many drinks containing alcohol do you have on a typical day you are drinking? 0 Filed at: 01/30/2022 2356   3a  Male UNDER 65: How often do you have five or more drinks on one occasion? 0 Filed at: 01/30/2022 2356   3b   FEMALE Any Age, or MALE 65+: How often do you have 4 or more drinks on one occassion? 0 Filed at: 01/30/2022 2356   Audit-C Score 0 Filed at: 01/30/2022 2356   PERRY: How many times in the past year have you    Used an illegal drug or used a prescription medication for non-medical reasons? Never Filed at: 01/30/2022 2356                    MDM  Number of Diagnoses or Management Options  CHF exacerbation [facility])  Diagnosis management comments: Patient is 68-year-old male presents for evaluation of worsening exertional dyspnea and chest pain  Patient reported to be hypoxic at Urgent Care, saturating well on room air here  Will be admitted for further workup and management  Disposition  Final diagnoses:   CHF exacerbation ([facility] [address] )     Time reflects when diagnosis was documented in both MDM as applicable and the Disposition within this note     Time User Action Codes Description Comment    1/30/2022  8:13 PM Kiya Dennison Add [I50 9] CHF exacerbation ([facility] [address] )     1/31/2022  9:19 AM Juan Riggs Add [I50 33] Acute on chronic diastolic congestive heart failure [facility])       ED Disposition     ED Disposition Condition Date/Time Comment    Admit Stable Sun Jan 30, 2022  8:14 PM Case was discussed with PEYTON and the patient's admission status was agreed to be Admission Status: observation status to the service of Dr Refugio Rousseau   Follow-up Information     Follow up With Specialties Details Why [address], DO Emergency Medicine, Internal Medicine Follow up in 1 week(s) Please call and schedule a post hospital discharge follow-up visit to ideally occur within 7-10 days Duane Meyers [place] [address] [facility]      Jean-Claude Vazquez MD Cardiology Follow up Please follow-up as previously scheduled Florencio Stewart [address] [place] [address], DO Nephrology Follow up Please follow-up as previously scheduled [facility] [id number]  [phone]            Discharge Medication List as of 1/31/2022  9:28 AM      START taking these medications    Details   furosemide (LASIX) 20 mg tablet Take 1 tablet (20 mg total) by mouth daily, Starting Tue 2/1/2022, Until Thu 3/3/2022, Normal         CONTINUE these medications which have NOT CHANGED    Details   amLODIPine (NORVASC) 10 mg tablet Take 1 tablet (10 mg total) by mouth daily, Starting Sat 3/6/2021, Normal      atorvastatin (LIPITOR) 20 mg tablet Take 20 mg by mouth daily, Historical Med      clopidogrel (PLAVIX) 75 mg tablet Take 1 tablet (75 mg total) by mouth daily Hold until seen by ENT on 9/23/19 , Starting Fri 2/14/2020, No Print      escitalopram (LEXAPRO) 10 mg tablet Take 10 mg by mouth daily, Historical Med      glimepiride (AMARYL) 1 mg tablet Take 1 mg by mouth 2 (two) times a day , Starting Tue 9/11/2018, Historical Med      isosorbide mononitrate (IMDUR) 60 mg 24 hr tablet Take 2 tablets (120 mg total) by mouth daily In the morning, Starting Sat 3/6/2021, Normal      nitroglycerin (NITROSTAT) 0 4 mg SL tablet one tablet under tongue for angina, may repeat in 15 min   no more than three times, Historical Med      omeprazole (PriLOSEC) 20 mg delayed release capsule Take 20 mg by mouth daily, Historical Med      pantoprazole (PROTONIX) 40 mg tablet Take 40 mg by mouth daily, Starting Wed 11/24/2021, Historical Med      propranolol (INDERAL LA) 120 mg 24 hr capsule Take 120 mg by mouth daily, Historical Med      sucralfate (CARAFATE) 1 g tablet Take 1 g by mouth 2 (two) times a day, Historical Med      tamsulosin (FLOMAX) 0 4 mg Take 2 capsules (0 8 mg total) by mouth daily with dinner, Starting Fri 10/29/2021, Normal             Outpatient Discharge Orders   Discharge Diet     Activity as tolerated     Call provider for:  persistent nausea or vomiting     Call provider for:  severe uncontrolled pain     Call provider for: active or persistent bleeding     Call provider for: persistent dizziness or light-headedness     Call provider for:  extreme fatigue     No dressing needed       PDMP Review     None          ED Provider  Electronically Signed by           Jennifer Machuca MD  02/02/22 1481

## 2022-02-10 ENCOUNTER — APPOINTMENT (OUTPATIENT)
Dept: LAB | Facility: MEDICAL CENTER | Age: 87
End: 2022-02-10
Payer: MEDICARE

## 2022-02-10 DIAGNOSIS — I50.9 CONGESTIVE HEART FAILURE, UNSPECIFIED HF CHRONICITY, UNSPECIFIED HEART FAILURE TYPE (HCC): ICD-10-CM

## 2022-02-10 DIAGNOSIS — E13.69 OTHER SPECIFIED DIABETES MELLITUS WITH OTHER SPECIFIED COMPLICATION, WITH LONG-TERM CURRENT USE OF INSULIN (HCC): ICD-10-CM

## 2022-02-10 DIAGNOSIS — I25.10 ASCVD (ARTERIOSCLEROTIC CARDIOVASCULAR DISEASE): ICD-10-CM

## 2022-02-10 DIAGNOSIS — Z79.4 OTHER SPECIFIED DIABETES MELLITUS WITH OTHER SPECIFIED COMPLICATION, WITH LONG-TERM CURRENT USE OF INSULIN (HCC): ICD-10-CM

## 2022-02-10 LAB
ANION GAP SERPL CALCULATED.3IONS-SCNC: 5 MMOL/L (ref 4–13)
BASOPHILS # BLD AUTO: 0.1 THOUSANDS/ΜL (ref 0–0.1)
BASOPHILS NFR BLD AUTO: 1 % (ref 0–1)
BUN SERPL-MCNC: 18 MG/DL (ref 5–25)
CALCIUM SERPL-MCNC: 10.6 MG/DL (ref 8.3–10.1)
CHLORIDE SERPL-SCNC: 100 MMOL/L (ref 100–108)
CO2 SERPL-SCNC: 29 MMOL/L (ref 21–32)
CREAT SERPL-MCNC: 1.86 MG/DL (ref 0.6–1.3)
EOSINOPHIL # BLD AUTO: 0.63 THOUSAND/ΜL (ref 0–0.61)
EOSINOPHIL NFR BLD AUTO: 8 % (ref 0–6)
ERYTHROCYTE [DISTWIDTH] IN BLOOD BY AUTOMATED COUNT: 13 % (ref 11.6–15.1)
GFR SERPL CREATININE-BSD FRML MDRD: 32 ML/MIN/1.73SQ M
GLUCOSE SERPL-MCNC: 263 MG/DL (ref 65–140)
HCT VFR BLD AUTO: 50 % (ref 36.5–49.3)
HGB BLD-MCNC: 16.4 G/DL (ref 12–17)
IMM GRANULOCYTES # BLD AUTO: 0.03 THOUSAND/UL (ref 0–0.2)
IMM GRANULOCYTES NFR BLD AUTO: 0 % (ref 0–2)
LYMPHOCYTES # BLD AUTO: 1.27 THOUSANDS/ΜL (ref 0.6–4.47)
LYMPHOCYTES NFR BLD AUTO: 16 % (ref 14–44)
MCH RBC QN AUTO: 28.2 PG (ref 26.8–34.3)
MCHC RBC AUTO-ENTMCNC: 32.8 G/DL (ref 31.4–37.4)
MCV RBC AUTO: 86 FL (ref 82–98)
MONOCYTES # BLD AUTO: 1.08 THOUSAND/ΜL (ref 0.17–1.22)
MONOCYTES NFR BLD AUTO: 13 % (ref 4–12)
NEUTROPHILS # BLD AUTO: 5.08 THOUSANDS/ΜL (ref 1.85–7.62)
NEUTS SEG NFR BLD AUTO: 62 % (ref 43–75)
NRBC BLD AUTO-RTO: 0 /100 WBCS
PLATELET # BLD AUTO: 258 THOUSANDS/UL (ref 149–390)
PMV BLD AUTO: 9.9 FL (ref 8.9–12.7)
POTASSIUM SERPL-SCNC: 3.5 MMOL/L (ref 3.5–5.3)
RBC # BLD AUTO: 5.82 MILLION/UL (ref 3.88–5.62)
SODIUM SERPL-SCNC: 134 MMOL/L (ref 136–145)
WBC # BLD AUTO: 8.19 THOUSAND/UL (ref 4.31–10.16)

## 2022-02-10 PROCEDURE — 85025 COMPLETE CBC W/AUTO DIFF WBC: CPT

## 2022-02-10 PROCEDURE — 80048 BASIC METABOLIC PNL TOTAL CA: CPT

## 2022-02-10 PROCEDURE — 36415 COLL VENOUS BLD VENIPUNCTURE: CPT

## 2022-02-18 ENCOUNTER — APPOINTMENT (EMERGENCY)
Dept: RADIOLOGY | Facility: HOSPITAL | Age: 87
DRG: 291 | End: 2022-02-18
Payer: MEDICARE

## 2022-02-18 ENCOUNTER — APPOINTMENT (EMERGENCY)
Dept: NON INVASIVE DIAGNOSTICS | Facility: HOSPITAL | Age: 87
DRG: 291 | End: 2022-02-18
Payer: MEDICARE

## 2022-02-18 ENCOUNTER — APPOINTMENT (EMERGENCY)
Dept: CT IMAGING | Facility: HOSPITAL | Age: 87
DRG: 291 | End: 2022-02-18
Payer: MEDICARE

## 2022-02-18 ENCOUNTER — HOSPITAL ENCOUNTER (INPATIENT)
Facility: HOSPITAL | Age: 87
LOS: 4 days | Discharge: HOME HEALTH | DRG: 291 | End: 2022-02-22
Attending: EMERGENCY MEDICINE | Admitting: STUDENT IN AN ORGANIZED HEALTH CARE EDUCATION/TRAINING PROGRAM
Payer: MEDICARE

## 2022-02-18 DIAGNOSIS — I65.29 STENOSIS OF CAROTID ARTERY, UNSPECIFIED LATERALITY: ICD-10-CM

## 2022-02-18 DIAGNOSIS — I25.118 CORONARY ARTERY DISEASE OF NATIVE ARTERY OF NATIVE HEART WITH STABLE ANGINA PECTORIS (HCC): Chronic | ICD-10-CM

## 2022-02-18 DIAGNOSIS — R73.9 HYPERGLYCEMIA: ICD-10-CM

## 2022-02-18 DIAGNOSIS — J96.01 ACUTE RESPIRATORY FAILURE WITH HYPOXIA (HCC): ICD-10-CM

## 2022-02-18 DIAGNOSIS — R80.9 MICROALBUMINURIA: ICD-10-CM

## 2022-02-18 DIAGNOSIS — E83.52 HYPERCALCEMIA: ICD-10-CM

## 2022-02-18 DIAGNOSIS — E11.65 TYPE 2 DIABETES MELLITUS WITH HYPERGLYCEMIA, WITHOUT LONG-TERM CURRENT USE OF INSULIN (HCC): ICD-10-CM

## 2022-02-18 DIAGNOSIS — R09.02 HYPOXIA: ICD-10-CM

## 2022-02-18 DIAGNOSIS — I16.9 HYPERTENSIVE CRISIS: ICD-10-CM

## 2022-02-18 DIAGNOSIS — E55.9 VITAMIN D DEFICIENCY: ICD-10-CM

## 2022-02-18 DIAGNOSIS — I50.9 ACUTE EXACERBATION OF CHF (CONGESTIVE HEART FAILURE) (HCC): Primary | ICD-10-CM

## 2022-02-18 DIAGNOSIS — D47.2 BICLONAL GAMMOPATHY: ICD-10-CM

## 2022-02-18 DIAGNOSIS — N18.32 STAGE 3B CHRONIC KIDNEY DISEASE (HCC): ICD-10-CM

## 2022-02-18 DIAGNOSIS — A41.9 SEPSIS (HCC): ICD-10-CM

## 2022-02-18 DIAGNOSIS — D72.10 EOSINOPHILIA, UNSPECIFIED TYPE: ICD-10-CM

## 2022-02-18 DIAGNOSIS — R79.89 ELEVATED SERUM CREATININE: ICD-10-CM

## 2022-02-18 PROBLEM — I10 ESSENTIAL HYPERTENSION: Status: ACTIVE | Noted: 2022-02-18

## 2022-02-18 PROBLEM — N17.9 ACUTE RENAL FAILURE SUPERIMPOSED ON STAGE 3B CHRONIC KIDNEY DISEASE (HCC): Status: ACTIVE | Noted: 2019-09-19

## 2022-02-18 LAB
2HR DELTA HS TROPONIN: 2 NG/L
4HR DELTA HS TROPONIN: 2 NG/L
ALBUMIN SERPL BCP-MCNC: 3.1 G/DL (ref 3.5–5)
ALP SERPL-CCNC: 85 U/L (ref 46–116)
ALT SERPL W P-5'-P-CCNC: 13 U/L (ref 12–78)
ANION GAP SERPL CALCULATED.3IONS-SCNC: 12 MMOL/L (ref 4–13)
APTT PPP: 40 SECONDS (ref 23–37)
AST SERPL W P-5'-P-CCNC: 12 U/L (ref 5–45)
BACTERIA UR QL AUTO: ABNORMAL /HPF
BASOPHILS # BLD AUTO: 0.07 THOUSANDS/ΜL (ref 0–0.1)
BASOPHILS NFR BLD AUTO: 1 % (ref 0–1)
BILIRUB SERPL-MCNC: 2.18 MG/DL (ref 0.2–1)
BILIRUB UR QL STRIP: NEGATIVE
BUN SERPL-MCNC: 25 MG/DL (ref 5–25)
CALCIUM ALBUM COR SERPL-MCNC: 10.8 MG/DL (ref 8.3–10.1)
CALCIUM SERPL-MCNC: 10.1 MG/DL (ref 8.3–10.1)
CARDIAC TROPONIN I PNL SERPL HS: 10 NG/L
CARDIAC TROPONIN I PNL SERPL HS: 10 NG/L
CARDIAC TROPONIN I PNL SERPL HS: 8 NG/L
CHLORIDE SERPL-SCNC: 93 MMOL/L (ref 100–108)
CLARITY UR: CLEAR
CO2 SERPL-SCNC: 26 MMOL/L (ref 21–32)
COLOR UR: YELLOW
CREAT SERPL-MCNC: 2.04 MG/DL (ref 0.6–1.3)
D DIMER PPP FEU-MCNC: 1.76 UG/ML FEU
EOSINOPHIL # BLD AUTO: 0.06 THOUSAND/ΜL (ref 0–0.61)
EOSINOPHIL NFR BLD AUTO: 1 % (ref 0–6)
ERYTHROCYTE [DISTWIDTH] IN BLOOD BY AUTOMATED COUNT: 12.7 % (ref 11.6–15.1)
FLUAV RNA RESP QL NAA+PROBE: NEGATIVE
FLUBV RNA RESP QL NAA+PROBE: NEGATIVE
GFR SERPL CREATININE-BSD FRML MDRD: 28 ML/MIN/1.73SQ M
GLUCOSE SERPL-MCNC: 219 MG/DL (ref 65–140)
GLUCOSE SERPL-MCNC: 230 MG/DL (ref 65–140)
GLUCOSE SERPL-MCNC: 273 MG/DL (ref 65–140)
GLUCOSE UR STRIP-MCNC: NEGATIVE MG/DL
HCT VFR BLD AUTO: 46.6 % (ref 36.5–49.3)
HGB BLD-MCNC: 15.1 G/DL (ref 12–17)
HGB UR QL STRIP.AUTO: NEGATIVE
HYALINE CASTS #/AREA URNS LPF: ABNORMAL /LPF
IMM GRANULOCYTES # BLD AUTO: 0.04 THOUSAND/UL (ref 0–0.2)
IMM GRANULOCYTES NFR BLD AUTO: 0 % (ref 0–2)
INR PPP: 1.26 (ref 0.84–1.19)
KETONES UR STRIP-MCNC: NEGATIVE MG/DL
LACTATE SERPL-SCNC: 1.2 MMOL/L (ref 0.5–2)
LACTATE SERPL-SCNC: 2.2 MMOL/L (ref 0.5–2)
LEUKOCYTE ESTERASE UR QL STRIP: NEGATIVE
LYMPHOCYTES # BLD AUTO: 0.96 THOUSANDS/ΜL (ref 0.6–4.47)
LYMPHOCYTES NFR BLD AUTO: 7 % (ref 14–44)
MAGNESIUM SERPL-MCNC: 2 MG/DL (ref 1.6–2.6)
MCH RBC QN AUTO: 27.6 PG (ref 26.8–34.3)
MCHC RBC AUTO-ENTMCNC: 32.4 G/DL (ref 31.4–37.4)
MCV RBC AUTO: 85 FL (ref 82–98)
MONOCYTES # BLD AUTO: 1.55 THOUSAND/ΜL (ref 0.17–1.22)
MONOCYTES NFR BLD AUTO: 12 % (ref 4–12)
NEUTROPHILS # BLD AUTO: 10.34 THOUSANDS/ΜL (ref 1.85–7.62)
NEUTS SEG NFR BLD AUTO: 79 % (ref 43–75)
NITRITE UR QL STRIP: NEGATIVE
NON-SQ EPI CELLS URNS QL MICRO: ABNORMAL /HPF
NRBC BLD AUTO-RTO: 0 /100 WBCS
NT-PROBNP SERPL-MCNC: 1791 PG/ML
PH UR STRIP.AUTO: 5.5 [PH]
PLATELET # BLD AUTO: 337 THOUSANDS/UL (ref 149–390)
PMV BLD AUTO: 9.2 FL (ref 8.9–12.7)
POTASSIUM SERPL-SCNC: 3.9 MMOL/L (ref 3.5–5.3)
PROCALCITONIN SERPL-MCNC: 0.6 NG/ML
PROT SERPL-MCNC: 8.1 G/DL (ref 6.4–8.2)
PROT UR STRIP-MCNC: ABNORMAL MG/DL
PROTHROMBIN TIME: 15.2 SECONDS (ref 11.6–14.5)
RBC # BLD AUTO: 5.47 MILLION/UL (ref 3.88–5.62)
RBC #/AREA URNS AUTO: ABNORMAL /HPF
RSV RNA RESP QL NAA+PROBE: NEGATIVE
SARS-COV-2 RNA RESP QL NAA+PROBE: NEGATIVE
SODIUM SERPL-SCNC: 131 MMOL/L (ref 136–145)
SP GR UR STRIP.AUTO: 1.02 (ref 1–1.03)
UROBILINOGEN UR QL STRIP.AUTO: 0.2 E.U./DL
WBC # BLD AUTO: 13.02 THOUSAND/UL (ref 4.31–10.16)
WBC #/AREA URNS AUTO: ABNORMAL /HPF

## 2022-02-18 PROCEDURE — 84145 PROCALCITONIN (PCT): CPT | Performed by: PHYSICIAN ASSISTANT

## 2022-02-18 PROCEDURE — 93971 EXTREMITY STUDY: CPT

## 2022-02-18 PROCEDURE — 80053 COMPREHEN METABOLIC PANEL: CPT | Performed by: PHYSICIAN ASSISTANT

## 2022-02-18 PROCEDURE — 85379 FIBRIN DEGRADATION QUANT: CPT | Performed by: PHYSICIAN ASSISTANT

## 2022-02-18 PROCEDURE — 85730 THROMBOPLASTIN TIME PARTIAL: CPT | Performed by: PHYSICIAN ASSISTANT

## 2022-02-18 PROCEDURE — 1123F ACP DISCUSS/DSCN MKR DOCD: CPT | Performed by: PHYSICIAN ASSISTANT

## 2022-02-18 PROCEDURE — 94664 DEMO&/EVAL PT USE INHALER: CPT

## 2022-02-18 PROCEDURE — 94760 N-INVAS EAR/PLS OXIMETRY 1: CPT

## 2022-02-18 PROCEDURE — 93005 ELECTROCARDIOGRAM TRACING: CPT

## 2022-02-18 PROCEDURE — 85025 COMPLETE CBC W/AUTO DIFF WBC: CPT | Performed by: PHYSICIAN ASSISTANT

## 2022-02-18 PROCEDURE — 84484 ASSAY OF TROPONIN QUANT: CPT | Performed by: PHYSICIAN ASSISTANT

## 2022-02-18 PROCEDURE — 99222 1ST HOSP IP/OBS MODERATE 55: CPT | Performed by: STUDENT IN AN ORGANIZED HEALTH CARE EDUCATION/TRAINING PROGRAM

## 2022-02-18 PROCEDURE — 99285 EMERGENCY DEPT VISIT HI MDM: CPT | Performed by: PHYSICIAN ASSISTANT

## 2022-02-18 PROCEDURE — 96365 THER/PROPH/DIAG IV INF INIT: CPT

## 2022-02-18 PROCEDURE — 81001 URINALYSIS AUTO W/SCOPE: CPT | Performed by: PHYSICIAN ASSISTANT

## 2022-02-18 PROCEDURE — 83880 ASSAY OF NATRIURETIC PEPTIDE: CPT | Performed by: PHYSICIAN ASSISTANT

## 2022-02-18 PROCEDURE — 87040 BLOOD CULTURE FOR BACTERIA: CPT | Performed by: PHYSICIAN ASSISTANT

## 2022-02-18 PROCEDURE — 99285 EMERGENCY DEPT VISIT HI MDM: CPT

## 2022-02-18 PROCEDURE — 82948 REAGENT STRIP/BLOOD GLUCOSE: CPT

## 2022-02-18 PROCEDURE — 71045 X-RAY EXAM CHEST 1 VIEW: CPT

## 2022-02-18 PROCEDURE — 36415 COLL VENOUS BLD VENIPUNCTURE: CPT | Performed by: PHYSICIAN ASSISTANT

## 2022-02-18 PROCEDURE — 0241U HB NFCT DS VIR RESP RNA 4 TRGT: CPT | Performed by: PHYSICIAN ASSISTANT

## 2022-02-18 PROCEDURE — 71250 CT THORAX DX C-: CPT

## 2022-02-18 PROCEDURE — 96361 HYDRATE IV INFUSION ADD-ON: CPT

## 2022-02-18 PROCEDURE — 85610 PROTHROMBIN TIME: CPT | Performed by: PHYSICIAN ASSISTANT

## 2022-02-18 PROCEDURE — 83735 ASSAY OF MAGNESIUM: CPT | Performed by: PHYSICIAN ASSISTANT

## 2022-02-18 PROCEDURE — 83605 ASSAY OF LACTIC ACID: CPT | Performed by: PHYSICIAN ASSISTANT

## 2022-02-18 RX ORDER — ONDANSETRON 2 MG/ML
4 INJECTION INTRAMUSCULAR; INTRAVENOUS EVERY 6 HOURS PRN
Status: DISCONTINUED | OUTPATIENT
Start: 2022-02-18 | End: 2022-02-22 | Stop reason: HOSPADM

## 2022-02-18 RX ORDER — HEPARIN SODIUM 5000 [USP'U]/ML
5000 INJECTION, SOLUTION INTRAVENOUS; SUBCUTANEOUS EVERY 8 HOURS SCHEDULED
Status: DISCONTINUED | OUTPATIENT
Start: 2022-02-18 | End: 2022-02-22 | Stop reason: HOSPADM

## 2022-02-18 RX ORDER — FUROSEMIDE 10 MG/ML
20 INJECTION INTRAMUSCULAR; INTRAVENOUS ONCE
Status: DISCONTINUED | OUTPATIENT
Start: 2022-02-18 | End: 2022-02-19

## 2022-02-18 RX ORDER — INSULIN GLARGINE 100 [IU]/ML
5 INJECTION, SOLUTION SUBCUTANEOUS
Status: DISCONTINUED | OUTPATIENT
Start: 2022-02-18 | End: 2022-02-19

## 2022-02-18 RX ORDER — PANTOPRAZOLE SODIUM 40 MG/1
40 TABLET, DELAYED RELEASE ORAL DAILY
Status: DISCONTINUED | OUTPATIENT
Start: 2022-02-19 | End: 2022-02-21

## 2022-02-18 RX ORDER — SUCRALFATE 1 G/1
1 TABLET ORAL 2 TIMES DAILY
Status: DISCONTINUED | OUTPATIENT
Start: 2022-02-18 | End: 2022-02-22 | Stop reason: HOSPADM

## 2022-02-18 RX ORDER — AMLODIPINE BESYLATE 10 MG/1
10 TABLET ORAL DAILY
Status: DISCONTINUED | OUTPATIENT
Start: 2022-02-19 | End: 2022-02-22 | Stop reason: HOSPADM

## 2022-02-18 RX ORDER — TAMSULOSIN HYDROCHLORIDE 0.4 MG/1
0.8 CAPSULE ORAL
Status: DISCONTINUED | OUTPATIENT
Start: 2022-02-18 | End: 2022-02-22 | Stop reason: HOSPADM

## 2022-02-18 RX ORDER — CEFTRIAXONE 1 G/50ML
1000 INJECTION, SOLUTION INTRAVENOUS ONCE
Status: COMPLETED | OUTPATIENT
Start: 2022-02-18 | End: 2022-02-18

## 2022-02-18 RX ORDER — ESCITALOPRAM OXALATE 10 MG/1
10 TABLET ORAL DAILY
Status: DISCONTINUED | OUTPATIENT
Start: 2022-02-19 | End: 2022-02-22 | Stop reason: HOSPADM

## 2022-02-18 RX ORDER — FUROSEMIDE 10 MG/ML
40 INJECTION INTRAMUSCULAR; INTRAVENOUS
Status: DISCONTINUED | OUTPATIENT
Start: 2022-02-19 | End: 2022-02-20

## 2022-02-18 RX ORDER — TRAMADOL HYDROCHLORIDE 50 MG/1
50 TABLET ORAL EVERY 6 HOURS PRN
COMMUNITY
End: 2022-02-22 | Stop reason: HOSPADM

## 2022-02-18 RX ORDER — ISOSORBIDE MONONITRATE 60 MG/1
120 TABLET, EXTENDED RELEASE ORAL DAILY
Status: DISCONTINUED | OUTPATIENT
Start: 2022-02-19 | End: 2022-02-22 | Stop reason: HOSPADM

## 2022-02-18 RX ORDER — ACETAMINOPHEN 325 MG/1
650 TABLET ORAL EVERY 6 HOURS PRN
Status: DISCONTINUED | OUTPATIENT
Start: 2022-02-18 | End: 2022-02-22 | Stop reason: HOSPADM

## 2022-02-18 RX ORDER — CLOPIDOGREL BISULFATE 75 MG/1
75 TABLET ORAL DAILY
Status: DISCONTINUED | OUTPATIENT
Start: 2022-02-19 | End: 2022-02-22 | Stop reason: HOSPADM

## 2022-02-18 RX ORDER — POLYETHYLENE GLYCOL 3350 17 G/17G
17 POWDER, FOR SOLUTION ORAL DAILY PRN
Status: DISCONTINUED | OUTPATIENT
Start: 2022-02-18 | End: 2022-02-22 | Stop reason: HOSPADM

## 2022-02-18 RX ORDER — ATORVASTATIN CALCIUM 10 MG/1
20 TABLET, FILM COATED ORAL DAILY
Status: DISCONTINUED | OUTPATIENT
Start: 2022-02-18 | End: 2022-02-21

## 2022-02-18 RX ORDER — PROPRANOLOL HCL 60 MG
120 CAPSULE, EXTENDED RELEASE 24HR ORAL DAILY
Status: DISCONTINUED | OUTPATIENT
Start: 2022-02-19 | End: 2022-02-22 | Stop reason: HOSPADM

## 2022-02-18 RX ORDER — ACETAMINOPHEN 325 MG/1
650 TABLET ORAL ONCE
Status: COMPLETED | OUTPATIENT
Start: 2022-02-18 | End: 2022-02-18

## 2022-02-18 RX ADMIN — ACETAMINOPHEN 650 MG: 325 TABLET, FILM COATED ORAL at 13:33

## 2022-02-18 RX ADMIN — SODIUM CHLORIDE 500 ML: 0.9 INJECTION, SOLUTION INTRAVENOUS at 14:54

## 2022-02-18 RX ADMIN — CEFTRIAXONE 1000 MG: 1 INJECTION, SOLUTION INTRAVENOUS at 15:33

## 2022-02-18 RX ADMIN — INSULIN GLARGINE 5 UNITS: 100 INJECTION, SOLUTION SUBCUTANEOUS at 21:35

## 2022-02-18 RX ADMIN — INSULIN LISPRO 2 UNITS: 100 INJECTION, SOLUTION INTRAVENOUS; SUBCUTANEOUS at 21:34

## 2022-02-18 RX ADMIN — HEPARIN SODIUM 5000 UNITS: 5000 INJECTION INTRAVENOUS; SUBCUTANEOUS at 21:34

## 2022-02-18 RX ADMIN — TAMSULOSIN HYDROCHLORIDE 0.8 MG: 0.4 CAPSULE ORAL at 18:50

## 2022-02-18 RX ADMIN — INSULIN LISPRO 2 UNITS: 100 INJECTION, SOLUTION INTRAVENOUS; SUBCUTANEOUS at 18:50

## 2022-02-18 RX ADMIN — ATORVASTATIN CALCIUM 20 MG: 10 TABLET, FILM COATED ORAL at 18:50

## 2022-02-18 NOTE — ED PROVIDER NOTES
History  Chief Complaint   Patient presents with    Leg Pain     pt having right leg pain and swelling  pt is covid positive and sob noted pulse ox 78% on r/a     Patient is an 81 y/o male with a PMHx of CAD, CKD 3b, DM2, HTN and HLD, presenting to the ED for evaluation of shortness of breath and right lower extremity pain/swelling  Patient reports increased pain and swelling in the right lower leg over the past few days  He denies falls or injuries but says he has had increased difficulty bearing weight on the leg secondary to the pain  He also reports worsening of baseline shortness of breath  Patient's granddaughter did a home COVID test on the patient a few days ago and it was reported to be positive  Patient received both of his COVID vaccinations  He denies fevers, chills, cough, congestion, chest pain, palpitations, abdominal pain, N/V/D or urinary symptoms  Prior to Admission Medications   Prescriptions Last Dose Informant Patient Reported? Taking?    amLODIPine (NORVASC) 10 mg tablet 2/18/2022 at 1000  No Yes   Sig: Take 1 tablet (10 mg total) by mouth daily   atorvastatin (LIPITOR) 20 mg tablet Unknown at Unknown time Self Yes No   Sig: Take 20 mg by mouth daily   clopidogrel (PLAVIX) 75 mg tablet 2/18/2022 at 1000  No Yes   Sig: Take 1 tablet (75 mg total) by mouth daily Hold until seen by ENT on 9/23/19    escitalopram (LEXAPRO) 10 mg tablet 2/18/2022 at 1000 Self Yes Yes   Sig: Take 10 mg by mouth daily   furosemide (LASIX) 20 mg tablet 2/18/2022 at 1000  No Yes   Sig: Take 1 tablet (20 mg total) by mouth daily   glimepiride (AMARYL) 1 mg tablet 2/18/2022 at Unknown time Self Yes Yes   Sig: Take 1 mg by mouth 2 (two) times a day    isosorbide mononitrate (IMDUR) 60 mg 24 hr tablet 2/18/2022 at 1000  No Yes   Sig: Take 2 tablets (120 mg total) by mouth daily In the morning   nitroglycerin (NITROSTAT) 0 4 mg SL tablet Unknown at Unknown time Self Yes No   Sig: one tablet under tongue for angina, may repeat in 15 min  no more than three times   omeprazole (PriLOSEC) 20 mg delayed release capsule Not Taking at Unknown time Self Yes No   Sig: Take 20 mg by mouth daily   Patient not taking: Reported on 2/18/2022    pantoprazole (PROTONIX) 40 mg tablet 2/18/2022 at 1000  Yes Yes   Sig: Take 40 mg by mouth daily   propranolol (INDERAL LA) 120 mg 24 hr capsule 2/18/2022 at 1000 Self Yes Yes   Sig: Take 120 mg by mouth daily   sucralfate (CARAFATE) 1 g tablet 2/18/2022 at 1000 Self Yes Yes   Sig: Take 1 g by mouth 2 (two) times a day   tamsulosin (FLOMAX) 0 4 mg 2/18/2022 at 1000  No Yes   Sig: Take 2 capsules (0 8 mg total) by mouth daily with dinner   traMADol (ULTRAM) 50 mg tablet 2/18/2022 at 0600  Yes Yes   Sig: Take 50 mg by mouth every 6 (six) hours as needed for moderate pain      Facility-Administered Medications: None       Past Medical History:   Diagnosis Date    Arthritis     Athscl 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     Stent OM1  Patent mammary graft to LAD 10/7/2009; CABG 1992 & 2005    Cancer [facility])     skin    Coronary artery disease     Diabetes mellitus ([facility] [address] )     Diabetes mellitus, type II ([facility] [address] )     without complication    Epistaxis     Essential (primary) hypertension     GERD (gastroesophageal reflux disease)     Hx of cardiovascular stress test 07/23/2014    Eddie MPI; EF0 52 (52%) Lexiscan, mild LV systolic dysfunction; evidence for prior inferior wall MI; perfusion imaging consistant w mild lat wall ischemia and min torin-infart inferior ischemia   / EF0 51 (51%) evidence for prior inferior wall MI  Mild inferior and mild-moderate lateral wall ischemia  7/29/15    Hx of echocardiogram 11/07/2017    2D w/CFD;EF0 55 (55%) mild LVH, mitral valve prolapse with moderate regurgitation  left atrial enlargement  Mild tricuspid regurgitation        Hypertension     Mixed hyperlipidemia     Occlusion and stenosis of bilateral carotid arteries     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 infarction)     Presence of aortocoronary bypass graft        Past Surgical History:   Procedure Laterality Date    CARDIAC CATHETERIZATION  10/07/2009    Stent 99% stenosis SVG from the aorta to OM1  Patent mammary graft to the LAD    CORONARY ARTERY BYPASS GRAFT      VG-CX, VG-RCA    CORONARY ARTERY BYPASS GRAFT  2005    LIMA-D1-LAD, VG-PDA-PLB, Mysbqg-[id number] TMR 9 Lesions    AL EGD TRANSORAL BIOPSY SINGLE/MULTIPLE N/A 2019    Procedure: ESOPHAGOGASTRODUODENOSCOPY (EGD) with biopsy;  Surgeon: Tamiko Hall MD;  Location: [address] [place] [address] GI LAB; Service: Gastroenterology    TONSILLECTOMY         Family History   Problem Relation Age of Onset    Heart disease Brother     No Known Problems Mother     Tuberculosis Father      I have reviewed and agree with the history as documented  E-Cigarette/Vaping    E-Cigarette Use Never User      E-Cigarette/Vaping Substances     Social History     Tobacco Use    Smoking status: Former Smoker     Packs/day: 1 00     Years: 40 00     Pack years: 40 00     Quit date: 1977     Years since quittin 1    Smokeless tobacco: Never Used   Vaping Use    Vaping Use: Never used   Substance Use Topics    Alcohol use: Never    Drug use: No       Review of Systems   Constitutional: Negative for chills, diaphoresis, fatigue and fever  HENT: Negative for congestion, ear pain, mouth sores, rhinorrhea, sinus pain, sore throat and trouble swallowing  Eyes: Negative for photophobia and visual disturbance  Respiratory: Positive for shortness of breath  Negative for cough, chest tightness and wheezing  Cardiovascular: Positive for leg swelling (right)  Negative for chest pain and palpitations  Gastrointestinal: Negative for abdominal pain, blood in stool, constipation, diarrhea, nausea and vomiting  Genitourinary: Negative for dysuria, flank pain, frequency, hematuria and urgency  Musculoskeletal: Positive for myalgias (right lower leg)   Negative for arthralgias, back pain, gait problem, joint swelling and neck pain  Skin: Negative for color change, pallor and rash  Neurological: Negative for dizziness, syncope, speech difficulty, weakness, light-headedness, numbness and headaches  Psychiatric/Behavioral: Negative for confusion and sleep disturbance  All other systems reviewed and are negative  Physical Exam  Physical Exam  Vitals and nursing note reviewed  Constitutional:       General: He is awake  He is not in acute distress  Appearance: Normal appearance  He is well-developed  He is not ill-appearing or diaphoretic  HENT:      Head: Normocephalic and atraumatic  Right Ear: External ear normal       Left Ear: External ear normal       Nose: Nose normal       Mouth/Throat:      Lips: Pink  Mouth: Mucous membranes are moist    Eyes:      General: Lids are normal  No scleral icterus  Conjunctiva/sclera: Conjunctivae normal       Pupils: Pupils are equal, round, and reactive to light  Cardiovascular:      Rate and Rhythm: Normal rate and regular rhythm  Pulses: Normal pulses  Radial pulses are 2+ on the right side and 2+ on the left side  Heart sounds: Normal heart sounds, S1 normal and S2 normal       Comments: Bilateral lower extremity edema, worse on right  Right posterior calf tenderness; No overlying erythema/warmth; neurovascularly intact; PT/DP pulses 2+  Pulmonary:      Effort: Pulmonary effort is normal  No accessory muscle usage  Breath sounds: No stridor  Examination of the right-lower field reveals rales  Examination of the left-lower field reveals rales  Rales present  No decreased breath sounds, wheezing or rhonchi  Abdominal:      General: Abdomen is flat  Bowel sounds are normal  There is no distension  Palpations: Abdomen is soft  Tenderness: There is no abdominal tenderness  There is no right CVA tenderness, left CVA tenderness, guarding or rebound     Musculoskeletal: Cervical back: Full passive range of motion without pain, normal range of motion and neck supple  No signs of trauma  No pain with movement  Normal range of motion  Right lower leg: Edema present  Left lower leg: Edema present  Lymphadenopathy:      Cervical: No cervical adenopathy  Skin:     General: Skin is warm and dry  Capillary Refill: Capillary refill takes less than 2 seconds  Coloration: Skin is not cyanotic, jaundiced or pale  Neurological:      Mental Status: He is alert and oriented to person, place, and time  GCS: GCS eye subscore is 4  GCS verbal subscore is 5  GCS motor subscore is 6  Cranial Nerves: No dysarthria or facial asymmetry  Gait: Gait normal    Psychiatric:         Attention and Perception: Attention normal          Mood and Affect: Mood normal          Speech: Speech normal          Behavior: Behavior normal  Behavior is cooperative           Vital Signs  ED Triage Vitals [02/18/22 1236]   Temperature Pulse Respirations Blood Pressure SpO2   100 °F (37 8 °C) 91 22 115/66 (!) 78 %      Temp Source Heart Rate Source Patient Position - Orthostatic VS BP Location FiO2 (%)   Temporal Monitor Sitting Right arm --      Pain Score       10 - Worst Possible Pain           Vitals:    02/18/22 1800 02/18/22 2202 02/19/22 0734 02/19/22 0854   BP: 124/58 115/52 123/56 118/58   Pulse: 78 74 70 75   Patient Position - Orthostatic VS:             Visual Acuity      ED Medications  Medications   furosemide (LASIX) injection 20 mg (20 mg Intravenous Not Given 2/18/22 1454)   amLODIPine (NORVASC) tablet 10 mg (10 mg Oral Given 2/19/22 0901)   atorvastatin (LIPITOR) tablet 20 mg (20 mg Oral Given 2/18/22 1850)   clopidogrel (PLAVIX) tablet 75 mg (75 mg Oral Given 2/19/22 0900)   escitalopram (LEXAPRO) tablet 10 mg (10 mg Oral Given 2/19/22 0900)   isosorbide mononitrate (IMDUR) 24 hr tablet 120 mg (120 mg Oral Given 2/19/22 0901)   pantoprazole (PROTONIX) EC tablet 40 mg (40 mg Oral Given 2/19/22 0901)   propranolol (INDERAL LA) 24 hr capsule 120 mg (120 mg Oral Given 2/19/22 0900)   sucralfate (CARAFATE) tablet 1 g (1 g Oral Given 2/19/22 0901)   tamsulosin (FLOMAX) capsule 0 8 mg (0 8 mg Oral Given 2/18/22 1850)   furosemide (LASIX) injection 40 mg (40 mg Intravenous Given 2/19/22 0901)   acetaminophen (TYLENOL) tablet 650 mg (has no administration in time range)   polyethylene glycol (MIRALAX) packet 17 g (has no administration in time range)   ondansetron (ZOFRAN) injection 4 mg (has no administration in time range)   heparin (porcine) subcutaneous injection 5,000 Units (5,000 Units Subcutaneous Given 2/19/22 0512)   insulin glargine (LANTUS) subcutaneous injection 5 Units 0 05 mL (5 Units Subcutaneous Given 2/18/22 2135)   insulin lispro (HumaLOG) 100 units/mL subcutaneous injection 1-5 Units (1 Units Subcutaneous Not Given 2/19/22 0714)   insulin lispro (HumaLOG) 100 units/mL subcutaneous injection 1-5 Units (2 Units Subcutaneous Given 2/18/22 2134)   potassium chloride (K-DUR,KLOR-CON) CR tablet 30 mEq (has no administration in time range)   acetaminophen (TYLENOL) tablet 650 mg (650 mg Oral Given 2/18/22 1333)   sodium chloride 0 9 % bolus 500 mL (0 mL Intravenous Stopped 2/18/22 1554)   cefTRIAXone (ROCEPHIN) IVPB (premix in dextrose) 1,000 mg 50 mL (0 mg Intravenous Stopped 2/18/22 1603)   traMADol (ULTRAM) tablet 50 mg (50 mg Oral Given 2/19/22 0710)   potassium chloride (K-DUR,KLOR-CON) CR tablet 40 mEq (40 mEq Oral Given 2/19/22 0901)       Diagnostic Studies  Results Reviewed     Procedure Component Value Units Date/Time    Procalcitonin Reflex [[id number]]  (Abnormal) Collected: 02/19/22 0511    Lab Status: Final result Specimen: Blood from Arm, Right Updated: 02/19/22 0617     Procalcitonin 0 70 ng/ml     Blood culture #1 [[id number]] Collected: 02/18/22 1314    Lab Status: Preliminary result Specimen: Blood from Arm, Right Updated: 02/18/22 2301     Blood Culture Received in Microbiology Lab  Culture in Progress  Blood culture #2 [[id number]] Collected: 02/18/22 1314    Lab Status: Preliminary result Specimen: Blood from Arm, Left Updated: 02/18/22 2301     Blood Culture Received in Microbiology Lab  Culture in Progress  HS Troponin I 4hr [[id number]]  (Normal) Collected: 02/18/22 1706    Lab Status: Final result Specimen: Blood from Arm, Right Updated: 02/18/22 1738     hs TnI 4hr 10 ng/L      Delta 4hr hsTnI 2 ng/L     D-dimer, quantitative [[id number]]  (Abnormal) Collected: 02/18/22 1629    Lab Status: Final result Specimen: Blood from Arm, Right Updated: 02/18/22 1658     D-Dimer, Quant 1 76 ug/ml FEU     Narrative: In the evaluation for possible pulmonary embolism, in the appropriate (Well's Score of 4 or less) patient, the age adjusted d-dimer cutoff for this patient can be calculated as:    Age x 0 01 (in ug/mL) for Age-adjusted D-dimer exclusion threshold for a patient over 50 years  Lactic acid 2 Hours [[id number]]  (Normal) Collected: 02/18/22 1533    Lab Status: Final result Specimen: Blood from Arm, Right Updated: 02/18/22 1606     LACTIC ACID 1 2 mmol/L     Narrative:      Result may be elevated if tourniquet was used during collection      HS Troponin I 2hr [[id number]]  (Normal) Collected: 02/18/22 1456    Lab Status: Final result Specimen: Blood from Arm, Right Updated: 02/18/22 1533     hs TnI 2hr 10 ng/L      Delta 2hr hsTnI 2 ng/L     Procalcitonin with AM Reflex [[id number]]  (Abnormal) Collected: 02/18/22 1314    Lab Status: Final result Specimen: Blood from Arm, Right Updated: 02/18/22 1418     Procalcitonin 0 60 ng/ml     COVID/FLU/RSV - 2 hour TAT [[id number]]  (Normal) Collected: 02/18/22 1314    Lab Status: Final result Specimen: Nares from Nasopharyngeal Swab Updated: 02/18/22 1414     SARS-CoV-2 Negative     INFLUENZA A PCR Negative     INFLUENZA B PCR Negative     RSV PCR Negative    Narrative:      FOR PEDIATRIC PATIENTS - copy/paste COVID Guidelines URL to browser: [email or web address]    SARS-CoV-2 assay is a Nucleic Acid Amplification assay intended for the  qualitative detection of nucleic acid from SARS-CoV-2 in nasopharyngeal  swabs  Results are for the presumptive identification of SARS-CoV-2 RNA  Positive results are indicative of infection with SARS-CoV-2, the virus  causing COVID-19, but do not rule out bacterial infection or co-infection  with other viruses  Laboratories within the [place] and its  territories are required to report all positive results to the appropriate  public health authorities  Negative results do not preclude SARS-CoV-2  infection and should not be used as the sole basis for treatment or other  patient management decisions  Negative results must be combined with  clinical observations, patient history, and epidemiological information  This test has not been FDA cleared or approved  This test has been authorized by FDA under an Emergency Use Authorization  (EUA)  This test is only authorized for the duration of time the  declaration that circumstances exist justifying the authorization of the  emergency use of an in vitro diagnostic tests for detection of SARS-CoV-2  virus and/or diagnosis of COVID-19 infection under section 564(b)(1) of  the Act, 21 U  S C  431NEL-5(V)(7), unless the authorization is terminated  or revoked sooner  The test has been validated but independent review by FDA  and CLIA is pending  Test performed using [employer] GeneXpert: This RT-PCR assay targets N2,  a region unique to SARS-CoV-2  A conserved region in the E-gene was chosen  for pan-Sarbecovirus detection which includes SARS-CoV-2      Urine Microscopic [[id number]]  (Abnormal) Collected: 02/18/22 1319    Lab Status: Final result Specimen: Urine, Clean Catch Updated: 02/18/22 1400     RBC, UA 0-1 /hpf      WBC, UA 0-1 /hpf      Epithelial Cells Occasional /hpf Bacteria, UA Occasional /hpf      Hyaline Casts, UA 0-1 /lpf     Magnesium [[id number]]  (Normal) Collected: 02/18/22 1314    Lab Status: Final result Specimen: Blood from Arm, Right Updated: 02/18/22 1355     Magnesium 2 0 mg/dL     HS Troponin 0hr (reflex protocol) [[id number]]  (Normal) Collected: 02/18/22 1314    Lab Status: Final result Specimen: Blood from Arm, Right Updated: 02/18/22 1355     hs TnI 0hr 8 ng/L     NT-BNP PRO [[id number]]  (Abnormal) Collected: 02/18/22 1314    Lab Status: Final result Specimen: Blood from Arm, Right Updated: 02/18/22 1355     NT-proBNP 1,791 pg/mL     Lactic acid [[id number]]  (Abnormal) Collected: 02/18/22 1314    Lab Status: Final result Specimen: Blood from Arm, Right Updated: 02/18/22 1351     LACTIC ACID 2 2 mmol/L     Narrative:      Result may be elevated if tourniquet was used during collection      Comprehensive metabolic panel [[id number]]  (Abnormal) Collected: 02/18/22 1314    Lab Status: Final result Specimen: Blood from Arm, Right Updated: 02/18/22 1348     Sodium 131 mmol/L      Potassium 3 9 mmol/L      Chloride 93 mmol/L      CO2 26 mmol/L      ANION GAP 12 mmol/L      BUN 25 mg/dL      Creatinine 2 04 mg/dL      Glucose 273 mg/dL      Calcium 10 1 mg/dL      Corrected Calcium 10 8 mg/dL      AST 12 U/L      ALT 13 U/L      Alkaline Phosphatase 85 U/L      Total Protein 8 1 g/dL      Albumin 3 1 g/dL      Total Bilirubin 2 18 mg/dL      eGFR 28 ml/min/1 73sq m     Narrative:      National Kidney Disease Foundation guidelines for Chronic Kidney Disease (CKD):     Stage 1 with normal or high GFR (GFR > 90 mL/min/1 73 square meters)    Stage 2 Mild CKD (GFR = 60-89 mL/min/1 73 square meters)    Stage 3A Moderate CKD (GFR = 45-59 mL/min/1 73 square meters)    Stage 3B Moderate CKD (GFR = 30-44 mL/min/1 73 square meters)    Stage 4 Severe CKD (GFR = 15-29 mL/min/1 73 square meters)    Stage 5 End Stage CKD (GFR <15 mL/min/1 73 square meters)  Note: GFR calculation is accurate only with a steady state creatinine    Protime-INR [[id number]]  (Abnormal) Collected: 02/18/22 1314    Lab Status: Final result Specimen: Blood from Arm, Right Updated: 02/18/22 1346     Protime 15 2 seconds      INR 1 26    APTT [[id number]]  (Abnormal) Collected: 02/18/22 1314    Lab Status: Final result Specimen: Blood from Arm, Right Updated: 02/18/22 1346     PTT 40 seconds     UA w Reflex to Microscopic w Reflex to Culture [[id number]]  (Abnormal) Collected: 02/18/22 1319    Lab Status: Final result Specimen: Urine, Clean Catch Updated: 02/18/22 1335     Color, UA Yellow     Clarity, UA Clear     Specific Gravity, UA 1 025     pH, UA 5 5     Leukocytes, UA Negative     Nitrite, UA Negative     Protein, UA 30 (1+) mg/dl      Glucose, UA Negative mg/dl      Ketones, UA Negative mg/dl      Urobilinogen, UA 0 2 E U /dl      Bilirubin, UA Negative     Blood, UA Negative    CBC and differential [[id number]]  (Abnormal) Collected: 02/18/22 1314    Lab Status: Final result Specimen: Blood from Arm, Right Updated: 02/18/22 1332     WBC 13 02 Thousand/uL      RBC 5 47 Million/uL      Hemoglobin 15 1 g/dL      Hematocrit 46 6 %      MCV 85 fL      MCH 27 6 pg      MCHC 32 4 g/dL      RDW 12 7 %      MPV 9 2 fL      Platelets 525 Thousands/uL      nRBC 0 /100 WBCs      Neutrophils Relative 79 %      Immat GRANS % 0 %      Lymphocytes Relative 7 %      Monocytes Relative 12 %      Eosinophils Relative 1 %      Basophils Relative 1 %      Neutrophils Absolute 10 34 Thousands/µL      Immature Grans Absolute 0 04 Thousand/uL      Lymphocytes Absolute 0 96 Thousands/µL      Monocytes Absolute 1 55 Thousand/µL      Eosinophils Absolute 0 06 Thousand/µL      Basophils Absolute 0 07 Thousands/µL                  CT chest without contrast   Final Result by Júnior Obregon MD (02/18 1456)      Persistent chronic interstitial changes in the periphery of the upper and lower lobes greater in the lower lobes        Stable pulmonary nodules and calcified granulomas as above  Probable chronic atelectasis left lower lobe  No new areas of airspace consolidation or effusions  Cardiomegaly  Coronary artery and aortic calcifications  Status post CABG and median sternotomy  Mild mediastinal lymphadenopathy, unchanged since prior study  No hilar or axillary lymphadenopathy  Workstation performed: [id number]         XR chest 1 view portable   Final Result by Minnie Tyler MD (02/18 1410)      Mild pulmonary venous congestion  No effusion or pneumothorax  Workstation performed: [id number]         VAS lower limb venous duplex study, unilateral/limited    (Results Pending)   NM inpatient order    (Results Pending)              Procedures  ECG 12 Lead Documentation Only    Date/Time: 2/18/2022 4:46 PM  Performed by: Diane Gamble PA-C  Authorized by: Diane Gamble PA-C     Indications / Diagnosis:  Sob  ECG reviewed by me, the ED Provider: yes    Patient location:  ED  Previous ECG:     Previous ECG:  Compared to current    Comparison ECG info:  1/30/22    Similarity:  No change    Comparison to cardiac monitor: Yes    Interpretation:     Interpretation: non-specific    Rate:     ECG rate:  85    ECG rate assessment: normal    Rhythm:     Rhythm: sinus rhythm    Ectopy:     Ectopy: none    QRS:     QRS axis:  Normal    QRS intervals:  Normal  Conduction:     Conduction: normal    ST segments:     ST segments:  Normal  T waves:     T waves: non-specific and inverted      Inverted:  V4 and V5  Comments:      NO STEMI  T-wave inversions in V4/V5 unchanged from prior  ED Course  ED Course as of 02/19/22 1223   Fri Feb 18, 2022   1405 The 30ml/kg fluid bolus was not given to the patient despite hypotension and/or significantly elevated lactate of = 4 and/or presence of septic shock due to: Heart Failure   The patient will be administered 500 ml of crystalloid fluid instead  Orders for this have been [facility] in [facility]  The patient may receive additional colloid or crystalloid fluids thereafter based on clinical condition  Ceasar Crandall PA-C    1415 Duplex negative for DVT  1415 NT-proBNP(!): 1,791   1415 LACTIC ACID(!!): 2 2   1415 Creatinine(!): 2 04   1415 WBC(!): 13 02   1415 Temperature: 100 °F (37 8 °C)   1415 SpO2(!): 78 %             HEART Risk Score      Most Recent Value   Heart Score Risk Calculator    History 0 Filed at: 02/19/2022 1140   ECG 1 Filed at: 02/19/2022 1140   Age 2 Filed at: 02/19/2022 1140   Risk Factors 2 Filed at: 02/19/2022 1140   Troponin 0 Filed at: 02/19/2022 1140   HEART Score 5 Filed at: 02/19/2022 1140                     Initial Sepsis Screening     Row Name 02/18/22 1713                Is the patient's history suggestive of a new or worsening infection? Yes (Proceed)  -        Suspected source of infection pneumonia  -LH        Are two or more of the following signs & symptoms of infection both present and new to the patient? Yes (Proceed)  -LH        Indicate SIRS criteria Tachypnea > 20 resp per min;Leukocytosis (WBC > 67258 IJL)  -        If the answer is yes to both questions, suspicion of sepsis is present --        If severe sepsis is present AND tissue hypoperfusion perists in the hour after fluid resuscitation or lactate > 4, the patient meets criteria for SEPTIC SHOCK --        Are any of the following organ dysfunction criteria present within 6 hours of suspected infection and SIRS criteria that are NOT considered to be chronic conditions?  Yes  -        Organ dysfunction Lactate > 2 0 mmol/L  -        Date of presentation of severe sepsis --        Time of presentation of severe sepsis --        Tissue hypoperfusion persists in the hour after crystalloid fluid administration, evidenced, by either: --        Was hypotension present within one hour of the conclusion of crystalloid fluid administration? --        Date of presentation of septic shock --        Time of presentation of septic shock --              User Key  (r) = Recorded By, (t) = Taken By, (c) = Cosigned By    [address] Name Provider Type    Cristal HOSKINS PA-C Physician Assistant                SBIRT 22yo+      Most Recent Value   SBIRT (25 yo +)    In order to provide better care to our patients, we are screening all of our patients for alcohol and drug use  Would it be okay to ask you these screening questions? Yes Filed at: 02/18/2022 1246   Initial Alcohol Screen: US AUDIT-C     1  How often do you have a drink containing alcohol? 0 Filed at: 02/18/2022 1246   2  How many drinks containing alcohol do you have on a typical day you are drinking? 0 Filed at: 02/18/2022 1246   3a  Male UNDER 65: How often do you have five or more drinks on one occasion? 0 Filed at: 02/18/2022 1246   3b  FEMALE Any Age, or MALE 65+: How often do you have 4 or more drinks on one occassion? 0 Filed at: 02/18/2022 1246   Audit-C Score 0 Filed at: 02/18/2022 1246   PERRY: How many times in the past year have you    Used an illegal drug or used a prescription medication for non-medical reasons? Never Filed at: 02/18/2022 1246                    MDM  Number of Diagnoses or Management Options  Acute exacerbation of CHF (congestive heart failure) (HCC)  Elevated serum creatinine  Hyperglycemia  Hypoxia  Sepsis ([facility] [address] )  Diagnosis management comments: Patient is an 81 y/o male with a PMHx of CAD, CKD 3b, DM2, HTN and HLD, presenting to the ED for evaluation of shortness of breath and right lower extremity pain/swelling  Patient was hypoxic on arrival at 76% and improved with 3L O2  Labs notable for an elevated BNP and an BECKI  Patient also met sepsis criteria, with mild tachypnea, low-grade fever, leukocytosis and elevated lactic acid  Rocephin given for possible pneumonia  CT chest negative for pneumonia but did show mild vascular congestion consistent with acute CHF   Venous duplex negative for DVT in RLE  D-dimer elevated; however in the setting of acute renal failure, IV contrast to rule out PE could cause more harm to patient at this time  COVID negative  Patient will be admitted to medicine for further evaluation/management of acute hypoxic respiratory failure, CHF and acute renal failure  Amount and/or Complexity of Data Reviewed  Clinical lab tests: reviewed and ordered  Tests in the radiology section of CPT®: ordered and reviewed  Discuss the patient with other providers: yes    Patient Progress  Patient progress: stable      Disposition  Final diagnoses:   Acute exacerbation of CHF (congestive heart failure) ([facility] )   Hypoxia   Sepsis ([place] [address] )   Elevated serum creatinine   Hyperglycemia     Time reflects when diagnosis was documented in both MDM as applicable and the Disposition within this note     Time User Action Codes Description Comment    2/18/2022  5:13 PM Evracheln Doctor Add [I50 9] Acute exacerbation of CHF (congestive heart failure) ([facility] )     2/18/2022  5:13 PM Evracheln Doctor Add [R09 02] Hypoxia     2/18/2022  5:14 PM Lor Page Add [A41 9] Sepsis ([place] [address] )     2/18/2022  5:14 PM Evlyn Doctor Add [R79 89] Elevated serum creatinine     2/18/2022  5:14 PM Lor Page Add [R73 9] Hyperglycemia       ED Disposition     ED Disposition Condition Date/Time Comment    Admit Stable Fri Feb 18, 2022  5:12 PM Case was discussed with PEYTON and the patient's admission status was agreed to be Admission Status: inpatient status to the service of Dr Emily Mercado  Follow-up Information    None         Current Discharge Medication List      CONTINUE these medications which have NOT CHANGED    Details   amLODIPine (NORVASC) 10 mg tablet Take 1 tablet (10 mg total) by mouth daily  Qty: 30 tablet, Refills: 0    Associated Diagnoses: Hypertensive crisis      clopidogrel (PLAVIX) 75 mg tablet Take 1 tablet (75 mg total) by mouth daily Hold until seen by ENT on 9/23/19  Associated Diagnoses: Coronary artery disease involving coronary bypass graft of native heart without angina pectoris      escitalopram (LEXAPRO) 10 mg tablet Take 10 mg by mouth daily      furosemide (LASIX) 20 mg tablet Take 1 tablet (20 mg total) by mouth daily  Qty: 30 tablet, Refills: 0    Associated Diagnoses: Acute on chronic diastolic congestive heart failure (HCC)      glimepiride (AMARYL) 1 mg tablet Take 1 mg by mouth 2 (two) times a day   Refills: 0      isosorbide mononitrate (IMDUR) 60 mg 24 hr tablet Take 2 tablets (120 mg total) by mouth daily In the morning  Qty: 60 tablet, Refills: 0    Associated Diagnoses: Hypertensive crisis      pantoprazole (PROTONIX) 40 mg tablet Take 40 mg by mouth daily      propranolol (INDERAL LA) 120 mg 24 hr capsule Take 120 mg by mouth daily      sucralfate (CARAFATE) 1 g tablet Take 1 g by mouth 2 (two) times a day      tamsulosin (FLOMAX) 0 4 mg Take 2 capsules (0 8 mg total) by mouth daily with dinner  Qty: 180 capsule, Refills: 3    Associated Diagnoses: Benign prostatic hyperplasia with urinary frequency      traMADol (ULTRAM) 50 mg tablet Take 50 mg by mouth every 6 (six) hours as needed for moderate pain      atorvastatin (LIPITOR) 20 mg tablet Take 20 mg by mouth daily      nitroglycerin (NITROSTAT) 0 4 mg SL tablet one tablet under tongue for angina, may repeat in 15 min  no more than three times  Refills: 0      omeprazole (PriLOSEC) 20 mg delayed release capsule Take 20 mg by mouth daily             No discharge procedures on file      PDMP Review     None          ED Provider  Electronically Signed by           Wilmer Richmond PA-C  02/19/22 5164

## 2022-02-18 NOTE — ASSESSMENT & PLAN NOTE
Lab Results   Component Value Date    HGBA1C 10 8 (H) 11/09/2021     A1c not a goal, uncontrolled  Hold oral meds  ISS  lantus 5  Hypoglycemic protocol  DM low salt diet

## 2022-02-18 NOTE — PLAN OF CARE
Problem: MOBILITY - ADULT  Goal: Maintain or return to baseline ADL function  Description: INTERVENTIONS:  -  Assess patient's ability to carry out ADLs; assess patient's baseline for ADL function and identify physical deficits which impact ability to perform ADLs (bathing, care of mouth/teeth, toileting, grooming, dressing, etc )  - Assess/evaluate cause of self-care deficits   - Assess range of motion  - Assess patient's mobility; develop plan if impaired  - Assess patient's need for assistive devices and provide as appropriate  - Encourage maximum independence but intervene and supervise when necessary  - Involve family in performance of ADLs  - Assess for home care needs following discharge   - Consider OT consult to assist with ADL evaluation and planning for discharge  - Provide patient education as appropriate  Outcome: Progressing  Goal: Maintains/Returns to pre admission functional level  Description: INTERVENTIONS:  - Perform BMAT or MOVE assessment daily    - Set and communicate daily mobility goal to care team and patient/family/caregiver  - Collaborate with rehabilitation services on mobility goals if consulted  - Perform Range of Motion 4 times a day  - Reposition patient every 2 hours    - Dangle patient 3 times a day  - Stand patient 3 times a day  - Ambulate patient 3 times a day  - Out of bed to chair 3 times a day   - Out of bed for meals 3 times a day  - Out of bed for toileting  - Record patient progress and toleration of activity level   Outcome: Progressing     Problem: PAIN - ADULT  Goal: Verbalizes/displays adequate comfort level or baseline comfort level  Description: Interventions:  - Encourage patient to monitor pain and request assistance  - Assess pain using appropriate pain scale  - Administer analgesics based on type and severity of pain and evaluate response  - Implement non-pharmacological measures as appropriate and evaluate response  - Consider cultural and social influences on pain and pain management  - Notify physician/advanced practitioner if interventions unsuccessful or patient reports new pain  Outcome: Progressing     Problem: INFECTION - ADULT  Goal: Absence or prevention of progression during hospitalization  Description: INTERVENTIONS:  - Assess and monitor for signs and symptoms of infection  - Monitor lab/diagnostic results  - Monitor all insertion sites, i e  indwelling lines, tubes, and drains  - Monitor endotracheal if appropriate and nasal secretions for changes in amount and color  - Graham appropriate cooling/warming therapies per order  - Administer medications as ordered  - Instruct and encourage patient and family to use good hand hygiene technique  - Identify and instruct in appropriate isolation precautions for identified infection/condition  Outcome: Progressing     Problem: SAFETY ADULT  Goal: Maintain or return to baseline ADL function  Description: INTERVENTIONS:  -  Assess patient's ability to carry out ADLs; assess patient's baseline for ADL function and identify physical deficits which impact ability to perform ADLs (bathing, care of mouth/teeth, toileting, grooming, dressing, etc )  - Assess/evaluate cause of self-care deficits   - Assess range of motion  - Assess patient's mobility; develop plan if impaired  - Assess patient's need for assistive devices and provide as appropriate  - Encourage maximum independence but intervene and supervise when necessary  - Involve family in performance of ADLs  - Assess for home care needs following discharge   - Consider OT consult to assist with ADL evaluation and planning for discharge  - Provide patient education as appropriate  Outcome: Progressing  Goal: Maintains/Returns to pre admission functional level  Description: INTERVENTIONS:  - Perform BMAT or MOVE assessment daily    - Set and communicate daily mobility goal to care team and patient/family/caregiver     - Collaborate with rehabilitation services on mobility goals if consulted  - Perform Range of Motion 4 times a day  - Reposition patient every 2 hours    - Dangle patient 3 times a day  - Stand patient 3 times a day  - Ambulate patient 3 times a day  - Out of bed to chair 3 times a day   - Out of bed for meals 3 times a day  - Out of bed for toileting  - Record patient progress and toleration of activity level   Outcome: Progressing  Goal: Patient will remain free of falls  Description: INTERVENTIONS:  - Educate patient/family on patient safety including physical limitations  - Instruct patient to call for assistance with activity   - Consult OT/PT to assist with strengthening/mobility   - Keep Call bell within reach  - Keep bed low and locked with side rails adjusted as appropriate  - Keep care items and personal belongings within reach  - Initiate and maintain comfort rounds  - Make Fall Risk Sign visible to staff  - Offer Toileting every 2 Hours, in advance of need  - Initiate/Maintain fall alarm  - Obtain necessary fall risk management equipment: alarm, nonskid socks, yellow wristband  - Apply yellow socks and bracelet for high fall risk patients  - Consider moving patient to room near nurses station  Outcome: Progressing     Problem: DISCHARGE PLANNING  Goal: Discharge to home or other facility with appropriate resources  Description: INTERVENTIONS:  - Identify barriers to discharge w/patient and caregiver  - Arrange for needed discharge resources and transportation as appropriate  - Identify discharge learning needs (meds, wound care, etc )  - Arrange for interpretive services to assist at discharge as needed  - Refer to Case Management Department for coordinating discharge planning if the patient needs post-hospital services based on physician/advanced practitioner order or complex needs related to functional status, cognitive ability, or social support system  Outcome: Progressing     Problem: Knowledge Deficit  Goal: Patient/family/caregiver demonstrates understanding of disease process, treatment plan, medications, and discharge instructions  Description: Complete learning assessment and assess knowledge base    Interventions:  - Provide teaching at level of understanding  - Provide teaching via preferred learning methods  Outcome: Progressing     Problem: CARDIOVASCULAR - ADULT  Goal: Maintains optimal cardiac output and hemodynamic stability  Description: INTERVENTIONS:  - Monitor I/O, vital signs and rhythm  - Monitor for S/S and trends of decreased cardiac output  - Administer and titrate ordered vasoactive medications to optimize hemodynamic stability  - Assess quality of pulses, skin color and temperature  - Assess for signs of decreased coronary artery perfusion  - Instruct patient to report change in severity of symptoms  Outcome: Progressing  Goal: Absence of cardiac dysrhythmias or at baseline rhythm  Description: INTERVENTIONS:  - Continuous cardiac monitoring, vital signs, obtain 12 lead EKG if ordered  - Administer antiarrhythmic and heart rate control medications as ordered  - Monitor electrolytes and administer replacement therapy as ordered  Outcome: Progressing     Problem: RESPIRATORY - ADULT  Goal: Achieves optimal ventilation and oxygenation  Description: INTERVENTIONS:  - Assess for changes in respiratory status  - Assess for changes in mentation and behavior  - Position to facilitate oxygenation and minimize respiratory effort  - Oxygen administered by appropriate delivery if ordered  - Initiate smoking cessation education as indicated  - Encourage broncho-pulmonary hygiene including cough, deep breathe, Incentive Spirometry  - Assess the need for suctioning and aspirate as needed  - Assess and instruct to report SOB or any respiratory difficulty  - Respiratory Therapy support as indicated  Outcome: Progressing     Problem: METABOLIC, FLUID AND ELECTROLYTES - ADULT  Goal: Electrolytes maintained within normal limits  Description: INTERVENTIONS:  - Monitor labs and assess patient for signs and symptoms of electrolyte imbalances  - Administer electrolyte replacement as ordered  - Monitor response to electrolyte replacements, including repeat lab results as appropriate  - Instruct patient on fluid and nutrition as appropriate  Outcome: Progressing  Goal: Fluid balance maintained  Description: INTERVENTIONS:  - Monitor labs   - Monitor I/O and WT  - Instruct patient on fluid and nutrition as appropriate  - Assess for signs & symptoms of volume excess or deficit  Outcome: Progressing  Goal: Glucose maintained within target range  Description: INTERVENTIONS:  - Monitor Blood Glucose as ordered  - Assess for signs and symptoms of hyperglycemia and hypoglycemia  - Administer ordered medications to maintain glucose within target range  - Assess nutritional intake and initiate nutrition service referral as needed  Outcome: Progressing     Problem: HEMATOLOGIC - ADULT  Goal: Maintains hematologic stability  Description: INTERVENTIONS  - Assess for signs and symptoms of bleeding or hemorrhage  - Monitor labs  - Administer supportive blood products/factors as ordered and appropriate  Outcome: Progressing

## 2022-02-18 NOTE — ASSESSMENT & PLAN NOTE
Patient presents with right lower extremity leg pain and swelling-> found to be short of breath saturating 78 percent on room air-> currently on 4 liters nasal cannula saturating 95 percent    Chest x-ray shows mild venous congestion; CT chest shows no acute findings    DDx ACHFE vs PE   Ceftriaxone and lasix given in the ED  Continue lasix IV bid  Hold abx at this time  Echo  I's and O's  Daily wts  DM Low salt diet  Last echo: 03/26/21 showed an EF of 65 percent and grade 1 diastolic dysfunction  d-dimer 1 76  VQ scan  Dopplers -ve for DVT

## 2022-02-18 NOTE — H&P
[address] [address], 82 y o  male MRN: [id number]  Unit/Bed#: 409-01 Encounter: [id number]  Primary Care Provider: Ana Laura Higginbotham DO   Date and time admitted to hospital: 2/18/2022 12:35 PM    * Acute respiratory failure with hypoxia [facility])  Assessment & Plan  Patient presents with right lower extremity leg pain and swelling-> found to be short of breath saturating 78 percent on room air-> currently on 4 liters nasal cannula saturating 95 percent    Chest x-ray shows mild venous congestion; CT chest shows no acute findings    DDx ACHFE vs PE   Ceftriaxone and lasix given in the ED  Continue lasix IV bid  Hold abx at this time  Echo  I's and O's  Daily wts  DM Low salt diet  Last echo: 03/26/21 showed an EF of 65 percent and grade 1 diastolic dysfunction  d-dimer 1 76  VQ scan  Dopplers -ve for DVT    Acute renal failure superimposed on stage 3b chronic kidney disease [facility])  Assessment & Plan  Lab Results   Component Value Date    EGFR 28 02/18/2022    EGFR 32 02/10/2022    EGFR 37 01/31/2022    CREATININE 2 04 (H) 02/18/2022    CREATININE 1 86 (H) 02/10/2022    CREATININE 1 65 (H) 01/31/2022     Cr on admission 2 04  Baseline 1 65-1 75  I's and O's   Avoid nephrotoxic agent      Essential hypertension  Assessment & Plan  Continue propranolol and amlodipine    Diabetes mellitus, type II (HCC)  Assessment & Plan    Lab Results   Component Value Date    HGBA1C 10 8 (H) 11/09/2021     A1c not a goal, uncontrolled  Hold oral meds  ISS  lantus 5  Hypoglycemic protocol  DM low salt diet    Mixed hyperlipidemia  Assessment & Plan  Continue Lipitor 20    VTE Pharmacologic Prophylaxis: VTE Score: 4 Moderate Risk (Score 3-4) - Pharmacological DVT Prophylaxis Ordered: heparin    Code Status: Prior full code    Anticipated Length of Stay: Patient will be admitted on an inpatient basis with an anticipated length of stay of greater than 2 midnights secondary to acute resp failure  Total Time for Visit, including Counseling / Coordination of Care: 20 minutes Greater than 50% of this total time spent on direct patient counseling and coordination of care  Chief Complaint: right le swelling and pain    History of Present Illness:  Macho Bronson is a 82 y o  male with a PMH of CKD stage 3 B, hypertension, hyperlipidemia , diabetes, who presents with right lower extremity swelling and pain that was associated with shortness of breath and presented with low saturations on admission to the ED  he stated that this occurred over the past few days and noticed that he could not get out of bed due to the swelling being so bad  he denies any chest pain, fevers, chills, sweats, abdominal pain, nausea, vomiting, diarrhea, constipation, and urinary symptoms  Review of Systems:  Review of Systems   All other systems reviewed and are negative  Past Medical and Surgical History:   Past Medical History:   Diagnosis Date    Arthritis     Athscl 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     Stent OM1  Patent mammary graft to LAD 10/7/2009; CABG 1992 & 2005    Cancer [facility])     skin    Coronary artery disease     Diabetes mellitus [facility] [address] )     Diabetes mellitus, type II ([facility] [address] )     without complication    Epistaxis     Essential (primary) hypertension     GERD (gastroesophageal reflux disease)     Hx of cardiovascular stress test 07/23/2014    Eddie MPI; EF0 52 (52%) Lexiscan, mild LV systolic dysfunction; evidence for prior inferior wall MI; perfusion imaging consistant w mild lat wall ischemia and min torin-infart inferior ischemia   / EF0 51 (51%) evidence for prior inferior wall MI  Mild inferior and mild-moderate lateral wall ischemia  7/29/15    Hx of echocardiogram 11/07/2017    2D w/CFD;EF0 55 (55%) mild LVH, mitral valve prolapse with moderate regurgitation  left atrial enlargement  Mild tricuspid regurgitation        Hypertension     Mixed hyperlipidemia     Occlusion and stenosis of bilateral carotid arteries     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 infarction)     Presence of aortocoronary bypass graft        Past Surgical History:   Procedure Laterality Date    CARDIAC CATHETERIZATION  10/07/2009    Stent 99% stenosis SVG from the aorta to OM1  Patent mammary graft to the LAD    CORONARY ARTERY BYPASS GRAFT  1992    VG-CX, VG-RCA    CORONARY ARTERY BYPASS GRAFT  11/04/2005    LIMA-D1-LAD, VG-PDA-PLB, Vlewbo-VI5-IQ5 TMR 9 Lesions    MN EGD TRANSORAL BIOPSY SINGLE/MULTIPLE N/A 1/23/2019    Procedure: ESOPHAGOGASTRODUODENOSCOPY (EGD) with biopsy;  Surgeon: Gerber Anderson MD;  Location: [facility]; Service: Gastroenterology    TONSILLECTOMY         Meds/Allergies:  Prior to Admission medications    Medication Sig Start Date End Date Taking? Authorizing Provider   amLODIPine (NORVASC) 10 mg tablet Take 1 tablet (10 mg total) by mouth daily 3/6/21   Philip Barillas MD   atorvastatin (LIPITOR) 20 mg tablet Take 20 mg by mouth daily    Historical Provider, MD   clopidogrel (PLAVIX) 75 mg tablet Take 1 tablet (75 mg total) by mouth daily Hold until seen by ENT on 9/23/19 2/14/20   Shirley Nicolas DO   escitalopram (LEXAPRO) 10 mg tablet Take 10 mg by mouth daily    Historical Provider, MD   furosemide (LASIX) 20 mg tablet Take 1 tablet (20 mg total) by mouth daily 2/1/22 3/3/22  Edyta Reynolds MD   glimepiride (AMARYL) 1 mg tablet Take 1 mg by mouth 2 (two) times a day  9/11/18   Historical Provider, MD   isosorbide mononitrate (IMDUR) 60 mg 24 hr tablet Take 2 tablets (120 mg total) by mouth daily In the morning 3/6/21   Philip Barillas MD   nitroglycerin (NITROSTAT) 0 4 mg SL tablet one tablet under tongue for angina, may repeat in 15 min   no more than three times 7/17/18   Historical Provider, MD   omeprazole (PriLOSEC) 20 mg delayed release capsule Take 20 mg by mouth daily    Historical Provider, MD   pantoprazole (PROTONIX) 40 mg tablet Take 40 mg by mouth daily 21   Historical Provider, MD   propranolol (INDERAL LA) 120 mg 24 hr capsule Take 120 mg by mouth daily    Historical Provider, MD   sucralfate (CARAFATE) 1 g tablet Take 1 g by mouth 2 (two) times a day    Historical Provider, MD   tamsulosin (FLOMAX) 0 4 mg Take 2 capsules (0 8 mg total) by mouth daily with dinner 10/29/21   Broderick Gamble DO     I have reviewed home medications using recent Epic encounter  Allergies: Allergies   Allergen Reactions    Advil [Ibuprofen] Fatigue       Social History:  Marital Status:    Patient Pre-hospital Living Situation: Home  Patient Pre-hospital Level of Mobility: walks  Patient Pre-hospital Diet Restrictions: DM  Substance Use History:   Social History     Substance and Sexual Activity   Alcohol Use Never     Social History     Tobacco Use   Smoking Status Former Smoker    Packs/day: 1 00    Years: 40 00    Pack years: 40 00    Quit date: 1977    Years since quittin 1   Smokeless Tobacco Never Used     Social History     Substance and Sexual Activity   Drug Use No       Family History:  Family History   Problem Relation Age of Onset    Heart disease Brother     No Known Problems Mother     Tuberculosis Father        Physical Exam:     Vitals:   Blood Pressure: 124/58 (22 1800)  Pulse: 78 (22 1800)  Temperature: 98 1 °F (36 7 °C) (22 1800)  Temp Source: Temporal (22 1236)  Respirations: 18 (22 1800)  Height: 5' 7" (170 2 cm) (22 1800)  Weight - Scale: 71 6 kg (157 lb 13 6 oz) (22 1800)  SpO2: 96 % (22 1800)    Physical Exam  Vitals and nursing note reviewed  Constitutional:       Appearance: Normal appearance  HENT:      Head: Normocephalic and atraumatic  Cardiovascular:      Rate and Rhythm: Normal rate and regular rhythm  Pulses: Normal pulses  Heart sounds: Normal heart sounds     Pulmonary:      Effort: Pulmonary effort is normal       Breath sounds: Normal breath sounds  Abdominal:      General: Abdomen is flat  Bowel sounds are normal       Palpations: Abdomen is soft  Musculoskeletal:      Right lower leg: No edema  Left lower leg: No edema  Skin:     General: Skin is warm  Neurological:      General: No focal deficit present  Mental Status: He is alert and oriented to person, place, and time  Additional Data:     Lab Results:  Results from last 7 days   Lab Units 02/18/22  1314   WBC Thousand/uL 13 02*   HEMOGLOBIN g/dL 15 1   HEMATOCRIT % 46 6   PLATELETS Thousands/uL 337   NEUTROS PCT % 79*   LYMPHS PCT % 7*   MONOS PCT % 12   EOS PCT % 1     Results from last 7 days   Lab Units 02/18/22  1314   SODIUM mmol/L 131*   POTASSIUM mmol/L 3 9   CHLORIDE mmol/L 93*   CO2 mmol/L 26   BUN mg/dL 25   CREATININE mg/dL 2 04*   ANION GAP mmol/L 12   CALCIUM mg/dL 10 1   ALBUMIN g/dL 3 1*   TOTAL BILIRUBIN mg/dL 2 18*   ALK PHOS U/L 85   ALT U/L 13   AST U/L 12   GLUCOSE RANDOM mg/dL 273*     Results from last 7 days   Lab Units 02/18/22  1314   INR  1 26*     Results from last 7 days   Lab Units 02/18/22  1809   POC GLUCOSE mg/dl 219*         Results from last 7 days   Lab Units 02/18/22  1533 02/18/22  1314   LACTIC ACID mmol/L 1 2 2 2*   PROCALCITONIN ng/ml  --  0 60*       Imaging: Reviewed radiology reports from this admission including: chest xray, chest CT scan and ultrasound(s)  CT chest without contrast   Final Result by Mati Canseco MD (02/18 3612)      Persistent chronic interstitial changes in the periphery of the upper and lower lobes greater in the lower lobes  Stable pulmonary nodules and calcified granulomas as above  Probable chronic atelectasis left lower lobe  No new areas of airspace consolidation or effusions  Cardiomegaly  Coronary artery and aortic calcifications  Status post CABG and median sternotomy  Mild mediastinal lymphadenopathy, unchanged since prior study  No hilar or axillary lymphadenopathy  Workstation performed: [id number]         XR chest 1 view portable   Final Result by Harris Johns MD (02/18 1410)      Mild pulmonary venous congestion  No effusion or pneumothorax  Workstation performed: [id number]         VAS lower limb venous duplex study, unilateral/limited    (Results Pending)   NM inpatient order    (Results Pending)       EKG and Other Studies Reviewed on Admission:    Previous ECG:  Compared to current    Comparison ECG info:  1/30/22    Similarity:  No change    Comparison to cardiac monitor: Yes    Interpretation:     Interpretation: non-specific    Rate:     ECG rate:  85    ECG rate assessment: normal    Rhythm:     Rhythm: sinus rhythm    Ectopy:     Ectopy: none    QRS:     QRS axis:  Normal    QRS intervals:  Normal  Conduction:     Conduction: normal    ST segments:     ST segments:  Normal  T waves:     T waves: non-specific and inverted      Inverted:  V4 and V5  Comments:      NO STEMI  T-wave inversions in V4/V5 unchanged from prior  ** Please Note: This note has been constructed using a voice recognition system   **

## 2022-02-18 NOTE — ASSESSMENT & PLAN NOTE
Lab Results   Component Value Date    EGFR 28 02/18/2022    EGFR 32 02/10/2022    EGFR 37 01/31/2022    CREATININE 2 04 (H) 02/18/2022    CREATININE 1 86 (H) 02/10/2022    CREATININE 1 65 (H) 01/31/2022     Cr on admission 2 04  Baseline 1 65-1 75  I's and O's   Avoid nephrotoxic agent

## 2022-02-18 NOTE — ED NOTES
Callbell within reach  Bed in low position  Siderails up  HOB elevated  Patient resting comfortably at this time        Kenny WongEn[facility]  02/18/22 [id number]

## 2022-02-19 ENCOUNTER — EPISODE CHANGES (OUTPATIENT)
Dept: CASE MANAGEMENT | Facility: OTHER | Age: 87
End: 2022-02-19

## 2022-02-19 PROBLEM — I50.32 CHRONIC DIASTOLIC CONGESTIVE HEART FAILURE (HCC): Status: ACTIVE | Noted: 2022-01-30

## 2022-02-19 LAB
ALBUMIN SERPL BCP-MCNC: 2.5 G/DL (ref 3.5–5)
ALP SERPL-CCNC: 72 U/L (ref 46–116)
ALT SERPL W P-5'-P-CCNC: 10 U/L (ref 12–78)
ANION GAP SERPL CALCULATED.3IONS-SCNC: 10 MMOL/L (ref 4–13)
ANION GAP SERPL CALCULATED.3IONS-SCNC: 12 MMOL/L (ref 4–13)
AST SERPL W P-5'-P-CCNC: 14 U/L (ref 5–45)
BASOPHILS # BLD AUTO: 0.03 THOUSANDS/ΜL (ref 0–0.1)
BASOPHILS NFR BLD AUTO: 0 % (ref 0–1)
BILIRUB SERPL-MCNC: 1.1 MG/DL (ref 0.2–1)
BUN SERPL-MCNC: 22 MG/DL (ref 5–25)
BUN SERPL-MCNC: 27 MG/DL (ref 5–25)
CALCIUM ALBUM COR SERPL-MCNC: 10.6 MG/DL (ref 8.3–10.1)
CALCIUM SERPL-MCNC: 10.1 MG/DL (ref 8.3–10.1)
CALCIUM SERPL-MCNC: 9.4 MG/DL (ref 8.3–10.1)
CHLORIDE SERPL-SCNC: 95 MMOL/L (ref 100–108)
CHLORIDE SERPL-SCNC: 99 MMOL/L (ref 100–108)
CO2 SERPL-SCNC: 25 MMOL/L (ref 21–32)
CO2 SERPL-SCNC: 25 MMOL/L (ref 21–32)
CREAT SERPL-MCNC: 1.74 MG/DL (ref 0.6–1.3)
CREAT SERPL-MCNC: 2.04 MG/DL (ref 0.6–1.3)
EOSINOPHIL # BLD AUTO: 0.16 THOUSAND/ΜL (ref 0–0.61)
EOSINOPHIL NFR BLD AUTO: 2 % (ref 0–6)
ERYTHROCYTE [DISTWIDTH] IN BLOOD BY AUTOMATED COUNT: 12.7 % (ref 11.6–15.1)
EST. AVERAGE GLUCOSE BLD GHB EST-MCNC: 226 MG/DL
GFR SERPL CREATININE-BSD FRML MDRD: 28 ML/MIN/1.73SQ M
GFR SERPL CREATININE-BSD FRML MDRD: 34 ML/MIN/1.73SQ M
GLUCOSE SERPL-MCNC: 132 MG/DL (ref 65–140)
GLUCOSE SERPL-MCNC: 140 MG/DL (ref 65–140)
GLUCOSE SERPL-MCNC: 258 MG/DL (ref 65–140)
GLUCOSE SERPL-MCNC: 293 MG/DL (ref 65–140)
HBA1C MFR BLD: 9.5 %
HCT VFR BLD AUTO: 44.1 % (ref 36.5–49.3)
HGB BLD-MCNC: 13.7 G/DL (ref 12–17)
IMM GRANULOCYTES # BLD AUTO: 0.03 THOUSAND/UL (ref 0–0.2)
IMM GRANULOCYTES NFR BLD AUTO: 0 % (ref 0–2)
LYMPHOCYTES # BLD AUTO: 0.9 THOUSANDS/ΜL (ref 0.6–4.47)
LYMPHOCYTES NFR BLD AUTO: 12 % (ref 14–44)
MAGNESIUM SERPL-MCNC: 2.1 MG/DL (ref 1.6–2.6)
MCH RBC QN AUTO: 28 PG (ref 26.8–34.3)
MCHC RBC AUTO-ENTMCNC: 31.1 G/DL (ref 31.4–37.4)
MCV RBC AUTO: 90 FL (ref 82–98)
MONOCYTES # BLD AUTO: 0.77 THOUSAND/ΜL (ref 0.17–1.22)
MONOCYTES NFR BLD AUTO: 10 % (ref 4–12)
NEUTROPHILS # BLD AUTO: 5.81 THOUSANDS/ΜL (ref 1.85–7.62)
NEUTS SEG NFR BLD AUTO: 76 % (ref 43–75)
NRBC BLD AUTO-RTO: 0 /100 WBCS
PLATELET # BLD AUTO: 219 THOUSANDS/UL (ref 149–390)
PMV BLD AUTO: 9.7 FL (ref 8.9–12.7)
POTASSIUM SERPL-SCNC: 3.3 MMOL/L (ref 3.5–5.3)
POTASSIUM SERPL-SCNC: 4.3 MMOL/L (ref 3.5–5.3)
PROCALCITONIN SERPL-MCNC: 0.7 NG/ML
PROT SERPL-MCNC: 6.9 G/DL (ref 6.4–8.2)
RBC # BLD AUTO: 4.89 MILLION/UL (ref 3.88–5.62)
SODIUM SERPL-SCNC: 130 MMOL/L (ref 136–145)
SODIUM SERPL-SCNC: 136 MMOL/L (ref 136–145)
WBC # BLD AUTO: 7.7 THOUSAND/UL (ref 4.31–10.16)

## 2022-02-19 PROCEDURE — 83735 ASSAY OF MAGNESIUM: CPT | Performed by: STUDENT IN AN ORGANIZED HEALTH CARE EDUCATION/TRAINING PROGRAM

## 2022-02-19 PROCEDURE — 85025 COMPLETE CBC W/AUTO DIFF WBC: CPT | Performed by: STUDENT IN AN ORGANIZED HEALTH CARE EDUCATION/TRAINING PROGRAM

## 2022-02-19 PROCEDURE — 83036 HEMOGLOBIN GLYCOSYLATED A1C: CPT | Performed by: INTERNAL MEDICINE

## 2022-02-19 PROCEDURE — 80053 COMPREHEN METABOLIC PANEL: CPT | Performed by: STUDENT IN AN ORGANIZED HEALTH CARE EDUCATION/TRAINING PROGRAM

## 2022-02-19 PROCEDURE — 99233 SBSQ HOSP IP/OBS HIGH 50: CPT | Performed by: INTERNAL MEDICINE

## 2022-02-19 PROCEDURE — 82948 REAGENT STRIP/BLOOD GLUCOSE: CPT

## 2022-02-19 PROCEDURE — 84145 PROCALCITONIN (PCT): CPT | Performed by: STUDENT IN AN ORGANIZED HEALTH CARE EDUCATION/TRAINING PROGRAM

## 2022-02-19 PROCEDURE — 93971 EXTREMITY STUDY: CPT | Performed by: SURGERY

## 2022-02-19 PROCEDURE — 80048 BASIC METABOLIC PNL TOTAL CA: CPT | Performed by: INTERNAL MEDICINE

## 2022-02-19 PROCEDURE — 94664 DEMO&/EVAL PT USE INHALER: CPT

## 2022-02-19 PROCEDURE — 97167 OT EVAL HIGH COMPLEX 60 MIN: CPT

## 2022-02-19 RX ORDER — POTASSIUM CHLORIDE 20 MEQ/1
40 TABLET, EXTENDED RELEASE ORAL ONCE
Status: COMPLETED | OUTPATIENT
Start: 2022-02-19 | End: 2022-02-19

## 2022-02-19 RX ORDER — ALBUTEROL SULFATE 2.5 MG/3ML
2.5 SOLUTION RESPIRATORY (INHALATION) EVERY 6 HOURS PRN
Status: DISCONTINUED | OUTPATIENT
Start: 2022-02-19 | End: 2022-02-22 | Stop reason: HOSPADM

## 2022-02-19 RX ORDER — TRAMADOL HYDROCHLORIDE 50 MG/1
50 TABLET ORAL ONCE
Status: COMPLETED | OUTPATIENT
Start: 2022-02-19 | End: 2022-02-19

## 2022-02-19 RX ORDER — CEFTRIAXONE 1 G/50ML
1000 INJECTION, SOLUTION INTRAVENOUS EVERY 24 HOURS
Status: DISCONTINUED | OUTPATIENT
Start: 2022-02-19 | End: 2022-02-22 | Stop reason: HOSPADM

## 2022-02-19 RX ADMIN — POTASSIUM CHLORIDE 30 MEQ: 1500 TABLET, EXTENDED RELEASE ORAL at 17:21

## 2022-02-19 RX ADMIN — TAMSULOSIN HYDROCHLORIDE 0.8 MG: 0.4 CAPSULE ORAL at 17:21

## 2022-02-19 RX ADMIN — CLOPIDOGREL BISULFATE 75 MG: 75 TABLET ORAL at 09:00

## 2022-02-19 RX ADMIN — INSULIN LISPRO 2 UNITS: 100 INJECTION, SOLUTION INTRAVENOUS; SUBCUTANEOUS at 13:23

## 2022-02-19 RX ADMIN — AMLODIPINE BESYLATE 10 MG: 10 TABLET ORAL at 09:01

## 2022-02-19 RX ADMIN — POTASSIUM CHLORIDE 40 MEQ: 1500 TABLET, EXTENDED RELEASE ORAL at 09:01

## 2022-02-19 RX ADMIN — HEPARIN SODIUM 5000 UNITS: 5000 INJECTION INTRAVENOUS; SUBCUTANEOUS at 14:14

## 2022-02-19 RX ADMIN — SUCRALFATE 1 G: 1 TABLET ORAL at 09:01

## 2022-02-19 RX ADMIN — ATORVASTATIN CALCIUM 20 MG: 10 TABLET, FILM COATED ORAL at 17:21

## 2022-02-19 RX ADMIN — PANTOPRAZOLE SODIUM 40 MG: 40 TABLET, DELAYED RELEASE ORAL at 09:01

## 2022-02-19 RX ADMIN — ESCITALOPRAM OXALATE 10 MG: 10 TABLET ORAL at 09:00

## 2022-02-19 RX ADMIN — PROPRANOLOL HYDROCHLORIDE 120 MG: 60 CAPSULE, EXTENDED RELEASE ORAL at 09:00

## 2022-02-19 RX ADMIN — INSULIN LISPRO 2 UNITS: 100 INJECTION, SOLUTION INTRAVENOUS; SUBCUTANEOUS at 17:18

## 2022-02-19 RX ADMIN — FUROSEMIDE 40 MG: 10 INJECTION, SOLUTION INTRAMUSCULAR; INTRAVENOUS at 17:20

## 2022-02-19 RX ADMIN — INSULIN LISPRO 2 UNITS: 100 INJECTION, SOLUTION INTRAVENOUS; SUBCUTANEOUS at 22:06

## 2022-02-19 RX ADMIN — CEFTRIAXONE 1000 MG: 1 INJECTION, SOLUTION INTRAVENOUS at 14:14

## 2022-02-19 RX ADMIN — ISOSORBIDE MONONITRATE 120 MG: 60 TABLET, EXTENDED RELEASE ORAL at 09:01

## 2022-02-19 RX ADMIN — HEPARIN SODIUM 5000 UNITS: 5000 INJECTION INTRAVENOUS; SUBCUTANEOUS at 22:06

## 2022-02-19 RX ADMIN — TRAMADOL HYDROCHLORIDE 50 MG: 50 TABLET, COATED ORAL at 07:10

## 2022-02-19 RX ADMIN — HEPARIN SODIUM 5000 UNITS: 5000 INJECTION INTRAVENOUS; SUBCUTANEOUS at 05:12

## 2022-02-19 RX ADMIN — FUROSEMIDE 40 MG: 10 INJECTION, SOLUTION INTRAMUSCULAR; INTRAVENOUS at 09:01

## 2022-02-19 RX ADMIN — SUCRALFATE 1 G: 1 TABLET ORAL at 17:21

## 2022-02-19 NOTE — PLAN OF CARE
Problem: MOBILITY - ADULT  Goal: Maintain or return to baseline ADL function  Description: INTERVENTIONS:  -  Assess patient's ability to carry out ADLs; assess patient's baseline for ADL function and identify physical deficits which impact ability to perform ADLs (bathing, care of mouth/teeth, toileting, grooming, dressing, etc )  - Assess/evaluate cause of self-care deficits   - Assess range of motion  - Assess patient's mobility; develop plan if impaired  - Assess patient's need for assistive devices and provide as appropriate  - Encourage maximum independence but intervene and supervise when necessary  - Involve family in performance of ADLs  - Assess for home care needs following discharge   - Consider OT consult to assist with ADL evaluation and planning for discharge  - Provide patient education as appropriate  2/19/2022 0712 by Helene Nicole RN  Outcome: Progressing  2/18/2022 1858 by Helene Nicole RN  Outcome: Progressing  Goal: Maintains/Returns to pre admission functional level  Description: INTERVENTIONS:  - Perform BMAT or MOVE assessment daily    - Set and communicate daily mobility goal to care team and patient/family/caregiver  - Collaborate with rehabilitation services on mobility goals if consulted  - Perform Range of Motion 4 times a day  - Reposition patient every 2 hours    - Dangle patient 3 times a day  - Stand patient 3 times a day  - Ambulate patient 3 times a day  - Out of bed to chair 3 times a day   - Out of bed for meals 3 times a day  - Out of bed for toileting  - Record patient progress and toleration of activity level   2/19/2022 0712 by Helene Nicole RN  Outcome: Progressing  2/18/2022 1858 by Helene Nicole RN  Outcome: Progressing     Problem: PAIN - ADULT  Goal: Verbalizes/displays adequate comfort level or baseline comfort level  Description: Interventions:  - Encourage patient to monitor pain and request assistance  - Assess pain using appropriate pain scale  - Administer analgesics based on type and severity of pain and evaluate response  - Implement non-pharmacological measures as appropriate and evaluate response  - Consider cultural and social influences on pain and pain management  - Notify physician/advanced practitioner if interventions unsuccessful or patient reports new pain  2/19/2022 0712 by Kary Gill RN  Outcome: Progressing  2/18/2022 1858 by Kary Gill RN  Outcome: Progressing     Problem: INFECTION - ADULT  Goal: Absence or prevention of progression during hospitalization  Description: INTERVENTIONS:  - Assess and monitor for signs and symptoms of infection  - Monitor lab/diagnostic results  - Monitor all insertion sites, i e  indwelling lines, tubes, and drains  - Monitor endotracheal if appropriate and nasal secretions for changes in amount and color  - Connell appropriate cooling/warming therapies per order  - Administer medications as ordered  - Instruct and encourage patient and family to use good hand hygiene technique  - Identify and instruct in appropriate isolation precautions for identified infection/condition  2/19/2022 0712 by Kary Gill RN  Outcome: Progressing  2/18/2022 1858 by Kary Gill RN  Outcome: Progressing     Problem: SAFETY ADULT  Goal: Maintain or return to baseline ADL function  Description: INTERVENTIONS:  -  Assess patient's ability to carry out ADLs; assess patient's baseline for ADL function and identify physical deficits which impact ability to perform ADLs (bathing, care of mouth/teeth, toileting, grooming, dressing, etc )  - Assess/evaluate cause of self-care deficits   - Assess range of motion  - Assess patient's mobility; develop plan if impaired  - Assess patient's need for assistive devices and provide as appropriate  - Encourage maximum independence but intervene and supervise when necessary  - Involve family in performance of ADLs  - Assess for home care needs following discharge   - Consider OT consult to assist with ADL evaluation and planning for discharge  - Provide patient education as appropriate  2/19/2022 0712 by Soto Gutierres RN  Outcome: Progressing  2/18/2022 1858 by Soto Gutierres RN  Outcome: Progressing  Goal: Maintains/Returns to pre admission functional level  Description: INTERVENTIONS:  - Perform BMAT or MOVE assessment daily    - Set and communicate daily mobility goal to care team and patient/family/caregiver  - Collaborate with rehabilitation services on mobility goals if consulted  - Perform Range of Motion 4 times a day  - Reposition patient every 2 hours    - Dangle patient 3 times a day  - Stand patient 3 times a day  - Ambulate patient 3 times a day  - Out of bed to chair 3 times a day   - Out of bed for meals 3 times a day  - Out of bed for toileting  - Record patient progress and toleration of activity level   2/19/2022 0712 by Soto Gutierres RN  Outcome: Progressing  2/18/2022 1858 by Soto Gutierres RN  Outcome: Progressing  Goal: Patient will remain free of falls  Description: INTERVENTIONS:  - Educate patient/family on patient safety including physical limitations  - Instruct patient to call for assistance with activity   - Consult OT/PT to assist with strengthening/mobility   - Keep Call bell within reach  - Keep bed low and locked with side rails adjusted as appropriate  - Keep care items and personal belongings within reach  - Initiate and maintain comfort rounds  - Make Fall Risk Sign visible to staff  - Offer Toileting every 2 Hours, in advance of need  - Initiate/Maintain fall alarm  - Obtain necessary fall risk management equipment: alarm, nonskid socks, yellow wristband  - Apply yellow socks and bracelet for high fall risk patients  - Consider moving patient to room near nurses station  2/19/2022 0712 by Soto Gutierres RN  Outcome: Progressing  2/18/2022 1858 by Soto Gutierres RN  Outcome: Progressing     Problem: DISCHARGE PLANNING  Goal: Discharge to home or other facility with appropriate resources  Description: INTERVENTIONS:  - Identify barriers to discharge w/patient and caregiver  - Arrange for needed discharge resources and transportation as appropriate  - Identify discharge learning needs (meds, wound care, etc )  - Arrange for interpretive services to assist at discharge as needed  - Refer to Case Management Department for coordinating discharge planning if the patient needs post-hospital services based on physician/advanced practitioner order or complex needs related to functional status, cognitive ability, or social support system  2/19/2022 0712 by Neal Melchor RN  Outcome: Progressing  2/18/2022 1858 by Neal Melchor RN  Outcome: Progressing     Problem: Knowledge Deficit  Goal: Patient/family/caregiver demonstrates understanding of disease process, treatment plan, medications, and discharge instructions  Description: Complete learning assessment and assess knowledge base    Interventions:  - Provide teaching at level of understanding  - Provide teaching via preferred learning methods  2/19/2022 0712 by Neal Melchor RN  Outcome: Progressing  2/18/2022 1858 by Neal Melchor RN  Outcome: Progressing     Problem: CARDIOVASCULAR - ADULT  Goal: Maintains optimal cardiac output and hemodynamic stability  Description: INTERVENTIONS:  - Monitor I/O, vital signs and rhythm  - Monitor for S/S and trends of decreased cardiac output  - Administer and titrate ordered vasoactive medications to optimize hemodynamic stability  - Assess quality of pulses, skin color and temperature  - Assess for signs of decreased coronary artery perfusion  - Instruct patient to report change in severity of symptoms  2/19/2022 0712 by Neal Melchor RN  Outcome: Progressing  2/18/2022 1858 by Neal Melchor RN  Outcome: Progressing  Goal: Absence of cardiac dysrhythmias or at baseline rhythm  Description: INTERVENTIONS:  - Continuous cardiac monitoring, vital signs, obtain 12 lead EKG if ordered  - Administer antiarrhythmic and heart rate control medications as ordered  - Monitor electrolytes and administer replacement therapy as ordered  2/19/2022 [id number] by Eugenie Sterling RN  Outcome: Progressing  2/18/2022 1858 by Eugenie Sterling RN  Outcome: Progressing     Problem: RESPIRATORY - ADULT  Goal: Achieves optimal ventilation and oxygenation  Description: INTERVENTIONS:  - Assess for changes in respiratory status  - Assess for changes in mentation and behavior  - Position to facilitate oxygenation and minimize respiratory effort  - Oxygen administered by appropriate delivery if ordered  - Initiate smoking cessation education as indicated  - Encourage broncho-pulmonary hygiene including cough, deep breathe, Incentive Spirometry  - Assess the need for suctioning and aspirate as needed  - Assess and instruct to report SOB or any respiratory difficulty  - Respiratory Therapy support as indicated  2/19/2022 0712 by Eugenie Sterling RN  Outcome: Progressing  2/18/2022 1858 by Eugenie Sterling RN  Outcome: Progressing     Problem: METABOLIC, FLUID AND ELECTROLYTES - ADULT  Goal: Electrolytes maintained within normal limits  Description: INTERVENTIONS:  - Monitor labs and assess patient for signs and symptoms of electrolyte imbalances  - Administer electrolyte replacement as ordered  - Monitor response to electrolyte replacements, including repeat lab results as appropriate  - Instruct patient on fluid and nutrition as appropriate  2/19/2022 8919 by Eugenie Sterling RN  Outcome: Progressing  2/18/2022 1858 by Eugenie Sterling RN  Outcome: Progressing  Goal: Fluid balance maintained  Description: INTERVENTIONS:  - Monitor labs   - Monitor I/O and WT  - Instruct patient on fluid and nutrition as appropriate  - Assess for signs & symptoms of volume excess or deficit  2/19/2022 4100 by Eugenie Sterling RN  Outcome: Progressing  2/18/2022 1858 by Eugenie Sterling RN  Outcome: Progressing  Goal: Glucose maintained within target range  Description: INTERVENTIONS:  - Monitor Blood Glucose as ordered  - Assess for signs and symptoms of hyperglycemia and hypoglycemia  - Administer ordered medications to maintain glucose within target range  - Assess nutritional intake and initiate nutrition service referral as needed  2/19/2022 4874 by Wilma Wetzel RN  Outcome: Progressing  2/18/2022 1858 by Wilma Wetzel RN  Outcome: Progressing     Problem: HEMATOLOGIC - ADULT  Goal: Maintains hematologic stability  Description: INTERVENTIONS  - Assess for signs and symptoms of bleeding or hemorrhage  - Monitor labs  - Administer supportive blood products/factors as ordered and appropriate  2/19/2022 0712 by Wilma Wetzel RN  Outcome: Progressing  2/18/2022 1858 by Wilma Wetzel RN  Outcome: Progressing     Problem: Potential for Falls  Goal: Patient will remain free of falls  Description: INTERVENTIONS:  - Educate patient/family on patient safety including physical limitations  - Instruct patient to call for assistance with activity   - Consult OT/PT to assist with strengthening/mobility   - Keep Call bell within reach  - Keep bed low and locked with side rails adjusted as appropriate  - Keep care items and personal belongings within reach  - Initiate and maintain comfort rounds  - Make Fall Risk Sign visible to staff  - Offer Toileting every 2 Hours, in advance of need  - Initiate/Maintain fall alarm  - Obtain necessary fall risk management equipment: alarm, nonskid socks, yellow wristband  - Apply yellow socks and bracelet for high fall risk patients  - Consider moving patient to room near nurses station  2/19/2022 0712 by Wilma Wetzel RN  Outcome: Progressing  2/18/2022 1858 by Wilma Wetzel RN  Outcome: Progressing

## 2022-02-19 NOTE — ASSESSMENT & PLAN NOTE
Presented with RLE pain and swelling, dyspnea, and hypoxia  Originally required 4 L supplemental O2 by NC, now down to 2 L  Received 1 dose of IV ceftriaxone in the ED, IV diuretic therapy since  Patient has a history of diastolic CHF - hospitalized late last month and treated for acute exacerbation  · CXR with mild pulmonary venous congestion  · ProBNP 1,791, up from a value of 644 3/2021  · Oxygenation continues to improve  · Check daily weights - 71 6 kg --> 70 9 kg   · Maintain on low-sodium diet  · Continue IV furosemide 40mg BID  · Continue cardiac regimen of BB, Imdur, clopidogrel, and statin therapy  Patient presented with a temperature of 100° F, leukocytosis with a WBC count of 13 2, and mild lactic acidosis  · Procalcitonin elevated at 0 6 --> 0 7  · CT with persisting chronic interstitial changes in the periphery of the upper and lower lobes  · CT also with LLL airspace consolidation, stable since 03/2021  Favors chronic changes/atelectasis  · However, given patient's presentation with elevated temperature (but not fever), leukocytosis that resolved, and mild lactic acidosis and hypoxia, favor short course of antibiotic therapy  Plan on ceftriaxone for 5 days  Trend procalcitonin  Also with elevated D-dimer at 1 76  · Lower extremity Dopplers ruled out DVT  · No gross evidence of PE clinically and patient's hypoxia has improved with diuresis and initial antibiotic  Hold off on active anticoagulation, and maintain on VT prophylaxis  · Consider V/Q scan if warranted

## 2022-02-19 NOTE — PHYSICAL THERAPY NOTE
Physical Therapy Evaluation   Patient Name: Chyna Son    CXIOU'O Date: 2/19/2022     Problem List  Principal Problem:    Acute respiratory failure with hypoxia ([address] )  Active Problems:    Mixed hyperlipidemia    Diabetes mellitus, type II ([address] )    Acute renal failure superimposed on stage 3b chronic kidney disease ([address] )    Essential hypertension       Past Medical History  Past Medical History:   Diagnosis Date    Arthritis     Athscl 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     Stent OM1  Patent mammary graft to LAD 10/7/2009; CABG 1992 & 2005    Cancer [facility])     skin    Coronary artery disease     Diabetes mellitus ([address] )     Diabetes mellitus, type II ([address] )     without complication    Epistaxis     Essential (primary) hypertension     GERD (gastroesophageal reflux disease)     Hx of cardiovascular stress test 07/23/2014    Eddie MPI; EF0 52 (52%) Lexiscan, mild LV systolic dysfunction; evidence for prior inferior wall MI; perfusion imaging consistant w mild lat wall ischemia and min torin-infart inferior ischemia   / EF0 51 (51%) evidence for prior inferior wall MI  Mild inferior and mild-moderate lateral wall ischemia  7/29/15    Hx of echocardiogram 11/07/2017    2D w/CFD;EF0 55 (55%) mild LVH, mitral valve prolapse with moderate regurgitation  left atrial enlargement  Mild tricuspid regurgitation   Hypertension     Mixed hyperlipidemia     Occlusion and stenosis of bilateral carotid arteries     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 infarction)     Presence of aortocoronary bypass graft         Past Surgical History  Past Surgical History:   Procedure Laterality Date    CARDIAC CATHETERIZATION  10/07/2009    Stent 99% stenosis SVG from the aorta to OM1   Patent mammary graft to the LAD    CORONARY ARTERY BYPASS GRAFT  1992    VG-CX, VG-RCA    CORONARY ARTERY BYPASS GRAFT  11/04/2005    LIMA-D1-LAD, VG-PDA-PLB, [place]-VW2-IV9 TMR 9 Lesions  MT EGD TRANSORAL BIOPSY SINGLE/MULTIPLE N/A 1/23/2019    Procedure: ESOPHAGOGASTRODUODENOSCOPY (EGD) with biopsy;  Surgeon: Fidel Smith MD;  Location: [address] [place] [address] GI LAB; Service: Gastroenterology    TONSILLECTOMY             02/19/22 1000   Note Type   Note type Evaluation   Pain Assessment   Pain Score No Pain   Restrictions/Precautions   Weight Bearing Precautions Per Order No   Other Precautions Telemetry;O2;Fall Risk; Chair Alarm   Home Living   Additional Comments See OT Evaluation    Prior Function   Comments See OT Evaluation    Cognition   Overall Cognitive Status WFL   Arousal/Participation Alert   Orientation Level Oriented X4   Memory Within functional limits   Following Commands Follows all commands and directions without difficulty   Subjective   Subjective " i dont know how far i will go "    RLE Assessment   RLE Assessment WFL   LLE Assessment   LLE Assessment WFL   Bed Mobility   Additional Comments Pt seated in chair at the beginning of the session   Transfers   Sit to Stand 5  Supervision   Stand to Sit 5  Supervision   Additional Comments RW, SpO2 dropped to low 80's after standing and donning brief  Above 90 after approx 3 min rest  3lO2    Ambulation/Elevation   Gait pattern Decreased toe off;Decreased heel strike;Decreased hip extension; Excessively slow; Foward flexed   Gait Assistance 5  Supervision   Assistive Device Rolling walker   Distance 25 feet in room   (3Lo2)   Balance   Static Sitting Good   Dynamic Sitting Good   Static Standing Fair   Dynamic Standing Fair   Ambulatory Fair   Assessment   Prognosis Fair   Problem List Decreased strength;Decreased range of motion; Impaired balance;Decreased endurance;Decreased mobility   Assessment Pt is 82 y o  male seen for PT evaluation s/p admit to [address] on 2/18/2022 w/ Acute respiratory failure with hypoxia ([address] )  PT consulted to assess pt's functional mobility and d/c needs   Order placed for PT eval and tx, w/ up and OOB as tolerated order  Comorbidities affecting pt's physical performance at time of assessment include: Acute respiratory failure with hypoxia  PTA, pt was independent w/ all functional mobility w/ RW or SPC   Personal factors affecting pt at time of IE include: ambulating w/ assistive device, inability to ambulate household distances, inability to navigate community distances, inability to navigate level surfaces w/o external assistance, unable to perform dynamic tasks in community, unable to perform physical activity, limited insight into impairments, inability to perform IADLs, inability to perform ADLs and inability to live alone  Please find objective findings from PT assessment regarding body systems outlined above with impairments and limitations including weakness, decreased ROM, impaired balance, decreased endurance, impaired coordination, gait deviations, pain, decreased activity tolerance, decreased functional mobility tolerance, fall risk and SOB upon exertion  PT tx to address deficits as defined above and maximize level of functional independent mobility and consistency  From PT/mobility standpoint, recommendation at time of d/c would be home with home health rehabilitation pending progress in order to facilitate return to PLOF  Goals   Patient Goals to go home    STG Expiration Date 03/05/22   Short Term Goal #1 Patient will participate in TE, to help facilitate return to PLOF    LTG Expiration Date 03/05/22   Long Term Goal #1 Patient will be (I) with all transfers and bed mobility    Long Term Goal #2 patient will safely ambulate 100 feet with (S) using RW    Plan   Treatment/Interventions ADL retraining;Functional transfer training;Elevations; Therapeutic exercise;LE strengthening/ROM; Bed mobility;Gait training   Recommendation   PT Discharge Recommendation Home with home health rehabilitation   [id number] Mobility Inpatient   Turning in Bed Without Bedrails 3   Lying on Back to Sitting on Edge of Flat Bed 3   Moving Bed to Chair 3   Standing Up From Chair 3   Walk in Room 3   Climb 3-5 Stairs 3   Basic Mobility Inpatient Raw Score 18   Basic Mobility Standardized Score 41 05   Highest Level Of Mobility   -[facility] Goal 6: Walk 10 steps or more   -[facility] Goal Achieved Yes     Patient seated in chair at the end of the session, All needs within reach  Patients raw score on the AM-PAC Basic Mobility inpatient short form is 18, standardized score is 41 05, ( /less than) 42  9  patient's at this level are likely to benefit from D/C to home, however, please refer to therapist recommendation for safe D/C Plan  Co treatment with OT secondary to complex medical condition of pt, possible A of 2 required to achieve and maintain transitional movements, requiring the need of skilled therapeutic intervention of 2 therapists to achieve delivery of services

## 2022-02-19 NOTE — RESPIRATORY THERAPY NOTE
RT Protocol Note  Blair Jeffries 82 y o  male MRN: [id number]  Unit/Bed#: 409-01 Encounter: [id number]    Assessment    Principal Problem:    Acute respiratory failure with hypoxia ([facility] )  Active Problems:    Diabetes mellitus, type II ([facility] )    Coronary artery disease of native artery of native heart with stable angina pectoris (HCC)    Gastroesophageal reflux disease without esophagitis    Stage 3b chronic kidney disease ([facility] )    Chronic diastolic congestive heart failure ([facility] )    Essential hypertension      Home Pulmonary Medications:  none  Home Devices/Therapy: Other (Comment) (none)    Past Medical History:   Diagnosis Date    Arthritis     Athscl 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     Stent OM1  Patent mammary graft to LAD 10/7/2009; CABG 1992 & 2005    Cancer [facility])     skin    Coronary artery disease     Diabetes mellitus ([facility] )     Diabetes mellitus, type II ([facility] )     without complication    Epistaxis     Essential (primary) hypertension     GERD (gastroesophageal reflux disease)     Hx of cardiovascular stress test 07/23/2014    Eddie MPI; EF0 52 (52%) Lexiscan, mild LV systolic dysfunction; evidence for prior inferior wall MI; perfusion imaging consistant w mild lat wall ischemia and min torin-infart inferior ischemia   / EF0 51 (51%) evidence for prior inferior wall MI  Mild inferior and mild-moderate lateral wall ischemia  7/29/15    Hx of echocardiogram 11/07/2017    2D w/CFD;EF0 55 (55%) mild LVH, mitral valve prolapse with moderate regurgitation  left atrial enlargement  Mild tricuspid regurgitation   Hypertension     Mixed hyperlipidemia     Occlusion and stenosis of bilateral carotid arteries     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 infarction)     Presence of aortocoronary bypass graft      Social History     Socioeconomic History    Marital status:       Spouse name: None    Number of children: None    Years of education: None    Highest education level: None Occupational History    None   Tobacco Use    Smoking status: Former Smoker     Packs/day: 1 00     Years: 40 00     Pack years: 40 00     Quit date: 1977     Years since quittin 1    Smokeless tobacco: Never Used   Vaping Use    Vaping Use: Never used   Substance and Sexual Activity    Alcohol use: Never    Drug use: No    Sexual activity: None   Other Topics Concern    None   Social History Narrative    None     Social Determinants of Health     Financial Resource Strain: Not on file   Food Insecurity: No Food Insecurity    Worried About Running Out of Food in the Last Year: Never true    Mello of Food in the Last Year: Never true   Transportation Needs: No Transportation Needs    Lack of Transportation (Medical): No    Lack of Transportation (Non-Medical): No   Physical Activity: Not on file   Stress: Not on file   Social Connections: Not on file   Intimate Partner Violence: Not on file   Housing Stability: Unknown    Unable to Pay for Housing in the Last Year: No    Number of Places Lived in the Last Year: Not on file    Unstable Housing in the Last Year: No       Subjective         Objective    Physical Exam:   Assessment Type: Assess only  General Appearance: Alert,Awake  Respiratory Pattern: Symmetrical,Spontaneous,Normal  Chest Assessment: Chest expansion symmetrical,Trachea midline  Bilateral Breath Sounds: Diminished,Rales  R Breath Sounds: Rales (many inspiratory rales RLL)  L Breath Sounds: Rales  Cough: None  O2 Device: 2 5 l/m nc    Vitals:  Blood pressure 106/53, pulse 78, temperature 97 5 °F (36 4 °C), resp  rate 16, height 5' 7" (1 702 m), weight 70 9 kg (156 lb 4 9 oz), SpO2 97 %  Imaging and other studies: I have personally reviewed pertinent reports  O2 Device: 2 5 l/m nc     Plan  Redid protocol no note left that it was done  Bronchodilator therapy with Albuterol 0 083% Q6prn for SOB and wheezing   Airway clearance protocol started with incentive spirometry for atelectasis and fine inspiratory rales in bases > RLL   Respiratory Plan: Discontinue Protocol        Resp Comments: started airway clearance protocol with incentive spirometry for atelectasis and fluid

## 2022-02-19 NOTE — PLAN OF CARE
Problem: OCCUPATIONAL THERAPY ADULT  Goal: Performs self-care activities at highest level of function for planned discharge setting  See evaluation for individualized goals  Description: Treatment Interventions: ADL retraining,Functional transfer training,UE strengthening/ROM,Endurance training,Patient/family training,Activityengagement,Energy conservation          See flowsheet documentation for full assessment, interventions and recommendations  Note: Limitation: Decreased ADL status,Decreased UE strength,Decreased Safe judgement during ADL,Decreased endurance,Decreased self-care trans,Decreased high-level ADLs     Assessment: Pt is a 82 y o  male seen for OT evaluation s/p admit to [facility] on 2/18/2022 w/ Acute respiratory failure with hypoxia ([facility] )  Comorbidities affecting pt's functional performance at time of assessment include: DM  HTN, oclusion and stenosis of B carotid arteries, MI, GERD, CA, arthritis, CAD, hyperlipidemia  Personal factors affecting pt at time of IE include:steps to enter environment, difficulty performing ADLS, difficulty performing IADLS , decreased initiation and engagement  and health management   Prior to admission, pt was (I) with ADLs and (A) with IADLs with use of RW or SPC during mobility  Upon evaluation: Pt requires (S) level with use of RW during mobility 2* the following deficits impacting occupational performance: weakness, decreased strength, decreased balance, decreased tolerance, impaired initiation and decreased safety awareness  Pt to benefit from continued skilled OT tx while in the hospital to address deficits as defined above and maximize level of functional independence w ADL's and functional mobility  Occupational Performance areas to address include: grooming, bathing/shower, toilet hygiene, dressing, functional mobility, community mobility and clothing management   The patient's raw score on the AM-PAC Daily Activity inpatient short form is 21, standardized score is 44 27, greater than 39 4  Patients at this level are likely to benefit from discharge to home  Please refer to the recommendation of the Occupational Therapist for safe discharge planning  Co treatment with PT secondary to complex medical condition of pt, possible A of 2 required to achieve and maintain transitional movements, requiring the need of skilled therapeutic intervention of 2 therapists to achieve delivery of services       OT Discharge Recommendation: Home with home health rehabilitation

## 2022-02-19 NOTE — ASSESSMENT & PLAN NOTE
Lab Results   Component Value Date    HGBA1C 10 8 (H) 11/09/2021     Hemoglobin A1c not controlled - update now  Hold glimepiride, and maintain on sliding scale coverage and ADA diet  Monitor blood sugars

## 2022-02-19 NOTE — PLAN OF CARE
Problem: PHYSICAL THERAPY ADULT  Goal: Performs mobility at highest level of function for planned discharge setting  See evaluation for individualized goals  Description: Treatment/Interventions: ADL retraining,Functional transfer training,Elevations,Therapeutic exercise,LE strengthening/ROM,Bed mobility,Gait training          See flowsheet documentation for full assessment, interventions and recommendations  Note: Prognosis: Fair  Problem List: Decreased strength,Decreased range of motion,Impaired balance,Decreased endurance,Decreased mobility  Assessment: Pt is 82 y o  male seen for PT evaluation s/p admit to [facility] on 2/18/2022 w/ Acute respiratory failure with hypoxia ([facility] )  PT consulted to assess pt's functional mobility and d/c needs  Order placed for PT eval and tx, w/ up and OOB as tolerated order  Comorbidities affecting pt's physical performance at time of assessment include: Acute respiratory failure with hypoxia  PTA, pt was independent w/ all functional mobility w/ RW or SPC   Personal factors affecting pt at time of IE include: ambulating w/ assistive device, inability to ambulate household distances, inability to navigate community distances, inability to navigate level surfaces w/o external assistance, unable to perform dynamic tasks in community, unable to perform physical activity, limited insight into impairments, inability to perform IADLs, inability to perform ADLs and inability to live alone  Please find objective findings from PT assessment regarding body systems outlined above with impairments and limitations including weakness, decreased ROM, impaired balance, decreased endurance, impaired coordination, gait deviations, pain, decreased activity tolerance, decreased functional mobility tolerance, fall risk and SOB upon exertion  PT tx to address deficits as defined above and maximize level of functional independent mobility and consistency   From PT/mobility standpoint, recommendation at time of d/c would be home with home health rehabilitation pending progress in order to facilitate return to PLOF  PT Discharge Recommendation: Home with home health rehabilitation          See flowsheet documentation for full assessment

## 2022-02-19 NOTE — ASSESSMENT & PLAN NOTE
Lab Results   Component Value Date    EGFR 34 02/19/2022    EGFR 28 02/18/2022    EGFR 32 02/10/2022    CREATININE 1 74 (H) 02/19/2022    CREATININE 2 04 (H) 02/18/2022    CREATININE 1 86 (H) 02/10/2022     Creatinine baseline of 1 5-2 1 dating back to 2020  Current value of 1 74 appears to be at baseline  Continue to monitor, avoid nephrotoxins, and renally dose medications when appropriate

## 2022-02-19 NOTE — ASSESSMENT & PLAN NOTE
Wt Readings from Last 3 Encounters:   02/19/22 70 9 kg (156 lb 4 9 oz)   01/30/22 77 6 kg (171 lb)   01/30/22 77 6 kg (171 lb)     Last ECHO 03/2021 with LVEF 65%, G1 DD, mild-to-moderate MR  RV systolic function was normal   Consider updating ECHO if patient is still hospitalized during the week here

## 2022-02-19 NOTE — ASSESSMENT & PLAN NOTE
Status post CABG X 02/19/1992, with SVG to the circumflex and RCA  Repeat CABG 2005 with LIMA to LAD, SVG to posterior descending and posterolateral branch, radial artery graft to OM  Stenting of high-grade stenosis of 1st OM graft in 2009  Currently asymptomatic, with negative cardiac biomarkers  Continue clopidogrel, beta-blocker, and statin therapy  Patient is also on long-acting nitrate

## 2022-02-19 NOTE — OCCUPATIONAL THERAPY NOTE
Occupational Therapy Evaluation     Patient Name: Lakshmi Velasquez  Today's Date: 2/19/2022  Problem List  Principal Problem:    Acute respiratory failure with hypoxia ([facility] [address] )  Active Problems:    Mixed hyperlipidemia    Diabetes mellitus, type II ([address] )    Acute renal failure superimposed on stage 3b chronic kidney disease ([address] )    Essential hypertension    Past Medical History  Past Medical History:   Diagnosis Date    Arthritis     Athscl 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     Stent OM1  Patent mammary graft to LAD 10/7/2009; CABG 1992 & 2005    Cancer [facility])     skin    Coronary artery disease     Diabetes mellitus ([facility] [address] )     Diabetes mellitus, type II ([facility] [address] )     without complication    Epistaxis     Essential (primary) hypertension     GERD (gastroesophageal reflux disease)     Hx of cardiovascular stress test 07/23/2014    Eddie MPI; EF0 52 (52%) Lexiscan, mild LV systolic dysfunction; evidence for prior inferior wall MI; perfusion imaging consistant w mild lat wall ischemia and min torin-infart inferior ischemia   / EF0 51 (51%) evidence for prior inferior wall MI  Mild inferior and mild-moderate lateral wall ischemia  7/29/15    Hx of echocardiogram 11/07/2017    2D w/CFD;EF0 55 (55%) mild LVH, mitral valve prolapse with moderate regurgitation  left atrial enlargement  Mild tricuspid regurgitation   Hypertension     Mixed hyperlipidemia     Occlusion and stenosis of bilateral carotid arteries     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 infarction)     Presence of aortocoronary bypass graft      Past Surgical History  Past Surgical History:   Procedure Laterality Date    CARDIAC CATHETERIZATION  10/07/2009    Stent 99% stenosis SVG from the aorta to OM1   Patent mammary graft to the LAD    CORONARY ARTERY BYPASS GRAFT  1992    VG-CX, VG-RCA    CORONARY ARTERY BYPASS GRAFT  11/04/2005    LIMA-D1-LAD, VG-PDA-PLB, Tzoire-HM1-LL4 TMR 9 Lesions    ME EGD TRANSORAL BIOPSY SINGLE/MULTIPLE N/A 1/23/2019 Procedure: ESOPHAGOGASTRODUODENOSCOPY (EGD) with biopsy;  Surgeon: Theora Kocher, MD;  Location: [address] [place] [address] GI LAB; Service: Gastroenterology    TONSILLECTOMY               02/19/22 0941   OT Last Visit   OT Visit Date 02/19/22   Note Type   Note type Evaluation   Restrictions/Precautions   Weight Bearing Precautions Per Order No   Other Precautions Telemetry;O2;Fall Risk; Chair Alarm   Pain Assessment   Pain Score No Pain   Home Living   Type of Home Apartment   Home Layout One level;Performs ADLs on one level; Able to live on main level with bedroom/bathroom;Stairs to enter with rails; Other (Comment)  (2-3 LEONARD c HR)   Bathroom Shower/Tub Tub/shower unit   Bathroom Toilet Standard   Bathroom Accessibility Accessible   Home Equipment Walker;Cane   Additional Comments pt reports use of both 4ww and cane during mobility at baseline; reports x1 fall from "missing a step"; recently moved downstairs in building due to excessive amount of steps to his original apartment   Prior Function   Level of [place] Independent with ADLs and functional mobility; Needs assistance with IADLs   Lives With Alone   Receives Help From Family   ADL Assistance Independent   IADLs Needs assistance   Falls in the last 6 months 1 to 4   Vocational Retired   Comments pt does not drive; family (A)   Psychosocial   Psychosocial (WDL) WDL   Subjective   Subjective "I do check the oxygen at home"   ADL   Where Assessed Chair   Grooming Assistance 5  Supervision/Setup   Grooming Deficit Wash/dry hands; Wash/dry face;Brushing hair   UB Bathing Assistance 5  Supervision/Setup   LB Dressing Assistance 5  Supervision/Setup   LB Dressing Deficit Thread RLE into underwear; Thread LLE into underwear; Increased time to complete; Don/doff R sock; Don/doff L sock;Pull up over hips   Additional Comments pt performs underwear donning seated in chair with significant time; reports "I couldn't do this 3 days ago"; however, pt with decrease in O2 and requires x2 rest breaks during ADL performance due to SpO2 <85% and requires pursed lip breathing    Bed Mobility   Additional Comments pt seated in chair at start and end of session; pt on 2 5-3L O2 throughout session; pt requires multiple rest breaks due to decreased SpO2 and endurance deficits; SpO2 range was 82-92% throughout session   Transfers   Sit to Stand 5  Supervision   Additional items Increased time required;Verbal cues  (RW)   Stand to Sit 5  Supervision   Additional items Increased time required;Verbal cues  (RW)   Additional Comments pt utilizes RW this session; performs with no significant LOB or instability, howevre appears very cautious with all mobility    Functional Mobility   Functional Mobility 5  Supervision   Additional Comments pt performs functional mobility in room ~20 ft; limited by O2 needs, poor endurance, and overall fatigue    Additional items Rolling walker   Balance   Static Sitting Good   Dynamic Sitting Good   Static Standing Fair   Dynamic Standing Fair -   Ambulatory Fair -   Activity Tolerance   Activity Tolerance Patient limited by fatigue   RUE Assessment   RUE Assessment WFL   LUE Assessment   LUE Assessment WFL   Hand Function   Gross Motor Coordination Functional   Fine Motor Coordination Functional   Sensation   Light Touch No apparent deficits   Sharp/Dull No apparent deficits   Cognition   Overall Cognitive Status WFL   Arousal/Participation Alert   Attention Within functional limits   Orientation Level Oriented X4   Memory Within functional limits   Following Commands Follows all commands and directions without difficulty   Assessment   Limitation Decreased ADL status; Decreased UE strength;Decreased Safe judgement during ADL;Decreased endurance;Decreased self-care trans;Decreased high-level ADLs   Assessment Pt is a 82 y o  male seen for OT evaluation s/p admit to [facility] on 2/18/2022 w/ Acute respiratory failure with hypoxia ([facility] [address] )    Comorbidities affecting pt's functional performance at time of assessment include: DM  HTN, oclusion and stenosis of B carotid arteries, MI, GERD, CA, arthritis, CAD, hyperlipidemia  Personal factors affecting pt at time of IE include:steps to enter environment, difficulty performing ADLS, difficulty performing IADLS , decreased initiation and engagement  and health management   Prior to admission, pt was (I) with ADLs and (A) with IADLs with use of RW or SPC during mobility  Upon evaluation: Pt requires (S) level with use of RW during mobility 2* the following deficits impacting occupational performance: weakness, decreased strength, decreased balance, decreased tolerance, impaired initiation and decreased safety awareness  Pt to benefit from continued skilled OT tx while in the hospital to address deficits as defined above and maximize level of functional independence w ADL's and functional mobility  Occupational Performance areas to address include: grooming, bathing/shower, toilet hygiene, dressing, functional mobility, community mobility and clothing management  The patient's raw score on the AM-PAC Daily Activity inpatient short form is 21, standardized score is 44 27, greater than 39 4  Patients at this level are likely to benefit from discharge to home  Please refer to the recommendation of the Occupational Therapist for safe discharge planning  Co treatment with PT secondary to complex medical condition of pt, possible A of 2 required to achieve and maintain transitional movements, requiring the need of skilled therapeutic intervention of 2 therapists to achieve delivery of services     Goals   Patient Goals to go home    Short Term Goal  pt will perform UE strengthening exercises   Long Term Goal #1 pt will perform UB/LB bathing and grooming tasks at (I) level   Long Term Goal #2 pt will demonstrate toilet transfers and hygiene at (I) level    Long Term Goal pt will demonstrate functional mobility with RW at mod (I) level   Plan   Treatment Interventions ADL retraining;Functional transfer training;UE strengthening/ROM; Endurance training;Patient/family training; Activityengagement; Energy conservation   Goal Expiration Date 03/05/22   OT Frequency 3-5x/wk   Recommendation   OT Discharge Recommendation Home with home health rehabilitation   AM-PAC Daily Activity Inpatient   Lower Body Dressing 3   Bathing 3   Toileting 3   Upper Body Dressing 4   Grooming 4   Eating 4   Daily Activity Raw Score 21   Daily Activity Standardized Score (Calc for Raw Score >=11) 44 27   AM-PAC Applied Cognition Inpatient   Following a Speech/Presentation 4   Understanding Ordinary Conversation 4   Taking Medications 4   Remembering Where Things Are Placed or Put Away 3   Remembering List of 4-5 Errands 3   Taking Care of Complicated Tasks 3   Applied Cognition Raw Score 21   Applied Cognition Standardized Score 44 3     Pt will benefit from continued OT services in order to maximize (I) c ADL performance, FM c RW, and improve overall endurance/strength required to complete functional tasks in preparation for d/c  Pt left seated in chair at end of session; all needs within reach; all lines intact

## 2022-02-19 NOTE — PROGRESS NOTES
[address] [facility] [address]  Progress Note - Blair Jeffries 1935, 82 y o  male MRN: [id number]  Unit/Bed#: 409-01 Encounter: [id number]  Primary Care Provider: Jeff Olivares DO   Date and time admitted to hospital: 2/18/2022 12:35 PM    * Acute respiratory failure with hypoxia [facility])  Assessment & Plan  Presented with RLE pain and swelling, dyspnea, and hypoxia  Originally required 4 L supplemental O2 by NC, now down to 2 L  Received 1 dose of IV ceftriaxone in the ED, IV diuretic therapy since  Patient has a history of diastolic CHF - hospitalized late last month and treated for acute exacerbation  · CXR with mild pulmonary venous congestion  · ProBNP 1,791, up from a value of 644 3/2021  · Oxygenation continues to improve  · Check daily weights - 71 6 kg --> 70 9 kg   · Maintain on low-sodium diet  · Continue IV furosemide 40mg BID  · Continue cardiac regimen of BB, Imdur, clopidogrel, and statin therapy  Patient presented with a temperature of 100° F, leukocytosis with a WBC count of 13 2, and mild lactic acidosis  · Procalcitonin elevated at 0 6 --> 0 7  · CT with persisting chronic interstitial changes in the periphery of the upper and lower lobes  · CT also with LLL airspace consolidation, stable since 03/2021  Favors chronic changes/atelectasis  · However, given patient's presentation with elevated temperature (but not fever), leukocytosis that resolved, and mild lactic acidosis and hypoxia, favor short course of antibiotic therapy  Plan on ceftriaxone for 5 days  Trend procalcitonin  Also with elevated D-dimer at 1 76   · Check lower extremity Dopplers to rule out DVT  · No gross evidence of PE clinically and patient's hypoxia has improved with diuresis and initial antibiotic  Hold off on active anticoagulation, and maintain on VT prophylaxis  · Consider V/Q scan if warranted      Chronic diastolic congestive heart failure (HCC)  Assessment & Plan  Wt Readings from Last 3 Encounters:   02/19/22 70 9 kg (156 lb 4 9 oz)   01/30/22 77 6 kg (171 lb)   01/30/22 77 6 kg (171 lb)     Last ECHO 03/2021 with LVEF 65%, G1 DD, mild-to-moderate MR  RV systolic function was normal   Consider updating ECHO if patient is still hospitalized during the week here  Coronary artery disease of native artery of native heart with stable angina pectoris [facility])  Assessment & Plan  Status post CABG X 02/19/1992, with SVG to the circumflex and RCA  Repeat CABG 2005 with LIMA to LAD, SVG to posterior descending and posterolateral branch, radial artery graft to OM  Stenting of high-grade stenosis of 1st OM graft in 2009  Currently asymptomatic, with negative cardiac biomarkers  Continue clopidogrel, beta-blocker, and statin therapy  Patient is also on long-acting nitrate  Stage 3b chronic kidney disease [facility])  Assessment & Plan  Lab Results   Component Value Date    EGFR 34 02/19/2022    EGFR 28 02/18/2022    EGFR 32 02/10/2022    CREATININE 1 74 (H) 02/19/2022    CREATININE 2 04 (H) 02/18/2022    CREATININE 1 86 (H) 02/10/2022     Creatinine baseline of 1 5-2 1 dating back to 2020  Current value of 1 74 appears to be at baseline  Continue to monitor, avoid nephrotoxins, and renally dose medications when appropriate  Diabetes mellitus, type II [facility])  Assessment & Plan    Lab Results   Component Value Date    HGBA1C 10 8 (H) 11/09/2021     Hemoglobin A1c not controlled - update now  Hold glimepiride, and maintain on sliding scale coverage and ADA diet  Monitor blood sugars  GERD (gastroesophageal reflux disease)  Assessment & Plan  Continue PPI therapy  Essential hypertension  Assessment & Plan  Continue propranolol and amlodipine  Also on long-acting nitrate with Imdur  VTE Pharmacologic Prophylaxis: VTE Score: 4 Moderate Risk (Score 3-4) - Pharmacological DVT Prophylaxis Ordered: heparin      Patient Centered Rounds: I performed bedside rounds with nursing staff today   Discussions with Specialists or Other Care Team Provider: None    Education and Discussions with Family / Patient: Attempted to update  (Other) via phone  Left voicemail  Time Spent for Care: 30 minutes  More than 50% of total time spent on counseling and coordination of care as described above  Current Length of Stay: 1 day(s)  Current Patient Status: Inpatient   Certification Statement: The patient will continue to require additional inpatient hospital stay due to Continue treatment of acute hypoxic respiratory failure  Discharge Plan: Anticipate discharge in 48-72 hrs to discharge location to be determined pending rehab evaluations  Code Status: Level 1 - Full Code    Subjective:   Patient seen and examined - reports feeling vastly improved from a respiratory standpoint  No overnight events reported  Remains afebrile  Objective:     Vitals:   Temp (24hrs), Av 1 °F (36 7 °C), Min:98 1 °F (36 7 °C), Max:98 1 °F (36 7 °C)    Temp:  [98 1 °F (36 7 °C)] 98 1 °F (36 7 °C)  HR:  [70-85] 75  Resp:  [12-24] 12  BP: ()/(52-58) 118/58  SpO2:  [90 %-96 %] 93 %  Body mass index is 24 48 kg/m²  Input and Output Summary (last 24 hours): Intake/Output Summary (Last 24 hours) at 2022 1323  Last data filed at 2022 1230  Gross per 24 hour   Intake 1160 ml   Output 525 ml   Net 635 ml       Physical Exam:   Physical Exam  Vitals reviewed  Constitutional:       General: He is not in acute distress  Appearance: He is well-developed  He is not ill-appearing or toxic-appearing  Cardiovascular:      Rate and Rhythm: Normal rate and regular rhythm  Heart sounds: No murmur heard  Pulmonary:      Effort: Pulmonary effort is normal  No respiratory distress  Breath sounds: Normal breath sounds  No wheezing or rhonchi  Comments: Mild bibasilar crackles  Abdominal:      Palpations: Abdomen is soft  Tenderness: There is no abdominal tenderness  Musculoskeletal:         General: Swelling present  No tenderness  Cervical back: Neck supple  Comments: Mild lower extremity edema  Skin:     General: Skin is warm and dry  Neurological:      General: No focal deficit present  Mental Status: He is alert and oriented to person, place, and time  Psychiatric:         Mood and Affect: Mood normal          Behavior: Behavior normal        Additional Data:     Labs:  Results from last 7 days   Lab Units 02/19/22  0509   WBC Thousand/uL 7 70   HEMOGLOBIN g/dL 13 7   HEMATOCRIT % 44 1   PLATELETS Thousands/uL 219   NEUTROS PCT % 76*   LYMPHS PCT % 12*   MONOS PCT % 10   EOS PCT % 2     Results from last 7 days   Lab Units 02/19/22  0509   SODIUM mmol/L 136   POTASSIUM mmol/L 3 3*   CHLORIDE mmol/L 99*   CO2 mmol/L 25   BUN mg/dL 22   CREATININE mg/dL 1 74*   ANION GAP mmol/L 12   CALCIUM mg/dL 9 4   ALBUMIN g/dL 2 5*   TOTAL BILIRUBIN mg/dL 1 10*   ALK PHOS U/L 72   ALT U/L 10*   AST U/L 14   GLUCOSE RANDOM mg/dL 132     Results from last 7 days   Lab Units 02/18/22  1314   INR  1 26*     Results from last 7 days   Lab Units 02/19/22  1110 02/19/22  0705 02/18/22  2130 02/18/22  1809   POC GLUCOSE mg/dl 258* 140 230* 219*         Results from last 7 days   Lab Units 02/19/22  0511 02/18/22  1533 02/18/22  1314   LACTIC ACID mmol/L  --  1 2 2 2*   PROCALCITONIN ng/ml 0 70*  --  0 60*       Lines/Drains:  Invasive Devices  Report    Peripheral Intravenous Line            Peripheral IV 02/18/22 Right Antecubital 1 day                  Imaging: Reviewed radiology reports from this admission including: chest xray, chest CT scan and Lower extremity Dopplers  Recent Cultures (last 7 days):   Results from last 7 days   Lab Units 02/18/22  1314   BLOOD CULTURE  Received in Microbiology Lab  Culture in Progress  Received in Microbiology Lab  Culture in Progress         Last 24 Hours Medication List:   Current Facility-Administered Medications   Medication Dose Route Frequency Provider Last Rate    acetaminophen  650 mg Oral Q6H PRN Kolby Alba MD      amLODIPine  10 mg Oral Daily Kolby Alba MD      atorvastatin  20 mg Oral Daily Kolby Alba MD      cefTRIAXone  1,000 mg Intravenous Q24H Stephon Aviles MD      clopidogrel  75 mg Oral Daily Kolby Alba MD      escitalopram  10 mg Oral Daily Kolby Alba MD      furosemide  40 mg Intravenous BID (diuretic) Kolby Alba MD      heparin (porcine)  5,000 Units Subcutaneous Q8H Curlene Spatz, MD      insulin lispro  1-5 Units Subcutaneous TID AC Kolby Alba MD      insulin lispro  1-5 Units Subcutaneous HS Kolby Alba MD      isosorbide mononitrate  120 mg Oral Daily Kolby Alba MD      ondansetron  4 mg Intravenous Q6H PRN Kolby Alba MD      pantoprazole  40 mg Oral Daily Kolby Alba MD      polyethylene glycol  17 g Oral Daily PRN Kolby Alba MD      potassium chloride  30 mEq Oral Once Stephon Aviles MD      propranolol  120 mg Oral Daily Kolby Alba MD      sucralfate  1 g Oral BID Kolby Alba MD      tamsulosin  0 8 mg Oral Daily With Darrall Frankel, MD          Today, Patient Was Seen By: Stephon Aviles MD    **Please Note: This note may have been constructed using a voice recognition system  **

## 2022-02-19 NOTE — PLAN OF CARE
Problem: MOBILITY - ADULT  Goal: Maintain or return to baseline ADL function  Description: INTERVENTIONS:  -  Assess patient's ability to carry out ADLs; assess patient's baseline for ADL function and identify physical deficits which impact ability to perform ADLs (bathing, care of mouth/teeth, toileting, grooming, dressing, etc )  - Assess/evaluate cause of self-care deficits   - Assess range of motion  - Assess patient's mobility; develop plan if impaired  - Assess patient's need for assistive devices and provide as appropriate  - Encourage maximum independence but intervene and supervise when necessary  - Involve family in performance of ADLs  - Assess for home care needs following discharge   - Consider OT consult to assist with ADL evaluation and planning for discharge  - Provide patient education as appropriate  Outcome: Progressing  Goal: Maintains/Returns to pre admission functional level  Description: INTERVENTIONS:  - Perform BMAT or MOVE assessment daily    - Set and communicate daily mobility goal to care team and patient/family/caregiver  - Collaborate with rehabilitation services on mobility goals if consulted  - Perform Range of Motion 4 times a day  - Reposition patient every 2 hours    - Dangle patient 3 times a day  - Stand patient 3 times a day  - Ambulate patient 3 times a day  - Out of bed to chair 3 times a day   - Out of bed for meals 3 times a day  - Out of bed for toileting  - Record patient progress and toleration of activity level   Outcome: Progressing     Problem: PAIN - ADULT  Goal: Verbalizes/displays adequate comfort level or baseline comfort level  Description: Interventions:  - Encourage patient to monitor pain and request assistance  - Assess pain using appropriate pain scale  - Administer analgesics based on type and severity of pain and evaluate response  - Implement non-pharmacological measures as appropriate and evaluate response  - Consider cultural and social influences on pain and pain management  - Notify physician/advanced practitioner if interventions unsuccessful or patient reports new pain  Outcome: Progressing     Problem: INFECTION - ADULT  Goal: Absence or prevention of progression during hospitalization  Description: INTERVENTIONS:  - Assess and monitor for signs and symptoms of infection  - Monitor lab/diagnostic results  - Monitor all insertion sites, i e  indwelling lines, tubes, and drains  - Monitor endotracheal if appropriate and nasal secretions for changes in amount and color  - [place] appropriate cooling/warming therapies per order  - Administer medications as ordered  - Instruct and encourage patient and family to use good hand hygiene technique  - Identify and instruct in appropriate isolation precautions for identified infection/condition  Outcome: Progressing     Problem: SAFETY ADULT  Goal: Maintain or return to baseline ADL function  Description: INTERVENTIONS:  -  Assess patient's ability to carry out ADLs; assess patient's baseline for ADL function and identify physical deficits which impact ability to perform ADLs (bathing, care of mouth/teeth, toileting, grooming, dressing, etc )  - Assess/evaluate cause of self-care deficits   - Assess range of motion  - Assess patient's mobility; develop plan if impaired  - Assess patient's need for assistive devices and provide as appropriate  - Encourage maximum independence but intervene and supervise when necessary  - Involve family in performance of ADLs  - Assess for home care needs following discharge   - Consider OT consult to assist with ADL evaluation and planning for discharge  - Provide patient education as appropriate  Outcome: Progressing  Goal: Maintains/Returns to pre admission functional level  Description: INTERVENTIONS:  - Perform BMAT or MOVE assessment daily    - Set and communicate daily mobility goal to care team and patient/family/caregiver     - Collaborate with rehabilitation services on mobility goals if consulted  - Perform Range of Motion 4 times a day  - Reposition patient every 2 hours    - Dangle patient 3 times a day  - Stand patient 3 times a day  - Ambulate patient 3 times a day  - Out of bed to chair 3 times a day   - Out of bed for meals 3 times a day  - Out of bed for toileting  - Record patient progress and toleration of activity level   Outcome: Progressing  Goal: Patient will remain free of falls  Description: INTERVENTIONS:  - Educate patient/family on patient safety including physical limitations  - Instruct patient to call for assistance with activity   - Consult OT/PT to assist with strengthening/mobility   - Keep Call bell within reach  - Keep bed low and locked with side rails adjusted as appropriate  - Keep care items and personal belongings within reach  - Initiate and maintain comfort rounds  - Make Fall Risk Sign visible to staff  - Offer Toileting every 2 Hours, in advance of need  - Initiate/Maintain fall alarm  - Obtain necessary fall risk management equipment: alarm, nonskid socks, yellow wristband  - Apply yellow socks and bracelet for high fall risk patients  - Consider moving patient to room near nurses station  Outcome: Progressing     Problem: DISCHARGE PLANNING  Goal: Discharge to home or other facility with appropriate resources  Description: INTERVENTIONS:  - Identify barriers to discharge w/patient and caregiver  - Arrange for needed discharge resources and transportation as appropriate  - Identify discharge learning needs (meds, wound care, etc )  - Arrange for interpretive services to assist at discharge as needed  - Refer to Case Management Department for coordinating discharge planning if the patient needs post-hospital services based on physician/advanced practitioner order or complex needs related to functional status, cognitive ability, or social support system  Outcome: Progressing     Problem: Knowledge Deficit  Goal: Patient/family/caregiver demonstrates understanding of disease process, treatment plan, medications, and discharge instructions  Description: Complete learning assessment and assess knowledge base    Interventions:  - Provide teaching at level of understanding  - Provide teaching via preferred learning methods  Outcome: Progressing     Problem: CARDIOVASCULAR - ADULT  Goal: Maintains optimal cardiac output and hemodynamic stability  Description: INTERVENTIONS:  - Monitor I/O, vital signs and rhythm  - Monitor for S/S and trends of decreased cardiac output  - Administer and titrate ordered vasoactive medications to optimize hemodynamic stability  - Assess quality of pulses, skin color and temperature  - Assess for signs of decreased coronary artery perfusion  - Instruct patient to report change in severity of symptoms  Outcome: Progressing  Goal: Absence of cardiac dysrhythmias or at baseline rhythm  Description: INTERVENTIONS:  - Continuous cardiac monitoring, vital signs, obtain 12 lead EKG if ordered  - Administer antiarrhythmic and heart rate control medications as ordered  - Monitor electrolytes and administer replacement therapy as ordered  Outcome: Progressing     Problem: RESPIRATORY - ADULT  Goal: Achieves optimal ventilation and oxygenation  Description: INTERVENTIONS:  - Assess for changes in respiratory status  - Assess for changes in mentation and behavior  - Position to facilitate oxygenation and minimize respiratory effort  - Oxygen administered by appropriate delivery if ordered  - Initiate smoking cessation education as indicated  - Encourage broncho-pulmonary hygiene including cough, deep breathe, Incentive Spirometry  - Assess the need for suctioning and aspirate as needed  - Assess and instruct to report SOB or any respiratory difficulty  - Respiratory Therapy support as indicated  Outcome: Progressing     Problem: METABOLIC, FLUID AND ELECTROLYTES - ADULT  Goal: Electrolytes maintained within normal limits  Description: INTERVENTIONS:  - Monitor labs and assess patient for signs and symptoms of electrolyte imbalances  - Administer electrolyte replacement as ordered  - Monitor response to electrolyte replacements, including repeat lab results as appropriate  - Instruct patient on fluid and nutrition as appropriate  Outcome: Progressing  Goal: Fluid balance maintained  Description: INTERVENTIONS:  - Monitor labs   - Monitor I/O and WT  - Instruct patient on fluid and nutrition as appropriate  - Assess for signs & symptoms of volume excess or deficit  Outcome: Progressing  Goal: Glucose maintained within target range  Description: INTERVENTIONS:  - Monitor Blood Glucose as ordered  - Assess for signs and symptoms of hyperglycemia and hypoglycemia  - Administer ordered medications to maintain glucose within target range  - Assess nutritional intake and initiate nutrition service referral as needed  Outcome: Progressing     Problem: HEMATOLOGIC - ADULT  Goal: Maintains hematologic stability  Description: INTERVENTIONS  - Assess for signs and symptoms of bleeding or hemorrhage  - Monitor labs  - Administer supportive blood products/factors as ordered and appropriate  Outcome: Progressing     Problem: Potential for Falls  Goal: Patient will remain free of falls  Description: INTERVENTIONS:  - Educate patient/family on patient safety including physical limitations  - Instruct patient to call for assistance with activity   - Consult OT/PT to assist with strengthening/mobility   - Keep Call bell within reach  - Keep bed low and locked with side rails adjusted as appropriate  - Keep care items and personal belongings within reach  - Initiate and maintain comfort rounds  - Make Fall Risk Sign visible to staff  - Offer Toileting every 2 Hours, in advance of need  - Initiate/Maintain fall alarm  - Obtain necessary fall risk management equipment: alarm, nonskid socks, yellow wristband  - Apply yellow socks and bracelet for high fall risk patients  - Consider moving patient to room near nurses station  Outcome: Progressing

## 2022-02-19 NOTE — CASE MANAGEMENT
Case Management Assessment & Discharge Planning Note    Patient name Alonso Conteh  Location /885-53 MRN [id number]  : 1935 Date 2022       Current Admission Date: 2022  Current Admission Diagnosis:Acute respiratory failure with hypoxia [facility])   Patient Active Problem List    Diagnosis Date Noted    Essential hypertension 2022    Acute respiratory failure with hypoxia ([facility] ) 2022    Chronic diastolic congestive heart failure ([place] [address] ) 2022    Closed fracture of multiple ribs of right side with routine healing 2022    Sensation of pressure in bladder area 10/01/2021    Hypertensive left ventricular hypertrophy, without heart failure 2021    Elevated d-dimer 2021    Carotid artery stenosis 2021    Anginal syndrome ([facility] ) 2021    Secondary hyperparathyroidism of renal origin ([place] [address] ) 2020    Vitamin D deficiency 2020    Nephrolithiasis 2020    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 ([place] [address] ) 2020    Chronic tubulointerstitial nephritis 2020    Edema 2020    Proteinuria 2020    Atherosclerosis 2020    Stage 3b chronic kidney disease ([place] [address] ) 2019    Hypertensive crisis 2018    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 infarction) 2018    Mixed hyperlipidemia 2018    Diabetes mellitus, type II ([place] [address] ) 2018    Coronary artery disease of native artery of native heart with stable angina pectoris ([place] [address] ) 2018    Gastroesophageal reflux disease without esophagitis 2018    Obese 2018    Presence of aortocoronary bypass graft 2018    Occlusion and stenosis of bilateral carotid arteries 2018    Dyslipidemia 2018      LOS (days): 1  Geometric Mean LOS (GMLOS) (days): 3 80  Days to GMLOS:2 9     OBJECTIVE:    Risk of Unplanned Readmission Score: 17         Current admission status: Inpatient       Preferred Pharmacy:   [facility] [facility], [address] [facility] [address]/ Raheem Mccarty  [facility] [address]  Phone: [phone] Fax: [phone]    Primary Care Provider: Valery Quintanilla DO    Primary Insurance: MEDICARE  Secondary Insurance: [address]    ASSESSMENT:  [address] [facility], [address] [place] [address] Representative   Primary Phone: [phone] (Home)               Advance Directives  Does patient have a [address]?: No  Was patient offered paperwork?: Yes (declined)  Does patient currently have a Health Care decision maker?: Yes, please see Health Care Proxy section  Does patient have Advance Directives?: No  Was patient offered paperwork?: Yes (declined)  Primary Contact: william         Readmission Root Cause  30 Day Readmission: No    Patient Information  Admitted from[de-identified] Home  Mental Status: Alert  During Assessment patient was accompanied by: Not accompanied during assessment  Assessment information provided by[de-identified] Patient  Primary Caregiver: Self  Support Systems: Self,Family members  [place] of Residence: [address] [place] [address] Avenue do you live in?: Samara Quinonez  50  entry access options   Select all that apply : Stairs  Number of steps to enter home : 2  Do the steps have railings?: Yes  Type of Current Residence: Apartment  Floor Level: 1  Upon entering residence, is there a bedroom on the main floor (no further steps)?: Yes  Upon entering residence, is there a bathroom on the main floor (no further steps)?: Yes  In the last 12 months, was there a time when you were not able to pay the mortgage or rent on time?: No  In the last 12 months, how many places have you lived?: 1  In the last 12 months, was there a time when you did not have a steady place to sleep or slept in a shelter (including now)?: No  Homeless/housing insecurity resource given?: N/A  Living Arrangements: Lives Alone  Is patient a ?: No    Activities of Daily Living Prior to Admission  Functional Status: Independent  Completes ADLs independently?: Yes  Ambulates independently?: Yes  Does patient use assisted devices?: Yes  Assisted Devices (DME) used: Edwin Alva  Does patient currently own DME?: Yes  What DME does the patient currently own?: Straight Cane,Walker  Does patient have a history of Outpatient Therapy (PT/OT)?: No  Does the patient have a history of Short-Term Rehab?: No  Does patient have a history of HHC?: Yes (pt had in past unsure of agency)  Does patient currently have [facility]?: No         Patient Information Continued  Income Source: Pension/correction  Does patient have prescription coverage?: Yes  Within the past 12 months, you worried that your food would run out before you got the money to buy more : Never true  Within the past 12 months, the food you bought just didnt last and you didnt have money to get more : Never true  Food insecurity resource given?: N/A  Does patient receive dialysis treatments?: No  Does patient have a history of substance abuse?: No  Does patient have a history of Mental Health Diagnosis?: No         Means of Transportation  Means of Transport to Appts[de-identified] Family transport  In the past 12 months, has lack of transportation kept you from medical appointments or from getting medications?: No  In the past 12 months, has lack of transportation kept you from meetings, work, or from getting things needed for daily living?: No  Was application for public transport provided?: N/A        DISCHARGE DETAILS:    Discharge planning discussed with[de-identified] patient  Freedom of Choice: Yes  Comments - Freedom of Choice: pt has no preference for C would like to stay with [facility] if they can see him  CM contacted family/caregiver?: No- see comments (pt alert and oriented)  Were Treatment Team discharge recommendations reviewed with patient/caregiver?: Yes  Did patient/caregiver verbalize understanding of patient care needs?: Yes  Were patient/caregiver advised of the risks associated with not following Treatment Team discharge recommendations?: Yes    Contacts  Contact Method:  In Person  Reason/Outcome: Discharge [address]         Is the patient interested in Lyssa Gomez at discharge?: Yes  [address] requested[de-identified] [address] Name[de-identified] [address] [place] [address] Provider[de-identified] PCP  Home Health Services Needed[de-identified] Evaluate Functional Status and Safety,Strengthening/Theraputic Exercises to Improve Function (nsg assessments)  Homebound Criteria Met[de-identified] Uses an Assist Device (i e  cane, walker, etc)  Supporting Clincal Findings[de-identified] Limited Endurance    DME Referral Provided  Referral made for DME?: No              Treatment Team Recommendation: Home with [address]  Discharge Destination Plan[de-identified] Home with [address]

## 2022-02-20 LAB
BASOPHILS # BLD AUTO: 0.04 THOUSANDS/ΜL (ref 0–0.1)
BASOPHILS NFR BLD AUTO: 1 % (ref 0–1)
EOSINOPHIL # BLD AUTO: 0.33 THOUSAND/ΜL (ref 0–0.61)
EOSINOPHIL NFR BLD AUTO: 5 % (ref 0–6)
ERYTHROCYTE [DISTWIDTH] IN BLOOD BY AUTOMATED COUNT: 12.6 % (ref 11.6–15.1)
GLUCOSE SERPL-MCNC: 188 MG/DL (ref 65–140)
GLUCOSE SERPL-MCNC: 255 MG/DL (ref 65–140)
GLUCOSE SERPL-MCNC: 298 MG/DL (ref 65–140)
GLUCOSE SERPL-MCNC: 421 MG/DL (ref 65–140)
HCT VFR BLD AUTO: 43.2 % (ref 36.5–49.3)
HGB BLD-MCNC: 14.2 G/DL (ref 12–17)
IMM GRANULOCYTES # BLD AUTO: 0.02 THOUSAND/UL (ref 0–0.2)
IMM GRANULOCYTES NFR BLD AUTO: 0 % (ref 0–2)
LYMPHOCYTES # BLD AUTO: 0.84 THOUSANDS/ΜL (ref 0.6–4.47)
LYMPHOCYTES NFR BLD AUTO: 12 % (ref 14–44)
MAGNESIUM SERPL-MCNC: 2.1 MG/DL (ref 1.6–2.6)
MCH RBC QN AUTO: 27.8 PG (ref 26.8–34.3)
MCHC RBC AUTO-ENTMCNC: 32.9 G/DL (ref 31.4–37.4)
MCV RBC AUTO: 85 FL (ref 82–98)
MONOCYTES # BLD AUTO: 0.84 THOUSAND/ΜL (ref 0.17–1.22)
MONOCYTES NFR BLD AUTO: 12 % (ref 4–12)
NEUTROPHILS # BLD AUTO: 5.23 THOUSANDS/ΜL (ref 1.85–7.62)
NEUTS SEG NFR BLD AUTO: 70 % (ref 43–75)
NRBC BLD AUTO-RTO: 0 /100 WBCS
PLATELET # BLD AUTO: 297 THOUSANDS/UL (ref 149–390)
PMV BLD AUTO: 9.2 FL (ref 8.9–12.7)
PROCALCITONIN SERPL-MCNC: 0.56 NG/ML
RBC # BLD AUTO: 5.11 MILLION/UL (ref 3.88–5.62)
WBC # BLD AUTO: 7.3 THOUSAND/UL (ref 4.31–10.16)

## 2022-02-20 PROCEDURE — 83735 ASSAY OF MAGNESIUM: CPT | Performed by: INTERNAL MEDICINE

## 2022-02-20 PROCEDURE — 82948 REAGENT STRIP/BLOOD GLUCOSE: CPT

## 2022-02-20 PROCEDURE — 85025 COMPLETE CBC W/AUTO DIFF WBC: CPT | Performed by: INTERNAL MEDICINE

## 2022-02-20 PROCEDURE — 84145 PROCALCITONIN (PCT): CPT | Performed by: INTERNAL MEDICINE

## 2022-02-20 PROCEDURE — 99232 SBSQ HOSP IP/OBS MODERATE 35: CPT | Performed by: INTERNAL MEDICINE

## 2022-02-20 RX ORDER — FUROSEMIDE 20 MG/1
20 TABLET ORAL
Status: DISCONTINUED | OUTPATIENT
Start: 2022-02-20 | End: 2022-02-22 | Stop reason: HOSPADM

## 2022-02-20 RX ADMIN — INSULIN LISPRO 2 UNITS: 100 INJECTION, SOLUTION INTRAVENOUS; SUBCUTANEOUS at 21:11

## 2022-02-20 RX ADMIN — INSULIN LISPRO 5 UNITS: 100 INJECTION, SOLUTION INTRAVENOUS; SUBCUTANEOUS at 10:52

## 2022-02-20 RX ADMIN — ISOSORBIDE MONONITRATE 120 MG: 60 TABLET, EXTENDED RELEASE ORAL at 09:11

## 2022-02-20 RX ADMIN — SUCRALFATE 1 G: 1 TABLET ORAL at 17:21

## 2022-02-20 RX ADMIN — ESCITALOPRAM OXALATE 10 MG: 10 TABLET ORAL at 09:11

## 2022-02-20 RX ADMIN — CLOPIDOGREL BISULFATE 75 MG: 75 TABLET ORAL at 09:11

## 2022-02-20 RX ADMIN — HEPARIN SODIUM 5000 UNITS: 5000 INJECTION INTRAVENOUS; SUBCUTANEOUS at 14:54

## 2022-02-20 RX ADMIN — PROPRANOLOL HYDROCHLORIDE 120 MG: 60 CAPSULE, EXTENDED RELEASE ORAL at 09:11

## 2022-02-20 RX ADMIN — CEFTRIAXONE 1000 MG: 1 INJECTION, SOLUTION INTRAVENOUS at 14:54

## 2022-02-20 RX ADMIN — FUROSEMIDE 20 MG: 20 TABLET ORAL at 17:21

## 2022-02-20 RX ADMIN — INSULIN LISPRO 1 UNITS: 100 INJECTION, SOLUTION INTRAVENOUS; SUBCUTANEOUS at 09:11

## 2022-02-20 RX ADMIN — ATORVASTATIN CALCIUM 20 MG: 10 TABLET, FILM COATED ORAL at 17:21

## 2022-02-20 RX ADMIN — HEPARIN SODIUM 5000 UNITS: 5000 INJECTION INTRAVENOUS; SUBCUTANEOUS at 05:06

## 2022-02-20 RX ADMIN — HEPARIN SODIUM 5000 UNITS: 5000 INJECTION INTRAVENOUS; SUBCUTANEOUS at 21:11

## 2022-02-20 RX ADMIN — PANTOPRAZOLE SODIUM 40 MG: 40 TABLET, DELAYED RELEASE ORAL at 09:11

## 2022-02-20 RX ADMIN — TAMSULOSIN HYDROCHLORIDE 0.8 MG: 0.4 CAPSULE ORAL at 17:21

## 2022-02-20 RX ADMIN — INSULIN LISPRO 3 UNITS: 100 INJECTION, SOLUTION INTRAVENOUS; SUBCUTANEOUS at 17:21

## 2022-02-20 RX ADMIN — AMLODIPINE BESYLATE 10 MG: 10 TABLET ORAL at 09:11

## 2022-02-20 RX ADMIN — SUCRALFATE 1 G: 1 TABLET ORAL at 09:11

## 2022-02-20 RX ADMIN — FUROSEMIDE 20 MG: 20 TABLET ORAL at 09:10

## 2022-02-20 NOTE — PLAN OF CARE
Problem: MOBILITY - ADULT  Goal: Maintain or return to baseline ADL function  Description: INTERVENTIONS:  -  Assess patient's ability to carry out ADLs; assess patient's baseline for ADL function and identify physical deficits which impact ability to perform ADLs (bathing, care of mouth/teeth, toileting, grooming, dressing, etc )  - Assess/evaluate cause of self-care deficits   - Assess range of motion  - Assess patient's mobility; develop plan if impaired  - Assess patient's need for assistive devices and provide as appropriate  - Encourage maximum independence but intervene and supervise when necessary  - Involve family in performance of ADLs  - Assess for home care needs following discharge   - Consider OT consult to assist with ADL evaluation and planning for discharge  - Provide patient education as appropriate  Outcome: Progressing  Goal: Maintains/Returns to pre admission functional level  Description: INTERVENTIONS:  - Perform BMAT or MOVE assessment daily    - Set and communicate daily mobility goal to care team and patient/family/caregiver  - Collaborate with rehabilitation services on mobility goals if consulted  - Perform Range of Motion 4 times a day  - Reposition patient every 2 hours    - Dangle patient 3 times a day  - Stand patient 3 times a day  - Ambulate patient 3 times a day  - Out of bed to chair 3 times a day   - Out of bed for meals 3 times a day  - Out of bed for toileting  - Record patient progress and toleration of activity level   Outcome: Progressing     Problem: PAIN - ADULT  Goal: Verbalizes/displays adequate comfort level or baseline comfort level  Description: Interventions:  - Encourage patient to monitor pain and request assistance  - Assess pain using appropriate pain scale  - Administer analgesics based on type and severity of pain and evaluate response  - Implement non-pharmacological measures as appropriate and evaluate response  - Consider cultural and social influences on pain and pain management  - Notify physician/advanced practitioner if interventions unsuccessful or patient reports new pain  Outcome: Progressing     Problem: INFECTION - ADULT  Goal: Absence or prevention of progression during hospitalization  Description: INTERVENTIONS:  - Assess and monitor for signs and symptoms of infection  - Monitor lab/diagnostic results  - Monitor all insertion sites, i e  indwelling lines, tubes, and drains  - Monitor endotracheal if appropriate and nasal secretions for changes in amount and color  - [place] appropriate cooling/warming therapies per order  - Administer medications as ordered  - Instruct and encourage patient and family to use good hand hygiene technique  - Identify and instruct in appropriate isolation precautions for identified infection/condition  Outcome: Progressing     Problem: SAFETY ADULT  Goal: Maintain or return to baseline ADL function  Description: INTERVENTIONS:  -  Assess patient's ability to carry out ADLs; assess patient's baseline for ADL function and identify physical deficits which impact ability to perform ADLs (bathing, care of mouth/teeth, toileting, grooming, dressing, etc )  - Assess/evaluate cause of self-care deficits   - Assess range of motion  - Assess patient's mobility; develop plan if impaired  - Assess patient's need for assistive devices and provide as appropriate  - Encourage maximum independence but intervene and supervise when necessary  - Involve family in performance of ADLs  - Assess for home care needs following discharge   - Consider OT consult to assist with ADL evaluation and planning for discharge  - Provide patient education as appropriate  Outcome: Progressing  Goal: Maintains/Returns to pre admission functional level  Description: INTERVENTIONS:  - Perform BMAT or MOVE assessment daily    - Set and communicate daily mobility goal to care team and patient/family/caregiver     - Collaborate with rehabilitation services on mobility goals if consulted  - Perform Range of Motion 4 times a day  - Reposition patient every 2 hours    - Dangle patient 3 times a day  - Stand patient 3 times a day  - Ambulate patient 3 times a day  - Out of bed to chair 3 times a day   - Out of bed for meals 3 times a day  - Out of bed for toileting  - Record patient progress and toleration of activity level   Outcome: Progressing  Goal: Patient will remain free of falls  Description: INTERVENTIONS:  - Educate patient/family on patient safety including physical limitations  - Instruct patient to call for assistance with activity   - Consult OT/PT to assist with strengthening/mobility   - Keep Call bell within reach  - Keep bed low and locked with side rails adjusted as appropriate  - Keep care items and personal belongings within reach  - Initiate and maintain comfort rounds  - Make Fall Risk Sign visible to staff  - Offer Toileting every 2 Hours, in advance of need  - Initiate/Maintain fall alarm  - Obtain necessary fall risk management equipment: alarm, nonskid socks, yellow wristband  - Apply yellow socks and bracelet for high fall risk patients  - Consider moving patient to room near nurses station  Outcome: Progressing     Problem: DISCHARGE PLANNING  Goal: Discharge to home or other facility with appropriate resources  Description: INTERVENTIONS:  - Identify barriers to discharge w/patient and caregiver  - Arrange for needed discharge resources and transportation as appropriate  - Identify discharge learning needs (meds, wound care, etc )  - Arrange for interpretive services to assist at discharge as needed  - Refer to Case Management Department for coordinating discharge planning if the patient needs post-hospital services based on physician/advanced practitioner order or complex needs related to functional status, cognitive ability, or social support system  Outcome: Progressing     Problem: Knowledge Deficit  Goal: Patient/family/caregiver demonstrates understanding of disease process, treatment plan, medications, and discharge instructions  Description: Complete learning assessment and assess knowledge base    Interventions:  - Provide teaching at level of understanding  - Provide teaching via preferred learning methods  Outcome: Progressing     Problem: CARDIOVASCULAR - ADULT  Goal: Maintains optimal cardiac output and hemodynamic stability  Description: INTERVENTIONS:  - Monitor I/O, vital signs and rhythm  - Monitor for S/S and trends of decreased cardiac output  - Administer and titrate ordered vasoactive medications to optimize hemodynamic stability  - Assess quality of pulses, skin color and temperature  - Assess for signs of decreased coronary artery perfusion  - Instruct patient to report change in severity of symptoms  Outcome: Progressing  Goal: Absence of cardiac dysrhythmias or at baseline rhythm  Description: INTERVENTIONS:  - Continuous cardiac monitoring, vital signs, obtain 12 lead EKG if ordered  - Administer antiarrhythmic and heart rate control medications as ordered  - Monitor electrolytes and administer replacement therapy as ordered  Outcome: Progressing     Problem: RESPIRATORY - ADULT  Goal: Achieves optimal ventilation and oxygenation  Description: INTERVENTIONS:  - Assess for changes in respiratory status  - Assess for changes in mentation and behavior  - Position to facilitate oxygenation and minimize respiratory effort  - Oxygen administered by appropriate delivery if ordered  - Initiate smoking cessation education as indicated  - Encourage broncho-pulmonary hygiene including cough, deep breathe, Incentive Spirometry  - Assess the need for suctioning and aspirate as needed  - Assess and instruct to report SOB or any respiratory difficulty  - Respiratory Therapy support as indicated  Outcome: Progressing     Problem: METABOLIC, FLUID AND ELECTROLYTES - ADULT  Goal: Electrolytes maintained within normal limits  Description: INTERVENTIONS:  - Monitor labs and assess patient for signs and symptoms of electrolyte imbalances  - Administer electrolyte replacement as ordered  - Monitor response to electrolyte replacements, including repeat lab results as appropriate  - Instruct patient on fluid and nutrition as appropriate  Outcome: Progressing  Goal: Fluid balance maintained  Description: INTERVENTIONS:  - Monitor labs   - Monitor I/O and WT  - Instruct patient on fluid and nutrition as appropriate  - Assess for signs & symptoms of volume excess or deficit  Outcome: Progressing  Goal: Glucose maintained within target range  Description: INTERVENTIONS:  - Monitor Blood Glucose as ordered  - Assess for signs and symptoms of hyperglycemia and hypoglycemia  - Administer ordered medications to maintain glucose within target range  - Assess nutritional intake and initiate nutrition service referral as needed  Outcome: Progressing     Problem: HEMATOLOGIC - ADULT  Goal: Maintains hematologic stability  Description: INTERVENTIONS  - Assess for signs and symptoms of bleeding or hemorrhage  - Monitor labs  - Administer supportive blood products/factors as ordered and appropriate  Outcome: Progressing     Problem: Potential for Falls  Goal: Patient will remain free of falls  Description: INTERVENTIONS:  - Educate patient/family on patient safety including physical limitations  - Instruct patient to call for assistance with activity   - Consult OT/PT to assist with strengthening/mobility   - Keep Call bell within reach  - Keep bed low and locked with side rails adjusted as appropriate  - Keep care items and personal belongings within reach  - Initiate and maintain comfort rounds  - Make Fall Risk Sign visible to staff  - Offer Toileting every 2 Hours, in advance of need  - Initiate/Maintain fall alarm  - Obtain necessary fall risk management equipment: alarm, nonskid socks, yellow wristband  - Apply yellow socks and bracelet for high fall risk patients  - Consider moving patient to room near nurses station  Outcome: Progressing   Pt awake, resting in high semi gordillo's position in bed  Light assist x1 for toileting and transfers  No c/o pain/discomfort

## 2022-02-20 NOTE — PROGRESS NOTES
[address] [facility] [address]  Progress Note - Suanne Leyden 1935, 82 y o  male MRN: [id number]  Unit/Bed#: 409-01 Encounter: [id number]  Primary Care Provider: Marilia Pacheco DO   Date and time admitted to hospital: 2/18/2022 12:35 PM    * Acute respiratory failure with hypoxia [facility])  Assessment & Plan  Presented with RLE pain and swelling, dyspnea, and hypoxia  Originally required 4 L supplemental O2 by NC  Patient has a history of diastolic CHF - hospitalized late last month and treated for acute exacerbation  · CXR with mild pulmonary venous congestion  · ProBNP 1,791, up from a value of 644 3/2021  · Oxygenation continues to improve - now on 1 5L  · Check daily weights - 71 6 kg --> 70 8 kg   · Maintain on low-sodium diet  · Was on IV furosemide 40mg BID - changed to oral today due to worsening renal function  · Continue cardiac regimen of BB, Imdur, clopidogrel, and statin therapy  Patient presented with a temperature of 100° F, leukocytosis with a WBC count of 13 2, and mild lactic acidosis (resolved)  · Procalcitonin elevated at 0 6 --> 0 7, 0 59  · CT with persisting chronic interstitial changes in the periphery of the upper and lower lobes  · CT also with LLL airspace consolidation, stable since 03/2021  Favors chronic changes/atelectasis  · However, given patient's presentation with elevated temperature (but not fever), leukocytosis that resolved, and mild lactic acidosis and hypoxia, favor short course of antibiotic therapy  Plan on ceftriaxone for 5 days  Trend procalcitonin  Also with elevated D-dimer at 1 76  · Lower extremity Dopplers ruled out DVT  · No gross evidence of PE clinically and patient's hypoxia has improved with diuresis and initial antibiotic  Hold off on active anticoagulation, and maintain on VT prophylaxis  · Consider V/Q scan if warranted      Chronic diastolic congestive heart failure (HCC)  Assessment & Plan  Wt Readings from Last 3 Encounters:   02/20/22 70 8 kg (156 lb 1 4 oz)   01/30/22 77 6 kg (171 lb)   01/30/22 77 6 kg (171 lb)     Last ECHO 03/2021 with LVEF 65%, G1 DD, mild-to-moderate MR  RV systolic function was normal   Consider updating ECHO if patient is still hospitalized during the week here  Coronary artery disease of native artery of native heart with stable angina pectoris [facility])  Assessment & Plan  Status post CABG X 02/19/1992, with SVG to the circumflex and RCA  Repeat CABG 2005 with LIMA to LAD, SVG to posterior descending and posterolateral branch, radial artery graft to OM  Stenting of high-grade stenosis of 1st OM graft in 2009  Currently asymptomatic, with negative cardiac biomarkers  Continue clopidogrel, beta-blocker, and statin therapy  Patient is also on long-acting nitrate  Stage 3b chronic kidney disease [facility])  Assessment & Plan  Lab Results   Component Value Date    EGFR 28 02/19/2022    EGFR 34 02/19/2022    EGFR 28 02/18/2022    CREATININE 2 04 (H) 02/19/2022    CREATININE 1 74 (H) 02/19/2022    CREATININE 2 04 (H) 02/18/2022     Creatinine baseline of 1 5-2 1 dating back to 2020  Current value of 2 appears to be at baseline but slightly worsened  Continue to monitor, avoid nephrotoxins, and renally dose medications when appropriate  Diabetes mellitus, type II [facility])  Assessment & Plan    Lab Results   Component Value Date    HGBA1C 9 5 (H) 02/19/2022     Hemoglobin A1c not controlled  Hold glimepiride, and maintain on sliding scale coverage and ADA diet  Monitor blood sugars  Gastroesophageal reflux disease without esophagitis  Assessment & Plan  Continue PPI therapy  Essential hypertension  Assessment & Plan  Continue propranolol and amlodipine  Also on long-acting nitrate with Imdur          VTE Pharmacologic Prophylaxis: VTE Score: 4 Moderate Risk (Score 3-4) - Pharmacological DVT Prophylaxis Ordered: heparin      Patient Centered Rounds: I performed bedside rounds with nursing staff today  Discussions with Specialists or Other Care Team Provider: None     Education and Discussions with Family / Patient: Attempted to update  (Other) via phone  Left voicemail       Time Spent for Care: 20 minutes  More than 50% of total time spent on counseling and coordination of care as described above      Current Length of Stay: 2 day(s)  Current Patient Status: Inpatient   Certification Statement: The patient will continue to require additional inpatient hospital stay due to Continue treatment of acute hypoxic respiratory failure  Discharge Plan: Anticipate discharge in 48-72 hrs to discharge location to be determined pending rehab evaluations      Code Status: Level 1 - Full Code    Subjective:   Patient seen and examined - reports feeling improved since time of admission, but not yet at baseline from a respiratory standpoint  No overnight events reported  Remains afebrile  Objective:     Vitals:   Temp (24hrs), Av 1 °F (36 7 °C), Min:97 5 °F (36 4 °C), Max:98 5 °F (36 9 °C)    Temp:  [97 5 °F (36 4 °C)-98 5 °F (36 9 °C)] 98 5 °F (36 9 °C)  HR:  [74-78] 75  Resp:  [16-22] 16  BP: (106-132)/(53-62) 128/62  SpO2:  [90 %-97 %] 95 %  Body mass index is 24 45 kg/m²  Input and Output Summary (last 24 hours): Intake/Output Summary (Last 24 hours) at 2022 1316  Last data filed at 2022 4699  Gross per 24 hour   Intake 371 67 ml   Output 1275 ml   Net -903 33 ml       Physical Exam:   Vitals reviewed  Constitutional:       General: He is not in acute distress  Appearance: He is well-developed  He is not ill-appearing or toxic-appearing  Cardiovascular:      Rate and Rhythm: Normal rate and regular rhythm  Heart sounds: No murmur heard  Pulmonary:      Effort: Pulmonary effort is normal  No respiratory distress  Breath sounds: Normal breath sounds  No wheezing or rhonchi  Comments: Mild bibasilar crackles    Abdominal:      Palpations: Abdomen is soft  Tenderness: There is no abdominal tenderness  Musculoskeletal:         General: Swelling present  No tenderness  Cervical back: Neck supple  Comments: Mild lower extremity edema  Skin:     General: Skin is warm and dry  Neurological:      General: No focal deficit present  Mental Status: He is alert and oriented to person, place, and time  Psychiatric:         Mood and Affect: Mood normal          Behavior: Behavior normal      Additional Data:     Labs:  Results from last 7 days   Lab Units 02/20/22  0506   WBC Thousand/uL 7 30   HEMOGLOBIN g/dL 14 2   HEMATOCRIT % 43 2   PLATELETS Thousands/uL 297   NEUTROS PCT % 70   LYMPHS PCT % 12*   MONOS PCT % 12   EOS PCT % 5     Results from last 7 days   Lab Units 02/19/22  1349 02/19/22  0509 02/19/22  0509   SODIUM mmol/L 130*   < > 136   POTASSIUM mmol/L 4 3   < > 3 3*   CHLORIDE mmol/L 95*   < > 99*   CO2 mmol/L 25   < > 25   BUN mg/dL 27*   < > 22   CREATININE mg/dL 2 04*   < > 1 74*   ANION GAP mmol/L 10   < > 12   CALCIUM mg/dL 10 1   < > 9 4   ALBUMIN g/dL  --   --  2 5*   TOTAL BILIRUBIN mg/dL  --   --  1 10*   ALK PHOS U/L  --   --  72   ALT U/L  --   --  10*   AST U/L  --   --  14   GLUCOSE RANDOM mg/dL 293*   < > 132    < > = values in this interval not displayed       Results from last 7 days   Lab Units 02/18/22  1314   INR  1 26*     Results from last 7 days   Lab Units 02/20/22  1035 02/20/22  0728 02/19/22  1110 02/19/22  0705 02/18/22  2130 02/18/22  1809   POC GLUCOSE mg/dl 421* 188* 258* 140 230* 219*     Results from last 7 days   Lab Units 02/19/22  0415   HEMOGLOBIN A1C % 9 5*     Results from last 7 days   Lab Units 02/20/22  0506 02/19/22  0511 02/18/22  1533 02/18/22  1314   LACTIC ACID mmol/L  --   --  1 2 2 2*   PROCALCITONIN ng/ml 0 56* 0 70*  --  0 60*       Lines/Drains:  Invasive Devices  Report    Peripheral Intravenous Line            Peripheral IV 02/18/22 Right Antecubital 2 days Imaging: No pertinent imaging reviewed  Recent Cultures (last 7 days):   Results from last 7 days   Lab Units 02/18/22  1314   BLOOD CULTURE  No Growth at 24 hrs  No Growth at 24 hrs  Last 24 Hours Medication List:   Current Facility-Administered Medications   Medication Dose Route Frequency Provider Last Rate    acetaminophen  650 mg Oral Q6H PRN Karolyn Coffman MD      albuterol  2 5 mg Nebulization Q6H PRN Judy Lizama MD      amLODIPine  10 mg Oral Daily Karolyn Coffman MD      atorvastatin  20 mg Oral Daily Kaorlyn Coffman MD      cefTRIAXone  1,000 mg Intravenous Q24H Judy Lizama MD Stopped (02/19/22 1445)    clopidogrel  75 mg Oral Daily Karolyn Coffman MD      escitalopram  10 mg Oral Daily Karolyn Coffman MD      furosemide  20 mg Oral BID (diuretic) Judy Lizama MD      heparin (porcine)  5,000 Units Subcutaneous Q8H Lucía Santiago MD      insulin lispro  1-5 Units Subcutaneous TID AC Karolyn Coffman MD      insulin lispro  1-5 Units Subcutaneous HS Karolyn Coffman MD      isosorbide mononitrate  120 mg Oral Daily Karolyn Coffman MD      ondansetron  4 mg Intravenous Q6H PRN Karolyn Coffman MD      pantoprazole  40 mg Oral Daily Karolyn Coffman MD      polyethylene glycol  17 g Oral Daily PRN Karolyn Coffman MD      propranolol  120 mg Oral Daily Karolyn Coffman MD      sucralfate  1 g Oral BID Karolyn Coffman MD      tamsulosin  0 8 mg Oral Daily With Melonie Fitzgerald MD          Today, Patient Was Seen By: Judy Lizama MD    **Please Note: This note may have been constructed using a voice recognition system  **

## 2022-02-20 NOTE — ASSESSMENT & PLAN NOTE
Wt Readings from Last 3 Encounters:   02/20/22 70 8 kg (156 lb 1 4 oz)   01/30/22 77 6 kg (171 lb)   01/30/22 77 6 kg (171 lb)     Last ECHO 03/2021 with LVEF 65%, G1 DD, mild-to-moderate MR  RV systolic function was normal   Consider updating ECHO if patient is still hospitalized during the week here

## 2022-02-20 NOTE — ASSESSMENT & PLAN NOTE
Lab Results   Component Value Date    EGFR 28 02/19/2022    EGFR 34 02/19/2022    EGFR 28 02/18/2022    CREATININE 2 04 (H) 02/19/2022    CREATININE 1 74 (H) 02/19/2022    CREATININE 2 04 (H) 02/18/2022     Creatinine baseline of 1 5-2 1 dating back to 2020  Current value of 2 appears to be at baseline but slightly worsened  Continue to monitor, avoid nephrotoxins, and renally dose medications when appropriate

## 2022-02-20 NOTE — ASSESSMENT & PLAN NOTE
Lab Results   Component Value Date    HGBA1C 9 5 (H) 02/19/2022     Hemoglobin A1c not controlled  Hold glimepiride, and maintain on sliding scale coverage and ADA diet  Monitor blood sugars

## 2022-02-20 NOTE — PLAN OF CARE
Problem: MOBILITY - ADULT  Goal: Maintain or return to baseline ADL function  Description: INTERVENTIONS:  -  Assess patient's ability to carry out ADLs; assess patient's baseline for ADL function and identify physical deficits which impact ability to perform ADLs (bathing, care of mouth/teeth, toileting, grooming, dressing, etc )  - Assess/evaluate cause of self-care deficits   - Assess range of motion  - Assess patient's mobility; develop plan if impaired  - Assess patient's need for assistive devices and provide as appropriate  - Encourage maximum independence but intervene and supervise when necessary  - Involve family in performance of ADLs  - Assess for home care needs following discharge   - Consider OT consult to assist with ADL evaluation and planning for discharge  - Provide patient education as appropriate  Outcome: Progressing  Goal: Maintains/Returns to pre admission functional level  Description: INTERVENTIONS:  - Perform BMAT or MOVE assessment daily    - Set and communicate daily mobility goal to care team and patient/family/caregiver  - Collaborate with rehabilitation services on mobility goals if consulted  - Perform Range of Motion 4 times a day  - Reposition patient every 2 hours    - Dangle patient 3 times a day  - Stand patient 3 times a day  - Ambulate patient 3 times a day  - Out of bed to chair 3 times a day   - Out of bed for meals 3 times a day  - Out of bed for toileting  - Record patient progress and toleration of activity level   Outcome: Progressing     Problem: PAIN - ADULT  Goal: Verbalizes/displays adequate comfort level or baseline comfort level  Description: Interventions:  - Encourage patient to monitor pain and request assistance  - Assess pain using appropriate pain scale  - Administer analgesics based on type and severity of pain and evaluate response  - Implement non-pharmacological measures as appropriate and evaluate response  - Consider cultural and social influences on pain and pain management  - Notify physician/advanced practitioner if interventions unsuccessful or patient reports new pain  Outcome: Progressing     Problem: INFECTION - ADULT  Goal: Absence or prevention of progression during hospitalization  Description: INTERVENTIONS:  - Assess and monitor for signs and symptoms of infection  - Monitor lab/diagnostic results  - Monitor all insertion sites, i e  indwelling lines, tubes, and drains  - Monitor endotracheal if appropriate and nasal secretions for changes in amount and color  - [place] appropriate cooling/warming therapies per order  - Administer medications as ordered  - Instruct and encourage patient and family to use good hand hygiene technique  - Identify and instruct in appropriate isolation precautions for identified infection/condition  Outcome: Progressing     Problem: SAFETY ADULT  Goal: Maintain or return to baseline ADL function  Description: INTERVENTIONS:  -  Assess patient's ability to carry out ADLs; assess patient's baseline for ADL function and identify physical deficits which impact ability to perform ADLs (bathing, care of mouth/teeth, toileting, grooming, dressing, etc )  - Assess/evaluate cause of self-care deficits   - Assess range of motion  - Assess patient's mobility; develop plan if impaired  - Assess patient's need for assistive devices and provide as appropriate  - Encourage maximum independence but intervene and supervise when necessary  - Involve family in performance of ADLs  - Assess for home care needs following discharge   - Consider OT consult to assist with ADL evaluation and planning for discharge  - Provide patient education as appropriate  Outcome: Progressing  Goal: Maintains/Returns to pre admission functional level  Description: INTERVENTIONS:  - Perform BMAT or MOVE assessment daily    - Set and communicate daily mobility goal to care team and patient/family/caregiver     - Collaborate with rehabilitation services on mobility goals if consulted  - Perform Range of Motion 4 times a day  - Reposition patient every 2 hours    - Dangle patient 3 times a day  - Stand patient 3 times a day  - Ambulate patient 3 times a day  - Out of bed to chair 3 times a day   - Out of bed for meals 3 times a day  - Out of bed for toileting  - Record patient progress and toleration of activity level   Outcome: Progressing  Goal: Patient will remain free of falls  Description: INTERVENTIONS:  - Educate patient/family on patient safety including physical limitations  - Instruct patient to call for assistance with activity   - Consult OT/PT to assist with strengthening/mobility   - Keep Call bell within reach  - Keep bed low and locked with side rails adjusted as appropriate  - Keep care items and personal belongings within reach  - Initiate and maintain comfort rounds  - Make Fall Risk Sign visible to staff  - Offer Toileting every 2 Hours, in advance of need  - Initiate/Maintain fall alarm  - Obtain necessary fall risk management equipment: alarm, nonskid socks, yellow wristband  - Apply yellow socks and bracelet for high fall risk patients  - Consider moving patient to room near nurses station  Outcome: Progressing     Problem: DISCHARGE PLANNING  Goal: Discharge to home or other facility with appropriate resources  Description: INTERVENTIONS:  - Identify barriers to discharge w/patient and caregiver  - Arrange for needed discharge resources and transportation as appropriate  - Identify discharge learning needs (meds, wound care, etc )  - Arrange for interpretive services to assist at discharge as needed  - Refer to Case Management Department for coordinating discharge planning if the patient needs post-hospital services based on physician/advanced practitioner order or complex needs related to functional status, cognitive ability, or social support system  Outcome: Progressing     Problem: Knowledge Deficit  Goal: Patient/family/caregiver demonstrates understanding of disease process, treatment plan, medications, and discharge instructions  Description: Complete learning assessment and assess knowledge base    Interventions:  - Provide teaching at level of understanding  - Provide teaching via preferred learning methods  Outcome: Progressing     Problem: CARDIOVASCULAR - ADULT  Goal: Maintains optimal cardiac output and hemodynamic stability  Description: INTERVENTIONS:  - Monitor I/O, vital signs and rhythm  - Monitor for S/S and trends of decreased cardiac output  - Administer and titrate ordered vasoactive medications to optimize hemodynamic stability  - Assess quality of pulses, skin color and temperature  - Assess for signs of decreased coronary artery perfusion  - Instruct patient to report change in severity of symptoms  Outcome: Progressing  Goal: Absence of cardiac dysrhythmias or at baseline rhythm  Description: INTERVENTIONS:  - Continuous cardiac monitoring, vital signs, obtain 12 lead EKG if ordered  - Administer antiarrhythmic and heart rate control medications as ordered  - Monitor electrolytes and administer replacement therapy as ordered  Outcome: Progressing     Problem: RESPIRATORY - ADULT  Goal: Achieves optimal ventilation and oxygenation  Description: INTERVENTIONS:  - Assess for changes in respiratory status  - Assess for changes in mentation and behavior  - Position to facilitate oxygenation and minimize respiratory effort  - Oxygen administered by appropriate delivery if ordered  - Initiate smoking cessation education as indicated  - Encourage broncho-pulmonary hygiene including cough, deep breathe, Incentive Spirometry  - Assess the need for suctioning and aspirate as needed  - Assess and instruct to report SOB or any respiratory difficulty  - Respiratory Therapy support as indicated  Outcome: Progressing     Problem: METABOLIC, FLUID AND ELECTROLYTES - ADULT  Goal: Electrolytes maintained within normal limits  Description: INTERVENTIONS:  - Monitor labs and assess patient for signs and symptoms of electrolyte imbalances  - Administer electrolyte replacement as ordered  - Monitor response to electrolyte replacements, including repeat lab results as appropriate  - Instruct patient on fluid and nutrition as appropriate  Outcome: Progressing  Goal: Fluid balance maintained  Description: INTERVENTIONS:  - Monitor labs   - Monitor I/O and WT  - Instruct patient on fluid and nutrition as appropriate  - Assess for signs & symptoms of volume excess or deficit  Outcome: Progressing  Goal: Glucose maintained within target range  Description: INTERVENTIONS:  - Monitor Blood Glucose as ordered  - Assess for signs and symptoms of hyperglycemia and hypoglycemia  - Administer ordered medications to maintain glucose within target range  - Assess nutritional intake and initiate nutrition service referral as needed  Outcome: Progressing     Problem: HEMATOLOGIC - ADULT  Goal: Maintains hematologic stability  Description: INTERVENTIONS  - Assess for signs and symptoms of bleeding or hemorrhage  - Monitor labs  - Administer supportive blood products/factors as ordered and appropriate  Outcome: Progressing     Problem: Potential for Falls  Goal: Patient will remain free of falls  Description: INTERVENTIONS:  - Educate patient/family on patient safety including physical limitations  - Instruct patient to call for assistance with activity   - Consult OT/PT to assist with strengthening/mobility   - Keep Call bell within reach  - Keep bed low and locked with side rails adjusted as appropriate  - Keep care items and personal belongings within reach  - Initiate and maintain comfort rounds  - Make Fall Risk Sign visible to staff  - Offer Toileting every 2 Hours, in advance of need  - Initiate/Maintain fall alarm  - Obtain necessary fall risk management equipment: alarm, nonskid socks, yellow wristband  - Apply yellow socks and bracelet for high fall risk patients  - Consider moving patient to room near nurses station  Outcome: Progressing

## 2022-02-20 NOTE — ASSESSMENT & PLAN NOTE
Presented with RLE pain and swelling, dyspnea, and hypoxia  Originally required 4 L supplemental O2 by NC  Patient has a history of diastolic CHF - hospitalized late last month and treated for acute exacerbation  · CXR with mild pulmonary venous congestion  · ProBNP 1,791, up from a value of 644 3/2021  · Oxygenation continues to improve - now on 1 5L  · Check daily weights - 71 6 kg --> 70 8 kg   · Maintain on low-sodium diet  · Was on IV furosemide 40mg BID - changed to oral today due to worsening renal function  · Continue cardiac regimen of BB, Imdur, clopidogrel, and statin therapy  Patient presented with a temperature of 100° F, leukocytosis with a WBC count of 13 2, and mild lactic acidosis (resolved)  · Procalcitonin elevated at 0 6 --> 0 7, 0 59  · CT with persisting chronic interstitial changes in the periphery of the upper and lower lobes  · CT also with LLL airspace consolidation, stable since 03/2021  Favors chronic changes/atelectasis  · However, given patient's presentation with elevated temperature (but not fever), leukocytosis that resolved, and mild lactic acidosis and hypoxia, favor short course of antibiotic therapy  Plan on ceftriaxone for 5 days  Trend procalcitonin  Also with elevated D-dimer at 1 76  · Lower extremity Dopplers ruled out DVT  · No gross evidence of PE clinically and patient's hypoxia has improved with diuresis and initial antibiotic  Hold off on active anticoagulation, and maintain on VT prophylaxis  · Consider V/Q scan if warranted

## 2022-02-21 ENCOUNTER — APPOINTMENT (INPATIENT)
Dept: RADIOLOGY | Facility: HOSPITAL | Age: 87
DRG: 291 | End: 2022-02-21
Payer: MEDICARE

## 2022-02-21 ENCOUNTER — APPOINTMENT (INPATIENT)
Dept: NUCLEAR MEDICINE | Facility: HOSPITAL | Age: 87
DRG: 291 | End: 2022-02-21
Payer: MEDICARE

## 2022-02-21 ENCOUNTER — APPOINTMENT (INPATIENT)
Dept: NON INVASIVE DIAGNOSTICS | Facility: HOSPITAL | Age: 87
DRG: 291 | End: 2022-02-21
Payer: MEDICARE

## 2022-02-21 PROBLEM — R80.9 MICROALBUMINURIA: Status: ACTIVE | Noted: 2022-02-21

## 2022-02-21 PROBLEM — R94.31 ABNORMAL EKG: Status: ACTIVE | Noted: 2022-02-21

## 2022-02-21 PROBLEM — D47.2 BICLONAL GAMMOPATHY: Status: ACTIVE | Noted: 2022-02-21

## 2022-02-21 PROBLEM — I70.0 AORTIC CALCIFICATION (HCC): Status: ACTIVE | Noted: 2022-02-21

## 2022-02-21 PROBLEM — E83.52 HYPERCALCEMIA: Status: ACTIVE | Noted: 2022-02-21

## 2022-02-21 PROBLEM — E11.65 TYPE 2 DIABETES MELLITUS WITH HYPERGLYCEMIA, WITHOUT LONG-TERM CURRENT USE OF INSULIN (HCC): Status: ACTIVE | Noted: 2018-08-26

## 2022-02-21 LAB
ALBUMIN SERPL BCP-MCNC: 2.9 G/DL (ref 3.5–5)
ALP SERPL-CCNC: 90 U/L (ref 46–116)
ALT SERPL W P-5'-P-CCNC: 16 U/L (ref 12–78)
ANION GAP SERPL CALCULATED.3IONS-SCNC: 8 MMOL/L (ref 4–13)
AORTIC ROOT: 3.1 CM
APICAL FOUR CHAMBER EJECTION FRACTION: 52 %
AST SERPL W P-5'-P-CCNC: 20 U/L (ref 5–45)
ATRIAL RATE: 85 BPM
BASOPHILS # BLD AUTO: 0.06 THOUSANDS/ΜL (ref 0–0.1)
BASOPHILS NFR BLD AUTO: 1 % (ref 0–1)
BILIRUB SERPL-MCNC: 0.78 MG/DL (ref 0.2–1)
BUN SERPL-MCNC: 30 MG/DL (ref 5–25)
CALCIUM ALBUM COR SERPL-MCNC: 11.3 MG/DL (ref 8.3–10.1)
CALCIUM SERPL-MCNC: 10.4 MG/DL (ref 8.3–10.1)
CHLORIDE SERPL-SCNC: 97 MMOL/L (ref 100–108)
CO2 SERPL-SCNC: 30 MMOL/L (ref 21–32)
CREAT SERPL-MCNC: 1.8 MG/DL (ref 0.6–1.3)
E WAVE DECELERATION TIME: 265 MS
EOSINOPHIL # BLD AUTO: 0.46 THOUSAND/ΜL (ref 0–0.61)
EOSINOPHIL NFR BLD AUTO: 7 % (ref 0–6)
ERYTHROCYTE [DISTWIDTH] IN BLOOD BY AUTOMATED COUNT: 12.6 % (ref 11.6–15.1)
FRACTIONAL SHORTENING: 28 % (ref 28–44)
GFR SERPL CREATININE-BSD FRML MDRD: 33 ML/MIN/1.73SQ M
GLUCOSE SERPL-MCNC: 192 MG/DL (ref 65–140)
GLUCOSE SERPL-MCNC: 204 MG/DL (ref 65–140)
GLUCOSE SERPL-MCNC: 229 MG/DL (ref 65–140)
GLUCOSE SERPL-MCNC: 241 MG/DL (ref 65–140)
GLUCOSE SERPL-MCNC: 418 MG/DL (ref 65–140)
HCT VFR BLD AUTO: 42.5 % (ref 36.5–49.3)
HGB BLD-MCNC: 14.1 G/DL (ref 12–17)
IMM GRANULOCYTES # BLD AUTO: 0.03 THOUSAND/UL (ref 0–0.2)
IMM GRANULOCYTES NFR BLD AUTO: 0 % (ref 0–2)
INTERVENTRICULAR SEPTUM IN DIASTOLE (PARASTERNAL SHORT AXIS VIEW): 1.5 CM (ref 0.51–0.96)
INTERVENTRICULAR SEPTUM: 1.5 CM (ref 0.6–1.1)
LEFT ATRIUM AREA SYSTOLE SINGLE PLANE A4C: 22.1 CM2
LEFT ATRIUM SIZE: 4.6 CM
LEFT INTERNAL DIMENSION IN SYSTOLE: 3.4 CM (ref 2.55–3.85)
LEFT VENTRICULAR INTERNAL DIMENSION IN DIASTOLE: 4.7 CM (ref 4.16–6.19)
LEFT VENTRICULAR POSTERIOR WALL IN END DIASTOLE: 1 CM (ref 0.5–0.95)
LEFT VENTRICULAR STROKE VOLUME: 57 ML
LVSV (TEICH): 57 ML
LYMPHOCYTES # BLD AUTO: 1.27 THOUSANDS/ΜL (ref 0.6–4.47)
LYMPHOCYTES NFR BLD AUTO: 19 % (ref 14–44)
MAGNESIUM SERPL-MCNC: 2.1 MG/DL (ref 1.6–2.6)
MCH RBC QN AUTO: 28 PG (ref 26.8–34.3)
MCHC RBC AUTO-ENTMCNC: 33.2 G/DL (ref 31.4–37.4)
MCV RBC AUTO: 84 FL (ref 82–98)
MONOCYTES # BLD AUTO: 0.8 THOUSAND/ΜL (ref 0.17–1.22)
MONOCYTES NFR BLD AUTO: 12 % (ref 4–12)
MV E'TISSUE VEL-SEP: 5 CM/S
MV PEAK A VEL: 1.19 M/S
MV PEAK E VEL: 69 CM/S
NEUTROPHILS # BLD AUTO: 4.18 THOUSANDS/ΜL (ref 1.85–7.62)
NEUTS SEG NFR BLD AUTO: 61 % (ref 43–75)
NRBC BLD AUTO-RTO: 0 /100 WBCS
P AXIS: 42 DEGREES
PLATELET # BLD AUTO: 320 THOUSANDS/UL (ref 149–390)
PMV BLD AUTO: 9 FL (ref 8.9–12.7)
POTASSIUM SERPL-SCNC: 4.1 MMOL/L (ref 3.5–5.3)
PR INTERVAL: 182 MS
PROCALCITONIN SERPL-MCNC: 0.35 NG/ML
PROT SERPL-MCNC: 8.1 G/DL (ref 6.4–8.2)
QRS AXIS: 78 DEGREES
QRSD INTERVAL: 88 MS
QT INTERVAL: 374 MS
QTC INTERVAL: 445 MS
RA PRESSURE ESTIMATED: 10 MMHG
RBC # BLD AUTO: 5.04 MILLION/UL (ref 3.88–5.62)
RIGHT ATRIUM AREA SYSTOLE A4C: 15.7 CM2
RIGHT VENTRICLE ID DIMENSION: 2.7 CM
RV PSP: 41 MMHG
SL CV LV EF: 65
SL CV PED ECHO LEFT VENTRICLE DIASTOLIC VOLUME (MOD BIPLANE) 2D: 105 ML
SL CV PED ECHO LEFT VENTRICLE SYSTOLIC VOLUME (MOD BIPLANE) 2D: 48 ML
SODIUM SERPL-SCNC: 135 MMOL/L (ref 136–145)
T WAVE AXIS: -14 DEGREES
TR MAX PG: 31 MMHG
TR PEAK VELOCITY: 2.8 M/S
TRICUSPID ANNULAR PLANE SYSTOLIC EXCURSION: 2.1 CM
TRICUSPID VALVE PEAK REGURGITATION VELOCITY: 2.8 M/S
VENTRICULAR RATE: 85 BPM
WBC # BLD AUTO: 6.8 THOUSAND/UL (ref 4.31–10.16)
Z-SCORE OF INTERVENTRICULAR SEPTUM IN END DIASTOLE: 6.64
Z-SCORE OF LEFT VENTRICULAR DIMENSION IN END DIASTOLE: -0.76
Z-SCORE OF LEFT VENTRICULAR DIMENSION IN END SYSTOLE: 0.65
Z-SCORE OF LEFT VENTRICULAR POSTERIOR WALL IN END DIASTOLE: 2.39

## 2022-02-21 PROCEDURE — 78580 LUNG PERFUSION IMAGING: CPT

## 2022-02-21 PROCEDURE — 97530 THERAPEUTIC ACTIVITIES: CPT

## 2022-02-21 PROCEDURE — 97116 GAIT TRAINING THERAPY: CPT

## 2022-02-21 PROCEDURE — 93306 TTE W/DOPPLER COMPLETE: CPT

## 2022-02-21 PROCEDURE — 93010 ELECTROCARDIOGRAM REPORT: CPT | Performed by: INTERNAL MEDICINE

## 2022-02-21 PROCEDURE — G1004 CDSM NDSC: HCPCS

## 2022-02-21 PROCEDURE — 93306 TTE W/DOPPLER COMPLETE: CPT | Performed by: INTERNAL MEDICINE

## 2022-02-21 PROCEDURE — 80053 COMPREHEN METABOLIC PANEL: CPT | Performed by: INTERNAL MEDICINE

## 2022-02-21 PROCEDURE — 86037 ANCA TITER EACH ANTIBODY: CPT | Performed by: PHYSICIAN ASSISTANT

## 2022-02-21 PROCEDURE — 83735 ASSAY OF MAGNESIUM: CPT | Performed by: INTERNAL MEDICINE

## 2022-02-21 PROCEDURE — 83520 IMMUNOASSAY QUANT NOS NONAB: CPT | Performed by: PHYSICIAN ASSISTANT

## 2022-02-21 PROCEDURE — 97535 SELF CARE MNGMENT TRAINING: CPT

## 2022-02-21 PROCEDURE — 99223 1ST HOSP IP/OBS HIGH 75: CPT | Performed by: INTERNAL MEDICINE

## 2022-02-21 PROCEDURE — 85025 COMPLETE CBC W/AUTO DIFF WBC: CPT | Performed by: INTERNAL MEDICINE

## 2022-02-21 PROCEDURE — 71045 X-RAY EXAM CHEST 1 VIEW: CPT

## 2022-02-21 PROCEDURE — 84145 PROCALCITONIN (PCT): CPT | Performed by: INTERNAL MEDICINE

## 2022-02-21 PROCEDURE — A9540 TC99M MAA: HCPCS

## 2022-02-21 PROCEDURE — 99232 SBSQ HOSP IP/OBS MODERATE 35: CPT | Performed by: INTERNAL MEDICINE

## 2022-02-21 PROCEDURE — 82948 REAGENT STRIP/BLOOD GLUCOSE: CPT

## 2022-02-21 RX ORDER — INSULIN GLARGINE 100 [IU]/ML
5 INJECTION, SOLUTION SUBCUTANEOUS EVERY MORNING
Status: DISCONTINUED | OUTPATIENT
Start: 2022-02-21 | End: 2022-02-22 | Stop reason: HOSPADM

## 2022-02-21 RX ORDER — LANOLIN ALCOHOL/MO/W.PET/CERES
3 CREAM (GRAM) TOPICAL
Status: DISCONTINUED | OUTPATIENT
Start: 2022-02-21 | End: 2022-02-22 | Stop reason: HOSPADM

## 2022-02-21 RX ORDER — ATORVASTATIN CALCIUM 40 MG/1
40 TABLET, FILM COATED ORAL
Status: DISCONTINUED | OUTPATIENT
Start: 2022-02-21 | End: 2022-02-22 | Stop reason: HOSPADM

## 2022-02-21 RX ADMIN — AMLODIPINE BESYLATE 10 MG: 10 TABLET ORAL at 08:44

## 2022-02-21 RX ADMIN — ESCITALOPRAM OXALATE 10 MG: 10 TABLET ORAL at 08:44

## 2022-02-21 RX ADMIN — ATORVASTATIN CALCIUM 40 MG: 40 TABLET, FILM COATED ORAL at 17:21

## 2022-02-21 RX ADMIN — SUCRALFATE 1 G: 1 TABLET ORAL at 08:43

## 2022-02-21 RX ADMIN — PANTOPRAZOLE SODIUM 40 MG: 40 TABLET, DELAYED RELEASE ORAL at 08:44

## 2022-02-21 RX ADMIN — INSULIN LISPRO 1 UNITS: 100 INJECTION, SOLUTION INTRAVENOUS; SUBCUTANEOUS at 17:21

## 2022-02-21 RX ADMIN — INSULIN GLARGINE 5 UNITS: 100 INJECTION, SOLUTION SUBCUTANEOUS at 14:00

## 2022-02-21 RX ADMIN — SUCRALFATE 1 G: 1 TABLET ORAL at 17:21

## 2022-02-21 RX ADMIN — HEPARIN SODIUM 5000 UNITS: 5000 INJECTION INTRAVENOUS; SUBCUTANEOUS at 21:54

## 2022-02-21 RX ADMIN — HEPARIN SODIUM 5000 UNITS: 5000 INJECTION INTRAVENOUS; SUBCUTANEOUS at 05:31

## 2022-02-21 RX ADMIN — ISOSORBIDE MONONITRATE 120 MG: 60 TABLET, EXTENDED RELEASE ORAL at 08:44

## 2022-02-21 RX ADMIN — PROPRANOLOL HYDROCHLORIDE 120 MG: 60 CAPSULE, EXTENDED RELEASE ORAL at 08:43

## 2022-02-21 RX ADMIN — INSULIN LISPRO 5 UNITS: 100 INJECTION, SOLUTION INTRAVENOUS; SUBCUTANEOUS at 11:42

## 2022-02-21 RX ADMIN — INSULIN LISPRO 1 UNITS: 100 INJECTION, SOLUTION INTRAVENOUS; SUBCUTANEOUS at 21:54

## 2022-02-21 RX ADMIN — INSULIN LISPRO 2 UNITS: 100 INJECTION, SOLUTION INTRAVENOUS; SUBCUTANEOUS at 08:45

## 2022-02-21 RX ADMIN — HEPARIN SODIUM 5000 UNITS: 5000 INJECTION INTRAVENOUS; SUBCUTANEOUS at 14:00

## 2022-02-21 RX ADMIN — FUROSEMIDE 20 MG: 20 TABLET ORAL at 17:28

## 2022-02-21 RX ADMIN — CLOPIDOGREL BISULFATE 75 MG: 75 TABLET ORAL at 08:44

## 2022-02-21 RX ADMIN — CEFTRIAXONE 1000 MG: 1 INJECTION, SOLUTION INTRAVENOUS at 16:08

## 2022-02-21 RX ADMIN — FUROSEMIDE 20 MG: 20 TABLET ORAL at 08:44

## 2022-02-21 RX ADMIN — Medication 3 MG: at 22:52

## 2022-02-21 RX ADMIN — TAMSULOSIN HYDROCHLORIDE 0.8 MG: 0.4 CAPSULE ORAL at 17:21

## 2022-02-21 NOTE — PROGRESS NOTES
[address] [facility] [address]  Progress Note - Gala Salvage 1935, 82 y o  male MRN: [id number]  Unit/Bed#: 409-01 Encounter: [id number]  Primary Care Provider: Charlotte Ovalle DO   Date and time admitted to hospital: 2/18/2022 12:35 PM    * Acute respiratory failure with hypoxia [facility])  Assessment & Plan  Presented with RLE pain and swelling, dyspnea, and hypoxia  Originally required 4 L supplemental O2 by NC  · Requiring 2 lpm of continuous supplemental oxygen via the nasal cannula to maintain oxygen saturation levels of 90% and above on 02/21/2022      Patient has a history of diastolic CHF - hospitalized late last month and treated for acute exacerbation  · CXR with mild pulmonary venous congestion  · ProBNP 1,791, up from a value of 644 3/2021  · Maintain on low-sodium diet  · Was on IV furosemide 40mg BID - changed to lasix 20 mg PO BID due to worsening renal function  · Continue cardiac regimen of BB, Imdur, clopidogrel, and statin therapy  Patient presented with a temperature of 100° F, leukocytosis with a WBC count of 13 2, and mild lactic acidosis (resolved)  · Procalcitonin elevated at 0 6 --> 0 7, 0 59  · CT with persisting chronic interstitial changes in the periphery of the upper and lower lobes  · CT also with LLL airspace consolidation, stable since 03/2021  Favors chronic changes/atelectasis  · However, given patient's presentation with elevated temperature (but not fever), leukocytosis that resolved, and mild lactic acidosis and hypoxia, favor short course of antibiotic therapy  Plan on ceftriaxone for 5 days  Trend procalcitonin  Also with elevated D-dimer at 1 76  · Lower extremity Dopplers were limited but did not show any evidence of DVT  · No gross evidence of PE clinically and patient's hypoxia has improved with diuresis and initial antibiotic  Hold off on active anticoagulation, and maintain on VTE prophylaxis    · Check a nuclear medicine V/Q lung scan   · The patient was seen in consultation by Pulmonology  · The patient will need a home supplemental oxygen evaluation on the day of discharge  Microalbuminuria  Assessment & Plan  · Outpatient Nephrology evaluation    Biclonal gammopathy  Assessment & Plan  · Outpatient Hematology/Oncology evaluation    Hypercalcemia  Assessment & Plan  · Check an ionized calcium level  · Check an intact-PTH level and PTH-related peptide level  · Outpatient Endocrinology evaluation    Aortic calcification (HCC)  Assessment & Plan  · Continue plavix 75 mg PO Qdaily  · Increase to high-intensity statin therapy with atorvastatin 40 mg PO Qdaily with dinner  Essential hypertension  Assessment & Plan  Continue propranolol, amlodipine, and imdur  Follow the blood pressure trend    Chronic diastolic congestive heart failure (HCC)  Assessment & Plan  Wt Readings from Last 3 Encounters:   02/21/22 70 3 kg (155 lb)   01/30/22 77 6 kg (171 lb)   01/30/22 77 6 kg (171 lb)     Last ECHO 03/2021 with LVEF 65%, G1 DD, mild-to-moderate MR  RV systolic function was normal   Consider updating ECHO if patient is still hospitalized during the week here  Continue lasix 20 mg PO BID  Elevated d-dimer  Assessment & Plan  · Check a nuclear medicine V/Q lung scan in the setting of renal insufficiency    Results for Imtiaz Main (MRN [id number]) as of 2/21/2022 12:22   Ref  Range 2/18/2022 16:29   D-Dimer, Chiqui Proper Latest Ref Range: <0 50 ug/ml FEU 1 76 (H)       VAS lower limb venous duplex study, unilateral/limited    Status: Final result       PACS Images     Show images for VAS lower limb venous duplex study, unilateral/limited    Study Result    Narrative & Impression      [facility] REPORT  CLINICAL:  Indications: Patient presents with right lower extremity weakness and edema x 4  days  Suspected Covid    Operative History:  CABG  Risk Factors  The patient has history of Obesity, HTN, Diabetes (IDDM), Hyperlipidemia, CKD,  CAD and previous smoking (quit 1-5yrs ago)  CONCLUSION:     Impression:  RIGHT LOWER LIMB: Limited  No gross evidence of acute deep vein thrombosis in the CFV, FV, Popliteal,  Gastrocnemius, Posterior Tibial and Peroneal Veins  LEFT LOWER LIMB: Limited  No gross evidence of acute  deep vein thrombosis in the CFV  Limited protocol performed     Technical findings were given to Toshia Cormier PA-C           Vitamin D deficiency  Assessment & Plan  · Check a vitamin D 25-OH level    Proteinuria  Assessment & Plan  · Outpatient Nephrology evaluation    Stage 3b chronic kidney disease [facility])  Assessment & Plan  Lab Results   Component Value Date    EGFR 33 02/21/2022    EGFR 28 02/19/2022    EGFR 34 02/19/2022    CREATININE 1 80 (H) 02/21/2022    CREATININE 2 04 (H) 02/19/2022    CREATININE 1 74 (H) 02/19/2022     Creatinine baseline of 1 5-1 7 mg/dl dating back to 2020  Continue to monitor, avoid nephrotoxins, and renally dose medications when appropriate  Serial laboratory testing to monitor the patient's renal function and electrolyte levels  Outpatient Nephrology evaluation      Gastroesophageal reflux disease without esophagitis  Assessment & Plan  · Hold PPI with CKD    Coronary artery disease of native artery of native heart with stable angina pectoris [facility])  Assessment & Plan  Status post CABG X 02/19/1992, with SVG to the circumflex and RCA  Repeat CABG 2005 with LIMA to LAD, SVG to posterior descending and posterolateral branch, radial artery graft to OM  Stenting of high-grade stenosis of 1st OM graft in 2009  Currently asymptomatic, with negative cardiac biomarkers  Continue clopidogrel, beta-blocker, and statin therapy  Patient is also on long-acting nitrate  Increase to high-intensity statin therapy with atorvastatin 40 mg PO Qdaily with dinner      Type 2 diabetes mellitus with hyperglycemia, without long-term current use of insulin [facility])  Assessment & Plan    Lab Results   Component Value Date    HGBA1C 9 5 (H) 2022     · Discontinue glimepiride with renal insufficiency  · Initiate lantus 5 units SQ Qdaily  · Not on ACE-Inhibitor or ARB therapy at this time due to a fluctuating renal function status  · Insulin sliding scale with blood glucose monitoring ACHS  · Outpatient Endocrinology evaluation        VTE Pharmacologic Prophylaxis: VTE Score: 5 High Risk (Score >/= 5) - Pharmacological DVT Prophylaxis Ordered: heparin  Sequential Compression Devices Ordered  Patient Centered Rounds: I performed bedside rounds with nursing staff today  Discussions with Specialists or Other Care Team Provider: I discussed the case with Pulmonology  Time Spent for Care: 30 minutes  More than 50% of total time spent on counseling and coordination of care as described above  Current Length of Stay: 3 day(s)  Current Patient Status: Inpatient   Certification Statement: The patient will continue to require additional inpatient hospital stay due to the need for IV antibiotic treatment, with the need for continuous supplemental oxygen to maintain adequate oxygen saturation levels, and for serial laboratory testing to monitor his renal function  Discharge Plan: Anticipate discharge in 24-48 hrs to home with home services  Code Status: Level 1 - Full Code    Subjective: The patient was seen and examined  The patient is doing better  No chest pain  No shortness of breath  No abdominal pain  No nausea or vomiting  Objective:     Vitals:   Temp (24hrs), Av 1 °F (36 7 °C), Min:97 8 °F (36 6 °C), Max:98 2 °F (36 8 °C)    Temp:  [97 8 °F (36 6 °C)-98 2 °F (36 8 °C)] 98 2 °F (36 8 °C)  HR:  [73-84] 80  Resp:  [16-18] 18  BP: (119-132)/(56-61) 123/56  SpO2:  [90 %-94 %] 94 %  Body mass index is 24 28 kg/m²  Input and Output Summary (last 24 hours):      Intake/Output Summary (Last 24 hours) at 2022 1315  Last data filed at 2022 1100  Gross per 24 hour   Intake 640 ml   Output 975 ml   Net -335 ml       Physical Exam:   Physical Exam   General:  NAD, follows commands  HEENT:  NC/AT, mucous membranes moist  Neck:  Supple, No JVP elevation  CV:  + S1, + S2, RRR  Pulm:  Bibasilar crackles  Abd:  Soft, Non-tender, Non-distended  Ext:  No clubbing/cyanosis/edema  Skin:  No rashes  Neuro:  Awake, alert, oriented  Psych:  Normal mood and affect      Additional Data:    Labs:  Results from last 7 days   Lab Units 02/21/22  0420   WBC Thousand/uL 6 80   HEMOGLOBIN g/dL 14 1   HEMATOCRIT % 42 5   PLATELETS Thousands/uL 320   NEUTROS PCT % 61   LYMPHS PCT % 19   MONOS PCT % 12   EOS PCT % 7*     Results from last 7 days   Lab Units 02/21/22  0823   SODIUM mmol/L 135*   POTASSIUM mmol/L 4 1   CHLORIDE mmol/L 97*   CO2 mmol/L 30   BUN mg/dL 30*   CREATININE mg/dL 1 80*   ANION GAP mmol/L 8   CALCIUM mg/dL 10 4*   ALBUMIN g/dL 2 9*   TOTAL BILIRUBIN mg/dL 0 78   ALK PHOS U/L 90   ALT U/L 16   AST U/L 20   GLUCOSE RANDOM mg/dL 241*     Results from last 7 days   Lab Units 02/18/22  1314   INR  1 26*     Results from last 7 days   Lab Units 02/21/22  1139 02/21/22  0742 02/20/22  2110 02/20/22  1549 02/20/22  1035 02/20/22  0728 02/19/22  1110 02/19/22  0705 02/18/22  2130 02/18/22  1809   POC GLUCOSE mg/dl 418* 229* 255* 298* 421* 188* 258* 140 230* 219*     Results from last 7 days   Lab Units 02/19/22  0415   HEMOGLOBIN A1C % 9 5*     Results from last 7 days   Lab Units 02/21/22  0420 02/20/22  0506 02/19/22  0511 02/18/22  1533 02/18/22  1314   LACTIC ACID mmol/L  --   --   --  1 2 2 2*   PROCALCITONIN ng/ml 0 35* 0 56* 0 70*  --  0 60*       Lines/Drains:  Invasive Devices  Report    Peripheral Intravenous Line            Peripheral IV 02/21/22 Dorsal (posterior); Right Forearm <1 day                      Imaging: No pertinent imaging reviewed  Recent Cultures (last 7 days):   Results from last 7 days   Lab Units 02/18/22  1314   BLOOD CULTURE  No Growth at 48 hrs    No Growth at 48 hrs  Last 24 Hours Medication List:   Current Facility-Administered Medications   Medication Dose Route Frequency Provider Last Rate    acetaminophen  650 mg Oral Q6H PRN Pacheco Archer MD      albuterol  2 5 mg Nebulization Q6H PRN Geronimo Loya MD      amLODIPine  10 mg Oral Daily Pacheco Archer MD      atorvastatin  40 mg Oral Daily With Jocelyn Antunez DO      cefTRIAXone  1,000 mg Intravenous Q24H Geronimo Loya MD 1,000 mg (02/20/22 0720)    clopidogrel  75 mg Oral Daily Pacheco Archer MD      escitalopram  10 mg Oral Daily Pacheco Archer MD      furosemide  20 mg Oral BID (diuretic) Geronimo Loya MD      heparin (porcine)  5,000 Units Subcutaneous Q8H [facility] Pacheco Archer MD      insulin glargine  5 Units Subcutaneous QAM Jonathan Antunez DO      insulin lispro  1-5 Units Subcutaneous TID AC Pacheco Archer MD      insulin lispro  1-5 Units Subcutaneous HS Pacheco Archer MD      isosorbide mononitrate  120 mg Oral Daily Pacheco Archer MD      ondansetron  4 mg Intravenous Q6H PRN Pacheco Archer MD      polyethylene glycol  17 g Oral Daily PRN Pacheco Archer MD      propranolol  120 mg Oral Daily Pacheco Archer MD      sucralfate  1 g Oral BID Pacheco Archer MD      tamsulosin  0 8 mg Oral Daily With Nae Gallo MD          Today, Patient Was Seen By: Lainey Stanley DO    **Please Note: This note may have been constructed using a voice recognition system  **

## 2022-02-21 NOTE — PLAN OF CARE
Problem: MOBILITY - ADULT  Goal: Maintain or return to baseline ADL function  Description: INTERVENTIONS:  -  Assess patient's ability to carry out ADLs; assess patient's baseline for ADL function and identify physical deficits which impact ability to perform ADLs (bathing, care of mouth/teeth, toileting, grooming, dressing, etc )  - Assess/evaluate cause of self-care deficits   - Assess range of motion  - Assess patient's mobility; develop plan if impaired  - Assess patient's need for assistive devices and provide as appropriate  - Encourage maximum independence but intervene and supervise when necessary  - Involve family in performance of ADLs  - Assess for home care needs following discharge   - Consider OT consult to assist with ADL evaluation and planning for discharge  - Provide patient education as appropriate  Outcome: Progressing  Goal: Maintains/Returns to pre admission functional level  Description: INTERVENTIONS:  - Perform BMAT or MOVE assessment daily    - Set and communicate daily mobility goal to care team and patient/family/caregiver  - Collaborate with rehabilitation services on mobility goals if consulted  - Perform Range of Motion 4 times a day  - Reposition patient every 2 hours    - Dangle patient 3 times a day  - Stand patient 3 times a day  - Ambulate patient 3 times a day  - Out of bed to chair 3 times a day   - Out of bed for meals 3 times a day  - Out of bed for toileting  - Record patient progress and toleration of activity level   Outcome: Progressing     Problem: PAIN - ADULT  Goal: Verbalizes/displays adequate comfort level or baseline comfort level  Description: Interventions:  - Encourage patient to monitor pain and request assistance  - Assess pain using appropriate pain scale  - Administer analgesics based on type and severity of pain and evaluate response  - Implement non-pharmacological measures as appropriate and evaluate response  - Consider cultural and social influences on pain and pain management  - Notify physician/advanced practitioner if interventions unsuccessful or patient reports new pain  Outcome: Progressing     Problem: INFECTION - ADULT  Goal: Absence or prevention of progression during hospitalization  Description: INTERVENTIONS:  - Assess and monitor for signs and symptoms of infection  - Monitor lab/diagnostic results  - Monitor all insertion sites, i e  indwelling lines, tubes, and drains  - Monitor endotracheal if appropriate and nasal secretions for changes in amount and color  - Phil Campbell appropriate cooling/warming therapies per order  - Administer medications as ordered  - Instruct and encourage patient and family to use good hand hygiene technique  - Identify and instruct in appropriate isolation precautions for identified infection/condition  Outcome: Progressing     Problem: SAFETY ADULT  Goal: Maintain or return to baseline ADL function  Description: INTERVENTIONS:  -  Assess patient's ability to carry out ADLs; assess patient's baseline for ADL function and identify physical deficits which impact ability to perform ADLs (bathing, care of mouth/teeth, toileting, grooming, dressing, etc )  - Assess/evaluate cause of self-care deficits   - Assess range of motion  - Assess patient's mobility; develop plan if impaired  - Assess patient's need for assistive devices and provide as appropriate  - Encourage maximum independence but intervene and supervise when necessary  - Involve family in performance of ADLs  - Assess for home care needs following discharge   - Consider OT consult to assist with ADL evaluation and planning for discharge  - Provide patient education as appropriate  Outcome: Progressing  Goal: Maintains/Returns to pre admission functional level  Description: INTERVENTIONS:  - Perform BMAT or MOVE assessment daily    - Set and communicate daily mobility goal to care team and patient/family/caregiver     - Collaborate with rehabilitation services on mobility goals if consulted  - Perform Range of Motion 4 times a day  - Reposition patient every 2 hours    - Dangle patient 3 times a day  - Stand patient 3 times a day  - Ambulate patient 3 times a day  - Out of bed to chair 3 times a day   - Out of bed for meals 3 times a day  - Out of bed for toileting  - Record patient progress and toleration of activity level   Outcome: Progressing  Goal: Patient will remain free of falls  Description: INTERVENTIONS:  - Educate patient/family on patient safety including physical limitations  - Instruct patient to call for assistance with activity   - Consult OT/PT to assist with strengthening/mobility   - Keep Call bell within reach  - Keep bed low and locked with side rails adjusted as appropriate  - Keep care items and personal belongings within reach  - Initiate and maintain comfort rounds  - Make Fall Risk Sign visible to staff  - Offer Toileting every 2 Hours, in advance of need  - Initiate/Maintain fall alarm  - Obtain necessary fall risk management equipment: alarm, nonskid socks, yellow wristband  - Apply yellow socks and bracelet for high fall risk patients  - Consider moving patient to room near nurses station  Outcome: Progressing     Problem: DISCHARGE PLANNING  Goal: Discharge to home or other facility with appropriate resources  Description: INTERVENTIONS:  - Identify barriers to discharge w/patient and caregiver  - Arrange for needed discharge resources and transportation as appropriate  - Identify discharge learning needs (meds, wound care, etc )  - Arrange for interpretive services to assist at discharge as needed  - Refer to Case Management Department for coordinating discharge planning if the patient needs post-hospital services based on physician/advanced practitioner order or complex needs related to functional status, cognitive ability, or social support system  Outcome: Progressing     Problem: Knowledge Deficit  Goal: Patient/family/caregiver demonstrates understanding of disease process, treatment plan, medications, and discharge instructions  Description: Complete learning assessment and assess knowledge base    Interventions:  - Provide teaching at level of understanding  - Provide teaching via preferred learning methods  Outcome: Progressing     Problem: CARDIOVASCULAR - ADULT  Goal: Maintains optimal cardiac output and hemodynamic stability  Description: INTERVENTIONS:  - Monitor I/O, vital signs and rhythm  - Monitor for S/S and trends of decreased cardiac output  - Administer and titrate ordered vasoactive medications to optimize hemodynamic stability  - Assess quality of pulses, skin color and temperature  - Assess for signs of decreased coronary artery perfusion  - Instruct patient to report change in severity of symptoms  Outcome: Progressing  Goal: Absence of cardiac dysrhythmias or at baseline rhythm  Description: INTERVENTIONS:  - Continuous cardiac monitoring, vital signs, obtain 12 lead EKG if ordered  - Administer antiarrhythmic and heart rate control medications as ordered  - Monitor electrolytes and administer replacement therapy as ordered  Outcome: Progressing     Problem: RESPIRATORY - ADULT  Goal: Achieves optimal ventilation and oxygenation  Description: INTERVENTIONS:  - Assess for changes in respiratory status  - Assess for changes in mentation and behavior  - Position to facilitate oxygenation and minimize respiratory effort  - Oxygen administered by appropriate delivery if ordered  - Initiate smoking cessation education as indicated  - Encourage broncho-pulmonary hygiene including cough, deep breathe, Incentive Spirometry  - Assess the need for suctioning and aspirate as needed  - Assess and instruct to report SOB or any respiratory difficulty  - Respiratory Therapy support as indicated  Outcome: Progressing     Problem: METABOLIC, FLUID AND ELECTROLYTES - ADULT  Goal: Electrolytes maintained within normal limits  Description: INTERVENTIONS:  - Monitor labs and assess patient for signs and symptoms of electrolyte imbalances  - Administer electrolyte replacement as ordered  - Monitor response to electrolyte replacements, including repeat lab results as appropriate  - Instruct patient on fluid and nutrition as appropriate  Outcome: Progressing  Goal: Fluid balance maintained  Description: INTERVENTIONS:  - Monitor labs   - Monitor I/O and WT  - Instruct patient on fluid and nutrition as appropriate  - Assess for signs & symptoms of volume excess or deficit  Outcome: Progressing  Goal: Glucose maintained within target range  Description: INTERVENTIONS:  - Monitor Blood Glucose as ordered  - Assess for signs and symptoms of hyperglycemia and hypoglycemia  - Administer ordered medications to maintain glucose within target range  - Assess nutritional intake and initiate nutrition service referral as needed  Outcome: Progressing     Problem: HEMATOLOGIC - ADULT  Goal: Maintains hematologic stability  Description: INTERVENTIONS  - Assess for signs and symptoms of bleeding or hemorrhage  - Monitor labs  - Administer supportive blood products/factors as ordered and appropriate  Outcome: Progressing     Problem: Potential for Falls  Goal: Patient will remain free of falls  Description: INTERVENTIONS:  - Educate patient/family on patient safety including physical limitations  - Instruct patient to call for assistance with activity   - Consult OT/PT to assist with strengthening/mobility   - Keep Call bell within reach  - Keep bed low and locked with side rails adjusted as appropriate  - Keep care items and personal belongings within reach  - Initiate and maintain comfort rounds  - Make Fall Risk Sign visible to staff  - Offer Toileting every 2 Hours, in advance of need  - Initiate/Maintain fall alarm  - Obtain necessary fall risk management equipment: alarm, nonskid socks, yellow wristband  - Apply yellow socks and bracelet for high fall risk patients  - Consider moving patient to room near nurses station  Outcome: Progressing

## 2022-02-21 NOTE — ASSESSMENT & PLAN NOTE
· Check an ionized calcium level  · Check an intact-PTH level and PTH-related peptide level  · Outpatient Endocrinology evaluation

## 2022-02-21 NOTE — PLAN OF CARE
- Continue home steroids and Bactrim for PCP prophylaxis   Problem: PHYSICAL THERAPY ADULT  Goal: Performs mobility at highest level of function for planned discharge setting  See evaluation for individualized goals  Description:  Outcome: Progressing  Note: Prognosis: Good  Problem List: Decreased strength,Decreased endurance,Impaired balance,Decreased mobility  Assessment: Pt  seen for PT treatment session this date with interventions consisting of transfers and  gait training w/ emphasis on improving pt's ability to ambulate  Pt  Requiring occastional cues for sequence and safety  In comparison to previous session, Pt  With improvements in activity tolerance  Pt is in need of continued activity in PT to improve strength balance endurance mobility transfers and ambulation with return to maximize LOF  From PT/mobility standpoint, recommendation at time of d/c would be home health rehab  in order to promote return to PLOF and independence  The patient's AM-PAC Basic Mobility Inpatient Short Form Raw Score is 19  A Raw score of greater than 16 suggests the patient may benefit from discharge to home  Please also refer to physical therapy recommendation for safe DC planning  PT Discharge Recommendation: Home with home health rehabilitation          See flowsheet documentation for full assessment

## 2022-02-21 NOTE — PHYSICAL THERAPY NOTE
PHYSICAL THERAPY NOTE          Patient Name: Kinsye Hu  WTHLS'Q Date: 2/21/2022 02/21/22 6030   Note Type   Note Type Treatment   Pain Assessment   Pain Assessment Tool 0-10   Pain Score No Pain   Restrictions/Precautions   Weight Bearing Precautions Per Order No   Other Precautions Telemetry;O2;Fall Risk; Chair Alarm   Cognition   Overall Cognitive Status [facility]   Arousal/Participation Alert; Cooperative   Following Commands Follows all commands and directions without difficulty   Subjective   Subjective Pt  would like to ambulate  Bed Mobility   Additional Comments seated EOB at start of session   Transfers   Sit to Stand 5  Supervision   Additional items Assist x 1; Increased time required;Verbal cues   Stand to Sit 5  Supervision   Additional items Armrests; Increased time required;Verbal cues   Stand pivot 5  Supervision   Additional items Verbal cues   Toilet transfer 5  Supervision   Additional items Assist x 1; Increased time required;Standard toilet;Verbal cues   Additional Comments Able to manage hygiene  Min A for clothing  Ambulation/Elevation   Gait pattern Excessively slow; Short stride; Foward flexed   Gait Assistance 5  Supervision   Additional items Assist x 1;Verbal cues   Assistive Device Rolling walker   Distance 200'  x 1  15' x 2   Balance   Static Sitting Good   Dynamic Sitting Fair +   Static Standing Fair   Dynamic Standing Fair -   Ambulatory Fair -   Endurance Deficit   Endurance Deficit Yes   Activity Tolerance   Activity Tolerance Patient limited by fatigue   Assessment   Prognosis Good   Problem List Decreased strength;Decreased endurance; Impaired balance;Decreased mobility   Assessment Pt  seen for PT treatment session this date with interventions consisting of transfers and  gait training w/ emphasis on improving pt's ability to ambulate  Pt  Requiring occastional cues for sequence and safety   In comparison to previous session, Pt  With improvements in activity tolerance  Pt is in need of continued activity in PT to improve strength balance endurance mobility transfers and ambulation with return to maximize LOF  From PT/mobility standpoint, recommendation at time of d/c would be home health rehab  in order to promote return to PLOF and independence  The patient's AM-PAC Basic Mobility Inpatient Short Form Raw Score is 19  A Raw score of greater than 16 suggests the patient may benefit from discharge to home  Please also refer to physical therapy recommendation for safe DC planning  Goals   LTG Expiration Date 03/05/22   Plan   Treatment/Interventions Functional transfer training;LE strengthening/ROM; Therapeutic exercise; Endurance training;Bed mobility;Gait training   Progress Progressing toward goals   Recommendation   PT Discharge Recommendation Home with home health rehabilitation   AM-PAC Basic Mobility Inpatient   Turning in Bed Without Bedrails 3   Lying on Back to Sitting on Edge of Flat Bed 3   Moving Bed to Chair 3   Standing Up From Chair 4   Walk in Room 3   Climb 3-5 Stairs 3   Basic Mobility Inpatient Raw Score 19   Basic Mobility Standardized Score 42 48   Highest Level Of Mobility   JH-HLM Goal 6: Walk 10 steps or more   JH-HLM Highest Level of Mobility 7: Walk 25 feet or more   JH-HLM Goal Achieved Yes   Education   Education Provided Mobility training   Patient Demonstrates acceptance/verbal understanding   End of Consult   Patient Position at End of Consult Bed/Chair alarm activated; All needs within reach; Bedside chair   End of Consult Comments discussed POC with PT

## 2022-02-21 NOTE — PLAN OF CARE
Problem: MOBILITY - ADULT  Goal: Maintain or return to baseline ADL function  Description: INTERVENTIONS:  -  Assess patient's ability to carry out ADLs; assess patient's baseline for ADL function and identify physical deficits which impact ability to perform ADLs (bathing, care of mouth/teeth, toileting, grooming, dressing, etc )  - Assess/evaluate cause of self-care deficits   - Assess range of motion  - Assess patient's mobility; develop plan if impaired  - Assess patient's need for assistive devices and provide as appropriate  - Encourage maximum independence but intervene and supervise when necessary  - Involve family in performance of ADLs  - Assess for home care needs following discharge   - Consider OT consult to assist with ADL evaluation and planning for discharge  - Provide patient education as appropriate  Outcome: Progressing  Goal: Maintains/Returns to pre admission functional level  Description: INTERVENTIONS:  - Perform BMAT or MOVE assessment daily    - Set and communicate daily mobility goal to care team and patient/family/caregiver  - Collaborate with rehabilitation services on mobility goals if consulted  - Perform Range of Motion 4 times a day  - Reposition patient every 2 hours    - Dangle patient 3 times a day  - Stand patient 3 times a day  - Ambulate patient 3 times a day  - Out of bed to chair 3 times a day   - Out of bed for meals 3 times a day  - Out of bed for toileting  - Record patient progress and toleration of activity level   Outcome: Progressing     Problem: PAIN - ADULT  Goal: Verbalizes/displays adequate comfort level or baseline comfort level  Description: Interventions:  - Encourage patient to monitor pain and request assistance  - Assess pain using appropriate pain scale  - Administer analgesics based on type and severity of pain and evaluate response  - Implement non-pharmacological measures as appropriate and evaluate response  - Consider cultural and social influences on pain and pain management  - Notify physician/advanced practitioner if interventions unsuccessful or patient reports new pain  Outcome: Progressing     Problem: INFECTION - ADULT  Goal: Absence or prevention of progression during hospitalization  Description: INTERVENTIONS:  - Assess and monitor for signs and symptoms of infection  - Monitor lab/diagnostic results  - Monitor all insertion sites, i e  indwelling lines, tubes, and drains  - Monitor endotracheal if appropriate and nasal secretions for changes in amount and color  - Victoria appropriate cooling/warming therapies per order  - Administer medications as ordered  - Instruct and encourage patient and family to use good hand hygiene technique  - Identify and instruct in appropriate isolation precautions for identified infection/condition  Outcome: Progressing     Problem: SAFETY ADULT  Goal: Maintain or return to baseline ADL function  Description: INTERVENTIONS:  -  Assess patient's ability to carry out ADLs; assess patient's baseline for ADL function and identify physical deficits which impact ability to perform ADLs (bathing, care of mouth/teeth, toileting, grooming, dressing, etc )  - Assess/evaluate cause of self-care deficits   - Assess range of motion  - Assess patient's mobility; develop plan if impaired  - Assess patient's need for assistive devices and provide as appropriate  - Encourage maximum independence but intervene and supervise when necessary  - Involve family in performance of ADLs  - Assess for home care needs following discharge   - Consider OT consult to assist with ADL evaluation and planning for discharge  - Provide patient education as appropriate  Outcome: Progressing  Goal: Maintains/Returns to pre admission functional level  Description: INTERVENTIONS:  - Perform BMAT or MOVE assessment daily    - Set and communicate daily mobility goal to care team and patient/family/caregiver     - Collaborate with rehabilitation services on mobility goals if consulted  - Perform Range of Motion 4 times a day  - Reposition patient every 2 hours    - Dangle patient 3 times a day  - Stand patient 3 times a day  - Ambulate patient 3 times a day  - Out of bed to chair 3 times a day   - Out of bed for meals 3 times a day  - Out of bed for toileting  - Record patient progress and toleration of activity level   Outcome: Progressing  Goal: Patient will remain free of falls  Description: INTERVENTIONS:  - Educate patient/family on patient safety including physical limitations  - Instruct patient to call for assistance with activity   - Consult OT/PT to assist with strengthening/mobility   - Keep Call bell within reach  - Keep bed low and locked with side rails adjusted as appropriate  - Keep care items and personal belongings within reach  - Initiate and maintain comfort rounds  - Make Fall Risk Sign visible to staff  - Offer Toileting every 2 Hours, in advance of need  - Initiate/Maintain fall alarm  - Obtain necessary fall risk management equipment: alarm, nonskid socks, yellow wristband  - Apply yellow socks and bracelet for high fall risk patients  - Consider moving patient to room near nurses station  Outcome: Progressing     Problem: DISCHARGE PLANNING  Goal: Discharge to home or other facility with appropriate resources  Description: INTERVENTIONS:  - Identify barriers to discharge w/patient and caregiver  - Arrange for needed discharge resources and transportation as appropriate  - Identify discharge learning needs (meds, wound care, etc )  - Arrange for interpretive services to assist at discharge as needed  - Refer to Case Management Department for coordinating discharge planning if the patient needs post-hospital services based on physician/advanced practitioner order or complex needs related to functional status, cognitive ability, or social support system  Outcome: Progressing     Problem: Knowledge Deficit  Goal: Patient/family/caregiver demonstrates understanding of disease process, treatment plan, medications, and discharge instructions  Description: Complete learning assessment and assess knowledge base    Interventions:  - Provide teaching at level of understanding  - Provide teaching via preferred learning methods  Outcome: Progressing     Problem: CARDIOVASCULAR - ADULT  Goal: Maintains optimal cardiac output and hemodynamic stability  Description: INTERVENTIONS:  - Monitor I/O, vital signs and rhythm  - Monitor for S/S and trends of decreased cardiac output  - Administer and titrate ordered vasoactive medications to optimize hemodynamic stability  - Assess quality of pulses, skin color and temperature  - Assess for signs of decreased coronary artery perfusion  - Instruct patient to report change in severity of symptoms  Outcome: Progressing  Goal: Absence of cardiac dysrhythmias or at baseline rhythm  Description: INTERVENTIONS:  - Continuous cardiac monitoring, vital signs, obtain 12 lead EKG if ordered  - Administer antiarrhythmic and heart rate control medications as ordered  - Monitor electrolytes and administer replacement therapy as ordered  Outcome: Progressing     Problem: RESPIRATORY - ADULT  Goal: Achieves optimal ventilation and oxygenation  Description: INTERVENTIONS:  - Assess for changes in respiratory status  - Assess for changes in mentation and behavior  - Position to facilitate oxygenation and minimize respiratory effort  - Oxygen administered by appropriate delivery if ordered  - Initiate smoking cessation education as indicated  - Encourage broncho-pulmonary hygiene including cough, deep breathe, Incentive Spirometry  - Assess the need for suctioning and aspirate as needed  - Assess and instruct to report SOB or any respiratory difficulty  - Respiratory Therapy support as indicated  Outcome: Progressing     Problem: METABOLIC, FLUID AND ELECTROLYTES - ADULT  Goal: Electrolytes maintained within normal limits  Description: INTERVENTIONS:  - Monitor labs and assess patient for signs and symptoms of electrolyte imbalances  - Administer electrolyte replacement as ordered  - Monitor response to electrolyte replacements, including repeat lab results as appropriate  - Instruct patient on fluid and nutrition as appropriate  Outcome: Progressing  Goal: Fluid balance maintained  Description: INTERVENTIONS:  - Monitor labs   - Monitor I/O and WT  - Instruct patient on fluid and nutrition as appropriate  - Assess for signs & symptoms of volume excess or deficit  Outcome: Progressing  Goal: Glucose maintained within target range  Description: INTERVENTIONS:  - Monitor Blood Glucose as ordered  - Assess for signs and symptoms of hyperglycemia and hypoglycemia  - Administer ordered medications to maintain glucose within target range  - Assess nutritional intake and initiate nutrition service referral as needed  Outcome: Progressing     Problem: HEMATOLOGIC - ADULT  Goal: Maintains hematologic stability  Description: INTERVENTIONS  - Assess for signs and symptoms of bleeding or hemorrhage  - Monitor labs  - Administer supportive blood products/factors as ordered and appropriate  Outcome: Progressing     Problem: Potential for Falls  Goal: Patient will remain free of falls  Description: INTERVENTIONS:  - Educate patient/family on patient safety including physical limitations  - Instruct patient to call for assistance with activity   - Consult OT/PT to assist with strengthening/mobility   - Keep Call bell within reach  - Keep bed low and locked with side rails adjusted as appropriate  - Keep care items and personal belongings within reach  - Initiate and maintain comfort rounds  - Make Fall Risk Sign visible to staff  - Offer Toileting every 2 Hours, in advance of need  - Initiate/Maintain fall alarm  - Obtain necessary fall risk management equipment: alarm, nonskid socks, yellow wristband  - Apply yellow socks and bracelet for high fall risk patients  - Consider moving patient to room near nurses station  Outcome: Progressing Comment: Patient to schedule Vbeam with Xiao De Luna. Detail Level: Simple

## 2022-02-21 NOTE — ASSESSMENT & PLAN NOTE
· Check a nuclear medicine V/Q lung scan in the setting of renal insufficiency    Results for Terrance Wei (MRN [id number]) as of 2/21/2022 12:22   Ref  Range 2/18/2022 16:29   D-Dimer, Donato Doran Latest Ref Range: <0 50 ug/ml FEU 1 76 (H)       VAS lower limb venous duplex study, unilateral/limited    Status: Final result       PACS Images     Show images for VAS lower limb venous duplex study, unilateral/limited    Study Result    Narrative & Impression      [facility] REPORT  CLINICAL:  Indications: Patient presents with right lower extremity weakness and edema x 4  days  Suspected Covid  Operative History:  CABG  Risk Factors  The patient has history of Obesity, HTN, Diabetes (IDDM), Hyperlipidemia, CKD,  CAD and previous smoking (quit 1-5yrs ago)  CONCLUSION:     Impression:  RIGHT LOWER LIMB: Limited  No gross evidence of acute deep vein thrombosis in the CFV, FV, Popliteal,  Gastrocnemius, Posterior Tibial and Peroneal Veins  LEFT LOWER LIMB: Limited  No gross evidence of acute  deep vein thrombosis in the CFV          Limited protocol performed     Technical findings were given to [employer] [facility]

## 2022-02-21 NOTE — CONSULTS
Consultation - Pulmonary Medicine   Lucero Rivera 82 y o  male MRN: [id number]  Unit/Bed#: 409-01 Encounter: [id number]      Assessment/Plan:    1  Acute hypoxic respiratory failure  1  Currently requiring 3 L nasal cannula  No oxygen requirement at baseline  2  Titrate oxygen maintain SpO2 greater than or equal to 88%  3  Pulmonary toilet:  Increase activity as tolerated, out of bed as tolerated, cough and deep breathing encouraged, incentive spirometry q 1 hour  4  Home O2 eval prior to discharge  2  Abnormal CT chest secondary to persistent chronic interstitial changes, emphysema, pulmonary nodules,  left pleural thickening  1  No evidence of acute PNA on CT chest   2  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 compared to prior 3/04/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  3  DD includes Churg-Esther syndrome, NSIP  Low likelihood for PNA   4  Check c-ANCA and p-ANCA, PR3, and MBO  If elevated could consider initiating systemic steroids   3  CKD  1  Treatment per primary team   4  Eosinophilia   1  Noted chronic eosinophilia dating as far back as 2019    2  Again concern for EGPA in the setting of interstitial changes and CKD-check c-ANCA and p-ANCA, MBO, PR3  5  Chronic diastolic CHF   1  Diuretics per primary team  6  DM type II  1  Treatment per primary team    History of Present Illness   Physician Requesting Consult: Joelle Charles DO  Reason for Consult / Principal Problem: acute hypoxia   Hx and PE limited by: n/a  Chief Complaint: shortness of breath  HPI: Lucero Rivera is a 82 y o   male who presented to [facility] with complaints of right leg pain and swelling  Patient's past medical history positive for CAD status post stent x1 and CABG x2, chronic diastolic CHF, diabetes mellitus type 2, hyperlipidemia, GERD, arthritis, CKD  Patient presents the ED 2/18 for right lower extremity swelling and pain has been ongoing for several days    Patient also reports worsening shortness of breath and low oxygen levels  Denies chest pain or palpitations  In the ED he was noted be hypoxic requiring supplemental oxygen  He also had a mildly elevated temperature 100° F, elevated white count and procalcitonin  Lower extremity Dopplers were negative for DVT however he did have an elevated D-dimer  CT noncontrast of the chest showed chronic interstitial changes stable compared to March 2021  Pulmonary was to manage his new hypoxia  Presently, patient states that his breathing feels fine  Denies any acute cough, sputum production, hemoptysis or wheeze  Shortness breath is resolved  Patient still states that his right leg is more swollen than his left  Pain is much less intense now  Denies chest pain or palpitations  No fevers or chills currently  A pulmonary perspective, patient does not follow a pulmonologist   He has never been diagnosed with pulmonary disease to his knowledge  He does not use inhalers, nebulizers supplemental oxygen at baseline  Never had pulmonary function test   He is a former smoker with a quit date 39 years ago  Prior to that he smoked pipe tobacco   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)     skin    Coronary artery disease     Diabetes mellitus ([facility] [address] )     Diabetes mellitus, type II ([facility] [address] )     without complication    Epistaxis     Essential (primary) hypertension     GERD (gastroesophageal reflux disease)     Hx of cardiovascular stress test 2014    Eddie MPI; EF0 52 (52%) Lexiscan, mild LV systolic dysfunction; evidence for prior inferior wall MI; perfusion imaging consistant w mild lat wall ischemia and min torin-infart inferior ischemia   / EF0 51 (51%) evidence for prior inferior wall MI  Mild inferior and mild-moderate lateral wall ischemia  7/29/15    Hx of echocardiogram 2017    2D w/CFD;EF0 55 (55%) mild LVH, mitral valve prolapse with moderate regurgitation  left atrial enlargement  Mild tricuspid regurgitation   Hypertension     Mixed hyperlipidemia     Occlusion and stenosis of bilateral carotid arteries     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 infarction)     Presence of aortocoronary bypass graft      Past Surgical History:   Procedure Laterality Date    CARDIAC CATHETERIZATION  10/07/2009    Stent 99% stenosis SVG from the aorta to OM1  Patent mammary graft to the LAD    CORONARY ARTERY BYPASS GRAFT      VG-CX, VG-RCA    CORONARY ARTERY BYPASS GRAFT  2005    LIMA-D1-LAD, VG-PDA-PLB, Xytxlw-CV2-SK3 TMR 9 Lesions    RI EGD TRANSORAL BIOPSY SINGLE/MULTIPLE N/A 2019    Procedure: ESOPHAGOGASTRODUODENOSCOPY (EGD) with biopsy;  Surgeon: Jaja Howard MD;  Location: [facility];   Service: Gastroenterology    TONSILLECTOMY       Social History   Social History     Substance and Sexual Activity   Alcohol Use Never     Social History     Substance and Sexual Activity   Drug Use No     Social History     Tobacco Use   Smoking Status Former Smoker    Packs/day: 1 00    Years: 40 00    Pack years: 40 00    Quit date: 1977    Years since quittin 1   Smokeless Tobacco Never Used     E-Cigarette/Vaping    E-Cigarette Use Never User      E-Cigarette/Vaping Substances     Occupational History: retired contractor    Family History:   Family History   Problem Relation Age of Onset    Heart disease Brother     No Known Problems Mother     Tuberculosis Father        Meds/Allergies   all current active meds have been reviewed, pertinent pulmonary meds have been reviewed and current meds:   Current Facility-Administered Medications   Medication Dose Route Frequency    acetaminophen (TYLENOL) tablet 650 mg  650 mg Oral Q6H PRN    albuterol inhalation solution 2 5 mg  2 5 mg Nebulization Q6H PRN    amLODIPine (NORVASC) tablet 10 mg  10 mg Oral Daily    atorvastatin (LIPITOR) tablet 40 mg  40 mg Oral Daily With Dinner    cefTRIAXone (ROCEPHIN) IVPB (premix in dextrose) 1,000 mg 50 mL  1,000 mg Intravenous Q24H    clopidogrel (PLAVIX) tablet 75 mg  75 mg Oral Daily    escitalopram (LEXAPRO) tablet 10 mg  10 mg Oral Daily    furosemide (LASIX) tablet 20 mg  20 mg Oral BID (diuretic)    heparin (porcine) subcutaneous injection 5,000 Units  5,000 Units Subcutaneous Q8H [address]    insulin glargine (LANTUS) subcutaneous injection 5 Units 0 05 mL  5 Units Subcutaneous QAM    insulin lispro (HumaLOG) 100 units/mL subcutaneous injection 1-5 Units  1-5 Units Subcutaneous TID AC    insulin lispro (HumaLOG) 100 units/mL subcutaneous injection 1-5 Units  1-5 Units Subcutaneous HS    isosorbide mononitrate (IMDUR) 24 hr tablet 120 mg  120 mg Oral Daily    ondansetron (ZOFRAN) injection 4 mg  4 mg Intravenous Q6H PRN    polyethylene glycol (MIRALAX) packet 17 g  17 g Oral Daily PRN    propranolol (INDERAL LA) 24 hr capsule 120 mg  120 mg Oral Daily    sucralfate (CARAFATE) tablet 1 g  1 g Oral BID    tamsulosin (FLOMAX) capsule 0 8 mg  0 8 mg Oral Daily With Dinner       Allergies   Allergen Reactions    Advil [Ibuprofen] Fatigue       Objective   Vitals: Blood pressure 123/56, pulse 80, temperature 98 2 °F (36 8 °C), resp  rate 18, height 5' 7" (1 702 m), weight 70 3 kg (155 lb), SpO2 94 %  3L NC,Body mass index is 24 28 kg/m²      Intake/Output Summary (Last 24 hours) at 2/21/2022 1530  Last data filed at 2/21/2022 1100  Gross per 24 hour   Intake 640 ml   Output 975 ml   Net -335 ml     Invasive Devices  Report    Peripheral Intravenous Line            Peripheral IV 02/21/22 Dorsal (posterior); Right Forearm <1 day                Physical Exam  Constitutional:       General: He is not in acute distress  Appearance: He is not ill-appearing  HENT:      Nose: Nose normal       Mouth/Throat:      Mouth: Mucous membranes are moist       Pharynx: Oropharynx is clear  Cardiovascular:      Rate and Rhythm: Normal rate and regular rhythm  Pulmonary:      Breath sounds: Rales present  Abdominal:      General: Abdomen is flat  Palpations: Abdomen is soft  Musculoskeletal:      Cervical back: Normal range of motion and neck supple  Right lower leg: Edema present  Skin:     General: Skin is warm and dry  Neurological:      General: No focal deficit present  Mental Status: Mental status is at baseline  Psychiatric:         Mood and Affect: Mood normal          Behavior: Behavior normal          Thought Content: Thought content normal          Judgment: Judgment normal          Lab Results:   I have personally reviewed pertinent lab results  , ABG: No results found for: PHART, EKY0ZHI, PO2ART, HCA3GFQ, Z3HREGKS, BEART, SOURCE, BNP: No results found for: BNP, CBC:   Lab Results   Component Value Date    WBC 6 80 02/21/2022    HGB 14 1 02/21/2022    HCT 42 5 02/21/2022    MCV 84 02/21/2022     02/21/2022    MCH 28 0 02/21/2022    MCHC 33 2 02/21/2022    RDW 12 6 02/21/2022    MPV 9 0 02/21/2022    NRBC 0 02/21/2022   , CMP:   Lab Results   Component Value Date    SODIUM 135 (L) 02/21/2022    K 4 1 02/21/2022    CL 97 (L) 02/21/2022    CO2 30 02/21/2022    BUN 30 (H) 02/21/2022    CREATININE 1 80 (H) 02/21/2022    CALCIUM 10 4 (H) 02/21/2022    AST 20 02/21/2022    ALT 16 02/21/2022    ALKPHOS 90 02/21/2022    EGFR 33 02/21/2022   , PT/INR: No results found for: PT, INR, Troponin: No results found for: TROPONINI  Imaging Studies: I have personally reviewed pertinent reports     and I have personally reviewed pertinent films in PACS     CT chest without contrast 02/18/2022  Persistent chronic interstitial changes in the periphery of upper and lower lobes  Stable pulmonary nodules and calcified granulomas  Paraseptal emphysema noted  Mild mediastinal lymphadenopathy  Chronic atelectasis left lower lobe  Lung findings are stable since March 2021  Status post CABG in median sternotomy  Coronary artery and are aortic calcifications  EKG, Pathology, and Other Studies: I have personally reviewed pertinent reports  Echo today shows EF 65%  Grade 1 diastolic dysfunction  Pulmonary Results (PFTs, PSG): none to review     VTE Prophylaxis: Sequential compression device (Venodyne)  and Heparin    Code Status: Level 1 - Full Code    Portions of the record may have been created with voice recognition software  Occasional wrong word or "sound a like" substitutions may have occurred due to the inherent limitations of voice recognition software  Read the chart carefully and recognize, using context, where substitutions have occurred

## 2022-02-21 NOTE — ASSESSMENT & PLAN NOTE
Lab Results   Component Value Date    HGBA1C 9 5 (H) 02/19/2022     · Discontinue glimepiride with renal insufficiency  · Initiate lantus 5 units SQ Qdaily  · Not on ACE-Inhibitory or ARB therapy at this time due to a fluctuating renal function status  · Insulin sliding scale with blood glucose monitoring ACHS  · Outpatient Endocrinology evaluation

## 2022-02-21 NOTE — ASSESSMENT & PLAN NOTE
· Continue plavix 75 mg PO Qdaily  · Increase to high-intensity statin therapy with atorvastatin 40 mg PO Qdaily with dinner

## 2022-02-21 NOTE — ASSESSMENT & PLAN NOTE
Presented with RLE pain and swelling, dyspnea, and hypoxia  Originally required 4 L supplemental O2 by NC  · Requiring 2 lpm of continuous supplemental oxygen via the nasal cannula to maintain oxygen saturation levels of 90% and above on 02/21/2022      Patient has a history of diastolic CHF - hospitalized late last month and treated for acute exacerbation  · CXR with mild pulmonary venous congestion  · ProBNP 1,791, up from a value of 644 3/2021  · Maintain on low-sodium diet  · Was on IV furosemide 40mg BID - changed to lasix 20 mg PO BID due to worsening renal function  · Continue cardiac regimen of BB, Imdur, clopidogrel, and statin therapy  Patient presented with a temperature of 100° F, leukocytosis with a WBC count of 13 2, and mild lactic acidosis (resolved)  · Procalcitonin elevated at 0 6 --> 0 7, 0 59  · CT with persisting chronic interstitial changes in the periphery of the upper and lower lobes  · CT also with LLL airspace consolidation, stable since 03/2021  Favors chronic changes/atelectasis  · However, given patient's presentation with elevated temperature (but not fever), leukocytosis that resolved, and mild lactic acidosis and hypoxia, favor short course of antibiotic therapy  Plan on ceftriaxone for 5 days  Trend procalcitonin  Also with elevated D-dimer at 1 76  · Lower extremity Dopplers were limited but did not show any evidence of DVT  · No gross evidence of PE clinically and patient's hypoxia has improved with diuresis and initial antibiotic  Hold off on active anticoagulation, and maintain on VTE prophylaxis  · Check a nuclear medicine V/Q lung scan  · The patient was seen in consultation by Pulmonology  · The patient a home supplemental oxygen valuation on the day of discharge

## 2022-02-21 NOTE — ASSESSMENT & PLAN NOTE
Lab Results   Component Value Date    EGFR 33 02/21/2022    EGFR 28 02/19/2022    EGFR 34 02/19/2022    CREATININE 1 80 (H) 02/21/2022    CREATININE 2 04 (H) 02/19/2022    CREATININE 1 74 (H) 02/19/2022     Creatinine baseline of 1 5-1 7 mg/dl dating back to 2020  Continue to monitor, avoid nephrotoxins, and renally dose medications when appropriate    Serial laboratory testing to monitor the patient's renal function and electrolyte levels  Outpatient Nephrology evaluation

## 2022-02-21 NOTE — OCCUPATIONAL THERAPY NOTE
Occupational Therapy Progress Note     Patient Name: Evelyn Watters  Today's Date: 2/21/2022  Problem List  Principal Problem:    Acute respiratory failure with hypoxia [facility])  Active Problems:    Type 2 diabetes mellitus with hyperglycemia, without long-term current use of insulin (HCC)    Coronary artery disease of native artery of native heart with stable angina pectoris (HCC)    Gastroesophageal reflux disease without esophagitis    Stage 3b chronic kidney disease (HCC)    Proteinuria    Vitamin D deficiency    Elevated d-dimer    Chronic diastolic congestive heart failure (HCC)    Essential hypertension    Aortic calcification (HCC)    Hypercalcemia    Biclonal gammopathy    Microalbuminuria    Abnormal EKG          02/21/22 1323   OT Last Visit   OT Visit Date 02/21/22   Note Type   Note Type Treatment   Restrictions/Precautions   Weight Bearing Precautions Per Order No   Other Precautions Telemetry; Fall Risk;O2;Chair Alarm   Pain Assessment   Pain Assessment Tool 0-10   Pain Score No Pain   ADL   LB Dressing Assistance 5  Supervision/Setup   LB Dressing Deficit Supervision/safety   LB Dressing Comments Pt doffed B slippers with S   Toileting Assistance  5  Supervision/Setup   Toileting Deficit Supervison/safety;Verbal cueing; Increased time to complete   Bed Mobility   Sit to Supine 5  Supervision   Additional items Assist x 1; Increased time required;Verbal cues; Bedrails   Transfers   Sit to Stand 5  Supervision   Additional items Assist x 1; Armrests; Increased time required;Verbal cues   Stand to Sit 5  Supervision   Additional items Increased time required;Verbal cues;Armrests   Toilet transfer 5  Supervision   Additional items Assist x 1; Increased time required;Standard toilet  (Use of RW)   Additional Comments Use of RW   Functional Mobility   Functional Mobility 5  Supervision   Additional Comments Pt completed standing balance/functional mobiliyt around room and hallway with use of RW and S with no LOB throughout with distance limited by fatigue   Additional items Rolling walker   Toilet Transfers   Toilet Transfer From Rolling walker   Toilet Transfer Type To and from   Toilet Transfer to Standard toilet   Toilet Transfer Technique Ambulating   Toilet Transfers Supervision   Cognition   Overall Cognitive Status [facility]   Arousal/Participation Alert; Cooperative   Attention Within functional limits   Orientation Level Oriented X4   Memory Within functional limits   Following Commands Follows all commands and directions without difficulty   Assessment   Assessment Patient participated in Skilled OT session this date with interventions consisting of ADL re training with the use of correct body mechnaics and  therapeutic activities to: increase activity tolerance   Patient agreeable to OT treatment session, upon arrival patient was found seated OOB to Recliner  Sit to stand txfrs from екатерина chair with S, standing balance/functional mobility around room and hallway with use of RW and S with no LOB throughout, to increase posture, dynamic standing balance, balance during self cares, use of BUE  Pt completed toileting S, toilet txfrs S with use of RW  Sit to supine txfrs S  Piedra slippers with S  Patient requiring verbal cues for safety and verbal cues for correct technique  Patient continues to be functioning below baseline level, occupational performance remains limited secondary to factors listed above and increased risk for falls and injury  From OT standpoint, recommendation at time of d/c would be Home with home health rehabilitation  The patient's raw score on the AM-PAC Daily Activity inpatient short form is 21, standardized score is 44 27, greater than 39 4  Patients at this level are likely to benefit from discharge to home  Please refer to the recommendation of the Occupational Therapist for safe discharge planning     Plan   Goal Expiration Date 03/05/22   OT Treatment Day 1   OT Frequency 3-5x/wk Recommendation   OT Discharge Recommendation Home with home health rehabilitation   AM-PAC Daily Activity Inpatient   Lower Body Dressing 3   Bathing 3   Toileting 3   Upper Body Dressing 4   Grooming 4   Eating 4   Daily Activity Raw Score 21   Daily Activity Standardized Score (Calc for Raw Score >=11) 44 27   AM-PAC Applied Cognition Inpatient   Following a Speech/Presentation 4   Understanding Ordinary Conversation 4   Taking Medications 4   Remembering Where Things Are Placed or Put Away 3   Remembering List of 4-5 Errands 3   Taking Care of Complicated Tasks 3   Applied Cognition Raw Score 21   Applied Cognition Standardized Score 44 3     Pt left supine on bed with all needs in reach

## 2022-02-21 NOTE — ASSESSMENT & PLAN NOTE
Wt Readings from Last 3 Encounters:   02/21/22 70 3 kg (155 lb)   01/30/22 77 6 kg (171 lb)   01/30/22 77 6 kg (171 lb)     Last ECHO 03/2021 with LVEF 65%, G1 DD, mild-to-moderate MR  RV systolic function was normal   Consider updating ECHO if patient is still hospitalized during the week here  Continue lasix 20 mg PO BID

## 2022-02-21 NOTE — ASSESSMENT & PLAN NOTE
Status post CABG X 02/19/1992, with SVG to the circumflex and RCA  Repeat CABG 2005 with LIMA to LAD, SVG to posterior descending and posterolateral branch, radial artery graft to OM  Stenting of high-grade stenosis of 1st OM graft in 2009  Currently asymptomatic, with negative cardiac biomarkers  Continue clopidogrel, beta-blocker, and statin therapy  Patient is also on long-acting nitrate  Increase to high-intensity statin therapy with atorvastatin 40 mg PO Qdaily with dinner

## 2022-02-21 NOTE — PLAN OF CARE
Problem: OCCUPATIONAL THERAPY ADULT  Goal: Performs self-care activities at highest level of function for planned discharge setting  See evaluation for individualized goals  Description: Treatment Interventions: ADL retraining,Functional transfer training,UE strengthening/ROM,Endurance training,Patient/family training,Activityengagement,Energy conservation          See flowsheet documentation for full assessment, interventions and recommendations  Outcome: Progressing  Note: Limitation: Decreased ADL status,Decreased UE strength,Decreased Safe judgement during ADL,Decreased endurance,Decreased self-care trans,Decreased high-level ADLs     Assessment: Patient participated in Skilled OT session this date with interventions consisting of ADL re training with the use of correct body mechnaics and  therapeutic activities to: increase activity tolerance   Patient agreeable to OT treatment session, upon arrival patient was found seated OOB to Recliner  Sit to stand txfrs from екатерина chair with S, standing balance/functional mobility around room and hallway with use of RW and S with no LOB throughout, to increase posture, dynamic standing balance, balance during self cares, use of BUE  Pt completed toileting S, toilet txfrs S with use of RW  Sit to supine txfrs S  [place] slippers with S  Patient requiring verbal cues for safety and verbal cues for correct technique  Patient continues to be functioning below baseline level, occupational performance remains limited secondary to factors listed above and increased risk for falls and injury  From OT standpoint, recommendation at time of d/c would be Home with home health rehabilitation  The patient's raw score on the AM-PAC Daily Activity inpatient short form is 21, standardized score is 44 27, greater than 39 4  Patients at this level are likely to benefit from discharge to home   Please refer to the recommendation of the Occupational Therapist for safe discharge planning       OT Discharge Recommendation: Home with home health rehabilitation

## 2022-02-22 VITALS
OXYGEN SATURATION: 85 % | HEIGHT: 67 IN | RESPIRATION RATE: 18 BRPM | WEIGHT: 155.42 LBS | HEART RATE: 72 BPM | BODY MASS INDEX: 24.39 KG/M2 | TEMPERATURE: 97.6 F | DIASTOLIC BLOOD PRESSURE: 55 MMHG | SYSTOLIC BLOOD PRESSURE: 114 MMHG

## 2022-02-22 PROBLEM — G47.34 NOCTURNAL HYPOXIA: Status: ACTIVE | Noted: 2022-02-22

## 2022-02-22 PROBLEM — J84.10 LUNG GRANULOMA (HCC): Status: ACTIVE | Noted: 2022-02-22

## 2022-02-22 PROBLEM — R59.1 LYMPHADENOPATHY: Status: ACTIVE | Noted: 2022-02-22

## 2022-02-22 PROBLEM — D72.10 EOSINOPHILIA: Status: ACTIVE | Noted: 2022-02-22

## 2022-02-22 PROBLEM — I37.1 PULMONIC VALVE INSUFFICIENCY: Status: ACTIVE | Noted: 2022-02-22

## 2022-02-22 LAB
25(OH)D3 SERPL-MCNC: 40.7 NG/ML (ref 30–100)
ALBUMIN SERPL BCP-MCNC: 3 G/DL (ref 3.5–5)
ALP SERPL-CCNC: 87 U/L (ref 46–116)
ALT SERPL W P-5'-P-CCNC: 18 U/L (ref 12–78)
ANION GAP SERPL CALCULATED.3IONS-SCNC: 5 MMOL/L (ref 4–13)
AST SERPL W P-5'-P-CCNC: 22 U/L (ref 5–45)
BASOPHILS # BLD AUTO: 0.06 THOUSANDS/ΜL (ref 0–0.1)
BASOPHILS NFR BLD AUTO: 1 % (ref 0–1)
BILIRUB SERPL-MCNC: 0.75 MG/DL (ref 0.2–1)
BUN SERPL-MCNC: 26 MG/DL (ref 5–25)
CA-I BLD-SCNC: 1.38 MMOL/L (ref 1.12–1.32)
CALCIUM ALBUM COR SERPL-MCNC: 11.1 MG/DL (ref 8.3–10.1)
CALCIUM SERPL-MCNC: 10.3 MG/DL (ref 8.3–10.1)
CHLORIDE SERPL-SCNC: 96 MMOL/L (ref 100–108)
CO2 SERPL-SCNC: 33 MMOL/L (ref 21–32)
CREAT SERPL-MCNC: 1.81 MG/DL (ref 0.6–1.3)
CREAT UR-MCNC: 85.1 MG/DL
EOSINOPHIL # BLD AUTO: 0.46 THOUSAND/ΜL (ref 0–0.61)
EOSINOPHIL NFR BLD AUTO: 6 % (ref 0–6)
ERYTHROCYTE [DISTWIDTH] IN BLOOD BY AUTOMATED COUNT: 12.6 % (ref 11.6–15.1)
GFR SERPL CREATININE-BSD FRML MDRD: 33 ML/MIN/1.73SQ M
GLUCOSE SERPL-MCNC: 158 MG/DL (ref 65–140)
GLUCOSE SERPL-MCNC: 208 MG/DL (ref 65–140)
GLUCOSE SERPL-MCNC: 312 MG/DL (ref 65–140)
HCT VFR BLD AUTO: 48.1 % (ref 36.5–49.3)
HGB BLD-MCNC: 15.7 G/DL (ref 12–17)
IMM GRANULOCYTES # BLD AUTO: 0.04 THOUSAND/UL (ref 0–0.2)
IMM GRANULOCYTES NFR BLD AUTO: 1 % (ref 0–2)
LYMPHOCYTES # BLD AUTO: 1.35 THOUSANDS/ΜL (ref 0.6–4.47)
LYMPHOCYTES NFR BLD AUTO: 19 % (ref 14–44)
MAGNESIUM SERPL-MCNC: 2.2 MG/DL (ref 1.6–2.6)
MCH RBC QN AUTO: 28 PG (ref 26.8–34.3)
MCHC RBC AUTO-ENTMCNC: 32.6 G/DL (ref 31.4–37.4)
MCV RBC AUTO: 86 FL (ref 82–98)
MONOCYTES # BLD AUTO: 0.94 THOUSAND/ΜL (ref 0.17–1.22)
MONOCYTES NFR BLD AUTO: 13 % (ref 4–12)
NEUTROPHILS # BLD AUTO: 4.44 THOUSANDS/ΜL (ref 1.85–7.62)
NEUTS SEG NFR BLD AUTO: 60 % (ref 43–75)
NRBC BLD AUTO-RTO: 0 /100 WBCS
PHOSPHATE SERPL-MCNC: 3.6 MG/DL (ref 2.3–4.1)
PLATELET # BLD AUTO: 343 THOUSANDS/UL (ref 149–390)
PMV BLD AUTO: 8.8 FL (ref 8.9–12.7)
POTASSIUM SERPL-SCNC: 4.5 MMOL/L (ref 3.5–5.3)
PROCALCITONIN SERPL-MCNC: 0.27 NG/ML
PROT SERPL-MCNC: 8.1 G/DL (ref 6.4–8.2)
PTH-INTACT SERPL-MCNC: 91.3 PG/ML (ref 18.4–80.1)
RBC # BLD AUTO: 5.61 MILLION/UL (ref 3.88–5.62)
SODIUM SERPL-SCNC: 134 MMOL/L (ref 136–145)
UUN 24H UR-MCNC: 739 MG/DL
WBC # BLD AUTO: 7.29 THOUSAND/UL (ref 4.31–10.16)

## 2022-02-22 PROCEDURE — 84145 PROCALCITONIN (PCT): CPT | Performed by: INTERNAL MEDICINE

## 2022-02-22 PROCEDURE — 83970 ASSAY OF PARATHORMONE: CPT | Performed by: INTERNAL MEDICINE

## 2022-02-22 PROCEDURE — 99232 SBSQ HOSP IP/OBS MODERATE 35: CPT | Performed by: PHYSICIAN ASSISTANT

## 2022-02-22 PROCEDURE — 84100 ASSAY OF PHOSPHORUS: CPT | Performed by: INTERNAL MEDICINE

## 2022-02-22 PROCEDURE — 82948 REAGENT STRIP/BLOOD GLUCOSE: CPT

## 2022-02-22 PROCEDURE — 82306 VITAMIN D 25 HYDROXY: CPT | Performed by: INTERNAL MEDICINE

## 2022-02-22 PROCEDURE — 86335 IMMUNFIX E-PHORSIS/URINE/CSF: CPT | Performed by: PATHOLOGY

## 2022-02-22 PROCEDURE — 82330 ASSAY OF CALCIUM: CPT | Performed by: INTERNAL MEDICINE

## 2022-02-22 PROCEDURE — 86335 IMMUNFIX E-PHORSIS/URINE/CSF: CPT | Performed by: NURSE PRACTITIONER

## 2022-02-22 PROCEDURE — 82397 CHEMILUMINESCENT ASSAY: CPT | Performed by: INTERNAL MEDICINE

## 2022-02-22 PROCEDURE — 99239 HOSP IP/OBS DSCHRG MGMT >30: CPT | Performed by: INTERNAL MEDICINE

## 2022-02-22 PROCEDURE — 99223 1ST HOSP IP/OBS HIGH 75: CPT | Performed by: NURSE PRACTITIONER

## 2022-02-22 PROCEDURE — 80053 COMPREHEN METABOLIC PANEL: CPT | Performed by: INTERNAL MEDICINE

## 2022-02-22 PROCEDURE — 84540 ASSAY OF URINE/UREA-N: CPT | Performed by: NURSE PRACTITIONER

## 2022-02-22 PROCEDURE — 84166 PROTEIN E-PHORESIS/URINE/CSF: CPT | Performed by: NURSE PRACTITIONER

## 2022-02-22 PROCEDURE — 83735 ASSAY OF MAGNESIUM: CPT | Performed by: INTERNAL MEDICINE

## 2022-02-22 PROCEDURE — 94760 N-INVAS EAR/PLS OXIMETRY 1: CPT

## 2022-02-22 PROCEDURE — 94761 N-INVAS EAR/PLS OXIMETRY MLT: CPT

## 2022-02-22 PROCEDURE — 82570 ASSAY OF URINE CREATININE: CPT | Performed by: NURSE PRACTITIONER

## 2022-02-22 PROCEDURE — 97110 THERAPEUTIC EXERCISES: CPT

## 2022-02-22 PROCEDURE — 97116 GAIT TRAINING THERAPY: CPT

## 2022-02-22 PROCEDURE — 85025 COMPLETE CBC W/AUTO DIFF WBC: CPT | Performed by: INTERNAL MEDICINE

## 2022-02-22 PROCEDURE — 84166 PROTEIN E-PHORESIS/URINE/CSF: CPT | Performed by: PATHOLOGY

## 2022-02-22 RX ORDER — ISOSORBIDE MONONITRATE 60 MG/1
60 TABLET, EXTENDED RELEASE ORAL DAILY
Refills: 0
Start: 2022-02-22

## 2022-02-22 RX ORDER — FUROSEMIDE 20 MG/1
20 TABLET ORAL 2 TIMES DAILY
Qty: 60 TABLET | Refills: 0 | Status: ON HOLD | OUTPATIENT
Start: 2022-02-22 | End: 2022-05-09

## 2022-02-22 RX ORDER — AMLODIPINE BESYLATE 10 MG/1
5 TABLET ORAL DAILY
Refills: 0
Start: 2022-02-22 | End: 2022-03-07

## 2022-02-22 RX ORDER — LINAGLIPTIN 5 MG/1
5 TABLET, FILM COATED ORAL DAILY
Qty: 30 TABLET | Refills: 0 | Status: SHIPPED | OUTPATIENT
Start: 2022-02-22

## 2022-02-22 RX ORDER — MELATONIN
1000 DAILY
Qty: 30 TABLET | Refills: 0 | Status: SHIPPED | OUTPATIENT
Start: 2022-02-22

## 2022-02-22 RX ORDER — ATORVASTATIN CALCIUM 40 MG/1
40 TABLET, FILM COATED ORAL
Qty: 30 TABLET | Refills: 0 | Status: SHIPPED | OUTPATIENT
Start: 2022-02-22

## 2022-02-22 RX ADMIN — SUCRALFATE 1 G: 1 TABLET ORAL at 08:35

## 2022-02-22 RX ADMIN — PROPRANOLOL HYDROCHLORIDE 120 MG: 60 CAPSULE, EXTENDED RELEASE ORAL at 08:35

## 2022-02-22 RX ADMIN — HEPARIN SODIUM 5000 UNITS: 5000 INJECTION INTRAVENOUS; SUBCUTANEOUS at 05:53

## 2022-02-22 RX ADMIN — INSULIN LISPRO 3 UNITS: 100 INJECTION, SOLUTION INTRAVENOUS; SUBCUTANEOUS at 12:00

## 2022-02-22 RX ADMIN — FUROSEMIDE 20 MG: 20 TABLET ORAL at 08:35

## 2022-02-22 RX ADMIN — CLOPIDOGREL BISULFATE 75 MG: 75 TABLET ORAL at 08:35

## 2022-02-22 RX ADMIN — AMLODIPINE BESYLATE 10 MG: 10 TABLET ORAL at 08:35

## 2022-02-22 RX ADMIN — ISOSORBIDE MONONITRATE 120 MG: 60 TABLET, EXTENDED RELEASE ORAL at 08:35

## 2022-02-22 RX ADMIN — INSULIN LISPRO 1 UNITS: 100 INJECTION, SOLUTION INTRAVENOUS; SUBCUTANEOUS at 08:34

## 2022-02-22 RX ADMIN — ESCITALOPRAM OXALATE 10 MG: 10 TABLET ORAL at 08:35

## 2022-02-22 RX ADMIN — INSULIN GLARGINE 5 UNITS: 100 INJECTION, SOLUTION SUBCUTANEOUS at 08:34

## 2022-02-22 NOTE — ASSESSMENT & PLAN NOTE
· Outpatient follow-up with Cardiology    ECG 12 lead  Order: [id number]   Status: Final result     Visible to patient: Yes (not seen)     Next appt: 02/24/2022 at 02:30 PM in [facility] ([facility] Room 1)     0 Result Notes    Component Ref Range & Units 2/18/22 1310 1/30/22 1815 1/30/22 1739 3/26/21 0545 3/25/21 1322 3/4/21 1359   Ventricular Rate BPM 85  90  95  79  83  83    Atrial Rate BPM 85  90  95  79  83  83    MO Interval ms 182  196  198  190  184  192    QRSD Interval ms 88  86  86  86  92  86    QT Interval ms 374  354  352  390  398  380    QTC Interval ms 445  433  442  447  467  446    P Axis degrees 42  17  -7  5  39  41    QRS Axis degrees 78  69  62  45  55  42    T Wave Axis degrees -14  138  130  82  55  -11              Narrative & Impression    Normal sinus rhythm  T wave abnormality, consider lateral ischemia  Abnormal ECG  When compared with ECG of 30-JAN-2022 18:15,  No significant change was found  Confirmed by Charle Hamman ([id number]) on 2/21/2022 12:32:41 PM      Specimen Collected: 02/18/22 13:10 Last Resulted: 02/21/22 12:32

## 2022-02-22 NOTE — ASSESSMENT & PLAN NOTE
· Checked a vitamin D 25-OH level, with results pending at the time of discharge  · Treat with cholecalciferol 1000 I  U  PO Qdaily   Outpatient follow-up with PCP in regards to this matter

## 2022-02-22 NOTE — ASSESSMENT & PLAN NOTE
Presented with RLE pain and swelling, dyspnea, and hypoxia  Originally required 4 L supplemental O2 by NC  · Requiring 2 lpm of continuous supplemental oxygen via the nasal cannula to maintain oxygen saturation levels of 90% and above on 02/21/2022      Patient has a history of diastolic CHF - hospitalized late last month and treated for acute exacerbation  · CXR with mild pulmonary venous congestion  · ProBNP 1,791, up from a value of 644 3/2021  · Maintain on low-sodium diet  · Was on IV furosemide 40mg BID - changed to lasix 20 mg PO BID due to worsening renal function  · Continue cardiac regimen of BB, Imdur, clopidogrel, and statin therapy  Patient presented with a temperature of 100° F, leukocytosis with a WBC count of 13 2, and mild lactic acidosis (resolved)  · Procalcitonin elevated at 0 6 --> 0 7, 0 59  · CT with persisting chronic interstitial changes in the periphery of the upper and lower lobes  · CT also with LLL airspace consolidation, stable since 03/2021  Favors chronic changes/atelectasis  · However, given patient's presentation with elevated temperature (but not fever), leukocytosis that resolved, and mild lactic acidosis and hypoxia, favor short course of antibiotic therapy  Plan on ceftriaxone for 5 days  Trend procalcitonin  Also with elevated D-dimer at 1 76  · Lower extremity Dopplers were limited but did not show any evidence of DVT  · No gross evidence of PE clinically and patient's hypoxia has improved with diuresis and initial antibiotic  Hold off on active anticoagulation, and maintain on VTE prophylaxis  · A nuclear medicine V/Q lung scan was checked, which showed low probability for pulmonary embolism  · The patient was seen in consultation by Pulmonology    · Outpatient follow-up with Pulmonology  · The patient qualified for home supplemental oxygen therapy on 02/22/2022 per the evaluation by Respiratory Therapy:  Home Oxygen Qualifying Test      Patient name: Perla Bryant        : 1935   Date of Test:  2022             Diagnosis:    Home Oxygen Test:    **Medicare Guidelines require item(s) 1-5 on all ambulatory patients or 1 and 2 on non-ambulatory patients      1  Baseline SPO2 on Room Air at rest 90 %   a  If <= 88% on Room Air add O2 via NC to obtain SpO2 >=88%  If LPM needed, document LPM n/a needed to reach =>88%     1  SPO2 during exertion on Room Air 81  %  a  During exertion monitor SPO2  If SPO2 increases >=89%, do not add supplemental oxygen     2  SPO2 on Oxygen at Rest 94 % at 2 LPM     3  SPO2 during exertion on Oxygen 90 % at 3 LPM     4  Test performed during exertion activity  [x]? Supplemental Home Oxygen is indicated      []?   Client does not qualify for home oxygen      Respiratory Additional Notes- meli Genao, RT

## 2022-02-22 NOTE — CASE MANAGEMENT
Case Management Discharge Planning Note    Patient name Son Alvarez  Location /[id number] MRN [id number]  : 1935 Date 2022       Current Admission Date: 2022  Current Admission Diagnosis:Acute respiratory failure with hypoxia [facility])   Patient Active Problem List    Diagnosis Date Noted    Eosinophilia 2022    Nocturnal hypoxia 2022    [facility] 2022    Hypercalcemia 2022    Biclonal gammopathy 2022    Microalbuminuria 2022    Abnormal EKG 2022    Essential hypertension 2022    Acute respiratory failure with hypoxia (HCC) 2022    Chronic diastolic congestive heart failure ([facility] ) 2022    Closed fracture of multiple ribs of right side with routine healing 2022    Sensation of pressure in bladder area 10/01/2021    Hypertensive left ventricular hypertrophy, without heart failure 2021    Elevated d-dimer 2021    Carotid artery stenosis 2021    Anginal syndrome [facility] 2021    Secondary hyperparathyroidism of renal origin ([facility] ) 2020    Vitamin D deficiency 2020    Nephrolithiasis 2020    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 ([facility] ) 2020    Chronic tubulointerstitial nephritis 2020    Edema 2020    Proteinuria 2020    Atherosclerosis 2020    Stage 3b chronic kidney disease ([facility] ) 2019    Hypertensive crisis 2018    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 infarction) 2018    Mixed hyperlipidemia 2018    Type 2 diabetes mellitus with hyperglycemia, without long-term current use of insulin ([facility] ) 2018    Coronary artery disease of native artery of native heart with stable angina pectoris ([facility] ) 2018    Gastroesophageal reflux disease without esophagitis 2018    Obese 2018    Presence of aortocoronary bypass graft 2018    Occlusion and stenosis of bilateral carotid arteries 08/26/2018    Dyslipidemia 08/26/2018      LOS (days): 4  Geometric Mean LOS (GMLOS) (days): 3 80  Days to GMLOS:0 1     OBJECTIVE:  Risk of Unplanned Readmission Score: 19         Current admission status: Inpatient   Preferred Pharmacy:   [address] [place] [address], [facility] [address] [facility] [address]/ Raheem Lul  [facility] [address] [address]  Phone: [phone] Fax: [phone]    Primary Care Provider: Charlotte Ovalle DO    Primary Insurance: MEDICARE  Secondary Insurance: [facility] DETAILS:    Discharge planning discussed with[de-identified] Pt  Freedom of Choice: Yes       DME Referral Provided  Referral made for DME?: Yes  DME referral completed for the following items[de-identified] Home Oxygen concentrator,Portable Oxygen tanks (Pt qualifies for O2 at 3 liters with exertion and 2 liters at rest )  DME Supplier Name[de-identified] [employer]

## 2022-02-22 NOTE — ASSESSMENT & PLAN NOTE
· Concerning for multiple myeloma with a biclonal gammopathy   · A PTH-related peptide level was checked with results pending at the time of discharge  · Outpatient Endocrinology evaluation  · Outpatient Hematology/Oncology evaluation    Results for Terrance Lade (MRN [id number]) as of 2/22/2022 13:14   Ref  Range 2/22/2022 06:13   Calcium, Ionized Latest Ref Range: 1 12 - 1 32 mmol/L 1 38 (H)     Results for Terrance Lade (MRN [id number]) as of 2/22/2022 13:15   Ref   Range 2/22/2022 06:13   PARATHYROID HORMONE Latest Ref Range: 18 4 - 80 1 pg/mL 91 3 (H)

## 2022-02-22 NOTE — CASE MANAGEMENT
Case Management Discharge Planning Note    Patient name Lavell Mandujano  Location /[id number] MRN [id number]  : 1935 Date 2022       Current Admission Date: 2022  Current Admission Diagnosis:Acute respiratory failure with hypoxia [facility])   Patient Active Problem List    Diagnosis Date Noted    Eosinophilia 2022    Nocturnal hypoxia 2022    [facility]    Hypercalcemia 2022    Biclonal gammopathy 2022    Microalbuminuria 2022    Abnormal EKG 2022    Essential hypertension 2022    Acute respiratory failure with hypoxia (HCC) 2022    Chronic diastolic congestive heart failure ([facility] ) 2022    Closed fracture of multiple ribs of right side with routine healing 2022    Sensation of pressure in bladder area 10/01/2021    Hypertensive left ventricular hypertrophy, without heart failure 2021    Elevated d-dimer 2021    Carotid artery stenosis 2021    Anginal syndrome ([facility] ) 2021    Secondary hyperparathyroidism of renal origin [facility] ) 2020    Vitamin D deficiency 2020    Nephrolithiasis 2020    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 [facility] ) 2020    Chronic tubulointerstitial nephritis 2020    Edema 2020    Proteinuria 2020    Atherosclerosis 2020    Stage 3b chronic kidney disease ([facility] ) 2019    Hypertensive crisis 2018    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 infarction) 2018    Mixed hyperlipidemia 2018    Type 2 diabetes mellitus with hyperglycemia, without long-term current use of insulin ([facility] ) 2018    Coronary artery disease of native artery of native heart with stable angina pectoris ([facility] ) 2018    Gastroesophageal reflux disease without esophagitis 2018    Obese 2018    Presence of aortocoronary bypass graft 2018    Occlusion and stenosis of bilateral carotid arteries 08/26/2018    Dyslipidemia 08/26/2018      LOS (days): 4  Geometric Mean LOS (GMLOS) (days): 3 80  Days to GMLOS:0     OBJECTIVE:  Risk of Unplanned Readmission Score: 20         Current admission status: Inpatient   Preferred Pharmacy:   [address] [place] [address], [address] [place] [address] [address] [facility] [address]/ Raheem Mccarty  [facility] [address]  Phone: [phone] Fax: [phone]    Primary Care Provider: Roberta Pittman DO    Primary Insurance: MEDICARE  Secondary Insurance: [id number] DETAILS:  As per Calista Ladd patient was approved for 02 and we can now give him a tank  Pt was given emergency phone number for [employer]  I notified Miguel PABON that patient is being discharged today  I in basket messaged Pulm and Nephro to call patient with a follow up appt

## 2022-02-22 NOTE — ASSESSMENT & PLAN NOTE
Status post CABG X 02/19/1992, with SVG to the circumflex and RCA  Repeat CABG 2005 with LIMA to LAD, SVG to posterior descending and posterolateral branch, radial artery graft to OM  Stenting of high-grade stenosis of 1st OM graft in 2009  Currently asymptomatic, with negative cardiac biomarkers  Continue clopidogrel, beta-blocker, and statin therapy  Patient is also on long-acting nitrate  Increased to high-intensity statin therapy with atorvastatin 40 mg PO Qdaily with dinner  Outpatient follow-up with Cardiology

## 2022-02-22 NOTE — ASSESSMENT & PLAN NOTE
· Not on an ACE-Inhibitor or ARB therapy at this time due to a fluctuating renal function status  · Outpatient follow-up with Nephrology

## 2022-02-22 NOTE — ASSESSMENT & PLAN NOTE
Lab Results   Component Value Date    HGBA1C 9 5 (H) 02/19/2022     · Discontinued glimepiride with renal insufficiency  · Initiated tradjenta 5 mg PO Qdaily, which will be continued upon discharge  · Likely will need the initiation of basal insulin as an outpatient  · Not on ACE-Inhibitor or ARB therapy at this time due to a fluctuating renal function status  · Outpatient Endocrinology evaluation

## 2022-02-22 NOTE — ASSESSMENT & PLAN NOTE
· Cannot have a CTA of the chest/PE protocol in the setting of renal insufficiency    Results for Jyoti Ernandez (MRN [id number]) as of 2/21/2022 12:22   Ref  Range 2/18/2022 16:29   D-Dimer, Steven Gene Latest Ref Range: <0 50 ug/ml FEU 1 76 (H)       VAS lower limb venous duplex study, unilateral/limited    Status: Final result       PACS Images     Show images for VAS lower limb venous duplex study, unilateral/limited    Study Result    Narrative & Impression      [facility] REPORT  CLINICAL:  Indications: Patient presents with right lower extremity weakness and edema x 4  days  Suspected Covid  Operative History:  CABG  Risk Factors  The patient has history of Obesity, HTN, Diabetes (IDDM), Hyperlipidemia, CKD,  CAD and previous smoking (quit 1-5yrs ago)  CONCLUSION:     Impression:  RIGHT LOWER LIMB: Limited  No gross evidence of acute deep vein thrombosis in the CFV, FV, Popliteal,  Gastrocnemius, Posterior Tibial and Peroneal Veins  LEFT LOWER LIMB: Limited  No gross evidence of acute  deep vein thrombosis in the CFV  Limited protocol performed     Technical findings were given to Angelique Cerda PA-C       NM lung perfusion imaging    Status: Final result       PACS Images     Show images for NM lung perfusion imaging    Study Result    Result Text   LUNG PERFUSION SCAN     INDICATION: hypoxia, elevated D-dimer, for V/Q lung scan due to BECKI/renal insufficiency     COMPARISON:  Chest radiograph  2/21/2022, lung scan 3/26/2021     TECHNIQUE:  Multiplanar perfusion imaging was performed following the intravenous administration of 4 3 mCi Tc-99m labeled MAA  No ventilation imaging was performed as per current Covid-19 protocol      FINDINGS:       Perfusion imaging demonstrates a mildly heterogeneous distribution of the radiopharmaceutical in the left lung  Left lung again noted to be smaller than the right    There is no new significant  segmental or subsegmental defect      IMPRESSION:     The probability for pulmonary embolus is low

## 2022-02-22 NOTE — ASSESSMENT & PLAN NOTE
Continue propranolol, amlodipine, and imdur   Outpatient follow-up with PCP in regards to this matter

## 2022-02-22 NOTE — ASSESSMENT & PLAN NOTE
· Needs an outpatient sleep study to check for obstructive sleep apnea  · Outpatient follow-up with Pulmonology  · The patient qualified for home supplemental oxygen therapy on 2022 per the evaluation by Respiratory Therapy:  Home Oxygen Qualifying Test      Patient name: Dionne Vo        : 1935   Date of Test:  2022             Diagnosis:    Home Oxygen Test:    **Medicare Guidelines require item(s) 1-5 on all ambulatory patients or 1 and 2 on non-ambulatory patients      1  Baseline SPO2 on Room Air at rest 90 %   a  If <= 88% on Room Air add O2 via NC to obtain SpO2 >=88%  If LPM needed, document LPM n/a needed to reach =>88%     1  SPO2 during exertion on Room Air 81  %  a  During exertion monitor SPO2  If SPO2 increases >=89%, do not add supplemental oxygen     2  SPO2 on Oxygen at Rest 94 % at 2 LPM     3  SPO2 during exertion on Oxygen 90 % at 3 LPM     4  Test performed during exertion activity  [x]? Supplemental Home Oxygen is indicated      []?   Client does not qualify for home oxygen      Respiratory Additional Notes- na     Yoni Genao, RT

## 2022-02-22 NOTE — DISCHARGE INSTRUCTIONS
YOU CANNOT BE ON GLIMEPIRIDE BECAUSE OF YOUR ABNORMAL KIDNEY FUNCTION  PLEASE TAKE TRADJENTA 5 MG DAILY IN PLACE OF THE GLIMEPIRIDE  PLEASE DISPOSE OF YOUR INSULIN NEEDLES, SYRINGES, TEST STRIPS, AND LANCETS USED TO CHECK YOUR BLOOD SUGAR IN A PROPER NEEDLE/SHARPS CONTAINER  ALTERNATIVELY, YOU CAN USE AN EMPTY LAUNDRY DETERGENT BOTTLE  WHEN THE BOTTLE IS FULL, SEAL IT WITH THE LID, AND WRAP IN COMPLETELY IN DUCT TAPE PRIOR TO THROWING IT IN THE GARBAGE  Heart Failure   WHAT YOU NEED TO KNOW:   What is heart failure? Heart failure is a condition that does not allow your heart to fill or pump properly  Your heart cannot pump enough oxygen in your blood to your organs and tissues  Fluid may not move through your body properly  Fluid builds up and causes swelling and trouble breathing  This is known as congestive heart failure  Heart failure may start in the left or right ventricle  Heart failure is a long-term condition that tends to get worse over time  It is important to manage your health to improve your quality of life  What are the signs and symptoms of heart failure? The signs and symptoms depend on how severe your heart failure is  You may have any of the following:  · Trouble breathing with activity that worsens to trouble breathing at rest    · Shortness of breath while lying flat    · Severe shortness of breath and coughing at night that usually wakes you    · Feeling lightheaded when you stand up    · Purple color around your mouth and nails    · Confusion or anxiety    · Chest pain at night    · Periods of no breathing, then breathing fast    · Lack of energy (often worsened by physical activity), or trouble sleeping    · Swelling in your ankles, legs, or abdomen    · Heartbeat that is fast or not regular    · Fingers and toes feel cool to the touch    How is heart failure diagnosed? Tell your healthcare provider about your health history and the medicines you take   He or she will ask about your shortness of breath and other symptoms  You may need any of the following:  · Blood tests  are used to check for heart problems such as coronary artery disease or decreased blood flow  Blood tests also give healthcare providers information about your kidney, liver, and thyroid function  The results can also show an infection  · An EKG  test records your heart rhythm and how fast your heart beats  It shows healthcare providers if you have heart block or have had a heart attack  · Echocardiogram  is a type of ultrasound  Sound waves are used to show the structure and function of your heart  This test may show if there are problems with your heart valves  It may also show if the chambers of your heart are working properly  · X-ray, CT, or MRI  pictures may be taken of your heart and lungs  The pictures may show the cause of your heart failure, or blood clots or fluid in your lungs  You may be given contrast liquid to help your heart show up better in the pictures  Tell the healthcare provider if you have ever had an allergic reaction to contrast liquid  Do not enter the MRI room with anything metal  Metal can cause serious injury  Tell the provider if you have any metal in or on your body  How is heart failure treated? Your healthcare providers will help you manage any other health conditions that may be causing your heart failure  The goals of treatment are to manage, slow, or reverse heart damage  Treatment may include any of the following:  · Medicines  may be given to help regulate your heart rhythm and lower your blood pressure  You may also need medicines to help decrease extra fluids  Medicines, such as NSAIDs, may be stopped if they are causing your heart failure to become worse  Do not stop any of your medicines on your own  · Cardiac rehab  is a program run by specialists who will help you safely strengthen your heart   In the program you will learn about exercise, relaxation, stress management, and heart-healthy nutrition  Cardiac rehab may be recommended if your heart failure is not severe  · Oxygen  may help you breathe easier if your oxygen level is lower than normal  A CPAP machine may be used to keep your airway open while you sleep  · Surgery  can be done to implant a pacemaker or another device in your chest to regulate your heart rhythm  Other types of surgery can open blocked heart vessels, replace a damaged heart valve, or remove scar tissue  What can I do to manage swelling from extra fluid? · Elevate (raise) your legs above the level of your heart  This will help with fluid that builds up in your legs or ankles  Elevate your legs as often as possible during the day  Prop your legs on pillows or blankets to keep them elevated comfortably  Try not to stand for long periods of time during the day  Move around to keep your blood circulating  · Limit sodium (salt)  Ask how much sodium you can have each day  Your healthcare provider may give you a limit, such as 2,300 milligrams (mg) a day  Your provider or a dietitian can teach you how to read food labels for the number of mg in a food  He or she can also help you find ways to have less salt  For example, if you add salt to food as you cook, do not add more at the table  · Drink liquids as directed  You may need to limit the amount of liquid you drink within 24 hours  Your healthcare provider will tell you how much liquid to have and which liquids are best for you  He or she may tell you to limit liquid to 1 5 to 2 liters in a day  He or she will also tell you how often to drink liquid throughout the day  · Weigh yourself every morning  Use the same scale, in the same spot  Do this after you use the bathroom, but before you eat or drink  Wear the same type of clothing each time  Write down your weight and call your healthcare provider if you have a sudden weight gain   Swelling and weight gain are signs of fluid buildup  What can I do to manage heart failure? Your quality of life may improve with treatment and the following:  · Do not smoke  Nicotine and other chemicals in cigarettes and cigars can cause lung and heart damage  Ask your healthcare provider for information if you currently smoke and need help to quit  E-cigarettes or smokeless tobacco still contain nicotine  Talk to your healthcare provider before you use these products  · Do not drink alcohol or use illegal drugs  Alcohol and drugs can increase your risk for high blood pressure, diabetes, and coronary artery disease  · Eat heart-healthy foods  Heart-healthy foods include fruits, vegetables, lean meat (such as beef, chicken, or pork), and low-fat dairy products  Fatty fish such as salmon and tuna are also heart healthy  Other heart-healthy foods include walnuts, whole-grain breads, beans, and cooked beans  Replace butter and margarine with heart-healthy oils such as olive oil or canola oil  Your provider or a dietitian can help you create heart-healthy meal plans  · Manage any chronic health conditions you have  These include high blood pressure, diabetes, obesity, high cholesterol, metabolic syndrome, and COPD  You will have fewer symptoms if you manage these health conditions  Follow your healthcare provider's recommendations and follow up with him or her regularly  · Maintain a healthy weight  Being overweight can increase your risk for high blood pressure, diabetes, and coronary artery disease  These conditions can make your symptoms worse  Ask your healthcare provider how much you should weigh  Ask him or her to help you create a weight loss plan if you are overweight  · Stay active  Activity can help keep your symptoms from getting worse  Walking is a type of physical activity that helps maintain your strength and improve your mood  Physical activity also helps you manage your weight   Work with your healthcare provider to create an exercise plan that is right for you  · Get vaccines as directed  The flu and pneumonia can be severe for a person who has heart failure  Vaccines protect you from these infections  Get a flu shot every year as soon as it is recommended, usually in September or October  You may also need the pneumonia vaccine  Your healthcare provider can tell you if you need other vaccines, and when to get them  Call your local emergency number [phone] in the [address] [facility]) if:   · You have any of the following signs of a heart attack:      ? Squeezing, pressure, or pain in your chest    ? You may  also have any of the following:     § Discomfort or pain in your back, neck, jaw, stomach, or arm    § Shortness of breath    § Nausea or vomiting    § Lightheadedness or a sudden cold sweat      When should I call my doctor? · Your heartbeat is fast, slow, or uneven all the time  · You have symptoms of worsening heart failure:      ? Shortness of breath at rest, at night, or that is getting worse in any way    ? Weight gain of 3 or more pounds (1 4 kg) in a day, or more than your healthcare provider says is okay    ? More swelling in your legs or ankles    ? Abdominal pain or swelling    ? More coughing    ? Feeling tired all the time    · You feel hopeless or depressed, or you have lost interest in things you used to enjoy  · You often feel worried or afraid  · You have questions or concerns about your condition or care  CARE AGREEMENT:   You have the right to help plan your care  Learn about your health condition and how it may be treated  Discuss treatment options with your healthcare providers to decide what care you want to receive  You always have the right to refuse treatment  The above information is an  only  It is not intended as medical advice for individual conditions or treatments   Talk to your doctor, nurse or pharmacist before following any medical regimen to see if it is safe and effective for you  © Copyright [employer] 2021 Information is for End User's use only and may not be sold, redistributed or otherwise used for commercial purposes  All illustrations and images included in CareNotes® are the copyrighted property of A D A M , Inc  or [employer] [facility]      Heart Failure   WHAT YOU NEED TO KNOW:   Heart failure is a condition that does not allow your heart to fill or pump properly  Not enough oxygen in your blood gets to your organs and tissues  Fluid may not move through your body properly  Fluid builds up and causes swelling and trouble breathing  This is known as congestive heart failure  Heart failure may start in the left or right ventricle  Heart failure is often caused by damage or injury to your heart  The damage may be caused by other heart problems, diabetes, or high blood pressure  The damage may have also been caused by an infection  Heart failure is a long-term condition that tends to get worse over time  It is important to manage your health to improve your quality of life  DISCHARGE INSTRUCTIONS:   Call your local emergency number (911 in the [address] [facility]) if:   · You have any of the following signs of a heart attack:      ? Squeezing, pressure, or pain in your chest    ? You may  also have any of the following:     § Discomfort or pain in your back, neck, jaw, stomach, or arm    § Shortness of breath    § Nausea or vomiting    § Lightheadedness or a sudden cold sweat      Call your doctor if:   · Your heartbeat is fast, slow, or uneven all the time  · You have symptoms of worsening heart failure:      ? Shortness of breath at rest, at night, or that is getting worse in any way    ? Weight gain of 3 or more pounds (1 4 kg) in a day, or more than your healthcare provider says is okay    ? More swelling in your legs or ankles    ? Abdominal pain or swelling    ? More coughing    ? Loss of appetite    ?  Feeling tired all the time    · You feel hopeless or depressed, or you have lost interest in things you used to enjoy  · You often feel worried or afraid  · You have questions or concerns about your condition or care  Medicines:   · Medicines  may be given to help regulate your heart rhythm and lower your blood pressure  You may also need medicines to help decrease extra fluids  Medicines, such as NSAIDs, may be stopped if they are causing your heart failure to become worse  Do not stop any of your medicines on your own  · Take your medicine as directed  Contact your healthcare provider if you think your medicine is not helping or if you have side effects  Tell him or her if you are allergic to any medicine  Keep a list of the medicines, vitamins, and herbs you take  Include the amounts, and when and why you take them  Bring the list or the pill bottles to follow-up visits  Carry your medicine list with you in case of an emergency  Go to cardiac rehab if directed:  Cardiac rehab is a program run by specialists who will help you safely strengthen your heart  In the program you will learn about exercise, relaxation, stress management, and heart-healthy nutrition  Manage swelling from extra fluid:   · Elevate (raise) your legs above the level of your heart  This will help with fluid that builds up in your legs or ankles  Elevate your legs as often as possible during the day  Prop your legs on pillows or blankets to keep them elevated comfortably  Try not to stand for long periods of time during the day  Move around to keep your blood circulating  · Limit sodium (salt)  Ask how much sodium you can have each day  Your healthcare provider may give you a limit, such as 2,300 milligrams (mg) a day  Your provider or a dietitian can teach you how to read food labels for the number of mg in a food  He or she can also help you find ways to have less salt   For example, if you add salt to food as you cook, do not add more at the table  · Drink liquids as directed  You may need to limit the amount of liquid you drink within 24 hours  Your healthcare provider will tell you how much liquid to have and which liquids are best for you  He or she may tell you to limit liquid to 1 5 to 2 liters in a day  He or she will also tell you how often to drink liquid throughout the day  · Weigh yourself every morning  Use the same scale, in the same spot  Do this after you use the bathroom, but before you eat or drink  Wear the same type of clothing each time  Write down your weight and call your healthcare provider if you have a sudden weight gain  Swelling and weight gain are signs of fluid buildup  Manage heart failure: Your quality of life may improve with treatment and the following:  · Do not smoke  Nicotine and other chemicals in cigarettes and cigars can cause lung and heart damage  Ask your healthcare provider for information if you currently smoke and need help to quit  E-cigarettes or smokeless tobacco still contain nicotine  Talk to your healthcare provider before you use these products  · Do not drink alcohol or use illegal drugs  Alcohol and drugs can increase your risk for high blood pressure, diabetes, and coronary artery disease  · Eat heart-healthy foods  Heart-healthy foods include fruits, vegetables, lean meat (such as beef, chicken, or pork), and low-fat dairy products  Fatty fish such as salmon and tuna are also heart healthy  Other heart-healthy foods include walnuts, whole-grain breads, beans, and cooked beans  Replace butter and margarine with heart-healthy oils such as olive oil or canola oil  Your provider or a dietitian can help you create heart-healthy meal plans  · Manage any chronic health conditions you have  These include high blood pressure, diabetes, obesity, high cholesterol, metabolic syndrome, and COPD  You will have fewer symptoms if you manage these health conditions  Follow your healthcare provider's recommendations and follow up with him or her regularly  · Maintain a healthy weight  Being overweight can increase your risk for high blood pressure, diabetes, and coronary artery disease  These conditions can make your symptoms worse  Ask your healthcare provider how much you should weigh  Ask him or her to help you create a weight loss plan if you are overweight  · Stay active  Activity can help keep your symptoms from getting worse  Walking is a type of physical activity that helps maintain your strength and improve your mood  Physical activity also helps you manage your weight  Work with your healthcare provider to create an exercise plan that is right for you  · Get vaccines as directed  The flu and pneumonia can be severe for a person who has heart failure  Vaccines protect you from these infections  Get a flu shot every year as soon as it is recommended, usually in September or October  You may also need the pneumonia vaccine  Your healthcare provider can tell you if you need other vaccines, and when to get them  Follow up with your doctor within 7 days and as directed: You may need to return for other tests  You may need home health care  A healthcare provider will monitor your vital signs, weight, and make sure your medicines are working  Write down your questions so you remember to ask them during your visits  Join a support group:  Heart failure can be difficult to manage  It may be helpful to talk with others who have heart failure  You may learn how to better manage your condition or get emotional support  For more information:  · [address]  [place] , [place]   Phone: [phone]  Web Address: https://[employer]/  [address] [facility] [place] 2021 Information is for End User's use only and may not be sold, redistributed or otherwise used for commercial purposes   All illustrations and images included in [employer] are the copyrighted property of [employer]  or [address] [place], [place] [address]pa[facility]  The above information is an  only  It is not intended as medical advice for individual conditions or treatments  Talk to your doctor, nurse or pharmacist before following any medical regimen to see if it is safe and effective for you  Using Oxygen at [facility]:   You may need extra oxygen if you are not able to breathe enough oxygen on your own  You need a doctor's order to get oxygen therapy  The order will include how much you need, and how often you need it  Use oxygen as directed  DISCHARGE INSTRUCTIONS:   Call, or have someone call, your local emergency number (911 in the [address] [facility] [address]) if:   · Your breathing becomes fast, or it hurts to inhale  · You have sudden chest pain  · You are tired, confused, cannot think clearly, or faint  Seek care immediately if:   · You have a headache, your heart is beating fast, and you are shaking  · Your breathing is shallow, slow, or more difficult than usual for you  · You feel anxious or cannot sit still  · Your fingernails or lips turn blue  Call your doctor if:   · The oxygen tubes create sores on your skin, or make you bleed  · You have trouble sleeping because you cannot breathe well  · You have questions or concerns about your condition or care  Types of oxygen supply systems:  Your healthcare provider will pick your oxygen supply based on how much oxygen you need, and how active you are  Oxygen can be supplied the following 3 ways:  · Compressed oxygen  holds oxygen in a metal cylinder (tank) under pressure  The tank can be set to release only the amount of oxygen you need as you breathe  Compressed oxygen tanks are heavy, and are meant to be used when you stay mostly in one place  You may need help to move or secure it   Smaller tanks and wheeled carts are available to help you move with ease, or when you travel  · Liquid oxygen  is kept chilled inside a small, insulated case  The liquid warms and becomes a breathable gas when you need to breathe in  Liquid oxygen cases are smaller and easy to carry around  You can refill your small liquid oxygen case from a big tank kept in your home  Your oxygen delivery service will fill your large tank every 1 to 2 weeks  · An oxygen concentrator  is an electric machine that stores oxygen from the air  This machine is heavy and may come with a wheeled cart to help you move it from room to room  Types of oxygen breathing devices:  Each device is connected to the oxygen supply with tubing  The tubing should be long enough to let you move around your house  You may need a humidifier to moisten the oxygen  This may prevent dryness in your nose, mouth, and throat  Ask your healthcare provider if you need a humidifier, and how to attach it to your oxygen supply  · A nasal cannula  is a 2-pronged plastic tube that fits inside your nostrils  Place one prong in each nostril  Loop the tubing around your ears, or attach it to your eyeglasses to keep it in place  Make sure your cannula fits you well and is comfortable  · An oxygen mask  is attached to a plastic tube and covers your nose and mouth  It is usually held in place by an elastic strap that wraps around the back of your head  You can use an oxygen mask if you need a lot of oxygen  Your healthcare provider may tell you to use a nasal cannula during the day, and a mask at night  A mask may help if your nose is dry or stuffy  · Transtracheal oxygen  is given through a small, flexible catheter inserted into your trachea (windpipe)  A necklace holds the catheter in place  Use oxygen safely:   · Do not use oxygen around heat or flame  Compressed oxygen can catch on fire  Keep the oxygen container 5 feet away from open flames or heaters, such as candles or hot water heaters   Do not use anything flammable, such as cleaning fluids, gasoline, or aerosol sprays near your oxygen  Keep a fire extinguisher and a phone close by in case of a fire  Tell your fire department that you have oxygen in your home if you need to call them for help  · Do not smoke while you are using oxygen  Do not let anyone smoke around you  · Do not change the flow of your oxygen  unless your healthcare provider tells you to  Turn your oxygen container or concentrator off when you are not using your oxygen  · Do not drink alcohol or take sedatives  while you use oxygen  These may slow your breathing  · Put signs on all the doors of your house  to let visitors and emergency workers know that oxygen is in use  Tell your electric company that you have electrical medical equipment  They will put you on a priority list to fix your power quickly if it goes out  · Follow instructions for use and maintenance of your oxygen equipment  Keep oxygen containers secured in an upright position  Oxygen containers may become damaged if they fall over  An oxygen container may cause serious injury if it breaks  Clean your oxygen supplies:   · Wash or replace equipment parts  as directed  Wash your nasal prongs with soap and water twice a week  Replace your nasal prongs every 2 weeks  Replace your tubing every 2 months, or when it becomes stiff  Change the tubing if moisture appears on the inside of the tube  Moisture can make bacteria grow, and cause infections  Change the cannula and tubing after you have a cold or the flu  · Ask your healthcare provider how to clean your oxygen mask or transtracheal catheter  Replace the oxygen mask every 2 weeks  · Disinfect the buttons and outside of your oxygen concentrator  Clean your air filter at least once a week with soap and water  Let it air dry  Replace the filter at least once a week  Ask your oxygen supply company to service your concentrator at least once a year   Ask your healthcare provider if you have any questions about how to clean the air filter  · Wash your humidifier bottle with soap and warm water between each refill  Rinse and air dry the bottle before you refill it with distilled water  Do not use tap water  Disinfect the outside of the bottle and cap once the inside of the bottle has been washed  General tips for oxygen use:   · Keep a backup oxygen supply in case of an emergency  Always keep a backup oxygen tank that does not run on electricity in case there is a power failure  Oxygen may leak out of your container  Ask your healthcare provider if your supply has a tool to reduce wasted oxygen  · Use gauze or water-based lubricants to help soothe your skin  Oxygen may dry out your skin, mouth, or throat  Place gauze on top of your ears or under the tubing on your cheeks if they become sore  Use water-based lubricants on your lips and nostrils if they become dry or sore  Do not use oil-based lubricants  They may be flammable  · Order new oxygen well before your current supply runs out  Your oxygen company may not deliver on holidays  Ask your healthcare provider for help planning your oxygen needs when you travel  · Keep the phone number of your oxygen supply company handy  Place it in an area that you see every day, such as on your fridge  Contact them if you have any problems with your supplies  Follow up with your doctor as directed:  Write down your questions so you remember to ask them during your visits  © [employer] 2021 Information is for End User's use only and may not be sold, redistributed or otherwise used for commercial purposes  All illustrations and images included in CareNotes® are the copyrighted property of A [employer] A M [employer] Inc  or [facility] Bailee Arias   The above information is an  only  It is not intended as medical advice for individual conditions or treatments   Talk to your doctor, nurse or pharmacist before following any medical regimen to see if it is safe and effective for you  Using Oxygen at [address] [facility]:   Why might I need to use oxygen at home? You may need extra oxygen if you are not able to breathe enough oxygen on your own  You need a doctor's order to get oxygen therapy  The order will include how much you need, and how often you need it  Use oxygen as directed  What are the types of oxygen supply systems? Your healthcare provider will pick your oxygen supply based on how much oxygen you need, and how active you are  Oxygen can be supplied the following 3 ways:  · Compressed oxygen  holds oxygen in a metal cylinder (tank) under pressure  The tank can be set to release only the amount of oxygen you need as you breathe  Compressed oxygen tanks are heavy, and are meant to be used when you stay mostly in one place  You may need help to move or secure it  Smaller tanks and wheeled carts are available to help you move with ease, or when you travel  · Liquid oxygen  is kept chilled inside a small, insulated case  The liquid warms and becomes a breathable gas when you need to breathe in  Liquid oxygen cases are smaller and easy to carry around  You can refill your small liquid oxygen case from a big tank kept in your home  Your oxygen delivery service will fill your large tank every 1 to 2 weeks  · An oxygen concentrator  is an electric machine that stores oxygen from the air  This machine is heavy and may come with a wheeled cart to help you move it from room to room  What are the types of oxygen breathing devices? Each device is connected to the oxygen supply with tubing  The tubing should be long enough to let you move around your house  You may need a humidifier to moisten the oxygen  This may prevent dryness in your nose, mouth, and throat  Ask your healthcare provider if you need a humidifier, and how to attach it to your oxygen supply    · A nasal cannula  is a 2-pronged plastic tube that fits inside your nostrils  Place one prong in each nostril  Loop the tubing around your ears, or attach it to your eyeglasses to keep it in place  Make sure your cannula fits you well and is comfortable  · An oxygen mask  is attached to a plastic tube and covers your nose and mouth  It is usually held in place by an elastic strap that wraps around the back of your head  You can use an oxygen mask if you need a lot of oxygen  Your healthcare provider may tell you to use a nasal cannula during the day, and a mask at night  A mask may help if your nose is dry or stuffy  · Transtracheal oxygen  is given through a small, flexible catheter inserted into an opening in your trachea (windpipe)  A necklace holds the catheter in place  How do I use oxygen safely? · Do not use oxygen around heat or flame  Compressed oxygen can catch on fire  Keep the oxygen container 5 feet away from open flames or heaters, such as candles or hot water heaters  Do not use anything flammable, such as cleaning fluids, gasoline, or aerosol sprays near your oxygen  Keep a fire extinguisher and a phone close by in case of a fire  Tell your fire department that you have oxygen in your home if you need to call them for help  · Do not smoke while you are using oxygen  Do not let anyone smoke around you  · Do not change the flow of your oxygen  unless your healthcare provider tells you to  Turn your oxygen container or concentrator off when you are not using your oxygen  · Do not drink alcohol or take sedatives  while you use oxygen  These may slow your breathing  · Put signs on all the doors of your house  to let visitors and emergency workers know that oxygen is in use  Tell your electric company that you have electrical medical equipment  They will put you on a priority list to fix your power quickly if it goes out  · Follow instructions for use and maintenance of your oxygen equipment    Keep oxygen containers secured in an upright position  Oxygen containers may become damaged if they fall over  An oxygen container may cause serious injury if it breaks  How do I clean my oxygen supplies? · Wash or replace equipment parts  as directed  Wash your nasal prongs with soap and water twice a week  Replace your nasal prongs every 2 weeks  Replace your tubing every 2 months, or when it becomes stiff  Change the tubing if moisture appears on the inside of the tube  Moisture can make bacteria grow, and cause infections  Change the cannula and tubing after you have a cold or the flu  · Ask your healthcare provider how to clean your oxygen mask or transtracheal catheter  Replace the oxygen mask every 2 weeks  · Disinfect the buttons and outside of your oxygen concentrator  Clean your air filter at least once a week with soap and water  Let it air dry  Replace the filter at least once a week  Ask your oxygen supply company to service your concentrator at least once a year  Ask your healthcare provider if you have any questions about how to clean the air filter  · Wash your humidifier bottle with soap and warm water between each refill  Rinse and air dry the bottle before you refill it with distilled water  Do not use tap water  Disinfect the outside of the bottle and cap once the inside of the bottle has been washed  What are some general tips for oxygen use? · Keep a backup oxygen supply in case of an emergency  Always keep a backup oxygen tank that does not run on electricity in case there is a power failure  Oxygen may leak out of your container  Ask your healthcare provider if your supply has a tool to reduce wasted oxygen  · Use gauze or water-based lubricants to help soothe your skin  Oxygen may dry out your skin, mouth, or throat  Place gauze on top of your ears or under the tubing on your cheeks if they become sore  Use water-based lubricants on your lips and nostrils if they become dry or sore   Do not use oil-based lubricants  They may be flammable  · Order new oxygen well before your current supply runs out  Your oxygen company may not deliver on holidays  Ask your healthcare provider for help planning your oxygen needs when you travel  · Keep the phone number of your oxygen supply company handy  Place it in an area that you see every day, such as on your fridge  Contact them if you have any problems with your supplies  Call, or have someone call, your local emergency number (911 in the [address]) if:   · Your breathing becomes fast, or it hurts to inhale  · You have sudden chest pain  · You are tired, confused, cannot think clearly, or faint  When should I seek immediate care? · You have a headache, your heart is beating fast, and you are shaking  · Your breathing is shallow, slow, or more difficult than usual for you  · You feel anxious or cannot sit still  · Your fingernails or lips turn blue  When should I call my doctor? · The oxygen tubes create sores on your skin, or make you bleed  · You have trouble sleeping because you cannot breathe well  · You have questions or concerns about your condition or care  CARE AGREEMENT:   You have the right to help plan your care  Learn about your health condition and how it may be treated  Discuss treatment options with your healthcare providers to decide what care you want to receive  You always have the right to refuse treatment  The above information is an  only  It is not intended as medical advice for individual conditions or treatments  Talk to your doctor, nurse or pharmacist before following any medical regimen to see if it is safe and effective for you  © Copyright [employer] 2021 Information is for End User's use only and may not be sold, redistributed or otherwise used for commercial purposes   All illustrations and images included in CareNotes® are the copyrighted property of A [employer] A M , Inc  or [facility]      Type 2 Diabetes Management for Adults   WHAT YOU NEED TO KNOW:   Type 2 diabetes is a disease that affects how your body uses glucose (sugar)  Either your body cannot make enough insulin, or it cannot use the insulin correctly  It is important to keep diabetes controlled to prevent damage to your heart, blood vessels, and other organs  DISCHARGE INSTRUCTIONS:   What you can do to manage your blood sugar levels:   · Talk to your care team if you become stressed about diabetes care  Sometimes being able to fit diabetes care into your life can cause increased stress  The stress can cause you not to take care of yourself properly  Your care team can help by offering tips about self-care  Your care team may suggest you talk to a mental health provider  The provider can listen and offer help with self-care issues  · Make healthy food choices  Work with a dietitian to develop a meal plan that works for you and your schedule  A dietitian can help you learn how to eat the right amount of carbohydrates during your meals and snacks  Carbohydrates can raise your blood sugar if you eat too many at one time  Some foods that contain carbohydrates include breads, cereals, rice, pasta, and sweets  · Drink water  Water can help your kidneys function properly  Decrease the amount of drinks with sugar substitutes you have, such as diet sodas  Avoid sugary drinks, such as regular sodas and fruit juice  · Get regular physical activity  Physical activity can help you get to your target blood sugar level goal and manage your weight  Get at least 150 minutes of moderate to vigorous aerobic physical activity each week  Do not miss more than 2 days in a row  Do not sit longer than 30 minutes at a time  Your healthcare provider can help you create an activity plan  The plan can include the best activities for you and can help you build your strength and endurance  · Maintain a healthy weight    Ask your healthcare provider how much you should weigh  Ask him or her to help you create a safe weight loss plan if you are overweight  · Check your blood sugar level as directed and as needed  Ask your healthcare provider what your blood sugar levels should be  Ask him or her to help you create a plan for times to check your level  · Take your diabetes medicine or insulin as directed  You may need diabetes medicine, insulin, or both to help control your blood sugar levels  Your healthcare provider will teach you how and when to take your diabetes medicine or insulin  · Go to all follow-up appointments  Your healthcare provider may need to check your A1c every 3 months  An A1c test shows the average amount of sugar in your blood over the past 2 to 3 months  Your healthcare provider will tell you what your A1c level should be  What you need to know about high blood sugar:  High blood sugar may not cause any symptoms  It may cause you to feel more thirsty than usual or urinate more often than usual  Over time, high blood sugar levels can damage your nerves, blood vessels, tissues, and organs  The following can increase your blood sugar levels:  · Large meals or large amounts of carbohydrates at one time    · Decreased physical activity    · Stress    · Illness     · A lower dose of medicine or insulin, or a late dose    What you need to know about low blood sugar: You can prevent symptoms such as shakiness, dizziness, irritability, or confusion by preventing your blood sugar from going too low  · Treat low blood sugar right away  ? Drink 4 ounces of juice or have 1 tube of glucose gel  ? Check your blood sugar again 10 to 15 minutes later  ? When your blood sugar goes back to normal, eat a meal or snack to prevent another decrease  ·          · Keep glucose gel, raisins, or even hard candy with you at all times to treat low blood sugar       · Your blood sugar can get too low if you take diabetes medicine or insulin and do not eat enough food  · If you use insulin, check your blood sugar before you exercise  ? If your blood sugar is below 100 mg/dL, eat 4 crackers, 2 ounces of raisins, or drink 4 ounces of juice  ? Check your blood sugar every 30 minutes if you exercise more than 1 hour  ? You may need a snack during or after exercise  Other things you can do to manage your diabetes:   · Wear medical alert jewelry or carry a card that says you have diabetes  Your provider can tell you where to get the items  · Be safe when you drive  If you feel like your blood sugar is low while you are driving, pull over and check your blood sugar level  Treat low blood sugar before you start driving again, if needed  Keep snacks such as raisins and crackers in your car  You can also keep glucose gel in your car  · Know the risks if you choose to drink alcohol  Alcohol can cause your blood sugar levels to be low if you use insulin  Alcohol can cause high blood sugar levels and weight gain if you drink too much  Women should limit alcohol to 1 drink a day  Men should limit alcohol to 2 drinks a day  A drink of alcohol is 12 ounces of beer, 5 ounces of wine, or 1½ ounces of liquor  · Do not smoke  Nicotine can damage blood vessels and make it more difficult to manage your diabetes  Do not use e-cigarettes or smokeless tobacco in place of cigarettes or to help you quit  They still contain nicotine  Ask your healthcare provider for information if you currently smoke and need help quitting  · Have screenings for complications of diabetes and other conditions that happen with diabetes  You will need to be screened for kidney problems, high cholesterol, high blood pressure, blood vessel problems, eye problems, and eating disorders  Some screenings may begin right away and some may happen within the first 5 years of diagnosis   You will need to continue screenings through your lifetime  Keep your follow-up appointments with all providers  · Ask about vaccines  You have a higher risk for serious illness if you get the flu, pneumonia, COVID-19, or hepatitis  Ask your healthcare provider if you should get a flu, pneumonia, or hepatitis B vaccine, and when to get the COVID-19 vaccine  Follow up with your healthcare provider as directed: You may need to have blood tests done before your follow-up visit  The test results will show if changes need to be made in your treatment or self-care  Write down your questions so you remember to ask them during your visit  Talk to your provider if you cannot afford your medicine  © Copyright [employer] 2021 Information is for End User's use only and may not be sold, redistributed or otherwise used for commercial purposes  All illustrations and images included in CareNotes® are the copyrighted property of A [employer] A M , Inc  or Clementine Arias   The above information is an  only  It is not intended as medical advice for individual conditions or treatments  Talk to your doctor, nurse or pharmacist before following any medical regimen to see if it is safe and effective for you  Type 2 Diabetes Management for Adults   WHAT YOU NEED TO KNOW:   What is type 2 diabetes? Type 2 diabetes is a disease that affects how your body uses glucose (sugar)  Either your body cannot make enough insulin, or it cannot use the insulin correctly  It is important to keep diabetes controlled to prevent damage to your heart, blood vessels, and other organs  What can I do to manage my blood sugar levels? · Talk to your care team if you become stressed about diabetes care  Sometimes being able to fit diabetes care into your life can cause increased stress  The stress can cause you not to take care of yourself properly  Your care team can help by offering tips about self-care  Your care team may suggest you talk to a mental health provider   The provider can listen and offer help with self-care issues  · Make healthy food choices  Work with a dietitian to develop a meal plan that works for you and your schedule  A dietitian can help you learn how to eat the right amount of carbohydrates during your meals and snacks  Carbohydrates can raise your blood sugar if you eat too many at one time  Some foods that contain carbohydrates include breads, cereals, rice, pasta, and sweets  · Drink water  Water can help your kidneys function properly  Decrease the amount of drinks with sugar substitutes you have, such as diet sodas  Avoid sugary drinks, such as regular sodas and fruit juice  · Get regular physical activity  Physical activity can help you get to your target blood sugar level goal and manage your weight  Get at least 150 minutes of moderate to vigorous aerobic physical activity each week  Do not miss more than 2 days in a row  Do not sit longer than 30 minutes at a time  Your healthcare provider can help you create an activity plan  The plan can include the best activities for you and can help you build your strength and endurance  · Maintain a healthy weight  Ask your healthcare provider how much you should weigh  Ask him or her to help you create a safe weight loss plan if you are overweight  · Check your blood sugar level as directed and as needed  Ask your healthcare provider what your blood sugar levels should be  Ask him or her to help you create a plan for times to check your level  · Take your diabetes medicine or insulin as directed  You may need diabetes medicine, insulin, or both to help control your blood sugar levels  Your healthcare provider will teach you how and when to take your diabetes medicine or insulin  · Go to all follow-up appointments  Your healthcare provider may need to check your A1c every 3 months  An A1c test shows the average amount of sugar in your blood over the past 2 to 3 months  Your healthcare provider will tell you what your A1c level should be  What do I need to know about high blood sugar? High blood sugar may not cause any symptoms  It may cause you to feel more thirsty than usual or urinate more often than usual  Over time, high blood sugar levels can damage your nerves, blood vessels, tissues, and organs  The following can increase your blood sugar levels:  · Large meals or large amounts of carbohydrates at one time    · Decreased physical activity    · Stress    · Illness     · A lower dose of medicine or insulin, or a late dose    What do I need to know about low blood sugar? You can prevent symptoms such as shakiness, dizziness, irritability, or confusion by preventing your blood sugar from going too low  · Treat low blood sugar right away  ? Drink 4 ounces of juice or have 1 tube of glucose gel  ? Check your blood sugar again 10 to 15 minutes later  ? When your blood sugar goes back to normal, eat a meal or snack to prevent another decrease  · Keep glucose gel, raisins, or even hard candy with you at all times to treat low blood sugar  · Your blood sugar can get too low if you take diabetes medicine or insulin and do not eat enough food  · If you use insulin, check your blood sugar before you exercise  ? If your blood sugar is below 100 mg/dL, eat 4 crackers, 2 ounces of raisins, or drink 4 ounces of juice  ? Check your blood sugar every 30 minutes if you exercise more than 1 hour  ? You may need a snack during or after exercise  What else can I do to manage my diabetes? · Wear medical alert jewelry or carry a card that says you have diabetes  Your provider can tell you where to get the items  · Be safe when you drive  If you feel like your blood sugar is low while you are driving, pull over and check your blood sugar level  Treat low blood sugar before you start driving again, if needed   Keep snacks such as raisins and crackers in your car  You can also keep glucose gel in your car  · Know the risks if you choose to drink alcohol  Alcohol can cause your blood sugar levels to be low if you use insulin  Alcohol can cause high blood sugar levels and weight gain if you drink too much  Women should limit alcohol to 1 drink a day  Men should limit alcohol to 2 drinks a day  A drink of alcohol is 12 ounces of beer, 5 ounces of wine, or 1½ ounces of liquor  · Do not smoke  Nicotine can damage blood vessels and make it more difficult to manage your diabetes  Do not use e-cigarettes or smokeless tobacco in place of cigarettes or to help you quit  They still contain nicotine  Ask your healthcare provider for information if you currently smoke and need help quitting  · Have screenings for complications of diabetes and other conditions that happen with diabetes  You will need to be screened for kidney problems, high cholesterol, high blood pressure, blood vessel problems, eye problems, and eating disorders  Some screenings may begin right away and some may happen within the first 5 years of diagnosis  You will need to continue screenings through your lifetime  Keep your follow-up appointments with all providers  · Ask about vaccines  You have a higher risk for serious illness if you get the flu, pneumonia, COVID-19, or hepatitis  Ask your healthcare provider if you should get a flu, pneumonia, or hepatitis B vaccine, and when to get the COVID-19 vaccine  CARE AGREEMENT:   You have the right to help plan your care  Learn about your health condition and how it may be treated  Discuss treatment options with your healthcare providers to decide what care you want to receive  You always have the right to refuse treatment  The above information is an  only  It is not intended as medical advice for individual conditions or treatments   Talk to your doctor, nurse or pharmacist before following any medical regimen to see if it is safe and effective for you  © Copyright [employer] 2021 Information is for End User's use only and may not be sold, redistributed or otherwise used for commercial purposes   All illustrations and images included in CareNotes® are the copyrighted property of [employer] M [employer] Inc  or Clementine Arias

## 2022-02-22 NOTE — PHYSICAL THERAPY NOTE
PHYSICAL THERAPY NOTE          Patient Name: Laina Husbands  [id number] Date: 2/22/2022 02/22/22 0593   Note Type   Note Type Treatment   Pain Assessment   Pain Assessment Tool 0-10   Pain Score No Pain   Restrictions/Precautions   Weight Bearing Precautions Per Order No   Other Precautions   (Telemetry; Fall Risk; O2; Chair Alarm)   General   Response to Previous Treatment Patient with no complaints from previous session  Family/Caregiver Present No   Cognition   Overall Cognitive Status WFL   Following Commands Follows all commands and directions without difficulty   Subjective   Subjective Pt  would like to ambulate  Transfers   Sit to Stand 5  Supervision   Additional items Assist x 1; Armrests; Increased time required;Verbal cues   Stand to Sit 5  Supervision   Additional items Increased time required;Verbal cues;Armrests   Stand pivot 5  Supervision   Additional items Verbal cues   Ambulation/Elevation   Gait pattern Short stride; Foward flexed   Gait Assistance 5  Supervision   Additional items Assist x 1   Assistive Device Rolling walker   Distance 200'   Balance   Static Sitting Good   Dynamic Sitting Good   Static Standing Fair   Dynamic Standing Fair   Ambulatory Fair   Endurance Deficit   Endurance Deficit Yes   Activity Tolerance   Activity Tolerance Patient limited by fatigue   Exercises   Hip Flexion Sitting;20 reps   Hip Abduction Sitting;20 reps   Hip Adduction Sitting;20 reps   Knee AROM Long Arc Quad Sitting;20 reps   Ankle Pumps Sitting;20 reps   Assessment   Prognosis Good   Problem List Decreased strength;Decreased endurance; Impaired balance;Decreased mobility   Assessment Pt  seen for PT treatment session this date with interventions consisting of  therapeutic exercises, bed mobility, transfers and  gait training w/ emphasis on improving pt's ability to ambulate   Pt  Requiring occasional cues for sequence and safety  In comparison to previous session, Pt  With improvements in activity tolerance  SpO2 dropped to 85% with 2 L   ~ 2 min seated recovery to > 90%  Pt is in need of continued activity in PT to improve strength balance endurance mobility transfers and ambulation with return to maximize LOF  From PT/mobility standpoint, recommendation at time of d/c would be home health rehab  in order to promote return to PLOF and independence  The patient's AM-PAC Basic Mobility Inpatient Short Form Raw Score is 21  A Raw score of greater than 16 suggests the patient may benefit from discharge to home  Please also refer to physical therapy recommendation for safe DC planning  Goals   LTG Expiration Date 03/05/22   PT Treatment Day 2   Plan   Treatment/Interventions Functional transfer training;LE strengthening/ROM; Therapeutic exercise; Endurance training;Bed mobility;Gait training   Progress Progressing toward goals   Recommendation   PT Discharge Recommendation Home with home health rehabilitation   AM-PAC Basic Mobility Inpatient   Turning in Bed Without Bedrails 3   Lying on Back to Sitting on Edge of Flat Bed 3   Moving Bed to Chair 3   Standing Up From Chair 4   Walk in Room 4   Climb 3-5 Stairs 4   Basic Mobility Inpatient Raw Score 21   Basic Mobility Standardized Score 45 55   Highest Level Of Mobility   JH-HLM Goal 6: Walk 10 steps or more   JH-HLM Highest Level of Mobility 7: Walk 25 feet or more   Education   Education Provided Mobility training   Patient Demonstrates acceptance/verbal understanding   End of Consult   Patient Position at End of Consult Bedside chair;Bed/Chair alarm activated; All needs within reach   End of Consult Comments discussed POC with PT

## 2022-02-22 NOTE — PROGRESS NOTES
Progress Note - Pulmonary   Tanika Whitaker 82 y o  male MRN: [id number]  Unit/Bed#: 409-01 [id number]    Assessment/Plan:    1  Acute hypoxic respiratory failure   Currently requiring 2 L nasal cannula   Home O2 eval today indicates that patient requires 3 L nasal cannula with activities   Keep oxygen saturations above 88%   Pulmonary toilet:  Increase activity as tolerated, out of bed as tolerated, cough and deep breathing as encouraged, incentive spirometry q 1 hour  2  Abnormal CT chest secondary to persistent chronic interstitial changes, emphysema, pulmonary nodules, left pleural thickening   Home evidence of acute pneumonia on CT chest   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 compared to prior 03/04/2021 concerning for vasculitis   c-ANCA, p-ANCA, PR3, MPO pending- if elevated could consider initiating systemic steroids    Defer ABX to primary team    Plan for outpatient pulmonary follow up with PFTs   3  BECKI atop CKD stage IIIB   Treatment per primary team and nephrology   4  Chronic diastolic CHF   Diuretics per primary team and nephrology- currently on Lasix 20 mg PO BID    Monitor I/Os and daily weights   5  Eosinophilia  · 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  · Concern for EGPA/vasculitis- check serologies as above   6  DM type II    Treatment per primary team     Patient stable from pulmonary standpoint  Will sign off  Call with questions  Chief Complaint:    "I feel good "    Subjective:    Patient was seen and examined today  No overnight events reported  Patient currently seen on 2 L nasal cannula no acute distress  Home O2 eval acute he needs 3 L with activity  Patient states that his breathing feels fine today  Offers no new complaints  He states that he is getting added  Now and is ready for discharge  Objective:    Vitals: Blood pressure 114/55, pulse 72, temperature 97 6 °F (36 4 °C), resp   rate 18, height 5' 7" (1 702 m), weight 70 5 kg (155 lb 6 8 oz), SpO2 (!) 85 %  2L NCr,Body mass index is 24 34 kg/m²  Intake/Output Summary (Last 24 hours) at 2/22/2022 1256  Last data filed at 2/22/2022 0900  Gross per 24 hour   Intake 540 ml   Output 375 ml   Net 165 ml       Invasive Devices  Report    None                 Physical Exam:     Physical Exam    Labs: I have personally reviewed pertinent lab results  , ABG: No results found for: PHART, BMK4NHY, PO2ART, MJW3ECC, Q5HEMJDF, BEART, SOURCE, BNP: No results found for: BNP, CBC:   Lab Results   Component Value Date    WBC 7 29 02/22/2022    HGB 15 7 02/22/2022    HCT 48 1 02/22/2022    MCV 86 02/22/2022     02/22/2022    MCH 28 0 02/22/2022    MCHC 32 6 02/22/2022    RDW 12 6 02/22/2022    MPV 8 8 (L) 02/22/2022    NRBC 0 02/22/2022   , CMP:   Lab Results   Component Value Date    SODIUM 134 (L) 02/22/2022    K 4 5 02/22/2022    CL 96 (L) 02/22/2022    CO2 33 (H) 02/22/2022    BUN 26 (H) 02/22/2022    CREATININE 1 81 (H) 02/22/2022    CALCIUM 10 3 (H) 02/22/2022    AST 22 02/22/2022    ALT 18 02/22/2022    ALKPHOS 87 02/22/2022    EGFR 33 02/22/2022   , PT/INR: No results found for: PT, INR, Troponin: No results found for: TROPONINI    Imaging and other studies: none to review today

## 2022-02-22 NOTE — DISCHARGE SUMMARY
[address] [facility] [address]  Discharge- Haresh Rose 1935, 82 y o  male MRN: [id number]  Unit/Bed#: 409-01 Encounter: [id number]  Primary Care Provider: Ana Laura Higginbotham DO   Date and time admitted to hospital: 2/18/2022 12:35 PM    * Acute respiratory failure with hypoxia [facility])  Assessment & Plan  Presented with RLE pain and swelling, dyspnea, and hypoxia  Originally required 4 L supplemental O2 by NC  · Requiring 2 lpm of continuous supplemental oxygen via the nasal cannula to maintain oxygen saturation levels of 90% and above on 02/21/2022      Patient has a history of diastolic CHF - hospitalized late last month and treated for acute exacerbation  · CXR with mild pulmonary venous congestion  · ProBNP 1,791, up from a value of 644 3/2021  · Maintain on low-sodium diet  · Was on IV furosemide 40mg BID - changed to lasix 20 mg PO BID due to worsening renal function  · Continue cardiac regimen of BB, Imdur, clopidogrel, and statin therapy  Patient presented with a temperature of 100° F, leukocytosis with a WBC count of 13 2, and mild lactic acidosis (resolved)  · Procalcitonin elevated at 0 6 --> 0 7, 0 59  · CT with persisting chronic interstitial changes in the periphery of the upper and lower lobes  · CT also with LLL airspace consolidation, stable since 03/2021  Favors chronic changes/atelectasis  · However, given patient's presentation with elevated temperature (but not fever), leukocytosis that resolved, and mild lactic acidosis and hypoxia, favor short course of antibiotic therapy  Plan on ceftriaxone for 5 days  Trend procalcitonin  Also with elevated D-dimer at 1 76  · Lower extremity Dopplers were limited but did not show any evidence of DVT  · No gross evidence of PE clinically and patient's hypoxia has improved with diuresis and initial antibiotic  Hold off on active anticoagulation, and maintain on VTE prophylaxis    · A nuclear medicine V/Q lung scan was checked, which showed low probability for pulmonary embolism  · The patient was seen in consultation by Pulmonology  · Outpatient follow-up with Pulmonology  · The patient qualified for home supplemental oxygen therapy on 2022 per the evaluation by Respiratory Therapy:  Home Oxygen Qualifying Test      Patient name: Lesley Onofre        : 1935   Date of Test:  2022             Diagnosis:    Home Oxygen Test:    **Medicare Guidelines require item(s) 1-5 on all ambulatory patients or 1 and 2 on non-ambulatory patients      1  Baseline SPO2 on Room Air at rest 90 %   a  If <= 88% on Room Air add O2 via NC to obtain SpO2 >=88%  If LPM needed, document LPM n/a needed to reach =>88%     1  SPO2 during exertion on Room Air 81  %  a  During exertion monitor SPO2  If SPO2 increases >=89%, do not add supplemental oxygen     2  SPO2 on Oxygen at Rest 94 % at 2 LPM     3  SPO2 during exertion on Oxygen 90 % at 3 LPM     4  Test performed during exertion activity  [x]? Supplemental Home Oxygen is indicated      []?   Client does not qualify for home oxygen      Respiratory Additional Notes- na     Yoni Genao, RT    Acute on chronic diastolic congestive heart failure (HCC)  Assessment & Plan  Wt Readings from Last 3 Encounters:   22 70 5 kg (155 lb 6 8 oz)   22 77 6 kg (171 lb)   22 77 6 kg (171 lb)     Initially treated with IV lasix during the hospitalization  Discharge home on lasix 20 mg PO BID  Continue beta-blocker therapy with propanolol  Outpatient follow-up with Cardiology    [facility], [facility]   [address] [place]  Echo complete  Order# [id number]  Reading physician: Crystal Keyes MD Ordering physician: Jaspal Gooden MD Study date: 22     Name: Brigitte Escobedo                                                        :1935  MRN: [id number] Age: 82 y o  Gender: male    Vitals    Height Weight BSA (Calculated - m2) BP Pulse   5' 7" (1 702 m) 70 3 kg (155 lb) 1 81 sq meters 123/56 80     Study Details    This transthoracic echocardiogram was performed at bedside  This was a routine, inpatient study  Study quality was adequate  This was a technically difficult study due to lung interference  A complete 2D, color flow Doppler and spectral Doppler transthoracic echocardiogram was performed  The apical, parasternal, subcostal and suprasternal views were obtained  Indications  Priority: Routine  Dyspnea, SOB, Wheezing, Tachypnea     Interpretation Summary         Left Ventricle: Left ventricular cavity size is normal  Wall thickness is moderately increased  The left ventricular ejection fraction is 65%  Systolic function is normal  Wall motion is normal  Diastolic function is mildly abnormal, consistent with grade I (abnormal) relaxation  There is moderate concentric hypertrophy    Right Ventricle: Right ventricular cavity size is normal  Systolic function is normal     Left Atrium: The atrium is mildly dilated    Mitral Valve: There is mild regurgitation    Pulmonic Valve: There is moderate regurgitation          Findings    Left Ventricle Left ventricular cavity size is normal  Wall thickness is moderately increased  The left ventricular ejection fraction is 65%  Systolic function is normal   Wall motion is normal  There is moderate concentric hypertrophy  Diastolic function is mildly abnormal, consistent with grade I (abnormal) relaxation  Right Ventricle Right ventricular cavity size is normal  Systolic function is normal  Wall thickness is normal    Left Atrium The atrium is mildly dilated  Right Atrium The atrium is normal in size  Aortic Valve The aortic valve is trileaflet  The leaflets are not thickened  The leaflets are not calcified   The leaflets exhibit normal mobility  There is no evidence of regurgitation  There is no evidence of stenosis  Mitral Valve The mitral valve has normal structure and function  There is mild regurgitation  There is no evidence of stenosis  Tricuspid Valve Tricuspid valve structure is normal  There is no evidence of regurgitation  There is no evidence of stenosis  The estimated right ventricular systolic pressure is 17 62 mmHg  Pulmonic Valve Pulmonic valve structure is normal  There is moderate regurgitation  There is no evidence of stenosis  Ascending Aorta The aortic root is normal in size  IVC/SVC The right atrial pressure is estimated at 10 0 mmHg  The inferior vena cava is normal in size  Pericardium There is no pericardial effusion  The pericardium is normal in appearance  Pulmonic valve insufficiency  Assessment & Plan  · Outpatient follow-up with Cardiology    Lymphadenopathy  Assessment & Plan  · Outpatient Hematology/Oncology evaluation  · Outpatient surveillance imaging with PCP    CT scan of the chest without contrast (02/18/2022):    MEDIASTINUM AND RENARD:  There are stable appearing mildly prominent mediastinal lymph nodes with the largest in the precarinal region measuring 1 1 cm in short axis dimension  No significant hilar or axillary lymphadenopathy      CHEST WALL AND LOWER NECK:   Unremarkable      VISUALIZED STRUCTURES IN THE UPPER ABDOMEN:  Status post cholecystectomy      OSSEOUS STRUCTURES:  Median sternotomy wires  Degenerative changes in the thoracic spine      IMPRESSION:     Persistent chronic interstitial changes in the periphery of the upper and lower lobes greater in the lower lobes      Stable pulmonary nodules and calcified granulomas as above      Probable chronic atelectasis left lower lobe  No new areas of airspace consolidation or effusions      Cardiomegaly  Coronary artery and aortic calcifications    Status post CABG and median sternotomy      Mild mediastinal lymphadenopathy, unchanged since prior study  No hilar or axillary lymphadenopathy  Lung granuloma ([address] )  Assessment & Plan  · Possible sarcoidosis in the setting of hypercalcemia  · A vitamin D 1, 25 OH dihydroxy level was checked with results pending at the time of discharge  · The patient was seen in consultation by Pulmonology  · Outpatient follow-up with Pulmonology    Nocturnal hypoxia  Assessment & Plan  · Needs an outpatient sleep study to check for obstructive sleep apnea  · Outpatient follow-up with Pulmonology  · The patient qualified for home supplemental oxygen therapy on 2022 per the evaluation by Respiratory Therapy:  Home Oxygen Qualifying Test      Patient name: Jan Mckay        : 1935   Date of Test:  2022             Diagnosis:    Home Oxygen Test:    **Medicare Guidelines require item(s) 1-5 on all ambulatory patients or 1 and 2 on non-ambulatory patients      1  Baseline SPO2 on Room Air at rest 90 %   b  If <= 88% on Room Air add O2 via NC to obtain SpO2 >=88%  If LPM needed, document LPM n/a needed to reach =>88%     2  SPO2 during exertion on Room Air 81  %  a  During exertion monitor SPO2  If SPO2 increases >=89%, do not add supplemental oxygen     3  SPO2 on Oxygen at Rest 94 % at 2 LPM     4  SPO2 during exertion on Oxygen 90 % at 3 LPM     5  Test performed during exertion activity  [x]? Supplemental Home Oxygen is indicated      []? Client does not qualify for home oxygen      Respiratory Additional Notes- na     Yoni Genao, RT      Eosinophilia  Assessment & Plan  · Possibility of vasculitis with interstitial lung disease  · The patient was seen in consultation by Pulmonology    · Check c-ANCA, p-ANCA, MPO, and PR3 levels per Pulmonology, with results pending at the time of discharge  · Outpatient follow-up with Pulmonology    Abnormal EKG  Assessment & Plan  · Outpatient follow-up with Cardiology    ECG 12 lead  Order: [id number]   Status: Final result     Visible to patient: Yes (not seen)     Next appt: 02/24/2022 at 02:30 PM in [facility] ([facility] OR Room 1)     0 Result Notes    Component Ref Range & Units 2/18/22 1310 1/30/22 1815 1/30/22 1739 3/26/21 0545 3/25/21 1322 3/4/21 1359   Ventricular Rate BPM 85  90  95  79  83  83    Atrial Rate BPM 85  90  95  79  83  83    AZ Interval ms 182  196  198  190  184  192    QRSD Interval ms 88  86  86  86  92  86    QT Interval ms 374  354  352  390  398  380    QTC Interval ms 445  433  442  447  467  446    P Axis degrees 42  17  -7  5  39  41    QRS Axis degrees 78  69  62  45  55  42    T Wave Axis degrees -14  138  130  82  55  -11              Narrative & Impression    Normal sinus rhythm  T wave abnormality, consider lateral ischemia  Abnormal ECG  When compared with ECG of 30-JAN-2022 18:15,  No significant change was found  Confirmed by Dami West ([id number]) on 2/21/2022 12:32:41 PM      Specimen Collected: 02/18/22 13:10 Last Resulted: 02/21/22 12:32               Microalbuminuria  Assessment & Plan  · Not on an ACE-Inhibitor or ARB therapy at this time due to a fluctuating renal function status  · Outpatient follow-up with Nephrology    Biclonal gammopathy  Assessment & Plan  · Concerning for multiple myeloma in the setting of hypercalcemia  · Check a repeat serum protein electrophoresis and urine protein electrophoresis as an outpatient  · Check serum free light chains as an outpatient  · Outpatient Hematology/Oncology evaluation    Hypercalcemia  Assessment & Plan  · Concerning for multiple myeloma with a biclonal gammopathy   · A PTH-related peptide level was checked with results pending at the time of discharge  · Outpatient Endocrinology evaluation  · Outpatient Hematology/Oncology evaluation    Results for Rosalina Turner (MRN [id number]) as of 2/22/2022 13:14   Ref   Range 2/22/2022 06:13   Calcium, Ionized Latest Ref Range: 1 12 - 1 32 mmol/L 1 38 (H)     Results for Philip Trevizo (MRN [id number]) as of 2/22/2022 13:15   Ref  Range 2/22/2022 06:13   PARATHYROID HORMONE Latest Ref Range: 18 4 - 80 1 pg/mL 91 3 (H)       Aortic calcification (HCC)  Assessment & Plan  · Continue plavix 75 mg PO Qdaily  · Increased to high-intensity statin therapy with atorvastatin 40 mg PO Qdaily with dinner  Essential hypertension  Assessment & Plan  Continue propranolol, amlodipine, and imdur   Outpatient follow-up with PCP in regards to this matter    Elevated d-dimer  Assessment & Plan  · Cannot have a CTA of the chest/PE protocol in the setting of renal insufficiency    Results for Philip Trevizo (MRN [id number]) as of 2/21/2022 12:22   Ref  Range 2/18/2022 16:29   D-Dimer, Rommie Coca Latest Ref Range: <0 50 ug/ml FEU 1 76 (H)       VAS lower limb venous duplex study, unilateral/limited    Status: Final result       PACS Images     Show images for VAS lower limb venous duplex study, unilateral/limited    Study Result    Narrative & Impression      [facility] REPORT  CLINICAL:  Indications: Patient presents with right lower extremity weakness and edema x 4  days  Suspected Covid  Operative History:  CABG  Risk Factors  The patient has history of Obesity, HTN, Diabetes (IDDM), Hyperlipidemia, CKD,  CAD and previous smoking (quit 1-5yrs ago)  CONCLUSION:     Impression:  RIGHT LOWER LIMB: Limited  No gross evidence of acute deep vein thrombosis in the CFV, FV, Popliteal,  Gastrocnemius, Posterior Tibial and Peroneal Veins  LEFT LOWER LIMB: Limited  No gross evidence of acute  deep vein thrombosis in the CFV          Limited protocol performed     Technical findings were given to [employer] PA-C       NM lung perfusion imaging    Status: Final result       PACS Images     Show images for NM lung perfusion imaging    Study Result    Result Text   LUNG PERFUSION SCAN     INDICATION: hypoxia, elevated D-dimer, for V/Q lung scan due to BECKI/renal insufficiency     COMPARISON: Chest radiograph  2/21/2022, lung scan 3/26/2021     TECHNIQUE:  Multiplanar perfusion imaging was performed following the intravenous administration of 4 3 mCi Tc-99m labeled MAA  No ventilation imaging was performed as per current Covid-19 protocol      FINDINGS:       Perfusion imaging demonstrates a mildly heterogeneous distribution of the radiopharmaceutical in the left lung  Left lung again noted to be smaller than the right  There is no new significant  segmental or subsegmental defect      IMPRESSION:     The probability for pulmonary embolus is low  Vitamin D deficiency  Assessment & Plan  · Checked a vitamin D 25-OH level, with results pending at the time of discharge  · Treat with cholecalciferol 1000 I  U  PO Qdaily   Outpatient follow-up with PCP in regards to this matter    Proteinuria  Assessment & Plan  · Not on an ACE-Inhibitor or ARB therapy at this time due to a fluctuating renal function status  · Outpatient follow-up with Nephrology    Stage 3b chronic kidney disease [facility])  Assessment & Plan  Lab Results   Component Value Date    EGFR 33 02/22/2022    EGFR 33 02/21/2022    EGFR 28 02/19/2022    CREATININE 1 81 (H) 02/22/2022    CREATININE 1 80 (H) 02/21/2022    CREATININE 2 04 (H) 02/19/2022     Creatinine baseline of 1 5-1 7 mg/dl dating back to 2020  Continue to monitor, avoid nephrotoxins, and renally dose medications when appropriate  Monitor the patient's renal function and electrolyte levels after discharge with Nephrology  The patient was seen in consultation by Nephrology  Outpatient follow-up with Nephrology  Gastroesophageal reflux disease without esophagitis  Assessment & Plan  · Discontinue PPI with CKD  · Outpatient follow-up with Gastroenterology    Coronary artery disease of native artery of native heart with stable angina pectoris [facility])  Assessment & Plan  Status post CABG X 02/19/1992, with SVG to the circumflex and RCA    Repeat CABG 2005 with LIMA to LAD, SVG to posterior descending and posterolateral branch, radial artery graft to OM  Stenting of high-grade stenosis of 1st OM graft in 2009  Currently asymptomatic, with negative cardiac biomarkers  Continue clopidogrel, beta-blocker, and statin therapy  Patient is also on long-acting nitrate  Increased to high-intensity statin therapy with atorvastatin 40 mg PO Qdaily with dinner  Outpatient follow-up with Cardiology  Type 2 diabetes mellitus with hyperglycemia, without long-term current use of insulin ([facility])  Assessment & Plan    Lab Results   Component Value Date    HGBA1C 9 5 (H) 02/19/2022     · Discontinued glimepiride with renal insufficiency  · Initiated tradjenta 5 mg PO Qdaily, which will be continued upon discharge  · Likely will need the initiation of basal insulin as an outpatient  · Not on ACE-Inhibitor or ARB therapy at this time due to a fluctuating renal function status  · Outpatient Endocrinology evaluation    Discharging Physician / Practitioner: Luz Elena Oliva DO  PCP: Ant Taylor DO  Admission Date:   Admission Orders (From admission, onward)     Ordered        02/18/22 1715  Inpatient Admission  Once                      Discharge Date: 02/22/22    Consultations During Hospital Stay:  · Pulmonology  · Nephrology    Procedures Performed:   · None    Significant Findings / Test Results:   XR chest portable    Result Date: 2/21/2022  Impression: Mild pulmonary vascular congestion  Workstation performed: [id number]     XR chest 1 view portable    Result Date: 2/18/2022  Impression: Mild pulmonary venous congestion  No effusion or pneumothorax  Workstation performed: [id number]     CT chest without contrast    Result Date: 2/18/2022  Impression: Persistent chronic interstitial changes in the periphery of the upper and lower lobes greater in the lower lobes  Stable pulmonary nodules and calcified granulomas as above  Probable chronic atelectasis left lower lobe    No new areas of airspace consolidation or effusions  Cardiomegaly  Coronary artery and aortic calcifications  Status post CABG and median sternotomy  Mild mediastinal lymphadenopathy, unchanged since prior study  No hilar or axillary lymphadenopathy  Workstation performed: [id number]     NM lung perfusion imaging    Result Date: 2/21/2022  Impression: The probability for pulmonary embolus is low  Workstation performed: [id number]     VAS lower limb venous duplex study, unilateral/limited    Status: Final result       PACS Images     Show images for VAS lower limb venous duplex study, unilateral/limited    Study Result    Narrative & Impression      [facility] REPORT  CLINICAL:  Indications: Patient presents with right lower extremity weakness and edema x 4  days  Suspected Covid  Operative History:  CABG  Risk Factors  The patient has history of Obesity, HTN, Diabetes (IDDM), Hyperlipidemia, CKD,  CAD and previous smoking (quit 1-5yrs ago)  CONCLUSION:     Impression:  RIGHT LOWER LIMB: Limited  No gross evidence of acute deep vein thrombosis in the CFV, FV, Popliteal,  Gastrocnemius, Posterior Tibial and Peroneal Veins  LEFT LOWER LIMB: Limited  No gross evidence of acute  deep vein thrombosis in the CFV          Limited protocol performed     Technical findings were given to [employer] PA-C     SIGNATURE:  Electronically Signed by: Keesha Flores on 2022-02-19 12:28:12 PM       Results from last 7 days   Lab Units 02/22/22  0613 02/21/22  0420 02/20/22  0506   WBC Thousand/uL 7 29 6 80 7 30   HEMOGLOBIN g/dL 15 7 14 1 14 2   HEMATOCRIT % 48 1 42 5 43 2   PLATELETS Thousands/uL 343 320 297     Results from last 7 days   Lab Units 02/22/22  0613 02/21/22  0823 02/19/22  1349   SODIUM mmol/L 134* 135* 130*   POTASSIUM mmol/L 4 5 4 1 4 3   CHLORIDE mmol/L 96* 97* 95*   CO2 mmol/L 33* 30 25   BUN mg/dL 26* 30* 27*   CREATININE mg/dL 1 81* 1 80* 2 04*   CALCIUM mg/dL 10 3* 10 4* 10 1     Results from last 7 days   Lab Units 02/22/22  0613 02/21/22  0420 02/20/22  0506   MAGNESIUM mg/dL 2 2 2 1 2 1     Results from last 7 days   Lab Units 02/22/22  0613 02/21/22  0823 02/19/22  0509   ALK PHOS U/L 87 90 72   ALT U/L 18 16 10*   AST U/L 22 20 14     Microbiology Results (last 21 days)    Collected Updated Procedure Result Status Patient Facility Result Comment    02/18/2022 1314 02/18/2022 1414 COVID/FLU/RSV - 2 hour TAT [[id number]]    Nares from Nasopharyngeal Swab    Final result [address] - copy/paste COVID Guidelines URL to browser: [email or web address]  [employer]   SARS-CoV-2 assay is a Nucleic Acid Amplification assay intended for the   qualitative detection of nucleic acid from SARS-CoV-2 in nasopharyngeal   swabs  Results are for the presumptive identification of SARS-CoV-2 RNA  Positive results are indicative of infection with SARS-CoV-2, the virus   causing COVID-19, but do not rule out bacterial infection or co-infection   with other viruses  Laboratories within the [place] and its   territories are required to report all positive results to the appropriate   public health authorities  Negative results do not preclude SARS-CoV-2   infection and should not be used as the sole basis for treatment or other   patient management decisions  Negative results must be combined with   clinical observations, patient history, and epidemiological information  This test has not been FDA cleared or approved  This test has been authorized by FDA under an Emergency Use Authorization   (EUA)  This test is only authorized for the duration of time the   declaration that circumstances exist justifying the authorization of the   emergency use of an in vitro diagnostic tests for detection of SARS-CoV-2   virus and/or diagnosis of COVID-19 infection under section 564(b)(1) of   the Act, 21 U  [place] C  360bbb-3(b)(1), unless the authorization is terminated   or revoked sooner  The test has been validated but independent review by FDA   and CLIA is pending  Test performed using [employer] GeneXpert: This RT-PCR assay targets N2,   a region unique to SARS-CoV-2  A conserved region in the E-gene was chosen   for pan-Sarbecovirus detection which includes SARS-CoV-2  Component Value   SARS-CoV-2 Negative   INFLUENZA A PCR Negative   INFLUENZA B PCR Negative   RSV PCR Negative           02/18/2022 1314 02/21/2022 2201 Blood culture #1 [[id number]]   Blood from Arm, Right    Preliminary result [address]  Component Value   Blood Culture No Growth at 72 hrs  P              02/18/2022 1314 02/21/2022 2201 Blood culture #2 [[id number]]   Blood from Arm, Left    Preliminary result [address]  Component Value   Blood Culture No Growth at 72 hrs   P                ECG 12 lead  Order: [id number]   Status: Final result     Visible to patient: Yes (not seen)     Next appt: 02/24/2022 at 02:30 PM in [facility] Specialty ([facility] OR Room 1)     0 Result Notes    Component Ref Range & Units 2/18/22 1310 1/30/22 1815 1/30/22 1739 3/26/21 0545 3/25/21 1322 3/4/21 1359   Ventricular Rate BPM 85  90  95  79  83  83    Atrial Rate BPM 85  90  95  79  83  83    WA Interval ms 182  196  198  190  184  192    QRSD Interval ms 88  86  86  86  92  86    QT Interval ms 374  354  352  390  398  380    QTC Interval ms 445  433  442  447  467  446    P Axis degrees 42  17  -7  5  39  41    QRS Axis degrees 78  69  62  45  55  42    T Wave Axis degrees -14  138  130  82  55  -11              Narrative & Impression    Normal sinus rhythm  T wave abnormality, consider lateral ischemia  Abnormal ECG  When compared with ECG of 30-JAN-2022 18:15,  No significant change was found  Confirmed by Lorrie Morgan ([id number]) on 2/21/2022 12:32:41 PM      Specimen Collected: 02/18/22 13:10 Last Resulted: 02/21/22 12:32 [facility] Ber[address] Ayers [address]  [address] [facility]  Echo complete  Order# [id number]  Reading physician: Joshua Phipps MD Ordering physician: Valery Nichols MD Study date: 22     Name: Bel Lloyd                                                        :1935  MRN: [id number]                                                             Age: 82 y o  Gender: male    Vitals    Height Weight BSA (Calculated - m2) BP Pulse   5' 7" (1 702 m) 70 3 kg (155 lb) 1 81 sq meters 123/56 80     Study Details    This transthoracic echocardiogram was performed at bedside  This was a routine, inpatient study  Study quality was adequate  This was a technically difficult study due to lung interference  A complete 2D, color flow Doppler and spectral Doppler transthoracic echocardiogram was performed  The apical, parasternal, subcostal and suprasternal views were obtained  Indications  Priority: Routine  Dyspnea, SOB, Wheezing, Tachypnea     Interpretation Summary         Left Ventricle: Left ventricular cavity size is normal  Wall thickness is moderately increased  The left ventricular ejection fraction is 65%  Systolic function is normal  Wall motion is normal  Diastolic function is mildly abnormal, consistent with grade I (abnormal) relaxation  There is moderate concentric hypertrophy    Right Ventricle: Right ventricular cavity size is normal  Systolic function is normal     Left Atrium: The atrium is mildly dilated    Mitral Valve: There is mild regurgitation    Pulmonic Valve: There is moderate regurgitation          Findings    Left Ventricle Left ventricular cavity size is normal  Wall thickness is moderately increased  The left ventricular ejection fraction is 65%   Systolic function is normal   Wall motion is normal  There is moderate concentric hypertrophy  Diastolic function is mildly abnormal, consistent with grade I (abnormal) relaxation  Right Ventricle Right ventricular cavity size is normal  Systolic function is normal  Wall thickness is normal    Left Atrium The atrium is mildly dilated  Right Atrium The atrium is normal in size  Aortic Valve The aortic valve is trileaflet  The leaflets are not thickened  The leaflets are not calcified  The leaflets exhibit normal mobility  There is no evidence of regurgitation  There is no evidence of stenosis  Mitral Valve The mitral valve has normal structure and function  There is mild regurgitation  There is no evidence of stenosis  Tricuspid Valve Tricuspid valve structure is normal  There is no evidence of regurgitation  There is no evidence of stenosis  The estimated right ventricular systolic pressure is 71 45 mmHg  Pulmonic Valve Pulmonic valve structure is normal  There is moderate regurgitation  There is no evidence of stenosis  Ascending Aorta The aortic root is normal in size  IVC/SVC The right atrial pressure is estimated at 10 0 mmHg  The inferior vena cava is normal in size  Pericardium There is no pericardial effusion  The pericardium is normal in appearance         Incidental Findings:   · As above    Test Results Pending at Discharge (will require follow-up):  · c-ANCA, p-ANCA, MPO, PR3, PTH-related peptide, and blood cultures x 2 sets from 02/18/2022     Outpatient Tests Requested:  · CBC with diff , CMP, Magnesium level, Phosphorus level, Serum protein electrophoresis, Urine protein electrophoresis, serum free light chains with kappa/lambda ratio, and vitamin D 1, 25 dihydroxy level  · Outpatient sleep study    Complications:  None    Reason for Admission:  Acute respiratory failure with hypoxia    Hospital Course:   Eulalia Cool is a 82 y o  male patient who originally presented to the hospital on 2/18/2022 due to lower extremity edema and pain in addition to hypoxia  Please see above list of diagnoses and related plan for additional information  Condition at Discharge: good    Discharge Day Visit / Exam:   Subjective: The patient was seen and examined  The patient is doing better  No chest pain  No shortness of breath  No abdominal pain  No nausea or vomiting  Vitals: Blood Pressure: 114/55 (02/22/22 0836)  Pulse: 72 (02/22/22 0836)  Temperature: 97 6 °F (36 4 °C) (02/22/22 0733)  Temp Source: Tympanic (02/20/22 1500)  Respirations: 18 (02/22/22 0733)  Height: 5' 7" (170 2 cm) (02/21/22 2503)  Weight - Scale: 70 5 kg (155 lb 6 8 oz) (02/22/22 0600)  SpO2: (!) 85 % (02/22/22 0836)  Exam:   Physical Exam   General:  NAD, follows commands  HEENT:  NC/AT, mucous membranes moist  Neck:  Supple, No JVP elevation  CV:  + S1, + S2, RRR  Pulm:  Scant bibasilar crackles  Abd:  Soft, Non-tender, Non-distended  Ext:  No clubbing/cyanosis/edema  Skin:  No rashes  Neuro:  Awake, alert, oriented  Psych:  Normal mood and affect      Discharge instructions/Information to patient and family:  See after visit summary for information provided to patient and family  Provisions for Follow-Up Care:  See after visit summary for information related to follow-up care and any pertinent home health orders  Disposition:   Home with VNA Services (Reminder: Complete face to face encounter)    Planned Readmission:  No     Discharge Statement:  I spent 45 minutes discharging the patient  This time was spent on the day of discharge  I had direct contact with the patient on the day of discharge  Greater than 50% of the total time was spent examining patient, answering all patient questions, arranging and discussing plan of care with patient as well as directly providing post-discharge instructions  Additional time then spent on discharge activities  Discharge Medications:  See after visit summary for reconciled discharge medications provided to patient and/or family  **Please Note: This note may have been constructed using a voice recognition system**

## 2022-02-22 NOTE — ASSESSMENT & PLAN NOTE
· Concerning for multiple myeloma in the setting of hypercalcemia  · Check a repeat serum protein electrophoresis and urine protein electrophoresis as an outpatient  · Check serum free light chains as an outpatient  · Outpatient Hematology/Oncology evaluation

## 2022-02-22 NOTE — RESPIRATORY THERAPY NOTE
Home Oxygen Qualifying Test     Patient name: Enrico Borrego        : 1935   Date of Test:  2022  Diagnosis:    Home Oxygen Test:    **Medicare Guidelines require item(s) 1-5 on all ambulatory patients or 1 and 2 on non-ambulatory patients  1  Baseline SPO2 on Room Air at rest 90 %   a  If <= 88% on Room Air add O2 via NC to obtain SpO2 >=88%  If LPM needed, document LPM n/a needed to reach =>88%    2  SPO2 during exertion on Room Air 81  %  a  During exertion monitor SPO2  If SPO2 increases >=89%, do not add supplemental oxygen    3  SPO2 on Oxygen at Rest 94 % at 2 LPM    4  SPO2 during exertion on Oxygen 90 % at 3 LPM    5  Test performed during exertion activity  [x]  Supplemental Home Oxygen is indicated  []  Client does not qualify for home oxygen      Respiratory Additional Notes- na    Yoni Genao, RT

## 2022-02-22 NOTE — ASSESSMENT & PLAN NOTE
· Continue plavix 75 mg PO Qdaily  · Increased to high-intensity statin therapy with atorvastatin 40 mg PO Qdaily with dinner

## 2022-02-22 NOTE — PLAN OF CARE
Problem: MOBILITY - ADULT  Goal: Maintain or return to baseline ADL function  Description: INTERVENTIONS:  -  Assess patient's ability to carry out ADLs; assess patient's baseline for ADL function and identify physical deficits which impact ability to perform ADLs (bathing, care of mouth/teeth, toileting, grooming, dressing, etc )  - Assess/evaluate cause of self-care deficits   - Assess range of motion  - Assess patient's mobility; develop plan if impaired  - Assess patient's need for assistive devices and provide as appropriate  - Encourage maximum independence but intervene and supervise when necessary  - Involve family in performance of ADLs  - Assess for home care needs following discharge   - Consider OT consult to assist with ADL evaluation and planning for discharge  - Provide patient education as appropriate  Outcome: Progressing  Goal: Maintains/Returns to pre admission functional level  Description: INTERVENTIONS:  - Perform BMAT or MOVE assessment daily    - Set and communicate daily mobility goal to care team and patient/family/caregiver  - Collaborate with rehabilitation services on mobility goals if consulted  - Perform Range of Motion 4 times a day  - Reposition patient every 2 hours    - Dangle patient 3 times a day  - Stand patient 3 times a day  - Ambulate patient 3 times a day  - Out of bed to chair 3 times a day   - Out of bed for meals 3 times a day  - Out of bed for toileting  - Record patient progress and toleration of activity level   Outcome: Progressing     Problem: PAIN - ADULT  Goal: Verbalizes/displays adequate comfort level or baseline comfort level  Description: Interventions:  - Encourage patient to monitor pain and request assistance  - Assess pain using appropriate pain scale  - Administer analgesics based on type and severity of pain and evaluate response  - Implement non-pharmacological measures as appropriate and evaluate response  - Consider cultural and social influences on pain and pain management  - Notify physician/advanced practitioner if interventions unsuccessful or patient reports new pain  Outcome: Progressing     Problem: INFECTION - ADULT  Goal: Absence or prevention of progression during hospitalization  Description: INTERVENTIONS:  - Assess and monitor for signs and symptoms of infection  - Monitor lab/diagnostic results  - Monitor all insertion sites, i e  indwelling lines, tubes, and drains  - Monitor endotracheal if appropriate and nasal secretions for changes in amount and color  - [place] appropriate cooling/warming therapies per order  - Administer medications as ordered  - Instruct and encourage patient and family to use good hand hygiene technique  - Identify and instruct in appropriate isolation precautions for identified infection/condition  Outcome: Progressing     Problem: SAFETY ADULT  Goal: Maintain or return to baseline ADL function  Description: INTERVENTIONS:  -  Assess patient's ability to carry out ADLs; assess patient's baseline for ADL function and identify physical deficits which impact ability to perform ADLs (bathing, care of mouth/teeth, toileting, grooming, dressing, etc )  - Assess/evaluate cause of self-care deficits   - Assess range of motion  - Assess patient's mobility; develop plan if impaired  - Assess patient's need for assistive devices and provide as appropriate  - Encourage maximum independence but intervene and supervise when necessary  - Involve family in performance of ADLs  - Assess for home care needs following discharge   - Consider OT consult to assist with ADL evaluation and planning for discharge  - Provide patient education as appropriate  Outcome: Progressing  Goal: Maintains/Returns to pre admission functional level  Description: INTERVENTIONS:  - Perform BMAT or MOVE assessment daily    - Set and communicate daily mobility goal to care team and patient/family/caregiver     - Collaborate with rehabilitation services on mobility goals if consulted  - Perform Range of Motion 4 times a day  - Reposition patient every 2 hours    - Dangle patient 3 times a day  - Stand patient 3 times a day  - Ambulate patient 3 times a day  - Out of bed to chair 3 times a day   - Out of bed for meals 3 times a day  - Out of bed for toileting  - Record patient progress and toleration of activity level   Outcome: Progressing  Goal: Patient will remain free of falls  Description: INTERVENTIONS:  - Educate patient/family on patient safety including physical limitations  - Instruct patient to call for assistance with activity   - Consult OT/PT to assist with strengthening/mobility   - Keep Call bell within reach  - Keep bed low and locked with side rails adjusted as appropriate  - Keep care items and personal belongings within reach  - Initiate and maintain comfort rounds  - Make Fall Risk Sign visible to staff  - Offer Toileting every 2 Hours, in advance of need  - Initiate/Maintain fall alarm  - Obtain necessary fall risk management equipment: alarm, nonskid socks, yellow wristband  - Apply yellow socks and bracelet for high fall risk patients  - Consider moving patient to room near nurses station  Outcome: Progressing     Problem: DISCHARGE PLANNING  Goal: Discharge to home or other facility with appropriate resources  Description: INTERVENTIONS:  - Identify barriers to discharge w/patient and caregiver  - Arrange for needed discharge resources and transportation as appropriate  - Identify discharge learning needs (meds, wound care, etc )  - Arrange for interpretive services to assist at discharge as needed  - Refer to Case Management Department for coordinating discharge planning if the patient needs post-hospital services based on physician/advanced practitioner order or complex needs related to functional status, cognitive ability, or social support system  Outcome: Progressing     Problem: Knowledge Deficit  Goal: Patient/family/caregiver demonstrates understanding of disease process, treatment plan, medications, and discharge instructions  Description: Complete learning assessment and assess knowledge base    Interventions:  - Provide teaching at level of understanding  - Provide teaching via preferred learning methods  Outcome: Progressing     Problem: CARDIOVASCULAR - ADULT  Goal: Maintains optimal cardiac output and hemodynamic stability  Description: INTERVENTIONS:  - Monitor I/O, vital signs and rhythm  - Monitor for S/S and trends of decreased cardiac output  - Administer and titrate ordered vasoactive medications to optimize hemodynamic stability  - Assess quality of pulses, skin color and temperature  - Assess for signs of decreased coronary artery perfusion  - Instruct patient to report change in severity of symptoms  Outcome: Progressing  Goal: Absence of cardiac dysrhythmias or at baseline rhythm  Description: INTERVENTIONS:  - Continuous cardiac monitoring, vital signs, obtain 12 lead EKG if ordered  - Administer antiarrhythmic and heart rate control medications as ordered  - Monitor electrolytes and administer replacement therapy as ordered  Outcome: Progressing     Problem: RESPIRATORY - ADULT  Goal: Achieves optimal ventilation and oxygenation  Description: INTERVENTIONS:  - Assess for changes in respiratory status  - Assess for changes in mentation and behavior  - Position to facilitate oxygenation and minimize respiratory effort  - Oxygen administered by appropriate delivery if ordered  - Initiate smoking cessation education as indicated  - Encourage broncho-pulmonary hygiene including cough, deep breathe, Incentive Spirometry  - Assess the need for suctioning and aspirate as needed  - Assess and instruct to report SOB or any respiratory difficulty  - Respiratory Therapy support as indicated  Outcome: Progressing     Problem: METABOLIC, FLUID AND ELECTROLYTES - ADULT  Goal: Electrolytes maintained within normal limits  Description: INTERVENTIONS:  - Monitor labs and assess patient for signs and symptoms of electrolyte imbalances  - Administer electrolyte replacement as ordered  - Monitor response to electrolyte replacements, including repeat lab results as appropriate  - Instruct patient on fluid and nutrition as appropriate  Outcome: Progressing  Goal: Fluid balance maintained  Description: INTERVENTIONS:  - Monitor labs   - Monitor I/O and WT  - Instruct patient on fluid and nutrition as appropriate  - Assess for signs & symptoms of volume excess or deficit  Outcome: Progressing  Goal: Glucose maintained within target range  Description: INTERVENTIONS:  - Monitor Blood Glucose as ordered  - Assess for signs and symptoms of hyperglycemia and hypoglycemia  - Administer ordered medications to maintain glucose within target range  - Assess nutritional intake and initiate nutrition service referral as needed  Outcome: Progressing     Problem: HEMATOLOGIC - ADULT  Goal: Maintains hematologic stability  Description: INTERVENTIONS  - Assess for signs and symptoms of bleeding or hemorrhage  - Monitor labs  - Administer supportive blood products/factors as ordered and appropriate  Outcome: Progressing     Problem: Potential for Falls  Goal: Patient will remain free of falls  Description: INTERVENTIONS:  - Educate patient/family on patient safety including physical limitations  - Instruct patient to call for assistance with activity   - Consult OT/PT to assist with strengthening/mobility   - Keep Call bell within reach  - Keep bed low and locked with side rails adjusted as appropriate  - Keep care items and personal belongings within reach  - Initiate and maintain comfort rounds  - Make Fall Risk Sign visible to staff  - Offer Toileting every 2 Hours, in advance of need  - Initiate/Maintain fall alarm  - Obtain necessary fall risk management equipment: alarm, nonskid socks, yellow wristband  - Apply yellow socks and bracelet for high fall risk patients  - Consider moving patient to room near nurses station  Outcome: Progressing

## 2022-02-22 NOTE — ASSESSMENT & PLAN NOTE
Wt Readings from Last 3 Encounters:   22 70 5 kg (155 lb 6 8 oz)   22 77 6 kg (171 lb)   22 77 6 kg (171 lb)     Initially treated with IV lasix during the hospitalization  Discharge home on lasix 20 mg PO BID  Continue beta-blocker therapy with propanolol  Outpatient follow-up with Cardiology    [facility] [address]  Echo complete  Order# [id number]  Reading physician: Kin Carpio MD Ordering physician: Dolores Wall MD Study date: 22     Name: Lucero Rivera                                                        :1935  MRN: [id number]                                                             Age: 82 y o  Gender: male    Vitals    Height Weight BSA (Calculated - m2) BP Pulse   5' 7" (1 702 m) 70 3 kg (155 lb) 1 81 sq meters 123/56 80     Study Details    This transthoracic echocardiogram was performed at bedside  This was a routine, inpatient study  Study quality was adequate  This was a technically difficult study due to lung interference  A complete 2D, color flow Doppler and spectral Doppler transthoracic echocardiogram was performed  The apical, parasternal, subcostal and suprasternal views were obtained  Indications  Priority: Routine  Dyspnea, SOB, Wheezing, Tachypnea     Interpretation Summary         Left Ventricle: Left ventricular cavity size is normal  Wall thickness is moderately increased  The left ventricular ejection fraction is 65%  Systolic function is normal  Wall motion is normal  Diastolic function is mildly abnormal, consistent with grade I (abnormal) relaxation  There is moderate concentric hypertrophy    Right Ventricle: Right ventricular cavity size is normal  Systolic function is normal     Left Atrium: The atrium is mildly dilated    Mitral Valve:  There is mild regurgitation    Pulmonic Valve: There is moderate regurgitation          Findings    Left Ventricle Left ventricular cavity size is normal  Wall thickness is moderately increased  The left ventricular ejection fraction is 65%  Systolic function is normal   Wall motion is normal  There is moderate concentric hypertrophy  Diastolic function is mildly abnormal, consistent with grade I (abnormal) relaxation  Right Ventricle Right ventricular cavity size is normal  Systolic function is normal  Wall thickness is normal    Left Atrium The atrium is mildly dilated  Right Atrium The atrium is normal in size  Aortic Valve The aortic valve is trileaflet  The leaflets are not thickened  The leaflets are not calcified  The leaflets exhibit normal mobility  There is no evidence of regurgitation  There is no evidence of stenosis  Mitral Valve The mitral valve has normal structure and function  There is mild regurgitation  There is no evidence of stenosis  Tricuspid Valve Tricuspid valve structure is normal  There is no evidence of regurgitation  There is no evidence of stenosis  The estimated right ventricular systolic pressure is 10 48 mmHg  Pulmonic Valve Pulmonic valve structure is normal  There is moderate regurgitation  There is no evidence of stenosis  Ascending Aorta The aortic root is normal in size  IVC/SVC The right atrial pressure is estimated at 10 0 mmHg  The inferior vena cava is normal in size  Pericardium There is no pericardial effusion  The pericardium is normal in appearance

## 2022-02-22 NOTE — CONSULTS
CONSULTATION-NEPHROLOGY   Ayan Mckoy 82 y o  male MRN: [id number]  Unit/Bed#: 409-01 Encounter: [id number]        Assessment and Plan:    1  Acute kidney injury (POA) atop chronic kidney disease  · Presented with a creatinine of 2 04 mg/dL which is somewhat improved today  Renal function is friable in the setting of recent acute kidney injury and hospitalization earlier this year  · Patient has prostatomegaly-periodically monitor PVR  · Continue to avoid potential nephrotoxins, avoid hypotension  · Okay to continue gentle diuresis with Lasix 20 mg b i d  And monitor response  · Outpatient follow-up with Nephrology  · Vasculitis being ruled out by pulmonology  2  Stage IIIB chronic kidney disease with baseline creatinine around 1 3-1 5 mg/dL  3  Mild hypercalcemia  · Recommend checking vitamin-D 1, 25 dihydroxy  Calcified granulomas noted on CT  Already being evaluated by pulmonology for ruling out vasculitis  · PTH is elevated-recommend Endocrinology referral as an outpatient  4  Possible primary hyperparathyroidism  · Outpatient referral to Endocrinology  5  Chronic diastolic congestive heart failure  · Examines fairly compensated  Plus one right lower extremity edema in the setting of vein harvest   Continue Lasix 20 mg b i d  And monitor response  Low-sodium diet  6  Mild hyponatremia  · Recommend gentle fluid restriction, loop diuretic and sodium restriction  7  Diabetic kidney disease, uncontrolled type 2 diabetes mellitus  · Grade 2 albuminuria noted in October 2021  Not a candidate for Ace or Arb at this time due to BECKI  Likely not a candidate for SGLT -2 inhibitor due to low GFR  All of these things should be re-evaluated as an outpatient when he is in his usual state of health  Endocrinology referral as an outpatient  8   Abnormal CT chest  · Management per pulmonology    HPI:    Ayan Mckoy is a 82 y o  male with an active problem list significant for CKD 3, diabetic kidney disease associated with type 2 diabetes mellitus, proteinuria, 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, GERD, prostatomegaly, CAD status post CABG, hypertension, chronic diastolic congestive heart failure who presented to [facility] emergency department 2/18 with chief complaint dyspnea, right lower extremity swelling, right lower extremity pain, difficulty bearing weight on leg  CT chest significant for chronic interstitial changes, pulmonary nodules, calcified granulomas, cardiomegaly, mild mediastinal lymphadenopathy  Acute kidney injury, mild hyponatremia, mild hypercalcemia, elevated BNP, lactic acidosis noted upon presentation as well  He is being treated for acute hypoxic respiratory failure, abnormal CT chest being evaluated by pulmonology  He is being treated with a short course of IV antibiotics for possible pneumonia per primary team   He is also being gently diuresed in the setting of chronic diastolic congestive heart failure and recent decompensation requiring hospitalization  A renal consultation was requested today for acute kidney injury atop chronic kidney disease  Upon discussion with the patient, he relates HPI as above in addition:  Patient states that he came in with lower extremity edema which is improved but chronic due to vein harvesting in the past   He has no physical complaints on exam   He states he will be going home with oxygen  He hopes he goes home today  From renal perspective, patient has stage 3 chronic kidney disease and follows with this group  Baseline creatinine appears to be around 1 5-1 7 mg/dL  Reason for Consult:  Acute kidney injury atop chronic kidney disease    Review of Systems:  A complete 10-point review of systems was performed  Aside from what was mentioned in the HPI, it is otherwise negative        Historical Information   Past Medical History:   Diagnosis Date    Arthritis     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  Patent mammary graft to LAD 10/7/2009; CABG  &     Cancer [facility])     skin    Coronary artery disease     Diabetes mellitus [facility] [address] )     Diabetes mellitus, type II ([facility] [address] )     without complication    Epistaxis     Essential (primary) hypertension     GERD (gastroesophageal reflux disease)     Hx of cardiovascular stress test 2014    Eddie MPI; EF0 52 (52%) Lexiscan, mild LV systolic dysfunction; evidence for prior inferior wall MI; perfusion imaging consistant w mild lat wall ischemia and min torin-infart inferior ischemia   / EF0 51 (51%) evidence for prior inferior wall MI  Mild inferior and mild-moderate lateral wall ischemia  7/29/15    Hx of echocardiogram 2017    2D w/CFD;EF0 55 (55%) mild LVH, mitral valve prolapse with moderate regurgitation  left atrial enlargement  Mild tricuspid regurgitation   Hypertension     Mixed hyperlipidemia     Occlusion and stenosis of bilateral carotid arteries     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 infarction)     Presence of aortocoronary bypass graft      Past Surgical History:   Procedure Laterality Date    CARDIAC CATHETERIZATION  10/07/2009    Stent 99% stenosis SVG from the aorta to OM1  Patent mammary graft to the LAD    CORONARY ARTERY BYPASS GRAFT      VG-CX, VG-RCA    CORONARY ARTERY BYPASS GRAFT  2005    LIMA-D1-LAD, VG-PDA-PLB, Djhrpb-CR9-NP1 TMR 9 Lesions    SC EGD TRANSORAL BIOPSY SINGLE/MULTIPLE N/A 2019    Procedure: ESOPHAGOGASTRODUODENOSCOPY (EGD) with biopsy;  Surgeon: Margarita Healy MD;  Location: [address] [place] [address] GI LAB;   Service: Gastroenterology    TONSILLECTOMY       Social History   Social History     Substance and Sexual Activity   Alcohol Use Never     Social History     Substance and Sexual Activity   Drug Use No     Social History     Tobacco Use   Smoking Status Former Smoker    Packs/day: 1 00    Years: 40 00    Pack years: 40 00    Quit date: 1977    Years since quittin 1   Smokeless Tobacco Never Used       Family History:   Family History   Problem Relation Age of Onset    Heart disease Brother     No Known Problems Mother     Tuberculosis Father        Medications:  Pertinent medications were reviewed  Current Facility-Administered Medications   Medication Dose Route Frequency Provider Last Rate    acetaminophen  650 mg Oral Q6H PRN Mark Wills MD      albuterol  2 5 mg Nebulization Q6H PRN Lexy Ace MD      amLODIPine  10 mg Oral Daily Mark Wills MD      atorvastatin  40 mg Oral Daily With [employer], DO      cefTRIAXone  1,000 mg Intravenous Q24H Lexy Ace MD 1,000 mg (02/21/22 1608)    clopidogrel  75 mg Oral Daily Mark Wills MD      escitalopram  10 mg Oral Daily Mark Wills MD      furosemide  20 mg Oral BID (diuretic) Lexy Ace MD      heparin (porcine)  5,000 Units Subcutaneous Q8H [address] Mark Wills MD      insulin glargine  5 Units Subcutaneous QAM Jonathan Antunez DO      insulin lispro  1-5 Units Subcutaneous TID AC Mark Wills MD      insulin lispro  1-5 Units Subcutaneous HS Mark Wills MD      isosorbide mononitrate  120 mg Oral Daily Mark Wills MD      melatonin  3 mg Oral HS Yovanny Sterling PA-C      ondansetron  4 mg Intravenous Q6H PRN Mark Wills MD      polyethylene glycol  17 g Oral Daily PRN Mark Wills MD      propranolol  120 mg Oral Daily Mark Wills MD      sucralfate  1 g Oral BID Mark Wills MD      tamsulosin  0 8 mg Oral Daily With Julien Gallegos MD           Allergies   Allergen Reactions    Advil [Ibuprofen] Fatigue         Vitals:   /55   Pulse 72   Temp 97 6 °F (36 4 °C)   Resp 18   Ht 5' 7" (1 702 m)   Wt 70 5 kg (155 lb 6 8 oz)   SpO2 (!) 85%   BMI 24 34 kg/m²   Body mass index is 24 34 kg/m²    SpO2: (!) 85 %,   SpO2 Activity: At Rest,   O2 Device: None (Room air)      Intake/Output Summary (Last 24 hours) at 2/22/2022 [employer] filed at 2/22/2022 0900  Gross per 24 hour   Intake 780 ml   Output 375 ml   Net 405 ml     Invasive Devices  Report    Peripheral Intravenous Line            Peripheral IV 02/21/22 Dorsal (posterior); Right Forearm 1 day                Physical Exam:  General: conscious, cooperative, in no acute distress  Eyes: conjunctivae pink, anicteric sclerae  ENT: lips and mucous membranes moist  Neck: supple, no JVD, no masses  Chest:  Diminished breath sounds bilateral, no crackles, ronchus or wheezings on 2 L nasal cannula  CVS: S1 & S2, normal rate, regular rhythm  Abdomen: soft, non-tender, non-distended, normoactive bowel sounds obese  Extremities:  Right greater than left edema  Plus one edema to right lower extremity  Skin: no rash  Neuro: awake, alert, oriented      Diagnostic Data:  Lab: I have personally reviewed pertinent lab results  ,   CBC:  Results from last 7 days   Lab Units 02/22/22  0613   WBC Thousand/uL 7 29   HEMOGLOBIN g/dL 15 7   HEMATOCRIT % 48 1   PLATELETS Thousands/uL 343      CMP:   Lab Results   Component Value Date    SODIUM 134 (L) 02/22/2022    K 4 5 02/22/2022    CL 96 (L) 02/22/2022    CO2 33 (H) 02/22/2022    BUN 26 (H) 02/22/2022    CREATININE 1 81 (H) 02/22/2022    CALCIUM 10 3 (H) 02/22/2022    AST 22 02/22/2022    ALT 18 02/22/2022    ALKPHOS 87 02/22/2022    EGFR 33 02/22/2022   ,   PT/INR: No results found for: PT, INR,   Magnesium: No components found for: MAG,  Phosphorous:   Lab Results   Component Value Date    PHOS 3 6 02/22/2022       Microbiology:  @LABRCNTIP,(urinecx:7)@        [facility] Noss, CRNP    Portions of the record may have been created with voice recognition software  Occasional wrong word or "sound a like" substitutions may have occurred due to the inherent limitations of voice recognition software  Read the chart carefully and recognize, using context, where substitutions have occurred

## 2022-02-22 NOTE — ASSESSMENT & PLAN NOTE
Lab Results   Component Value Date    EGFR 33 02/22/2022    EGFR 33 02/21/2022    EGFR 28 02/19/2022    CREATININE 1 81 (H) 02/22/2022    CREATININE 1 80 (H) 02/21/2022    CREATININE 2 04 (H) 02/19/2022     Creatinine baseline of 1 5-1 7 mg/dl dating back to 2020  Continue to monitor, avoid nephrotoxins, and renally dose medications when appropriate  Monitor the patient's renal function and electrolyte levels after discharge with Nephrology  The patient was seen in consultation by Nephrology  Outpatient follow-up with Nephrology

## 2022-02-22 NOTE — PLAN OF CARE
Problem: MOBILITY - ADULT  Goal: Maintain or return to baseline ADL function  Description: INTERVENTIONS:  -  Assess patient's ability to carry out ADLs; assess patient's baseline for ADL function and identify physical deficits which impact ability to perform ADLs (bathing, care of mouth/teeth, toileting, grooming, dressing, etc )  - Assess/evaluate cause of self-care deficits   - Assess range of motion  - Assess patient's mobility; develop plan if impaired  - Assess patient's need for assistive devices and provide as appropriate  - Encourage maximum independence but intervene and supervise when necessary  - Involve family in performance of ADLs  - Assess for home care needs following discharge   - Consider OT consult to assist with ADL evaluation and planning for discharge  - Provide patient education as appropriate  Outcome: Progressing  Goal: Maintains/Returns to pre admission functional level  Description: INTERVENTIONS:  - Perform BMAT or MOVE assessment daily    - Set and communicate daily mobility goal to care team and patient/family/caregiver  - Collaborate with rehabilitation services on mobility goals if consulted  - Perform Range of Motion 4 times a day  - Reposition patient every 2 hours    - Dangle patient 3 times a day  - Stand patient 3 times a day  - Ambulate patient 3 times a day  - Out of bed to chair 3 times a day   - Out of bed for meals 3 times a day  - Out of bed for toileting  - Record patient progress and toleration of activity level   Outcome: Progressing     Problem: PAIN - ADULT  Goal: Verbalizes/displays adequate comfort level or baseline comfort level  Description: Interventions:  - Encourage patient to monitor pain and request assistance  - Assess pain using appropriate pain scale  - Administer analgesics based on type and severity of pain and evaluate response  - Implement non-pharmacological measures as appropriate and evaluate response  - Consider cultural and social influences on pain and pain management  - Notify physician/advanced practitioner if interventions unsuccessful or patient reports new pain  Outcome: Progressing     Problem: INFECTION - ADULT  Goal: Absence or prevention of progression during hospitalization  Description: INTERVENTIONS:  - Assess and monitor for signs and symptoms of infection  - Monitor lab/diagnostic results  - Monitor all insertion sites, i e  indwelling lines, tubes, and drains  - Monitor endotracheal if appropriate and nasal secretions for changes in amount and color  - [place] appropriate cooling/warming therapies per order  - Administer medications as ordered  - Instruct and encourage patient and family to use good hand hygiene technique  - Identify and instruct in appropriate isolation precautions for identified infection/condition  Outcome: Progressing     Problem: SAFETY ADULT  Goal: Maintain or return to baseline ADL function  Description: INTERVENTIONS:  -  Assess patient's ability to carry out ADLs; assess patient's baseline for ADL function and identify physical deficits which impact ability to perform ADLs (bathing, care of mouth/teeth, toileting, grooming, dressing, etc )  - Assess/evaluate cause of self-care deficits   - Assess range of motion  - Assess patient's mobility; develop plan if impaired  - Assess patient's need for assistive devices and provide as appropriate  - Encourage maximum independence but intervene and supervise when necessary  - Involve family in performance of ADLs  - Assess for home care needs following discharge   - Consider OT consult to assist with ADL evaluation and planning for discharge  - Provide patient education as appropriate  Outcome: Progressing  Goal: Maintains/Returns to pre admission functional level  Description: INTERVENTIONS:  - Perform BMAT or MOVE assessment daily    - Set and communicate daily mobility goal to care team and patient/family/caregiver     - Collaborate with rehabilitation services on mobility goals if consulted  - Perform Range of Motion 4 times a day  - Reposition patient every 2 hours    - Dangle patient 3 times a day  - Stand patient 3 times a day  - Ambulate patient 3 times a day  - Out of bed to chair 3 times a day   - Out of bed for meals 3 times a day  - Out of bed for toileting  - Record patient progress and toleration of activity level   Outcome: Progressing  Goal: Patient will remain free of falls  Description: INTERVENTIONS:  - Educate patient/family on patient safety including physical limitations  - Instruct patient to call for assistance with activity   - Consult OT/PT to assist with strengthening/mobility   - Keep Call bell within reach  - Keep bed low and locked with side rails adjusted as appropriate  - Keep care items and personal belongings within reach  - Initiate and maintain comfort rounds  - Make Fall Risk Sign visible to staff  - Offer Toileting every 2 Hours, in advance of need  - Initiate/Maintain fall alarm  - Obtain necessary fall risk management equipment: alarm, nonskid socks, yellow wristband  - Apply yellow socks and bracelet for high fall risk patients  - Consider moving patient to room near nurses station  Outcome: Progressing     Problem: DISCHARGE PLANNING  Goal: Discharge to home or other facility with appropriate resources  Description: INTERVENTIONS:  - Identify barriers to discharge w/patient and caregiver  - Arrange for needed discharge resources and transportation as appropriate  - Identify discharge learning needs (meds, wound care, etc )  - Arrange for interpretive services to assist at discharge as needed  - Refer to Case Management Department for coordinating discharge planning if the patient needs post-hospital services based on physician/advanced practitioner order or complex needs related to functional status, cognitive ability, or social support system  Outcome: Progressing     Problem: Knowledge Deficit  Goal: Patient/family/caregiver demonstrates understanding of disease process, treatment plan, medications, and discharge instructions  Description: Complete learning assessment and assess knowledge base    Interventions:  - Provide teaching at level of understanding  - Provide teaching via preferred learning methods  Outcome: Progressing     Problem: CARDIOVASCULAR - ADULT  Goal: Maintains optimal cardiac output and hemodynamic stability  Description: INTERVENTIONS:  - Monitor I/O, vital signs and rhythm  - Monitor for S/S and trends of decreased cardiac output  - Administer and titrate ordered vasoactive medications to optimize hemodynamic stability  - Assess quality of pulses, skin color and temperature  - Assess for signs of decreased coronary artery perfusion  - Instruct patient to report change in severity of symptoms  Outcome: Progressing  Goal: Absence of cardiac dysrhythmias or at baseline rhythm  Description: INTERVENTIONS:  - Continuous cardiac monitoring, vital signs, obtain 12 lead EKG if ordered  - Administer antiarrhythmic and heart rate control medications as ordered  - Monitor electrolytes and administer replacement therapy as ordered  Outcome: Progressing     Problem: RESPIRATORY - ADULT  Goal: Achieves optimal ventilation and oxygenation  Description: INTERVENTIONS:  - Assess for changes in respiratory status  - Assess for changes in mentation and behavior  - Position to facilitate oxygenation and minimize respiratory effort  - Oxygen administered by appropriate delivery if ordered  - Initiate smoking cessation education as indicated  - Encourage broncho-pulmonary hygiene including cough, deep breathe, Incentive Spirometry  - Assess the need for suctioning and aspirate as needed  - Assess and instruct to report SOB or any respiratory difficulty  - Respiratory Therapy support as indicated  Outcome: Progressing     Problem: METABOLIC, FLUID AND ELECTROLYTES - ADULT  Goal: Electrolytes maintained within normal limits  Description: INTERVENTIONS:  - Monitor labs and assess patient for signs and symptoms of electrolyte imbalances  - Administer electrolyte replacement as ordered  - Monitor response to electrolyte replacements, including repeat lab results as appropriate  - Instruct patient on fluid and nutrition as appropriate  Outcome: Progressing  Goal: Fluid balance maintained  Description: INTERVENTIONS:  - Monitor labs   - Monitor I/O and WT  - Instruct patient on fluid and nutrition as appropriate  - Assess for signs & symptoms of volume excess or deficit  Outcome: Progressing  Goal: Glucose maintained within target range  Description: INTERVENTIONS:  - Monitor Blood Glucose as ordered  - Assess for signs and symptoms of hyperglycemia and hypoglycemia  - Administer ordered medications to maintain glucose within target range  - Assess nutritional intake and initiate nutrition service referral as needed  Outcome: Progressing     Problem: HEMATOLOGIC - ADULT  Goal: Maintains hematologic stability  Description: INTERVENTIONS  - Assess for signs and symptoms of bleeding or hemorrhage  - Monitor labs  - Administer supportive blood products/factors as ordered and appropriate  Outcome: Progressing     Problem: Potential for Falls  Goal: Patient will remain free of falls  Description: INTERVENTIONS:  - Educate patient/family on patient safety including physical limitations  - Instruct patient to call for assistance with activity   - Consult OT/PT to assist with strengthening/mobility   - Keep Call bell within reach  - Keep bed low and locked with side rails adjusted as appropriate  - Keep care items and personal belongings within reach  - Initiate and maintain comfort rounds  - Make Fall Risk Sign visible to staff  - Offer Toileting every 2 Hours, in advance of need  - Initiate/Maintain fall alarm  - Obtain necessary fall risk management equipment: alarm, nonskid socks, yellow wristband  - Apply yellow socks and bracelet for high fall risk patients  - Consider moving patient to room near nurses station  Outcome: Progressing

## 2022-02-22 NOTE — ASSESSMENT & PLAN NOTE
· Outpatient Hematology/Oncology evaluation  · Outpatient surveillance imaging with PCP    CT scan of the chest without contrast (02/18/2022):    MEDIASTINUM AND RENARD:  There are stable appearing mildly prominent mediastinal lymph nodes with the largest in the precarinal region measuring 1 1 cm in short axis dimension  No significant hilar or axillary lymphadenopathy      CHEST WALL AND LOWER NECK:   Unremarkable      VISUALIZED STRUCTURES IN THE UPPER ABDOMEN:  Status post cholecystectomy      OSSEOUS STRUCTURES:  Median sternotomy wires  Degenerative changes in the thoracic spine      IMPRESSION:     Persistent chronic interstitial changes in the periphery of the upper and lower lobes greater in the lower lobes      Stable pulmonary nodules and calcified granulomas as above      Probable chronic atelectasis left lower lobe  No new areas of airspace consolidation or effusions      Cardiomegaly  Coronary artery and aortic calcifications  Status post CABG and median sternotomy      Mild mediastinal lymphadenopathy, unchanged since prior study  No hilar or axillary lymphadenopathy

## 2022-02-22 NOTE — ASSESSMENT & PLAN NOTE
· Possibility of vasculitis with interstitial lung disease  · The patient was seen in consultation by Pulmonology    · Check c-ANCA, p-ANCA, MPO, and PR3 levels per Pulmonology, with results pending at the time of discharge  · Outpatient follow-up with Pulmonology

## 2022-02-22 NOTE — ASSESSMENT & PLAN NOTE
· Possible sarcoidosis in the setting of hypercalcemia  · A vitamin D 1, 25 [place] dihydroxy level was checked with results pending at the time of discharge  · The patient was seen in consultation by Pulmonology    · Outpatient follow-up with Pulmonology

## 2022-02-22 NOTE — PLAN OF CARE
Problem: PHYSICAL THERAPY ADULT  Goal: Performs mobility at highest level of function for planned discharge setting  See evaluation for individualized goals  Description:   Outcome: Progressing  Note: Prognosis: Good  Problem List: Decreased strength,Decreased endurance,Impaired balance,Decreased mobility  Assessment: Pt  seen for PT treatment session this date with interventions consisting of  therapeutic exercises, bed mobility, transfers and  gait training w/ emphasis on improving pt's ability to ambulate  Pt  Requiring occasional cues for sequence and safety  In comparison to previous session, Pt  With improvements in activity tolerance  SpO2 dropped to 85% with 2 L   ~ 2 min seated recovery to > 90%  Pt is in need of continued activity in PT to improve strength balance endurance mobility transfers and ambulation with return to maximize LOF  From PT/mobility standpoint, recommendation at time of d/c would be home health rehab  in order to promote return to PLOF and independence  The patient's AM-PAC Basic Mobility Inpatient Short Form Raw Score is 21  A Raw score of greater than 16 suggests the patient may benefit from discharge to home  Please also refer to physical therapy recommendation for safe DC planning  PT Discharge Recommendation: Home with home health rehabilitation          See flowsheet documentation for full assessment

## 2022-02-23 ENCOUNTER — TELEPHONE (OUTPATIENT)
Dept: HEMATOLOGY ONCOLOGY | Facility: CLINIC | Age: 87
End: 2022-02-23

## 2022-02-23 ENCOUNTER — PATIENT OUTREACH (OUTPATIENT)
Dept: CASE MANAGEMENT | Facility: OTHER | Age: 87
End: 2022-02-23

## 2022-02-23 LAB
ALBUMIN UR ELPH-MCNC: 14.8 %
ALPHA1 GLOB MFR UR ELPH: 41.1 %
ALPHA2 GLOB MFR UR ELPH: 5.5 %
B-GLOBULIN MFR UR ELPH: 10.7 %
BACTERIA BLD CULT: NORMAL
BACTERIA BLD CULT: NORMAL
DME PARACHUTE DELIVERY DATE ACTUAL: NORMAL
DME PARACHUTE DELIVERY DATE REQUESTED: NORMAL
DME PARACHUTE ITEM DESCRIPTION: NORMAL
DME PARACHUTE ORDER STATUS: NORMAL
DME PARACHUTE SUPPLIER NAME: NORMAL
DME PARACHUTE SUPPLIER PHONE: NORMAL
GAMMA GLOB MFR UR ELPH: 27.9 %
INTERPRETATION UR IFE-IMP: NORMAL
M PROTEIN UR-MCNC: 2.5 %
PROT PATTERN UR ELPH-IMP: ABNORMAL
PROT UR-MCNC: 27 MG/DL

## 2022-02-23 NOTE — PROGRESS NOTES
In basket message received with hospital discharge  Chart reviewed  I spoke with Kasey Bruce who reports he is at home feeling better  He denies shortness of breath or swelling of the legs or abdomen  He does not have a scaled but plans to buy one  I reeducated him on the red flags of weight gain and when to call the physician  His daughter is a nurse and checks in on him daily  He does not drive but has multiple family members supporting him  Patient has 9 daughters and multiple grandchildren  Patient has a sleep study tomorrow and has transportation  He is independent with ADL's   He has an open case with [facility] and will be seen on 2/24 per note in [facility]  He took my contact information and repeated it to me  I asked him to call me with any questions  He did consent to another call

## 2022-02-24 ENCOUNTER — OFFICE VISIT (OUTPATIENT)
Dept: SLEEP CENTER | Facility: CLINIC | Age: 87
End: 2022-02-24
Payer: MEDICARE

## 2022-02-24 ENCOUNTER — APPOINTMENT (OUTPATIENT)
Dept: LAB | Facility: MEDICAL CENTER | Age: 87
End: 2022-02-24
Payer: MEDICARE

## 2022-02-24 VITALS
HEART RATE: 87 BPM | DIASTOLIC BLOOD PRESSURE: 70 MMHG | BODY MASS INDEX: 24.34 KG/M2 | TEMPERATURE: 97.6 F | HEIGHT: 67 IN | OXYGEN SATURATION: 93 % | SYSTOLIC BLOOD PRESSURE: 118 MMHG

## 2022-02-24 DIAGNOSIS — R73.9 HYPERGLYCEMIA: ICD-10-CM

## 2022-02-24 DIAGNOSIS — R09.82 ALLERGIC RHINITIS WITH POSTNASAL DRIP: ICD-10-CM

## 2022-02-24 DIAGNOSIS — N18.32 STAGE 3B CHRONIC KIDNEY DISEASE (HCC): ICD-10-CM

## 2022-02-24 DIAGNOSIS — R80.9 MICROALBUMINURIA: ICD-10-CM

## 2022-02-24 DIAGNOSIS — D47.2 BICLONAL GAMMOPATHY: Primary | ICD-10-CM

## 2022-02-24 DIAGNOSIS — G47.34 NOCTURNAL HYPOXIA: ICD-10-CM

## 2022-02-24 DIAGNOSIS — I25.2 OLD MI (MYOCARDIAL INFARCTION): ICD-10-CM

## 2022-02-24 DIAGNOSIS — G47.33 OSA (OBSTRUCTIVE SLEEP APNEA): Primary | ICD-10-CM

## 2022-02-24 DIAGNOSIS — I10 ESSENTIAL HYPERTENSION: ICD-10-CM

## 2022-02-24 DIAGNOSIS — E83.52 HYPERCALCEMIA: ICD-10-CM

## 2022-02-24 DIAGNOSIS — I16.9 HYPERTENSIVE CRISIS: ICD-10-CM

## 2022-02-24 DIAGNOSIS — J84.10 LUNG GRANULOMA (HCC): ICD-10-CM

## 2022-02-24 DIAGNOSIS — J30.9 ALLERGIC RHINITIS WITH POSTNASAL DRIP: ICD-10-CM

## 2022-02-24 DIAGNOSIS — I50.9 ACUTE EXACERBATION OF CHF (CONGESTIVE HEART FAILURE) (HCC): ICD-10-CM

## 2022-02-24 DIAGNOSIS — J96.01 ACUTE RESPIRATORY FAILURE WITH HYPOXIA (HCC): ICD-10-CM

## 2022-02-24 LAB
ALBUMIN SERPL BCP-MCNC: 3.5 G/DL (ref 3.5–5)
ALP SERPL-CCNC: 100 U/L (ref 46–116)
ALT SERPL W P-5'-P-CCNC: 30 U/L (ref 12–78)
ANION GAP SERPL CALCULATED.3IONS-SCNC: 4 MMOL/L (ref 4–13)
AST SERPL W P-5'-P-CCNC: 27 U/L (ref 5–45)
BASOPHILS # BLD AUTO: 0.1 THOUSANDS/ΜL (ref 0–0.1)
BASOPHILS NFR BLD AUTO: 1 % (ref 0–1)
BILIRUB SERPL-MCNC: 0.96 MG/DL (ref 0.2–1)
BUN SERPL-MCNC: 37 MG/DL (ref 5–25)
C-ANCA TITR SER IF: NORMAL TITER
CA-I BLD-SCNC: 1.36 MMOL/L (ref 1.12–1.32)
CALCIUM SERPL-MCNC: 11.2 MG/DL (ref 8.3–10.1)
CHLORIDE SERPL-SCNC: 94 MMOL/L (ref 100–108)
CO2 SERPL-SCNC: 33 MMOL/L (ref 21–32)
CREAT SERPL-MCNC: 1.77 MG/DL (ref 0.6–1.3)
EOSINOPHIL # BLD AUTO: 0.36 THOUSAND/ΜL (ref 0–0.61)
EOSINOPHIL NFR BLD AUTO: 5 % (ref 0–6)
ERYTHROCYTE [DISTWIDTH] IN BLOOD BY AUTOMATED COUNT: 12.5 % (ref 11.6–15.1)
GFR SERPL CREATININE-BSD FRML MDRD: 34 ML/MIN/1.73SQ M
GLUCOSE SERPL-MCNC: 275 MG/DL (ref 65–140)
HCT VFR BLD AUTO: 52.7 % (ref 36.5–49.3)
HGB BLD-MCNC: 16.8 G/DL (ref 12–17)
IMM GRANULOCYTES # BLD AUTO: 0.06 THOUSAND/UL (ref 0–0.2)
IMM GRANULOCYTES NFR BLD AUTO: 1 % (ref 0–2)
LYMPHOCYTES # BLD AUTO: 1.3 THOUSANDS/ΜL (ref 0.6–4.47)
LYMPHOCYTES NFR BLD AUTO: 16 % (ref 14–44)
MAGNESIUM SERPL-MCNC: 2.5 MG/DL (ref 1.6–2.6)
MCH RBC QN AUTO: 27.5 PG (ref 26.8–34.3)
MCHC RBC AUTO-ENTMCNC: 31.9 G/DL (ref 31.4–37.4)
MCV RBC AUTO: 86 FL (ref 82–98)
MONOCYTES # BLD AUTO: 1 THOUSAND/ΜL (ref 0.17–1.22)
MONOCYTES NFR BLD AUTO: 12 % (ref 4–12)
MYELOPEROXIDASE AB SER IA-ACNC: <9 U/ML (ref 0–9)
NEUTROPHILS # BLD AUTO: 5.26 THOUSANDS/ΜL (ref 1.85–7.62)
NEUTS SEG NFR BLD AUTO: 65 % (ref 43–75)
NRBC BLD AUTO-RTO: 0 /100 WBCS
P-ANCA ATYPICAL TITR SER IF: NORMAL TITER
P-ANCA TITR SER IF: NORMAL TITER
PHOSPHATE SERPL-MCNC: 3.3 MG/DL (ref 2.3–4.1)
PLATELET # BLD AUTO: 329 THOUSANDS/UL (ref 149–390)
PMV BLD AUTO: 8.8 FL (ref 8.9–12.7)
POTASSIUM SERPL-SCNC: 4.5 MMOL/L (ref 3.5–5.3)
PROT SERPL-MCNC: 8.7 G/DL (ref 6.4–8.2)
PROTEINASE3 AB SER IA-ACNC: <3.5 U/ML (ref 0–3.5)
RBC # BLD AUTO: 6.11 MILLION/UL (ref 3.88–5.62)
SODIUM SERPL-SCNC: 131 MMOL/L (ref 136–145)
WBC # BLD AUTO: 8.08 THOUSAND/UL (ref 4.31–10.16)

## 2022-02-24 PROCEDURE — 82330 ASSAY OF CALCIUM: CPT

## 2022-02-24 PROCEDURE — 84165 PROTEIN E-PHORESIS SERUM: CPT

## 2022-02-24 PROCEDURE — 86334 IMMUNOFIX E-PHORESIS SERUM: CPT

## 2022-02-24 PROCEDURE — 84165 PROTEIN E-PHORESIS SERUM: CPT | Performed by: PATHOLOGY

## 2022-02-24 PROCEDURE — 84100 ASSAY OF PHOSPHORUS: CPT

## 2022-02-24 PROCEDURE — 80053 COMPREHEN METABOLIC PANEL: CPT

## 2022-02-24 PROCEDURE — 85025 COMPLETE CBC W/AUTO DIFF WBC: CPT

## 2022-02-24 PROCEDURE — 83521 IG LIGHT CHAINS FREE EACH: CPT

## 2022-02-24 PROCEDURE — 83520 IMMUNOASSAY QUANT NOS NONAB: CPT

## 2022-02-24 PROCEDURE — 86037 ANCA TITER EACH ANTIBODY: CPT

## 2022-02-24 PROCEDURE — 83735 ASSAY OF MAGNESIUM: CPT

## 2022-02-24 PROCEDURE — 99204 OFFICE O/P NEW MOD 45 MIN: CPT | Performed by: INTERNAL MEDICINE

## 2022-02-24 PROCEDURE — 86334 IMMUNOFIX E-PHORESIS SERUM: CPT | Performed by: PATHOLOGY

## 2022-02-24 PROCEDURE — 36415 COLL VENOUS BLD VENIPUNCTURE: CPT

## 2022-02-24 PROCEDURE — 82652 VIT D 1 25-DIHYDROXY: CPT

## 2022-02-24 NOTE — PROGRESS NOTES
Consultation - [place] Osman[place]  82 y o  male  GEL:[birth year]  AWZ:[id number]  DOS:2/24/2022    Physician Requesting Consult: Leonor Guerrero DO             Reason for Consult : At your kind request I saw Mari Keen for initial sleep evaluation today  The patient is here to evaluate for suspected Obstructive Sleep Apnea  PFSH, Problem List, Medications & Allergies were reviewed in EMR  Rick Santiago  has a past medical history of Arthritis, Athscl 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, Cancer ([facility] ), Coronary artery disease, Diabetes mellitus ([place] [address] ), Diabetes mellitus, type II ([place] [address] ), Epistaxis, Essential (primary) hypertension, GERD (gastroesophageal reflux disease), cardiovascular stress test (07/23/2014), echocardiogram (11/07/2017), Hypertension, Mixed hyperlipidemia, Occlusion and stenosis of bilateral carotid arteries, 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 infarction), and Presence of aortocoronary bypass graft  He has a current medication list which includes the following prescription(s): amlodipine, atorvastatin, cholecalciferol, clopidogrel, escitalopram, furosemide, isosorbide mononitrate, tradjenta, propranolol, sucralfate, and tamsulosin  HPI:  He is here at behest of his PCP because of hypoxia noted during recent hospitalization  His also found to have a exacerbation of CHF and in acute respiratory failure  He was discharged on supplemental oxygen  He is unsure why he is here  He lives alone and is not aware of snoring or breathing difficulties during sleep  Other Complaints: none  Restless Leg Syndrome: reports no suggestive symptoms  Parasomnia: no features reported    Sleep Routine (on average):   Typical Bedtime:  1:00 a m  Gets OOB:  8:00 a m  TIB:7 hrs  Sleep latency:<  30 minutes Sleep Interruptions:multiple /nite because of nocturia and is able to fall back asleep  Awakens: spontaneously  Upon awakening: usually feels refreshed     Rick Santiago reports Excessive Daytime Sleepiness dozes off when sedentary at home for several hours  He rated himself at Total score: 3 /24 on the [place] Sleepiness Scale  Habits:  reports that he quit smoking about 45 years ago  He has a 40 00 pack-year smoking history  He has never used smokeless tobacco  ;   E-Cigarette/Vaping    E-Cigarette Use Never User     ;  reports no history of drug use ;  reports no history of alcohol use  ; Caffeine use:limited ; Exercise routine: none   Family History: Negative for sleep disturbance  ROS - reviewed and as attached  Significant for weight has been stable  He has postnasal drip  He reports chest tightness and shortness of breath  He has chest pains and palpitations  He has musculoskeletal aches and pains  Dino Martin EXAM:  /70 (BP Location: Left arm, Cuff Size: Large)   Pulse 87   Temp 97 6 °F (36 4 °C) (Temporal)   Ht 5' 7" (1 702 m)   SpO2 93%   BMI 24 34 kg/m²    General: Well groomed male, well appearing, in no apparent distress  On O2 via nasal cannula  Neurological: Alert and orientated;  cooperative; Cranial nerves intact; left sided ptosis   Psychiatric: Speech:clear and coherent;  Normal mood, affect & thought   Skin: warm and dry; Color& Hydration good; no facial rashes or lesions   HEENT:  Craniofacial anatomy: retrognathia Sinuses: non- tender  TMJ: Normal    Eyes: EOM's intact;  conjunctiva/corneas clear   Ears: Externallyappear normal     Nasal Airway: narrow nares Septum:intact; Mucosa: normal; Turbinates: normal; Rhinorrhea:  Postnasal drip   Mouth: Lips: normal posture; Dentition: edentulous  Mucosa:moist  ; Hard Palate:narrow   Oropharryx: crowded and AP narrowing Tongue: Mallampati:Class III and MobileSoft Palate:  redundant  Tonsils: absent  Neck:is thick; Neck Circumference: 17 5 "; Supple; no abnormal masses;  Thyroid:normal  Trachea:central     Lymph: No Cervical or Submandibular Lymhadenopathy  Heart: S1,S2 normal; RRR; no gallop; no murmurs   Lungs: Respiratory Effort:normal  Air entry good bilaterally  No wheezes  No rales  Abdomen: Obese, Soft & non-tender    Extremities:  Bilateral Ankle edema  No clubbing or cyanosis  Musculoskeletal:  Motor normal; Gait:  Ambulant with a walker  IMPRESSION: Primary/Secondary Sleep Diagnoses (to Medical or Psych conditions) & Comorbidities   1  ARVIN (obstructive sleep apnea)  Split Study   2  Nocturnal hypoxia     3  Acute respiratory failure with hypoxia [facility])  Ambulatory Referral to Sleep Medicine   4  Acute exacerbation of CHF (congestive heart failure) ([place] [address] )  Ambulatory Referral to Sleep Medicine   5  Essential hypertension     6  Allergic rhinitis with postnasal drip     7  Lung granuloma ([place] [address] )     8  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 infarction)         PLAN:   With respect to above conditions, comprehensive counseling provided on pathophysiology; effects on symptoms and comorbidities, diagnostic strategies & limitations; treatment options; risks or no treament; risks & benefits of the various therapeutic options; costs and insurance aspects  I advised  avoiding sleeping supine, using alcohol or sedating medications close to bed time and on safe driving practices  Nocturnal polysomnography is indicated and since his willing to try CPAP, a split study will be scheduled  I advised on sleep hygiene specifically avoiding daytime naps  Follow-up to be scheduled after the studies to review results, further details of treatment options and to initiate/adjust therapy  Sincerely,     Authenticated electronically by Radha Bradshaw MD   on 47/87/43   Board Certified Specialist     Portions of the record may have been created with voice recognition software  Occasional wrong word or "sound a like" substitutions may have occurred due to the inherent limitations of voice recognition software  There may also be notations and random deletions of words or characters from malfunctioning software   Read the chart carefully and recognize, using context, where substitutions/deletions have occurred

## 2022-02-24 NOTE — PATIENT INSTRUCTIONS
What is ARVIN? Obstructive sleep apnea is a common and serious sleep disorder that causes you to stop breathing during sleep  The airway repeatedly becomes blocked, limiting the amount of air that reaches your lungs  When this happens, you may snore loudly or making choking noises as you try to breathe  Your brain and body becomes oxygen deprived and you may wake up  This may happen a few times a night, or in more severe cases, several hundred times a night  Sleep apnea can make you wake up in the morning feeling tired or unrefreshed even though you have had a full night of sleep  During the day, you may feel fatigued, have difficulty concentrating or you may even unintentionally fall asleep  This is because your body is waking up numerous times throughout the night, even though you might not be conscious of each awakening  The lack of oxygen your body receives can have negative long-term consequences for your health  This includes:  High blood pressure  Heart disease  Irregular heart rhythms  Stroke  Pre-diabetes and diabetes  Depression    Testing  An objective evaluation of your sleep may be needed before your board certified sleep physician can make a diagnosis  Options include:   In-lab overnight sleep study  This type of sleep study requires you to stay overnight at a sleep center, in a bed that may resemble a hotel room  You will sleep with sensors hooked up to various parts of your body  These sensors record your brain waves, heartbeat, breathing and movement  An overnight sleep study also provides your doctor with the most complete information about your sleep  Learn more about an overnight sleep study      Home sleep apnea test  Some patients with high risk factors for obstructive sleep apnea and no other medical disorders may be candidates for a home sleep apnea test  The testing equipment differs in that it is less complicated than what is used in an overnight sleep study   As such, does not give all the data an in-lab will and if negative, may not mean you do not have the problem  Treatment for sleep apnea  includes using a continuous positive airway pressure (CPAP) machine to keep your airway open during sleep  A mask is placed over your nose and mouth, or just your nose  The mask is hooked to the CPAP machine that blows a gentle stream of air into the mask when you breathe  This helps keep your airway open so you can breathe more regularly  Extra oxygen may be given to you through the machine  You may be given a mouth device  It looks like a mouth guard or dental retainer and stops your tongue and mouth tissues from blocking your throat while you sleep  Surgery may be needed to remove extra tissues that block your mouth, throat, or nose  Manage sleep apnea:   Do not smoke  Nicotine and other chemicals in cigarettes and cigars can cause lung damage  Ask your healthcare provider for information if you currently smoke and need help to quit  E-cigarettes or smokeless tobacco still contain nicotine  Talk to your healthcare provider before you use these products  Do not drink alcohol or take sedative medicine before you go to sleep  Alcohol and sedatives can relax the muscles and tissues around your throat  This can block the airflow to your lungs  Maintain a healthy weight  Excess tissue around your throat may restrict your breathing  Ask your healthcare provider for information if you need to lose weight  Sleep on your side or use pillows designed to prevent sleep apnea  This prevents your tongue or other tissues from blocking your throat  You can also raise the head of your bed  Driving Safety  Refrain from driving when drowsy  Follow up with your healthcare provider as directed:  Write down your questions so you remember to ask them during your visits  Go to [employer]ASM website for more information: Sleepeducation  org     What is ARVIN?    Obstructive sleep apnea is a common and serious sleep disorder that causes you to stop breathing during sleep  The airway repeatedly becomes blocked, limiting the amount of air that reaches your lungs  When this happens, you may snore loudly or making choking noises as you try to breathe  Your brain and body becomes oxygen deprived and you may wake up  This may happen a few times a night, or in more severe cases, several hundred times a night  Sleep apnea can make you wake up in the morning feeling tired or unrefreshed even though you have had a full night of sleep  During the day, you may feel fatigued, have difficulty concentrating or you may even unintentionally fall asleep  This is because your body is waking up numerous times throughout the night, even though you might not be conscious of each awakening  The lack of oxygen your body receives can have negative long-term consequences for your health  This includes:  High blood pressure  Heart disease  Irregular heart rhythms  Stroke  Pre-diabetes and diabetes  Depression    Testing  An objective evaluation of your sleep may be needed before your board certified sleep physician can make a diagnosis  Options include:   In-lab overnight sleep study  This type of sleep study requires you to stay overnight at a sleep center, in a bed that may resemble a hotel room  You will sleep with sensors hooked up to various parts of your body  These sensors record your brain waves, heartbeat, breathing and movement  An overnight sleep study also provides your doctor with the most complete information about your sleep  Learn more about an overnight sleep study      Home sleep apnea test  Some patients with high risk factors for obstructive sleep apnea and no other medical disorders may be candidates for a home sleep apnea test  The testing equipment differs in that it is less complicated than what is used in an overnight sleep study   As such, does not give all the data an in-lab will and if negative, may not mean you do not have the problem  Treatment for sleep apnea  includes using a continuous positive airway pressure (CPAP) machine to keep your airway open during sleep  A mask is placed over your nose and mouth, or just your nose  The mask is hooked to the CPAP machine that blows a gentle stream of air into the mask when you breathe  This helps keep your airway open so you can breathe more regularly  Extra oxygen may be given to you through the machine  You may be given a mouth device  It looks like a mouth guard or dental retainer and stops your tongue and mouth tissues from blocking your throat while you sleep  Surgery may be needed to remove extra tissues that block your mouth, throat, or nose  Manage sleep apnea:   Do not smoke  Nicotine and other chemicals in cigarettes and cigars can cause lung damage  Ask your healthcare provider for information if you currently smoke and need help to quit  E-cigarettes or smokeless tobacco still contain nicotine  Talk to your healthcare provider before you use these products  Do not drink alcohol or take sedative medicine before you go to sleep  Alcohol and sedatives can relax the muscles and tissues around your throat  This can block the airflow to your lungs  Maintain a healthy weight  Excess tissue around your throat may restrict your breathing  Ask your healthcare provider for information if you need to lose weight  Sleep on your side or use pillows designed to prevent sleep apnea  This prevents your tongue or other tissues from blocking your throat  You can also raise the head of your bed  Driving Safety  Refrain from driving when drowsy  Follow up with your healthcare provider as directed:  Write down your questions so you remember to ask them during your visits  Go to AAS website for more information: Sleepeducation  org       Nursing Support:  When: Monday through Friday 7A-5PM except holidays  Where: Our direct line is [phone]      If you are having a true emergency please call 911  In the event that the line is busy or it is after hours please leave a voice message and we will return your call  Please speak clearly, leaving your full name, birth date, best number to reach you and the reason for your call  Medication refills: We will need the name of the medication, the dosage, the ordering provider, whether you get a 30 or 90 day refill, and the pharmacy name and address  Medications will be ordered by the provider only  Nurses cannot call in prescriptions  Please allow 7 days for medication refills  Physician requested updates: If your provider requested that you call with an update after starting medication, please be ready to provide us the medication and dosage, what time you take your medication, the time you attempt to fall asleep, time you fall asleep, when you wake up, and what time you get out of bed  Sleep Study Results: We will contact you with sleep study results and/or next steps after the physician has reviewed your testing

## 2022-02-24 NOTE — PROGRESS NOTES
Review of Systems      Genitourinary need to urinate more than twice a night   Cardiology Frequent chest pain or angina, , palpitations/fluttering feeling in the chest and ankle/leg swelling   Gastrointestinal none   Neurology muscle weakness and numbness/tingling of an extremity   Constitutional excessive sweating at night and weight change   Integumentary none   Psychiatry none   Musculoskeletal joint pain and muscle aches   Pulmonary shortness of breath with activity and chest tightness   ENT none   Endocrine none   Hematological none

## 2022-02-25 LAB
KAPPA LC FREE SER-MCNC: 84 MG/L (ref 3.3–19.4)
KAPPA LC FREE/LAMBDA FREE SER: 0.95 {RATIO} (ref 0.26–1.65)
LAMBDA LC FREE SERPL-MCNC: 88.7 MG/L (ref 5.7–26.3)

## 2022-02-26 LAB
1,25(OH)2D3 SERPL-MCNC: 69.8 PG/ML (ref 19.9–79.3)
ALBUMIN SERPL ELPH-MCNC: 3.99 G/DL (ref 3.5–5)
ALBUMIN SERPL ELPH-MCNC: 48.7 % (ref 52–65)
ALPHA1 GLOB SERPL ELPH-MCNC: 0.51 G/DL (ref 0.1–0.4)
ALPHA1 GLOB SERPL ELPH-MCNC: 6.2 % (ref 2.5–5)
ALPHA2 GLOB SERPL ELPH-MCNC: 1.13 G/DL (ref 0.4–1.2)
ALPHA2 GLOB SERPL ELPH-MCNC: 13.8 % (ref 7–13)
BETA GLOB ABNORMAL SERPL ELPH-MCNC: 0.44 G/DL (ref 0.4–0.8)
BETA1 GLOB SERPL ELPH-MCNC: 5.4 % (ref 5–13)
BETA2 GLOB SERPL ELPH-MCNC: 7 % (ref 2–8)
BETA2+GAMMA GLOB SERPL ELPH-MCNC: 0.57 G/DL (ref 0.2–0.5)
GAMMA GLOB ABNORMAL SERPL ELPH-MCNC: 1.55 G/DL (ref 0.5–1.6)
GAMMA GLOB SERPL ELPH-MCNC: 18.9 % (ref 12–22)
IGG/ALB SER: 0.95 {RATIO} (ref 1.1–1.8)
INTERPRETATION UR IFE-IMP: NORMAL
M PEAK ID 2: 4 %
M PROTEIN 1 MFR SERPL ELPH: 3.2 %
M PROTEIN 1 SERPL ELPH-MCNC: 0.26 G/DL
M PROTEIN 2 SERPL ELPH-MCNC: 0.33 G/DL
M PROTEIN 3 MFR SERPL ELPH: 2.4 %
M PROTEIN 3 SERPL ELPH-MCNC: 0.2 G/DL
PROT PATTERN SERPL ELPH-IMP: ABNORMAL
PROT SERPL-MCNC: 8.2 G/DL (ref 6.4–8.2)

## 2022-02-28 ENCOUNTER — APPOINTMENT (OUTPATIENT)
Dept: LAB | Facility: MEDICAL CENTER | Age: 87
End: 2022-02-28
Payer: MEDICARE

## 2022-02-28 DIAGNOSIS — I50.9 CONGESTIVE HEART FAILURE, UNSPECIFIED HF CHRONICITY, UNSPECIFIED HEART FAILURE TYPE (HCC): ICD-10-CM

## 2022-02-28 LAB — PTH RELATED PROT SERPL-SCNC: <2 PMOL/L

## 2022-02-28 PROCEDURE — 83735 ASSAY OF MAGNESIUM: CPT

## 2022-02-28 PROCEDURE — 80048 BASIC METABOLIC PNL TOTAL CA: CPT

## 2022-02-28 PROCEDURE — 36415 COLL VENOUS BLD VENIPUNCTURE: CPT

## 2022-03-01 LAB
ANION GAP SERPL CALCULATED.3IONS-SCNC: 3 MMOL/L (ref 4–13)
BUN SERPL-MCNC: 30 MG/DL (ref 5–25)
C-ANCA TITR SER IF: NORMAL TITER
CALCIUM SERPL-MCNC: 10.7 MG/DL (ref 8.3–10.1)
CHLORIDE SERPL-SCNC: 96 MMOL/L (ref 100–108)
CO2 SERPL-SCNC: 32 MMOL/L (ref 21–32)
CREAT SERPL-MCNC: 1.97 MG/DL (ref 0.6–1.3)
GFR SERPL CREATININE-BSD FRML MDRD: 30 ML/MIN/1.73SQ M
GLUCOSE SERPL-MCNC: 331 MG/DL (ref 65–140)
MAGNESIUM SERPL-MCNC: 2.5 MG/DL (ref 1.6–2.6)
MYELOPEROXIDASE AB SER IA-ACNC: <9 U/ML (ref 0–9)
P-ANCA ATYPICAL TITR SER IF: NORMAL TITER
P-ANCA TITR SER IF: NORMAL TITER
POTASSIUM SERPL-SCNC: 4.8 MMOL/L (ref 3.5–5.3)
PROTEINASE3 AB SER IA-ACNC: <3.5 U/ML (ref 0–3.5)
SODIUM SERPL-SCNC: 131 MMOL/L (ref 136–145)

## 2022-03-02 ENCOUNTER — PATIENT OUTREACH (OUTPATIENT)
Dept: CASE MANAGEMENT | Facility: OTHER | Age: 87
End: 2022-03-02

## 2022-03-02 NOTE — PROGRESS NOTES
I spoke with Andreea Jiménez who is at home doing better  He is on home oxygen at 2 liters at rest and 3 when exerting himself  Patient was seen at home by [facility] and was reeducated on heart failure  He also started physical therapy in home  His family is rearranging his bedroom for ease of access  He reports he is ambulating at home and is independent with ADL's  He reports he has no needs at this time  He has my contact information and I advised him to call me with any questions/concerns  He expects a nurse visit later today

## 2022-03-04 ENCOUNTER — TELEPHONE (OUTPATIENT)
Dept: NEPHROLOGY | Facility: CLINIC | Age: 87
End: 2022-03-04

## 2022-03-04 NOTE — TELEPHONE ENCOUNTER
Appointment Confirmation   Person confirmed appointment with  If not patient, name of the person lvm for pt     Date and time of appointment 3/7/2022 @ 2:30     Patient acknowledged and will be at appointment? no    Did you advise the patient that they will need a urine sample if they are a new patient?  N/A    Did you advise the patient to bring their current medications for verification? (including any OTC) Yes    Additional Information

## 2022-03-07 ENCOUNTER — OFFICE VISIT (OUTPATIENT)
Dept: NEPHROLOGY | Facility: CLINIC | Age: 87
End: 2022-03-07
Payer: MEDICARE

## 2022-03-07 VITALS
HEART RATE: 71 BPM | WEIGHT: 154 LBS | HEIGHT: 67 IN | OXYGEN SATURATION: 96 % | SYSTOLIC BLOOD PRESSURE: 118 MMHG | DIASTOLIC BLOOD PRESSURE: 66 MMHG | BODY MASS INDEX: 24.17 KG/M2

## 2022-03-07 DIAGNOSIS — R80.9 PROTEINURIA, UNSPECIFIED TYPE: ICD-10-CM

## 2022-03-07 DIAGNOSIS — N18.9 ACUTE KIDNEY INJURY SUPERIMPOSED ON CHRONIC KIDNEY DISEASE (HCC): Primary | ICD-10-CM

## 2022-03-07 DIAGNOSIS — E11.65 TYPE 2 DIABETES MELLITUS WITH HYPERGLYCEMIA, WITHOUT LONG-TERM CURRENT USE OF INSULIN (HCC): ICD-10-CM

## 2022-03-07 DIAGNOSIS — N18.32 STAGE 3B CHRONIC KIDNEY DISEASE (HCC): ICD-10-CM

## 2022-03-07 DIAGNOSIS — N20.0 NEPHROLITHIASIS: ICD-10-CM

## 2022-03-07 DIAGNOSIS — I50.33 ACUTE ON CHRONIC DIASTOLIC CONGESTIVE HEART FAILURE (HCC): ICD-10-CM

## 2022-03-07 DIAGNOSIS — N17.9 ACUTE KIDNEY INJURY SUPERIMPOSED ON CHRONIC KIDNEY DISEASE (HCC): Primary | ICD-10-CM

## 2022-03-07 DIAGNOSIS — N25.81 SECONDARY HYPERPARATHYROIDISM OF RENAL ORIGIN (HCC): ICD-10-CM

## 2022-03-07 DIAGNOSIS — N11.9 CHRONIC TUBULOINTERSTITIAL NEPHRITIS: ICD-10-CM

## 2022-03-07 DIAGNOSIS — I50.9 ACUTE EXACERBATION OF CHF (CONGESTIVE HEART FAILURE) (HCC): ICD-10-CM

## 2022-03-07 PROCEDURE — 99215 OFFICE O/P EST HI 40 MIN: CPT | Performed by: PHYSICIAN ASSISTANT

## 2022-03-07 RX ORDER — PANTOPRAZOLE SODIUM 40 MG/1
40 TABLET, DELAYED RELEASE ORAL DAILY
COMMUNITY
Start: 2022-02-23

## 2022-03-07 NOTE — ASSESSMENT & PLAN NOTE
Wt Readings from Last 3 Encounters:   03/07/22 69 9 kg (154 lb)   02/22/22 70 5 kg (155 lb 6 8 oz)   01/30/22 77 6 kg (171 lb)   Examines compensated, low sodium diet, continue furosemide 20 mg twice daily

## 2022-03-07 NOTE — PROGRESS NOTES
Assessment & Plan:    1  Acute kidney injury superimposed on chronic kidney disease [facility])  Assessment & Plan:  Lab Results   Component Value Date    EGFR 30 02/28/2022    EGFR 34 02/24/2022    EGFR 33 02/22/2022    CREATININE 1 97 (H) 02/28/2022    CREATININE 1 77 (H) 02/24/2022    CREATININE 1 81 (H) 02/22/2022   Update labs  Further recommendations pending  Avoid nephrotoxins    Orders:  -     Basic metabolic panel; Future; Expected date: 03/07/2022    2  Stage 3b chronic kidney disease (HCC)  -     CBC and differential; Future; Expected date: 06/30/2022  -     Comprehensive metabolic panel; Future; Expected date: 06/30/2022  -     Magnesium; Future; Expected date: 06/30/2022  -     Microalbumin / creatinine urine ratio; Future; Expected date: 06/30/2022  -     Phosphorus; Future; Expected date: 06/30/2022  -     Protein / creatinine ratio, urine; Future; Expected date: 06/30/2022  -     PTH, intact; Future; Expected date: 06/30/2022  -     Urinalysis with microscopic; Future; Expected date: 06/30/2022    3  Acute exacerbation of CHF (congestive heart failure) ([facility])  Assessment & Plan:  Wt Readings from Last 3 Encounters:   03/07/22 69 9 kg (154 lb)   02/22/22 70 5 kg (155 lb 6 8 oz)   01/30/22 77 6 kg (171 lb)   Examines compensated, low sodium diet, continue furosemide 20 mg twice daily          Orders:  -     Ambulatory Referral to [address] [address] Addison Linder    4  Type 2 diabetes mellitus with hyperglycemia, without long-term current use of insulin ([facility] )  -     Ambulatory Referral to [address] [place] [place] [address] Addison Linder    5  Acute on chronic diastolic congestive heart failure ([facility] [address] )    6  Chronic tubulointerstitial nephritis  Assessment & Plan:  Avoid NSAIDs      7  Nephrolithiasis  Assessment & Plan:  Cannot increase fluid intake given propensity towards CHF      8  Proteinuria, unspecified type  Assessment & Plan:  Consider addition of RAASi following stability in renal function    Orders:  -     Microalbumin / creatinine urine ratio;  Future; Expected date: 06/30/2022  -     Protein / creatinine ratio, urine; Future; Expected date: 06/30/2022    9  Secondary hyperparathyroidism of renal origin [facility])  Assessment & Plan:  Check PTH, continue vitamin D3         The benefits, risks and alternatives to the treatment plan were discussed at this visit  Patient was advised of common adverse effects of any medical therapies prescribed  All questions were answered and discussed with the patient and any accompanying family members or caretakers  Subjective:      Patient ID: Brigitte Escobedo is a 82 y o  male seen in the [facility] office  HPI     Today, patient presents for follow up of hospitalization  He was seen at [facility] from 02/18/22 through 2/22/22 for acute respiratory failure with hypoxia and acute exacerbation of CHF  He was discharged with supplemental oxygen  He denies further changes to health, changes to medications,  falls, bones fractures  Blood pressure is 118/66 HR 71  Reports some lightheadedness upon standing  Reports adherence with antihypertensives  He denies lower extremity swelling  History obtained from patient and family member  The following portions of the patient's history were reviewed and updated as appropriate: allergies, current medications, past family history, past medical history, past social history, past surgical history, and problem list     Review of Systems   Constitutional: Negative for activity change, chills, diaphoresis, fatigue and fever  HENT: Negative for mouth sores and trouble swallowing  Respiratory: Negative for apnea, cough, chest tightness, shortness of breath and wheezing  Cardiovascular: Negative for chest pain, palpitations and leg swelling  Gastrointestinal: Negative for abdominal distention, abdominal pain, blood in stool, constipation, diarrhea and nausea  Genitourinary: Negative for decreased urine volume, difficulty urinating, dysuria, enuresis, frequency, hematuria and urgency  Musculoskeletal: Positive for arthralgias  Negative for back pain and joint swelling  Skin: Negative for pallor, rash and wound  Neurological: Negative for dizziness, seizures, light-headedness, numbness and headaches  Hematological: Does not bruise/bleed easily  Psychiatric/Behavioral: Negative for agitation, behavioral problems and confusion  The patient is not nervous/anxious  Objective:      /66   Pulse 71   Ht 5' 7" (1 702 m)   Wt 69 9 kg (154 lb)   SpO2 96%   BMI 24 12 kg/m²          Physical Exam  Vitals and nursing note reviewed  Constitutional:       General: He is awake  He is not in acute distress  Appearance: Normal appearance  He is well-developed and normal weight  He is not ill-appearing, toxic-appearing or diaphoretic  HENT:      Head: Normocephalic and atraumatic  Jaw: There is normal jaw occlusion  Comments: Nasal cannula     Nose: Nose normal       Mouth/Throat:      Mouth: Mucous membranes are moist       Pharynx: Oropharynx is clear  No oropharyngeal exudate or posterior oropharyngeal erythema  Eyes:      General: Lids are normal  Vision grossly intact  Gaze aligned appropriately  No scleral icterus  Right eye: No discharge  Left eye: No discharge  Extraocular Movements: Extraocular movements intact  Conjunctiva/sclera: Conjunctivae normal       Pupils: Pupils are equal, round, and reactive to light  Neck:      Thyroid: No thyroid mass or thyromegaly  Trachea: Trachea and phonation normal    Cardiovascular:      Rate and Rhythm: Normal rate and regular rhythm  Heart sounds: Normal heart sounds, S1 normal and S2 normal  No murmur heard  No friction rub  No gallop  Pulmonary:      Effort: Pulmonary effort is normal  No respiratory distress  Breath sounds: Normal breath sounds  No stridor  No wheezing, rhonchi or rales  Abdominal:      General: Abdomen is flat   Bowel sounds are normal  There is no distension  Palpations: Abdomen is soft  There is no mass  Tenderness: There is no abdominal tenderness  There is no guarding  Hernia: No hernia is present  Musculoskeletal:         General: Normal range of motion  Cervical back: Normal range of motion and neck supple  No rigidity or tenderness  Right lower leg: No edema  Left lower leg: No edema  Lymphadenopathy:      Cervical: No cervical adenopathy  Skin:     General: Skin is warm and dry  Coloration: Skin is not jaundiced  Findings: No bruising  Nails: There is no clubbing  Neurological:      General: No focal deficit present  Mental Status: He is alert and oriented to person, place, and time  Mental status is at baseline  Psychiatric:         Attention and Perception: Attention and perception normal          Mood and Affect: Mood and affect normal          Speech: Speech normal          Behavior: Behavior normal  Behavior is cooperative  Thought Content:  Thought content normal          Judgment: Judgment normal              Lab Results   Component Value Date    SODIUM 131 (L) 02/28/2022    K 4 8 02/28/2022    CL 96 (L) 02/28/2022    CO2 32 02/28/2022    AGAP 3 (L) 02/28/2022    BUN 30 (H) 02/28/2022    CREATININE 1 97 (H) 02/28/2022    GLUC 331 (H) 02/28/2022    GLUF 192 (H) 10/04/2021    CALCIUM 10 7 (H) 02/28/2022    AST 27 02/24/2022    ALT 30 02/24/2022    ALKPHOS 100 02/24/2022    TP 8 2 02/24/2022    TP 8 7 (H) 02/24/2022    TBILI 0 96 02/24/2022    EGFR 30 02/28/2022      Lab Results   Component Value Date    CREATININE 1 97 (H) 02/28/2022    CREATININE 1 77 (H) 02/24/2022    CREATININE 1 81 (H) 02/22/2022    CREATININE 1 80 (H) 02/21/2022    CREATININE 2 04 (H) 02/19/2022    CREATININE 1 74 (H) 02/19/2022    CREATININE 2 04 (H) 02/18/2022    CREATININE 1 86 (H) 02/10/2022    CREATININE 1 65 (H) 01/31/2022    CREATININE 1 75 (H) 01/30/2022    CREATININE 1 72 (H) 10/04/2021    CREATININE 1 67 (H) 05/13/2021    CREATININE 1 63 (H) 03/26/2021    CREATININE 1 90 (H) 03/25/2021    CREATININE 1 53 (H) 03/05/2021      Lab Results   Component Value Date    COLORU Yellow 02/18/2022    CLARITYU Clear 02/18/2022    SPECGRAV 1 025 02/18/2022    PHUR 5 5 02/18/2022    LEUKOCYTESUR Negative 02/18/2022    NITRITE Negative 02/18/2022    PROTEIN UA 30 (1+) (A) 02/18/2022    GLUCOSEU Negative 02/18/2022    KETONESU Negative 02/18/2022    UROBILINOGEN 0 2 02/18/2022    BILIRUBINUR Negative 02/18/2022    BLOODU Negative 02/18/2022    RBCUA 0-1 02/18/2022    WBCUA 0-1 02/18/2022    EPIS Occasional 02/18/2022    BACTERIA Occasional 02/18/2022      No results found for: LABPROT  No results found for: Bryan John  Lab Results   Component Value Date    WBC 8 08 02/24/2022    HGB 16 8 02/24/2022    HCT 52 7 (H) 02/24/2022    MCV 86 02/24/2022     02/24/2022      Lab Results   Component Value Date    HGB 16 8 02/24/2022    HGB 15 7 02/22/2022    HGB 14 1 02/21/2022    HGB 14 2 02/20/2022    HGB 13 7 02/19/2022      Lab Results   Component Value Date    IRON 57 (L) 01/23/2019    TIBC 316 01/23/2019      No results found for: PTHCALCIUM, [id number], PHOSPHORUS   Lab Results   Component Value Date    CHOLESTEROL 121 11/09/2021    HDL 40 11/09/2021    LDLCALC 59 11/09/2021    TRIG 112 11/09/2021      No results found for: URICACID   Lab Results   Component Value Date    HGBA1C 9 5 (H) 02/19/2022      No results found for: TSHANTIBODY, P1YAXMW, FREET4   No results found for: JUNE, DSDNAAB, RFIGM   Lab Results   Component Value Date    UPEP See Comment 02/22/2022        Portions of the record may have been created with voice recognition software  Occasional wrong word or "sound a like" substitutions may have occurred due to the inherent limitations of voice recognition software  Read the chart carefully and recognize, using context, where substitutions have occurred   If you have any questions, please contact the dictating provider

## 2022-03-07 NOTE — ASSESSMENT & PLAN NOTE
Lab Results   Component Value Date    EGFR 30 02/28/2022    EGFR 34 02/24/2022    EGFR 33 02/22/2022    CREATININE 1 97 (H) 02/28/2022    CREATININE 1 77 (H) 02/24/2022    CREATININE 1 81 (H) 02/22/2022   Update labs  Further recommendations pending   Avoid nephrotoxins

## 2022-03-08 ENCOUNTER — OFFICE VISIT (OUTPATIENT)
Dept: PULMONOLOGY | Facility: CLINIC | Age: 87
End: 2022-03-08
Payer: MEDICARE

## 2022-03-08 ENCOUNTER — TELEPHONE (OUTPATIENT)
Dept: PULMONOLOGY | Facility: CLINIC | Age: 87
End: 2022-03-08

## 2022-03-08 VITALS
OXYGEN SATURATION: 96 % | DIASTOLIC BLOOD PRESSURE: 73 MMHG | HEART RATE: 80 BPM | TEMPERATURE: 98.3 F | SYSTOLIC BLOOD PRESSURE: 115 MMHG | WEIGHT: 153.2 LBS | BODY MASS INDEX: 24.04 KG/M2 | HEIGHT: 67 IN

## 2022-03-08 DIAGNOSIS — J96.01 ACUTE RESPIRATORY FAILURE WITH HYPOXIA (HCC): ICD-10-CM

## 2022-03-08 DIAGNOSIS — I27.20 PULMONARY HYPERTENSION (HCC): ICD-10-CM

## 2022-03-08 DIAGNOSIS — G47.34 NOCTURNAL HYPOXIA: ICD-10-CM

## 2022-03-08 DIAGNOSIS — I50.9 ACUTE EXACERBATION OF CHF (CONGESTIVE HEART FAILURE) (HCC): ICD-10-CM

## 2022-03-08 DIAGNOSIS — J96.11 CHRONIC RESPIRATORY FAILURE WITH HYPOXIA (HCC): ICD-10-CM

## 2022-03-08 DIAGNOSIS — J84.9 ILD (INTERSTITIAL LUNG DISEASE) (HCC): Primary | ICD-10-CM

## 2022-03-08 DIAGNOSIS — R91.1 PULMONARY NODULE: ICD-10-CM

## 2022-03-08 PROCEDURE — 99214 OFFICE O/P EST MOD 30 MIN: CPT | Performed by: NURSE PRACTITIONER

## 2022-03-08 PROCEDURE — 94618 PULMONARY STRESS TESTING: CPT | Performed by: NURSE PRACTITIONER

## 2022-03-08 NOTE — ASSESSMENT & PLAN NOTE
-  recent hospitalization- echo did show new pulmonary hypertension than the previous year  -  PA pressure currently 41 mmHg  -  likely WHO group II & III  -  if hypoxia becomes worse would recommend close cardiology follow-up with likely daily diuretic therapy  -  may consider repeat echo in 1 year's time

## 2022-03-08 NOTE — ASSESSMENT & PLAN NOTE
-  I do feel patient has chronic hypoxia is likely multifaceted:  Grade 1 diastolic CHF, moderate pulmonary hypertension, ILD  -  patient completed 6 minute walk- room air at rest 93%, patient required 3 L upon ambulation  -  patient has home pulse ox, will monitor oxygen requirement at home and keep a log to present to provider upon follow-up  -  will continue maintain saturations greater than 87%

## 2022-03-08 NOTE — ASSESSMENT & PLAN NOTE
-  previous imaging is noted to have pleural thickening and bronchiectasis changes concerning for I LD  -  given that patient had chronically elevated eosinophils since 2019 further inflammatory markers were obtained- all inflammatory markers unremarkable  -  no indication for steroids at this time  -  etiology remains somewhat unclear  -  given that patient is overall stable from a respiratory standpoint- will repeat chest CT in 3 months if fibrosis/bronchiectasis worsening will consider further workup

## 2022-03-08 NOTE — ASSESSMENT & PLAN NOTE
-  bilateral right upper lobe and left upper lobe pulmonary nodule  -  lab repeat imaging in 3 months  -  determined at that time the stability of the nodules and if further imaging needs to be maintained on an annual basis

## 2022-03-08 NOTE — ASSESSMENT & PLAN NOTE
-  patient only requires oxygen therapy upon ambulation  -  patient is concerned about his overall saturations q h s   As his nasal cannula has tendency to follow off  -  I did recommend taping nasal cannula to cheeks or wearing nasal cannula in his nares and around his head as opposed to behind his ears  -  will order overnight pulse ox on room air to determine severity of hypoxia

## 2022-03-08 NOTE — TELEPHONE ENCOUNTER
Ordered Oxygen portability and overnight pulse oximetry for patient with [place] via [facility]

## 2022-03-10 ENCOUNTER — OFFICE VISIT (OUTPATIENT)
Dept: CARDIOLOGY CLINIC | Facility: CLINIC | Age: 87
End: 2022-03-10
Payer: MEDICARE

## 2022-03-10 ENCOUNTER — PATIENT OUTREACH (OUTPATIENT)
Dept: CASE MANAGEMENT | Facility: OTHER | Age: 87
End: 2022-03-10

## 2022-03-10 VITALS
HEIGHT: 67 IN | HEART RATE: 94 BPM | BODY MASS INDEX: 24.23 KG/M2 | WEIGHT: 154.4 LBS | SYSTOLIC BLOOD PRESSURE: 130 MMHG | DIASTOLIC BLOOD PRESSURE: 70 MMHG | OXYGEN SATURATION: 96 %

## 2022-03-10 DIAGNOSIS — I10 ESSENTIAL HYPERTENSION: Primary | ICD-10-CM

## 2022-03-10 DIAGNOSIS — I50.9 ACUTE EXACERBATION OF CHF (CONGESTIVE HEART FAILURE) (HCC): ICD-10-CM

## 2022-03-10 DIAGNOSIS — J96.01 ACUTE RESPIRATORY FAILURE WITH HYPOXIA (HCC): ICD-10-CM

## 2022-03-10 DIAGNOSIS — I25.118 CORONARY ARTERY DISEASE OF NATIVE ARTERY OF NATIVE HEART WITH STABLE ANGINA PECTORIS (HCC): Chronic | ICD-10-CM

## 2022-03-10 PROCEDURE — 99214 OFFICE O/P EST MOD 30 MIN: CPT | Performed by: NURSE PRACTITIONER

## 2022-03-10 NOTE — PROGRESS NOTES
Chart reviewed  Patient was seen by [facility] skilled nurse yesterday  Notes reviewed  Patient was seen by nephrology on 3/7  Amlodipine was stopped  Will continue to follow

## 2022-03-10 NOTE — PROGRESS NOTES
Patient ID: Alonso Conteh is a 82 y o  male  Plan:      Essential hypertension   Blood pressure well controlled   Continue current medication regimen    Coronary artery disease of native artery of native heart with stable angina pectoris ([facility] [address] )   Prior bypass surgery   Continue Plavix, statin, beta-blocker, nitrates    Acute respiratory failure with hypoxia ([facility] )   Recently hospitalized  Now wearing supplemental oxygen       Follow up Plan/Summary Comments:   Roxana Cristina appears to be doing well from a cardiac perspective  His weight has been stable at home, 146-150 lb on his scale  We reviewed a low-sodium diet and daily weights  No medication changes today  He is planning for an upper endoscopy later this month  This is a low risk procedure, though I did tell him that he is higher than average risk given his current medical conditions and need for supplemental oxygen  Plavix may be held for 5 days prior to the procedure  Follow-up in 6 months  He will notify us sooner if needed  HPI:  I had the pleasure of meeting Roxana Cristina in the office today for a hospital follow-up visit  Roxana Cristina is a pleasant 82-year-old male with CAD, hypertension, hyperlipidemia, diabetes  He was reportedly having ongoing issues with dyspnea  He was hospitalized 2/18 - 02/22/2022 at [facility]  He was discharged home on supplemental oxygen and has been doing well since  He denies any significant chest pain  He does occasionally feel a "shock" that is fleeting  This is not similar to any discomfort that he previously experienced prior to his cardiac interventions  He had previously been on Ranexa for some chest discomfort  It is unclear when this was discontinued, but he states he is feeling okay without it  He denies any other cardiac symptoms of chest pressure, palpitations, dizziness, lightheadedness, syncope      He is planning for an upper endoscopy later this month due to some food swallowing difficulties  Review of Systems   10  point ROS  was otherwise non pertinent or negative except as per HPI or as below  Gait: Rollator       Most recent or relevant cardiac/vascular testing:    Echo 02/21/2022 EF 65% moderate LVH, mildly impaired diastolic function  mild MR, moderate PI    Echo 03/26/2021 EF 65%, mildly impaired diastolic function   Mild to moderate MR, mild TR    Objective:     /70   Pulse 94   Ht 5' 7" (1 702 m)   Wt 70 kg (154 lb 6 4 oz)   SpO2 96%   BMI 24 18 kg/m²     PHYSICAL EXAM:    General:  Normal appearance, no acute distress, wearing supplemental O2  Eyes:  Anicteric  Oral mucosa: wearing a mask  Neck:  No JVD  Carotid upstrokes are brisk without bruits  No masses  Chest:  Clear but decreased throughout  Cardiac:  No palpable PMI  Normal S1 and S2  No murmur gallop or rub  Abdomen:  Soft and nontender  No palpable organomegaly or aortic enlargement  Extremities:  No peripheral edema  Musculoskeletal:  Symmetric  Vascular:  Pedal pulses are intact  Neuro:  Grossly symmetric  Psych:  Alert and oriented x3      Allergies   Allergen Reactions    Ibuprofen Fatigue     Other reaction(s): decreased kidney function       Current Outpatient Medications:     atorvastatin (LIPITOR) 40 mg tablet, Take 1 tablet (40 mg total) by mouth daily with dinner This is an increased dose to better help prevent heart attack and stroke , Disp: 30 tablet, Rfl: 0    cholecalciferol (VITAMIN D3) 1,000 units tablet, Take 1 tablet (1,000 Units total) by mouth daily, Disp: 30 tablet, Rfl: 0    clopidogrel (PLAVIX) 75 mg tablet, Take 1 tablet (75 mg total) by mouth daily Hold until seen by ENT on 9/23/19 , Disp: , Rfl:     escitalopram (LEXAPRO) 10 mg tablet, Take 10 mg by mouth daily, Disp: , Rfl:     furosemide (LASIX) 20 mg tablet, Take 1 tablet (20 mg total) by mouth 2 (two) times a day, Disp: 60 tablet, Rfl: 0    isosorbide mononitrate (IMDUR) 60 mg 24 hr tablet, Take 1 tablet (60 mg total) by mouth daily In the morning, Disp: , Rfl: 0    linaGLIPtin (Tradjenta) 5 MG TABS, Take 5 mg by mouth daily Take this for your diabetes instead of glimepiride, Disp: 30 tablet, Rfl: 0    pantoprazole (PROTONIX) 40 mg tablet, Take 40 mg by mouth daily, Disp: , Rfl:     propranolol (INDERAL LA) 120 mg 24 hr capsule, Take 120 mg by mouth daily, Disp: , Rfl:     sucralfate (CARAFATE) 1 g tablet, Take 1 g by mouth 2 (two) times a day, Disp: , Rfl:     tamsulosin (FLOMAX) 0 4 mg, Take 2 capsules (0 8 mg total) by mouth daily with dinner, Disp: 180 capsule, Rfl: 3  Past Medical History:   Diagnosis Date    Arthritis     Athscl 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     Stent OM1  Patent mammary graft to LAD 10/7/2009; CABG 1992 & 2005    Cancer [facility])     skin    Coronary artery disease     [facility]     Diabetes mellitus, type II ([facility] )     without complication    Epistaxis     Essential (primary) hypertension     GERD (gastroesophageal reflux disease)     Hx of cardiovascular stress test 07/23/2014    Eddie MPI; EF0 52 (52%) Lexiscan, mild LV systolic dysfunction; evidence for prior inferior wall MI; perfusion imaging consistant w mild lat wall ischemia and min torin-infart inferior ischemia   / EF0 51 (51%) evidence for prior inferior wall MI  Mild inferior and mild-moderate lateral wall ischemia  7/29/15    Hx of echocardiogram 11/07/2017    2D w/CFD;EF0 55 (55%) mild LVH, mitral valve prolapse with moderate regurgitation  left atrial enlargement  Mild tricuspid regurgitation   Hypertension     Mixed hyperlipidemia     Occlusion and stenosis of bilateral carotid arteries     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 infarction)     Presence of aortocoronary bypass graft      Past Surgical History:   Procedure Laterality Date    CARDIAC CATHETERIZATION  10/07/2009    Stent 99% stenosis SVG from the aorta to OM1   Patent mammary graft to the LAD    CORONARY ARTERY BYPASS GRAFT  1992 VG-CX, VG-RCA    CORONARY ARTERY BYPASS GRAFT  2005    LIMA-D1-LAD, VG-PDA-PLB, Kovlpa-GR2-LE6 TMR 9 Lesions    MA EGD TRANSORAL BIOPSY SINGLE/MULTIPLE N/A 2019    Procedure: ESOPHAGOGASTRODUODENOSCOPY (EGD) with biopsy;  Surgeon: Willy Cheek MD;  Location: [facility] GI LAB;   Service: Gastroenterology    TONSILLECTOMY         CMP:   Lab Results   Component Value Date    K 4 8 2022    CL 96 (L) 2022    CO2 32 2022    BUN 30 (H) 2022    CREATININE 1 97 (H) 2022    EGFR 30 2022     Lipid Profile:    Lab Results   Component Value Date    TRIG 112 2021    HDL 40 2021         Social History     Tobacco Use   Smoking Status Former Smoker    Packs/day: 1 00    Years: 40 00    Pack years: 40 00    Quit date: 1977    Years since quittin 1   Smokeless Tobacco Never Used

## 2022-03-15 ENCOUNTER — APPOINTMENT (OUTPATIENT)
Dept: LAB | Facility: MEDICAL CENTER | Age: 87
End: 2022-03-15
Payer: MEDICARE

## 2022-03-15 DIAGNOSIS — N18.9 ACUTE KIDNEY INJURY SUPERIMPOSED ON CHRONIC KIDNEY DISEASE (HCC): ICD-10-CM

## 2022-03-15 DIAGNOSIS — N17.9 ACUTE KIDNEY INJURY SUPERIMPOSED ON CHRONIC KIDNEY DISEASE (HCC): ICD-10-CM

## 2022-03-15 LAB
ANION GAP SERPL CALCULATED.3IONS-SCNC: 4 MMOL/L (ref 4–13)
BUN SERPL-MCNC: 23 MG/DL (ref 5–25)
CALCIUM SERPL-MCNC: 10.3 MG/DL (ref 8.3–10.1)
CHLORIDE SERPL-SCNC: 99 MMOL/L (ref 100–108)
CO2 SERPL-SCNC: 32 MMOL/L (ref 21–32)
CREAT SERPL-MCNC: 1.89 MG/DL (ref 0.6–1.3)
GFR SERPL CREATININE-BSD FRML MDRD: 31 ML/MIN/1.73SQ M
GLUCOSE SERPL-MCNC: 294 MG/DL (ref 65–140)
POTASSIUM SERPL-SCNC: 4.1 MMOL/L (ref 3.5–5.3)
SODIUM SERPL-SCNC: 135 MMOL/L (ref 136–145)

## 2022-03-15 PROCEDURE — 80048 BASIC METABOLIC PNL TOTAL CA: CPT

## 2022-03-15 PROCEDURE — 36415 COLL VENOUS BLD VENIPUNCTURE: CPT

## 2022-03-18 ENCOUNTER — PATIENT OUTREACH (OUTPATIENT)
Dept: CASE MANAGEMENT | Facility: OTHER | Age: 87
End: 2022-03-18

## 2022-03-18 ENCOUNTER — CONSULT (OUTPATIENT)
Dept: HEMATOLOGY ONCOLOGY | Facility: CLINIC | Age: 87
End: 2022-03-18
Payer: MEDICARE

## 2022-03-18 VITALS
HEIGHT: 67 IN | SYSTOLIC BLOOD PRESSURE: 118 MMHG | WEIGHT: 154 LBS | TEMPERATURE: 98.5 F | HEART RATE: 79 BPM | BODY MASS INDEX: 24.17 KG/M2 | DIASTOLIC BLOOD PRESSURE: 63 MMHG

## 2022-03-18 DIAGNOSIS — D47.2 BICLONAL GAMMOPATHY: ICD-10-CM

## 2022-03-18 DIAGNOSIS — E83.52 HYPERCALCEMIA: Primary | ICD-10-CM

## 2022-03-18 PROCEDURE — 99205 OFFICE O/P NEW HI 60 MIN: CPT | Performed by: INTERNAL MEDICINE

## 2022-03-18 NOTE — LETTER
March 18, 2022     Thedore Bamberger, [address] [address]    Patient: Veronica Tong   YOB: 1935   Date of Visit: 3/18/2022       Dear Dr Brandie Mackenzie:    Thank you for referring Sherita Mcleod to me for evaluation  Below are my notes for this consultation  If you have questions, please do not hesitate to call me  I look forward to following your patient along with you  Sincerely,        Julio Cordova DO        CC: DO Priyanka Martin DO Liddie Golds Rayleen Halter, DO  3/18/2022 12:16 PM  Sign when Signing Visit    Veronica Tong  1935  HEM ONC [id number]  [address] [facility]  [place] [address] [facility] [address]  [phone]    No chief complaint on file  Oncology History    No history exists  History of Present Illness:  February 2022 patient was hospitalized for shortness of breath  This was felt to be due to exacerbation of underlying CHF and CKD  During his hospitalization, WBC 8 0, hemoglobin 16 8, platelet count 685, normal differential, MCV 86  CMP showed creatinine 1 7, calcium 11 2  Total protein 8 7, albumin 3 5  Sodium 131  UPEP showed no monoclonal protein  SPEP showed M peak 10 26, M peak 20 33, M peak 30 2 grams/deciliter  Immunofixation showed IgG lambda, IgM lambda, IgG kappa as peaks 1, 2, 3  Serum free light chains kappa 84 0, lambda 88 7, ratio 0 95  CT of the chest abdomen pelvis over the past few months showed no osseous lesions  Review of Systems   Constitutional: Negative for chills and fever  HENT: Negative for nosebleeds  Eyes: Negative for discharge  Respiratory: Negative for cough and shortness of breath  Cardiovascular: Negative for chest pain  Gastrointestinal: Negative for abdominal pain, constipation and diarrhea  Endocrine: Negative for polydipsia  Genitourinary: Negative for hematuria  Musculoskeletal: Negative for arthralgias     Skin: Negative for color change  Allergic/Immunologic: Negative for immunocompromised state  Neurological: Negative for dizziness and headaches  Hematological: Negative for adenopathy  Psychiatric/Behavioral: Negative for agitation         Patient Active Problem List   Diagnosis    Hypertensive crisis    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 infarction)    Mixed hyperlipidemia    Type 2 diabetes mellitus with hyperglycemia, without long-term current use of insulin ([facility] )    Coronary artery disease of native artery of native heart with stable angina pectoris ([facility] )    Gastroesophageal reflux disease without esophagitis    Obese    Presence of aortocoronary bypass graft    Occlusion and stenosis of bilateral carotid arteries    Dyslipidemia    Stage 3b chronic kidney disease ([facility] )    Nephrolithiasis    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 ([facility])    Chronic tubulointerstitial nephritis    Edema    Proteinuria    Atherosclerosis    Secondary hyperparathyroidism of renal origin ([facility] )    Vitamin D deficiency    Anginal syndrome ([place] [address] )    Carotid artery stenosis    Elevated d-dimer    Hypertensive left ventricular hypertrophy, without heart failure    Sensation of pressure in bladder area    Acute on chronic diastolic congestive heart failure (HCC)    Closed fracture of multiple ribs of right side with routine healing    Essential hypertension    Acute respiratory failure with hypoxia ([facility])    Aortic calcification ([facility])    Hypercalcemia    Biclonal gammopathy    Microalbuminuria    Abnormal EKG    Eosinophilia    Nocturnal hypoxia    Pulmonary nodule    Lymphadenopathy    Pulmonic valve insufficiency    Acute exacerbation of CHF (congestive heart failure) ([facility])    Acute kidney injury superimposed on chronic kidney disease ([facility])    Chronic respiratory failure with hypoxia ([facility])    ILD (interstitial lung disease) ([facility])    Pulmonary hypertension ([facility] )     Past Medical History: Diagnosis Date    Arthritis     Athscl 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     Stent OM1  Patent mammary graft to LAD 10/7/2009; CABG 1992 & 2005    Cancer [facility])     skin    Coronary artery disease     Diabetes mellitus ([facility]     Diabetes mellitus, type II ([facility] )     without complication    Epistaxis     Essential (primary) hypertension     GERD (gastroesophageal reflux disease)     Hx of cardiovascular stress test 07/23/2014    Eddie MPI; EF0 52 (52%) Lexiscan, mild LV systolic dysfunction; evidence for prior inferior wall MI; perfusion imaging consistant w mild lat wall ischemia and min torin-infart inferior ischemia   / EF0 51 (51%) evidence for prior inferior wall MI  Mild inferior and mild-moderate lateral wall ischemia  7/29/15    Hx of echocardiogram 11/07/2017    2D w/CFD;EF0 55 (55%) mild LVH, mitral valve prolapse with moderate regurgitation  left atrial enlargement  Mild tricuspid regurgitation   Hypertension     Mixed hyperlipidemia     Occlusion and stenosis of bilateral carotid arteries     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 infarction)     Presence of aortocoronary bypass graft      Past Surgical History:   Procedure Laterality Date    CARDIAC CATHETERIZATION  10/07/2009    Stent 99% stenosis SVG from the aorta to OM1  Patent mammary graft to the LAD    CORONARY ARTERY BYPASS GRAFT  1992    VG-CX, VG-RCA    CORONARY ARTERY BYPASS GRAFT  11/04/2005    LIMA-D1-LAD, VG-PDA-PLB, Qeeihn-QR1-YQ1 TMR 9 Lesions    WI EGD TRANSORAL BIOPSY SINGLE/MULTIPLE N/A 1/23/2019    Procedure: ESOPHAGOGASTRODUODENOSCOPY (EGD) with biopsy;  Surgeon: Fidel Smith MD;  Location: [address] [place] [address] GI LAB; Service: Gastroenterology    TONSILLECTOMY       Family History   Problem Relation Age of Onset    Heart disease Brother     No Known Problems Mother     Tuberculosis Father      Social History     Socioeconomic History    Marital status:       Spouse name: Not on file    Number of children: Not on file    Years of education: Not on file    Highest education level: Not on file   Occupational History    Not on file   Tobacco Use    Smoking status: Former Smoker     Packs/day: 1 00     Years: 40 00     Pack years: 40 00     Quit date: 1977     Years since quittin 1    Smokeless tobacco: Never Used   Vaping Use    Vaping Use: Never used   Substance and Sexual Activity    Alcohol use: Never    Drug use: No    Sexual activity: Not on file   Other Topics Concern    Not on file   Social History Narrative    Not on file     Social Determinants of Health     Financial Resource Strain: Not on file   Food Insecurity: No Food Insecurity    Worried About Running Out of Food in the Last Year: Never true    Mello of Food in the Last Year: Never true   Transportation Needs: No Transportation Needs    Lack of Transportation (Medical): No    Lack of Transportation (Non-Medical):  No   Physical Activity: Not on file   Stress: Not on file   Social Connections: Not on file   Intimate Partner Violence: Not on file   Housing Stability: Low Risk     Unable to Pay for Housing in the Last Year: No    Number of Places Lived in the Last Year: 1    Unstable Housing in the Last Year: No       Current Outpatient Medications:     atorvastatin (LIPITOR) 40 mg tablet, Take 1 tablet (40 mg total) by mouth daily with dinner This is an increased dose to better help prevent heart attack and stroke , Disp: 30 tablet, Rfl: 0    cholecalciferol (VITAMIN D3) 1,000 units tablet, Take 1 tablet (1,000 Units total) by mouth daily, Disp: 30 tablet, Rfl: 0    clopidogrel (PLAVIX) 75 mg tablet, Take 1 tablet (75 mg total) by mouth daily Hold until seen by ENT on 19 , Disp: , Rfl:     escitalopram (LEXAPRO) 10 mg tablet, Take 10 mg by mouth daily, Disp: , Rfl:     furosemide (LASIX) 20 mg tablet, Take 1 tablet (20 mg total) by mouth 2 (two) times a day, Disp: 60 tablet, Rfl: 0    isosorbide mononitrate (IMDUR) 60 mg 24 hr tablet, Take 1 tablet (60 mg total) by mouth daily In the morning, Disp: , Rfl: 0    linaGLIPtin (Tradjenta) 5 MG TABS, Take 5 mg by mouth daily Take this for your diabetes instead of glimepiride, Disp: 30 tablet, Rfl: 0    pantoprazole (PROTONIX) 40 mg tablet, Take 40 mg by mouth daily, Disp: , Rfl:     propranolol (INDERAL LA) 120 mg 24 hr capsule, Take 120 mg by mouth daily, Disp: , Rfl:     sucralfate (CARAFATE) 1 g tablet, Take 1 g by mouth 2 (two) times a day, Disp: , Rfl:     tamsulosin (FLOMAX) 0 4 mg, Take 2 capsules (0 8 mg total) by mouth daily with dinner, Disp: 180 capsule, Rfl: 3  Allergies   Allergen Reactions    Ibuprofen Fatigue     Other reaction(s): decreased kidney function     Vitals:    03/18/22 1126   BP: 118/63   Pulse: 79   Temp: 98 5 °F (36 9 °C)       Physical Exam  Constitutional:       Appearance: He is well-developed  HENT:      Head: Normocephalic and atraumatic  Eyes:      Pupils: Pupils are equal, round, and reactive to light  Cardiovascular:      Rate and Rhythm: Normal rate and regular rhythm  Heart sounds: No murmur heard  Pulmonary:      Breath sounds: Normal breath sounds  No wheezing or rales  Abdominal:      Palpations: Abdomen is soft  Tenderness: There is no abdominal tenderness  Musculoskeletal:         General: No tenderness  Normal range of motion  Cervical back: Neck supple  Lymphadenopathy:      Cervical: No cervical adenopathy  Skin:     Findings: No erythema or rash  Neurological:      Mental Status: He is alert and oriented to person, place, and time  Cranial Nerves: No cranial nerve deficit  Deep Tendon Reflexes: Reflexes are normal and symmetric  Psychiatric:         Behavior: Behavior normal            Labs:  CBC, Coags, BMP, Mg, Phos    Imaging  XR chest portable    Result Date: 2/21/2022  Narrative: CHEST INDICATION:   hypoxia, shortness of breath   COMPARISON:  2/18/2022 EXAM PERFORMED/VIEWS:  XR CHEST PORTABLE FINDINGS:  Median sternotomy wires are present  Stable cardiomegaly is present  Mild pulmonary vascular congestion is present  No pneumothorax or pleural effusion  Osseous structures appear within normal limits for patient age  Impression: Mild pulmonary vascular congestion  Workstation performed: [id number]     XR chest 1 view portable    Result Date: 2/18/2022  Narrative: CHEST INDICATION:   sob  COMPARISON:  Chest radiograph and abdomen CT from 1/30/2022, chest CT from 3/4/2021  EXAM PERFORMED/VIEWS:  XR CHEST PORTABLE FINDINGS: Mild cardiomegaly, CABG  Mild pulmonary venous congestion  Chronic pleural thickening in the left costophrenic sulcus with no effusion or pneumothorax  Opacity in medial right base due to a benign pericardial fat pad  Osseous structures appear within normal limits for patient age  Impression: Mild pulmonary venous congestion  No effusion or pneumothorax  Workstation performed: [id number]     CT chest without contrast    Result Date: 2/18/2022  Narrative: CT CHEST WITHOUT IV CONTRAST INDICATION:   SOB, hypoxia  COMPARISON:  3/4/2021  TECHNIQUE: CT examination of the chest was performed without intravenous contrast   Axial, sagittal, and coronal 2D reformatted images were created from the source data and submitted for interpretation  Radiation dose length product (DLP) for this visit:  432 31 mGy-cm   This examination, like all CT scans performed in the [facility], was performed utilizing techniques to minimize radiation dose exposure, including the use of iterative  reconstruction and automated exposure control  FINDINGS: LUNGS:  Redemonstrated are mild chronic interstitial changes in the periphery of the upper and lower lobes greater in the lower lobes  There is small patchy nodular airspace consolidation seen in the left lower lobe laterally that is stable  Given its interval stability this may also reflect chronic changes/ atelectasis    No new areas of focal airspace consolidation seen  Stable tiny 3 mm nodule in the right upper lobe on series 4, image 29  Stable punctate calcified granuloma abutting the pleura in the right upper lobe on series 4, image 30  Stable 5 mm calcified granuloma left upper lobe on series 4, image 63  Stable 3 mm pulmonary nodule left upper lobe on series 4, image 74  No obvious endobronchial lesion  PLEURA:  Stable minimal pleural thickening laterally at the left lung base  No pleural effusions or pneumothorax  HEART/GREAT VESSELS: Mild cardiomegaly  No thoracic aortic aneurysm  Extensive coronary artery calcifications  Aortic calcifications  Status post CABG  No pericardial effusion  MEDIASTINUM AND RENARD:  There are stable appearing mildly prominent mediastinal lymph nodes with the largest in the precarinal region measuring 1 1 cm in short axis dimension  No significant hilar or axillary lymphadenopathy  CHEST WALL AND LOWER NECK:   Unremarkable  VISUALIZED STRUCTURES IN THE UPPER ABDOMEN:  Status post cholecystectomy  OSSEOUS STRUCTURES:  Median sternotomy wires  Degenerative changes in the thoracic spine  Impression: Persistent chronic interstitial changes in the periphery of the upper and lower lobes greater in the lower lobes  Stable pulmonary nodules and calcified granulomas as above  Probable chronic atelectasis left lower lobe  No new areas of airspace consolidation or effusions  Cardiomegaly  Coronary artery and aortic calcifications  Status post CABG and median sternotomy  Mild mediastinal lymphadenopathy, unchanged since prior study  No hilar or axillary lymphadenopathy   Workstation performed: [id number]     NM lung perfusion imaging    Result Date: 2/21/2022  Narrative: LUNG PERFUSION SCAN INDICATION: hypoxia, elevated D-dimer, for V/Q lung scan due to BECKI/renal insufficiency COMPARISON:  Chest radiograph  2/21/2022, lung scan 3/26/2021 TECHNIQUE:  Multiplanar perfusion imaging was performed following the intravenous administration of 4 3 mCi Tc-99m labeled MAA  No ventilation imaging was performed as per current Covid-19 protocol  FINDINGS:  Perfusion imaging demonstrates a mildly heterogeneous distribution of the radiopharmaceutical in the left lung  Left lung again noted to be smaller than the right  There is no new significant  segmental or subsegmental defect  Impression: The probability for pulmonary embolus is low  Workstation performed: [id number]     VAS lower limb venous duplex study, unilateral/limited    Result Date: 2/19/2022  Narrative:  [facility] REPORT CLINICAL: Indications: Patient presents with right lower extremity weakness and edema x 4 days  Suspected Covid  Operative History: CABG Risk Factors The patient has history of Obesity, HTN, Diabetes (IDDM), Hyperlipidemia, CKD, CAD and previous smoking (quit 1-5yrs ago)  CONCLUSION:  Impression: RIGHT LOWER LIMB: Limited No gross evidence of acute deep vein thrombosis in the CFV, FV, Popliteal, Gastrocnemius, Posterior Tibial and Peroneal Veins  LEFT LOWER LIMB: Limited No gross evidence of acute  deep vein thrombosis in the CFV  Limited protocol performed  Technical findings were given to [employer] PA-C  SIGNATURE: Electronically Signed by: Carson Vasquez on 2022-02-19 12:28:12 PM    Echo complete w/ contrast if indicated    Result Date: 2/21/2022  Narrative: Alejandra Zamudio  Left Ventricle: Left ventricular cavity size is normal  Wall thickness is moderately increased  The left ventricular ejection fraction is 65%  Systolic function is normal  Wall motion is normal  Diastolic function is mildly abnormal, consistent with grade I (abnormal) relaxation  There is moderate concentric hypertrophy    Right Ventricle: Right ventricular cavity size is normal  Systolic function is normal    Left Atrium: The atrium is mildly dilated    Mitral Valve: There is mild regurgitation    Pulmonic Valve:  There is moderate regurgitation  I reviewed the above laboratory and imaging data  Discussion/Summary:  In summary, this is an 68-year-old 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  Each of these is low volume  Free light chain ratio is normal   Quantitative immunoglobulins are requested  Visualized bones on CT chest abdomen pelvis showed no abnormality  Chest CT showed borderline 1 1 cm mediastinal node which has been stable  Skeletal survey is requested  We reviewed some of the distinctions between MGUS and myeloma  I think it is unlikely that he has myeloma as renal dysfunction has been stable and fluctuating dating back at least 3 5 years  He does have hypercalcemia which has been fluctuating over greater than 2 years  PTH is requested  We reviewed that if all is favorable observation would be appropriate for MGUS  We reviewed the possibility of malignant transformation in a small percentage of patients over time  The patient and his daughter voiced understanding above discussion

## 2022-03-18 NOTE — PROGRESS NOTES
Mari Leonila  1935  [facility] [place]  [address] [facility]  [place] [address]  [phone]    No chief complaint on file  Oncology History    No history exists  History of Present Illness:  February 2022 patient was hospitalized for shortness of breath  This was felt to be due to exacerbation of underlying CHF and CKD  During his hospitalization, WBC 8 0, hemoglobin 16 8, platelet count 055, normal differential, MCV 86  CMP showed creatinine 1 7, calcium 11 2  Total protein 8 7, albumin 3 5  Sodium 131  UPEP showed no monoclonal protein  SPEP showed M peak 10 26, M peak 20 33, M peak 30 2 grams/deciliter  Immunofixation showed IgG lambda, IgM lambda, IgG kappa as peaks 1, 2, 3  Serum free light chains kappa 84 0, lambda 88 7, ratio 0 95  CT of the chest abdomen pelvis over the past few months showed no osseous lesions  Review of Systems   Constitutional: Negative for chills and fever  HENT: Negative for nosebleeds  Eyes: Negative for discharge  Respiratory: Negative for cough and shortness of breath  Cardiovascular: Negative for chest pain  Gastrointestinal: Negative for abdominal pain, constipation and diarrhea  Endocrine: Negative for polydipsia  Genitourinary: Negative for hematuria  Musculoskeletal: Negative for arthralgias  Skin: Negative for color change  Allergic/Immunologic: Negative for immunocompromised state  Neurological: Negative for dizziness and headaches  Hematological: Negative for adenopathy  Psychiatric/Behavioral: Negative for agitation         Patient Active Problem List   Diagnosis    Hypertensive crisis    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 infarction)    Mixed hyperlipidemia    Type 2 diabetes mellitus with hyperglycemia, without long-term current use of insulin ([facility] [address] )    Coronary artery disease of native artery of native heart with stable angina pectoris (HCC)    Gastroesophageal reflux disease without esophagitis    Obese    Presence of aortocoronary bypass graft    Occlusion and stenosis of bilateral carotid arteries    Dyslipidemia    Stage 3b chronic kidney disease ([facility] [address] )    Nephrolithiasis    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 (HCC)    Chronic tubulointerstitial nephritis    Edema    Proteinuria    Atherosclerosis    Secondary hyperparathyroidism of renal origin ([facility] [address] )    Vitamin D deficiency    Anginal syndrome ([facility] [address] )    Carotid artery stenosis    Elevated d-dimer    Hypertensive left ventricular hypertrophy, without heart failure    Sensation of pressure in bladder area    Acute on chronic diastolic congestive heart failure (HCC)    Closed fracture of multiple ribs of right side with routine healing    Essential hypertension    Acute respiratory failure with hypoxia (HCC)    Aortic calcification (HCC)    Hypercalcemia    Biclonal gammopathy    Microalbuminuria    Abnormal EKG    Eosinophilia    Nocturnal hypoxia    Pulmonary nodule    Lymphadenopathy    Pulmonic valve insufficiency    Acute exacerbation of CHF (congestive heart failure) (HCC)    Acute kidney injury superimposed on chronic kidney disease (HCC)    Chronic respiratory failure with hypoxia (HCC)    ILD (interstitial lung disease) (HCC)    Pulmonary hypertension (HCC)     Past Medical History:   Diagnosis Date    Arthritis     Athscl 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     Stent OM1   Patent mammary graft to LAD 10/7/2009; CABG 1992 & 2005    Cancer [facility])     skin    Coronary artery disease     Diabetes mellitus ([facility] [address] )     Diabetes mellitus, type II ([facility] [address] )     without complication    Epistaxis     Essential (primary) hypertension     GERD (gastroesophageal reflux disease)     Hx of cardiovascular stress test 07/23/2014    Eddie MPI; EF0 52 (52%) Lexiscan, mild LV systolic dysfunction; evidence for prior inferior wall MI; perfusion imaging consistant w mild lat wall ischemia and min torin-infart inferior ischemia   / EF0 51 (51%) evidence for prior inferior wall MI  Mild inferior and mild-moderate lateral wall ischemia  7/29/15    Hx of echocardiogram 2017    2D w/CFD;EF0 55 (55%) mild LVH, mitral valve prolapse with moderate regurgitation  left atrial enlargement  Mild tricuspid regurgitation   Hypertension     Mixed hyperlipidemia     Occlusion and stenosis of bilateral carotid arteries     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 infarction)     Presence of aortocoronary bypass graft      Past Surgical History:   Procedure Laterality Date    CARDIAC CATHETERIZATION  10/07/2009    Stent 99% stenosis SVG from the aorta to OM1  Patent mammary graft to the LAD    CORONARY ARTERY BYPASS GRAFT      VG-CX, VG-RCA    CORONARY ARTERY BYPASS GRAFT  2005    LIMA-D1-LAD, VG-PDA-PLB, [id number] TMR 9 Lesions    OH EGD TRANSORAL BIOPSY SINGLE/MULTIPLE N/A 2019    Procedure: ESOPHAGOGASTRODUODENOSCOPY (EGD) with biopsy;  Surgeon: Clint Carrasco MD;  Location: [address] [place] [address] GI LAB; Service: Gastroenterology    TONSILLECTOMY       Family History   Problem Relation Age of Onset    Heart disease Brother     No Known Problems Mother     Tuberculosis Father      Social History     Socioeconomic History    Marital status:       Spouse name: Not on file    Number of children: Not on file    Years of education: Not on file    Highest education level: Not on file   Occupational History    Not on file   Tobacco Use    Smoking status: Former Smoker     Packs/day: 1 00     Years: 40 00     Pack years: 40 00     Quit date: 1977     Years since quittin 1    Smokeless tobacco: Never Used   Vaping Use    Vaping Use: Never used   Substance and Sexual Activity    Alcohol use: Never    Drug use: No    Sexual activity: Not on file   Other Topics Concern    Not on file   Social History Narrative    Not on file     Social Determinants of Health Financial Resource Strain: Not on file   Food Insecurity: No Food Insecurity    Worried About Running Out of Food in the Last Year: Never true    Mello of Food in the Last Year: Never true   Transportation Needs: No Transportation Needs    Lack of Transportation (Medical): No    Lack of Transportation (Non-Medical):  No   Physical Activity: Not on file   Stress: Not on file   Social Connections: Not on file   Intimate Partner Violence: Not on file   Housing Stability: Low Risk     Unable to Pay for Housing in the Last Year: No    Number of Places Lived in the Last Year: 1    Unstable Housing in the Last Year: No       Current Outpatient Medications:     atorvastatin (LIPITOR) 40 mg tablet, Take 1 tablet (40 mg total) by mouth daily with dinner This is an increased dose to better help prevent heart attack and stroke , Disp: 30 tablet, Rfl: 0    cholecalciferol (VITAMIN D3) 1,000 units tablet, Take 1 tablet (1,000 Units total) by mouth daily, Disp: 30 tablet, Rfl: 0    clopidogrel (PLAVIX) 75 mg tablet, Take 1 tablet (75 mg total) by mouth daily Hold until seen by ENT on 9/23/19 , Disp: , Rfl:     escitalopram (LEXAPRO) 10 mg tablet, Take 10 mg by mouth daily, Disp: , Rfl:     furosemide (LASIX) 20 mg tablet, Take 1 tablet (20 mg total) by mouth 2 (two) times a day, Disp: 60 tablet, Rfl: 0    isosorbide mononitrate (IMDUR) 60 mg 24 hr tablet, Take 1 tablet (60 mg total) by mouth daily In the morning, Disp: , Rfl: 0    linaGLIPtin (Tradjenta) 5 MG TABS, Take 5 mg by mouth daily Take this for your diabetes instead of glimepiride, Disp: 30 tablet, Rfl: 0    pantoprazole (PROTONIX) 40 mg tablet, Take 40 mg by mouth daily, Disp: , Rfl:     propranolol (INDERAL LA) 120 mg 24 hr capsule, Take 120 mg by mouth daily, Disp: , Rfl:     sucralfate (CARAFATE) 1 g tablet, Take 1 g by mouth 2 (two) times a day, Disp: , Rfl:     tamsulosin (FLOMAX) 0 4 mg, Take 2 capsules (0 8 mg total) by mouth daily with dinner, Disp: 180 capsule, Rfl: 3  Allergies   Allergen Reactions    Ibuprofen Fatigue     Other reaction(s): decreased kidney function     Vitals:    03/18/22 1126   BP: 118/63   Pulse: 79   Temp: 98 5 °F (36 9 °C)       Physical Exam  Constitutional:       Appearance: He is well-developed  HENT:      Head: Normocephalic and atraumatic  Eyes:      Pupils: Pupils are equal, round, and reactive to light  Cardiovascular:      Rate and Rhythm: Normal rate and regular rhythm  Heart sounds: No murmur heard  Pulmonary:      Breath sounds: Normal breath sounds  No wheezing or rales  Abdominal:      Palpations: Abdomen is soft  Tenderness: There is no abdominal tenderness  Musculoskeletal:         General: No tenderness  Normal range of motion  Cervical back: Neck supple  Lymphadenopathy:      Cervical: No cervical adenopathy  Skin:     Findings: No erythema or rash  Neurological:      Mental Status: He is alert and oriented to person, place, and time  Cranial Nerves: No cranial nerve deficit  Deep Tendon Reflexes: Reflexes are normal and symmetric  Psychiatric:         Behavior: Behavior normal            Labs:  CBC, Coags, BMP, Mg, Phos    Imaging  XR chest portable    Result Date: 2/21/2022  Narrative: CHEST INDICATION:   hypoxia, shortness of breath  COMPARISON:  2/18/2022 EXAM PERFORMED/VIEWS:  XR CHEST PORTABLE FINDINGS:  Median sternotomy wires are present  Stable cardiomegaly is present  Mild pulmonary vascular congestion is present  No pneumothorax or pleural effusion  Osseous structures appear within normal limits for patient age  Impression: Mild pulmonary vascular congestion  Workstation performed: [id number]     XR chest 1 view portable    Result Date: 2/18/2022  Narrative: CHEST INDICATION:   sob  COMPARISON:  Chest radiograph and abdomen CT from 1/30/2022, chest CT from 3/4/2021  EXAM PERFORMED/VIEWS:  XR CHEST PORTABLE FINDINGS: Mild cardiomegaly, CABG  Mild pulmonary venous congestion  Chronic pleural thickening in the left costophrenic sulcus with no effusion or pneumothorax  Opacity in medial right base due to a benign pericardial fat pad  Osseous structures appear within normal limits for patient age  Impression: Mild pulmonary venous congestion  No effusion or pneumothorax  Workstation performed: [id number]     CT chest without contrast    Result Date: 2/18/2022  Narrative: CT CHEST WITHOUT IV CONTRAST INDICATION:   SOB, hypoxia  COMPARISON:  3/4/2021  TECHNIQUE: CT examination of the chest was performed without intravenous contrast   Axial, sagittal, and coronal 2D reformatted images were created from the source data and submitted for interpretation  Radiation dose length product (DLP) for this visit:  432 31 mGy-cm   This examination, like all CT scans performed in the [facility], was performed utilizing techniques to minimize radiation dose exposure, including the use of iterative  reconstruction and automated exposure control  FINDINGS: LUNGS:  Redemonstrated are mild chronic interstitial changes in the periphery of the upper and lower lobes greater in the lower lobes  There is small patchy nodular airspace consolidation seen in the left lower lobe laterally that is stable  Given its interval stability this may also reflect chronic changes/ atelectasis  No new areas of focal airspace consolidation seen  Stable tiny 3 mm nodule in the right upper lobe on series 4, image 29  Stable punctate calcified granuloma abutting the pleura in the right upper lobe on series 4, image 30  Stable 5 mm calcified granuloma left upper lobe on series 4, image 63  Stable 3 mm pulmonary nodule left upper lobe on series 4, image 74  No obvious endobronchial lesion  PLEURA:  Stable minimal pleural thickening laterally at the left lung base  No pleural effusions or pneumothorax  HEART/GREAT VESSELS: Mild cardiomegaly  No thoracic aortic aneurysm  Extensive coronary artery calcifications  Aortic calcifications  Status post CABG  No pericardial effusion  MEDIASTINUM AND RENARD:  There are stable appearing mildly prominent mediastinal lymph nodes with the largest in the precarinal region measuring 1 1 cm in short axis dimension  No significant hilar or axillary lymphadenopathy  CHEST WALL AND LOWER NECK:   Unremarkable  VISUALIZED STRUCTURES IN THE UPPER ABDOMEN:  Status post cholecystectomy  OSSEOUS STRUCTURES:  Median sternotomy wires  Degenerative changes in the thoracic spine  Impression: Persistent chronic interstitial changes in the periphery of the upper and lower lobes greater in the lower lobes  Stable pulmonary nodules and calcified granulomas as above  Probable chronic atelectasis left lower lobe  No new areas of airspace consolidation or effusions  Cardiomegaly  Coronary artery and aortic calcifications  Status post CABG and median sternotomy  Mild mediastinal lymphadenopathy, unchanged since prior study  No hilar or axillary lymphadenopathy  Workstation performed: [id number]     NM lung perfusion imaging    Result Date: 2/21/2022  Narrative: LUNG PERFUSION SCAN INDICATION: hypoxia, elevated D-dimer, for V/Q lung scan due to BECKI/renal insufficiency COMPARISON:  Chest radiograph  2/21/2022, lung scan 3/26/2021 TECHNIQUE:  Multiplanar perfusion imaging was performed following the intravenous administration of 4 3 mCi Tc-99m labeled MAA  No ventilation imaging was performed as per current Covid-19 protocol  FINDINGS:  Perfusion imaging demonstrates a mildly heterogeneous distribution of the radiopharmaceutical in the left lung  Left lung again noted to be smaller than the right  There is no new significant  segmental or subsegmental defect  Impression: The probability for pulmonary embolus is low   Workstation performed: [id number]     VAS lower limb venous duplex study, unilateral/limited    Result Date: 2/19/2022  Narrative: [facility] REPORT CLINICAL: Indications: Patient presents with right lower extremity weakness and edema x 4 days  Suspected Covid  Operative History: CABG Risk Factors The patient has history of Obesity, HTN, Diabetes (IDDM), Hyperlipidemia, CKD, CAD and previous smoking (quit 1-5yrs ago)  CONCLUSION:  Impression: RIGHT LOWER LIMB: Limited No gross evidence of acute deep vein thrombosis in the CFV, FV, Popliteal, Gastrocnemius, Posterior Tibial and Peroneal Veins  LEFT LOWER LIMB: Limited No gross evidence of acute  deep vein thrombosis in the CFV  Limited protocol performed  Technical findings were given to Angelique Cerda PA-C  SIGNATURE: Electronically Signed by: Kristine Peter on 2022-02-19 12:28:12 PM    Echo complete w/ contrast if indicated    Result Date: 2/21/2022  Narrative: [place]  Left Ventricle: Left ventricular cavity size is normal  Wall thickness is moderately increased  The left ventricular ejection fraction is 65%  Systolic function is normal  Wall motion is normal  Diastolic function is mildly abnormal, consistent with grade I (abnormal) relaxation  There is moderate concentric hypertrophy    Right Ventricle: Right ventricular cavity size is normal  Systolic function is normal    Left Atrium: The atrium is mildly dilated    Mitral Valve: There is mild regurgitation    Pulmonic Valve: There is moderate regurgitation  I reviewed the above laboratory and imaging data  Discussion/Summary:  In summary, this is an 73-year-old 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  Each of these is low volume  Free light chain ratio is normal   Quantitative immunoglobulins are requested  Visualized bones on CT chest abdomen pelvis showed no abnormality  Chest CT showed borderline 1 1 cm mediastinal node which has been stable  Skeletal survey is requested  We reviewed some of the distinctions between MGUS and myeloma    I think it is unlikely that he has myeloma as renal dysfunction has been stable and fluctuating dating back at least 3 5 years  He does have hypercalcemia which has been fluctuating over greater than 2 years  PTH is requested  We reviewed that if all is favorable observation would be appropriate for MGUS  We reviewed the possibility of malignant transformation in a small percentage of patients over time  The patient and his daughter voiced understanding above discussion

## 2022-03-18 NOTE — PROGRESS NOTES
Chart reviewed  Patient was seen by cardiology in the office and per note is doing well  Patient reported he has been asymptomatic since discharge  Patient had a skilled nurse visit on 3/15 and physical therapy 3/16 with [facility]  Notes reviewed in homecare prod

## 2022-03-18 NOTE — LETTER
March 18, 2022     Celestina Smart, [address] [address]    Patient: Mansi Higgins   YOB: 1935   Date of Visit: 3/18/2022       Dear Dr Gisela Loredo:    Thank you for referring Dex Nixon to me for evaluation  Below are my notes for this consultation  If you have questions, please do not hesitate to call me  I look forward to following your patient along with you  Sincerely,        Carole Alston DO        CC: Nancy Antunez, DO Martin Dixon, DO Carole Alston DO  3/18/2022 12:16 PM  Incomplete    Mansi Higgins  1935  [facility]  [id number] [place] [address]  [place] [address]  [phone]    No chief complaint on file  Oncology History    No history exists  History of Present Illness:  February 2022 patient was hospitalized for shortness of breath  This was felt to be due to exacerbation of underlying CHF and CKD  During his hospitalization, WBC 8 0, hemoglobin 16 8, platelet count 238, normal differential, MCV 86  CMP showed creatinine 1 7, calcium 11 2  Total protein 8 7, albumin 3 5  Sodium 131  UPEP showed no monoclonal protein  SPEP showed M peak 10 26, M peak 20 33, M peak 30 2 grams/deciliter  Immunofixation showed IgG lambda, IgM lambda, IgG kappa as peaks 1, 2, 3  Serum free light chains kappa 84 0, lambda 88 7, ratio 0 95  CT of the chest abdomen pelvis over the past few months showed no osseous lesions  Review of Systems   Constitutional: Negative for chills and fever  HENT: Negative for nosebleeds  Eyes: Negative for discharge  Respiratory: Negative for cough and shortness of breath  Cardiovascular: Negative for chest pain  Gastrointestinal: Negative for abdominal pain, constipation and diarrhea  Endocrine: Negative for polydipsia  Genitourinary: Negative for hematuria  Musculoskeletal: Negative for arthralgias     Skin: Negative for color change  Allergic/Immunologic: Negative for immunocompromised state  Neurological: Negative for dizziness and headaches  Hematological: Negative for adenopathy  Psychiatric/Behavioral: Negative for agitation         Patient Active Problem List   Diagnosis    Hypertensive crisis    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 infarction)    Mixed hyperlipidemia    Type 2 diabetes mellitus with hyperglycemia, without long-term current use of insulin [facility]    Coronary artery disease of native artery of native heart with stable angina pectoris ([facility]    Gastroesophageal reflux disease without esophagitis    Obese    Presence of aortocoronary bypass graft    Occlusion and stenosis of bilateral carotid arteries    Dyslipidemia    Stage 3b chronic kidney disease ([facility]    Nephrolithiasis    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 (HCC)    Chronic tubulointerstitial nephritis    Edema    Proteinuria    Atherosclerosis    Secondary hyperparathyroidism of renal origin ([facility]    Vitamin D deficiency    Anginal syndrome ([facility]    Carotid artery stenosis    Elevated d-dimer    Hypertensive left ventricular hypertrophy, without heart failure    Sensation of pressure in bladder area    Acute on chronic diastolic congestive heart failure (HCC)    Closed fracture of multiple ribs of right side with routine healing    Essential hypertension    Acute respiratory failure with hypoxia (HCC)    Aortic calcification (HCC)    Hypercalcemia    Biclonal gammopathy    Microalbuminuria    Abnormal EKG    Eosinophilia    Nocturnal hypoxia    Pulmonary nodule    Lymphadenopathy    Pulmonic valve insufficiency    Acute exacerbation of CHF (congestive heart failure) (HCC)    Acute kidney injury superimposed on chronic kidney disease (HCC)    Chronic respiratory failure with hypoxia (HCC)    IL[facility]interstitial lung disease) [facility]    Pulmonary hypertension (HCC)     Past Medical History:   Diagnosis Date  Arthritis     Athscl 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     Stent OM1  Patent mammary graft to LAD 10/7/2009; CABG 1992 & 2005    Cancer [facility])     skin    Coronary artery disease     Diabetes mellitus [facility]     Diabetes mellitus, type II ([facility] )     without complication    Epistaxis     Essential (primary) hypertension     GERD (gastroesophageal reflux disease)     Hx of cardiovascular stress test 07/23/2014    Eddie MPI; EF0 52 (52%) Lexiscan, mild LV systolic dysfunction; evidence for prior inferior wall MI; perfusion imaging consistant w mild lat wall ischemia and min torin-infart inferior ischemia   / EF0 51 (51%) evidence for prior inferior wall MI  Mild inferior and mild-moderate lateral wall ischemia  7/29/15    Hx of echocardiogram 11/07/2017    2D w/CFD;EF0 55 (55%) mild LVH, mitral valve prolapse with moderate regurgitation  left atrial enlargement  Mild tricuspid regurgitation   Hypertension     Mixed hyperlipidemia     Occlusion and stenosis of bilateral carotid arteries     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 infarction)     Presence of aortocoronary bypass graft      Past Surgical History:   Procedure Laterality Date    CARDIAC CATHETERIZATION  10/07/2009    Stent 99% stenosis SVG from the aorta to OM1  Patent mammary graft to the LAD    CORONARY ARTERY BYPASS GRAFT  1992    VG-CX, VG-RCA    CORONARY ARTERY BYPASS GRAFT  11/04/2005    LIMA-D1-LAD, VG-PDA-PLB, [id number] TMR 9 Lesions    AR EGD TRANSORAL BIOPSY SINGLE/MULTIPLE N/A 1/23/2019    Procedure: ESOPHAGOGASTRODUODENOSCOPY (EGD) with biopsy;  Surgeon: Lolis Mendoza MD;  Location: [address] [place] [address] GI LAB; Service: Gastroenterology    TONSILLECTOMY       Family History   Problem Relation Age of Onset    Heart disease Brother     No Known Problems Mother     Tuberculosis Father      Social History     Socioeconomic History    Marital status:       Spouse name: Not on file    Number of children: Not on file    Years of education: Not on file    Highest education level: Not on file   Occupational History    Not on file   Tobacco Use    Smoking status: Former Smoker     Packs/day: 1 00     Years: 40 00     Pack years: 40 00     Quit date: 1977     Years since quittin 1    Smokeless tobacco: Never Used   Vaping Use    Vaping Use: Never used   Substance and Sexual Activity    Alcohol use: Never    Drug use: No    Sexual activity: Not on file   Other Topics Concern    Not on file   Social History Narrative    Not on file     Social Determinants of Health     Financial Resource Strain: Not on file   Food Insecurity: No Food Insecurity    Worried About Running Out of Food in the Last Year: Never true    Mello of Food in the Last Year: Never true   Transportation Needs: No Transportation Needs    Lack of Transportation (Medical): No    Lack of Transportation (Non-Medical):  No   Physical Activity: Not on file   Stress: Not on file   Social Connections: Not on file   Intimate Partner Violence: Not on file   Housing Stability: Low Risk     Unable to Pay for Housing in the Last Year: No    Number of Places Lived in the Last Year: 1    Unstable Housing in the Last Year: No       Current Outpatient Medications:     atorvastatin (LIPITOR) 40 mg tablet, Take 1 tablet (40 mg total) by mouth daily with dinner This is an increased dose to better help prevent heart attack and stroke , Disp: 30 tablet, Rfl: 0    cholecalciferol (VITAMIN D3) 1,000 units tablet, Take 1 tablet (1,000 Units total) by mouth daily, Disp: 30 tablet, Rfl: 0    clopidogrel (PLAVIX) 75 mg tablet, Take 1 tablet (75 mg total) by mouth daily Hold until seen by ENT on 19 , Disp: , Rfl:     escitalopram (LEXAPRO) 10 mg tablet, Take 10 mg by mouth daily, Disp: , Rfl:     furosemide (LASIX) 20 mg tablet, Take 1 tablet (20 mg total) by mouth 2 (two) times a day, Disp: 60 tablet, Rfl: 0    isosorbide mononitrate (IMDUR) 60 mg 24 hr tablet, Take 1 tablet (60 mg total) by mouth daily In the morning, Disp: , Rfl: 0    linaGLIPtin (Tradjenta) 5 MG TABS, Take 5 mg by mouth daily Take this for your diabetes instead of glimepiride, Disp: 30 tablet, Rfl: 0    pantoprazole (PROTONIX) 40 mg tablet, Take 40 mg by mouth daily, Disp: , Rfl:     propranolol (INDERAL LA) 120 mg 24 hr capsule, Take 120 mg by mouth daily, Disp: , Rfl:     sucralfate (CARAFATE) 1 g tablet, Take 1 g by mouth 2 (two) times a day, Disp: , Rfl:     tamsulosin (FLOMAX) 0 4 mg, Take 2 capsules (0 8 mg total) by mouth daily with dinner, Disp: 180 capsule, Rfl: 3  Allergies   Allergen Reactions    Ibuprofen Fatigue     Other reaction(s): decreased kidney function     Vitals:    03/18/22 1126   BP: 118/63   Pulse: 79   Temp: 98 5 °F (36 9 °C)       Physical Exam  Constitutional:       Appearance: He is well-developed  HENT:      Head: Normocephalic and atraumatic  Eyes:      Pupils: Pupils are equal, round, and reactive to light  Cardiovascular:      Rate and Rhythm: Normal rate and regular rhythm  Heart sounds: No murmur heard  Pulmonary:      Breath sounds: Normal breath sounds  No wheezing or rales  Abdominal:      Palpations: Abdomen is soft  Tenderness: There is no abdominal tenderness  Musculoskeletal:         General: No tenderness  Normal range of motion  Cervical back: Neck supple  Lymphadenopathy:      Cervical: No cervical adenopathy  Skin:     Findings: No erythema or rash  Neurological:      Mental Status: He is alert and oriented to person, place, and time  Cranial Nerves: No cranial nerve deficit  Deep Tendon Reflexes: Reflexes are normal and symmetric  Psychiatric:         Behavior: Behavior normal            Labs:  CBC, Coags, BMP, Mg, Phos    Imaging  XR chest portable    Result Date: 2/21/2022  Narrative: CHEST INDICATION:   hypoxia, shortness of breath   COMPARISON:  2/18/2022 EXAM PERFORMED/VIEWS:  XR CHEST PORTABLE FINDINGS: Median sternotomy wires are present  Stable cardiomegaly is present  Mild pulmonary vascular congestion is present  No pneumothorax or pleural effusion  Osseous structures appear within normal limits for patient age  Impression: Mild pulmonary vascular congestion  Workstation performed: [id number]     XR chest 1 view portable    Result Date: 2/18/2022  Narrative: CHEST INDICATION:   sob  COMPARISON:  Chest radiograph and abdomen CT from 1/30/2022, chest CT from 3/4/2021  EXAM PERFORMED/VIEWS:  XR CHEST PORTABLE FINDINGS: Mild cardiomegaly, CABG  Mild pulmonary venous congestion  Chronic pleural thickening in the left costophrenic sulcus with no effusion or pneumothorax  Opacity in medial right base due to a benign pericardial fat pad  Osseous structures appear within normal limits for patient age  Impression: Mild pulmonary venous congestion  No effusion or pneumothorax  Workstation performed: [id number]     CT chest without contrast    Result Date: 2/18/2022  Narrative: CT CHEST WITHOUT IV CONTRAST INDICATION:   SOB, hypoxia  COMPARISON:  3/4/2021  TECHNIQUE: CT examination of the chest was performed without intravenous contrast   Axial, sagittal, and coronal 2D reformatted images were created from the source data and submitted for interpretation  Radiation dose length product (DLP) for this visit:  432 31 mGy-cm   This examination, like all CT scans performed in the [facility], was performed utilizing techniques to minimize radiation dose exposure, including the use of iterative  reconstruction and automated exposure control  FINDINGS: LUNGS:  Redemonstrated are mild chronic interstitial changes in the periphery of the upper and lower lobes greater in the lower lobes  There is small patchy nodular airspace consolidation seen in the left lower lobe laterally that is stable  Given its interval stability this may also reflect chronic changes/ atelectasis    No new areas of focal airspace consolidation seen  Stable tiny 3 mm nodule in the right upper lobe on series 4, image 29  Stable punctate calcified granuloma abutting the pleura in the right upper lobe on series 4, image 30  Stable 5 mm calcified granuloma left upper lobe on series 4, image 63  Stable 3 mm pulmonary nodule left upper lobe on series 4, image 74  No obvious endobronchial lesion  PLEURA:  Stable minimal pleural thickening laterally at the left lung base  No pleural effusions or pneumothorax  HEART/GREAT VESSELS: Mild cardiomegaly  No thoracic aortic aneurysm  Extensive coronary artery calcifications  Aortic calcifications  Status post CABG  No pericardial effusion  MEDIASTINUM AND RENARD:  There are stable appearing mildly prominent mediastinal lymph nodes with the largest in the precarinal region measuring 1 1 cm in short axis dimension  No significant hilar or axillary lymphadenopathy  CHEST WALL AND LOWER NECK:   Unremarkable  VISUALIZED STRUCTURES IN THE UPPER ABDOMEN:  Status post cholecystectomy  OSSEOUS STRUCTURES:  Median sternotomy wires  Degenerative changes in the thoracic spine  Impression: Persistent chronic interstitial changes in the periphery of the upper and lower lobes greater in the lower lobes  Stable pulmonary nodules and calcified granulomas as above  Probable chronic atelectasis left lower lobe  No new areas of airspace consolidation or effusions  Cardiomegaly  Coronary artery and aortic calcifications  Status post CABG and median sternotomy  Mild mediastinal lymphadenopathy, unchanged since prior study  No hilar or axillary lymphadenopathy   Workstation performed: [id number]     NM lung perfusion imaging    Result Date: 2/21/2022  Narrative: LUNG PERFUSION SCAN INDICATION: hypoxia, elevated D-dimer, for V/Q lung scan due to BECKI/renal insufficiency COMPARISON:  Chest radiograph  2/21/2022, lung scan 3/26/2021 TECHNIQUE:  Multiplanar perfusion imaging was performed following the intravenous administration of 4 3 mCi Tc-99m labeled MAA  No ventilation imaging was performed as per current Covid-19 protocol  FINDINGS:  Perfusion imaging demonstrates a mildly heterogeneous distribution of the radiopharmaceutical in the left lung  Left lung again noted to be smaller than the right  There is no new significant  segmental or subsegmental defect  Impression: The probability for pulmonary embolus is low  Workstation performed: [id number]     VAS lower limb venous duplex study, unilateral/limited    Result Date: 2/19/2022  Narrative:  [facility] REPORT CLINICAL: Indications: Patient presents with right lower extremity weakness and edema x 4 days  Suspected Covid  Operative History: CABG Risk Factors The patient has history of Obesity, HTN, Diabetes (IDDM), Hyperlipidemia, CKD, CAD and previous smoking (quit 1-5yrs ago)  CONCLUSION:  Impression: RIGHT LOWER LIMB: Limited No gross evidence of acute deep vein thrombosis in the CFV, FV, Popliteal, Gastrocnemius, Posterior Tibial and Peroneal Veins  LEFT LOWER LIMB: Limited No gross evidence of acute  deep vein thrombosis in the CFV  Limited protocol performed  Technical findings were given to [employer] PA-MILES  SIGNATURE: Electronically Signed by: Aman Panchal on 2022-02-19 12:28:12 PM    Echo complete w/ contrast if indicated    Result Date: 2/21/2022  Narrative: Inés Theodore  Left Ventricle: Left ventricular cavity size is normal  Wall thickness is moderately increased  The left ventricular ejection fraction is 65%  Systolic function is normal  Wall motion is normal  Diastolic function is mildly abnormal, consistent with grade I (abnormal) relaxation  There is moderate concentric hypertrophy    Right Ventricle: Right ventricular cavity size is normal  Systolic function is normal    Left Atrium: The atrium is mildly dilated    Mitral Valve: There is mild regurgitation    Pulmonic Valve: There is moderate regurgitation  I reviewed the above laboratory and imaging data  Discussion/Summary:  In summary, this is an 53-year-old 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  Each of these is low volume  Free light chain ratio is normal   Quantitative immunoglobulins are requested  Visualized bones on CT chest abdomen pelvis showed no abnormality  Chest CT showed borderline 1 1 cm mediastinal node which has been stable  Skeletal survey is requested  We reviewed some of the distinctions between MGUS and myeloma  I think it is unlikely that he has myeloma as renal dysfunction has been stable and fluctuating dating back at least 3 5 years  He does have hypercalcemia which has been fluctuating over greater than 2 years  PTH is requested  We reviewed that if all is favorable observation would be appropriate for MGUS  We reviewed the possibility of malignant transformation in a small percentage of patients over time  The patient and his daughter voiced understanding above discussion  [place] DO Sheree  3/18/2022 12:15 PM  Sign when Signing Visit    Pati Jackson  1935  HEM ONC [address]  [address] [facility]  [place] [address] [facility]  [phone]    No chief complaint on file  Oncology History    No history exists  History of Present Illness:  February 2022 patient was hospitalized for shortness of breath  This was felt to be due to exacerbation of underlying CHF and CKD  During his hospitalization, WBC 8 0, hemoglobin 16 8, platelet count 720, normal differential, MCV 86  CMP showed creatinine 1 7, calcium 11 2  Total protein 8 7, albumin 3 5  Sodium 131  UPEP showed no monoclonal protein  SPEP showed M peak 10 26, M peak 20 33, M peak 30 2 grams/deciliter  Immunofixation showed IgG lambda, IgM lambda, IgG kappa as peaks 1, 2, 3  Serum free light chains kappa 84 0, lambda 88 7, ratio 0 95    CT of the chest abdomen pelvis over the past few months showed no osseous lesions  Review of Systems   Constitutional: Negative for chills and fever  HENT: Negative for nosebleeds  Eyes: Negative for discharge  Respiratory: Negative for cough and shortness of breath  Cardiovascular: Negative for chest pain  Gastrointestinal: Negative for abdominal pain, constipation and diarrhea  Endocrine: Negative for polydipsia  Genitourinary: Negative for hematuria  Musculoskeletal: Negative for arthralgias  Skin: Negative for color change  Allergic/Immunologic: Negative for immunocompromised state  Neurological: Negative for dizziness and headaches  Hematological: Negative for adenopathy  Psychiatric/Behavioral: Negative for agitation         Patient Active Problem List   Diagnosis    Hypertensive crisis    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 infarction)    Mixed hyperlipidemia    Type 2 diabetes mellitus with hyperglycemia, without long-term current use of insulin [facility]    Coronary artery disease of native artery of native heart with stable angina pectoris ([facility]    Gastroesophageal reflux disease without esophagitis    Obese    Presence of aortocoronary bypass graft    Occlusion and stenosis of bilateral carotid arteries    Dyslipidemia    Stage 3b chronic kidney disease ([facility]    Nephrolithiasis    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 (HCC)    Chronic tubulointerstitial nephritis    Edema    Proteinuria    Atherosclerosis    Secondary hyperparathyroidism of renal origin ([facility]    Vitamin D deficiency    [facility]inal syndrome [facility]    Carotid artery stenosis    Elevated d-dimer    Hypertensive left ventricular hypertrophy, without heart failure    Sensation of pressure in bladder area    Acute on chronic diastolic congestive heart failure (HCC)    Closed fracture of multiple ribs of right side with routine healing    Essential hypertension    Acute respiratory failure with hypoxia (HCC)    Aortic calcification ([facility]    Hypercalcemia    Biclonal gammopathy    Microalbuminuria    Abnormal EKG    Eosinophilia    Nocturnal hypoxia    Pulmonary nodule    Lymphadenopathy    Pulmonic valve insufficiency    Acute exacerbation of CHF (congestive heart failure) (HCC)    Acute kidney injury superimposed on chronic kidney disease (HCC)    Chronic respiratory failure with hypoxia (HCC)    IL[facility]interstitial [facility]) [facility]    Pulmonary hypertension ([facility]     Past Medical History:   Diagnosis Date    Arthritis     Athscl 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     Stent OM1  Patent mammary graft to LAD 10/7/2009; CABG 1992 & 2005    Cancer [facility])     skin    Coronary artery disease     [facility]     Diabetes mellitus, type II [facility]     without complication    Epistaxis     Essential (primary) hypertension     GERD (gastroesophageal reflux disease)     Hx of cardiovascular stress test 07/23/2014    Eddie MPI; EF0 52 (52%) Lexiscan, mild LV systolic dysfunction; evidence for prior inferior wall MI; perfusion imaging consistant w mild lat wall ischemia and min torin-infart inferior ischemia   / EF0 51 (51%) evidence for prior inferior wall MI  Mild inferior and mild-moderate lateral wall ischemia  7/29/15    Hx of echocardiogram 11/07/2017    2D w/CFD;EF0 55 (55%) mild LVH, mitral valve prolapse with moderate regurgitation  left atrial enlargement  Mild tricuspid regurgitation   Hypertension     Mixed hyperlipidemia     Occlusion and stenosis of bilateral carotid arteries     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 infarction)     Presence of aortocoronary bypass graft      Past Surgical History:   Procedure Laterality Date    CARDIAC CATHETERIZATION  10/07/2009    Stent 99% stenosis SVG from the aorta to OM1   Patent mammary graft to the LAD    CORONARY ARTERY BYPASS GRAFT  1992    VG-CX, VG-RCA    CORONARY ARTERY BYPASS GRAFT  11/04/2005    LIMA-D1-LAD, VG-PDA-PLB, [id number] TMR 9 Lesions  DC EGD TRANSORAL BIOPSY SINGLE/MULTIPLE N/A 2019    Procedure: ESOPHAGOGASTRODUODENOSCOPY (EGD) with biopsy;  Surgeon: Alonzo Downey MD;  Location: [address] [place] [address] GI LAB; Service: Gastroenterology    TONSILLECTOMY       Family History   Problem Relation Age of Onset    Heart disease Brother     No Known Problems Mother     Tuberculosis Father      Social History     Socioeconomic History    Marital status:      Spouse name: Not on file    Number of children: Not on file    Years of education: Not on file    Highest education level: Not on file   Occupational History    Not on file   Tobacco Use    Smoking status: Former Smoker     Packs/day: 1 00     Years: 40 00     Pack years: 40 00     Quit date: 1977     Years since quittin 1    Smokeless tobacco: Never Used   Vaping Use    Vaping Use: Never used   Substance and Sexual Activity    Alcohol use: Never    Drug use: No    Sexual activity: Not on file   Other Topics Concern    Not on file   Social History Narrative    Not on file     Social Determinants of Health     Financial Resource Strain: Not on file   Food Insecurity: No Food Insecurity    Worried About Running Out of Food in the Last Year: Never true    Mello of Food in the Last Year: Never true   Transportation Needs: No Transportation Needs    Lack of Transportation (Medical): No    Lack of Transportation (Non-Medical):  No   Physical Activity: Not on file   Stress: Not on file   Social Connections: Not on file   Intimate Partner Violence: Not on file   Housing Stability: Low Risk     Unable to Pay for Housing in the Last Year: No    Number of Places Lived in the Last Year: 1    Unstable Housing in the Last Year: No       Current Outpatient Medications:     atorvastatin (LIPITOR) 40 mg tablet, Take 1 tablet (40 mg total) by mouth daily with dinner This is an increased dose to better help prevent heart attack and stroke , Disp: 30 tablet, Rfl: 0    cholecalciferol (VITAMIN D3) 1,000 units tablet, Take 1 tablet (1,000 Units total) by mouth daily, Disp: 30 tablet, Rfl: 0    clopidogrel (PLAVIX) 75 mg tablet, Take 1 tablet (75 mg total) by mouth daily Hold until seen by ENT on 9/23/19 , Disp: , Rfl:     escitalopram (LEXAPRO) 10 mg tablet, Take 10 mg by mouth daily, Disp: , Rfl:     furosemide (LASIX) 20 mg tablet, Take 1 tablet (20 mg total) by mouth 2 (two) times a day, Disp: 60 tablet, Rfl: 0    isosorbide mononitrate (IMDUR) 60 mg 24 hr tablet, Take 1 tablet (60 mg total) by mouth daily In the morning, Disp: , Rfl: 0    linaGLIPtin (Tradjenta) 5 MG TABS, Take 5 mg by mouth daily Take this for your diabetes instead of glimepiride, Disp: 30 tablet, Rfl: 0    pantoprazole (PROTONIX) 40 mg tablet, Take 40 mg by mouth daily, Disp: , Rfl:     propranolol (INDERAL LA) 120 mg 24 hr capsule, Take 120 mg by mouth daily, Disp: , Rfl:     sucralfate (CARAFATE) 1 g tablet, Take 1 g by mouth 2 (two) times a day, Disp: , Rfl:     tamsulosin (FLOMAX) 0 4 mg, Take 2 capsules (0 8 mg total) by mouth daily with dinner, Disp: 180 capsule, Rfl: 3  Allergies   Allergen Reactions    Ibuprofen Fatigue     Other reaction(s): decreased kidney function     Vitals:    03/18/22 1126   BP: 118/63   Pulse: 79   Temp: 98 5 °F (36 9 °C)       Physical Exam  Constitutional:       Appearance: He is well-developed  HENT:      Head: Normocephalic and atraumatic  Eyes:      Pupils: Pupils are equal, round, and reactive to light  Cardiovascular:      Rate and Rhythm: Normal rate and regular rhythm  Heart sounds: No murmur heard  Pulmonary:      Breath sounds: Normal breath sounds  No wheezing or rales  Abdominal:      Palpations: Abdomen is soft  Tenderness: There is no abdominal tenderness  Musculoskeletal:         General: No tenderness  Normal range of motion  Cervical back: Neck supple  Lymphadenopathy:      Cervical: No cervical adenopathy     Skin:     Findings: No erythema or rash  Neurological:      Mental Status: He is alert and oriented to person, place, and time  Cranial Nerves: No cranial nerve deficit  Deep Tendon Reflexes: Reflexes are normal and symmetric  Psychiatric:         Behavior: Behavior normal            Labs:  {Lab Choice:06068} {LABS HEM/ONC:34056}    Imaging  XR chest portable    Result Date: 2/21/2022  Narrative: CHEST INDICATION:   hypoxia, shortness of breath  COMPARISON:  2/18/2022 EXAM PERFORMED/VIEWS:  XR CHEST PORTABLE FINDINGS:  Median sternotomy wires are present  Stable cardiomegaly is present  Mild pulmonary vascular congestion is present  No pneumothorax or pleural effusion  Osseous structures appear within normal limits for patient age  Impression: Mild pulmonary vascular congestion  Workstation performed: [id number]     XR chest 1 view portable    Result Date: 2/18/2022  Narrative: CHEST INDICATION:   sob  COMPARISON:  Chest radiograph and abdomen CT from 1/30/2022, chest CT from 3/4/2021  EXAM PERFORMED/VIEWS:  XR CHEST PORTABLE FINDINGS: Mild cardiomegaly, CABG  Mild pulmonary venous congestion  Chronic pleural thickening in the left costophrenic sulcus with no effusion or pneumothorax  Opacity in medial right base due to a benign pericardial fat pad  Osseous structures appear within normal limits for patient age  Impression: Mild pulmonary venous congestion  No effusion or pneumothorax  Workstation performed: [id number]     CT chest without contrast    Result Date: 2/18/2022  Narrative: CT CHEST WITHOUT IV CONTRAST INDICATION:   SOB, hypoxia  COMPARISON:  3/4/2021  TECHNIQUE: CT examination of the chest was performed without intravenous contrast   Axial, sagittal, and coronal 2D reformatted images were created from the source data and submitted for interpretation  Radiation dose length product (DLP) for this visit:  432 31 mGy-cm     This examination, like all CT scans performed in the [facility], was performed utilizing techniques to minimize radiation dose exposure, including the use of iterative  reconstruction and automated exposure control  FINDINGS: LUNGS:  Redemonstrated are mild chronic interstitial changes in the periphery of the upper and lower lobes greater in the lower lobes  There is small patchy nodular airspace consolidation seen in the left lower lobe laterally that is stable  Given its interval stability this may also reflect chronic changes/ atelectasis  No new areas of focal airspace consolidation seen  Stable tiny 3 mm nodule in the right upper lobe on series 4, image 29  Stable punctate calcified granuloma abutting the pleura in the right upper lobe on series 4, image 30  Stable 5 mm calcified granuloma left upper lobe on series 4, image 63  Stable 3 mm pulmonary nodule left upper lobe on series 4, image 74  No obvious endobronchial lesion  PLEURA:  Stable minimal pleural thickening laterally at the left lung base  No pleural effusions or pneumothorax  HEART/GREAT VESSELS: Mild cardiomegaly  No thoracic aortic aneurysm  Extensive coronary artery calcifications  Aortic calcifications  Status post CABG  No pericardial effusion  MEDIASTINUM AND RENARD:  There are stable appearing mildly prominent mediastinal lymph nodes with the largest in the precarinal region measuring 1 1 cm in short axis dimension  No significant hilar or axillary lymphadenopathy  CHEST WALL AND LOWER NECK:   Unremarkable  VISUALIZED STRUCTURES IN THE UPPER ABDOMEN:  Status post cholecystectomy  OSSEOUS STRUCTURES:  Median sternotomy wires  Degenerative changes in the thoracic spine  Impression: Persistent chronic interstitial changes in the periphery of the upper and lower lobes greater in the lower lobes  Stable pulmonary nodules and calcified granulomas as above  Probable chronic atelectasis left lower lobe  No new areas of airspace consolidation or effusions  Cardiomegaly    Coronary artery and aortic calcifications  Status post CABG and median sternotomy  Mild mediastinal lymphadenopathy, unchanged since prior study  No hilar or axillary lymphadenopathy  Workstation performed: [id number]     NM lung perfusion imaging    Result Date: 2/21/2022  Narrative: LUNG PERFUSION SCAN INDICATION: hypoxia, elevated D-dimer, for V/Q lung scan due to BECKI/renal insufficiency COMPARISON:  Chest radiograph  2/21/2022, lung scan 3/26/2021 TECHNIQUE:  Multiplanar perfusion imaging was performed following the intravenous administration of 4 3 mCi Tc-99m labeled MAA  No ventilation imaging was performed as per current Covid-19 protocol  FINDINGS:  Perfusion imaging demonstrates a mildly heterogeneous distribution of the radiopharmaceutical in the left lung  Left lung again noted to be smaller than the right  There is no new significant  segmental or subsegmental defect  Impression: The probability for pulmonary embolus is low  Workstation performed: [id number]     VAS lower limb venous duplex study, unilateral/limited    Result Date: 2/19/2022  Narrative:  [facility] REPORT CLINICAL: Indications: Patient presents with right lower extremity weakness and edema x 4 days  Suspected Covid  Operative History: CABG Risk Factors The patient has history of Obesity, HTN, Diabetes (IDDM), Hyperlipidemia, CKD, CAD and previous smoking (quit 1-5yrs ago)  CONCLUSION:  Impression: RIGHT LOWER LIMB: Limited No gross evidence of acute deep vein thrombosis in the CFV, FV, Popliteal, Gastrocnemius, Posterior Tibial and Peroneal Veins  LEFT LOWER LIMB: Limited No gross evidence of acute  deep vein thrombosis in the CFV     Limited protocol performed  Technical findings were given to [employer] PA-C  SIGNATURE: Electronically Signed by: Magy Pantoja on 2022-02-19 12:28:12 PM    Echo complete w/ contrast if indicated    Result Date: 2/21/2022  Narrative: Umair Charlton  Left Ventricle: Left ventricular cavity size is normal  Wall thickness is moderately increased  The left ventricular ejection fraction is 65%  Systolic function is normal  Wall motion is normal  Diastolic function is mildly abnormal, consistent with grade I (abnormal) relaxation  There is moderate concentric hypertrophy    Right Ventricle: Right ventricular cavity size is normal  Systolic function is normal    Left Atrium: The atrium is mildly dilated    Mitral Valve: There is mild regurgitation    Pulmonic Valve: There is moderate regurgitation  I reviewed the above laboratory and imaging data  Discussion/Summary:  In summary, this is an 73-year-old 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  Each of these is low volume  Free light chain ratio is normal   Quantitative immunoglobulins are requested  Visualized bones on CT chest abdomen pelvis showed no abnormality  Chest CT showed borderline 1 1 cm mediastinal node which has been stable  Skeletal survey is requested  We reviewed some of the distinctions between MGUS and myeloma  I think it is unlikely that he has myeloma as renal dysfunction has been stable and fluctuating dating back at least 3 5 years  He does have hypercalcemia which has been fluctuating over greater than 2 years  PTH is requested  We reviewed that if all is favorable observation would be appropriate for MGUS  We reviewed the possibility of malignant transformation in a small percentage of patients over time  The patient and his daughter voiced understanding above discussion

## 2022-03-31 ENCOUNTER — ANESTHESIA EVENT (OUTPATIENT)
Dept: PERIOP | Facility: HOSPITAL | Age: 87
End: 2022-03-31

## 2022-03-31 ENCOUNTER — HOSPITAL ENCOUNTER (OUTPATIENT)
Dept: PERIOP | Facility: HOSPITAL | Age: 87
Setting detail: OUTPATIENT SURGERY
Discharge: HOME | End: 2022-03-31
Attending: INTERNAL MEDICINE | Admitting: INTERNAL MEDICINE
Payer: MEDICARE

## 2022-03-31 ENCOUNTER — ANESTHESIA (OUTPATIENT)
Dept: PERIOP | Facility: HOSPITAL | Age: 87
End: 2022-03-31

## 2022-03-31 VITALS
DIASTOLIC BLOOD PRESSURE: 78 MMHG | SYSTOLIC BLOOD PRESSURE: 116 MMHG | OXYGEN SATURATION: 98 % | HEIGHT: 67 IN | RESPIRATION RATE: 18 BRPM | WEIGHT: 154 LBS | TEMPERATURE: 97.2 F | HEART RATE: 75 BPM | BODY MASS INDEX: 24.17 KG/M2

## 2022-03-31 DIAGNOSIS — R13.10 DYSPHAGIA, UNSPECIFIED: ICD-10-CM

## 2022-03-31 DIAGNOSIS — R10.816 EPIGASTRIC ABDOMINAL TENDERNESS: ICD-10-CM

## 2022-03-31 DIAGNOSIS — R93.3 ABNORMAL FINDINGS ON DIAGNOSTIC IMAGING OF OTHER PARTS OF DIGESTIVE TRACT: ICD-10-CM

## 2022-03-31 DIAGNOSIS — K21.9 GASTRO-ESOPHAGEAL REFLUX DISEASE WITHOUT ESOPHAGITIS: ICD-10-CM

## 2022-03-31 LAB — GLUCOSE SERPL-MCNC: 218 MG/DL (ref 65–140)

## 2022-03-31 PROCEDURE — 82948 REAGENT STRIP/BLOOD GLUCOSE: CPT

## 2022-03-31 PROCEDURE — C1726 CATH, BAL DIL, NON-VASCULAR: HCPCS

## 2022-03-31 RX ORDER — PROPOFOL 10 MG/ML
INJECTION, EMULSION INTRAVENOUS AS NEEDED
Status: DISCONTINUED | OUTPATIENT
Start: 2022-03-31 | End: 2022-03-31

## 2022-03-31 RX ORDER — LIDOCAINE HYDROCHLORIDE 10 MG/ML
INJECTION, SOLUTION EPIDURAL; INFILTRATION; INTRACAUDAL; PERINEURAL AS NEEDED
Status: DISCONTINUED | OUTPATIENT
Start: 2022-03-31 | End: 2022-03-31

## 2022-03-31 RX ORDER — LIDOCAINE HYDROCHLORIDE 10 MG/ML
0.5 INJECTION, SOLUTION EPIDURAL; INFILTRATION; INTRACAUDAL; PERINEURAL ONCE AS NEEDED
Status: DISCONTINUED | OUTPATIENT
Start: 2022-03-31 | End: 2022-04-04 | Stop reason: HOSPADM

## 2022-03-31 RX ORDER — SODIUM CHLORIDE, SODIUM LACTATE, POTASSIUM CHLORIDE, CALCIUM CHLORIDE 600; 310; 30; 20 MG/100ML; MG/100ML; MG/100ML; MG/100ML
50 INJECTION, SOLUTION INTRAVENOUS CONTINUOUS
Status: DISCONTINUED | OUTPATIENT
Start: 2022-03-31 | End: 2022-04-04 | Stop reason: HOSPADM

## 2022-03-31 RX ADMIN — PROPOFOL 20 MG: 10 INJECTION, EMULSION INTRAVENOUS at 10:38

## 2022-03-31 RX ADMIN — PROPOFOL 100 MG: 10 INJECTION, EMULSION INTRAVENOUS at 10:33

## 2022-03-31 RX ADMIN — PROPOFOL 20 MG: 10 INJECTION, EMULSION INTRAVENOUS at 10:43

## 2022-03-31 RX ADMIN — SODIUM CHLORIDE, SODIUM LACTATE, POTASSIUM CHLORIDE, AND CALCIUM CHLORIDE 50 ML/HR: .6; .31; .03; .02 INJECTION, SOLUTION INTRAVENOUS at 09:35

## 2022-03-31 RX ADMIN — LIDOCAINE HYDROCHLORIDE 5 ML: 10 INJECTION, SOLUTION EPIDURAL; INFILTRATION; INTRACAUDAL; PERINEURAL at 10:33

## 2022-03-31 RX ADMIN — PROPOFOL 20 MG: 10 INJECTION, EMULSION INTRAVENOUS at 10:41

## 2022-03-31 NOTE — ANESTHESIA PREPROCEDURE EVALUATION
Procedure:  EGD    Relevant Problems   CARDIO   (+) Acute exacerbation of CHF (congestive heart failure) (HCC)   (+) Acute on chronic diastolic congestive heart failure (HCC)   (+) Anginal syndrome (HCC)   (+) Aortic calcification (HCC)   (+) Coronary artery disease of native artery of native heart with stable angina pectoris (HCC)   (+) Essential hypertension   (+) Hypertensive crisis   (+) Mixed hyperlipidemia   (+) 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 infarction)      ENDO   (+) Secondary hyperparathyroidism of renal origin ([facility] [address] )   (+) Type 2 diabetes mellitus with hyperglycemia, without long-term current use of insulin (HCC)      GI/HEPATIC   (+) Gastroesophageal reflux disease without esophagitis      /RENAL   (+) Acute kidney injury superimposed on chronic kidney disease (HCC)   (+) Chronic tubulointerstitial nephritis   (+) 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 (HCC)   (+) Nephrolithiasis   (+) Stage 3b chronic kidney disease (HCC)      PULMONARY   (+) Acute respiratory failure with hypoxia (HCC)   (+) Chronic respiratory failure with hypoxia (HCC)        Physical Exam    Airway    Mallampati score: III  TM Distance: >3 FB  Neck ROM: full     Dental   upper dentures and lower dentures,     Cardiovascular  Rhythm: regular, Rate: normal,     Pulmonary  Breath sounds clear to auscultation, Decreased breath sounds,     Other Findings  Indications  Priority: Routine  Dyspnea, SOB, Wheezing, Tachypnea      Interpretation Summary         Left Ventricle: Left ventricular cavity size is normal  Wall thickness is moderately increased  The left ventricular ejection fraction is 65%  Systolic function is normal  Wall motion is normal  Diastolic function is mildly abnormal, consistent with grade I (abnormal) relaxation  There is moderate concentric hypertrophy    Right Ventricle: Right ventricular cavity size is normal  Systolic function is normal     Left Atrium: The atrium is mildly dilated      Mitral Valve: There is mild regurgitation    Pulmonic Valve: There is moderate regurgitation          Findings    Left Ventricle Left ventricular cavity size is normal  Wall thickness is moderately increased  The left ventricular ejection fraction is 65%  Systolic function is normal   Wall motion is normal  There is moderate concentric hypertrophy  Diastolic function is mildly abnormal, consistent with grade I (abnormal) relaxation  Right Ventricle Right ventricular cavity size is normal  Systolic function is normal  Wall thickness is normal   Left Atrium The atrium is mildly dilated  Right Atrium The atrium is normal in size  Aortic Valve The aortic valve is trileaflet  The leaflets are not thickened  The leaflets are not calcified  The leaflets exhibit normal mobility  There is no evidence of regurgitation  There is no evidence of stenosis  Mitral Valve The mitral valve has normal structure and function  There is mild regurgitation  There is no evidence of stenosis  Tricuspid Valve Tricuspid valve structure is normal  There is no evidence of regurgitation  There is no evidence of stenosis  The estimated right ventricular systolic pressure is 81 65 mmHg  Pulmonic Valve Pulmonic valve structure is normal  There is moderate regurgitation  There is no evidence of stenosis  Ascending Aorta The aortic root is normal in size  IVC/SVC The right atrial pressure is estimated at 10 0 mmHg  The inferior vena cava is normal in size  Pericardium There is no pericardial effusion  The pericardium is normal in appearance          Anesthesia Plan  ASA Score- 3     Anesthesia Type- IV sedation with anesthesia with ASA Monitors  Additional Monitors:   Airway Plan:           Plan Factors-Exercise tolerance (METS): <4 METS  Chart reviewed  EKG reviewed  Patient summary reviewed  Patient is not a current smoker  Induction- intravenous      Postoperative Plan-     Informed Consent- Anesthetic plan and risks discussed with patient and spouse  I personally reviewed this patient with the CRNA  Discussed and agreed on the Anesthesia Plan with the JOÃO Molina

## 2022-03-31 NOTE — ANESTHESIA POSTPROCEDURE EVALUATION
Post-Op Assessment Note    CV Status:  Stable    Pain management: adequate     Mental Status:  Sleepy   Hydration Status:  Euvolemic   PONV Controlled:  Controlled   Airway Patency:  Patent      Post Op Vitals Reviewed: Yes      Staff: CRNA         No complications documented      /57   Temp  97 2   Pulse  83   Resp 20   SpO2   98

## 2022-03-31 NOTE — INTERVAL H&P NOTE
H&P reviewed  After examining the patient I find no changes in the patients condition since the H&P had been written      Vitals:    03/31/22 0923   BP: 141/67   Pulse: 67   Resp: 18   Temp: (!) 97 1 °F (36 2 °C)   SpO2: 99%

## 2022-04-04 ENCOUNTER — OFFICE VISIT (OUTPATIENT)
Dept: ENDOCRINOLOGY | Facility: CLINIC | Age: 87
End: 2022-04-04
Payer: MEDICARE

## 2022-04-04 VITALS
HEIGHT: 67 IN | DIASTOLIC BLOOD PRESSURE: 62 MMHG | HEART RATE: 52 BPM | WEIGHT: 162.4 LBS | BODY MASS INDEX: 25.49 KG/M2 | SYSTOLIC BLOOD PRESSURE: 128 MMHG

## 2022-04-04 DIAGNOSIS — E78.2 MIXED HYPERLIPIDEMIA: ICD-10-CM

## 2022-04-04 DIAGNOSIS — E11.65 TYPE 2 DIABETES MELLITUS WITH HYPERGLYCEMIA, WITHOUT LONG-TERM CURRENT USE OF INSULIN (HCC): Primary | ICD-10-CM

## 2022-04-04 DIAGNOSIS — N18.32 STAGE 3B CHRONIC KIDNEY DISEASE (HCC): ICD-10-CM

## 2022-04-04 DIAGNOSIS — E83.52 HYPERCALCEMIA: ICD-10-CM

## 2022-04-04 DIAGNOSIS — E11.21 DIABETIC NEPHROPATHY ASSOCIATED WITH TYPE 2 DIABETES MELLITUS (HCC): ICD-10-CM

## 2022-04-04 PROCEDURE — 99204 OFFICE O/P NEW MOD 45 MIN: CPT | Performed by: STUDENT IN AN ORGANIZED HEALTH CARE EDUCATION/TRAINING PROGRAM

## 2022-04-04 NOTE — PROGRESS NOTES
Elizabeth Wayne 82 y o  male MRN: [id number]    Encounter: [id number]      Assessment/Plan     Problem List Items Addressed This Visit        Endocrine    Type 2 diabetes mellitus with hyperglycemia, without long-term current use of insulin ([place] [address] ) - Primary    Relevant Orders    HEMOGLOBIN A1C W/ EAG ESTIMATION Lab Collect    Ambulatory referral to Diabetic Education    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 ([place] [address] )     Diabetes is poorly controlled and complicated  I believe insulin therapy would be most beneficial to [facility], but he expresses hesitancy towards that option  For now, he is advised to check fingersticks, ideally at least twice daily and at scattering times, and send them to me by end of week or by next week for review  Therapeutic options are limited due to comorbidities, namely CKD, but can consider insulin, ALLISON or meglitinides  Can also consider substitution of tradjenta for GLP1RA  SGLT2i may be considered for renal-protection but would not be expected to help with poorly controlled hyperglycemia  Will refer to CDE for MNT counseling             Genitourinary    Stage 3b chronic kidney disease ([place] [address] )       Other    Mixed hyperlipidemia     Continue statin         Hypercalcemia     Appears to be PTH mediated  Has plans to repeat relevant labs in June  Also has plans to obtain skeletal survey  Given focus of visit today, did not address in full, but advised that elevated calcium may be a hormonally mediated process and that surgical treatment for this condition may be considered  Would like to get a baseline DXA, but will defer until patient has had time to complete numerous pending studies  CC: Diabetes, hypercalcemia    History of Present Illness     HPI:    [facility] presents today as an initial visit for diabetes  He is joined by his granddaughter who is assisting with elements of the history  Recent history complicated by hospitalization for CHF in February   He is following with numerous other providers, including 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  Skeletal survey is pending  Hai Dickson reports longstanding diabetes that is complicated by CKD and CAD  He reports following with Lorena Saez in [place] for eye exams (reports history of cataracts) and with a podiatrist in [place] (uncertain who the provider is)  For diabetes, he is on monotherapy with tradjenta  He checks BGs daily and reports typical values in the mid to high 100s, although can see numbers much higher depending on diet  He reports dietary indiscretions  He reports symptoms including polydipsia and nocturia  In the past, he was also prescribed glimepiride, but this was discontinued on account of advanced CKD  BG in hospital in February ranged from as low as 100 fasting to as high as 300s  It appears he was only placed on correction insulin without any standing insulin orders  Patient states preference to avoid injectable therapy  Patient with hypercalcemia which appears chronic  He reports history of rib fracture  He reports remote history of kidney stones  He has never had a DXA  He does have history of esophageal varices  He appears to be a fall risk  Review of Systems   Constitutional: Negative for fever and unexpected weight change  HENT: Negative for trouble swallowing and voice change  Eyes: Positive for visual disturbance  Respiratory: Negative for cough and shortness of breath  Cardiovascular: Negative for chest pain and palpitations  Gastrointestinal: Negative for nausea and vomiting  Endocrine: Positive for polydipsia and polyuria  Musculoskeletal: Positive for gait problem  Neurological: Negative for tremors  Psychiatric/Behavioral: Negative for agitation and behavioral problems  Historical Information   Past Medical History:   Diagnosis Date    Arthritis     Athscl 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     Stent OM1  Patent mammary graft to LAD 10/7/2009; CABG  &     Cancer [facility])     skin    Coronary artery disease     Diabetes mellitus ([facility] [address] )     Diabetes mellitus, type II ([facility] [address] )     without complication    Epistaxis     Essential (primary) hypertension     GERD (gastroesophageal reflux disease)     Hx of cardiovascular stress test 2014    Eddie MPI; EF0 52 (52%) Lexiscan, mild LV systolic dysfunction; evidence for prior inferior wall MI; perfusion imaging consistant w mild lat wall ischemia and min torin-infart inferior ischemia   / EF0 51 (51%) evidence for prior inferior wall MI  Mild inferior and mild-moderate lateral wall ischemia  7/29/15    Hx of echocardiogram 2017    2D w/CFD;EF0 55 (55%) mild LVH, mitral valve prolapse with moderate regurgitation  left atrial enlargement  Mild tricuspid regurgitation   Hypertension     Mixed hyperlipidemia     Occlusion and stenosis of bilateral carotid arteries     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 infarction)     Presence of aortocoronary bypass graft      Past Surgical History:   Procedure Laterality Date    CARDIAC CATHETERIZATION  10/07/2009    Stent 99% stenosis SVG from the aorta to OM1  Patent mammary graft to the LAD    CORONARY ARTERY BYPASS GRAFT      VG-CX, VG-RCA    CORONARY ARTERY BYPASS GRAFT  2005    LIMA-D1-LAD, VG-PDA-PLB, [id number] TMR 9 Lesions    VT EGD TRANSORAL BIOPSY SINGLE/MULTIPLE N/A 2019    Procedure: ESOPHAGOGASTRODUODENOSCOPY (EGD) with biopsy;  Surgeon: Bonnee Opitz, MD;  Location: [address] [place] [address] GI LAB;   Service: Gastroenterology    TONSILLECTOMY       Social History   Social History     Substance and Sexual Activity   Alcohol Use Never     Social History     Substance and Sexual Activity   Drug Use No     Social History     Tobacco Use   Smoking Status Former Smoker    Packs/day: 1 00    Years: 40 00    Pack years: 40 00    Quit date: 1977    Years since quittin 2   Smokeless Tobacco Never Used Family History:   Family History   Problem Relation Age of Onset    Heart disease Brother     No Known Problems Mother     Tuberculosis Father        Meds/Allergies   Current Outpatient Medications   Medication Sig Dispense Refill    atorvastatin (LIPITOR) 40 mg tablet Take 1 tablet (40 mg total) by mouth daily with dinner This is an increased dose to better help prevent heart attack and stroke  30 tablet 0    cholecalciferol (VITAMIN D3) 1,000 units tablet Take 1 tablet (1,000 Units total) by mouth daily 30 tablet 0    clopidogrel (PLAVIX) 75 mg tablet Take 1 tablet (75 mg total) by mouth daily Hold until seen by ENT on 9/23/19   escitalopram (LEXAPRO) 10 mg tablet Take 10 mg by mouth daily      furosemide (LASIX) 20 mg tablet Take 1 tablet (20 mg total) by mouth 2 (two) times a day 60 tablet 0    isosorbide mononitrate (IMDUR) 60 mg 24 hr tablet Take 1 tablet (60 mg total) by mouth daily In the morning  0    linaGLIPtin (Tradjenta) 5 MG TABS Take 5 mg by mouth daily Take this for your diabetes instead of glimepiride 30 tablet 0    pantoprazole (PROTONIX) 40 mg tablet Take 40 mg by mouth daily      propranolol (INDERAL LA) 120 mg 24 hr capsule Take 120 mg by mouth daily      sucralfate (CARAFATE) 1 g tablet Take 1 g by mouth 2 (two) times a day      tamsulosin (FLOMAX) 0 4 mg Take 2 capsules (0 8 mg total) by mouth daily with dinner 180 capsule 3     No current facility-administered medications for this visit  Allergies   Allergen Reactions    Ibuprofen Fatigue     Other reaction(s): decreased kidney function       Objective   Vitals: Blood pressure 128/62, pulse (!) 52, height 5' 7" (1 702 m), weight 73 7 kg (162 lb 6 4 oz)  Physical Exam  Vitals reviewed  HENT:      Head: Normocephalic and atraumatic  Cardiovascular:      Rate and Rhythm: Regular rhythm  Bradycardia present  Pulmonary:      Effort: Pulmonary effort is normal  No respiratory distress     Abdominal:      General: There is no distension  Musculoskeletal:      Cervical back: Normal range of motion  Skin:     General: Skin is warm and dry  Neurological:      General: No focal deficit present  Mental Status: He is alert  Psychiatric:         Mood and Affect: Mood normal          Behavior: Behavior normal          The history was obtained from the review of the chart, patient and family      Lab Results:   Lab Results   Component Value Date/Time    Hemoglobin A1C 9 5 (H) 02/19/2022 04:15 AM    Hemoglobin A1C 10 8 (H) 11/09/2021 08:51 AM    Hemoglobin A1C 8 9 (H) 05/13/2021 11:32 AM    WBC 8 08 02/24/2022 02:35 PM    WBC 7 29 02/22/2022 06:13 AM    WBC 6 80 02/21/2022 04:20 AM    Hemoglobin 16 8 02/24/2022 02:35 PM    Hemoglobin 15 7 02/22/2022 06:13 AM    Hemoglobin 14 1 02/21/2022 04:20 AM    Hematocrit 52 7 (H) 02/24/2022 02:35 PM    Hematocrit 48 1 02/22/2022 06:13 AM    Hematocrit 42 5 02/21/2022 04:20 AM    MCV 86 02/24/2022 02:35 PM    MCV 86 02/22/2022 06:13 AM    MCV 84 02/21/2022 04:20 AM    Platelets 964 24/01/4361 02:35 PM    Platelets 392 24/45/6043 06:13 AM    Platelets 419 20/36/7715 04:20 AM    BUN 23 03/15/2022 11:47 AM    BUN 30 (H) 02/28/2022 03:00 PM    BUN 37 (H) 02/24/2022 02:35 PM    Potassium 4 1 03/15/2022 11:47 AM    Potassium 4 8 02/28/2022 03:00 PM    Potassium 4 5 02/24/2022 02:35 PM    Chloride 99 (L) 03/15/2022 11:47 AM    Chloride 96 (L) 02/28/2022 03:00 PM    Chloride 94 (L) 02/24/2022 02:35 PM    CO2 32 03/15/2022 11:47 AM    CO2 32 02/28/2022 03:00 PM    CO2 33 (H) 02/24/2022 02:35 PM    Creatinine 1 89 (H) 03/15/2022 11:47 AM    Creatinine 1 97 (H) 02/28/2022 03:00 PM    Creatinine 1 77 (H) 02/24/2022 02:35 PM    AST 27 02/24/2022 02:35 PM    AST 22 02/22/2022 06:13 AM    AST 20 02/21/2022 08:23 AM    ALT 30 02/24/2022 02:35 PM    ALT 18 02/22/2022 06:13 AM    ALT 16 02/21/2022 08:23 AM    Albumin 3 5 02/24/2022 02:35 PM    Albumin 3 0 (L) 02/22/2022 06:13 AM    Albumin 2 9 (L) 02/21/2022 08:23 AM    HDL, Direct 40 11/09/2021 08:51 AM    HDL, Direct 43 05/13/2021 11:32 AM    Triglycerides 112 11/09/2021 08:51 AM    Triglycerides 128 05/13/2021 11:32 AM         Component      Latest Ref Rng & Units 2/22/2022 2/24/2022 2/24/2022            2:35 PM  2:35 PM   Albumin/Globulin Ratio      1 10 - 1 80   0 95 (L)   ALBUMIN ELP      52 0 - 65 0 %   48 7 (L)   ALBUMIN CONC ELP      3 50 - 5 00 g/dl   3 99   ALPHA 1      2 5 - 5 0 %   6 2 (H)   ALPHA 1 CONC ELP      0 10 - 0 40 g/dL   0 51 (H)   ALPHA 2      7 0 - 13 0 %   13 8 (H)   ALPHA 2 CONC ELP      0 40 - 1 20 g/dL   1 13   BETA-1      5 0 - 13 0 %   5 4   BETA 1 CONC ELP      0 40 - 0 80 g/dL   0 44   BETA-2      2 0 - 8 0 %   7 0   BETA 2 CONC ELP      0 20 - 0 50 g/dL   0 57 (H)   GAMMA GLOBULIN      12 0 - 22 0 %   18 9   GAMMA CONC ELP      0 50 - 1 60 g/dL   1 55   M PEAK ID 1      %   3 20   M PEAK 1 CONC      g/dL   0 26   M PEAK ID 2      %   4 00   M Peak 2 CONC      g/dL   0 33   M PEAK ID 3      %   2 40   M PEAK 3 CONC      g/dL   0 20   Total Protein      6 4 - 8 2 g/dL 8 1 8 7 (H) 8 2   SPEP INTERPRETATION         See Comment   Sodium      136 - 145 mmol/L 134 (L) 131 (L)    Potassium      3 5 - 5 3 mmol/L 4 5 4 5    Chloride      100 - 108 mmol/L 96 (L) 94 (L)    CO2      21 - 32 mmol/L 33 (H) 33 (H)    Anion Gap      4 - 13 mmol/L 5 4    BUN      5 - 25 mg/dL 26 (H) 37 (H)    Creatinine      0 60 - 1 30 mg/dL 1 81 (H) 1 77 (H)    Glucose, Random      65 - 140 mg/dL 158 (H) 275 (H)    Calcium      8 3 - 10 1 mg/dL 10 3 (H) 11 2 (H)    CORRECTED CALCIUM      8 3 - 10 1 mg/dL 11 1 (H)     AST      5 - 45 U/L 22 27    ALT      12 - 78 U/L 18 30    Alkaline Phosphatase      46 - 116 U/L 87 100    Albumin      3 5 - 5 0 g/dL 3 0 (L) 3 5    TOTAL BILIRUBIN      0 20 - 1 00 mg/dL 0 75 0 96    eGFR      ml/min/1 73sq m 33 34    Urine Protein      2 0 - 17 5 mg/dL 27 0 (H)     ALBUMIN URINE ELP      % 14 8     ALPHA 1 URINE      % 41 1 ALPHA 2 URINE      % 5 5     BETA URINE      % 10 7     GAMMA GLOBULIN URINE      % 27 9     M PEAK ID 1 URINE      % 2 5     UPEP INTERPRETATION       See Comment     Phosphorus      2 3 - 4 1 mg/dL 3 6 3 3    Calcium, Ionized      1 12 - 1 32 mmol/L 1 38 (H) 1 36 (H)    PARATHYROID HORMONE      18 4 - 80 1 pg/mL 91 3 (H)     PTH-Related Protein      pmol/L <2 0     Vit D, 25-Hydroxy      30 0 - 100 0 ng/mL 40 7     Magnesium      1 6 - 2 6 mg/dL 2 2 2 5    EXT Creatinine Urine      mg/dL 85 1     VITAMIN D 1,25-DIHYDROXY      19 9 - 79 3 pg/mL  69 8          Imaging Studies: I have personally reviewed pertinent reports  Portions of the record may have been created with voice recognition software  Occasional wrong word or "sound a like" substitutions may have occurred due to the inherent limitations of voice recognition software  Read the chart carefully and recognize, using context, where substitutions have occurred

## 2022-04-04 NOTE — PATIENT INSTRUCTIONS
Check sugars 2x daily    Before meals, bedtime    Goal Blood Sugars:   Premeal , even better <110  2hr after a meal <180, even better <140  A1C <7%, even better <6 5%      Aim for 45g carbohydrates with meals  15-20g with snacks    Goal exercise is 30 minutes daily, most days of the week    Please have labs done before next visit    Bring your meter or logbook with you each visit    Return appointment 3 months

## 2022-04-04 NOTE — ASSESSMENT & PLAN NOTE
Diabetes is poorly controlled and complicated  I believe insulin therapy would be most beneficial to Elyssa Holloway, but he expresses hesitancy towards that option  For now, he is advised to check fingersticks, ideally at least twice daily and at scattering times, and send them to me by end of week or by next week for review  Therapeutic options are limited due to comorbidities, namely CKD, but can consider insulin, ALLISON or meglitinides  Can also consider substitution of tradjenta for GLP1RA  SGLT2i may be considered for renal-protection but would not be expected to help with poorly controlled hyperglycemia   Will refer to CDE for MNT counseling

## 2022-04-04 NOTE — ASSESSMENT & PLAN NOTE
Appears to be PTH mediated  Has plans to repeat relevant labs in June  Also has plans to obtain skeletal survey  Given focus of visit today, did not address in full, but advised that elevated calcium may be a hormonally mediated process and that surgical treatment for this condition may be considered  Would like to get a baseline DXA, but will defer until patient has had time to complete numerous pending studies

## 2022-04-07 ENCOUNTER — PATIENT OUTREACH (OUTPATIENT)
Dept: CASE MANAGEMENT | Facility: OTHER | Age: 87
End: 2022-04-07

## 2022-04-07 LAB
DME PARACHUTE DELIVERY DATE ACTUAL: NORMAL
DME PARACHUTE DELIVERY DATE EXPECTED: NORMAL
DME PARACHUTE DELIVERY DATE REQUESTED: NORMAL
DME PARACHUTE ITEM DESCRIPTION: NORMAL
DME PARACHUTE ORDER STATUS: NORMAL
DME PARACHUTE SUPPLIER NAME: NORMAL
DME PARACHUTE SUPPLIER PHONE: NORMAL

## 2022-04-07 NOTE — PROGRESS NOTES
I spoke with Harrison Amador who reports he is feeling good  His breathing is at baseline with oxygen at 2 liters  He was discharged from [facility] on 3/24  He was seen by Endocrinologist 4/4and per note he is poorly controlled  He was referred to the Diabetic educator and advised to check blood sugar twice daily and record for the endocrinology office  Patient to report them to the office next week  He is taking his medications as directed  He has no needs at this time  I advised him to report any shortness of breath or leg swelling to his cardiologist He understands and agrees  He thanked me for calling but declined further outreach

## 2022-04-22 ENCOUNTER — OFFICE VISIT (OUTPATIENT)
Dept: ENDOCRINOLOGY | Facility: OTHER | Age: 87
End: 2022-04-22
Payer: MEDICARE

## 2022-04-22 ENCOUNTER — TELEPHONE (OUTPATIENT)
Dept: DIABETES SERVICES | Facility: CLINIC | Age: 87
End: 2022-04-22

## 2022-04-22 VITALS — WEIGHT: 162 LBS | BODY MASS INDEX: 25.37 KG/M2

## 2022-04-22 DIAGNOSIS — E11.65 TYPE 2 DIABETES MELLITUS WITH HYPERGLYCEMIA, WITHOUT LONG-TERM CURRENT USE OF INSULIN (HCC): ICD-10-CM

## 2022-04-22 PROCEDURE — 97802 MEDICAL NUTRITION INDIV IN: CPT | Performed by: DIETITIAN, REGISTERED

## 2022-04-22 NOTE — PROGRESS NOTES
Medical Nutrition Therapy        Assessment    Visit Type: Initial visit    Chief complaint Pt has type 2 diabetes as well as many other chronic conditions  States this morning his blood sugar number was a "little high" at 164 mg/dL  He is eating better now since he's been out of the hospital for a while and s/p dilation of esophageal stricture  HPI: Cherelle diet history reveals 2-3 meals daily, but if he skips lunch, he usually has some snacks  He prepares some of his own meals and relies on family for help with grocery shopping and for providing some food  He's recently having some difficulty because he has a new digital stove and cannot read the panel with his vision  Currently meals range from 25 to 50 grams of carbohydrate  Explained basic pathophysiology of diabetes and impact of diet on blood glucose levels  Together we discussed what foods contain CHO, reading a food label, and serving sizes  Used "Planning Health meals" portion booklet to teach Rodger Campos more about food groups and basic carbohydrate counting  Created an individualized meal plan for Rodger Campos with 3 meals and 2 snacks providing 45 g carb per meal and 15 g carb per snack  This plan will help promote weight maintenance  Put together sample meals for Hakeem's reference  His granddaughter will work with him to plan out more meals  Rodger Campos demonstrated fair understanding, Rodger Campos will call with questions or for more education  Ht Readings from Last 1 Encounters:   04/04/22 5' 7" (1 702 m)     Wt Readings from Last 2 Encounters:   04/04/22 73 7 kg (162 lb 6 4 oz)   03/31/22 69 9 kg (154 lb)     Weight Change: Yes regaining    Medical Diagnosis/ICD10: E11 65    Barriers to Learning: vision    Do you follow any special diet presently?: No, knows he is supposed to follow low sodium but "he likes what he likes"   He did stop buying candy, occasionally buys a 2 pack of pastries or Pizelles  Who shops: patient and daughter or granddaughter  Who cooks: patient and family brings him some meals    Food Log: Completed via the method of food recall    Breakfast: 8-10, cereal like Rice Krispies w/ strawberries if he has them or 1-2 packets low sugar oatmeal w/ 1% or 2% milk or toast or crackers w/ pb and jelly or occasionally eggs   Today 2 rice cakes w/ PB and some grapes 3/4 cup, (or no sugar fruit cup), coffee w/ milk and splenda  Morning Snack:may have a rice cake  Lunch: whenever, may skip some days, especially if late breakfast  Yesterday had whole matzo cracker w/ PB, "ICE" sugar free drink, may also have an [employer] no sugar added frozen fruit bar  Afternoon Snack: may have a rice cake or a fruit cup  Dinner:later in day, always before 8, stuffed pepper w/ ground beef, rice, cheese, fruit cup, OR microwaves a potato w/ a piece of meat OR makes his own soup, broth based  Evening Snack:2 rice cakes w/ peanut butter or jam OR Ritz cracker with a little cheese spread OR strawberries, plain or with a little whipped cream  Beverages: coffee, "ICE " drinks, occasional milk, V-8 juice - regular, 4 ounces  Eating out/Take out:once in a while, gets take-out clam chowder and a sandwich or gyro or [place]  Exercise not much, has to use oxygen    Calorie needs 1680 kcals/day Carbs: 45 g/meal, 15g/snack         Nutrition Diagnosis:  Food and nutrition related knowledge deficit  related to Lack of prior exposure to accurate nutrition related information as evidenced by [employer] inaccurate or incomplete information    Intervention: plate method, label reading, carbohydrate counting, increased plant based foods, meal planning and monitoring portion control     Treatment Goals: Patient understands education and recommendations, Patient will monitor portion control, Patient will increase their intake of plant based foods and Patient will count carbohydrates    Monitoring and evaluation:    Term code indicator  FH 3 1 Food and Nutrition Knowledge Criteria: Read food label for total carbohydrate grams  Term code indicator  FH 1 3 2 Food Intake Criteria: Choose more vegetables  Term code indicator  FH 1 6 3 Carbohydrate Intake Criteria: Follow plan    Patients Response to Instruction:  Zee Mandujano  Expected Compliancegood    Thank you for coming to the [facility] for education today  Please feel free to call with any questions or concerns      [facility]  [phone] [place] [phone]

## 2022-04-22 NOTE — PATIENT INSTRUCTIONS
1  Choose about 45 g carb or 3 carb servings per meal  2  Write out some meal ideas with your granddaughter  3  Be sure to choose low sodium foods  4  Keep protein servings to no more than 3 ounces at a time  5   Send in blood sugar records to e-mail address on the form

## 2022-05-09 ENCOUNTER — HOSPITAL ENCOUNTER (INPATIENT)
Facility: HOSPITAL | Age: 87
LOS: 4 days | Discharge: HOME HEALTH | DRG: 291 | End: 2022-05-13
Attending: EMERGENCY MEDICINE | Admitting: INTERNAL MEDICINE
Payer: MEDICARE

## 2022-05-09 ENCOUNTER — APPOINTMENT (EMERGENCY)
Dept: CT IMAGING | Facility: HOSPITAL | Age: 87
DRG: 291 | End: 2022-05-09
Payer: MEDICARE

## 2022-05-09 ENCOUNTER — APPOINTMENT (EMERGENCY)
Dept: RADIOLOGY | Facility: HOSPITAL | Age: 87
DRG: 291 | End: 2022-05-09
Payer: MEDICARE

## 2022-05-09 DIAGNOSIS — I50.9 CHF (CONGESTIVE HEART FAILURE) (HCC): ICD-10-CM

## 2022-05-09 DIAGNOSIS — R09.02 HYPOXIA: ICD-10-CM

## 2022-05-09 DIAGNOSIS — I50.33 ACUTE ON CHRONIC DIASTOLIC CONGESTIVE HEART FAILURE (HCC): ICD-10-CM

## 2022-05-09 DIAGNOSIS — I48.0 PAROXYSMAL ATRIAL FIBRILLATION (HCC): ICD-10-CM

## 2022-05-09 DIAGNOSIS — J96.01 ACUTE RESPIRATORY FAILURE WITH HYPOXIA (HCC): ICD-10-CM

## 2022-05-09 DIAGNOSIS — I48.91 ATRIAL FIBRILLATION WITH RAPID VENTRICULAR RESPONSE (HCC): ICD-10-CM

## 2022-05-09 DIAGNOSIS — R06.00 DYSPNEA: Primary | ICD-10-CM

## 2022-05-09 LAB
2HR DELTA HS TROPONIN: 36 NG/L
4HR DELTA HS TROPONIN: 63 NG/L
ALBUMIN SERPL BCP-MCNC: 3.2 G/DL (ref 3.5–5)
ALP SERPL-CCNC: 64 U/L (ref 46–116)
ALT SERPL W P-5'-P-CCNC: 22 U/L (ref 12–78)
ANION GAP SERPL CALCULATED.3IONS-SCNC: 9 MMOL/L (ref 4–13)
AST SERPL W P-5'-P-CCNC: 13 U/L (ref 5–45)
ATRIAL RATE: 86 BPM
BASOPHILS # BLD AUTO: 0.06 THOUSANDS/ΜL (ref 0–0.1)
BASOPHILS NFR BLD AUTO: 1 % (ref 0–1)
BILIRUB SERPL-MCNC: 2.12 MG/DL (ref 0.2–1)
BUN SERPL-MCNC: 26 MG/DL (ref 5–25)
CALCIUM ALBUM COR SERPL-MCNC: 10.2 MG/DL (ref 8.3–10.1)
CALCIUM SERPL-MCNC: 9.6 MG/DL (ref 8.3–10.1)
CARDIAC TROPONIN I PNL SERPL HS: 110 NG/L
CARDIAC TROPONIN I PNL SERPL HS: 47 NG/L
CARDIAC TROPONIN I PNL SERPL HS: 83 NG/L
CHLORIDE SERPL-SCNC: 101 MMOL/L (ref 100–108)
CO2 SERPL-SCNC: 27 MMOL/L (ref 21–32)
CREAT SERPL-MCNC: 1.85 MG/DL (ref 0.6–1.3)
D DIMER PPP FEU-MCNC: 0.72 UG/ML FEU
EOSINOPHIL # BLD AUTO: 0.03 THOUSAND/ΜL (ref 0–0.61)
EOSINOPHIL NFR BLD AUTO: 0 % (ref 0–6)
ERYTHROCYTE [DISTWIDTH] IN BLOOD BY AUTOMATED COUNT: 14.2 % (ref 11.6–15.1)
GFR SERPL CREATININE-BSD FRML MDRD: 32 ML/MIN/1.73SQ M
GLUCOSE SERPL-MCNC: 229 MG/DL (ref 65–140)
HCT VFR BLD AUTO: 39.8 % (ref 36.5–49.3)
HGB BLD-MCNC: 12.9 G/DL (ref 12–17)
IMM GRANULOCYTES # BLD AUTO: 0.04 THOUSAND/UL (ref 0–0.2)
IMM GRANULOCYTES NFR BLD AUTO: 0 % (ref 0–2)
LYMPHOCYTES # BLD AUTO: 0.91 THOUSANDS/ΜL (ref 0.6–4.47)
LYMPHOCYTES NFR BLD AUTO: 8 % (ref 14–44)
MAGNESIUM SERPL-MCNC: 1.9 MG/DL (ref 1.6–2.6)
MCH RBC QN AUTO: 28.6 PG (ref 26.8–34.3)
MCHC RBC AUTO-ENTMCNC: 32.4 G/DL (ref 31.4–37.4)
MCV RBC AUTO: 88 FL (ref 82–98)
MONOCYTES # BLD AUTO: 1.11 THOUSAND/ΜL (ref 0.17–1.22)
MONOCYTES NFR BLD AUTO: 10 % (ref 4–12)
NEUTROPHILS # BLD AUTO: 8.75 THOUSANDS/ΜL (ref 1.85–7.62)
NEUTS SEG NFR BLD AUTO: 81 % (ref 43–75)
NRBC BLD AUTO-RTO: 0 /100 WBCS
NT-PROBNP SERPL-MCNC: ABNORMAL PG/ML
PLATELET # BLD AUTO: 200 THOUSANDS/UL (ref 149–390)
PMV BLD AUTO: 9.9 FL (ref 8.9–12.7)
POTASSIUM SERPL-SCNC: 4.2 MMOL/L (ref 3.5–5.3)
PROT SERPL-MCNC: 7.3 G/DL (ref 6.4–8.2)
QRS AXIS: 74 DEGREES
QRSD INTERVAL: 90 MS
QT INTERVAL: 322 MS
QTC INTERVAL: 455 MS
RBC # BLD AUTO: 4.51 MILLION/UL (ref 3.88–5.62)
SODIUM SERPL-SCNC: 137 MMOL/L (ref 136–145)
T WAVE AXIS: 200 DEGREES
VENTRICULAR RATE: 120 BPM
WBC # BLD AUTO: 10.9 THOUSAND/UL (ref 4.31–10.16)

## 2022-05-09 PROCEDURE — 93010 ELECTROCARDIOGRAM REPORT: CPT | Performed by: INTERNAL MEDICINE

## 2022-05-09 PROCEDURE — 96366 THER/PROPH/DIAG IV INF ADDON: CPT

## 2022-05-09 PROCEDURE — 96365 THER/PROPH/DIAG IV INF INIT: CPT

## 2022-05-09 PROCEDURE — 80053 COMPREHEN METABOLIC PANEL: CPT | Performed by: EMERGENCY MEDICINE

## 2022-05-09 PROCEDURE — 76604 US EXAM CHEST: CPT | Performed by: EMERGENCY MEDICINE

## 2022-05-09 PROCEDURE — 96368 THER/DIAG CONCURRENT INF: CPT

## 2022-05-09 PROCEDURE — 94664 DEMO&/EVAL PT USE INHALER: CPT

## 2022-05-09 PROCEDURE — 83880 ASSAY OF NATRIURETIC PEPTIDE: CPT | Performed by: EMERGENCY MEDICINE

## 2022-05-09 PROCEDURE — 36415 COLL VENOUS BLD VENIPUNCTURE: CPT | Performed by: EMERGENCY MEDICINE

## 2022-05-09 PROCEDURE — 71045 X-RAY EXAM CHEST 1 VIEW: CPT

## 2022-05-09 PROCEDURE — 71275 CT ANGIOGRAPHY CHEST: CPT

## 2022-05-09 PROCEDURE — 99285 EMERGENCY DEPT VISIT HI MDM: CPT

## 2022-05-09 PROCEDURE — 94760 N-INVAS EAR/PLS OXIMETRY 1: CPT

## 2022-05-09 PROCEDURE — 99223 1ST HOSP IP/OBS HIGH 75: CPT | Performed by: INTERNAL MEDICINE

## 2022-05-09 PROCEDURE — G1004 CDSM NDSC: HCPCS

## 2022-05-09 PROCEDURE — 85379 FIBRIN DEGRADATION QUANT: CPT | Performed by: EMERGENCY MEDICINE

## 2022-05-09 PROCEDURE — 99291 CRITICAL CARE FIRST HOUR: CPT | Performed by: EMERGENCY MEDICINE

## 2022-05-09 PROCEDURE — 85025 COMPLETE CBC W/AUTO DIFF WBC: CPT | Performed by: EMERGENCY MEDICINE

## 2022-05-09 PROCEDURE — 93308 TTE F-UP OR LMTD: CPT | Performed by: EMERGENCY MEDICINE

## 2022-05-09 PROCEDURE — 84484 ASSAY OF TROPONIN QUANT: CPT | Performed by: EMERGENCY MEDICINE

## 2022-05-09 PROCEDURE — 83735 ASSAY OF MAGNESIUM: CPT | Performed by: EMERGENCY MEDICINE

## 2022-05-09 PROCEDURE — 93005 ELECTROCARDIOGRAM TRACING: CPT

## 2022-05-09 RX ORDER — MELATONIN
1000 DAILY
Status: DISCONTINUED | OUTPATIENT
Start: 2022-05-09 | End: 2022-05-09

## 2022-05-09 RX ORDER — MAGNESIUM SULFATE HEPTAHYDRATE 40 MG/ML
2 INJECTION, SOLUTION INTRAVENOUS ONCE
Status: COMPLETED | OUTPATIENT
Start: 2022-05-09 | End: 2022-05-09

## 2022-05-09 RX ORDER — ASPIRIN 81 MG/1
324 TABLET, CHEWABLE ORAL ONCE
Status: COMPLETED | OUTPATIENT
Start: 2022-05-09 | End: 2022-05-09

## 2022-05-09 RX ORDER — SODIUM CHLORIDE, SODIUM GLUCONATE, SODIUM ACETATE, POTASSIUM CHLORIDE, MAGNESIUM CHLORIDE, SODIUM PHOSPHATE, DIBASIC, AND POTASSIUM PHOSPHATE .53; .5; .37; .037; .03; .012; .00082 G/100ML; G/100ML; G/100ML; G/100ML; G/100ML; G/100ML; G/100ML
100 INJECTION, SOLUTION INTRAVENOUS ONCE
Status: DISCONTINUED | OUTPATIENT
Start: 2022-05-09 | End: 2022-05-09

## 2022-05-09 RX ORDER — ISOSORBIDE MONONITRATE 60 MG/1
60 TABLET, EXTENDED RELEASE ORAL DAILY
Status: DISCONTINUED | OUTPATIENT
Start: 2022-05-10 | End: 2022-05-13 | Stop reason: HOSPADM

## 2022-05-09 RX ORDER — TAMSULOSIN HYDROCHLORIDE 0.4 MG/1
0.8 CAPSULE ORAL
Status: DISCONTINUED | OUTPATIENT
Start: 2022-05-09 | End: 2022-05-13 | Stop reason: HOSPADM

## 2022-05-09 RX ORDER — SODIUM CHLORIDE, SODIUM GLUCONATE, SODIUM ACETATE, POTASSIUM CHLORIDE, MAGNESIUM CHLORIDE, SODIUM PHOSPHATE, DIBASIC, AND POTASSIUM PHOSPHATE .53; .5; .37; .037; .03; .012; .00082 G/100ML; G/100ML; G/100ML; G/100ML; G/100ML; G/100ML; G/100ML
100 INJECTION, SOLUTION INTRAVENOUS CONTINUOUS
Status: DISCONTINUED | OUTPATIENT
Start: 2022-05-09 | End: 2022-05-09

## 2022-05-09 RX ORDER — CLOPIDOGREL BISULFATE 75 MG/1
75 TABLET ORAL DAILY
Status: DISCONTINUED | OUTPATIENT
Start: 2022-05-10 | End: 2022-05-10

## 2022-05-09 RX ORDER — ESCITALOPRAM OXALATE 10 MG/1
10 TABLET ORAL DAILY
Status: DISCONTINUED | OUTPATIENT
Start: 2022-05-10 | End: 2022-05-13 | Stop reason: HOSPADM

## 2022-05-09 RX ORDER — PANTOPRAZOLE SODIUM 40 MG/1
40 TABLET, DELAYED RELEASE ORAL
Status: DISCONTINUED | OUTPATIENT
Start: 2022-05-10 | End: 2022-05-13 | Stop reason: HOSPADM

## 2022-05-09 RX ORDER — ATORVASTATIN CALCIUM 40 MG/1
40 TABLET, FILM COATED ORAL
Status: DISCONTINUED | OUTPATIENT
Start: 2022-05-09 | End: 2022-05-13 | Stop reason: HOSPADM

## 2022-05-09 RX ORDER — FUROSEMIDE 10 MG/ML
20 INJECTION INTRAMUSCULAR; INTRAVENOUS ONCE
Status: COMPLETED | OUTPATIENT
Start: 2022-05-09 | End: 2022-05-09

## 2022-05-09 RX ORDER — FUROSEMIDE 10 MG/ML
40 INJECTION INTRAMUSCULAR; INTRAVENOUS
Status: DISCONTINUED | OUTPATIENT
Start: 2022-05-09 | End: 2022-05-10

## 2022-05-09 RX ORDER — MELATONIN
1000 DAILY
Status: DISCONTINUED | OUTPATIENT
Start: 2022-05-10 | End: 2022-05-13 | Stop reason: HOSPADM

## 2022-05-09 RX ORDER — DILTIAZEM HYDROCHLORIDE 5 MG/ML
10 INJECTION INTRAVENOUS ONCE
Status: COMPLETED | OUTPATIENT
Start: 2022-05-09 | End: 2022-05-09

## 2022-05-09 RX ORDER — SUCRALFATE 1 G/1
1 TABLET ORAL 2 TIMES DAILY
Status: DISCONTINUED | OUTPATIENT
Start: 2022-05-09 | End: 2022-05-13 | Stop reason: HOSPADM

## 2022-05-09 RX ADMIN — TAMSULOSIN HYDROCHLORIDE 0.8 MG: 0.4 CAPSULE ORAL at 15:45

## 2022-05-09 RX ADMIN — MAGNESIUM SULFATE HEPTAHYDRATE 2 G: 40 INJECTION, SOLUTION INTRAVENOUS at 09:31

## 2022-05-09 RX ADMIN — SODIUM CHLORIDE, SODIUM GLUCONATE, SODIUM ACETATE, POTASSIUM CHLORIDE, MAGNESIUM CHLORIDE, SODIUM PHOSPHATE, DIBASIC, AND POTASSIUM PHOSPHATE 100 ML/HR: .53; .5; .37; .037; .03; .012; .00082 INJECTION, SOLUTION INTRAVENOUS at 09:38

## 2022-05-09 RX ADMIN — DILTIAZEM HYDROCHLORIDE 30 MG: 30 TABLET, FILM COATED ORAL at 18:28

## 2022-05-09 RX ADMIN — ASPIRIN 81 MG CHEWABLE TABLET 324 MG: 81 TABLET CHEWABLE at 12:04

## 2022-05-09 RX ADMIN — FUROSEMIDE 20 MG: 10 INJECTION, SOLUTION INTRAMUSCULAR; INTRAVENOUS at 12:06

## 2022-05-09 RX ADMIN — ATORVASTATIN CALCIUM 40 MG: 40 TABLET, FILM COATED ORAL at 15:45

## 2022-05-09 RX ADMIN — DILTIAZEM HYDROCHLORIDE 10 MG: 5 INJECTION INTRAVENOUS at 12:19

## 2022-05-09 RX ADMIN — APIXABAN 2.5 MG: 2.5 TABLET, FILM COATED ORAL at 18:28

## 2022-05-09 RX ADMIN — SUCRALFATE 1 G: 1 TABLET ORAL at 18:28

## 2022-05-09 RX ADMIN — FUROSEMIDE 40 MG: 10 INJECTION, SOLUTION INTRAMUSCULAR; INTRAVENOUS at 16:34

## 2022-05-09 RX ADMIN — IOHEXOL 85 ML: 350 INJECTION, SOLUTION INTRAVENOUS at 09:46

## 2022-05-09 NOTE — ASSESSMENT & PLAN NOTE
Lab Results   Component Value Date    EGFR 32 05/09/2022    EGFR 31 03/15/2022    EGFR 30 02/28/2022    CREATININE 1 85 (H) 05/09/2022    CREATININE 1 89 (H) 03/15/2022    CREATININE 1 97 (H) 02/28/2022   Patient has chronic kidney disease creatinine 1 85, baseline is 1 65-1 8  Will monitor kidney function, for now will continue PPI will change to H2 if kidney function worsens

## 2022-05-09 NOTE — H&P
See documented H&P by the family medicine resident  Patient presents with increasing shortness of breath, abnormal CT scan, on clinical exam appears euvolemic, BNP is markedly elevated, will trial course of diuretic therapy, as well as rate control of AFib with RVR  Cardiology consult requested, if respiratory status is not improving with diuretics patient will need to be seen by Pulmonary Medicine  Patient started on anticoagulation with Eliquis, given elevated CHADS2 Vasc score  Continue Cardizem 30 mg p o  Q 6 hours, hold for systolic blood pressure less than 90  Follow up a m  Labs, plan of care discussed with nursing staff, I attempted to contact the patient's daughter via telephone, no answer

## 2022-05-09 NOTE — ASSESSMENT & PLAN NOTE
Continue home medication tamsulosin, Imdur, Lipitor, Plavix  Propranolol( for esophageal varices )     EGD 12/16/2021   IMPRESSION:  Esophageal varices, fairly small and nonbleeding but involving the lower 2/3 of the esophagus  Upper and lower esophageal ring, both dilated cautiously      Follow-up vitals

## 2022-05-09 NOTE — PROGRESS NOTES
Patient received from ER via stretcher, transferred into hospital bed and placed on monitor,vs taken and recorded,  assessment completed and given call bell

## 2022-05-09 NOTE — ED NOTES
Patient transported to [place] [address], [address] [facility]  05/09/22 [id number]

## 2022-05-09 NOTE — LETTER
May 13, 2022     [address] [facility] [address]    Patient: Dayana Antoine   YOB: 1935   Date of Visit: 5/9/2022         Thank you for referring Wiley Cam to me for evaluation  Below are the relevant portions of my H&P  If you have questions, please do not hesitate to call me  I look forward to following [place] along with you  Sincerely,        No name on file  CC: No Recipients  [address], [place]  5/9/2022  7:08 PM  Signed  See documented H&P by the family medicine resident  Patient presents with increasing shortness of breath, abnormal CT scan, on clinical exam appears euvolemic, BNP is markedly elevated, will trial course of diuretic therapy, as well as rate control of AFib with RVR  Cardiology consult requested, if respiratory status is not improving with diuretics patient will need to be seen by Pulmonary Medicine  Patient started on anticoagulation with Eliquis, given elevated CHADS2 Vasc score  Continue Cardizem 30 mg p o  Q 6 hours, hold for systolic blood pressure less than 90  Follow up a m  Labs, plan of care discussed with nursing staff, I attempted to contact the patient's daughter via telephone, no answer  Gilbert Leon MD  5/10/2022  7:16 AM  Attested  [address] 1935, 82 y o  male MRN: [id number]  Unit/Bed#:  Encounter: [id number]  Primary Care Provider: Luly Hawley DO   Date and time admitted to hospital: 5/9/2022  8:12 AM    * Acute on chronic diastolic congestive heart failure [facility])  Assessment & Plan  Wt Readings from Last 3 Encounters:   05/10/22 69 9 kg (154 lb 1 6 oz)   04/22/22 73 5 kg (162 lb)   04/04/22 73 7 kg (162 lb 6 4 oz)     Patient presents with shortness of breath    In the ED :  Labs notable for increased BNP 23 302, BUN 26 creatinine 1 85, chest x-ray remarkable for vascular congestion, CT on 5/9 - no PE, calcified granuloma are stable     MEDIASTINUM AND RENARD: Anterior mediastinal lymph nodes are seen measuring about 7 mm, 8 mm  Subaortic the lymph nodes are seen measuring about 1 6 cm lower right paratracheal lymph node seen measuring 1 5 cm  Subcarinal nodes measuring about 1 6 cm  Mild enlargement of the hilar region,  ALONSO-notable for AFib with RVR  Patient received in the ED magnesium sulfate 2 mg, Lasix 20 mg IV Cardizem 10 mg IV, IVF bolus    Admit med surge    Echo 02/21/2022 EF 65 %  Strict I and O's, weight check,  Will give Lasix 40 mg b i d  IV  Patient uses oxygen 3 L at home  Follow-up labs tomorrow a m  Essential hypertension  Assessment & Plan  Continue home medication tamsulosin, Imdur, Lipitor, Plavix  Propranolol( for esophageal varices )     EGD 12/16/2021   IMPRESSION:  Esophageal varices, fairly small and nonbleeding but involving the lower 2/3 of the esophagus  Upper and lower esophageal ring, both dilated cautiously  Follow-up vitals    Stage 3b chronic kidney disease [facility])  Assessment & Plan  Lab Results   Component Value Date    EGFR 31 05/10/2022    EGFR 32 05/09/2022    EGFR 31 03/15/2022    CREATININE 1 90 (H) 05/10/2022    CREATININE 1 85 (H) 05/09/2022    CREATININE 1 89 (H) 03/15/2022   Patient has chronic kidney disease creatinine 1 85, baseline is 1 65-1 8  Will monitor kidney function, for now will continue PPI will change to H2 if kidney function worsens    Type 2 diabetes mellitus ([facility] [address] )  Assessment & Plan  Lab Results   Component Value Date    HGBA1C 9 5 (H) 02/19/2022       No results for input(s): POCGLU in the last 72 hours  Blood Sugar Average: Last 72 hrs:   A1c not a goal , uncontrolled      Oral meds hold  Will start lispro, AC/HS  Hypoglycemic protocol  Low-sodium diet    VTE Prophylaxis: Apixaban (Eliquis)  / sequential compression device   Code Status: full  Discussion with family: patient    Anticipated Length of Stay:  Patient will be admitted on an Inpatient basis with an anticipated length of stay of  More than 2 midnights  Justification for Hospital Stay: Iv diuresis and possible Cardizem use , for Afib     Total Time for Visit, including Counseling / Coordination of Care: 60 minutes  Greater than 50% of this total time spent on direct patient counseling and coordination of care  Chief Complaint:   SOB and right knee pain    History of Present Illness:    Shmuel Singh is a 82 y o   with past medical history of CKD stage IIIB, hypertension, hyperlipidemia, diabetes uncontrolled who presents today with shortness of breath and right knee pain  He reports since Thursday he has been having right knee pain in the knee was swollen, which significantly improved by today  He is not able to ambulate normally because of the pain  Did not take any pain medication  Patient reports that he has chronic shortness of breath but recent couple of days it progressively got worse  He is on home oxygen 3 L tried to increase it to 4 L but it did not help  Shortness of breath gets worse with walking, and alleviating with laying down in his bed  He denies fever, chest pain, headache, nausea, fever, recent URI symptoms chills, vomiting, diarrhea, constipation, abdominal pain, history of PE and DVT,     In the ED given magnesium sulfate, Lasix 20 mg IV Cardizem 10 mg IV, IV fluids bolus  Patient will be admitted to [facility] level 4 for the management and evaluation of AFib and acute diastolic CHF       Review of Systems:    Review of Systems     Constitutional: Positive for activity change  Negative for appetite change ( Due to right knee pain), chills, diaphoresis and fatigue  HENT: Negative for congestion  Respiratory: Positive for shortness of breath  Negative for cough and chest tightness  Cardiovascular: Positive for leg swelling  Negative for chest pain and palpitations  Gastrointestinal: Negative for abdominal distention, constipation, diarrhea, nausea and vomiting     Genitourinary: Negative for difficulty urinating and urgency  Skin: Negative for color change and pallor  Neurological: Negative for dizziness, weakness, numbness and headaches  Psychiatric/Behavioral: Negative for confusion  Past Medical and Surgical History:     Past Medical History:   Diagnosis Date    A-fib ([facility] )     Arthritis     Athscl 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     Stent OM1  Patent mammary graft to LAD 10/7/2009; CABG 1992 & 2005    Cancer [facility])     skin    Coronary artery disease     Diabetes mellitus ([facility] )     Diabetes mellitus, type II ([facility] )     without complication    Epistaxis     Essential (primary) hypertension     GERD (gastroesophageal reflux disease)     Hx of cardiovascular stress test 07/23/2014    Eddie MPI; EF0 52 (52%) Lexiscan, mild LV systolic dysfunction; evidence for prior inferior wall MI; perfusion imaging consistant w mild lat wall ischemia and min torin-infart inferior ischemia   / EF0 51 (51%) evidence for prior inferior wall MI  Mild inferior and mild-moderate lateral wall ischemia  7/29/15    Hx of echocardiogram 11/07/2017    2D w/CFD;EF0 55 (55%) mild LVH, mitral valve prolapse with moderate regurgitation  left atrial enlargement  Mild tricuspid regurgitation   Hypertension     Mixed hyperlipidemia     Occlusion and stenosis of bilateral carotid arteries     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 infarction)     Presence of aortocoronary bypass graft        Past Surgical History:   Procedure Laterality Date    CARDIAC CATHETERIZATION  10/07/2009    Stent 99% stenosis SVG from the aorta to OM1  Patent mammary graft to the LAD    CORONARY ARTERY BYPASS GRAFT  1992    VG-CX, VG-RCA    CORONARY ARTERY BYPASS GRAFT  11/04/2005    LIMA-D1-LAD, VG-PDA-PLB, [id number] TMR 9 Lesions    AK EGD TRANSORAL BIOPSY SINGLE/MULTIPLE N/A 1/23/2019    Procedure: ESOPHAGOGASTRODUODENOSCOPY (EGD) with biopsy;  Surgeon: Talon Fonseca MD;  Location: [address] [place] [address] GI LAB;   Service: Gastroenterology   Lori TONSILLECTOMY         Meds/Allergies:    Prior to Admission medications    Medication Sig Start Date End Date Taking? Authorizing Provider   atorvastatin (LIPITOR) 40 mg tablet Take 1 tablet (40 mg total) by mouth daily with dinner This is an increased dose to better help prevent heart attack and stroke  2/22/22  Yes Janis Reid,    cholecalciferol (VITAMIN D3) 1,000 units tablet Take 1 tablet (1,000 Units total) by mouth daily 2/22/22  Yes Kirill Antunez DO   clopidogrel (PLAVIX) 75 mg tablet Take 1 tablet (75 mg total) by mouth daily Hold until seen by ENT on 9/23/19 2/14/20  Yes Gregorio Reese DO   escitalopram (LEXAPRO) 10 mg tablet Take 10 mg by mouth daily   Yes Historical Provider, MD   isosorbide mononitrate (IMDUR) 60 mg 24 hr tablet Take 1 tablet (60 mg total) by mouth daily In the morning 2/22/22  Yes Kirill Antunez DO   linaGLIPtin (Tradjenta) 5 MG TABS Take 5 mg by mouth daily Take this for your diabetes instead of glimepiride 2/22/22  Yes Kirill Antunez DO   pantoprazole (PROTONIX) 40 mg tablet Take 40 mg by mouth daily 2/23/22  Yes Historical Provider, MD   sucralfate (CARAFATE) 1 g tablet Take 1 g by mouth 2 (two) times a day   Yes Historical Provider, MD   tamsulosin (FLOMAX) 0 4 mg Take 2 capsules (0 8 mg total) by mouth daily with dinner 10/29/21  Yes Gregorio Reese,      I have reviewed home medications with patient personally  Allergies: Allergies   Allergen Reactions    Ibuprofen Fatigue     Other reaction(s): decreased kidney function       Social History:     Marital Status:     Occupation: retired  Patient Pre-hospital Living Situation: lives at home himself and has 2 cats  Patient Pre-hospital Level of Mobility: not restricted till last Thursday because of right knee pain  Patient Pre-hospital Diet Restrictions: low sodium diet   Substance Use History:   Social History     Substance and Sexual Activity   Alcohol Use Never     Social History     Tobacco Use   Smoking Status Former Smoker    Packs/day: 1 00    Years: 40 00    Pack years: 40 00    Quit date: 1977    Years since quittin 3   Smokeless Tobacco Never Used     Social History     Substance and Sexual Activity   Drug Use No       Family History:    non-contributory    Physical Exam:     Vitals:   Blood Pressure: 124/57 (05/10/22 0611)  Pulse: 100 (05/10/22 0611)  Temperature: 99 3 °F (37 4 °C) (05/10/22 06)  Temp Source: Tympanic (05/10/22 06)  Respirations: (!) 37 (05/10/22 06)  Height: 5' 6" (167 6 cm) (22 1300)  Weight - Scale: 69 9 kg (154 lb 1 6 oz) (05/10/22 0544)  SpO2: 94 % (05/10/22 06)    Physical Exam     Vitals:   Blood Pressure: 103/57 (22 1600)  Pulse: 104 (22 1600)  Temperature: 98 7 °F (37 1 °C) (22 1600)  Temp Source: Tympanic (22 1600)  Respirations: (!) 54 (22 1600)  Height: 5' 6" (167 6 cm) (22 1300)  Weight - Scale: 70 9 kg (156 lb 4 9 oz) (22 1514)  SpO2: 91 % (22 1600)     Physical Exam  Vitals and nursing note reviewed  Constitutional:       Appearance: He is well-developed  He is obese  He is ill-appearing  HENT:      Head: Normocephalic and atraumatic  Mouth/Throat:      Mouth: Mucous membranes are dry  Eyes:      Conjunctiva/sclera: Conjunctivae normal    Cardiovascular:      Rate and Rhythm: Bradycardia present  Rhythm irregular  Heart sounds: Normal heart sounds  No murmur heard        Pulmonary:      Effort: Pulmonary effort is normal  No respiratory distress  Breath sounds: Decreased breath sounds and rales present  Abdominal:      General: Abdomen is flat  Bowel sounds are normal  There is no distension  Palpations: Abdomen is soft  Tenderness: There is no abdominal tenderness  Musculoskeletal:      Cervical back: Neck supple  Right lower leg: Swelling present  Edema ( Mild) present  Left lower leg: Edema ( Mild) present  Legs:    Skin:     General: Skin is warm and dry  Capillary Refill: Capillary refill takes 2 to 3 seconds  Neurological:      Mental Status: He is alert  Additional Data:     Lab Results: I have personally reviewed pertinent reports  Results from last 7 days   Lab Units 05/10/22  0441   WBC Thousand/uL 9 83   HEMOGLOBIN g/dL 13 3   HEMATOCRIT % 40 5   PLATELETS Thousands/uL 216   NEUTROS PCT % 82*   LYMPHS PCT % 8*   MONOS PCT % 10   EOS PCT % 0     Results from last 7 days   Lab Units 05/10/22  0441   SODIUM mmol/L 137   POTASSIUM mmol/L 3 8   CHLORIDE mmol/L 100   CO2 mmol/L 27   BUN mg/dL 35*   CREATININE mg/dL 1 90*   ANION GAP mmol/L 10   CALCIUM mg/dL 9 7   ALBUMIN g/dL 3 1*   TOTAL BILIRUBIN mg/dL 2 02*   ALK PHOS U/L 66   ALT U/L 21   AST U/L 13   GLUCOSE RANDOM mg/dL 232*                 Results from last 7 days   Lab Units 05/10/22  0441   PROCALCITONIN ng/ml 0 38*       Imaging: I have personally reviewed pertinent reports  CTA ED chest PE Study   Final Result by Shaun Robins MD (05/09 1030)   No pulmonary embolism seen   There is a interlobular septal thickening with reticulation, small bilateral effusion and fissural thickening, suggest pulmonary congestion, new from the previous study      There are patchy areas of groundglass density in the right middle lobe area, right lower lobe may be due to infiltrate or related to heart failure  Follow-up suggested in 2-3 months to demonstrate resolution      Mild increasing enlargement of the mediastinal lymph nodes, possibly reactive         No acute airspace consolidation      The study was marked in EPIC for immediate notification  Workstation performed: [id number]         XR chest 1 view portable   ED Interpretation by Akilah Richards DO (05/09 6203)   Diffuse bilateral haziness worse on the left side  Change compared to x-ray from February 2022         Final Result by Butch Russell MD (91/95 5324)      Vascular congestion  Workstation performed: [id number]             EKG, Pathology, and Other Studies Reviewed on Admission:   · EKG: Afib    Allscripts / Epic Records Reviewed: Yes     ** Please Note: This note has been constructed using a voice recognition system  **  Attestation signed by Beverly Dye DO at 5/10/2022  7:53 AM:    Standard Teaching Supervising Statement    I have reviewed the note performed and documented by the Resident  I personally performed the required components/examined the patient   I agree with the Resident's findings and plan of care with the following additions/exceptions:     See my brief note dictated same day of admission    Beverly Dye DO

## 2022-05-09 NOTE — PROGRESS NOTES
[facility] [address]  Progress Note - Nicci Schultz 1935, 82 y o  male MRN: [id number]  Unit/Bed#:  Encounter: [id number]  Primary Care Provider: Malorie Cardoso,    Date and time admitted to hospital: 5/9/2022  8:12 AM    Atrial fibrillation [facility])  Assessment & Plan  History of AFib  In the ED presented with AFib given Cardizem 10 mg IV  Will continue Cardizem 30 mg q 6 p o  if rate is not controlled with p o  will start Cardizem gtt  Eliquis 2 5 mg b i d  Chads2 VASc score is 5  Will stop home medication propranolol  Consult cardiology, for possible DCCV    Essential hypertension  Assessment & Plan  Continue home medication tamsulosin, Imdur, Lipitor, Plavix  Propranolol( for esophageal varices )     EGD 12/16/2021   IMPRESSION:  Esophageal varices, fairly small and nonbleeding but involving the lower 2/3 of the esophagus  Upper and lower esophageal ring, both dilated cautiously  Follow-up vitals    Acute on chronic diastolic congestive heart failure [facility])  Assessment & Plan  Wt Readings from Last 3 Encounters:   05/09/22 70 9 kg (156 lb 4 9 oz)   04/22/22 73 5 kg (162 lb)   04/04/22 73 7 kg (162 lb 6 4 oz)     Patient presents with shortness of breath  In the ED :  Labs notable for increased BNP 23 302, BUN 26 creatinine 1 85, chest x-ray remarkable for vascular congestion, CT on 5/9 - no PE, calcified granuloma are stable     MEDIASTINUM AND RENARD:  Anterior mediastinal lymph nodes are seen measuring about 7 mm, 8 mm  Subaortic the lymph nodes are seen measuring about 1 6 cm lower right paratracheal lymph node seen measuring 1 5 cm  Subcarinal nodes measuring about 1 6 cm  Mild enlargement of the hilar region,  ALONSO-notable for AFib with RVR    Patient received in the ED magnesium sulfate 2 mg, Lasix 20 mg IV Cardizem 10 mg IV, IVF bolus    Admit med surge    Echo 02/21/2022 EF 65 %  Strict I and O's, weight check,  Will give Lasix 40 mg b i d  IV  Patient uses oxygen 3 L at home  Follow-up labs tomorrow a m  Stage 3b chronic kidney disease [facility])  Assessment & Plan  Lab Results   Component Value Date    EGFR 32 05/09/2022    EGFR 31 03/15/2022    EGFR 30 02/28/2022    CREATININE 1 85 (H) 05/09/2022    CREATININE 1 89 (H) 03/15/2022    CREATININE 1 97 (H) 02/28/2022   Patient has chronic kidney disease creatinine 1 85, baseline is 1 65-1 8  Will monitor kidney function, for now will continue PPI will change to H2 if kidney function worsens    Type 2 diabetes mellitus ([facility] )  Assessment & Plan  Lab Results   Component Value Date    HGBA1C 9 5 (H) 02/19/2022       No results for input(s): POCGLU in the last 72 hours  Blood Sugar Average: Last 72 hrs:   A1c not a goal , uncontrolled    Oral meds hold  Will start lispro, AC/HS  Hypoglycemic protocol  Low-sodium diet      VTE Prophylaxis: Apixaban (Eliquis)  / sequential compression device   Code Status:  Full   Discussion with family:  Patient    Anticipated Length of Stay:  Patient will be admitted on an Inpatient basis with an anticipated length of stay of  more than 2 midnights  Justification for Hospital Stay:  IV diuresis and possible Cardizem use, for AFib    Total Time for Visit, including Counseling / Coordination of Care: 60 minutes  Greater than 50% of this total time spent on direct patient counseling and coordination of care  Chief Complaint:   Shortness of breath and right knee pain    History of Present Illness:    Lenora James is a 82 y o  male with past medical history of CKD stage IIIB, hypertension, hyperlipidemia, diabetes uncontrolled who presents today with shortness of breath and right knee pain  He reports since Thursday he has been having right knee pain in the knee was swollen, which significantly improved by today  He is not able to ambulate normally because of the pain  Did not take any pain medication    Patient reports that he has chronic shortness of breath but recent couple of days it progressively got worse  He is on home oxygen 3 L tried to increase it to 4 L but it did not help  Shortness of breath gets worse with walking, and alleviating with laying down in his bed  He denies fever, chest pain, headache, nausea, fever, recent URI symptoms chills, vomiting, diarrhea, constipation, abdominal pain, history of PE and DVT,    In the ED given magnesium sulfate, Lasix 20 mg IV Cardizem 10 mg IV, IV fluids bolus  Patient will be admitted to [facility] for the management and evaluation of AFib and acute diastolic CHF    Review of Systems:    Review of Systems   Constitutional: Positive for activity change  Negative for appetite change ( Due to right knee pain), chills, diaphoresis and fatigue  HENT: Negative for congestion  Respiratory: Positive for shortness of breath  Negative for cough and chest tightness  Cardiovascular: Positive for leg swelling  Negative for chest pain and palpitations  Gastrointestinal: Negative for abdominal distention, constipation, diarrhea, nausea and vomiting  Genitourinary: Negative for difficulty urinating and urgency  Skin: Negative for color change and pallor  Neurological: Negative for dizziness, weakness, numbness and headaches  Psychiatric/Behavioral: Negative for confusion  Past Medical and Surgical History:     Past Medical History:   Diagnosis Date    A-fib ([facility] )     Arthritis     Athscl 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     Stent OM1   Patent mammary graft to LAD 10/7/2009; CABG 1992 & 2005    Cancer [facility])     skin    Coronary artery disease     Diabetes mellitus ([facility] )     Diabetes mellitus, type II ([facility] )     without complication    Epistaxis     Essential (primary) hypertension     GERD (gastroesophageal reflux disease)     Hx of cardiovascular stress test 07/23/2014    Eddie MPI; EF0 52 (52%) Lexiscan, mild LV systolic dysfunction; evidence for prior inferior wall MI; perfusion imaging consistant w mild lat wall ischemia and min torin-infart inferior ischemia   / EF0 51 (51%) evidence for prior inferior wall MI  Mild inferior and mild-moderate lateral wall ischemia  7/29/15    Hx of echocardiogram 11/07/2017    2D w/CFD;EF0 55 (55%) mild LVH, mitral valve prolapse with moderate regurgitation  left atrial enlargement  Mild tricuspid regurgitation   Hypertension     Mixed hyperlipidemia     Occlusion and stenosis of bilateral carotid arteries     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 infarction)     Presence of aortocoronary bypass graft        Past Surgical History:   Procedure Laterality Date    CARDIAC CATHETERIZATION  10/07/2009    Stent 99% stenosis SVG from the aorta to OM1  Patent mammary graft to the LAD    CORONARY ARTERY BYPASS GRAFT  1992    VG-CX, VG-RCA    CORONARY ARTERY BYPASS GRAFT  11/04/2005    LIMA-D1-LAD, VG-PDA-PLB, [id number] TMR 9 Lesions    MI EGD TRANSORAL BIOPSY SINGLE/MULTIPLE N/A 1/23/2019    Procedure: ESOPHAGOGASTRODUODENOSCOPY (EGD) with biopsy;  Surgeon: Chan Tran MD;  Location: [address] [place] [address] GI LAB; Service: Gastroenterology    TONSILLECTOMY         Meds/Allergies:    Prior to Admission medications    Medication Sig Start Date End Date Taking? Authorizing Provider   atorvastatin (LIPITOR) 40 mg tablet Take 1 tablet (40 mg total) by mouth daily with dinner This is an increased dose to better help prevent heart attack and stroke   2/22/22  Yes Kaya Madsen, DO   cholecalciferol (VITAMIN D3) 1,000 units tablet Take 1 tablet (1,000 Units total) by mouth daily 2/22/22  Yes Xavi Antunez, DO   clopidogrel (PLAVIX) 75 mg tablet Take 1 tablet (75 mg total) by mouth daily Hold until seen by ENT on 9/23/19 2/14/20  Yes Yamilex Rajput, DO   escitalopram (LEXAPRO) 10 mg tablet Take 10 mg by mouth daily   Yes Historical Provider, MD   isosorbide mononitrate (IMDUR) 60 mg 24 hr tablet Take 1 tablet (60 mg total) by mouth daily In the morning 2/22/22  Yes Kaya Madsen, DO linaGLIPtin (Tradjenta) 5 MG TABS Take 5 mg by mouth daily Take this for your diabetes instead of glimepiride 22  Yes Keatchie Sandi Carrerar,    pantoprazole (PROTONIX) 40 mg tablet Take 40 mg by mouth daily 22  Yes Historical Provider, MD   sucralfate (CARAFATE) 1 g tablet Take 1 g by mouth 2 (two) times a day   Yes Historical Provider, MD   tamsulosin (FLOMAX) 0 4 mg Take 2 capsules (0 8 mg total) by mouth daily with dinner 10/29/21  Yes Dread Moulding, DO   furosemide (LASIX) 20 mg tablet Take 1 tablet (20 mg total) by mouth 2 (two) times a day 22 Yes Keatchie Sandi Antunez,    propranolol (INDERAL LA) 120 mg 24 hr capsule Take 120 mg by mouth daily  22 Yes Historical Provider, MD     I have reviewed home medications with patient personally  Allergies: Allergies   Allergen Reactions    Ibuprofen Fatigue     Other reaction(s): decreased kidney function       Social History:     Marital Status:     Occupation:  Retired  Patient Pre-hospital Living Situation:  Lives at home by himself and has 2 cats  Patient Pre-hospital Level of Mobility:  Not restricted till last Thursday because of right knee pain  Patient Pre-hospital Diet Restrictions:  Low-sodium diet  Substance Use History:   Social History     Substance and Sexual Activity   Alcohol Use Never     Social History     Tobacco Use   Smoking Status Former Smoker    Packs/day: 1 00    Years: 40 00    Pack years: 40 00    Quit date: 1977    Years since quittin 3   Smokeless Tobacco Never Used     Social History     Substance and Sexual Activity   Drug Use No       Family History:    non-contributory    Physical Exam:     Vitals:   Blood Pressure: 103/57 (22 1600)  Pulse: 104 (22 1600)  Temperature: 98 7 °F (37 1 °C) (22 1600)  Temp Source: Tympanic (22 1600)  Respirations: (!) 54 (22 1600)  Height: 5' 6" (167 6 cm) (22 1300)  Weight - Scale: 70 9 kg (156 lb 4 9 oz) (05/09/22 1514)  SpO2: 91 % (05/09/22 1600)    Physical Exam  Vitals and nursing note reviewed  Constitutional:       Appearance: He is well-developed  He is obese  He is ill-appearing  HENT:      Head: Normocephalic and atraumatic  Mouth/Throat:      Mouth: Mucous membranes are dry  Eyes:      Conjunctiva/sclera: Conjunctivae normal    Cardiovascular:      Rate and Rhythm: Bradycardia present  Rhythm irregular  Heart sounds: Normal heart sounds  No murmur heard  Pulmonary:      Effort: Pulmonary effort is normal  No respiratory distress  Breath sounds: Decreased breath sounds and rales present  Abdominal:      General: Abdomen is flat  Bowel sounds are normal  There is no distension  Palpations: Abdomen is soft  Tenderness: There is no abdominal tenderness  Musculoskeletal:      Cervical back: Neck supple  Right lower leg: Swelling present  Edema ( Mild) present  Left lower leg: Edema ( Mild) present  Legs:    Skin:     General: Skin is warm and dry  Capillary Refill: Capillary refill takes 2 to 3 seconds  Neurological:      Mental Status: He is alert  Additional Data:     Lab Results: I have personally reviewed pertinent reports  Results from last 7 days   Lab Units 05/09/22  0827   WBC Thousand/uL 10 90*   HEMOGLOBIN g/dL 12 9   HEMATOCRIT % 39 8   PLATELETS Thousands/uL 200   NEUTROS PCT % 81*   LYMPHS PCT % 8*   MONOS PCT % 10   EOS PCT % 0     Results from last 7 days   Lab Units 05/09/22  0827   SODIUM mmol/L 137   POTASSIUM mmol/L 4 2   CHLORIDE mmol/L 101   CO2 mmol/L 27   BUN mg/dL 26*   CREATININE mg/dL 1 85*   ANION GAP mmol/L 9   CALCIUM mg/dL 9 6   ALBUMIN g/dL 3 2*   TOTAL BILIRUBIN mg/dL 2 12*   ALK PHOS U/L 64   ALT U/L 22   AST U/L 13   GLUCOSE RANDOM mg/dL 229*                       Imaging: I have personally reviewed pertinent reports        CTA ED chest PE Study   Final Result by Jacob Tellez MD (05/09 1030)   No pulmonary embolism seen   There is a interlobular septal thickening with reticulation, small bilateral effusion and fissural thickening, suggest pulmonary congestion, new from the previous study      There are patchy areas of groundglass density in the right middle lobe area, right lower lobe may be due to infiltrate or related to heart failure  Follow-up suggested in 2-3 months to demonstrate resolution      Mild increasing enlargement of the mediastinal lymph nodes, possibly reactive         No acute airspace consolidation      The study was marked in EPIC for immediate notification  Workstation performed: [id number]         XR chest 1 view portable   ED Interpretation by Sarah Whittington DO (05/09 9666)   Diffuse bilateral haziness worse on the left side  Change compared to x-ray from February 2022  Final Result by Fanny Leos MD (05/09 5944)      Vascular congestion  Workstation performed: [id number]             EKG, Pathology, and Other Studies Reviewed on Admission:   · EKG:  AFib    Allscripts / Epic Records Reviewed: Yes     ** Please Note: This note has been constructed using a voice recognition system   **

## 2022-05-09 NOTE — ASSESSMENT & PLAN NOTE
Wt Readings from Last 3 Encounters:   05/09/22 70 9 kg (156 lb 4 9 oz)   04/22/22 73 5 kg (162 lb)   04/04/22 73 7 kg (162 lb 6 4 oz)     Patient presents with shortness of breath  In the ED :  Labs notable for increased BNP 23 302, BUN 26 creatinine 1 85, chest x-ray remarkable for vascular congestion, CT on 5/9 - no PE, calcified granuloma are stable     MEDIASTINUM AND RENARD:  Anterior mediastinal lymph nodes are seen measuring about 7 mm, 8 mm  Subaortic the lymph nodes are seen measuring about 1 6 cm lower right paratracheal lymph node seen measuring 1 5 cm  Subcarinal nodes measuring about 1 6 cm  Mild enlargement of the hilar region,  ALONSO-notable for AFib with RVR  Patient received in the ED magnesium sulfate 2 mg, Lasix 20 mg IV Cardizem 10 mg IV, IVF bolus    Admit med surge    Echo 02/21/2022 EF 65 %  Strict I and O's, weight check,  Will give Lasix 40 mg b i d  IV  Patient uses oxygen 3 L at home  Follow-up labs tomorrow a m

## 2022-05-09 NOTE — PLAN OF CARE
Problem: PAIN - ADULT  Goal: Verbalizes/displays adequate comfort level or baseline comfort level  Description: Interventions:  - Encourage patient to monitor pain and request assistance  - Assess pain using appropriate pain scale  - Administer analgesics based on type and severity of pain and evaluate response  - Implement non-pharmacological measures as appropriate and evaluate response  - Consider cultural and social influences on pain and pain management  - Notify physician/advanced practitioner if interventions unsuccessful or patient reports new pain  Outcome: Progressing     Problem: INFECTION - ADULT  Goal: Absence or prevention of progression during hospitalization  Description: INTERVENTIONS:  - Assess and monitor for signs and symptoms of infection  - Monitor lab/diagnostic results  - Monitor all insertion sites, i e  indwelling lines, tubes, and drains  - Monitor endotracheal if appropriate and nasal secretions for changes in amount and color  - [place] appropriate cooling/warming therapies per order  - Administer medications as ordered  - Instruct and encourage patient and family to use good hand hygiene technique  - Identify and instruct in appropriate isolation precautions for identified infection/condition  Outcome: Progressing  Goal: Absence of fever/infection during neutropenic period  Description: INTERVENTIONS:  - Monitor WBC    Outcome: Progressing     Problem: SAFETY ADULT  Goal: Patient will remain free of falls  Description: INTERVENTIONS:  - Educate patient/family on patient safety including physical limitations  - Instruct patient to call for assistance with activity   - Consult OT/PT to assist with strengthening/mobility   - Keep Call bell within reach  - Keep bed low and locked with side rails adjusted as appropriate  - Keep care items and personal belongings within reach  - Initiate and maintain comfort rounds  - Make Fall Risk Sign visible to staff  - Offer Toileting every 2 Hours, in advance of need  - Initiate/Maintain bed/chair alarm  - Obtain necessary fall risk management equipment: alarms  - Apply yellow socks and bracelet for high fall risk patients  - Consider moving patient to room near nurses station  Outcome: Progressing  Goal: Maintain or return to baseline ADL function  Description: INTERVENTIONS:  -  Assess patient's ability to carry out ADLs; assess patient's baseline for ADL function and identify physical deficits which impact ability to perform ADLs (bathing, care of mouth/teeth, toileting, grooming, dressing, etc )  - Assess/evaluate cause of self-care deficits   - Assess range of motion  - Assess patient's mobility; develop plan if impaired  - Assess patient's need for assistive devices and provide as appropriate  - Encourage maximum independence but intervene and supervise when necessary  - Involve family in performance of ADLs  - Assess for home care needs following discharge   - Consider OT consult to assist with ADL evaluation and planning for discharge  - Provide patient education as appropriate  Outcome: Progressing  Goal: Maintains/Returns to pre admission functional level  Description: INTERVENTIONS:  - Perform BMAT or MOVE assessment daily    - Set and communicate daily mobility goal to care team and patient/family/caregiver  - Collaborate with rehabilitation services on mobility goals if consulted  - Perform Range of Motion 3 times a day  - Reposition patient every 2 hours    - Dangle patient 3 times a day  - Stand patient 3 times a day  - Ambulate patient 3 times a day  - Out of bed to chair 3 times a day   - Out of bed for meals 3 times a day  - Out of bed for toileting  - Record patient progress and toleration of activity level   Outcome: Progressing     Problem: DISCHARGE PLANNING  Goal: Discharge to home or other facility with appropriate resources  Description: INTERVENTIONS:  - Identify barriers to discharge w/patient and caregiver  - Arrange for needed discharge resources and transportation as appropriate  - Identify discharge learning needs (meds, wound care, etc )  - Arrange for interpretive services to assist at discharge as needed  - Refer to Case Management Department for coordinating discharge planning if the patient needs post-hospital services based on physician/advanced practitioner order or complex needs related to functional status, cognitive ability, or social support system  Outcome: Progressing     Problem: Knowledge Deficit  Goal: Patient/family/caregiver demonstrates understanding of disease process, treatment plan, medications, and discharge instructions  Description: Complete learning assessment and assess knowledge base    Interventions:  - Provide teaching at level of understanding  - Provide teaching via preferred learning methods  Outcome: Progressing     Problem: CARDIOVASCULAR - ADULT  Goal: Maintains optimal cardiac output and hemodynamic stability  Description: INTERVENTIONS:  - Monitor I/O, vital signs and rhythm  - Monitor for S/S and trends of decreased cardiac output  - Administer and titrate ordered vasoactive medications to optimize hemodynamic stability  - Assess quality of pulses, skin color and temperature  - Assess for signs of decreased coronary artery perfusion  - Instruct patient to report change in severity of symptoms  Outcome: Progressing  Goal: Absence of cardiac dysrhythmias or at baseline rhythm  Description: INTERVENTIONS:  - Continuous cardiac monitoring, vital signs, obtain 12 lead EKG if ordered  - Administer antiarrhythmic and heart rate control medications as ordered  - Monitor electrolytes and administer replacement therapy as ordered  Outcome: Progressing     Problem: RESPIRATORY - ADULT  Goal: Achieves optimal ventilation and oxygenation  Description: INTERVENTIONS:  - Assess for changes in respiratory status  - Assess for changes in mentation and behavior  - Position to facilitate oxygenation and minimize respiratory effort  - Oxygen administered by appropriate delivery if ordered  - Encourage broncho-pulmonary hygiene including cough, deep breathe, Incentive Spirometry  - Assess and instruct to report SOB or any respiratory difficulty  - Respiratory Therapy support as indicated  Outcome: Progressing     Problem: METABOLIC, FLUID AND ELECTROLYTES - ADULT  Goal: Electrolytes maintained within normal limits  Description: INTERVENTIONS:  - Monitor labs and assess patient for signs and symptoms of electrolyte imbalances  - Administer electrolyte replacement as ordered  - Monitor response to electrolyte replacements, including repeat lab results as appropriate  - Instruct patient on fluid and nutrition as appropriate  Outcome: Progressing  Goal: Fluid balance maintained  Description: INTERVENTIONS:  - Monitor labs   - Monitor I/O and WT  - Instruct patient on fluid and nutrition as appropriate  - Assess for signs & symptoms of volume excess or deficit  Outcome: Progressing  Goal: Glucose maintained within target range  Description: INTERVENTIONS:  - Monitor Blood Glucose as ordered  - Assess for signs and symptoms of hyperglycemia and hypoglycemia  - Administer ordered medications to maintain glucose within target range  - Assess nutritional intake and initiate nutrition service referral as needed  Outcome: Progressing     Problem: Potential for Falls  Goal: Patient will remain free of falls  Description: INTERVENTIONS:  - Educate patient/family on patient safety including physical limitations  - Instruct patient to call for assistance with activity   - Consult OT/PT to assist with strengthening/mobility   - Keep Call bell within reach  - Keep bed low and locked with side rails adjusted as appropriate  - Keep care items and personal belongings within reach  - Initiate and maintain comfort rounds  - Make Fall Risk Sign visible to staff  - Offer Toileting every 2 Hours, in advance of need  - Initiate/Maintain bed/chair alarm  - Obtain necessary fall risk management equipment: alarms  - Apply yellow socks and bracelet for high fall risk patients  - Consider moving patient to room near nurses station  Outcome: Progressing

## 2022-05-09 NOTE — ED PROVIDER NOTES
History  Chief Complaint   Patient presents with    Shortness of Breath     Patient presents from home via EMS with report of SOB for a few days that got worse today  + inttermitiant chest pains  Denies at this time  68-year-old male presents for evaluation of progressively worsening dyspnea over the last 3 days  Patient states transient right lower extremity swelling which resolved after a warm compress  No fever or chills  No URI symptoms  Transient intermittent centrally located nonradiating chest pain this- pain-free now  According to patient's daughter, he does eat salty soup from the trip restaurant across the street  Despite having a fluid restriction, patient is usually drinking because of thirst   He states he has been compliant with his medications to include his diuretic agents  Patient does have a history of atrial fibrillation as well  On exam, patient appears to be in mild distress  Prior to Admission Medications   Prescriptions Last Dose Informant Patient Reported? Taking?   atorvastatin (LIPITOR) 40 mg tablet 5/9/2022 at 0500 Self No Yes   Sig: Take 1 tablet (40 mg total) by mouth daily with dinner This is an increased dose to better help prevent heart attack and stroke     cholecalciferol (VITAMIN D3) 1,000 units tablet 5/9/2022 at 0500 Self No Yes   Sig: Take 1 tablet (1,000 Units total) by mouth daily   clopidogrel (PLAVIX) 75 mg tablet 5/9/2022 at 0500 Self No Yes   Sig: Take 1 tablet (75 mg total) by mouth daily Hold until seen by ENT on 9/23/19    escitalopram (LEXAPRO) 10 mg tablet 5/9/2022 at 0500 Self Yes Yes   Sig: Take 10 mg by mouth daily   isosorbide mononitrate (IMDUR) 60 mg 24 hr tablet 5/9/2022 at 0500 Self No Yes   Sig: Take 1 tablet (60 mg total) by mouth daily In the morning   linaGLIPtin (Tradjenta) 5 MG TABS 5/9/2022 at 0500 Self No Yes   Sig: Take 5 mg by mouth daily Take this for your diabetes instead of glimepiride   pantoprazole (PROTONIX) 40 mg tablet 5/9/2022 at 0500  Yes Yes   Sig: Take 40 mg by mouth daily   sucralfate (CARAFATE) 1 g tablet 5/9/2022 at 0500 Self Yes Yes   Sig: Take 1 g by mouth 2 (two) times a day   tamsulosin (FLOMAX) 0 4 mg 5/8/2022 at 2300 Self No Yes   Sig: Take 2 capsules (0 8 mg total) by mouth daily with dinner      Facility-Administered Medications: None       Past Medical History:   Diagnosis Date    A-fib ([place] [address] )     Arthritis     Athscl 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     Stent OM1  Patent mammary graft to LAD 10/7/2009; CABG 1992 & 2005    Cancer [facility])     skin    Coronary artery disease     Diabetes mellitus ([place] [address] )     Diabetes mellitus, type II ([place] [address] )     without complication    Epistaxis     Essential (primary) hypertension     GERD (gastroesophageal reflux disease)     Hx of cardiovascular stress test 07/23/2014    Eddie MPI; EF0 52 (52%) Lexiscan, mild LV systolic dysfunction; evidence for prior inferior wall MI; perfusion imaging consistant w mild lat wall ischemia and min torin-infart inferior ischemia   / EF0 51 (51%) evidence for prior inferior wall MI  Mild inferior and mild-moderate lateral wall ischemia  7/29/15    Hx of echocardiogram 11/07/2017    2D w/CFD;EF0 55 (55%) mild LVH, mitral valve prolapse with moderate regurgitation  left atrial enlargement  Mild tricuspid regurgitation   Hypertension     Mixed hyperlipidemia     Occlusion and stenosis of bilateral carotid arteries     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 infarction)     Presence of aortocoronary bypass graft        Past Surgical History:   Procedure Laterality Date    CARDIAC CATHETERIZATION  10/07/2009    Stent 99% stenosis SVG from the aorta to OM1   Patent mammary graft to the LAD    CORONARY ARTERY BYPASS GRAFT  1992    VG-CX, VG-RCA    CORONARY ARTERY BYPASS GRAFT  11/04/2005    LIMA-D1-LAD, VG-PDA-PLB, Iicqaf-IZ2-XD3 TMR 9 Lesions    MS EGD TRANSORAL BIOPSY SINGLE/MULTIPLE N/A 1/23/2019    Procedure: ESOPHAGOGASTRODUODENOSCOPY (EGD) with biopsy;  Surgeon: Chan Tran MD;  Location: [address] [place] [address] GI LAB; Service: Gastroenterology    TONSILLECTOMY         Family History   Problem Relation Age of Onset    Heart disease Brother     No Known Problems Mother     Tuberculosis Father      I have reviewed and agree with the history as documented  E-Cigarette/Vaping    E-Cigarette Use Never User      E-Cigarette/Vaping Substances    Nicotine No     THC No     CBD No     Flavoring No     Other No     Unknown No      Social History     Tobacco Use    Smoking status: Former Smoker     Packs/day: 1 00     Years: 40 00     Pack years: 40 00     Quit date: 1977     Years since quittin 3    Smokeless tobacco: Never Used   Vaping Use    Vaping Use: Never used   Substance Use Topics    Alcohol use: Never    Drug use: No       Review of Systems   Constitutional: Negative for activity change, appetite change, chills, diaphoresis, fatigue and fever  HENT: Negative for dental problem, ear pain, sore throat, trouble swallowing and voice change  Eyes: Negative for pain and visual disturbance  Respiratory: Positive for shortness of breath  Negative for cough, chest tightness and wheezing  Cardiovascular: Positive for chest pain and leg swelling  Negative for palpitations  Gastrointestinal: Negative for abdominal pain, anal bleeding, blood in stool, diarrhea, nausea, rectal pain and vomiting  Endocrine: Negative for polydipsia, polyphagia and polyuria  Genitourinary: Negative for difficulty urinating, dysuria, flank pain, frequency, hematuria and urgency  Musculoskeletal: Negative for back pain, joint swelling, myalgias, neck pain and neck stiffness  Skin: Negative for pallor, rash and wound  Neurological: Negative for dizziness, facial asymmetry, speech difficulty, weakness, light-headedness, numbness and headaches  Hematological: Negative for adenopathy  Psychiatric/Behavioral: Negative for agitation   The patient is not nervous/anxious  All other systems reviewed and are negative  Physical Exam  Physical Exam  Vitals and nursing note reviewed  Constitutional:       General: He is in acute distress  Appearance: He is well-developed  He is not ill-appearing, toxic-appearing or diaphoretic  HENT:      Head: Normocephalic and atraumatic  Right Ear: External ear normal       Left Ear: External ear normal       Nose: Nose normal       Mouth/Throat:      Pharynx: No oropharyngeal exudate  Eyes:      General: No scleral icterus  Right eye: No discharge  Left eye: No discharge  Conjunctiva/sclera: Conjunctivae normal       Pupils: Pupils are equal, round, and reactive to light  Neck:      Thyroid: No thyromegaly  Vascular: No JVD  Trachea: No tracheal deviation  Cardiovascular:      Rate and Rhythm: Tachycardia present  Rhythm irregularly irregular  Pulses: Normal pulses  Heart sounds: Normal heart sounds, S1 normal and S2 normal  No murmur heard  No friction rub  No gallop  Pulmonary:      Effort: Tachypnea and respiratory distress present  No accessory muscle usage or retractions  Breath sounds: No stridor or decreased air movement  Decreased breath sounds present  No wheezing, rhonchi or rales  Chest:      Chest wall: No tenderness  Abdominal:      General: There is no distension  Palpations: Abdomen is soft  There is no mass  Tenderness: There is no abdominal tenderness  There is no guarding or rebound  Hernia: No hernia is present  Musculoskeletal:         General: No tenderness or deformity  Normal range of motion  Cervical back: Normal range of motion and neck supple  Lymphadenopathy:      Cervical: No cervical adenopathy  Skin:     General: Skin is warm and dry  Capillary Refill: Capillary refill takes less than 2 seconds  Coloration: Skin is not pale  Findings: No erythema or rash     Neurological: General: No focal deficit present  Mental Status: He is alert and oriented to person, place, and time  GCS: GCS eye subscore is 4  GCS verbal subscore is 5  GCS motor subscore is 6  Cranial Nerves: No cranial nerve deficit, dysarthria or facial asymmetry  Sensory: Sensation is intact  No sensory deficit  Motor: Motor function is intact  No weakness or tremor  Deep Tendon Reflexes: Reflexes are normal and symmetric   Reflexes normal    Psychiatric:         Behavior: Behavior normal          Vital Signs  ED Triage Vitals   Temperature Pulse Respirations Blood Pressure SpO2   05/09/22 0822 05/09/22 0822 05/09/22 0822 05/09/22 0822 05/09/22 0822   98 2 °F (36 8 °C) (!) 131 (!) 30 97/66 95 %      Temp Source Heart Rate Source Patient Position - Orthostatic VS BP Location FiO2 (%)   05/09/22 0822 05/09/22 0822 05/09/22 0822 05/09/22 0822 --   Temporal Monitor Lying Right arm       Pain Score       05/09/22 1235       No Pain           Vitals:    05/10/22 0500 05/10/22 0611 05/10/22 0741 05/10/22 1107   BP:  124/57 124/88 144/69   Pulse: (!) 108 100  (!) 113   Patient Position - Orthostatic VS:  Lying Lying Sitting         Visual Acuity      ED Medications  Medications   atorvastatin (LIPITOR) tablet 40 mg (40 mg Oral Given 5/9/22 1545)   escitalopram (LEXAPRO) tablet 10 mg (10 mg Oral Given 5/10/22 0802)   isosorbide mononitrate (IMDUR) 24 hr tablet 60 mg (60 mg Oral Given 5/10/22 0803)   pantoprazole (PROTONIX) EC tablet 40 mg (40 mg Oral Given 5/10/22 0541)   sucralfate (CARAFATE) tablet 1 g (1 g Oral Not Given 5/10/22 0803)   tamsulosin (FLOMAX) capsule 0 8 mg (0 8 mg Oral Given 5/9/22 1545)   sitaGLIPtin (JANUVIA) tablet 25 mg (25 mg Oral Given 5/10/22 0803)   cholecalciferol (VITAMIN D3) tablet 1,000 Units (1,000 Units Oral Given 5/10/22 0803)   apixaban (ELIQUIS) tablet 2 5 mg (2 5 mg Oral Not Given 5/10/22 0803)   cefepime (MAXIPIME) IVPB (premix in dextrose) 1,000 mg 50 mL (1,000 mg Intravenous New Bag 5/10/22 1107)   metoprolol tartrate (LOPRESSOR) tablet 25 mg (25 mg Oral Given 5/10/22 1107)   furosemide (LASIX) injection 80 mg (has no administration in time range)   ranolazine (RANEXA) 12 hr tablet 500 mg (500 mg Oral Given 5/10/22 1108)   aspirin (ECOTRIN LOW STRENGTH) EC tablet 81 mg (has no administration in time range)   magnesium sulfate 2 g/50 mL IVPB (premix) 2 g (0 g Intravenous Stopped 5/9/22 1204)   iohexol (OMNIPAQUE) 350 MG/ML injection (SINGLE-DOSE) 85 mL (85 mL Intravenous Given 5/9/22 0946)   aspirin chewable tablet 324 mg (324 mg Oral Given 5/9/22 1204)   furosemide (LASIX) injection 20 mg (20 mg Intravenous Given 5/9/22 1206)   diltiazem (CARDIZEM) injection 10 mg (10 mg Intravenous Given 5/9/22 1219)       Diagnostic Studies  Results Reviewed     Procedure Component Value Units Date/Time    HS Troponin I 4hr [[id number]]  (Abnormal) Collected: 05/09/22 1312    Lab Status: Final result Specimen: Blood from Arm, Right Updated: 05/09/22 1341     hs TnI 4hr 110 ng/L      Delta 4hr hsTnI 63 ng/L     HS Troponin I 2hr [[id number]]  (Abnormal) Collected: 05/09/22 1050    Lab Status: Final result Specimen: Blood from Arm, Right Updated: 05/09/22 1117     hs TnI 2hr 83 ng/L      Delta 2hr hsTnI 36 ng/L     D-dimer, quantitative [[id number]]  (Abnormal) Collected: 05/09/22 0827    Lab Status: Final result Specimen: Blood from Arm, Right Updated: 05/09/22 0909     D-Dimer, Quant 0 72 ug/ml FEU     Narrative: In the evaluation for possible pulmonary embolism, in the appropriate (Well's Score of 4 or less) patient, the age adjusted d-dimer cutoff for this patient can be calculated as:    Age x 0 01 (in ug/mL) for Age-adjusted D-dimer exclusion threshold for a patient over 50 years      NT-BNP PRO [[id number]]  (Abnormal) Collected: 05/09/22 0827    Lab Status: Final result Specimen: Blood from Arm, Right Updated: 05/09/22 0855     NT-proBNP 23,302 pg/mL     Magnesium [[id number]] (Normal) Collected: 05/09/22 0827    Lab Status: Final result Specimen: Blood from Arm, Right Updated: 05/09/22 0855     Magnesium 1 9 mg/dL     HS Troponin 0hr (reflex protocol) [[id number]]  (Normal) Collected: 05/09/22 0827    Lab Status: Final result Specimen: Blood from Arm, Right Updated: 05/09/22 0855     hs TnI 0hr 47 ng/L     Comprehensive metabolic panel [[id number]]  (Abnormal) Collected: 05/09/22 0827    Lab Status: Final result Specimen: Blood from Arm, Right Updated: 05/09/22 0847     Sodium 137 mmol/L      Potassium 4 2 mmol/L      Chloride 101 mmol/L      CO2 27 mmol/L      ANION GAP 9 mmol/L      BUN 26 mg/dL      Creatinine 1 85 mg/dL      Glucose 229 mg/dL      Calcium 9 6 mg/dL      Corrected Calcium 10 2 mg/dL      AST 13 U/L      ALT 22 U/L      Alkaline Phosphatase 64 U/L      Total Protein 7 3 g/dL      Albumin 3 2 g/dL      Total Bilirubin 2 12 mg/dL      eGFR 32 ml/min/1 73sq m     Narrative:      National Kidney Disease Foundation guidelines for Chronic Kidney Disease (CKD):     Stage 1 with normal or high GFR (GFR > 90 mL/min/1 73 square meters)    Stage 2 Mild CKD (GFR = 60-89 mL/min/1 73 square meters)    Stage 3A Moderate CKD (GFR = 45-59 mL/min/1 73 square meters)    Stage 3B Moderate CKD (GFR = 30-44 mL/min/1 73 square meters)    Stage 4 Severe CKD (GFR = 15-29 mL/min/1 73 square meters)    Stage 5 End Stage CKD (GFR <15 mL/min/1 73 square meters)  Note: GFR calculation is accurate only with a steady state creatinine    CBC and differential [[id number]]  (Abnormal) Collected: 05/09/22 0827    Lab Status: Final result Specimen: Blood from Arm, Right Updated: 05/09/22 0833     WBC 10 90 Thousand/uL      RBC 4 51 Million/uL      Hemoglobin 12 9 g/dL      Hematocrit 39 8 %      MCV 88 fL      MCH 28 6 pg      MCHC 32 4 g/dL      RDW 14 2 %      MPV 9 9 fL      Platelets 034 Thousands/uL      nRBC 0 /100 WBCs      Neutrophils Relative 81 %      Immat GRANS % 0 %      Lymphocytes Relative 8 %      Monocytes Relative 10 %      Eosinophils Relative 0 %      Basophils Relative 1 %      Neutrophils Absolute 8 75 Thousands/µL      Immature Grans Absolute 0 04 Thousand/uL      Lymphocytes Absolute 0 91 Thousands/µL      Monocytes Absolute 1 11 Thousand/µL      Eosinophils Absolute 0 03 Thousand/µL      Basophils Absolute 0 06 Thousands/µL                  CTA ED chest PE Study   Final Result by Venus Daniels MD (05/09 1030)   No pulmonary embolism seen   There is a interlobular septal thickening with reticulation, small bilateral effusion and fissural thickening, suggest pulmonary congestion, new from the previous study      There are patchy areas of groundglass density in the right middle lobe area, right lower lobe may be due to infiltrate or related to heart failure  Follow-up suggested in 2-3 months to demonstrate resolution      Mild increasing enlargement of the mediastinal lymph nodes, possibly reactive         No acute airspace consolidation      The study was marked in EPIC for immediate notification  Workstation performed: [id number]         XR chest 1 view portable   ED Interpretation by Eloisa Hillman DO (05/09 9066)   Diffuse bilateral haziness worse on the left side  Change compared to x-ray from February 2022  Final Result by Deanna Pérez MD (05/09 8423)      Vascular congestion                    Workstation performed: [id number]                    Procedures  ECG 12 Lead Documentation Only    Date/Time: 5/9/2022 9:30 AM  Performed by: Eloisa Hillman DO  Authorized by: Eloisa Hillman DO     Indications / Diagnosis:  Dyspnea, Afib  ECG reviewed by me, the ED Provider: yes    Patient location:  ED  Previous ECG:     Previous ECG:  Compared to current    Comparison ECG info:  2/18/22    Similarity:  No change    Comparison to cardiac monitor: Yes    Interpretation:     Interpretation: abnormal    Rate:     ECG rate:  120    ECG rate assessment: tachycardic    Rhythm:     Rhythm: atrial fibrillation    Ectopy:     Ectopy: PVCs      PVCs:  Infrequent and unifocal  QRS:     QRS axis:  Normal  Conduction:     Conduction: normal    ST segments:     ST segments:  Non-specific  T waves:     T waves: non-specific    CriticalCare Time  Performed by: Rani Bower DO  Authorized by: Rani Bower DO     Critical care provider statement:     Critical care time (minutes):  90    Critical care time was exclusive of:  Separately billable procedures and treating other patients and teaching time    Critical care was necessary to treat or prevent imminent or life-threatening deterioration of the following conditions:  Respiratory failure, cardiac failure and dehydration    Critical care was time spent personally by me on the following activities:  Blood draw for specimens, obtaining history from patient or surrogate, development of treatment plan with patient or surrogate, discussions with consultants, evaluation of patient's response to treatment, examination of patient, ordering and performing treatments and interventions, ordering and review of laboratory studies, ordering and review of radiographic studies, re-evaluation of patient's condition and review of old charts  Comments:      AFib with rapid ventricular response, concern for intravascular dehydration and pulmonary edema  Provide IV fluids, diuresis, rate control with Cardizem  Supplemental oxygen for hypoxia    CT scan of chest   POC Cardiac US    Date/Time: 5/9/2022 9:00 AM  Performed by: Rani Bower DO  Authorized by: Rani Bower DO     Patient location:  ED  Procedure details:     Exam Type:  Diagnostic    Indications: hypotension, suspected volume depletion, chest pain, dyspnea and respiratory distress      Assessment / Evaluation for: cardiac function, pericardial effusion, inferior vena cava for fluid responsiveness, intravascular volume status and right heart strain (suspected pulmonary embolism)      Exam Type: initial exam      Image quality: diagnostic      Image availability:  Images available in PACS  Patient Details:     Cardiac Rhythm:  Irregular    Mechanical ventilation: No    Cardiac findings:     Echo technique: limited 2D      Views obtained: parasternal long axis, subcostal and apical      Pericardial effusion: absent      Tamponade physiology: absent      Wall motion: normal      LV systolic function: normal      RV dilation: none    IVC findings:     IVC Size: small      IVC Inspiratory Collapse: partial    Interpretation:     Fluid Status:  Hypovolemic             ED Course             HEART Risk Score      Most Recent Value   Heart Score Risk Calculator    History 1 Filed at: 05/09/2022 1612   ECG 1 Filed at: 05/09/2022 1612   Age 2 Filed at: 05/09/2022 1612   Risk Factors 2 Filed at: 05/09/2022 1612   Troponin 2 Filed at: 05/09/2022 1612   HEART Score 8 Filed at: 05/09/2022 1612                PERC Rule for PE      Most Recent Value   PERC Rule for PE    Age >=50 1 Filed at: 05/09/2022 1613   HR >=100 1 Filed at: 05/09/2022 1613   O2 Sat on room air < 95% 1 Filed at: 05/09/2022 1613   History of PE or DVT 0 Filed at: 05/09/2022 1613   Recent trauma or surgery 0 Filed at: 05/09/2022 1613   Hemoptysis 0 Filed at: 05/09/2022 1613   Exogenous estrogen 0 Filed at: 05/09/2022 1613   Unilateral leg swelling 1 Filed at: 05/09/2022 1613   PERC Rule for PE Results 4 Filed at: 05/09/2022 1613                  Wells' Criteria for PE      Most Recent Value   Wells' Criteria for PE    Clinical signs and symptoms of DVT 3 Filed at: 05/09/2022 1612   PE is primary diagnosis or equally likely 3 Filed at: 05/09/2022 1612   HR >100 1 5 Filed at: 05/09/2022 1612   Immobilization at least 3 days or Surgery in the previous 4 weeks 1 5 Filed at: 05/09/2022 1612   Previous, objectively diagnosed PE or DVT 0 Filed at: 05/09/2022 1612   Hemoptysis 0 Filed at: 05/09/2022 1612   Malignancy with treatment within 6 months or palliative 0 Filed at: 05/09/2022 1612   Wells' Criteria Total 9 Filed at: 05/09/2022 1612                MDM    Disposition  Final diagnoses:   Dyspnea   Hypoxia   CHF (congestive heart failure) ([facility])   Atrial fibrillation with rapid ventricular response ([facility] )     Time reflects when diagnosis was documented in both MDM as applicable and the Disposition within this note     Time User Action Codes Description Comment    5/9/2022 11:48 AM Elizamichael Rodia Add [R06 00] Dyspnea     5/9/2022 11:48 AM Elizabeteodoro Alarcon Add [R09 02] Hypoxia     5/9/2022 11:48 AM Katina Boss Add [I50 9] CHF (congestive heart failure) ([facility] )     5/9/2022 11:49 AM Katina Boss Add [I48 91] Atrial fibrillation with rapid ventricular response ([place] [address] )     5/9/2022  3:13 PM Sera Paiz Add [I50 33] Acute on chronic diastolic congestive heart failure ([facility] )     5/10/2022  8:50 AM Shani Leonard Add [J96 01] Acute respiratory failure with hypoxia [facility])       ED Disposition     ED Disposition Condition Date/Time Comment    Admit Stable Mon May 9, 2022 11:37 AM Case was discussed with PEYTON and the patient's admission status was agreed to be Admission Status: inpatient status to the service of Dr Steven Jarquin   Follow-up Information    None         Current Discharge Medication List      CONTINUE these medications which have NOT CHANGED    Details   atorvastatin (LIPITOR) 40 mg tablet Take 1 tablet (40 mg total) by mouth daily with dinner This is an increased dose to better help prevent heart attack and stroke  Qty: 30 tablet, Refills: 0    Associated Diagnoses: Coronary artery disease of native artery of native heart with stable angina pectoris ([facility] );  Stenosis of carotid artery, unspecified laterality      cholecalciferol (VITAMIN D3) 1,000 units tablet Take 1 tablet (1,000 Units total) by mouth daily  Qty: 30 tablet, Refills: 0    Associated Diagnoses: Vitamin D deficiency clopidogrel (PLAVIX) 75 mg tablet Take 1 tablet (75 mg total) by mouth daily Hold until seen by ENT on 9/23/19  Associated Diagnoses: Coronary artery disease involving coronary bypass graft of native heart without angina pectoris      escitalopram (LEXAPRO) 10 mg tablet Take 10 mg by mouth daily      isosorbide mononitrate (IMDUR) 60 mg 24 hr tablet Take 1 tablet (60 mg total) by mouth daily In the morning  Refills: 0    Associated Diagnoses: Hypertensive crisis      linaGLIPtin (Tradjenta) 5 MG TABS Take 5 mg by mouth daily Take this for your diabetes instead of glimepiride  Qty: 30 tablet, Refills: 0    Associated Diagnoses: Type 2 diabetes mellitus with hyperglycemia, without long-term current use of insulin (HCC)      pantoprazole (PROTONIX) 40 mg tablet Take 40 mg by mouth daily      sucralfate (CARAFATE) 1 g tablet Take 1 g by mouth 2 (two) times a day      tamsulosin (FLOMAX) 0 4 mg Take 2 capsules (0 8 mg total) by mouth daily with dinner  Qty: 180 capsule, Refills: 3    Associated Diagnoses: Benign prostatic hyperplasia with urinary frequency             No discharge procedures on file      PDMP Review     None          ED Provider  Electronically Signed by           Penelope Morris,   05/09/22 [address], DO  05/10/22 1219

## 2022-05-09 NOTE — PLAN OF CARE
Problem: PAIN - ADULT  Goal: Verbalizes/displays adequate comfort level or baseline comfort level  Description: Interventions:  - Encourage patient to monitor pain and request assistance  - Assess pain using appropriate pain scale  - Administer analgesics based on type and severity of pain and evaluate response  - Implement non-pharmacological measures as appropriate and evaluate response  - Consider cultural and social influences on pain and pain management  - Notify physician/advanced practitioner if interventions unsuccessful or patient reports new pain  Outcome: Progressing     Problem: INFECTION - ADULT  Goal: Absence or prevention of progression during hospitalization  Description: INTERVENTIONS:  - Assess and monitor for signs and symptoms of infection  - Monitor lab/diagnostic results  - Monitor all insertion sites, i e  indwelling lines, tubes, and drains  - Monitor endotracheal if appropriate and nasal secretions for changes in amount and color  - [place] appropriate cooling/warming therapies per order  - Administer medications as ordered  - Instruct and encourage patient and family to use good hand hygiene technique  - Identify and instruct in appropriate isolation precautions for identified infection/condition  Outcome: Progressing  Goal: Absence of fever/infection during neutropenic period  Description: INTERVENTIONS:  - Monitor WBC    Outcome: Progressing     Problem: SAFETY ADULT  Goal: Patient will remain free of falls  Description: INTERVENTIONS:  - Educate patient/family on patient safety including physical limitations  - Instruct patient to call for assistance with activity   - Consult OT/PT to assist with strengthening/mobility   - Keep Call bell within reach  - Keep bed low and locked with side rails adjusted as appropriate  - Keep care items and personal belongings within reach  - Initiate and maintain comfort rounds  - Make Fall Risk Sign visible to staff  - Offer Toileting every 2 Hours, in advance of need  - Initiate/Maintain bed/chair alarm  - Obtain necessary fall risk management equipment: alarms  - Apply yellow socks and bracelet for high fall risk patients  - Consider moving patient to room near nurses station  Outcome: Progressing  Goal: Maintain or return to baseline ADL function  Description: INTERVENTIONS:  -  Assess patient's ability to carry out ADLs; assess patient's baseline for ADL function and identify physical deficits which impact ability to perform ADLs (bathing, care of mouth/teeth, toileting, grooming, dressing, etc )  - Assess/evaluate cause of self-care deficits   - Assess range of motion  - Assess patient's mobility; develop plan if impaired  - Assess patient's need for assistive devices and provide as appropriate  - Encourage maximum independence but intervene and supervise when necessary  - Involve family in performance of ADLs  - Assess for home care needs following discharge   - Consider OT consult to assist with ADL evaluation and planning for discharge  - Provide patient education as appropriate  Outcome: Progressing  Goal: Maintains/Returns to pre admission functional level  Description: INTERVENTIONS:  - Perform BMAT or MOVE assessment daily    - Set and communicate daily mobility goal to care team and patient/family/caregiver  - Collaborate with rehabilitation services on mobility goals if consulted  - Perform Range of Motion 3 times a day  - Reposition patient every 2 hours    - Dangle patient 3 times a day  - Stand patient 3 times a day  - Ambulate patient 3 times a day  - Out of bed to chair 3 times a day   - Out of bed for meals 3 times a day  - Out of bed for toileting  - Record patient progress and toleration of activity level   Outcome: Progressing     Problem: DISCHARGE PLANNING  Goal: Discharge to home or other facility with appropriate resources  Description: INTERVENTIONS:  - Identify barriers to discharge w/patient and caregiver  - Arrange for needed discharge resources and transportation as appropriate  - Identify discharge learning needs (meds, wound care, etc )  - Arrange for interpretive services to assist at discharge as needed  - Refer to Case Management Department for coordinating discharge planning if the patient needs post-hospital services based on physician/advanced practitioner order or complex needs related to functional status, cognitive ability, or social support system  Outcome: Progressing     Problem: Knowledge Deficit  Goal: Patient/family/caregiver demonstrates understanding of disease process, treatment plan, medications, and discharge instructions  Description: Complete learning assessment and assess knowledge base    Interventions:  - Provide teaching at level of understanding  - Provide teaching via preferred learning methods  Outcome: Progressing     Problem: CARDIOVASCULAR - ADULT  Goal: Maintains optimal cardiac output and hemodynamic stability  Description: INTERVENTIONS:  - Monitor I/O, vital signs and rhythm  - Monitor for S/S and trends of decreased cardiac output  - Administer and titrate ordered vasoactive medications to optimize hemodynamic stability  - Assess quality of pulses, skin color and temperature  - Assess for signs of decreased coronary artery perfusion  - Instruct patient to report change in severity of symptoms  Outcome: Progressing  Goal: Absence of cardiac dysrhythmias or at baseline rhythm  Description: INTERVENTIONS:  - Continuous cardiac monitoring, vital signs, obtain 12 lead EKG if ordered  - Administer antiarrhythmic and heart rate control medications as ordered  - Monitor electrolytes and administer replacement therapy as ordered  Outcome: Progressing     Problem: RESPIRATORY - ADULT  Goal: Achieves optimal ventilation and oxygenation  Description: INTERVENTIONS:  - Assess for changes in respiratory status  - Assess for changes in mentation and behavior  - Position to facilitate oxygenation and minimize respiratory effort  - Oxygen administered by appropriate delivery if ordered  - Encourage broncho-pulmonary hygiene including cough, deep breathe, Incentive Spirometry  - Assess and instruct to report SOB or any respiratory difficulty  - Respiratory Therapy support as indicated  Outcome: Progressing     Problem: METABOLIC, FLUID AND ELECTROLYTES - ADULT  Goal: Electrolytes maintained within normal limits  Description: INTERVENTIONS:  - Monitor labs and assess patient for signs and symptoms of electrolyte imbalances  - Administer electrolyte replacement as ordered  - Monitor response to electrolyte replacements, including repeat lab results as appropriate  - Instruct patient on fluid and nutrition as appropriate  Outcome: Progressing  Goal: Fluid balance maintained  Description: INTERVENTIONS:  - Monitor labs   - Monitor I/O and WT  - Instruct patient on fluid and nutrition as appropriate  - Assess for signs & symptoms of volume excess or deficit  Outcome: Progressing  Goal: Glucose maintained within target range  Description: INTERVENTIONS:  - Monitor Blood Glucose as ordered  - Assess for signs and symptoms of hyperglycemia and hypoglycemia  - Administer ordered medications to maintain glucose within target range  - Assess nutritional intake and initiate nutrition service referral as needed  Outcome: Progressing

## 2022-05-09 NOTE — ASSESSMENT & PLAN NOTE
Lab Results   Component Value Date    HGBA1C 9 5 (H) 02/19/2022       No results for input(s): POCGLU in the last 72 hours  Blood Sugar Average: Last 72 hrs:   A1c not a goal , uncontrolled      Oral meds hold  Will start lispro, AC/HS  Hypoglycemic protocol  Low-sodium diet

## 2022-05-10 PROBLEM — I25.10 CORONARY ARTERY DISEASE: Status: ACTIVE | Noted: 2020-02-14

## 2022-05-10 LAB
ALBUMIN SERPL BCP-MCNC: 3.1 G/DL (ref 3.5–5)
ALP SERPL-CCNC: 66 U/L (ref 46–116)
ALT SERPL W P-5'-P-CCNC: 21 U/L (ref 12–78)
ANION GAP SERPL CALCULATED.3IONS-SCNC: 10 MMOL/L (ref 4–13)
AST SERPL W P-5'-P-CCNC: 13 U/L (ref 5–45)
BASOPHILS # BLD AUTO: 0.04 THOUSANDS/ΜL (ref 0–0.1)
BASOPHILS NFR BLD AUTO: 0 % (ref 0–1)
BILIRUB SERPL-MCNC: 2.02 MG/DL (ref 0.2–1)
BUN SERPL-MCNC: 35 MG/DL (ref 5–25)
CALCIUM ALBUM COR SERPL-MCNC: 10.4 MG/DL (ref 8.3–10.1)
CALCIUM SERPL-MCNC: 9.7 MG/DL (ref 8.3–10.1)
CHLORIDE SERPL-SCNC: 100 MMOL/L (ref 100–108)
CO2 SERPL-SCNC: 27 MMOL/L (ref 21–32)
CREAT SERPL-MCNC: 1.9 MG/DL (ref 0.6–1.3)
EOSINOPHIL # BLD AUTO: 0.03 THOUSAND/ΜL (ref 0–0.61)
EOSINOPHIL NFR BLD AUTO: 0 % (ref 0–6)
ERYTHROCYTE [DISTWIDTH] IN BLOOD BY AUTOMATED COUNT: 14.4 % (ref 11.6–15.1)
GFR SERPL CREATININE-BSD FRML MDRD: 31 ML/MIN/1.73SQ M
GLUCOSE SERPL-MCNC: 232 MG/DL (ref 65–140)
HCT VFR BLD AUTO: 40.5 % (ref 36.5–49.3)
HGB BLD-MCNC: 13.3 G/DL (ref 12–17)
IMM GRANULOCYTES # BLD AUTO: 0.04 THOUSAND/UL (ref 0–0.2)
IMM GRANULOCYTES NFR BLD AUTO: 0 % (ref 0–2)
LYMPHOCYTES # BLD AUTO: 0.74 THOUSANDS/ΜL (ref 0.6–4.47)
LYMPHOCYTES NFR BLD AUTO: 8 % (ref 14–44)
MCH RBC QN AUTO: 28.7 PG (ref 26.8–34.3)
MCHC RBC AUTO-ENTMCNC: 32.8 G/DL (ref 31.4–37.4)
MCV RBC AUTO: 88 FL (ref 82–98)
MONOCYTES # BLD AUTO: 0.99 THOUSAND/ΜL (ref 0.17–1.22)
MONOCYTES NFR BLD AUTO: 10 % (ref 4–12)
NEUTROPHILS # BLD AUTO: 7.99 THOUSANDS/ΜL (ref 1.85–7.62)
NEUTS SEG NFR BLD AUTO: 82 % (ref 43–75)
NRBC BLD AUTO-RTO: 0 /100 WBCS
PLATELET # BLD AUTO: 216 THOUSANDS/UL (ref 149–390)
PMV BLD AUTO: 10.2 FL (ref 8.9–12.7)
POTASSIUM SERPL-SCNC: 3.8 MMOL/L (ref 3.5–5.3)
PROCALCITONIN SERPL-MCNC: 0.38 NG/ML
PROT SERPL-MCNC: 7.2 G/DL (ref 6.4–8.2)
RBC # BLD AUTO: 4.63 MILLION/UL (ref 3.88–5.62)
SODIUM SERPL-SCNC: 137 MMOL/L (ref 136–145)
TSH SERPL DL<=0.05 MIU/L-ACNC: 1.08 UIU/ML (ref 0.45–4.5)
WBC # BLD AUTO: 9.83 THOUSAND/UL (ref 4.31–10.16)

## 2022-05-10 PROCEDURE — 97163 PT EVAL HIGH COMPLEX 45 MIN: CPT

## 2022-05-10 PROCEDURE — 80053 COMPREHEN METABOLIC PANEL: CPT

## 2022-05-10 PROCEDURE — 99223 1ST HOSP IP/OBS HIGH 75: CPT | Performed by: PHYSICIAN ASSISTANT

## 2022-05-10 PROCEDURE — 99233 SBSQ HOSP IP/OBS HIGH 50: CPT | Performed by: INTERNAL MEDICINE

## 2022-05-10 PROCEDURE — 99222 1ST HOSP IP/OBS MODERATE 55: CPT | Performed by: INTERNAL MEDICINE

## 2022-05-10 PROCEDURE — 84443 ASSAY THYROID STIM HORMONE: CPT | Performed by: PHYSICIAN ASSISTANT

## 2022-05-10 PROCEDURE — 84145 PROCALCITONIN (PCT): CPT | Performed by: INTERNAL MEDICINE

## 2022-05-10 PROCEDURE — 85025 COMPLETE CBC W/AUTO DIFF WBC: CPT

## 2022-05-10 PROCEDURE — 97166 OT EVAL MOD COMPLEX 45 MIN: CPT

## 2022-05-10 RX ORDER — ASPIRIN 81 MG/1
81 TABLET ORAL DAILY
Status: DISCONTINUED | OUTPATIENT
Start: 2022-05-11 | End: 2022-05-13 | Stop reason: HOSPADM

## 2022-05-10 RX ORDER — FUROSEMIDE 10 MG/ML
80 INJECTION INTRAMUSCULAR; INTRAVENOUS ONCE
Status: COMPLETED | OUTPATIENT
Start: 2022-05-10 | End: 2022-05-10

## 2022-05-10 RX ORDER — RANOLAZINE 500 MG/1
500 TABLET, EXTENDED RELEASE ORAL EVERY 12 HOURS SCHEDULED
Status: DISCONTINUED | OUTPATIENT
Start: 2022-05-10 | End: 2022-05-13 | Stop reason: HOSPADM

## 2022-05-10 RX ORDER — CEFEPIME HYDROCHLORIDE 1 G/50ML
1000 INJECTION, SOLUTION INTRAVENOUS EVERY 12 HOURS
Status: DISCONTINUED | OUTPATIENT
Start: 2022-05-10 | End: 2022-05-13 | Stop reason: HOSPADM

## 2022-05-10 RX ADMIN — PANTOPRAZOLE SODIUM 40 MG: 40 TABLET, DELAYED RELEASE ORAL at 05:41

## 2022-05-10 RX ADMIN — Medication 1000 UNITS: at 08:03

## 2022-05-10 RX ADMIN — RANOLAZINE 500 MG: 500 TABLET, EXTENDED RELEASE ORAL at 11:08

## 2022-05-10 RX ADMIN — SITAGLIPTIN 25 MG: 25 TABLET, FILM COATED ORAL at 08:03

## 2022-05-10 RX ADMIN — DILTIAZEM HYDROCHLORIDE 30 MG: 30 TABLET, FILM COATED ORAL at 05:41

## 2022-05-10 RX ADMIN — CEFEPIME HYDROCHLORIDE 1000 MG: 1 INJECTION, SOLUTION INTRAVENOUS at 21:28

## 2022-05-10 RX ADMIN — ATORVASTATIN CALCIUM 40 MG: 40 TABLET, FILM COATED ORAL at 17:15

## 2022-05-10 RX ADMIN — SUCRALFATE 1 G: 1 TABLET ORAL at 07:46

## 2022-05-10 RX ADMIN — FUROSEMIDE 80 MG: 10 INJECTION, SOLUTION INTRAMUSCULAR; INTRAVENOUS at 17:15

## 2022-05-10 RX ADMIN — ESCITALOPRAM OXALATE 10 MG: 10 TABLET ORAL at 08:02

## 2022-05-10 RX ADMIN — METOPROLOL TARTRATE 25 MG: 25 TABLET, FILM COATED ORAL at 17:14

## 2022-05-10 RX ADMIN — APIXABAN 2.5 MG: 2.5 TABLET, FILM COATED ORAL at 17:14

## 2022-05-10 RX ADMIN — FUROSEMIDE 40 MG: 10 INJECTION, SOLUTION INTRAMUSCULAR; INTRAVENOUS at 07:40

## 2022-05-10 RX ADMIN — APIXABAN 2.5 MG: 2.5 TABLET, FILM COATED ORAL at 07:46

## 2022-05-10 RX ADMIN — ISOSORBIDE MONONITRATE 60 MG: 60 TABLET, EXTENDED RELEASE ORAL at 08:03

## 2022-05-10 RX ADMIN — TAMSULOSIN HYDROCHLORIDE 0.8 MG: 0.4 CAPSULE ORAL at 17:15

## 2022-05-10 RX ADMIN — SUCRALFATE 1 G: 1 TABLET ORAL at 17:16

## 2022-05-10 RX ADMIN — DILTIAZEM HYDROCHLORIDE 30 MG: 30 TABLET, FILM COATED ORAL at 00:26

## 2022-05-10 RX ADMIN — RANOLAZINE 500 MG: 500 TABLET, EXTENDED RELEASE ORAL at 21:30

## 2022-05-10 RX ADMIN — METOPROLOL TARTRATE 25 MG: 25 TABLET, FILM COATED ORAL at 11:07

## 2022-05-10 RX ADMIN — CEFEPIME HYDROCHLORIDE 1000 MG: 1 INJECTION, SOLUTION INTRAVENOUS at 11:07

## 2022-05-10 RX ADMIN — CLOPIDOGREL BISULFATE 75 MG: 75 TABLET ORAL at 08:03

## 2022-05-10 NOTE — PLAN OF CARE
Problem: PAIN - ADULT  Goal: Verbalizes/displays adequate comfort level or baseline comfort level  Description: Interventions:  - Encourage patient to monitor pain and request assistance  - Assess pain using appropriate pain scale  - Administer analgesics based on type and severity of pain and evaluate response  - Implement non-pharmacological measures as appropriate and evaluate response  - Consider cultural and social influences on pain and pain management  - Notify physician/advanced practitioner if interventions unsuccessful or patient reports new pain  Outcome: Progressing     Problem: INFECTION - ADULT  Goal: Absence or prevention of progression during hospitalization  Description: INTERVENTIONS:  - Assess and monitor for signs and symptoms of infection  - Monitor lab/diagnostic results  - Monitor all insertion sites, i e  indwelling lines, tubes, and drains  - Monitor endotracheal if appropriate and nasal secretions for changes in amount and color  - [place] appropriate cooling/warming therapies per order  - Administer medications as ordered  - Instruct and encourage patient and family to use good hand hygiene technique  - Identify and instruct in appropriate isolation precautions for identified infection/condition  Outcome: Progressing     Problem: SAFETY ADULT  Goal: Patient will remain free of falls  Description: INTERVENTIONS:  - Educate patient/family on patient safety including physical limitations  - Instruct patient to call for assistance with activity   - Consult OT/PT to assist with strengthening/mobility   - Keep Call bell within reach  - Keep bed low and locked with side rails adjusted as appropriate  - Keep care items and personal belongings within reach  - Initiate and maintain comfort rounds  - Make Fall Risk Sign visible to staff  - Offer Toileting every 2 Hours, in advance of need  - Initiate/Maintain bed/chair alarm  - Obtain necessary fall risk management equipment: alarms  - Apply yellow socks and bracelet for high fall risk patients  - Consider moving patient to room near nurses station  Outcome: Progressing  Goal: Maintain or return to baseline ADL function  Description: INTERVENTIONS:  -  Assess patient's ability to carry out ADLs; assess patient's baseline for ADL function and identify physical deficits which impact ability to perform ADLs (bathing, care of mouth/teeth, toileting, grooming, dressing, etc )  - Assess/evaluate cause of self-care deficits   - Assess range of motion  - Assess patient's mobility; develop plan if impaired  - Assess patient's need for assistive devices and provide as appropriate  - Encourage maximum independence but intervene and supervise when necessary  - Involve family in performance of ADLs  - Assess for home care needs following discharge   - Consider OT consult to assist with ADL evaluation and planning for discharge  - Provide patient education as appropriate  Outcome: Progressing  Goal: Maintains/Returns to pre admission functional level  Description: INTERVENTIONS:  - Perform BMAT or MOVE assessment daily    - Set and communicate daily mobility goal to care team and patient/family/caregiver  - Collaborate with rehabilitation services on mobility goals if consulted  - Perform Range of Motion 3 times a day  - Reposition patient every 2 hours    - Dangle patient 3 times a day  - Stand patient 3 times a day  - Ambulate patient 3 times a day  - Out of bed to chair 3 times a day   - Out of bed for meals 3 times a day  - Out of bed for toileting  - Record patient progress and toleration of activity level   Outcome: Progressing     Problem: DISCHARGE PLANNING  Goal: Discharge to home or other facility with appropriate resources  Description: INTERVENTIONS:  - Identify barriers to discharge w/patient and caregiver  - Arrange for needed discharge resources and transportation as appropriate  - Identify discharge learning needs (meds, wound care, etc )  - Arrange for interpretive services to assist at discharge as needed  - Refer to Case Management Department for coordinating discharge planning if the patient needs post-hospital services based on physician/advanced practitioner order or complex needs related to functional status, cognitive ability, or social support system  Outcome: Progressing     Problem: Knowledge Deficit  Goal: Patient/family/caregiver demonstrates understanding of disease process, treatment plan, medications, and discharge instructions  Description: Complete learning assessment and assess knowledge base    Interventions:  - Provide teaching at level of understanding  - Provide teaching via preferred learning methods  Outcome: Progressing     Problem: CARDIOVASCULAR - ADULT  Goal: Maintains optimal cardiac output and hemodynamic stability  Description: INTERVENTIONS:  - Monitor I/O, vital signs and rhythm  - Monitor for S/S and trends of decreased cardiac output  - Administer and titrate ordered vasoactive medications to optimize hemodynamic stability  - Assess quality of pulses, skin color and temperature  - Assess for signs of decreased coronary artery perfusion  - Instruct patient to report change in severity of symptoms  Outcome: Progressing  Goal: Absence of cardiac dysrhythmias or at baseline rhythm  Description: INTERVENTIONS:  - Continuous cardiac monitoring, vital signs, obtain 12 lead EKG if ordered  - Administer antiarrhythmic and heart rate control medications as ordered  - Monitor electrolytes and administer replacement therapy as ordered  Outcome: Progressing     Problem: RESPIRATORY - ADULT  Goal: Achieves optimal ventilation and oxygenation  Description: INTERVENTIONS:  - Assess for changes in respiratory status  - Assess for changes in mentation and behavior  - Position to facilitate oxygenation and minimize respiratory effort  - Oxygen administered by appropriate delivery if ordered  - Encourage broncho-pulmonary hygiene including cough, deep breathe, Incentive Spirometry  - Assess and instruct to report SOB or any respiratory difficulty  - Respiratory Therapy support as indicated  Outcome: Progressing     Problem: METABOLIC, FLUID AND ELECTROLYTES - ADULT  Goal: Electrolytes maintained within normal limits  Description: INTERVENTIONS:  - Monitor labs and assess patient for signs and symptoms of electrolyte imbalances  - Administer electrolyte replacement as ordered  - Monitor response to electrolyte replacements, including repeat lab results as appropriate  - Instruct patient on fluid and nutrition as appropriate  Outcome: Progressing  Goal: Fluid balance maintained  Description: INTERVENTIONS:  - Monitor labs   - Monitor I/O and WT  - Instruct patient on fluid and nutrition as appropriate  - Assess for signs & symptoms of volume excess or deficit  Outcome: Progressing  Goal: Glucose maintained within target range  Description: INTERVENTIONS:  - Monitor Blood Glucose as ordered  - Assess for signs and symptoms of hyperglycemia and hypoglycemia  - Administer ordered medications to maintain glucose within target range  - Assess nutritional intake and initiate nutrition service referral as needed  Outcome: Progressing     Problem: Potential for Falls  Goal: Patient will remain free of falls  Description: INTERVENTIONS:  - Educate patient/family on patient safety including physical limitations  - Instruct patient to call for assistance with activity   - Consult OT/PT to assist with strengthening/mobility   - Keep Call bell within reach  - Keep bed low and locked with side rails adjusted as appropriate  - Keep care items and personal belongings within reach  - Initiate and maintain comfort rounds  - Make Fall Risk Sign visible to staff  - Offer Toileting every 2 Hours, in advance of need  - Initiate/Maintain bed/chair alarm  - Obtain necessary fall risk management equipment: alarms  - Apply yellow socks and bracelet for high fall risk patients  - Consider moving patient to room near nurses station  Outcome: Progressing     Problem: MOBILITY - ADULT  Goal: Maintain or return to baseline ADL function  Description: INTERVENTIONS:  -  Assess patient's ability to carry out ADLs; assess patient's baseline for ADL function and identify physical deficits which impact ability to perform ADLs (bathing, care of mouth/teeth, toileting, grooming, dressing, etc )  - Assess/evaluate cause of self-care deficits   - Assess range of motion  - Assess patient's mobility; develop plan if impaired  - Assess patient's need for assistive devices and provide as appropriate  - Encourage maximum independence but intervene and supervise when necessary  - Involve family in performance of ADLs  - Assess for home care needs following discharge   - Consider OT consult to assist with ADL evaluation and planning for discharge  - Provide patient education as appropriate  Outcome: Progressing  Goal: Maintains/Returns to pre admission functional level  Description: INTERVENTIONS:  - Perform BMAT or MOVE assessment daily    - Set and communicate daily mobility goal to care team and patient/family/caregiver  - Collaborate with rehabilitation services on mobility goals if consulted  - Perform Range of Motion 3 times a day  - Reposition patient every 2 hours    - Dangle patient 3 times a day  - Stand patient 3 times a day  - Ambulate patient 3 times a day  - Out of bed to chair 3 times a day   - Out of bed for meals 3 times a day  - Out of bed for toileting  - Record patient progress and toleration of activity level   Outcome: Progressing

## 2022-05-10 NOTE — RESPIRATORY THERAPY NOTE
05/10/22 0850   Respiratory Assessment   Resp Comments called by nurse pt pulse ox 80's on 6 l/m nc, pt wears 4 l/m nc at home, placedd on midflow 8 5 l/m   Oxygen Therapy/Pulse Ox   O2 Device Mid flow nasal cannula   O2 Therapy Oxygen humidified   Nasal Cannula O2 Flow Rate (L/min) 8 5 L/min   Calculated FIO2 (%) - Nasal Cannula 54   SpO2 92 %   SpO2 Activity At Rest

## 2022-05-10 NOTE — CONSULTS
Consult received for CHF diet education  Provided pt  with and reviewed handouts Low-Sodium Nutrition Therapy and Fluid-Restricted Nutrition Therapy  Of note, pt  had appointment w/ RD for DM diet education at endocrinolgy 4/22/22 at which time the importance of limiting Na was discussed  CCD 2, 2 gm Na, dysphagia 3 diet as ordered appropriate  Patient also on 1800 mL fluid restriction  No new recommendations  Thank you for the consult

## 2022-05-10 NOTE — ASSESSMENT & PLAN NOTE
Lab Results   Component Value Date    HGBA1C 9 5 (H) 02/19/2022       No results for input(s): POCGLU in the last 72 hours  Blood Sugar Average: Last 72 hrs:   A1c not a goal , uncontrolled      Continue diet and home medication for now    Low-sodium diet

## 2022-05-10 NOTE — NURSING NOTE
Patient was desatting on 4L O2 NC  I bumped his O2 up to 6L and notified respiratory and Sherleen Amabile because patient would occasionally still desat mostly with movement down to about 83-86%  I emphasized the importance of breathing in through nose and out through mouth because he was breathing in and out through mouth 
153.41

## 2022-05-10 NOTE — PHYSICAL THERAPY NOTE
Physical Therapy Evaluation    Patient Name: Abdulkadir Whitley    PDJZB'O Date: 5/10/2022     Problem List  Principal Problem:    Acute on chronic diastolic congestive heart failure ([facility] )  Active Problems:    Type 2 diabetes mellitus (HCC)    Stage 3b chronic kidney disease ([place] [address] )    Essential hypertension    Atrial fibrillation [facility])       Past Medical History  Past Medical History:   Diagnosis Date    A-fib ([place] [address] )     Arthritis     Athscl 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     Stent OM1  Patent mammary graft to LAD 10/7/2009; CABG 1992 & 2005    Cancer [facility])     skin    Coronary artery disease     Diabetes mellitus ([place] [address] )     Diabetes mellitus, type II ([place] [address] )     without complication    Epistaxis     Essential (primary) hypertension     GERD (gastroesophageal reflux disease)     Hx of cardiovascular stress test 07/23/2014    Eddie MPI; EF0 52 (52%) Lexiscan, mild LV systolic dysfunction; evidence for prior inferior wall MI; perfusion imaging consistant w mild lat wall ischemia and min torin-infart inferior ischemia   / EF0 51 (51%) evidence for prior inferior wall MI  Mild inferior and mild-moderate lateral wall ischemia  7/29/15    Hx of echocardiogram 11/07/2017    2D w/CFD;EF0 55 (55%) mild LVH, mitral valve prolapse with moderate regurgitation  left atrial enlargement  Mild tricuspid regurgitation   Hypertension     Mixed hyperlipidemia     Occlusion and stenosis of bilateral carotid arteries     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 infarction)     Presence of aortocoronary bypass graft         Past Surgical History  Past Surgical History:   Procedure Laterality Date    CARDIAC CATHETERIZATION  10/07/2009    Stent 99% stenosis SVG from the aorta to OM1   Patent mammary graft to the LAD    CORONARY ARTERY BYPASS GRAFT  1992    VG-CX, VG-RCA    CORONARY ARTERY BYPASS GRAFT  11/04/2005    LIMA-D1-LAD, VG-PDA-PLB, Sudhvq-VS2-GS3 TMR 9 Lesions    [place] EGD TRANSORAL BIOPSY SINGLE/MULTIPLE N/A 1/23/2019    Procedure: ESOPHAGOGASTRODUODENOSCOPY (EGD) with biopsy;  Surgeon: Elías Sanders MD;  Location: [facility] GI LAB; Service: Gastroenterology    TONSILLECTOMY             05/10/22 1102   PT Last Visit   PT Visit Date 05/10/22   Note Type   Note type Evaluation   Pain Assessment   Pain Assessment Tool 0-10   Pain Score No Pain   Restrictions/Precautions   Weight Bearing Precautions Per Order No   Other Precautions Fall Risk;O2;Telemetry;Multiple lines; Chair Alarm   Home Living   Type of [address] One level;Stairs to enter with rails  (3 LEONARD c HR)   Bathroom Shower/Tub Tub/shower unit   Bathroom Toilet Standard   Bathroom Equipment Grab bars in shower; Shower chair   Bathroom Accessibility Accessible   Home Equipment Walker;Cane;Other (Comment)  (O2)   Additional Comments pt reports use of 4WW at baseline   Prior Function   Level of [place] Independent with ADLs and functional mobility; Needs assistance with IADLs   Lives With Alone   Receives Help From Family   ADL Assistance Independent   IADLs Needs assistance   Falls in the last 6 months 1 to 4   Vocational Retired   Comments (-) driving   General   Family/Caregiver Present No   Cognition   Overall Cognitive Status WFL   Arousal/Participation Alert   Attention Within functional limits   Orientation Level Oriented X4   Following Commands Follows all commands and directions without difficulty   Subjective   Subjective "My daughters and granddaughters and great granddaughters help me"   RLE Assessment   RLE Assessment WFL  (assessed functionally)   LLE Assessment   LLE Assessment WFL  (assessed functionally)   Bed Mobility   Additional Comments pt seated EOB at start of session/ OOB at end of session   Transfers   Sit to Stand 5  Supervision   Additional items Increased time required;Verbal cues   Stand to Sit 5  Supervision   Additional items Increased time required;Verbal cues   Additional Comments RW used   Ambulation/Elevation   Gait pattern Excessively slow; Short stride; Foward flexed;Decreased foot clearance   Gait Assistance 5  Supervision   Additional items Verbal cues   Assistive Device Rolling walker   Distance 75'   Balance   Static Sitting Good   Dynamic Sitting Fair +   Static Standing Fair   Dynamic Standing [address], [address] -  (with RW)   Endurance Deficit   Endurance Deficit Yes   Endurance Deficit Description SpO2 decreasing with ambulation   Activity Tolerance   Activity Tolerance Patient limited by fatigue   Assessment   Prognosis Good   Problem List Decreased strength;Decreased endurance; Impaired balance;Decreased mobility   Assessment Patient is a 82 y o  male evaluated by Physical Therapy s/p admit to [address] [facility] [place] on 5/9/2022 with admitting diagnosis of: CHF (congestive heart failure), Dyspnea, SOB (shortness of breath), Hypoxia, Atrial fibrillation with rapid ventricular response, and principal problem of: Acute on chronic diastolic congestive heart failure  PT was consulted to assess patient's functional mobility and discharge needs  Ordered are PT Evaluation and treatment with activity level of: up as tolerated  Comorbidities affecting patient's physical performance at time of assessment include: DM, HLD, HTN, athscl heart disease, hx of MI, hx of cancer, CAD, a-fib  Personal factors affecting the patient at time of IE include: ambulating with assistive device, step(s) to enter home, inability to navigate community distances, history of fall(s) and inability/difficulty performing IADLs  Please locate objective findings from PT assessment regarding body systems outlined above  Upon evaluation, pt able to perform all functional mobility with SUP, RW, and increased time  Moderate verbal cuing provided for safety awareness and sequencing  Pt able to ambulate 76' before requiring seated rest break  SpO2 decreasing to 83% at lowest on 8L O2   Pt then recovered with seated rest break and instruction in pursed lip breathing  Pt with mild postural sway throughout but no true LOB experienced  The patient's AM-PAC Basic Mobility Inpatient Short Form Raw Score is 18  A Raw score of greater than 16 suggests the patient may benefit from discharge to home  Please also refer to the recommendation of the Physical Therapist for safe discharge planning  Co treatment with OT secondary to complex medical condition of pt, possible A of 2 required to achieve and maintain transitional movements, requiring the need of skilled therapeutic intervention of 2 therapists to achieve delivery of services  Pt will benefit from continued PT intervention during LOS to address current deficits, increase LOF, and facilitate safe d/c to next level of care when medically appropriate  D/c recommendation at this time is home with home health rehabilitation  Goals   Patient Goals to go home   LTG Expiration Date 05/24/22   Long Term Goal #1 Pt will participate in B LE strengthening exercises to facilitate improved functional activity tolerance  Pt will perform all functional transfers and bed mobility mod(I) with good safety awareness  Pt will ambulate 250' mod(I) with RW while maintaining good functional dynamic balance  Pt will ascend/descend 3 stairs with HR and SUP to reflect the ability to safely navigate the home  Plan   Treatment/Interventions Functional transfer training;LE strengthening/ROM; Elevations; Therapeutic exercise; Endurance training;Bed mobility;Gait training   PT Frequency 3-5x/wk   Recommendation   PT Discharge Recommendation Home with home health rehabilitation   AM-PAC Basic Mobility Inpatient   Turning in Bed Without Bedrails 3   Lying on Back to Sitting on Edge of Flat Bed 3   Moving Bed to Chair 3   Standing Up From Chair 3   Walk in Room 3   Climb 3-5 Stairs 3   Basic Mobility Inpatient Raw Score 18   Basic Mobility Standardized Score 41 05   Highest Level Of Mobility   -[facility] Goal 6: Walk 10 steps or more   JH-[facility]M Highest Level of Mobility 7: Walk 25 feet or more   JH-HLM Goal Achieved Yes

## 2022-05-10 NOTE — OCCUPATIONAL THERAPY NOTE
Occupational Therapy Evaluation      Get Wheeler    5/10/2022    Principal Problem:    Acute on chronic diastolic congestive heart failure (HCC)  Active Problems:    Type 2 diabetes mellitus (HCC)    Stage 3b chronic kidney disease (HCC)    Essential hypertension    Atrial fibrillation (HCC)      Past Medical History:   Diagnosis Date    [facility] )     Arthritis     Athscl 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     Stent OM1  Patent mammary graft to LAD 10/7/2009; CABG 1992 & 2005    Cancer [facility])     skin    Coronary artery disease     Diabetes mellitus ([facility] )     [facility], [facility]     without complication    Epistaxis     Essential (primary) hypertension     GERD (gastroesophageal reflux disease)     Hx of cardiovascular stress test 07/23/2014    Eddie MPI; EF0 52 (52%) Lexiscan, mild LV systolic dysfunction; evidence for prior inferior wall MI; perfusion imaging consistant w mild lat wall ischemia and min torin-infart inferior ischemia   / EF0 51 (51%) evidence for prior inferior wall MI  Mild inferior and mild-moderate lateral wall ischemia  7/29/15    Hx of echocardiogram 11/07/2017    2D w/CFD;EF0 55 (55%) mild LVH, mitral valve prolapse with moderate regurgitation  left atrial enlargement  Mild tricuspid regurgitation   Hypertension     Mixed hyperlipidemia     Occlusion and stenosis of bilateral carotid arteries     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 infarction)     Presence of aortocoronary bypass graft        Past Surgical History:   Procedure Laterality Date    CARDIAC CATHETERIZATION  10/07/2009    Stent 99% stenosis SVG from the aorta to OM1  Patent mammary graft to the LAD    CORONARY ARTERY BYPASS GRAFT  1992    VG-CX, VG-RCA    CORONARY ARTERY BYPASS GRAFT  11/04/2005    LIMA-D1-LAD, VG-PDA-PLB, Atrehh-PW3-EW3 TMR 9 Lesions    [place] EGD TRANSORAL BIOPSY SINGLE/MULTIPLE N/A 1/23/2019    Procedure: ESOPHAGOGASTRODUODENOSCOPY (EGD) with biopsy;  Surgeon:  King Shemar Eldon Ricardo MD;  Location: [address] [place] [address] GI LAB; Service: Gastroenterology    TONSILLECTOMY          05/10/22 1046   OT Last Visit   OT Visit Date 05/10/22   Note Type   Note type Evaluation   Restrictions/Precautions   Weight Bearing Precautions Per Order No   Other Precautions Chair Alarm; Bed Alarm;Multiple lines;Telemetry; Fall Risk;O2;Hard of hearing;Visual impairment  (8L O2 via midflow, wears O2 at home)   Pain Assessment   Pain Assessment Tool 0-10   Pain Score No Pain   Home Living   Type of Home Apartment   Home Layout One level;Performs ADLs on one level; Able to live on main level with bedroom/bathroom;Stairs to enter with rails  (3 LEONARD)   Bathroom Shower/Tub Tub/shower unit   Bathroom Toilet Standard   Bathroom Equipment Grab bars in shower; Shower chair   [address] [facility]  (rollator usage at home)   Prior Function   Level of [place] Independent with ADLs and functional mobility   Lives With Alone   Receives Help From Family   ADL Assistance Independent   IADLs Independent   Falls in the last 6 months 1 to 4  (1 fall)   Vocational Retired   ADL   UB Bathing Assistance 5  Supervision/Setup   LB [address] 4  Minimal Assistance   [address]  [address] 3  Moderate Assistance   Bed Mobility   Additional Comments Pt sitting EOB upon arrival and in recliner upon conclusion   Transfers   Sit to Stand   (CGA)   Additional items Assist x 1; Increased time required;Verbal cues   Stand to Sit   (CGA)   Additional items Assist x 1; Armrests; Increased time required;Verbal cues   Functional Mobility   Functional Mobility 4  Minimal assistance   Additional items Rolling walker   Balance   Static Sitting Good   Dynamic Sitting Fair +   Static Standing Fair   Dynamic Standing Fair -   Ambulatory Fair -   Activity Tolerance   Activity Tolerance Patient limited by fatigue  (drop in O2 stats to med [de-identified] s/p mobility)   Nurse Made Aware RN Olivia   RUE Assessment   RUE Assessment WFL   LUE Assessment   LUE Assessment WFL   Hand Function   Gross Motor Coordination Functional   Fine Motor Coordination Functional   Vision-Basic Assessment   Current Vision Wears glasses all the time   Visual History Cataracts  (L eye)   Cognition   Overall Cognitive Status WFL   Arousal/Participation Alert; Cooperative   Attention Within functional limits   Orientation Level Oriented X4   Memory Within functional limits   Following Commands Follows one step commands without difficulty   Assessment   Limitation Decreased ADL status; Decreased endurance;Decreased self-care trans;Decreased high-level ADLs   Prognosis Good   Assessment Pt is a 82 y o  male seen for OT evaluation s/p admit to [facility] on 5/9/2022 w/ Acute on chronic diastolic congestive heart failure ([facility] )  Comorbidities affecting pt's functional performance at time of assessment include: DM II, CKD, HTN, A-fib  Personal factors affecting pt at time of IE include:difficulty performing ADLS  Prior to admission, pt was I with ADLs  Upon evaluation: the following deficits impact occupational performance: decreased balance and decreased tolerance  Pt to benefit from continued skilled OT tx while in the hospital to address deficits as defined above and maximize level of functional independence w ADL's and functional mobility  Occupational Performance areas to address include: bathing/shower, toilet hygiene, dressing, functional mobility and clothing management  From OT standpoint, recommendation at time of d/c would be Home OT  Goals   Patient Goals To return home to 2 cats   Plan   Treatment Interventions ADL retraining;Functional transfer training; Endurance training;Patient/family training;Equipment evaluation/education; Compensatory technique education; Energy conservation; Activityengagement   Goal Expiration Date 05/20/22   OT Treatment Day 0   OT Frequency 3-5x/wk   Recommendation   OT Discharge Recommendation Home with home health rehabilitation   OT - OK to Discharge Yes  (when medically stable)   Additional Comments  Pt seen as a co-eval with PT due to the patient's co-morbidities, clinically unstable presentation, and present impairments which are a regression from the patient's baseline  Additional Comments 2 The patient's raw score on the AM-PAC Daily Activity inpatient short form is 20, standardized score is 42 03, greater than 39 4  Patients at this level are likely to benefit from discharge to home  Please refer to the recommendation of the Occupational Therapist for safe discharge planning     AM-PAC Daily Activity Inpatient   Lower Body Dressing 3   Bathing 3   Toileting 3   Upper Body Dressing 3   Grooming 4   Eating 4   Daily Activity Raw Score 20   Daily Activity Standardized Score (Calc for Raw Score >=11) 42 03   AM-[facility] Applied Cognition Inpatient   Following a Speech/Presentation 4   Understanding Ordinary Conversation 4   Taking Medications 4   Remembering Where Things Are Placed or Put Away 3   Remembering List of 4-5 Errands 3   Taking Care of Complicated Tasks 3   Applied Cognition Raw Score 21   Applied Cognition Standardized Score 44 3     GOALS:    Pt will achieve the following within specified time frame: STG  Pt will achieve the following goals within 5 days    *ADL transfers with (S) for inc'd independence with ADLs/purposeful tasks    *UB ADL with (S) for inc'd independence with self cares    *LB ADL with Min (A) using AE prn for inc'd independence with self cares    *Toileting with CGA for clothing management and hygiene for return to PLOF with personal care    *Increase static stand balance to F+ and dyn stand balance to F for inc'd safety with standing purposeful tasks    *Increase stand tolerance x3 m for inc'd tolerance with standing purposeful tasks    *Participate in 10m UE therex to increase overall stamina/activity tolerance for purposeful tasks    *Bed mobility- (S) for inc'd independence to manage own comfort and initiate EOB & OOB purposeful tasks    Pt will achieve the following within specified time frame: LTG  Pt will achieve the following goals within 10 days    *ADL transfers with (I) for inc'd independence with ADLs/purposeful tasks    *UB ADL with (I) for inc'd independence with self cares    *LB ADL with CGA using AE prn for inc'd independence with self cares    *Toileting with (S) for clothing management and hygiene for return to [facility] with personal care    *Increase static stand balance to G- and dyn stand balance to F+ for inc'd safety with standing purposeful tasks    *Increase stand tolerance x5 m for inc'd tolerance with standing purposeful tasks    *Bed mobility- (I) for inc'd independence to manage own comfort and initiate EOB & OOB purposeful tasks      Shikha Allen MS, OTR/L

## 2022-05-10 NOTE — PROGRESS NOTES
[address] [facility] [address]  Progress Note - Lauren Mora 1935, 82 y o  male MRN: [id number]  Unit/Bed#:  Encounter: [id number]  Primary Care Provider: Aileen Keenan DO   Date and time admitted to hospital: 5/9/2022  8:12 AM    * Acute on chronic diastolic congestive heart failure [facility])  Assessment & Plan  Wt Readings from Last 3 Encounters:   05/10/22 69 9 kg (154 lb 1 6 oz)   04/22/22 73 5 kg (162 lb)   04/04/22 73 7 kg (162 lb 6 4 oz)     Patient presents with shortness of breath  In the ED :  Labs notable for increased BNP 23 302, BUN 26 creatinine 1 85, chest x-ray remarkable for vascular congestion, CT on 5/9 - no PE, calcified granuloma are stable     MEDIASTINUM AND RENARD:  Anterior mediastinal lymph nodes are seen measuring about 7 mm, 8 mm  Subaortic the lymph nodes are seen measuring about 1 6 cm lower right paratracheal lymph node seen measuring 1 5 cm  Subcarinal nodes measuring about 1 6 cm  Mild enlargement of the hilar region,  ALONSO-notable for AFib with RVR  Patient received in the ED magnesium sulfate 2 mg, Lasix 20 mg IV Cardizem 10 mg IV, IVF bolus    Admit med surge    Echo 02/21/2022 EF 65 %  Strict I and O's, weight check,  Initiated Lasix 40 mg b i d  IV will transition to 80 mg today, per cardiology, patient will have repeat echo once his rate control improves  Continue low-sodium diet and fluid restriction  Strict daily weight and I's and O's  Patient uses oxygen 3 L at home, currently on mid flow 8 5 L  Follow-up labs tomorrow a m  Atrial fibrillation [facility])  Assessment & Plan  History of AFib  In the ED presented with AFib given Cardizem 10 mg IV  Initiated Cardizem 30 mg q 6 p o  Cardiology consulted, recommendations appreciated  Will start metoprolol 25 mg every 6 hours, given his coronary artery disease, per Cardiology  Eliquis 2 5 mg b i d    Chads2 VASc score is 6   hold home medication propranolol      Acute respiratory failure with hypoxia [facility])  Assessment & Plan  Patient required oxygen at home 3 L  Upon admission patient's oxygen was 4 L which was increased to mid flow 8 5 L today a quentin Zuñiga Patient was desatting in high 80s  Patient also has increased procal, today  and will be given cefepime q 12  Follow-up procal level  Follow-up labs tomorrow eulalio saavedra  Coronary artery disease  Assessment & Plan  S/p  CABG two separate times and subsequent PCI   Cardiology consulted, recommendation appreciated, will replace Plavix with aspirin 81 mg the daily continue statin, beta-blockers, imdur                Patient previously was on Ranexa some for a gyn as symptoms, will resume Ranexa at 500 mg b i d  Per Cardiology                      Type 2 diabetes mellitus [facility])  Assessment & Plan  Lab Results   Component Value Date    HGBA1C 9 5 (H) 02/19/2022       No results for input(s): POCGLU in the last 72 hours  Blood Sugar Average: Last 72 hrs:   A1c not a goal , uncontrolled    Continue diet and home medication for now    Low-sodium diet      VTE Pharmacologic Prophylaxis:   Pharmacologic:  Eliquis  Mechanical VTE Prophylaxis in Place: Yes      Discussions with Specialists or Other Care Team Provider:  Cardiology    Education and Discussions with Family / Patient:  Patient, wife and daughter    Time Spent for Care: 20 minutes  More than 50% of total time spent on counseling and coordination of care as described above  Current Length of Stay: 1 day(s)    Current Patient Status: Inpatient   Certification Statement: The patient will continue to require additional inpatient hospital stay due to Need for IV diuretics and oxygen mid flow    Discharge Plan: -1-2 days if stable    Code Status: Level 1 - Full Code      Subjective:   Seen and examined today eulalio Zuñiga Patient reports that still she he has shortness of breath when he is trying to walk  He does not drink any new concerns  He reports that his right knee pain improved      Objective:     Vitals:   Temp (24hrs), Av 8 °F (37 1 °C), Min:98 4 °F (36 9 °C), Max:99 3 °F (37 4 °C)    Temp:  [98 4 °F (36 9 °C)-99 3 °F (37 4 °C)] 98 8 °F (37 1 °C)  HR:  [] 113  Resp:  [28-54] 37  BP: (103-144)/(51-88) 144/69  SpO2:  [83 %-96 %] 92 %  Body mass index is 24 87 kg/m²  Input and Output Summary (last 24 hours): Intake/Output Summary (Last 24 hours) at 5/10/2022 1433  Last data filed at 5/10/2022 1245  Gross per 24 hour   Intake 1370 ml   Output 1900 ml   Net -530 ml       Physical Exam:     Physical Exam  Constitutional:       Appearance: He is ill-appearing  HENT:      Head: Normocephalic  Nose: Nose normal       Mouth/Throat:      Mouth: Mucous membranes are dry  Eyes:      Conjunctiva/sclera: Conjunctivae normal    Cardiovascular:      Rate and Rhythm: Normal rate  Rhythm irregular  Pulses: Normal pulses  Pulmonary:      Breath sounds: Decreased breath sounds present  Abdominal:      General: Abdomen is flat  Bowel sounds are normal       Palpations: Abdomen is soft  Skin:     Capillary Refill: Capillary refill takes less than 2 seconds  Neurological:      Mental Status: He is alert  Additional Data:     Labs:    Results from last 7 days   Lab Units 05/10/22  0441   WBC Thousand/uL 9 83   HEMOGLOBIN g/dL 13 3   HEMATOCRIT % 40 5   PLATELETS Thousands/uL 216   NEUTROS PCT % 82*   LYMPHS PCT % 8*   MONOS PCT % 10   EOS PCT % 0     Results from last 7 days   Lab Units 05/10/22  0441   SODIUM mmol/L 137   POTASSIUM mmol/L 3 8   CHLORIDE mmol/L 100   CO2 mmol/L 27   BUN mg/dL 35*   CREATININE mg/dL 1 90*   ANION GAP mmol/L 10   CALCIUM mg/dL 9 7   ALBUMIN g/dL 3 1*   TOTAL BILIRUBIN mg/dL 2 02*   ALK PHOS U/L 66   ALT U/L 21   AST U/L 13   GLUCOSE RANDOM mg/dL 232*                 Results from last 7 days   Lab Units 05/10/22  0441   PROCALCITONIN ng/ml 0 38*           * I Have Reviewed All Lab Data Listed Above  * Additional Pertinent Lab Tests Reviewed:  All Labs For Current Hospital Admission Reviewed    Imaging:    CTA ED chest PE Study   Final Result   No pulmonary embolism seen   There is a interlobular septal thickening with reticulation, small bilateral effusion and fissural thickening, suggest pulmonary congestion, new from the previous study      There are patchy areas of groundglass density in the right middle lobe area, right lower lobe may be due to infiltrate or related to heart failure  Follow-up suggested in 2-3 months to demonstrate resolution      Mild increasing enlargement of the mediastinal lymph nodes, possibly reactive         No acute airspace consolidation      The study was marked in EPIC for immediate notification  Workstation performed: [id number]         XR chest 1 view portable   ED Interpretation   Diffuse bilateral haziness worse on the left side  Change compared to x-ray from February 2022  Final Result      Vascular congestion                    Workstation performed: [id number]                 Imaging Personally Reviewed by Myself Includes:      Recent Cultures (last 7 days):           Last 24 Hours Medication List:   Current Facility-Administered Medications   Medication Dose Route Frequency Provider Last Rate    apixaban  2 5 mg Oral BID Nellie Frame Reinaldokris alford, DO      [START ON 5/11/2022] aspirin  81 mg Oral Daily Cuco Kendrick PA-C      atorvastatin  40 mg Oral Daily With Joey Quintanilla MD      cefepime  1,000 mg Intravenous Q12H Nellie Frame Onesimo, DO 1,000 mg (05/10/22 1107)    cholecalciferol  1,000 Units Oral Daily Nellie Frame Reinaldo dunes, DO      escitalopram  10 mg Oral Daily Silvestre Lubin MD      furosemide  80 mg Intravenous Once Cuco Kendrick PA-C      isosorbide mononitrate  60 mg Oral Daily Silvestre Lubin MD      metoprolol tartrate  25 mg Oral Q6H [address] Cuco Kendrick PA-C      pantoprazole  40 mg Oral Early Morning Silvestre Lubin MD      ranolazine  500 mg Oral Q12H [address] Cuco Kendrick PA-C      sitaGLIPtin 25 mg Oral Daily Galileo Singleton MD      sucralfate  1 g Oral BID Galileo Singleton MD      tamsulosin  0 8 mg Oral Daily With Tristian Dickson MD          Today, Patient Was Seen By: Galileo Singleton MD    ** Please Note: Dictation voice to text software may have been used in the creation of this document   **

## 2022-05-10 NOTE — PLAN OF CARE
Problem: PHYSICAL THERAPY ADULT  Goal: Performs mobility at highest level of function for planned discharge setting  See evaluation for individualized goals  Description: Treatment/Interventions: Functional transfer training,LE strengthening/ROM,Elevations,Therapeutic exercise,Endurance training,Bed mobility,Gait training          See flowsheet documentation for full assessment, interventions and recommendations  Note: Prognosis: Good  Problem List: Decreased strength,Decreased endurance,Impaired balance,Decreased mobility  Assessment: Patient is a 82 y o  male evaluated by Physical Therapy s/p admit to [address] [place] [address],[address] on 5/9/2022 with admitting diagnosis of: CHF (congestive heart failure), Dyspnea, SOB (shortness of breath), Hypoxia, Atrial fibrillation with rapid ventricular response, and principal problem of: Acute on chronic diastolic congestive heart failure  PT was consulted to assess patient's functional mobility and discharge needs  Ordered are PT Evaluation and treatment with activity level of: up as tolerated  Comorbidities affecting patient's physical performance at time of assessment include: DM, HLD, HTN, athscl heart disease, hx of MI, hx of cancer, CAD, a-fib  Personal factors affecting the patient at time of IE include: ambulating with assistive device, step(s) to enter home, inability to navigate community distances, history of fall(s) and inability/difficulty performing IADLs  Please locate objective findings from PT assessment regarding body systems outlined above  Upon evaluation, pt able to perform all functional mobility with SUP, RW, and increased time  Moderate verbal cuing provided for safety awareness and sequencing  Pt able to ambulate 76' before requiring seated rest break  SpO2 decreasing to 83% at lowest on 8L O2  Pt then recovered with seated rest break and instruction in pursed lip breathing  Pt with mild postural sway throughout but no true LOB experienced   The patient's AM-PAC Basic Mobility Inpatient Short Form Raw Score is 18  A Raw score of greater than 16 suggests the patient may benefit from discharge to home  Please also refer to the recommendation of the Physical Therapist for safe discharge planning  Co treatment with OT secondary to complex medical condition of pt, possible A of 2 required to achieve and maintain transitional movements, requiring the need of skilled therapeutic intervention of 2 therapists to achieve delivery of services  Pt will benefit from continued PT intervention during LOS to address current deficits, increase LOF, and facilitate safe d/c to next level of care when medically appropriate  D/c recommendation at this time is home with home health rehabilitation  PT Discharge Recommendation: Home with home health rehabilitation          See flowsheet documentation for full assessment

## 2022-05-10 NOTE — ASSESSMENT & PLAN NOTE
History of AFib  In the ED presented with AFib given Cardizem 10 mg IV  Initiated Cardizem 30 mg q 6 p o  Cardiology consulted, recommendations appreciated  Will start metoprolol 25 mg every 6 hours, given his coronary artery disease, per Cardiology  Eliquis 2 5 mg b i d    Chads2 VASc score is 6   hold home medication propranolol

## 2022-05-10 NOTE — H&P
[address] [address], 82 y o  male MRN: [id number]  Unit/Bed#:  Encounter: [id number]  Primary Care Provider: Malorie Cardoso DO   Date and time admitted to hospital: 5/9/2022  8:12 AM    * Acute on chronic diastolic congestive heart failure [facility])  Assessment & Plan  Wt Readings from Last 3 Encounters:   05/10/22 69 9 kg (154 lb 1 6 oz)   04/22/22 73 5 kg (162 lb)   04/04/22 73 7 kg (162 lb 6 4 oz)     Patient presents with shortness of breath  In the ED :  Labs notable for increased BNP 23 302, BUN 26 creatinine 1 85, chest x-ray remarkable for vascular congestion, CT on 5/9 - no PE, calcified granuloma are stable     MEDIASTINUM AND RENARD:  Anterior mediastinal lymph nodes are seen measuring about 7 mm, 8 mm  Subaortic the lymph nodes are seen measuring about 1 6 cm lower right paratracheal lymph node seen measuring 1 5 cm  Subcarinal nodes measuring about 1 6 cm  Mild enlargement of the hilar region,  ALONSO-notable for AFib with RVR  Patient received in the ED magnesium sulfate 2 mg, Lasix 20 mg IV Cardizem 10 mg IV, IVF bolus    Admit med surge    Echo 02/21/2022 EF 65 %  Strict I and O's, weight check,  Will give Lasix 40 mg b i d  IV  Patient uses oxygen 3 L at home  Follow-up labs tomorrow a m  Essential hypertension  Assessment & Plan  Continue home medication tamsulosin, Imdur, Lipitor, Plavix  Propranolol( for esophageal varices )     EGD 12/16/2021   IMPRESSION:  Esophageal varices, fairly small and nonbleeding but involving the lower 2/3 of the esophagus  Upper and lower esophageal ring, both dilated cautiously      Follow-up vitals    Stage 3b chronic kidney disease [facility])  Assessment & Plan  Lab Results   Component Value Date    EGFR 31 05/10/2022    EGFR 32 05/09/2022    EGFR 31 03/15/2022    CREATININE 1 90 (H) 05/10/2022    CREATININE 1 85 (H) 05/09/2022    CREATININE 1 89 (H) 03/15/2022   Patient has chronic kidney disease creatinine 1 85, baseline is 1 65-1 8  Will monitor kidney function, for now will continue PPI will change to H2 if kidney function worsens    Type 2 diabetes mellitus ([facility] [address] )  Assessment & Plan  Lab Results   Component Value Date    HGBA1C 9 5 (H) 02/19/2022       No results for input(s): POCGLU in the last 72 hours  Blood Sugar Average: Last 72 hrs:   A1c not a goal , uncontrolled    Oral meds hold  Will start lispro, AC/HS  Hypoglycemic protocol  Low-sodium diet    VTE Prophylaxis: Apixaban (Eliquis)  / sequential compression device   Code Status: full  Discussion with family: patient    Anticipated Length of Stay:  Patient will be admitted on an Inpatient basis with an anticipated length of stay of  More than 2 midnights  Justification for Hospital Stay: Iv diuresis and possible Cardizem use , for Afib     Total Time for Visit, including Counseling / Coordination of Care: 60 minutes  Greater than 50% of this total time spent on direct patient counseling and coordination of care  Chief Complaint:   SOB and right knee pain    History of Present Illness:    Sherif Conner is a 82 y o   with past medical history of CKD stage IIIB, hypertension, hyperlipidemia, diabetes uncontrolled who presents today with shortness of breath and right knee pain  He reports since Thursday he has been having right knee pain in the knee was swollen, which significantly improved by today  He is not able to ambulate normally because of the pain  Did not take any pain medication  Patient reports that he has chronic shortness of breath but recent couple of days it progressively got worse  He is on home oxygen 3 L tried to increase it to 4 L but it did not help  Shortness of breath gets worse with walking, and alleviating with laying down in his bed    He denies fever, chest pain, headache, nausea, fever, recent URI symptoms chills, vomiting, diarrhea, constipation, abdominal pain, history of PE and DVT,     In the ED given magnesium sulfate, Lasix 20 mg IV Cardizem 10 mg IV, IV fluids bolus  Patient [facility] be admitted to [facility] level 4 for the management and evaluation of AFib and acute diastolic CHF       Review of Systems:    Review of Systems     Constitutional: Positive for activity change  Negative for appetite change ( Due to right knee pain), chills, diaphoresis and fatigue  HENT: Negative for congestion  Respiratory: Positive for shortness of breath  Negative for cough and chest tightness  Cardiovascular: Positive for leg swelling  Negative for chest pain and palpitations  Gastrointestinal: Negative for abdominal distention, constipation, diarrhea, nausea and vomiting  Genitourinary: Negative for difficulty urinating and urgency  Skin: Negative for color change and pallor  Neurological: Negative for dizziness, weakness, numbness and headaches  Psychiatric/Behavioral: Negative for confusion  Past Medical and Surgical History:     Past Medical History:   Diagnosis Date    A-fib ([place] [address] )     Arthritis     Athscl 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     Stent OM1  Patent mammary graft to LAD 10/7/2009; CABG 1992 & 2005    Cancer [facility])     skin    Coronary artery disease     Diabetes mellitus ([place] [address] )     Diabetes mellitus, type II ([place] [address] )     without complication    Epistaxis     Essential (primary) hypertension     GERD (gastroesophageal reflux disease)     Hx of cardiovascular stress test 07/23/2014    Eddie MPI; EF0 52 (52%) Lexiscan, mild LV systolic dysfunction; evidence for prior inferior wall MI; perfusion imaging consistant w mild lat wall ischemia and min torin-infart inferior ischemia   / EF0 51 (51%) evidence for prior inferior wall MI  Mild inferior and mild-moderate lateral wall ischemia  7/29/15    Hx of echocardiogram 11/07/2017    2D w/CFD;EF0 55 (55%) mild LVH, mitral valve prolapse with moderate regurgitation  left atrial enlargement  Mild tricuspid regurgitation        Hypertension     Mixed hyperlipidemia     Occlusion and stenosis of bilateral carotid arteries     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 infarction)     Presence of aortocoronary bypass graft        Past Surgical History:   Procedure Laterality Date    CARDIAC CATHETERIZATION  10/07/2009    Stent 99% stenosis SVG from the aorta to OM1  Patent mammary graft to the LAD    CORONARY ARTERY BYPASS GRAFT  1992    VG-CX, VG-RCA    CORONARY ARTERY BYPASS GRAFT  11/04/2005    LIMA-D1-LAD, VG-PDA-PLB, [id number] TMR 9 Lesions    VA EGD TRANSORAL BIOPSY SINGLE/MULTIPLE N/A 1/23/2019    Procedure: ESOPHAGOGASTRODUODENOSCOPY (EGD) with biopsy;  Surgeon: Margarita Bamberger, MD;  Location: [address] [place] [address] GI LAB; Service: Gastroenterology    TONSILLECTOMY         Meds/Allergies:    Prior to Admission medications    Medication Sig Start Date End Date Taking? Authorizing Provider   atorvastatin (LIPITOR) 40 mg tablet Take 1 tablet (40 mg total) by mouth daily with dinner This is an increased dose to better help prevent heart attack and stroke   2/22/22  Yes Juana Garcia, DO   cholecalciferol (VITAMIN D3) 1,000 units tablet Take 1 tablet (1,000 Units total) by mouth daily 2/22/22  Yes James Antunez, DO   clopidogrel (PLAVIX) 75 mg tablet Take 1 tablet (75 mg total) by mouth daily Hold until seen by ENT on 9/23/19 2/14/20  Yes Redd Mcduffie, DO   escitalopram (LEXAPRO) 10 mg tablet Take 10 mg by mouth daily   Yes Historical Provider, MD   isosorbide mononitrate (IMDUR) 60 mg 24 hr tablet Take 1 tablet (60 mg total) by mouth daily In the morning 2/22/22  Yes James Antunez, DO   linaGLIPtin (Tradjenta) 5 MG TABS Take 5 mg by mouth daily Take this for your diabetes instead of glimepiride 2/22/22  Yes James Antunez, DO   pantoprazole (PROTONIX) 40 mg tablet Take 40 mg by mouth daily 2/23/22  Yes Historical Provider, MD   sucralfate (CARAFATE) 1 g tablet Take 1 g by mouth 2 (two) times a day   Yes Historical Provider, MD   tamsulosin (FLOMAX) 0 4 mg Take 2 capsules (0 8 mg total) by mouth daily with dinner 10/29/21  Yes [employer], DO     I have reviewed home medications with patient personally  Allergies: Allergies   Allergen Reactions    Ibuprofen Fatigue     Other reaction(s): decreased kidney function       Social History:     Marital Status:    Occupation: retired  Patient Pre-hospital Living Situation: lives at home himself and has 2 cats  Patient Pre-hospital Level of Mobility: not restricted till last Thursday because of right knee pain  Patient Pre-hospital Diet Restrictions: low sodium diet   Substance Use History:   Social History     Substance and Sexual Activity   Alcohol Use Never     Social History     Tobacco Use   Smoking Status Former Smoker    Packs/day: 1 00    Years: 40 00    Pack years: 40 00    Quit date: 1977    Years since quittin 3   Smokeless Tobacco Never Used     Social History     Substance and Sexual Activity   Drug Use No       Family History:    non-contributory    Physical Exam:     Vitals:   Blood Pressure: 124/57 (05/10/22 0611)  Pulse: 100 (05/10/22 06)  Temperature: 99 3 °F (37 4 °C) (05/10/22 06)  Temp Source: Tympanic (05/10/22 0611)  Respirations: (!) 37 (05/10/22 06)  Height: 5' 6" (167 6 cm) (22 1300)  Weight - Scale: 69 9 kg (154 lb 1 6 oz) (05/10/22 0544)  SpO2: 94 % (05/10/22 0611)    Physical Exam     Vitals:   Blood Pressure: 103/57 (22 1600)  Pulse: 104 (22 1600)  Temperature: 98 7 °F (37 1 °C) (22 1600)  Temp Source: Tympanic (22 1600)  Respirations: (!) 54 (22 1600)  Height: 5' 6" (167 6 cm) (22 1300)  Weight - Scale: 70 9 kg (156 lb 4 9 oz) (22 1514)  SpO2: 91 % (22 1600)     Physical Exam  Vitals and nursing note reviewed  Constitutional:       Appearance: He is well-developed  He is obese  He is ill-appearing  HENT:      Head: Normocephalic and atraumatic        Mouth/Throat:      Mouth: Mucous membranes are dry    Eyes:      Conjunctiva/sclera: Conjunctivae normal    Cardiovascular:      Rate and Rhythm: Bradycardia present  Rhythm irregular  Heart sounds: Normal heart sounds  No murmur heard        Pulmonary:      Effort: Pulmonary effort is normal  No respiratory distress  Breath sounds: Decreased breath sounds and rales present  Abdominal:      General: Abdomen is flat  Bowel sounds are normal  There is no distension  Palpations: Abdomen is soft  Tenderness: There is no abdominal tenderness  Musculoskeletal:      Cervical back: Neck supple  Right lower leg: Swelling present  Edema ( Mild) present  Left lower leg: Edema ( Mild) present  Legs:    Skin:     General: Skin is warm and dry  Capillary Refill: Capillary refill takes 2 to 3 seconds  Neurological:      Mental Status: He is alert  Additional Data:     Lab Results: I have personally reviewed pertinent reports  Results from last 7 days   Lab Units 05/10/22  0441   WBC Thousand/uL 9 83   HEMOGLOBIN g/dL 13 3   HEMATOCRIT % 40 5   PLATELETS Thousands/uL 216   NEUTROS PCT % 82*   LYMPHS PCT % 8*   MONOS PCT % 10   EOS PCT % 0     Results from last 7 days   Lab Units 05/10/22  0441   SODIUM mmol/L 137   POTASSIUM mmol/L 3 8   CHLORIDE mmol/L 100   CO2 mmol/L 27   BUN mg/dL 35*   CREATININE mg/dL 1 90*   ANION GAP mmol/L 10   CALCIUM mg/dL 9 7   ALBUMIN g/dL 3 1*   TOTAL BILIRUBIN mg/dL 2 02*   ALK PHOS U/L 66   ALT U/L 21   AST U/L 13   GLUCOSE RANDOM mg/dL 232*                 Results from last 7 days   Lab Units 05/10/22  0441   PROCALCITONIN ng/ml 0 38*       Imaging: I have personally reviewed pertinent reports        CTA ED chest PE Study   Final Result by Carlene Kendall MD (05/09 1030)   No pulmonary embolism seen   There is a interlobular septal thickening with reticulation, small bilateral effusion and fissural thickening, suggest pulmonary congestion, new from the previous study      There are patchy areas of groundglass density in the right middle lobe area, right lower lobe may be due to infiltrate or related to heart failure  Follow-up suggested in 2-3 months to demonstrate resolution      Mild increasing enlargement of the mediastinal lymph nodes, possibly reactive         No acute airspace consolidation      The study was marked in EPIC for immediate notification  Workstation performed: [id number]         XR chest 1 view portable   ED Interpretation by Rhianna Tabor DO (05/09 8002)   Diffuse bilateral haziness worse on the left side  Change compared to x-ray from February 2022  Final Result by Anju Lozano MD (05/09 5457)      Vascular congestion  Workstation performed: [id number]             EKG, Pathology, and Other Studies Reviewed on Admission:   · EKG: Afib    Allscripts / Epic Records Reviewed: Yes     ** Please Note: This note has been constructed using a voice recognition system   **

## 2022-05-10 NOTE — PLAN OF CARE
Problem: OCCUPATIONAL THERAPY ADULT  Goal: Performs self-care activities at highest level of function for planned discharge setting  See evaluation for individualized goals  Description: Treatment Interventions: ADL retraining,Functional transfer training,Endurance training,Patient/family training,Equipment evaluation/education,Compensatory technique education,Energy conservation,Activityengagement     See flowsheet documentation for full assessment, interventions and recommendations  Note: Limitation: Decreased ADL status,Decreased endurance,Decreased self-care trans,Decreased high-level ADLs  Prognosis: Good  Assessment: Pt is a 82 y o  male seen for OT evaluation s/p admit to [facility] on 5/9/2022 w/ Acute on chronic diastolic congestive heart failure ([facility] [address] )  Comorbidities affecting pt's functional performance at time of assessment include: DM II, CKD, HTN, A-fib  Personal factors affecting pt at time of IE include:difficulty performing ADLS  Prior to admission, pt was I with ADLs  Upon evaluation: the following deficits impact occupational performance: decreased balance and decreased tolerance  Pt to benefit from continued skilled OT tx while in the hospital to address deficits as defined above and maximize level of functional independence w ADL's and functional mobility  Occupational Performance areas to address include: bathing/shower, toilet hygiene, dressing, functional mobility and clothing management  From OT standpoint, recommendation at time of d/c would be Home OT       OT Discharge Recommendation: Home with home health rehabilitation  OT - OK to Discharge: Yes (when medically stable)    Shikha Lindsey MS, OTR/L

## 2022-05-10 NOTE — CONSULTS
Consultation - Pulmonary Medicine   Lor Mcginnis 82 y o  male MRN: [id number]  Unit/Bed#:  Encounter: [id number]      Assessment/Plan:    1  Acute on chronic hypoxic respiratory failure  1  Multifactorial in the setting of AFib with RVR, acute on chronic CHF, possible PNA and suspected underlying ILD   2  Currently requiring 8 5 L mid flow  Oxygen requirement at baseline is 3-4 L nasal cannula  3  Titrate oxygen to maintain SpO2 greater than or equal to 88%  4  Pulmonary toilet:  Increase activity as tolerated, out of bed as tolerated, cough and deep breathing as encouraged, incentive spirometry q 1 hour  2  Abnormal CT chest secondary to volume overload with possible pneumonia superimposed on suspected ILD  1  CT finding this admission were negative for PE but did show interlobular septal thickening and small right pleural effusion with stable pleural thickening on the left side, reticulations bibasilarly, mild paraseptal emphysema and new patchy ground-glass densities in right middle lobe and right lower lobe and mildly enlarged mediastinal lymph nodes  2  In the setting of mildly elevated white count and procalcitonin findings could suggest early pneumonia in addition to volume overload and underlying ILD  3  Agree with IV antibiotics, currently on cefepime  4  Check sputum culture  5  Trend WBC, procalcitonin, temps  6  IV diuretics per Cardiology recommendations  7  Repeat CT chest in 2-3 months  8  Previously checked ANCA which was normal   3  Acute on chronic diastolic CHF  1  Secondary to AFib with RVR  2  Cardiology following-diuretics per their recommendations, received Lasix 40 mg IV this a m  And 80 mg IV this afternoon  3  Monitor I/O and daily weights  4  New onset AFib with RVR  1  Heart rate poorly controlled during my evaluation  2  Suspect this is primary factor driving his respiratory failure  3  Cardiology following-recommending transition from Cardizem to p o   Metoprolol tartrate 25 mg every 6 hours  4  Anticoagulation with Eliquis 2 5 mg b i d  Based on [facility] score 6    History of Present Illness   Physician Requesting Consult: Clem Diamond DO  Reason for Consult / Principal Problem:  Acute hypoxic respiratory failure  Hx and PE limited by: n/a  Chief Complaint:  Shortness of breath  HPI: Hector Tavarez is a 82 y o   male who presented to [address] [facility] with complaints of shortness of breath  Patient's past medical history positive for coronary artery disease status post CABG x2, coronary artery stenosis, hypertension, dyslipidemia, chronic diastolic CHF, diabetes mellitus chronic hypoxic respiratory failure, suspected ILD  Patient came to the ER yesterday for evaluation of shortness of breath that he reports had been getting worse over the last several days especially with exertion  Other pertinent symptoms included worsening lower extremity edema, chest heaviness while lying flat dry nonproductive cough with off and on wheeze  Headache  Denied mucopurulent sputum production or hemoptysis  Upon arrival he was noted be tachycardic, tachypneic and hypoxic was found to have AFib with RVR  Given Cardizem and admitted to the ICU  Patient has had worsening oxygen requirement up to 8 5 L prompting Pulmonary consult today  Presently, he seen getting back into bed on 8 5 L in no acute distress  He states that he does feel little winded when he does activities like that  Overall improved compared to yesterday    From a pulmonary perspective, patient was seen by our pulmonary group February/March of this year  He has abnormal CT finding initially concern for [place]/NSIP ild  He is not taking any inhalers or nebulizers but he does wear 4 L nasal cannula continuously  He never had pulmonary function test   Patient is a former smoker with a quit date in the 80s  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    Denies sick contacts  No bird exposure  No recent travel  Inpatient consult to Pulmonology  Consult performed by: Olegario Julian PA-C  Consult ordered by: Duke Barrett DO          Review of Systems   All other systems reviewed and are negative  Historical Information   Past Medical History:   Diagnosis Date    A-fib [facility])     Arthritis     Athscl 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     Stent OM1  Patent mammary graft to LAD 10/7/2009; CABG 1992 & 2005    Cancer [facility])     skin    Coronary artery disease     Diabetes mellitus [facility] )     Diabetes mellitus, type II ([facility] )     without complication    Epistaxis     Essential (primary) hypertension     GERD (gastroesophageal reflux disease)     Hx of cardiovascular stress test 07/23/2014    Eddie MPI; EF0 52 (52%) Lexiscan, mild LV systolic dysfunction; evidence for prior inferior wall MI; perfusion imaging consistant w mild lat wall ischemia and min torin-infart inferior ischemia   / EF0 51 (51%) evidence for prior inferior wall MI  Mild inferior and mild-moderate lateral wall ischemia  7/29/15    Hx of echocardiogram 11/07/2017    2D w/CFD;EF0 55 (55%) mild LVH, mitral valve prolapse with moderate regurgitation  left atrial enlargement  Mild tricuspid regurgitation   Hypertension     Mixed hyperlipidemia     Occlusion and stenosis of bilateral carotid arteries     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 infarction)     Presence of aortocoronary bypass graft      Past Surgical History:   Procedure Laterality Date    CARDIAC CATHETERIZATION  10/07/2009    Stent 99% stenosis SVG from the aorta to OM1  Patent mammary graft to the LAD    CORONARY ARTERY BYPASS GRAFT  1992    VG-CX, VG-RCA    CORONARY ARTERY BYPASS GRAFT  11/04/2005    LIMA-D1-LAD, VG-PDA-PLB, Rctgos-HC0-XR5 TMR 9 Lesions    MI EGD TRANSORAL BIOPSY SINGLE/MULTIPLE N/A 1/23/2019    Procedure: ESOPHAGOGASTRODUODENOSCOPY (EGD) with biopsy;  Surgeon:  Hector Helton MD;  Location: [address] [place] [address] GI LAB;  Service: Gastroenterology    TONSILLECTOMY       Social History   Social History     Substance and Sexual Activity   Alcohol Use Never     Social History     Substance and Sexual Activity   Drug Use No     Social History     Tobacco Use   Smoking Status Former Smoker    Packs/day: 1 00    Years: 40 00    Pack years: 40 00    Quit date: 1977    Years since quittin 3   Smokeless Tobacco Never Used     E-Cigarette/Vaping    E-Cigarette Use Never User      E-Cigarette/Vaping Substances    Nicotine No     THC No     CBD No     Flavoring No     Other No     Unknown No      Occupational History: retired contractor    Family History:   Family History   Problem Relation Age of Onset    Heart disease Brother     No Known Problems Mother     Tuberculosis Father        Meds/Allergies   all current active meds have been reviewed, pertinent pulmonary meds have been reviewed and current meds:   Current Facility-Administered Medications   Medication Dose Route Frequency    apixaban (ELIQUIS) tablet 2 5 mg  2 5 mg Oral BID    [START ON 2022] aspirin (ECOTRIN LOW STRENGTH) EC tablet 81 mg  81 mg Oral Daily    atorvastatin (LIPITOR) tablet 40 mg  40 mg Oral Daily With Dinner    cefepime (MAXIPIME) IVPB (premix in dextrose) 1,000 mg 50 mL  1,000 mg Intravenous Q12H    cholecalciferol (VITAMIN D3) tablet 1,000 Units  1,000 Units Oral Daily    escitalopram (LEXAPRO) tablet 10 mg  10 mg Oral Daily    furosemide (LASIX) injection 80 mg  80 mg Intravenous Once    isosorbide mononitrate (IMDUR) 24 hr tablet 60 mg  60 mg Oral Daily    metoprolol tartrate (LOPRESSOR) tablet 25 mg  25 mg Oral Q6H [address]    pantoprazole (PROTONIX) EC tablet 40 mg  40 mg Oral Early Morning    ranolazine (RANEXA) 12 hr tablet 500 mg  500 mg Oral Q12H [address]    sitaGLIPtin (JANUVIA) tablet 25 mg  25 mg Oral Daily    sucralfate (CARAFATE) tablet 1 g  1 g Oral BID    tamsulosin (FLOMAX) capsule 0 8 mg  0 8 mg Oral Daily With Dinner       Allergies   Allergen Reactions    Ibuprofen Fatigue     Other reaction(s): decreased kidney function       Objective   Vitals: Blood pressure 144/69, pulse (!) 113, temperature 98 8 °F (37 1 °C), temperature source Temporal, resp  rate (!) 37, height 5' 6" (1 676 m), weight 69 9 kg (154 lb 1 6 oz), SpO2 92 %  8 5L midflow,Body mass index is 24 87 kg/m²  Intake/Output Summary (Last 24 hours) at 5/10/2022 1508  Last data filed at 5/10/2022 1506  Gross per 24 hour   Intake 1416 67 ml   Output 2125 ml   Net -708 33 ml     Invasive Devices  Report    Peripheral Intravenous Line            Peripheral IV 05/09/22 Right Antecubital 1 day                Physical Exam  Vitals and nursing note reviewed  Constitutional:       General: He is not in acute distress  Appearance: Normal appearance  HENT:      Head: Normocephalic and atraumatic  Nose: Nose normal       Mouth/Throat:      Mouth: Mucous membranes are moist       Pharynx: Oropharynx is clear  Eyes:      Extraocular Movements: Extraocular movements intact  Conjunctiva/sclera: Conjunctivae normal    Cardiovascular:      Rate and Rhythm: Tachycardia present  Rhythm irregularly irregular  Pulses: Normal pulses  Heart sounds: No murmur heard  Pulmonary:      Effort: Pulmonary effort is normal       Breath sounds: Rales present  Abdominal:      General: Bowel sounds are normal       Palpations: Abdomen is soft  Tenderness: There is no abdominal tenderness  Musculoskeletal:         General: Normal range of motion  Cervical back: Normal range of motion and neck supple  No muscular tenderness  Right lower leg: Edema (trace) present  Left lower leg: Edema (trace) present  Skin:     General: Skin is warm and dry  Neurological:      General: No focal deficit present  Mental Status: He is alert  Mental status is at baseline     Psychiatric:         Mood and Affect: Mood normal          Behavior: Behavior normal          Lab Results:   I have personally reviewed pertinent lab results  , ABG: No results found for: PHART, EZN8NIL, PO2ART, XOI0YKN, Y4YXDSBN, BEART, SOURCE, BNP: No results found for: BNP, CBC:   Lab Results   Component Value Date    WBC 9 83 05/10/2022    HGB 13 3 05/10/2022    HCT 40 5 05/10/2022    MCV 88 05/10/2022     05/10/2022    MCH 28 7 05/10/2022    MCHC 32 8 05/10/2022    RDW 14 4 05/10/2022    MPV 10 2 05/10/2022    NRBC 0 05/10/2022   , CMP:   Lab Results   Component Value Date    SODIUM 137 05/10/2022    K 3 8 05/10/2022     05/10/2022    CO2 27 05/10/2022    BUN 35 (H) 05/10/2022    CREATININE 1 90 (H) 05/10/2022    CALCIUM 9 7 05/10/2022    AST 13 05/10/2022    ALT 21 05/10/2022    ALKPHOS 66 05/10/2022    EGFR 31 05/10/2022   , PT/INR: No results found for: PT, INR, Troponin: No results found for: TROPONINI    Imaging Studies: I have personally reviewed pertinent reports  and I have personally reviewed pertinent films in PACS     CTA chest PE study 05/09/2022  No PE  Interlobular septal thickening  Left lingular region granuloma seen  Areas of ground-glass density in right middle lobe and right lower lobe  Mosaic attenuation  Mild chronic ild most notable at bases  Fissural thickening seen  Pleural thickening on the left side and a small right pleural effusion seen  Mildly enlarged mediastinal lymphadenopathy  EKG, Pathology, and Other Studies: I have personally reviewed pertinent reports  Echocardiogram 02/21/2022  EF 65%  Grade 1 diastolic dysfunction  Estimated right ventricular systolic pressure 41 mm Hg  Pulmonary Results (PFTs, PSG): none to review        VTE Prophylaxis: Sequential compression device (Venodyne)  and Reason for no pharmacologic prophylaxis placed on eliquis    Code Status: Level 1 - Full Code    Portions of the record may have been created with voice recognition software    Occasional wrong word or "sound a like" substitutions may have occurred due to the inherent limitations of voice recognition software  Read the chart carefully and recognize, using context, where substitutions have occurred

## 2022-05-10 NOTE — ASSESSMENT & PLAN NOTE
Wt Readings from Last 3 Encounters:   05/10/22 69 9 kg (154 lb 1 6 oz)   04/22/22 73 5 kg (162 lb)   04/04/22 73 7 kg (162 lb 6 4 oz)     Patient presents with shortness of breath  In the ED :  Labs notable for increased BNP 23 302, BUN 26 creatinine 1 85, chest x-ray remarkable for vascular congestion, CT on 5/9 - no PE, calcified granuloma are stable     MEDIASTINUM AND RENARD:  Anterior mediastinal lymph nodes are seen measuring about 7 mm, 8 mm  Subaortic the lymph nodes are seen measuring about 1 6 cm lower right paratracheal lymph node seen measuring 1 5 cm  Subcarinal nodes measuring about 1 6 cm  Mild enlargement of the hilar region,  ALONSO-notable for AFib with RVR  Patient received in the ED magnesium sulfate 2 mg, Lasix 20 mg IV Cardizem 10 mg IV, IVF bolus    Admit med surge    Echo 02/21/2022 EF 65 %  Strict I and O's, weight check,  Will give Lasix 40 mg b i d  IV  Patient uses oxygen 3 L at home  Follow-up labs tomorrow a m

## 2022-05-10 NOTE — ASSESSMENT & PLAN NOTE
S/p  CABG two separate times and subsequent PCI   Cardiology consulted, recommendation appreciated, will replace Plavix with aspirin 81 mg the daily continue statin, beta-blockers, imdur                Patient previously was on Ranexa some for a gyn as symptoms, will resume Ranexa at 500 mg b i d   Per Cardiology

## 2022-05-10 NOTE — CASE MANAGEMENT
Case Management Assessment & Discharge Planning Note    Patient name Amita Craft  Location / MRN [id number]  : 1935 Date 5/10/2022       Current Admission Date: 2022  Current Admission Diagnosis:Acute on chronic diastolic congestive heart failure [facility])   Patient Active Problem List    Diagnosis Date Noted    Atrial fibrillation ([facility] ) 2022    Chronic respiratory failure with hypoxia ([facility] ) 2022    ILD (interstitial lung disease) ([facility] ) 2022    Pulmonary hypertension ([facility] ) 2022    Acute exacerbation of CHF (congestive heart failure) ([facility] ) 2022    Acute kidney injury superimposed on chronic kidney disease ([place] [address] ) 2022    Eosinophilia 2022    Nocturnal hypoxia 2022    Pulmonary nodule 2022    Lymphadenopathy 2022    Pulmonic valve insufficiency 2022    Aortic calcification (HCC) 2022    Hypercalcemia 2022    Biclonal gammopathy 2022    Microalbuminuria 2022    Abnormal EKG 2022    Essential hypertension 2022    Acute respiratory failure with hypoxia ([facility] ) 2022    Acute on chronic diastolic congestive heart failure ([facility] ) 2022    Closed fracture of multiple ribs of right side with routine healing 2022    Sensation of pressure in bladder area 10/01/2021    Hypertensive left ventricular hypertrophy, without heart failure 2021    Elevated d-dimer 2021    Carotid artery stenosis 2021    Anginal syndrome ([facility] ) 2021    Secondary hyperparathyroidism of renal origin ([facility] ) 2020    Vitamin D deficiency 2020    Nephrolithiasis 2020    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 ([facility] ) 2020    Chronic tubulointerstitial nephritis 2020    Edema 2020    Proteinuria 2020    Coronary artery disease 2020    Stage 3b chronic kidney disease ([facility] ) 2019    Hypertensive crisis 08/26/2018    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 infarction) 08/26/2018    Mixed hyperlipidemia 08/26/2018    Type 2 diabetes mellitus ([facility] ) 08/26/2018    Coronary artery disease of native artery of native heart with stable angina pectoris ([facility] ) 08/26/2018    Gastroesophageal reflux disease without esophagitis 08/26/2018    Obese 08/26/2018    Presence of aortocoronary bypass graft 08/26/2018    Occlusion and stenosis of bilateral carotid arteries 08/26/2018    Dyslipidemia 08/26/2018      LOS (days): 1  Geometric Mean LOS (GMLOS) (days): 3 80  Days to GMLOS:2 7     OBJECTIVE:    Risk of Unplanned Readmission Score: 17         Current admission status: Inpatient       Preferred Pharmacy:   [facility] [address] [place]/ [address]  [facility] [address]  Phone: [phone] Fax: [phone]    Primary Care Provider: Aileen Keenan DO    Primary Insurance: MEDICARE  Secondary Insurance: [facility]    ASSESSMENT:  [address] [place] [place], [address] [facility] Representative   Primary Phone: [phone] (Home)                         Readmission Root Cause  30 Day Readmission: No    Patient Information  Admitted from[de-identified] Home  Mental Status: Alert  During Assessment patient was accompanied by: Not accompanied during assessment  Assessment information provided by[de-identified] Patient  Primary Caregiver: Self  Support Systems: [address] members (Pt's daughter and grand daughters are very supportive)  [place] of Residence: [address] do you live in?: [address] [facility] entry access options   Select all that apply : Stairs  Number of steps to enter home : 2  Do the steps have railings?: Yes  Type of Current Residence: Apartment  Upon entering residence, is there a bedroom on the main floor (no further steps)?: Yes  Upon entering residence, is there a bathroom on the main floor (no further steps)?: Yes  In the last 12 months, was there a time when you were not able to pay the mortgage or rent on time?: No  In the last 12 months, how many places have you lived?: 1  In the last 12 months, was there a time when you did not have a steady place to sleep or slept in a shelter (including now)?: No  Homeless/housing insecurity resource given?: N/A  Living Arrangements: Lives Alone  Is patient a ?: No    Activities of Daily Living Prior to Admission  Completes ADLs independently?: Yes  Ambulates independently?: Yes  Does patient use assisted devices?: Yes  Assisted Devices (DME) used: Marquis For[address] 76 tanks,Straight Cane,Walker  DME Company Name (respiratory supplies): [employer]  O2 Rate(s): 3 liters with exercise    Does patient currently own DME?: Yes  What DME does the patient currently own?: Home Oxygen concentrator,Portable Oxygen tanks,Straight Cane,Walker  Does patient have a history of Outpatient Therapy (PT/OT)?: No  Does the patient have a history of Short-Term Rehab?: No  Does patient have a history of HHC?: Yes (Pt had [facility] in [facility] past )  Does patient currently have [facility]?: No         Patient Information Continued  Income Source: Pension/residential  Does patient have prescription coverage?: Yes  Within the past 12 months, you worried that your food would run out before you got the money to buy more : Never true  Within the past 12 months, the food you bought just didnt last and you didnt have money to get more : Never true  Food insecurity resource given?: N/A  Does patient receive dialysis treatments?: No  Does patient have a history of substance abuse?: No  Does patient have a history of Mental Health Diagnosis?: No         Means of Transportation  Means of Transport to [facility][de-identified] Family transport (Pt's daughter and grand daughters drive patient to [facility] )  In the past 12 months, has lack of transportation kept you from medical appointments or from getting medications?: No  In the past 12 months, has lack of transportation kept you from meetings, work, or from getting things needed for daily living?: No  Was application for public transport provided?: N/A        DISCHARGE DETAILS:    Discharge planning discussed with[de-identified] Pt  Freedom of Choice: Yes     CM contacted family/caregiver?: No- see comments  Were Treatment Team discharge recommendations reviewed with patient/caregiver?: Yes               [address]         Is the patient interested in [place] [address] at discharge?: Yes  [address] requested[de-identified] [facility] Name[de-identified] [facility] Provider[de-identified] PCP  Home Health Services Needed[de-identified] Heart Failure Management,Evaluate Functional Status and Safety,Strengthening/Theraputic Exercises to Improve Function,Oxygen Via Nasal Cannula  Oxygen LPM Ordered (if applicable based on home health services needed):: 3 LPM  Homebound Criteria Met[de-identified] Uses an Assist Device (i e  cane, walker, etc)  Supporting Clincal Findings[de-identified] Requires Oxygen,Dyspnea with Exertion,Fatigues Easliy in Short Distances    DME Referral Provided  Referral made for DME?: No       PT is recommending that patient would benefit from Homecare  [facility] as requested by patient

## 2022-05-10 NOTE — ASSESSMENT & PLAN NOTE
Patient required oxygen at home 3 L  Upon admission patient's oxygen was 4 L which was increased to mid flow 8 5 L today a m  [facility] Serum Patient was desatting in high 80s  Patient also has increased procal, today  and will be given cefepime q 12  Follow-up procal level  Follow-up labs tomorrow a m

## 2022-05-10 NOTE — ASSESSMENT & PLAN NOTE
Lab Results   Component Value Date    EGFR 31 05/10/2022    EGFR 32 05/09/2022    EGFR 31 03/15/2022    CREATININE 1 90 (H) 05/10/2022    CREATININE 1 85 (H) 05/09/2022    CREATININE 1 89 (H) 03/15/2022   Patient has chronic kidney disease creatinine 1 85, baseline is 1 65-1 8  Will monitor kidney function, for now will continue PPI will change to H2 if kidney function worsens

## 2022-05-10 NOTE — RESPIRATORY THERAPY NOTE
RT Protocol Note  Alfie Bradley 82 y o  male MRN: [id number]  Unit/Bed#:  Encounter: [id number]    Assessment    Principal Problem:    Acute on chronic diastolic congestive heart failure (HCC)  Active Problems:    Type 2 diabetes mellitus (HCC)    Stage 3b chronic kidney disease (HCC)    Essential hypertension    Atrial fibrillation (HCC)      Home Pulmonary Medications:  None    Home Devices/Therapy: Home O2    Past Medical History:   Diagnosis Date    A-fib ([facility] )     Arthritis     Athscl 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     Stent OM1  Patent mammary graft to LAD 10/7/2009; CABG 1992 & 2005    Cancer [facility])     skin    Coronary artery disease     Diabetes mellitus ([facility] )     Diabetes mellitus, type II ([facility] )     without complication    Epistaxis     Essential (primary) hypertension     GERD (gastroesophageal reflux disease)     Hx of cardiovascular stress test 07/23/2014    Eddie MPI; EF0 52 (52%) Lexiscan, mild LV systolic dysfunction; evidence for prior inferior wall MI; perfusion imaging consistant w mild lat wall ischemia and min torin-infart inferior ischemia   / EF0 51 (51%) evidence for prior inferior wall MI  Mild inferior and mild-moderate lateral wall ischemia  7/29/15    Hx of echocardiogram 11/07/2017    2D w/CFD;EF0 55 (55%) mild LVH, mitral valve prolapse with moderate regurgitation  left atrial enlargement  Mild tricuspid regurgitation   Hypertension     Mixed hyperlipidemia     Occlusion and stenosis of bilateral carotid arteries     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 infarction)     Presence of aortocoronary bypass graft      Social History     Socioeconomic History    Marital status:       Spouse name: None    Number of children: None    Years of education: None    Highest education level: None   Occupational History    None   Tobacco Use    Smoking status: Former Smoker     Packs/day: 1 00     Years: 40 00     Pack years: 40 00     Quit date: 1/22/1977 Years since quittin 3    Smokeless tobacco: Never Used   Vaping Use    Vaping Use: Never used   Substance and Sexual Activity    Alcohol use: Never    Drug use: No    Sexual activity: Never     Partners: Male   Other Topics Concern    None   Social History Narrative    None     Social Determinants of Health     Financial Resource Strain: Not on file   Food Insecurity: No Food Insecurity    Worried About Running Out of Food in the Last Year: Never true    Mello of Food in the Last Year: Never true   Transportation Needs: No Transportation Needs    Lack of Transportation (Medical): No    Lack of Transportation (Non-Medical): No   Physical Activity: Not on file   Stress: Not on file   Social Connections: Not on file   Intimate Partner Violence: Not on file   Housing Stability: Low Risk     Unable to Pay for Housing in the Last Year: No    Number of Places Lived in the Last Year: 1    Unstable Housing in the Last Year: No       Subjective         Objective    Physical Exam:   Assessment Type: Assess only  General Appearance: Alert,Awake  Respiratory Pattern: Dyspnea with exertion  Chest Assessment: Chest expansion symmetrical  Bilateral Breath Sounds: Diminished  Cough: None    Vitals:  Blood pressure 116/67, pulse 89, temperature 98 4 °F (36 9 °C), temperature source Temporal, resp  rate (!) 40, height 5' 6" (1 676 m), weight 70 9 kg (156 lb 4 9 oz), SpO2 93 %  Imaging and other studies: I have personally reviewed pertinent reports              Plan    Respiratory Plan: Discontinue Protocol

## 2022-05-10 NOTE — ASSESSMENT & PLAN NOTE
Wt Readings from Last 3 Encounters:   05/10/22 69 9 kg (154 lb 1 6 oz)   04/22/22 73 5 kg (162 lb)   04/04/22 73 7 kg (162 lb 6 4 oz)     Patient presents with shortness of breath  In the ED :  Labs notable for increased BNP 23 302, BUN 26 creatinine 1 85, chest x-ray remarkable for vascular congestion, CT on 5/9 - no PE, calcified granuloma are stable     MEDIASTINUM AND RENARD:  Anterior mediastinal lymph nodes are seen measuring about 7 mm, 8 mm  Subaortic the lymph nodes are seen measuring about 1 6 cm lower right paratracheal lymph node seen measuring 1 5 cm  Subcarinal nodes measuring about 1 6 cm  Mild enlargement of the hilar region,  ALONSO-notable for AFib with RVR  Patient received in the ED magnesium sulfate 2 mg, Lasix 20 mg IV Cardizem 10 mg IV, IVF bolus    Admit med surge    Echo 02/21/2022 EF 65 %  Strict I and O's, weight check,  Initiated Lasix 40 mg b i d  IV will transition to 80 mg today, per cardiology, patient will have repeat echo once his rate control improves  Continue low-sodium diet and fluid restriction  Strict daily weight and I's and O's  Patient uses oxygen 3 L at home, currently on mid flow 8 5 L  Follow-up labs tomorrow a m

## 2022-05-10 NOTE — CONSULTS
Consultation - Cardiology   Brie Gonzales 82 y o  male MRN: [id number]  Unit/Bed#:  Encounter: [id number]    Consults      Physician Requesting Consult: Leigh Ann Fajardo DO  Reason for Consult / Principal Problem: atrial fibrillation with RVR, acute on chronic diastolic CHF    Assessmsent/Plan:  1  New onset atrial fibrillation with rapid ventricular response   - patient has no prior history of such, presented in rapid atrial fibrillation   - rates are still poorly controlled with diltiazem   - given age and respiratory status would attempt rate control vs  Rhythm control strategy for now   - given coronary artery disease, would favor beta -blocker therapy - will transition to PO metoprolol tartrate 25 mg every 6 hours which can be titrated as needed   - [id number] score = 6 (age +4, HTN, diabetes, CAD, CHF), anticoagulation with Eliquis 2 5 mg BID was added (reduced dose given age and renal function)   - check TSH   - patient is to have outpatient sleep study to r/o ARVIN   - he denies alcohol use or excessive caffeine use    2  Acute diastolic congestive heart failure   - most likely in the setting of #1   - received lasix 40 mg IV and 20 mg IV yesterday, urinary output was suboptimal   - received lasix 40 mg IV this AM, will give 80 mg IV this afternoon and assess response   - continue low sodium diet and fluid restriction   - he should have daily weights and strict I/O   - monitor daily BMP    3  Acute respiratory failure with hypoxia   - currently requiring mid flow nasal cannula at 8 5 L, appears he wears 3L NC at home   - at least in part secondary to acute CHF - diuresis as discussed above   - patient also with elevated procalcitonin, questionable pneumonia - per evaluation by pulmonary     4   Coronary artery disease    - with prior CABG two separate times and subsequent PCI as discussed below   - will replace plavix with aspirin 81 mg daily, continue statin, beta-blocker as above   - continue Imdur   - previously was on Ranexa which was added 1 year ago for anginal symptoms - ? Now not on medication list for unclear reasons, patient is unaware of why this would have been discontinued   - will resume Ranexa at 500 mg BID    5  Hypertension    - controlled on current regimen    History of Present Illness   HPI: Medina Ball is a 82y o  year old male with coronary artery disease s/p CABG x 2 in 1992 - VG-circumflex, VG-RCA, CABG x 3 in 2005 - LIMA-D1 and LAD, VG -PDA and PLB, radial graft to OM2 and OM1, and PCI/stenting to the graft to OM1 in 2009 - LIMA was patent at that time, carotid artery stenosis s/p left CEA in 2013, hypertension, dyslipidemia, diabetes who follows with Dr Jarret Martin  The patient presented to the ER yesterday for the evaluation of shortness of breath  Last Friday the patient started to experience worsening dyspnea on exertion  He was unable to lie flat without becoming short of breath  He denies PND  He did have some associated chest heaviness when lying flat  He notes some occasional chest heaviness with exertion  He is vague in this description  He had noticed bilateral lower extremity edema  He does not weigh himself daily but believes he was losing weight  He denies palpitations  He notes intermittent lightheadedness but denies syncope  Upon admission he was found to be in atrial fibrillation with rapid ventricular response which is a new diagnosis for him  He was given IV diltiazem and subsequently PO diltiazem 30 mg x 3 doses thus far  CXR and CTA revealed pulmonary vascular congestion  BNP was elevated at >23,000  He was given a total of 60 mg IV lasix yesterday resulting in a 2 lb weight loss  Cardiology was consulted for further evaluation and management  Review of Systems   Cardiovascular: Positive for chest pain, dyspnea on exertion, leg swelling and orthopnea  Negative for palpitations, paroxysmal nocturnal dyspnea and syncope     Neurological: Positive for light-headedness  All other systems reviewed and are negative  Historical Information   Past Medical History:   Diagnosis Date    A-fib [facility])     Arthritis     Athscl 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     Stent OM1  Patent mammary graft to LAD 10/7/2009; CABG 1992 & 2005    Cancer [facility])     skin    Coronary artery disease     [facility] mellitus [facility] )     Diabetes mellitus, type II ([facility] )     without complication    Epistaxis     Essential (primary) hypertension     GERD (gastroesophageal reflux disease)     Hx of cardiovascular stress test 07/23/2014    Eddie MPI; EF0 52 (52%) Lexiscan, mild LV systolic dysfunction; evidence for prior inferior wall MI; perfusion imaging consistant w mild lat wall ischemia and min torin-infart inferior ischemia   / EF0 51 (51%) evidence for prior inferior wall MI  Mild inferior and mild-moderate lateral wall ischemia  7/29/15    Hx of echocardiogram 11/07/2017    2D w/CFD;EF0 55 (55%) mild LVH, mitral valve prolapse with moderate regurgitation  left atrial enlargement  Mild tricuspid regurgitation   Hypertension     Mixed hyperlipidemia     Occlusion and stenosis of bilateral carotid arteries     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 infarction)     Presence of aortocoronary bypass graft      Past Surgical History:   Procedure Laterality Date    CARDIAC CATHETERIZATION  10/07/2009    Stent 99% stenosis SVG from the aorta to OM1  Patent mammary graft to the LAD    CORONARY ARTERY BYPASS GRAFT  1992    VG-CX, VG-RCA    CORONARY ARTERY BYPASS GRAFT  11/04/2005    LIMA-D1-LAD, VG-PDA-PLB, [id number] TMR 9 Lesions    NM EGD TRANSORAL BIOPSY SINGLE/MULTIPLE N/A 1/23/2019    Procedure: ESOPHAGOGASTRODUODENOSCOPY (EGD) with biopsy;  Surgeon: Marcella Aquino MD;  Location: [address] [place] [address] GI LAB;   Service: Gastroenterology    TONSILLECTOMY       Social History     Substance and Sexual Activity   Alcohol Use Never     Social History     Substance and Sexual Activity   Drug Use No     Social History     Tobacco Use   Smoking Status Former Smoker    Packs/day: 1 00    Years: 40 00    Pack years: 40 00    Quit date: 1977    Years since quittin 3   Smokeless Tobacco Never Used     Family History: non-contributory    Meds/Allergies   all current active meds have been reviewed       Allergies   Allergen Reactions    Ibuprofen Fatigue     Other reaction(s): decreased kidney function       Objective   Vitals: Blood pressure 124/88, pulse 100, temperature 98 8 °F (37 1 °C), temperature source Temporal, resp  rate (!) 37, height 5' 6" (1 676 m), weight 69 9 kg (154 lb 1 6 oz), SpO2 92 %  , Body mass index is 24 87 kg/m² ,   Orthostatic Blood Pressures      Most Recent Value   Blood Pressure 124/88 filed at 05/10/2022 0741   Patient Position - Orthostatic VS Lying filed at 05/10/2022 4925        Systolic (05RSK), PPK:982 , Min:103 , YOK:169     Diastolic (39SBX), AHI:13, Min:51, Max:88      Intake/Output Summary (Last 24 hours) at 5/10/2022 1018  Last data filed at 5/10/2022 0940  Gross per 24 hour   Intake 1310 ml   Output 2050 ml   Net -740 ml       Weight (last 2 days)     Date/Time Weight    05/10/22 0544 69 9 (154 1)    22 1514 70 9 (156 31)    22 1300 73 6 (162 26)    22 0822 73 8 (162 7)          Invasive Devices  Report    Peripheral Intravenous Line            Peripheral IV 22 Right Antecubital 1 day                  Physical Exam  Vitals reviewed  Constitutional:       General: He is not in acute distress  Appearance: He is well-developed  He is obese  He is not diaphoretic  HENT:      Head: Normocephalic and atraumatic  Eyes:      Pupils: Pupils are equal, round, and reactive to light  Neck:      Vascular: No carotid bruit  Cardiovascular:      Rate and Rhythm: Bradycardia present  Rhythm irregularly irregular  Pulses:           Radial pulses are 2+ on the right side and 2+ on the left side        Heart sounds: S1 normal and S2 normal  Murmur heard  Systolic murmur is present  Pulmonary:      Effort: Pulmonary effort is normal  No respiratory distress  Breath sounds: Decreased breath sounds present  No wheezing or rales  Abdominal:      General: There is no distension  Palpations: Abdomen is soft  Tenderness: There is no abdominal tenderness  Musculoskeletal:         General: Normal range of motion  Cervical back: Normal range of motion  Right lower leg: Edema (trace edema bilaterally) present  Left lower leg: Edema present  Skin:     General: Skin is warm and dry  Findings: No erythema  Neurological:      General: No focal deficit present  Mental Status: He is alert and oriented to person, place, and time  Psychiatric:         Mood and Affect: Mood normal          Behavior: Behavior normal              Laboratory Results:        CBC with diff:   Results from last 7 days   Lab Units 05/10/22  0441 05/09/22  0827   WBC Thousand/uL 9 83 10 90*   HEMOGLOBIN g/dL 13 3 12 9   HEMATOCRIT % 40 5 39 8   MCV fL 88 88   PLATELETS Thousands/uL 216 200   MCH pg 28 7 28 6   MCHC g/dL 32 8 32 4   RDW % 14 4 14 2   MPV fL 10 2 9 9   NRBC AUTO /100 WBCs 0 0         CMP:  Results from last 7 days   Lab Units 05/10/22  0441 05/09/22  0827   POTASSIUM mmol/L 3 8 4 2   CHLORIDE mmol/L 100 101   CO2 mmol/L 27 27   BUN mg/dL 35* 26*   CREATININE mg/dL 1 90* 1 85*   CALCIUM mg/dL 9 7 9 6   AST U/L 13 13   ALT U/L 21 22   ALK PHOS U/L 66 64   EGFR ml/min/1 73sq m 31 32         BMP:  Results from last 7 days   Lab Units 05/10/22  0441 05/09/22  0827   POTASSIUM mmol/L 3 8 4 2   CHLORIDE mmol/L 100 101   CO2 mmol/L 27 27   BUN mg/dL 35* 26*   CREATININE mg/dL 1 90* 1 85*   CALCIUM mg/dL 9 7 9 6       BNP:  No results for input(s): BNP in the last 72 hours      Magnesium:   Results from last 7 days   Lab Units 05/09/22  0827   MAGNESIUM mg/dL 1 9       Coags:       TSH:       Hemoglobin A1C       Lipid Profile:         Cardiac testing:   Results for orders placed during the hospital encounter of 21    Echo complete with contrast if indicated    Narrative  [address]  [address], [place] [address]  [phone]    Transthoracic Echocardiogram  2D, M-mode, Doppler, and Color Doppler    Study date:  26-Mar-2021    Patient: Cornel Holguin  MR number: [id number]  Account number: [de-identified]  : 1935  Age: 82 years  Gender: Male  Status: Inpatient  Location: Bedside  Height: 70 in  Weight: 168 7 lb  BP:    Indications: dyspnea    Diagnoses: R06 00 - Dyspnea, unspecified    Sonographer:  Rajani Arechiga RDCS  Primary Physician:  Sera Tavares DO  Referring Physician:  Anne Cheema DO  Group:  [facility]  Interpreting Physician:  Magdalena Mustafa DO    SUMMARY    LEFT VENTRICLE:  Systolic function was normal  Ejection fraction was estimated to be 65 %  There were no regional wall motion abnormalities  Wall thickness was mildly to moderately increased  There was assymetrical hypertrophy of the septum  Doppler parameters were consistent with abnormal left ventricular relaxation (grade 1 diastolic dysfunction)  LEFT ATRIUM:  The atrium was mildly dilated  MITRAL VALVE:  There was moderate annular calcification  There was mild to moderate regurgitation  TRICUSPID VALVE:  There was mild regurgitation  HISTORY: PRIOR HISTORY: coronary artery disease, s/p cbg, diabetes mellitus, mixed hyperlipidemia, chronic kidney disease, MI remote, former smoker, carotid stenosis, hypertension    PROCEDURE: The procedure was performed at the bedside  This was a routine study  The transthoracic approach was used  The study included complete 2D imaging, M-mode, complete spectral Doppler, and color Doppler  Images were obtained from  the parasternal, apical, subcostal, and suprasternal notch acoustic windows   Echocardiographic views were limited due to poor acoustic window availability, decreased penetration, and lung interference  This was a technically difficult  study  LEFT VENTRICLE: Size was normal  Systolic function was normal  Ejection fraction was estimated to be 65 %  There were no regional wall motion abnormalities  Wall thickness was mildly to moderately increased  There was assymetrical  hypertrophy of the septum  DOPPLER: Doppler parameters were consistent with abnormal left ventricular relaxation (grade 1 diastolic dysfunction)  RIGHT VENTRICLE: The size was normal  Systolic function was normal  Wall thickness was normal     LEFT ATRIUM: The atrium was mildly dilated  RIGHT ATRIUM: Size was normal     MITRAL VALVE: There was moderate annular calcification  Valve structure was normal  There was normal leaflet separation  DOPPLER: The transmitral velocity was within the normal range  There was no evidence for stenosis  There was mild to  moderate regurgitation  AORTIC VALVE: The valve was trileaflet  Leaflets exhibited mildly increased thickness and normal cuspal separation  DOPPLER: Transaortic velocity was within the normal range  There was no evidence for stenosis  There was no regurgitation  TRICUSPID VALVE: The valve structure was normal  There was normal leaflet separation  DOPPLER: The transtricuspid velocity was within the normal range  There was no evidence for stenosis  There was mild regurgitation  PULMONIC VALVE: Leaflets exhibited normal thickness, no calcification, and normal cuspal separation  DOPPLER: The transpulmonic velocity was within the normal range  There was no regurgitation  PERICARDIUM: There was no pericardial effusion  The pericardium was normal in appearance  AORTA: The root exhibited normal size  SYSTEMIC VEINS: IVC: The inferior vena cava was normal in size and course   Respirophasic changes were normal     SYSTEM MEASUREMENT TABLES    2D  %FS: 13 05 %  Ao Diam: 2 78 cm  EDV(Teich): 68 6 ml  EF(Teich): 28 45 %  ESV(Teich): 49 08 ml  IVSd: 1 61 cm  LA Area: 18 4 cm2  LA Diam: 4 61 cm  LVEDV MOD A4C: 91 97 ml  LVEF MOD A4C: 66 88 %  LVESV MOD A4C: 30 46 ml  LVIDd: 3 97 cm  LVIDs: 3 45 cm  LVLd A4C: 7 86 cm  LVLs A4C: 6 16 cm  LVOT Diam: 1 62 cm  LVPWd: 1 07 cm  RA Area: 15 9 cm2  RVIDd: 2 96 cm  RWT: 0 54  SV MOD A4C: 61 51 ml  SV(Teich): 19 52 ml    CW  AV Vmax: 1 22 m/s  AV maxP 91 mmHg  MR Vmax: 5 3 m/s  MR maxP 32 mmHg  PV Vmax: 0 86 m/s  PV maxP 96 mmHg  TR Vmax: 2 63 m/s  TR maxP 65 mmHg    MM  TAPSE: 1 75 cm    PW  KONSTANTIN Vmax, Pt: 1 37 cm2  AVAI Vmax, Pt: 0 cm2/m2  E' Lat: 0 1 m/s  E' Sept: 0 03 m/s  E/E' Lat: 4 93  E/E' Sept: 15 42  LVOT Vmax: 0 81 m/s  LVOT maxP 65 mmHg  MV A Zac: 1 28 m/s  MV Dec [place]: 1 91 m/s2  MV DecT: 267 86 ms  MV E Zac: 0 51 m/s  MV E/A Ratio: 0 4  RVOT Vmax: 0 66 m/s  RVOT maxP 76 mmHg    [facility]    Prepared and electronically signed by    Brennon Webster DO  Signed 26-Mar-2021 16:02:29    No results found for this or any previous visit  No results found for this or any previous visit  No results found for this or any previous visit  Imaging: I have personally reviewed pertinent reports  XR chest 1 view portable    Result Date: 2022  Narrative: CHEST INDICATION:   sob  COMPARISON:  2022 EXAM PERFORMED/VIEWS:  XR CHEST PORTABLE FINDINGS: Heart shadow is enlarged but unchanged from prior exam  Vascular congestion  No pneumothorax or pleural effusion  Sternal wires are present  Visualized osseous structures appear otherwise unremarkable for the patient's age  Impression: Vascular congestion   Workstation performed: [id number]     CTA ED chest PE Study    Result Date: 2022  Narrative: CTA - CHEST WITH IV CONTRAST - PULMONARY ANGIOGRAM INDICATION:   Pulmonary embolism (PE) suspected, positive D-dimer Recent RLE swelling, now Afib RVR, Hypoxia, elevated Troponin and d-dimer  COMPARISON: None  TECHNIQUE: CTA examination of the chest was performed using angiographic technique according to a protocol specifically tailored to evaluate for pulmonary embolism  Axial, sagittal, and coronal 2D reformatted images were created from the source data and  submitted for interpretation  In addition, coronal 3D MIP postprocessing was performed on the acquisition scanner  Radiation dose length product (DLP) for this visit:  474 17 mGy-cm   This examination, like all CT scans performed in the [facility], was performed utilizing techniques to minimize radiation dose exposure, including the use of iterative  reconstruction and automated exposure control  IV Contrast:  85 mL of iohexol (OMNIPAQUE)  FINDINGS: PULMONARY ARTERIAL TREE:  Good opacification of the pulmonary arteries    There are no filling defects seen in the 1st through 4th order pulmonary artery branches to suggest any pulmonary embolism LUNGS:  Interlobular septal thickening seen Left lingular region granuloma seen Areas of groundglass density seen in the right middle lobe, in the superior segment of the right lower lobe, seen in image 1:15 series 601 and image 135 series 2, new from the previous study there is interlobular septal thickening, more pronounced from the previous study Mild mosaic attenuation lung parenchyma seen Background of mild chronic interstitial lung disease is again noted Additional calcified granuloma are stable PLEURA:  Fissural thickening seen Pleural thickening seen on the left side Small right effusion seen HEART/GREAT VESSELS:  Ascending aorta appears unremarkable Coronary artery calcification seen Atherosclerotic changes are seen within the aorta and mild atherosclerotic changes seen within the descending thoracic aorta MEDIASTINUM AND RENARD:  Anterior mediastinal lymph nodes are seen measuring about 7 mm, 8 mm Subaortic the lymph nodes are seen measuring about 1 6 cm lower right paratracheal lymph node seen measuring 1 5 cm Subcarinal nodes measuring about 1 6 cm Mild enlargement of the hilar region CHEST WALL AND LOWER NECK:   Unremarkable  VISUALIZED STRUCTURES IN THE UPPER ABDOMEN: Kidneys appear unremarkable Mild nodularity of the both adrenal gland, stable Pancreas remain unchanged OSSEOUS STRUCTURES:  No acute fracture or destructive osseous lesion  Impression: No pulmonary embolism seen There is a interlobular septal thickening with reticulation, small bilateral effusion and fissural thickening, suggest pulmonary congestion, new from the previous study There are patchy areas of groundglass density in the right middle lobe area, right lower lobe may be due to infiltrate or related to heart failure  Follow-up suggested in 2-3 months to demonstrate resolution Mild increasing enlargement of the mediastinal lymph nodes, possibly reactive No acute airspace consolidation The study was marked in EPIC for immediate notification  Workstation performed: Royce Gomez bedside procedure    Result Date: 5/9/2022  Narrative: 1 2 840 [id number]  2 584 0779 [id number] 1 1     bedside procedure    Result Date: 5/9/2022  Narrative: 1 2 840 [id number]  2 521 3689 [id number]  3 1      EKG reviewed personally: atrial fibrillation with RVR  Telemetry reviewed personally: atrial fibrillation with RVR     Assessment:  Principal Problem:    Acute on chronic diastolic congestive heart failure (HCC)  Active Problems:    Type 2 diabetes mellitus (HCC)    Stage 3b chronic kidney disease (HCC)    Essential hypertension    Atrial fibrillation (HCC)        Code Status: Level 1 - Full Code

## 2022-05-11 LAB
ALBUMIN SERPL BCP-MCNC: 2.9 G/DL (ref 3.5–5)
ALP SERPL-CCNC: 69 U/L (ref 46–116)
ALT SERPL W P-5'-P-CCNC: 15 U/L (ref 12–78)
ANION GAP SERPL CALCULATED.3IONS-SCNC: 7 MMOL/L (ref 4–13)
AST SERPL W P-5'-P-CCNC: 11 U/L (ref 5–45)
BASOPHILS # BLD AUTO: 0.05 THOUSANDS/ΜL (ref 0–0.1)
BASOPHILS NFR BLD AUTO: 1 % (ref 0–1)
BILIRUB SERPL-MCNC: 1.76 MG/DL (ref 0.2–1)
BUN SERPL-MCNC: 36 MG/DL (ref 5–25)
CALCIUM ALBUM COR SERPL-MCNC: 10.4 MG/DL (ref 8.3–10.1)
CALCIUM SERPL-MCNC: 9.5 MG/DL (ref 8.3–10.1)
CHLORIDE SERPL-SCNC: 96 MMOL/L (ref 100–108)
CO2 SERPL-SCNC: 31 MMOL/L (ref 21–32)
CREAT SERPL-MCNC: 2.04 MG/DL (ref 0.6–1.3)
EOSINOPHIL # BLD AUTO: 0.1 THOUSAND/ΜL (ref 0–0.61)
EOSINOPHIL NFR BLD AUTO: 1 % (ref 0–6)
ERYTHROCYTE [DISTWIDTH] IN BLOOD BY AUTOMATED COUNT: 14.2 % (ref 11.6–15.1)
GFR SERPL CREATININE-BSD FRML MDRD: 28 ML/MIN/1.73SQ M
GLUCOSE SERPL-MCNC: 199 MG/DL (ref 65–140)
GLUCOSE SERPL-MCNC: 239 MG/DL (ref 65–140)
GLUCOSE SERPL-MCNC: 294 MG/DL (ref 65–140)
GLUCOSE SERPL-MCNC: 435 MG/DL (ref 65–140)
HCT VFR BLD AUTO: 39.7 % (ref 36.5–49.3)
HGB BLD-MCNC: 13.2 G/DL (ref 12–17)
IMM GRANULOCYTES # BLD AUTO: 0.04 THOUSAND/UL (ref 0–0.2)
IMM GRANULOCYTES NFR BLD AUTO: 0 % (ref 0–2)
INR PPP: 1.55 (ref 0.84–1.19)
LYMPHOCYTES # BLD AUTO: 0.85 THOUSANDS/ΜL (ref 0.6–4.47)
LYMPHOCYTES NFR BLD AUTO: 9 % (ref 14–44)
MAGNESIUM SERPL-MCNC: 2.3 MG/DL (ref 1.6–2.6)
MCH RBC QN AUTO: 28.5 PG (ref 26.8–34.3)
MCHC RBC AUTO-ENTMCNC: 33.2 G/DL (ref 31.4–37.4)
MCV RBC AUTO: 86 FL (ref 82–98)
MONOCYTES # BLD AUTO: 0.96 THOUSAND/ΜL (ref 0.17–1.22)
MONOCYTES NFR BLD AUTO: 10 % (ref 4–12)
NEUTROPHILS # BLD AUTO: 7.33 THOUSANDS/ΜL (ref 1.85–7.62)
NEUTS SEG NFR BLD AUTO: 79 % (ref 43–75)
NRBC BLD AUTO-RTO: 0 /100 WBCS
PHOSPHATE SERPL-MCNC: 2.5 MG/DL (ref 2.3–4.1)
PLATELET # BLD AUTO: 243 THOUSANDS/UL (ref 149–390)
PMV BLD AUTO: 9.6 FL (ref 8.9–12.7)
POTASSIUM SERPL-SCNC: 3 MMOL/L (ref 3.5–5.3)
PROCALCITONIN SERPL-MCNC: 0.39 NG/ML
PROT SERPL-MCNC: 7.3 G/DL (ref 6.4–8.2)
PROTHROMBIN TIME: 17.8 SECONDS (ref 11.6–14.5)
RBC # BLD AUTO: 4.63 MILLION/UL (ref 3.88–5.62)
SODIUM SERPL-SCNC: 134 MMOL/L (ref 136–145)
WBC # BLD AUTO: 9.33 THOUSAND/UL (ref 4.31–10.16)

## 2022-05-11 PROCEDURE — 83735 ASSAY OF MAGNESIUM: CPT | Performed by: INTERNAL MEDICINE

## 2022-05-11 PROCEDURE — 80053 COMPREHEN METABOLIC PANEL: CPT | Performed by: INTERNAL MEDICINE

## 2022-05-11 PROCEDURE — 97530 THERAPEUTIC ACTIVITIES: CPT

## 2022-05-11 PROCEDURE — 99232 SBSQ HOSP IP/OBS MODERATE 35: CPT | Performed by: INTERNAL MEDICINE

## 2022-05-11 PROCEDURE — 85025 COMPLETE CBC W/AUTO DIFF WBC: CPT | Performed by: INTERNAL MEDICINE

## 2022-05-11 PROCEDURE — 85610 PROTHROMBIN TIME: CPT | Performed by: INTERNAL MEDICINE

## 2022-05-11 PROCEDURE — 82948 REAGENT STRIP/BLOOD GLUCOSE: CPT

## 2022-05-11 PROCEDURE — 84100 ASSAY OF PHOSPHORUS: CPT | Performed by: INTERNAL MEDICINE

## 2022-05-11 PROCEDURE — 84145 PROCALCITONIN (PCT): CPT | Performed by: INTERNAL MEDICINE

## 2022-05-11 PROCEDURE — 99233 SBSQ HOSP IP/OBS HIGH 50: CPT | Performed by: INTERNAL MEDICINE

## 2022-05-11 PROCEDURE — 97116 GAIT TRAINING THERAPY: CPT

## 2022-05-11 RX ORDER — INSULIN GLARGINE 100 [IU]/ML
15 INJECTION, SOLUTION SUBCUTANEOUS
Status: DISCONTINUED | OUTPATIENT
Start: 2022-05-11 | End: 2022-05-13 | Stop reason: HOSPADM

## 2022-05-11 RX ORDER — POTASSIUM CHLORIDE 20 MEQ/1
40 TABLET, EXTENDED RELEASE ORAL 2 TIMES DAILY
Status: COMPLETED | OUTPATIENT
Start: 2022-05-11 | End: 2022-05-11

## 2022-05-11 RX ORDER — METOPROLOL TARTRATE 50 MG/1
50 TABLET, FILM COATED ORAL EVERY 6 HOURS SCHEDULED
Status: DISCONTINUED | OUTPATIENT
Start: 2022-05-11 | End: 2022-05-12

## 2022-05-11 RX ORDER — INSULIN LISPRO 100 [IU]/ML
1-5 INJECTION, SOLUTION INTRAVENOUS; SUBCUTANEOUS
Status: DISCONTINUED | OUTPATIENT
Start: 2022-05-11 | End: 2022-05-13 | Stop reason: HOSPADM

## 2022-05-11 RX ORDER — INSULIN GLARGINE 100 [IU]/ML
5 INJECTION, SOLUTION SUBCUTANEOUS ONCE
Status: COMPLETED | OUTPATIENT
Start: 2022-05-11 | End: 2022-05-11

## 2022-05-11 RX ORDER — INSULIN LISPRO 100 [IU]/ML
3 INJECTION, SOLUTION INTRAVENOUS; SUBCUTANEOUS
Status: DISCONTINUED | OUTPATIENT
Start: 2022-05-11 | End: 2022-05-12

## 2022-05-11 RX ADMIN — ATORVASTATIN CALCIUM 40 MG: 40 TABLET, FILM COATED ORAL at 16:50

## 2022-05-11 RX ADMIN — RANOLAZINE 500 MG: 500 TABLET, EXTENDED RELEASE ORAL at 21:59

## 2022-05-11 RX ADMIN — ASPIRIN 81 MG: 81 TABLET, COATED ORAL at 08:18

## 2022-05-11 RX ADMIN — METOPROLOL TARTRATE 50 MG: 50 TABLET, FILM COATED ORAL at 18:26

## 2022-05-11 RX ADMIN — Medication 1000 UNITS: at 08:18

## 2022-05-11 RX ADMIN — CEFEPIME HYDROCHLORIDE 1000 MG: 1 INJECTION, SOLUTION INTRAVENOUS at 21:59

## 2022-05-11 RX ADMIN — CEFEPIME HYDROCHLORIDE 1000 MG: 1 INJECTION, SOLUTION INTRAVENOUS at 08:18

## 2022-05-11 RX ADMIN — METOPROLOL TARTRATE 50 MG: 50 TABLET, FILM COATED ORAL at 11:37

## 2022-05-11 RX ADMIN — SUCRALFATE 1 G: 1 TABLET ORAL at 21:59

## 2022-05-11 RX ADMIN — INSULIN LISPRO 3 UNITS: 100 INJECTION, SOLUTION INTRAVENOUS; SUBCUTANEOUS at 11:36

## 2022-05-11 RX ADMIN — INSULIN LISPRO 4 UNITS: 100 INJECTION, SOLUTION INTRAVENOUS; SUBCUTANEOUS at 11:36

## 2022-05-11 RX ADMIN — POTASSIUM CHLORIDE 40 MEQ: 20 TABLET, EXTENDED RELEASE ORAL at 11:39

## 2022-05-11 RX ADMIN — ESCITALOPRAM OXALATE 10 MG: 10 TABLET ORAL at 08:18

## 2022-05-11 RX ADMIN — INSULIN LISPRO 2 UNITS: 100 INJECTION, SOLUTION INTRAVENOUS; SUBCUTANEOUS at 16:53

## 2022-05-11 RX ADMIN — POTASSIUM CHLORIDE 40 MEQ: 20 TABLET, EXTENDED RELEASE ORAL at 18:26

## 2022-05-11 RX ADMIN — INSULIN GLARGINE 5 UNITS: 100 INJECTION, SOLUTION SUBCUTANEOUS at 11:35

## 2022-05-11 RX ADMIN — INSULIN LISPRO 3 UNITS: 100 INJECTION, SOLUTION INTRAVENOUS; SUBCUTANEOUS at 16:51

## 2022-05-11 RX ADMIN — SUCRALFATE 1 G: 1 TABLET ORAL at 08:19

## 2022-05-11 RX ADMIN — ISOSORBIDE MONONITRATE 60 MG: 60 TABLET, EXTENDED RELEASE ORAL at 08:18

## 2022-05-11 RX ADMIN — METOPROLOL TARTRATE 25 MG: 25 TABLET, FILM COATED ORAL at 00:30

## 2022-05-11 RX ADMIN — METOPROLOL TARTRATE 25 MG: 25 TABLET, FILM COATED ORAL at 06:28

## 2022-05-11 RX ADMIN — APIXABAN 2.5 MG: 2.5 TABLET, FILM COATED ORAL at 18:26

## 2022-05-11 RX ADMIN — RANOLAZINE 500 MG: 500 TABLET, EXTENDED RELEASE ORAL at 08:19

## 2022-05-11 RX ADMIN — INSULIN GLARGINE 15 UNITS: 100 INJECTION, SOLUTION SUBCUTANEOUS at 21:59

## 2022-05-11 RX ADMIN — SITAGLIPTIN 25 MG: 25 TABLET, FILM COATED ORAL at 08:18

## 2022-05-11 RX ADMIN — TAMSULOSIN HYDROCHLORIDE 0.8 MG: 0.4 CAPSULE ORAL at 16:51

## 2022-05-11 RX ADMIN — APIXABAN 2.5 MG: 2.5 TABLET, FILM COATED ORAL at 08:18

## 2022-05-11 RX ADMIN — PANTOPRAZOLE SODIUM 40 MG: 40 TABLET, DELAYED RELEASE ORAL at 06:28

## 2022-05-11 NOTE — ASSESSMENT & PLAN NOTE
Patient required oxygen at home 3 L  Patient still requiring mid flow oxygen, wean as tolerated, continue IV cefepime given elevated procalcitonin level, continue with diuretics

## 2022-05-11 NOTE — PROGRESS NOTES
[address] [facility] [address] [place]  Progress Note - Alfie Bradley 1935, 82 y o  male MRN: [id number]  Unit/Bed#:  Encounter: [id number]  Primary Care Provider: Antonio Velarde DO   Date and time admitted to hospital: 5/9/2022  8:12 AM    Acute respiratory failure with hypoxia [facility])  Assessment & Plan  Patient required oxygen at home 3 L  Patient still requiring mid flow oxygen, wean as tolerated, continue IV cefepime given elevated procalcitonin level, continue with diuretics    * Acute on chronic diastolic congestive heart failure [facility])  Assessment & Plan  Wt Readings from Last 3 Encounters:   05/11/22 67 9 kg (149 lb 11 1 oz)   04/22/22 73 5 kg (162 lb)   04/04/22 73 7 kg (162 lb 6 4 oz)   Continue IV diuretics, continue to wean oxygen as tolerated  Atrial fibrillation (HCC)  Assessment & Plan  Continue Eliquis 2 5 mg p o  B i d , given chest 2 Vasc score 6, goal rate of less than 100      Type 2 diabetes mellitus ([facility] [address] )  Assessment & Plan  Lab Results   Component Value Date    HGBA1C 9 5 (H) 02/19/2022       No results for input(s): POCGLU in the last 72 hours  Blood Sugar Average: Last 72 hrs:   A1c not a goal , uncontrolled    Continue diet and home medication for now    Hold oral meds, start Lantus 15 units daily    Plus sliding scale        Stage 3b chronic kidney disease [facility])  Assessment & Plan  Lab Results   Component Value Date    EGFR 28 05/11/2022    EGFR 31 05/10/2022    EGFR 32 05/09/2022    CREATININE 2 04 (H) 05/11/2022    CREATININE 1 90 (H) 05/10/2022    CREATININE 1 85 (H) 05/09/2022   Patient has chronic kidney disease creatinine 1 85, baseline is 1 65-1 8      Essential hypertension  Assessment & Plan      EGD 12/16/2021   IMPRESSION:  Esophageal varices, fairly small and nonbleeding but involving the lower 2/3 of the esophagus  Upper and lower esophageal ring, both dilated cautiously      Monitor daily CBC, patient has been started on anticoagulation    Coronary artery disease  Assessment & Plan  S/p  CABG two separate times and subsequent PCI   Cardiology consulted, recommendation appreciated, replaced Plavix with aspirin 81 mg the daily continue statin, beta-blockers, imdur, Ranexa restarted                                        Code Status: Level 1 - Full Code    Subjective:   No acute complaints, clinically seems to be improving  Objective:     Vitals:   Temp (24hrs), Av °F (36 7 °C), Min:97 8 °F (36 6 °C), Max:98 2 °F (36 8 °C)    Temp:  [97 8 °F (36 6 °C)-98 2 °F (36 8 °C)] 97 8 °F (36 6 °C)  HR:  [] 108  Resp:  [20-44] 35  BP: ()/(57-94) 111/57  SpO2:  [87 %-96 %] 92 %  Body mass index is 24 16 kg/m²  Input and Output Summary (last 24 hours): Intake/Output Summary (Last 24 hours) at 2022 9280  Last data filed at 2022 0846  Gross per 24 hour   Intake 1114 67 ml   Output 1800 ml   Net -685 33 ml       Physical Exam:   Physical Exam  Constitutional:       General: He is not in acute distress  Appearance: He is not ill-appearing, toxic-appearing or diaphoretic  HENT:      Head: Normocephalic and atraumatic  Cardiovascular:      Rate and Rhythm: Normal rate  Pulses: Normal pulses  Pulmonary:      Effort: Pulmonary effort is normal       Comments: Inspiratory crackles bilateral bases right greater than left  Abdominal:      General: There is no distension  Palpations: Abdomen is soft  Tenderness: There is no abdominal tenderness  There is no guarding  Musculoskeletal:         General: Normal range of motion  Right lower leg: No edema  Left lower leg: No edema  Neurological:      General: No focal deficit present  Mental Status: He is alert     Psychiatric:         Mood and Affect: Mood normal          Behavior: Behavior normal          Judgment: Judgment normal           Additional Data:     Labs:  Results from last 7 days   Lab Units 22  0524   WBC Thousand/uL 9 33   HEMOGLOBIN g/dL 13 2 HEMATOCRIT % 39 7   PLATELETS Thousands/uL 243   NEUTROS PCT % 79*   LYMPHS PCT % 9*   MONOS PCT % 10   EOS PCT % 1     Results from last 7 days   Lab Units 05/11/22  0524   SODIUM mmol/L 134*   POTASSIUM mmol/L 3 0*   CHLORIDE mmol/L 96*   CO2 mmol/L 31   BUN mg/dL 36*   CREATININE mg/dL 2 04*   ANION GAP mmol/L 7   CALCIUM mg/dL 9 5   ALBUMIN g/dL 2 9*   TOTAL BILIRUBIN mg/dL 1 76*   ALK PHOS U/L 69   ALT U/L 15   AST U/L 11   GLUCOSE RANDOM mg/dL 294*     Results from last 7 days   Lab Units 05/11/22  0524   INR  1 55*             Results from last 7 days   Lab Units 05/11/22  0524 05/10/22  0441   PROCALCITONIN ng/ml 0 39* 0 38*       Lines/Drains:  Invasive Devices  Report    Peripheral Intravenous Line  Duration           Peripheral IV 05/09/22 Right Antecubital 2 days                      Imaging: No pertinent imaging reviewed      Recent Cultures (last 7 days):         Last 24 Hours Medication List:   Current Facility-Administered Medications   Medication Dose Route Frequency Provider Last Rate    apixaban  2 5 mg Oral BID Jacqueline Grills Reinaldo jesus manueles, DO      aspirin  81 mg Oral Daily Sally Guillen PA-C      atorvastatin  40 mg Oral Daily With Sarita Sagastume MD      cefepime  1,000 mg Intravenous Q12H Jacqueline Grricardo Onesimo, DO 1,000 mg (05/11/22 0818)    cholecalciferol  1,000 Units Oral Daily Jacqueline Grills Reinaldo dunes, DO      escitalopram  10 mg Oral Daily Christel Sims MD      insulin glargine  15 Units Subcutaneous HS Jacqueline Grills Reinaldokris alford, DO      insulin glargine  5 Units Subcutaneous Once Jacqueline Grills Reinaldo alford, DO      insulin lispro  1-5 Units Subcutaneous TID [facility] Jacqueline Grills Reinaldo dunes, DO      insulin lispro  3 Units Subcutaneous TID With Meals Jacqueline Grricardo alford, DO      isosorbide mononitrate  60 mg Oral Daily Christel Sims MD      metoprolol tartrate  25 mg Oral Q6H [address] Sally Guillen PA-C      pantoprazole  40 mg Oral Early Morning Christel Sims MD      potassium chloride  40 mEq Oral BID Jay Echols DO      ranolazine  500 mg Oral Q12H [facility] Meli Estrada PA-C      sucralfate  1 g Oral BID Dayana Guerra MD      tamsulosin  0 8 mg Oral Daily With Rich Blackwood MD          Today, Patient Was Seen By: Karlene Coon DO    **Please Note: This note may have been constructed using a voice recognition system  **

## 2022-05-11 NOTE — RESPIRATORY THERAPY NOTE
05/11/22 0954   Respiratory Assessment   Resp Comments titrated to 6 l/m   Oxygen Therapy/Pulse Ox   O2 Device Mid flow nasal cannula   O2 Therapy Oxygen humidified   Nasal Cannula O2 Flow Rate (L/min) 6 L/min   Calculated FIO2 (%) - Nasal Cannula 44   SpO2 Activity At Rest

## 2022-05-11 NOTE — PLAN OF CARE
Problem: DISCHARGE PLANNING  Goal: Discharge to home or other facility with appropriate resources  Description: INTERVENTIONS:  - Identify barriers to discharge w/patient and caregiver  - Arrange for needed discharge resources and transportation as appropriate  - Identify discharge learning needs (meds, wound care, etc )  - Arrange for interpretive services to assist at discharge as needed  - Refer to Case Management Department for coordinating discharge planning if the patient needs post-hospital services based on physician/advanced practitioner order or complex needs related to functional status, cognitive ability, or social support system  Outcome: Progressing     Problem: INFECTION - ADULT  Goal: Absence or prevention of progression during hospitalization  Description: INTERVENTIONS:  - Assess and monitor for signs and symptoms of infection  - Monitor lab/diagnostic results  - Monitor all insertion sites, i e  indwelling lines, tubes, and drains  - Monitor endotracheal if appropriate and nasal secretions for changes in amount and color  - [place] appropriate cooling/warming therapies per order  - Administer medications as ordered  - Instruct and encourage patient and family to use good hand hygiene technique  - Identify and instruct in appropriate isolation precautions for identified infection/condition  Outcome: Progressing

## 2022-05-11 NOTE — ASSESSMENT & PLAN NOTE
EGD 12/16/2021   IMPRESSION:  Esophageal varices, fairly small and nonbleeding but involving the lower 2/3 of the esophagus  Upper and lower esophageal ring, both dilated cautiously      Monitor daily CBC, patient has been started on anticoagulation

## 2022-05-11 NOTE — ASSESSMENT & PLAN NOTE
Lab Results   Component Value Date    EGFR 28 05/11/2022    EGFR 31 05/10/2022    EGFR 32 05/09/2022    CREATININE 2 04 (H) 05/11/2022    CREATININE 1 90 (H) 05/10/2022    CREATININE 1 85 (H) 05/09/2022   Patient has chronic kidney disease creatinine 1 85, baseline is 1 65-1 8

## 2022-05-11 NOTE — PROGRESS NOTES
Cardiology Progress Note - Gleda Blocker 82 y o  male MRN: [id number]    Unit/Bed#:  Encounter: [id number]    Assessmsent/Plan:  1  New onset atrial fibrillation with rapid ventricular response              - patient has no prior history of such, presented in rapid atrial fibrillation              - was transitioned from diltiazem to metoprolol 25 mg q 6 hours yesterday - given suboptimal rates, will increase to 50 mg every 6 hours today and continue monitoring on telemetry              - continue anticoagulation with Eliquis 2 5 mg BID (age, renal function) given elevated [id number] score of 6    - hold off on repeat echocardiogram until rates improve              - TSH is within normal limits              - patient is to have outpatient sleep study to r/o ARVIN              - he denies alcohol use or excessive caffeine use     2  Acute diastolic congestive heart failure              - most likely in the setting of #1              - was diuresed with IV lasix with a 7 lb weight loss since admission   - creatinine continues to trend upward - would hold off on further diuretics today   - consider repeating CXR to determine need for further diuresis   - he should have daily standing weights, low sodium diet, fluid restriction, strict I/O   - monitor daily BMP    3  Acute respiratory failure with hypoxia              - still requiring mid flow nasal cannula, weaned to 6 L NC this AM, apparently wears 3L NC at home   - respiratory failure is partially secondary to acute CHF although oxygen requirements seem out of proportion to degree of volume overload              - patient also with elevated procalcitonin and possible pneumonia - he is being treated with antibiotics per pulmonary recommendations   - also with suspected interstitial lung disease which likely is contributing      4   Coronary artery disease               - with prior CABG two separate times and subsequent PCI as discussed below              - continue plavix, statin, beta-blocker, Imdur              - Ranexa 500 mg BID was resumed yesterday     5  Hypertension               - controlled on current regimen    Subjective:   Patient seen and examined  He feels better as compared to yesterday  Review of Systems   Cardiovascular: Positive for chest pain, dyspnea on exertion and orthopnea  Negative for leg swelling  All other systems reviewed and are negative  Objective:   Vitals: Blood pressure 111/57, pulse (!) 108, temperature 97 8 °F (36 6 °C), temperature source Temporal, resp  rate (!) 35, height 5' 6" (1 676 m), weight 67 9 kg (149 lb 11 1 oz), SpO2 92 %  , Body mass index is 24 16 kg/m² ,   Orthostatic Blood Pressures    Flowsheet Row Most Recent Value   Blood Pressure 111/57 filed at 05/11/2022 8821   Patient Position - Orthostatic VS Sitting filed at 05/10/2022 1713         Systolic (48NQZ), IOZ:886 , Min:95 , MFB:209     Diastolic (58IZJ), LLE:59, Min:57, Max:94      Intake/Output Summary (Last 24 hours) at 5/11/2022 1010  Last data filed at 5/11/2022 0846  Gross per 24 hour   Intake 1114 67 ml   Output 1500 ml   Net -385 33 ml     Weight (last 2 days)     Date/Time Weight    05/11/22 0600 67 9 (149 69)    05/10/22 0544 69 9 (154 1)    05/09/22 1514 70 9 (156 31)    05/09/22 1300 73 6 (162 26)    05/09/22 0822 73 8 (162 7)            Telemetry Review: atrial fibrillation, rates 100-120 bpm on average  EKG personally reviewed by Eriberto Baez PA-C  Physical Exam  Vitals reviewed  Constitutional:       General: He is not in acute distress  Appearance: He is well-developed  He is not diaphoretic  Interventions: Nasal cannula in place  HENT:      Head: Normocephalic and atraumatic  Eyes:      Pupils: Pupils are equal, round, and reactive to light  Neck:      Vascular: JVD present  No carotid bruit  Cardiovascular:      Rate and Rhythm: Tachycardia present  Rhythm irregularly irregular        Pulses:           Radial pulses are 2+ on the right side and 2+ on the left side  Heart sounds: S1 normal and S2 normal  Murmur heard  Systolic murmur is present with a grade of 2/6  Comments: 2/6 high pitched systolic murmur @ RUSB, left axilla  Pulmonary:      Effort: Pulmonary effort is normal  No respiratory distress  Breath sounds: Examination of the right-lower field reveals rales  Examination of the left-lower field reveals rales  Rales present  No wheezing  Abdominal:      General: There is no distension  Palpations: Abdomen is soft  Tenderness: There is no abdominal tenderness  Musculoskeletal:         General: Normal range of motion  Cervical back: Normal range of motion  Right lower leg: No edema  Left lower leg: No edema  Skin:     General: Skin is warm and dry  Findings: No erythema  Neurological:      General: No focal deficit present  Mental Status: He is alert and oriented to person, place, and time     Psychiatric:         Mood and Affect: Mood normal          Behavior: Behavior normal            Laboratory Results:        CBC with diff:   Results from last 7 days   Lab Units 05/11/22  0524 05/10/22  0441 05/09/22  0827   WBC Thousand/uL 9 33 9 83 10 90*   HEMOGLOBIN g/dL 13 2 13 3 12 9   HEMATOCRIT % 39 7 40 5 39 8   MCV fL 86 88 88   PLATELETS Thousands/uL 243 216 200   MCH pg 28 5 28 7 28 6   MCHC g/dL 33 2 32 8 32 4   RDW % 14 2 14 4 14 2   MPV fL 9 6 10 2 9 9   NRBC AUTO /100 WBCs 0 0 0         CMP:  Results from last 7 days   Lab Units 05/11/22  0524 05/10/22  0441 05/09/22  0827   POTASSIUM mmol/L 3 0* 3 8 4 2   CHLORIDE mmol/L 96* 100 101   CO2 mmol/L 31 27 27   BUN mg/dL 36* 35* 26*   CREATININE mg/dL 2 04* 1 90* 1 85*   CALCIUM mg/dL 9 5 9 7 9 6   AST U/L 11 13 13   ALT U/L 15 21 22   ALK PHOS U/L 69 66 64   EGFR ml/min/1 73sq m 28 31 32         BMP:  Results from last 7 days   Lab Units 05/11/22  0524 05/10/22  0441 05/09/22  0827   POTASSIUM mmol/L 3 0* 3 8 4 2 CHLORIDE mmol/L 96* 100 101   CO2 mmol/L 31 27 27   BUN mg/dL 36* 35* 26*   CREATININE mg/dL 2 04* 1 90* 1 85*   CALCIUM mg/dL 9 5 9 7 9 6       BNP: No results for input(s): BNP in the last 72 hours  Magnesium:   Results from last 7 days   Lab Units 22  0524 22  0827   MAGNESIUM mg/dL 2 3 1 9       Coags:   Results from last 7 days   Lab Units 22  0524   INR  1 55*       TSH:        Hemoglobin A1C       Lipid Profile:       Cardiac testing:   Results for orders placed during the hospital encounter of 21    Echo complete with contrast if indicated    Narrative  [address] [place] [address]  [address]  [phone]    Transthoracic Echocardiogram  2D, M-mode, Doppler, and Color Doppler    Study date:  26-Mar-2021    Patient: Julissa Barrera  MR number: [id number]  Account number: [de-identified]  : 1935  Age: 82 years  Gender: Male  Status: Inpatient  Location: Bedside  Height: 70 in  Weight: 168 7 lb  BP:    Indications: dyspnea    Diagnoses: R06 00 - Dyspnea, unspecified    Sonographer:  Aislinn Siddiqui RDCS  Primary Physician:  Jaime Garcia DO  Referring Physician:  Neville Stevens DO  Group:  [facility]  Interpreting Physician:  Raquel Catalan DO    SUMMARY    LEFT VENTRICLE:  Systolic function was normal  Ejection fraction was estimated to be 65 %  There were no regional wall motion abnormalities  Wall thickness was mildly to moderately increased  There was assymetrical hypertrophy of the septum  Doppler parameters were consistent with abnormal left ventricular relaxation (grade 1 diastolic dysfunction)  LEFT ATRIUM:  The atrium was mildly dilated  MITRAL VALVE:  There was moderate annular calcification  There was mild to moderate regurgitation  TRICUSPID VALVE:  There was mild regurgitation      HISTORY: PRIOR HISTORY: coronary artery disease, s/p cbg, diabetes mellitus, mixed hyperlipidemia, chronic kidney disease, MI remote, former smoker, carotid stenosis, hypertension    PROCEDURE: The procedure was performed at the bedside  This was a routine study  The transthoracic approach was used  The study included complete 2D imaging, M-mode, complete spectral Doppler, and color Doppler  Images were obtained from  the parasternal, apical, subcostal, and suprasternal notch acoustic windows  Echocardiographic views were limited due to poor acoustic window availability, decreased penetration, and lung interference  This was a technically difficult  study  LEFT VENTRICLE: Size was normal  Systolic function was normal  Ejection fraction was estimated to be 65 %  There were no regional wall motion abnormalities  Wall thickness was mildly to moderately increased  There was assymetrical  hypertrophy of the septum  DOPPLER: Doppler parameters were consistent with abnormal left ventricular relaxation (grade 1 diastolic dysfunction)  RIGHT VENTRICLE: The size was normal  Systolic function was normal  Wall thickness was normal     LEFT ATRIUM: The atrium was mildly dilated  RIGHT ATRIUM: Size was normal     MITRAL VALVE: There was moderate annular calcification  Valve structure was normal  There was normal leaflet separation  DOPPLER: The transmitral velocity was within the normal range  There was no evidence for stenosis  There was mild to  moderate regurgitation  AORTIC VALVE: The valve was trileaflet  Leaflets exhibited mildly increased thickness and normal cuspal separation  DOPPLER: Transaortic velocity was within the normal range  There was no evidence for stenosis  There was no regurgitation  TRICUSPID VALVE: The valve structure was normal  There was normal leaflet separation  DOPPLER: The transtricuspid velocity was within the normal range  There was no evidence for stenosis  There was mild regurgitation      PULMONIC VALVE: Leaflets exhibited normal thickness, no calcification, and normal cuspal separation  DOPPLER: The transpulmonic velocity was within the normal range  There was no regurgitation  PERICARDIUM: There was no pericardial effusion  The pericardium was normal in appearance  AORTA: The root exhibited normal size  SYSTEMIC VEINS: IVC: The inferior vena cava was normal in size and course  Respirophasic changes were normal     SYSTEM MEASUREMENT TABLES    2D  %FS: 13 05 %  Ao Diam: 2 78 cm  EDV(Teich): 68 6 ml  EF(Teich): 28 45 %  ESV(Teich): 49 08 ml  IVSd: 1 61 cm  LA Area: 18 4 cm2  LA Diam: 4 61 cm  LVEDV MOD A4C: 91 97 ml  LVEF MOD A4C: 66 88 %  LVESV MOD A4C: 30 46 ml  LVIDd: 3 97 cm  LVIDs: 3 45 cm  LVLd A4C: 7 86 cm  LVLs A4C: 6 16 cm  LVOT Diam: 1 62 cm  LVPWd: 1 07 cm  RA Area: 15 9 cm2  RVIDd: 2 96 cm  RWT: 0 54  SV MOD A4C: 61 51 ml  SV(Teich): 19 52 ml    CW  AV Vmax: 1 22 m/s  AV maxP 91 mmHg  MR Vmax: 5 3 m/s  MR maxP 32 mmHg  PV Vmax: 0 86 m/s  PV maxP 96 mmHg  TR Vmax: 2 63 m/s  TR maxP 65 mmHg    MM  TAPSE: 1 75 cm    PW  KONSTANTIN Vmax, Pt: 1 37 cm2  AVAI Vmax, Pt: 0 cm2/m2  E' Lat: 0 1 m/s  E' Sept: 0 03 m/s  E/E' Lat: 4 93  E/E' Sept: 15 42  LVOT Vmax: 0 81 m/s  LVOT maxP 65 mmHg  MV A Zac: 1 28 m/s  MV Dec Stoddard: 1 91 m/s2  MV DecT: 267 86 ms  MV E Zac: 0 51 m/s  MV E/A Ratio: 0 4  RVOT Vmax: 0 66 m/s  RVOT maxP 76 mmHg    [employer] Accredited Echocardiography Laboratory    Prepared and electronically signed by    Radha Martinez DO  Signed 26-Mar-2021 16:02:29    No results found for this or any previous visit  No results found for this or any previous visit  No results found for this or any previous visit        Meds/Allergies   all current active meds have been reviewed  Medications Prior to Admission   Medication    atorvastatin (LIPITOR) 40 mg tablet    cholecalciferol (VITAMIN D3) 1,000 units tablet    clopidogrel (PLAVIX) 75 mg tablet    escitalopram (LEXAPRO) 10 mg tablet    isosorbide mononitrate (IMDUR) 60 mg 24 hr tablet    linaGLIPtin (Tradjenta) 5 MG TABS    pantoprazole (PROTONIX) 40 mg tablet    sucralfate (CARAFATE) 1 g tablet    tamsulosin (FLOMAX) 0 4 mg          Assessment:  Principal Problem:    Acute on chronic diastolic congestive heart failure (HCC)  Active Problems:    Type 2 diabetes mellitus (HCC)    Stage 3b chronic kidney disease (HCC)    Coronary artery disease    Essential hypertension    Acute respiratory failure with hypoxia (HCC)    Atrial fibrillation ([facility] [address] )

## 2022-05-11 NOTE — RESPIRATORY THERAPY NOTE
05/10/22 2210   Respiratory Assessment   General Appearance Sleeping   Resp Comments filled water bottle   Oxygen Therapy/Pulse Ox   O2 Device Mid flow nasal cannula   Nasal Cannula O2 Flow Rate (L/min) 8 L/min   Calculated FIO2 (%) - Nasal Cannula 52   SpO2 Activity At Rest

## 2022-05-11 NOTE — ASSESSMENT & PLAN NOTE
S/p  CABG two separate times and subsequent PCI   Cardiology consulted, recommendation appreciated, replaced Plavix with aspirin 81 mg the daily continue statin, beta-blockers, imdur, Ranexa restarted

## 2022-05-11 NOTE — ASSESSMENT & PLAN NOTE
Wt Readings from Last 3 Encounters:   05/11/22 67 9 kg (149 lb 11 1 oz)   04/22/22 73 5 kg (162 lb)   04/04/22 73 7 kg (162 lb 6 4 oz)   Continue IV diuretics, continue to wean oxygen as tolerated

## 2022-05-11 NOTE — PHYSICAL THERAPY NOTE
PHYSICAL THERAPY NOTE          Patient Name: Alfie Bradley  PHLAKEISHA'W Date: 5/11/2022 05/11/22 1151   PT Last Visit   PT Visit Date 05/11/22   Note Type   Note Type Treatment   Pain Assessment   Pain Assessment Tool 0-10   Pain Score No Pain   Restrictions/Precautions   Weight Bearing Precautions Per Order No   Other Precautions O2;Telemetry; Fall Risk;Multiple lines   General   Family/Caregiver Present No   Cognition   Overall Cognitive Status WFL   Arousal/Participation Alert; Cooperative   Following Commands Follows all commands and directions without difficulty   Subjective   Subjective Agreeable to therapy  Bed Mobility   Supine to Sit 4  Minimal assistance   Additional items Assist x 1;HOB elevated; Bedrails; Increased time required;Verbal cues   Transfers   Sit to Stand 5  Supervision   Additional items Assist x 1;Bedrails; Increased time required;Verbal cues   Stand to Sit 5  Supervision   Additional items Assist x 1; Armrests; Increased time required;Verbal cues   Stand pivot 5  Supervision   Additional items Verbal cues   Ambulation/Elevation   Gait pattern   (Excessively slow; Short stride; Foward flexed; Decreased foot clearance)   Gait Assistance 5  Supervision   Additional items Verbal cues   Assistive Device Rolling walker   Distance 140'   Balance   Static Sitting Good   Dynamic Sitting Fair +   Static Standing Fair   Dynamic Standing Fair   Ambulatory Fair -  (RW)   Endurance Deficit   Endurance Deficit Yes   Endurance Deficit Description SpO2 desaturation to 77% with ambulation 8L   Activity Tolerance   Activity Tolerance Patient limited by fatigue   Assessment   Prognosis Good   Problem List Decreased strength;Decreased endurance; Impaired balance;Decreased mobility   Assessment Pt  seen for PT treatment session this date with interventions consisting of  bed mobility, transfers and  gait training w/ emphasis on improving pt's ability to ambulate  Pt  Requiring occasional cues for sequence and safety  In comparison to previous session, Pt  With improvements in activity tolerance  SpO2 dropped to &&% with ambulation 8L O2  6 L O2 at rest  No episodes LOB with ambulation  Pt is in need of continued activity in PT to improve strength balance endurance mobility transfers and ambulation with return to maximize LOF  From PT/mobility standpoint, recommendation at time of d/c would be home health rehab  in order to promote return to PLOF and independence  The patient's AM-PAC Basic Mobility Inpatient Short Form Raw Score is 18  A Raw score of greater than 16 suggests the patient may benefit from discharge to home  Please also refer to physical therapy recommendation for safe DC planning  Goals   LTG Expiration Date 05/24/22   Plan   Treatment/Interventions Functional transfer training;LE strengthening/ROM; Elevations; Therapeutic exercise; Endurance training;Bed mobility; Compensatory technique education   Progress Slow progress, decreased activity tolerance   PT Frequency 3-5x/wk   Recommendation   PT Discharge Recommendation Home with home health rehabilitation   [address] Mobility Inpatient   Turning in Bed Without Bedrails 3   Lying on Back to Sitting on Edge of Flat Bed 3   Moving Bed to Chair 3   Standing Up From Chair 3   Walk in Room 3   Climb 3-5 Stairs 3   Basic Mobility Inpatient Raw Score 18   Basic Mobility Standardized Score 41 05   Highest Level Of Mobility   JH-HLM Goal 6: Walk 10 steps or more   JH-HLM Highest Level of Mobility 7: Walk 25 feet or more   Education   Education Provided Mobility training   Patient Demonstrates verbal understanding   End of Consult   Patient Position at End of Consult Bedside chair; All needs within reach   End of Consult Comments discussed POC with PT

## 2022-05-11 NOTE — PLAN OF CARE
Problem: PHYSICAL THERAPY ADULT  Goal: Performs mobility at highest level of function for planned discharge setting  See evaluation for individualized goals  Description: Treatment/Interventions: Functional transfer training,LE strengthening/ROM,Elevations,Therapeutic exercise,Endurance training,Bed mobility,Gait training          See flowsheet documentation for full assessment, interventions and recommendations  Outcome: Progressing  Note: Prognosis: Good  Problem List: Decreased strength, Decreased endurance, Impaired balance, Decreased mobility  Assessment: Pt  seen for PT treatment session this date with interventions consisting of  bed mobility, transfers and  gait training w/ emphasis on improving pt's ability to ambulate  Pt  Requiring occasional cues for sequence and safety  In comparison to previous session, Pt  With improvements in activity tolerance  SpO2 dropped to &&% with ambulation 8L O2  6 L O2 at rest  No episodes LOB with ambulation  Pt is in need of continued activity in PT to improve strength balance endurance mobility transfers and ambulation with return to maximize LOF  From PT/mobility standpoint, recommendation at time of d/c would be home health rehab  in order to promote return to PLOF and independence  The patient's AM-PAC Basic Mobility Inpatient Short Form Raw Score is 18  A Raw score of greater than 16 suggests the patient may benefit from discharge to home  Please also refer to physical therapy recommendation for safe DC planning  PT Discharge Recommendation: Home with home health rehabilitation          See flowsheet documentation for full assessment

## 2022-05-11 NOTE — ASSESSMENT & PLAN NOTE
Lab Results   Component Value Date    HGBA1C 9 5 (H) 02/19/2022       No results for input(s): POCGLU in the last 72 hours  Blood Sugar Average: Last 72 hrs:   A1c not a goal , uncontrolled      Continue diet and home medication for now    Hold oral meds, start Lantus 15 units daily    Plus sliding scale

## 2022-05-11 NOTE — PROGRESS NOTES
Progress Note - Pulmonary   Tamra Main 82 y o  male MRN: [id number]  Unit/Bed#:  Encouter:[id number]    Assessment/Plan:    1  Acute on chronic hypoxic respiratory failure   Multifactorial in the setting of AFib with RVR, acute on chronic CHF, possible pneumonia, suspected underlying ILD   Improving-weaned to 6 L nasal cannula this morning  Oxygen requirement at baseline is 3-4 L nasal cannula   Titrate oxygen to maintain SpO2 greater than or equal to 88%   Pulmonary toilet:  Increase activity as tolerated, out of bed as tolerated cough and deep breathing as encouraged, incentive spirometry q 1 hour  2  Abnormal CT chest secondary to volume overload possible pneumonia superimposed on suspected ILD   CT findings this admission were negative for PE but did show interlobular septal thickening and small right pleural effusion with stable pleural thickening on left side, reticulations bibasilarly, mild paraseptal emphysema, new patchy ground-glass densities in right middle and right lower lobe and mildly enlarged mediastinal lymph nodes   The setting of mildly elevated white count on admission and elevated procalcitonin x2 findings could suggest early pneumonia in addition to volume overload and underlying ILD   Agree with IV antibiotics, sleep currently on cefepime   Sputum culture pending   Trend WBC, procalcitonin, temps   IV diuretics per Cardiology recommendations   Repeat CT chest in 2-3 months   Previously checked ANCA given CT findings and persistent eospinophilia but this was WNL  3  Acute on chronic diastolic CHF   Secondary to AFib with RVR   Cardiology following-diuretics per their recommendation   Monitor I/O and daily weights  4  New onset AFib with RVR   Heart rates still suboptimal during my evaluation    Suspect this is primary factor driving his respiratory failure    Echo management per Cardiology- increased metoprolol today   Anticoagulation with Eliquis 2 5 mg b i d  Based on [facility] score of 6  5  CKD   Worsening renal function today likely in the setting of IV diuretics and contrast dye    Cardiology holding Lasix today    Continue to monitor   Further treatment per primary team     Chief Complaint:    "I feel better ":    Subjective:    Patient was seen examined today  No overnight events reported  Patient states that overall he feels improved  Breathing improved  Reports mild cough  No wheezing  Extremity significantly improved  Denies chest pain or palpitations  No fevers or chills  Objective:    Vitals: Blood pressure 111/57, pulse (!) 108, temperature 97 8 °F (36 6 °C), temperature source Temporal, resp  rate (!) 35, height 5' 6" (1 676 m), weight 67 9 kg (149 lb 11 1 oz), SpO2 92 %  6L NC ,Body mass index is 24 16 kg/m²  Intake/Output Summary (Last 24 hours) at 5/11/2022 1304  Last data filed at 5/11/2022 0846  Gross per 24 hour   Intake 718 ml   Output 1400 ml   Net -682 ml       Invasive Devices  Report    Peripheral Intravenous Line  Duration           Peripheral IV 05/09/22 Right Antecubital 2 days                Physical Exam:     Physical Exam  Vitals and nursing note reviewed  Constitutional:       General: He is not in acute distress  Appearance: Normal appearance  HENT:      Head: Normocephalic and atraumatic  Nose: Nose normal       Mouth/Throat:      Mouth: Mucous membranes are moist       Pharynx: Oropharynx is clear  Eyes:      Extraocular Movements: Extraocular movements intact  Conjunctiva/sclera: Conjunctivae normal    Cardiovascular:      Rate and Rhythm: Normal rate and regular rhythm  Pulses: Normal pulses  Heart sounds: No murmur heard  Pulmonary:      Effort: Pulmonary effort is normal  No respiratory distress  Breath sounds: Rales present  No wheezing or rhonchi  Abdominal:      General: Bowel sounds are normal       Palpations: Abdomen is soft  Tenderness:  There is no abdominal tenderness  Musculoskeletal:         General: Normal range of motion  Cervical back: Normal range of motion and neck supple  No muscular tenderness  Right lower leg: No edema  Left lower leg: No edema  Skin:     General: Skin is warm and dry  Neurological:      General: No focal deficit present  Mental Status: He is alert  Mental status is at baseline  Psychiatric:         Mood and Affect: Mood normal          Behavior: Behavior normal          Labs: I have personally reviewed pertinent lab results  , ABG: No results found for: PHART, ZWZ4VPF, PO2ART, THA3VHA, R0XULLPS, BEART, SOURCE, BNP: No results found for: BNP, CBC:   Lab Results   Component Value Date    WBC 9 33 05/11/2022    HGB 13 2 05/11/2022    HCT 39 7 05/11/2022    MCV 86 05/11/2022     05/11/2022    MCH 28 5 05/11/2022    MCHC 33 2 05/11/2022    RDW 14 2 05/11/2022    MPV 9 6 05/11/2022    NRBC 0 05/11/2022   , CMP:   Lab Results   Component Value Date    SODIUM 134 (L) 05/11/2022    K 3 0 (L) 05/11/2022    CL 96 (L) 05/11/2022    CO2 31 05/11/2022    BUN 36 (H) 05/11/2022    CREATININE 2 04 (H) 05/11/2022    CALCIUM 9 5 05/11/2022    AST 11 05/11/2022    ALT 15 05/11/2022    ALKPHOS 69 05/11/2022    EGFR 28 05/11/2022   , PT/INR:   Lab Results   Component Value Date    INR 1 55 (H) 05/11/2022   , Troponin: No results found for: TROPONINI    Imaging and other studies: none to review today

## 2022-05-12 ENCOUNTER — APPOINTMENT (INPATIENT)
Dept: NON INVASIVE DIAGNOSTICS | Facility: HOSPITAL | Age: 87
DRG: 291 | End: 2022-05-12
Payer: MEDICARE

## 2022-05-12 LAB
ALBUMIN SERPL BCP-MCNC: 2.5 G/DL (ref 3.5–5)
ALP SERPL-CCNC: 61 U/L (ref 46–116)
ALT SERPL W P-5'-P-CCNC: 15 U/L (ref 12–78)
ANION GAP SERPL CALCULATED.3IONS-SCNC: 6 MMOL/L (ref 4–13)
APICAL FOUR CHAMBER EJECTION FRACTION: 65 %
AST SERPL W P-5'-P-CCNC: 9 U/L (ref 5–45)
BASOPHILS # BLD AUTO: 0.07 THOUSANDS/ΜL (ref 0–0.1)
BASOPHILS NFR BLD AUTO: 1 % (ref 0–1)
BILIRUB SERPL-MCNC: 1.12 MG/DL (ref 0.2–1)
BUN SERPL-MCNC: 37 MG/DL (ref 5–25)
CALCIUM ALBUM COR SERPL-MCNC: 10.5 MG/DL (ref 8.3–10.1)
CALCIUM SERPL-MCNC: 9.3 MG/DL (ref 8.3–10.1)
CHLORIDE SERPL-SCNC: 98 MMOL/L (ref 100–108)
CO2 SERPL-SCNC: 32 MMOL/L (ref 21–32)
CREAT SERPL-MCNC: 1.81 MG/DL (ref 0.6–1.3)
EOSINOPHIL # BLD AUTO: 0.42 THOUSAND/ΜL (ref 0–0.61)
EOSINOPHIL NFR BLD AUTO: 6 % (ref 0–6)
ERYTHROCYTE [DISTWIDTH] IN BLOOD BY AUTOMATED COUNT: 14.1 % (ref 11.6–15.1)
EST. AVERAGE GLUCOSE BLD GHB EST-MCNC: 183 MG/DL
GFR SERPL CREATININE-BSD FRML MDRD: 33 ML/MIN/1.73SQ M
GLUCOSE SERPL-MCNC: 151 MG/DL (ref 65–140)
GLUCOSE SERPL-MCNC: 164 MG/DL (ref 65–140)
GLUCOSE SERPL-MCNC: 190 MG/DL (ref 65–140)
GLUCOSE SERPL-MCNC: 194 MG/DL (ref 65–140)
GLUCOSE SERPL-MCNC: 404 MG/DL (ref 65–140)
HBA1C MFR BLD: 8 %
HCT VFR BLD AUTO: 38.5 % (ref 36.5–49.3)
HGB BLD-MCNC: 12.7 G/DL (ref 12–17)
IMM GRANULOCYTES # BLD AUTO: 0.04 THOUSAND/UL (ref 0–0.2)
IMM GRANULOCYTES NFR BLD AUTO: 1 % (ref 0–2)
LYMPHOCYTES # BLD AUTO: 1.08 THOUSANDS/ΜL (ref 0.6–4.47)
LYMPHOCYTES NFR BLD AUTO: 15 % (ref 14–44)
MAGNESIUM SERPL-MCNC: 2.4 MG/DL (ref 1.6–2.6)
MCH RBC QN AUTO: 28.5 PG (ref 26.8–34.3)
MCHC RBC AUTO-ENTMCNC: 33 G/DL (ref 31.4–37.4)
MCV RBC AUTO: 87 FL (ref 82–98)
MONOCYTES # BLD AUTO: 0.73 THOUSAND/ΜL (ref 0.17–1.22)
MONOCYTES NFR BLD AUTO: 10 % (ref 4–12)
NEUTROPHILS # BLD AUTO: 5.02 THOUSANDS/ΜL (ref 1.85–7.62)
NEUTS SEG NFR BLD AUTO: 67 % (ref 43–75)
NRBC BLD AUTO-RTO: 0 /100 WBCS
PHOSPHATE SERPL-MCNC: 2.2 MG/DL (ref 2.3–4.1)
PLATELET # BLD AUTO: 256 THOUSANDS/UL (ref 149–390)
PMV BLD AUTO: 9.4 FL (ref 8.9–12.7)
POTASSIUM SERPL-SCNC: 3.8 MMOL/L (ref 3.5–5.3)
PROCALCITONIN SERPL-MCNC: 0.33 NG/ML
PROT SERPL-MCNC: 6.7 G/DL (ref 6.4–8.2)
RA PRESSURE ESTIMATED: 5 MMHG
RBC # BLD AUTO: 4.45 MILLION/UL (ref 3.88–5.62)
RV PSP: 48 MMHG
SL CV LV EF: 65
SODIUM SERPL-SCNC: 136 MMOL/L (ref 136–145)
TR MAX PG: 43 MMHG
TR PEAK VELOCITY: 3.3 M/S
TRICUSPID VALVE PEAK REGURGITATION VELOCITY: 3.28 M/S
WBC # BLD AUTO: 7.36 THOUSAND/UL (ref 4.31–10.16)

## 2022-05-12 PROCEDURE — 93325 DOPPLER ECHO COLOR FLOW MAPG: CPT | Performed by: INTERNAL MEDICINE

## 2022-05-12 PROCEDURE — 83036 HEMOGLOBIN GLYCOSYLATED A1C: CPT | Performed by: INTERNAL MEDICINE

## 2022-05-12 PROCEDURE — 80053 COMPREHEN METABOLIC PANEL: CPT | Performed by: INTERNAL MEDICINE

## 2022-05-12 PROCEDURE — 97530 THERAPEUTIC ACTIVITIES: CPT

## 2022-05-12 PROCEDURE — 84145 PROCALCITONIN (PCT): CPT | Performed by: INTERNAL MEDICINE

## 2022-05-12 PROCEDURE — 83735 ASSAY OF MAGNESIUM: CPT | Performed by: INTERNAL MEDICINE

## 2022-05-12 PROCEDURE — 93308 TTE F-UP OR LMTD: CPT | Performed by: INTERNAL MEDICINE

## 2022-05-12 PROCEDURE — 99232 SBSQ HOSP IP/OBS MODERATE 35: CPT | Performed by: INTERNAL MEDICINE

## 2022-05-12 PROCEDURE — 84100 ASSAY OF PHOSPHORUS: CPT | Performed by: INTERNAL MEDICINE

## 2022-05-12 PROCEDURE — 93308 TTE F-UP OR LMTD: CPT

## 2022-05-12 PROCEDURE — 82948 REAGENT STRIP/BLOOD GLUCOSE: CPT

## 2022-05-12 PROCEDURE — 97116 GAIT TRAINING THERAPY: CPT

## 2022-05-12 PROCEDURE — 93321 DOPPLER ECHO F-UP/LMTD STD: CPT | Performed by: INTERNAL MEDICINE

## 2022-05-12 PROCEDURE — 85025 COMPLETE CBC W/AUTO DIFF WBC: CPT | Performed by: INTERNAL MEDICINE

## 2022-05-12 RX ORDER — TORSEMIDE 20 MG/1
20 TABLET ORAL DAILY
Status: DISCONTINUED | OUTPATIENT
Start: 2022-05-12 | End: 2022-05-13 | Stop reason: HOSPADM

## 2022-05-12 RX ORDER — INSULIN LISPRO 100 [IU]/ML
5 INJECTION, SOLUTION INTRAVENOUS; SUBCUTANEOUS
Status: DISCONTINUED | OUTPATIENT
Start: 2022-05-12 | End: 2022-05-13 | Stop reason: HOSPADM

## 2022-05-12 RX ADMIN — INSULIN LISPRO 1 UNITS: 100 INJECTION, SOLUTION INTRAVENOUS; SUBCUTANEOUS at 09:00

## 2022-05-12 RX ADMIN — CEFEPIME HYDROCHLORIDE 1000 MG: 1 INJECTION, SOLUTION INTRAVENOUS at 08:56

## 2022-05-12 RX ADMIN — METOPROLOL TARTRATE 50 MG: 50 TABLET, FILM COATED ORAL at 00:09

## 2022-05-12 RX ADMIN — TAMSULOSIN HYDROCHLORIDE 0.8 MG: 0.4 CAPSULE ORAL at 16:21

## 2022-05-12 RX ADMIN — INSULIN LISPRO 4 UNITS: 100 INJECTION, SOLUTION INTRAVENOUS; SUBCUTANEOUS at 12:03

## 2022-05-12 RX ADMIN — Medication 1000 UNITS: at 08:58

## 2022-05-12 RX ADMIN — METOPROLOL TARTRATE 75 MG: 50 TABLET, FILM COATED ORAL at 21:36

## 2022-05-12 RX ADMIN — ISOSORBIDE MONONITRATE 60 MG: 60 TABLET, EXTENDED RELEASE ORAL at 08:58

## 2022-05-12 RX ADMIN — ATORVASTATIN CALCIUM 40 MG: 40 TABLET, FILM COATED ORAL at 16:21

## 2022-05-12 RX ADMIN — ASPIRIN 81 MG: 81 TABLET, COATED ORAL at 08:58

## 2022-05-12 RX ADMIN — ESCITALOPRAM OXALATE 10 MG: 10 TABLET ORAL at 08:58

## 2022-05-12 RX ADMIN — CEFEPIME HYDROCHLORIDE 1000 MG: 1 INJECTION, SOLUTION INTRAVENOUS at 21:36

## 2022-05-12 RX ADMIN — APIXABAN 2.5 MG: 2.5 TABLET, FILM COATED ORAL at 17:04

## 2022-05-12 RX ADMIN — METOPROLOL TARTRATE 50 MG: 50 TABLET, FILM COATED ORAL at 05:18

## 2022-05-12 RX ADMIN — INSULIN LISPRO 1 UNITS: 100 INJECTION, SOLUTION INTRAVENOUS; SUBCUTANEOUS at 16:21

## 2022-05-12 RX ADMIN — INSULIN LISPRO 5 UNITS: 100 INJECTION, SOLUTION INTRAVENOUS; SUBCUTANEOUS at 16:21

## 2022-05-12 RX ADMIN — INSULIN LISPRO 3 UNITS: 100 INJECTION, SOLUTION INTRAVENOUS; SUBCUTANEOUS at 09:00

## 2022-05-12 RX ADMIN — APIXABAN 2.5 MG: 2.5 TABLET, FILM COATED ORAL at 08:58

## 2022-05-12 RX ADMIN — SUCRALFATE 1 G: 1 TABLET ORAL at 09:02

## 2022-05-12 RX ADMIN — SUCRALFATE 1 G: 1 TABLET ORAL at 17:05

## 2022-05-12 RX ADMIN — INSULIN GLARGINE 15 UNITS: 100 INJECTION, SOLUTION SUBCUTANEOUS at 21:36

## 2022-05-12 RX ADMIN — RANOLAZINE 500 MG: 500 TABLET, EXTENDED RELEASE ORAL at 09:03

## 2022-05-12 RX ADMIN — INSULIN LISPRO 5 UNITS: 100 INJECTION, SOLUTION INTRAVENOUS; SUBCUTANEOUS at 12:03

## 2022-05-12 RX ADMIN — PANTOPRAZOLE SODIUM 40 MG: 40 TABLET, DELAYED RELEASE ORAL at 05:18

## 2022-05-12 RX ADMIN — TORSEMIDE 20 MG: 20 TABLET ORAL at 12:03

## 2022-05-12 RX ADMIN — RANOLAZINE 500 MG: 500 TABLET, EXTENDED RELEASE ORAL at 21:36

## 2022-05-12 NOTE — PLAN OF CARE
Problem: PHYSICAL THERAPY ADULT  Goal: Performs mobility at highest level of function for planned discharge setting  See evaluation for individualized goals  Description: Treatment/Interventions: Functional transfer training,LE strengthening/ROM,Elevations,Therapeutic exercise,Endurance training,Bed mobility,Gait training          See flowsheet documentation for full assessment, interventions and recommendations  Outcome: Progressing  Note: Prognosis: Good  Problem List: Decreased strength, Decreased endurance, Impaired balance, Decreased mobility  Assessment: Pt  seen for PT treatment session this date with interventions consisting of  bed mobility, transfers and  gait training w/ emphasis on improving pt's ability to ambulate  Pt  Requiring ( ) cues for sequence and safety  In comparison to previous session, Pt  With improvements in activity tolerance  SpO2 WFL's with 6L, 91% after ambulation no SOB  Pt is in need of continued activity in PT to improve strength balance endurance mobility transfers and ambulation with return to maximize LOF  From PT/mobility standpoint, recommendation at time of d/c would be home health rehab in order to promote return to PLOF and independence  The patient's AM-PAC Basic Mobility Inpatient Short Form Raw Score is 20  A Raw score of greater than 16 suggests the patient may benefit from discharge to home  Please also refer to physical therapy recommendation for safe DC planning  PT Discharge Recommendation: Home with home health rehabilitation          See flowsheet documentation for full assessment

## 2022-05-12 NOTE — PROGRESS NOTES
[address] Located within [facility] [address] [place]  Progress Note - Ted Mata 1935, 82 y o  male MRN: [id number]  Unit/Bed#:  Encounter: [id number]  Primary Care Provider: Jae Garcia DO   Date and time admitted to hospital: 5/9/2022  8:12 AM    * Acute on chronic diastolic congestive heart failure [facility])  Assessment & Plan  Wt Readings from Last 3 Encounters:   05/12/22 68 4 kg (150 lb 12 7 oz)   04/22/22 73 5 kg (162 lb)   04/04/22 73 7 kg (162 lb 6 4 oz)   Patient was diuresed  with IV Lasix which helped to improve his respiratory status and volume  Weight is down from 70 9kg to 68 4 kg,  net negative since admission 940 3   Yesterday due to elevated creatinines diuretics were held, renal function improved today, patient will start p o  Torsemide 20 mg daily per cardiology  Cardiology consulted, recommendations appreciated  Patient will have repeat echo       Atrial fibrillation (HCC)  Assessment & Plan  Continue Eliquis 2 5 mg p o  B i d , given chest 2 Vasc score 6, goal rate of less than 100  Metoprolol tartrate 50 mg q 6 will transition to 75 mg b i d  From this evening ,Per Cardiology  Recommendations appreciated  Current the rate is controlled and Cardiology will be doing repeat  Echo  TSH-normal  Outpatient sleep study     Acute respiratory failure with hypoxia [facility])  Assessment & Plan  Patient oxygen requirement at baseline is 3 L  Overnight patient required 8 L  today a m  He is on 5 L of oxygen     wean as tolerated,   continue IV cefepime given elevated procalcitonin level, follow-up procql    CT on 05/09  LUNGS:  Interlobular septal thickening seen  Left lingular region granuloma seen  Areas of groundglass density seen in the right middle lobe, in the superior segment of the right lower lobe, seen in image 1:15 series 601 and image 135 series 2, new from the previous study there is interlobular septal thickening, more pronounced from   the previous study  Mild mosaic attenuation lung parenchyma seen  Background of mild chronic interstitial lung disease is again noted  Additional calcified granuloma are stable    Essential hypertension  Assessment & Plan    Monitor vitals  Continue current regimen          Coronary artery disease  Assessment & Plan  S/p  CABG two separate times and subsequent PCI   Cardiology consulted, recommendation appreciated, replaced Plavix with aspirin 81 mg the daily continue statin, beta-blockers, imdur, Ranexa restarted                                      Stage 3b chronic kidney disease [facility])  Assessment & Plan  Lab Results   Component Value Date    EGFR 33 05/12/2022    EGFR 28 05/11/2022    EGFR 31 05/10/2022    CREATININE 1 81 (H) 05/12/2022    CREATININE 2 04 (H) 05/11/2022    CREATININE 1 90 (H) 05/10/2022   Patient has chronic kidney disease creatinine baseline is 1 65-1 8  Due to mildly elevated creatinine yesterday do a dex were held, renal function improved today will start p o  Torsemide  Follow-up labs tomorrow a m  Type 2 diabetes mellitus [facility])  Assessment & Plan  Lab Results   Component Value Date    HGBA1C 8 0 (H) 05/12/2022       Recent Labs     05/11/22  1648 05/11/22  2146 05/12/22  0658 05/12/22  1056   POCGLU 239* 199* 190* 404*       Blood Sugar Average: Last 72 hrs:   A1c not a goal , uncontrolled    Continue diet and home medication for now    Hold oral meds, start Lantus 15 units daily    Plus sliding scale          VTE Pharmacologic Prophylaxis:   Pharmacologic: Apixaban (Eliquis)  Mechanical VTE Prophylaxis in Place: Yes      Discussions with Specialists or Other Care Team Provider:  Cardiology, pulmonology, case management, physical therapy, respiratory therapy,    Education and Discussions with Family / Patient:  Patient    Time Spent for Care: 20 minutes  More than 50% of total time spent on counseling and coordination of care as described above      Current Length of Stay: 3 day(s)    Current Patient Status: Inpatient   Certification Statement: The patient will continue to require additional inpatient hospital stay due to oxygen requirement and closed monitoring of heart rate and rhythm    Discharge Plan:  1-2 days if stable    Code Status: Level 1 - Full Code      Subjective:   Seen and examined today a quentin Brewer Patient reports that he feels 100% well  Does not drink any new concerns  Has normal p o  Intake, bowel movements and UO  Objective:     Vitals:   Temp (24hrs), Av 7 °F (36 5 °C), Min:97 1 °F (36 2 °C), Max:98 3 °F (36 8 °C)    Temp:  [97 1 °F (36 2 °C)-98 3 °F (36 8 °C)] 97 6 °F (36 4 °C)  HR:  [] 82  Resp:  [22-41] 27  BP: ()/(51-86) 129/58  SpO2:  [89 %-98 %] 94 %  Body mass index is 24 34 kg/m²  Input and Output Summary (last 24 hours): Intake/Output Summary (Last 24 hours) at 2022 1208  Last data filed at 2022 0856  Gross per 24 hour   Intake 1450 ml   Output 1265 ml   Net 185 ml       Physical Exam:     Physical Exam  Vitals and nursing note reviewed  Constitutional:       Appearance: He is well-developed  HENT:      Head: Normocephalic and atraumatic  Mouth/Throat:      Mouth: Mucous membranes are moist    Eyes:      Conjunctiva/sclera: Conjunctivae normal    Cardiovascular:      Rate and Rhythm: Normal rate and regular rhythm  Pulses: Normal pulses  Pulmonary:      Effort: Pulmonary effort is normal       Breath sounds: Decreased breath sounds and rales present  Abdominal:      General: Abdomen is flat  Bowel sounds are normal       Palpations: Abdomen is soft  Tenderness: There is no abdominal tenderness  Musculoskeletal:      Cervical back: Neck supple  Right lower leg: Edema present  Left lower leg: Edema present  Skin:     General: Skin is warm and dry  Capillary Refill: Capillary refill takes less than 2 seconds  Neurological:      Mental Status: He is alert  Psychiatric:         Mood and Affect: Mood normal          Thought Content:  Thought content normal            Additional Data:     Labs:    Results from last 7 days   Lab Units 05/12/22  0443   WBC Thousand/uL 7 36   HEMOGLOBIN g/dL 12 7   HEMATOCRIT % 38 5   PLATELETS Thousands/uL 256   NEUTROS PCT % 67   LYMPHS PCT % 15   MONOS PCT % 10   EOS PCT % 6     Results from last 7 days   Lab Units 05/12/22  0443   SODIUM mmol/L 136   POTASSIUM mmol/L 3 8   CHLORIDE mmol/L 98*   CO2 mmol/L 32   BUN mg/dL 37*   CREATININE mg/dL 1 81*   ANION GAP mmol/L 6   CALCIUM mg/dL 9 3   ALBUMIN g/dL 2 5*   TOTAL BILIRUBIN mg/dL 1 12*   ALK PHOS U/L 61   ALT U/L 15   AST U/L 9   GLUCOSE RANDOM mg/dL 194*     Results from last 7 days   Lab Units 05/11/22  0524   INR  1 55*     Results from last 7 days   Lab Units 05/12/22  1056 05/12/22  0658 05/11/22  2146 05/11/22  1648 05/11/22  1102   POC GLUCOSE mg/dl 404* 190* 199* 239* 435*     Results from last 7 days   Lab Units 05/12/22  0443   HEMOGLOBIN A1C % 8 0*     Results from last 7 days   Lab Units 05/12/22  0443 05/11/22  0524 05/10/22  0441   PROCALCITONIN ng/ml 0 33* 0 39* 0 38*           * I Have Reviewed All Lab Data Listed Above  * Additional Pertinent Lab Tests Reviewed: Ryan 66 Admission Reviewed    Imaging:    CTA ED chest PE Study   Final Result   No pulmonary embolism seen   There is a interlobular septal thickening with reticulation, small bilateral effusion and fissural thickening, suggest pulmonary congestion, new from the previous study      There are patchy areas of groundglass density in the right middle lobe area, right lower lobe may be due to infiltrate or related to heart failure  Follow-up suggested in 2-3 months to demonstrate resolution      Mild increasing enlargement of the mediastinal lymph nodes, possibly reactive         No acute airspace consolidation      The study was marked in EPIC for immediate notification        Workstation performed: [id number]         XR chest 1 view portable   ED Interpretation   Diffuse bilateral haziness worse on the left side  Change compared to x-ray from February 2022  Final Result      Vascular congestion  Workstation performed: [id number]                 Imaging Personally Reviewed by Myself Includes:      Recent Cultures (last 7 days):           Last 24 Hours Medication List:   Current Facility-Administered Medications   Medication Dose Route Frequency Provider Last Rate    apixaban  2 5 mg Oral BID [facility], [facility]      aspirin  81 mg Oral Daily Philip Laws PA-C      atorvastatin  40 mg Oral Daily With Archana Tellez MD      cefepime  1,000 mg Intravenous Q12H [facility] 1,000 mg (05/12/22 0856)    cholecalciferol  1,000 Units Oral Daily [facility], DO      escitalopram  10 mg Oral Daily Gilbert Leon MD      insulin glargine  15 Units Subcutaneous HS [facility], DO      insulin lispro  1-5 Units Subcutaneous TID [facility]      insulin lispro  5 Units Subcutaneous TID With Meals [facility] Onesimo, DO      isosorbide mononitrate  60 mg Oral Daily Gilbert Leon MD      metoprolol tartrate  75 mg Oral Q12H [facility] Philip Laws PA-C      pantoprazole  40 mg Oral Early Morning Gilbert Leon MD      ranolazine  500 mg Oral Q12H [facility] Philip Laws PA-C      sucralfate  1 g Oral BID Gilbert Leon MD      tamsulosin  0 8 mg Oral Daily With Archana Tellez MD      torsemide  20 mg Oral Daily Philip Laws PA-C          Today, Patient Was Seen By: Gilbert Leon MD    ** Please Note: Dictation voice to text software may have been used in the creation of this document   **

## 2022-05-12 NOTE — ASSESSMENT & PLAN NOTE
Lab Results   Component Value Date    HGBA1C 8 0 (H) 05/12/2022       Recent Labs     05/11/22  1648 05/11/22  2146 05/12/22  0658 05/12/22  1056   POCGLU 239* 199* 190* 404*       Blood Sugar Average: Last 72 hrs:   A1c not a goal , uncontrolled      Continue diet and home medication for now    Hold oral meds, start Lantus 15 units daily    Plus sliding scale

## 2022-05-12 NOTE — PHYSICAL THERAPY NOTE
PHYSICAL THERAPY NOTE          Patient Name: Freida Larson  KNXHK'A Date: 5/12/2022 05/12/22 5560   PT Last Visit   PT Visit Date 05/12/22   Note Type   Note Type Treatment   Pain Assessment   Pain Assessment Tool 0-10   Pain Score No Pain   Cognition   Overall Cognitive Status [facility]   Arousal/Participation Alert; Cooperative   Following Commands Follows all commands and directions without difficulty   Subjective   Subjective Feeling better  Agreeable to therapy  Bed Mobility   Supine to Sit 5  Supervision   Additional items Assist x 1; Increased time required;Verbal cues   Transfers   Sit to Stand 5  Supervision   Additional items Assist x 1;Bedrails   Stand to Sit 5  Supervision   Additional items Assist x 1; Armrests; Increased time required;Verbal cues   Stand pivot 5  Supervision   Additional items Verbal cues   Ambulation/Elevation   Gait pattern Short stride; Foward flexed   Gait Assistance 5  Supervision   Additional items Verbal cues   Assistive Device Rolling walker   Distance 160'   Balance   Static Sitting Good   Dynamic Sitting Fair +   Static Standing Fair   Dynamic Standing Fair   Ambulatory Fair -  (RW)   Endurance Deficit   Endurance Deficit Yes   Activity Tolerance   Activity Tolerance Patient tolerated treatment well   Assessment   Prognosis Good   Problem List Decreased strength;Decreased endurance; Impaired balance;Decreased mobility   Assessment Pt  seen for PT treatment session this date with interventions consisting of  bed mobility, transfers and  gait training w/ emphasis on improving pt's ability to ambulate  Pt  Requiring ( ) cues for sequence and safety  In comparison to previous session, Pt  With improvements in activity tolerance  SpO2 WFL's with 6L, 91% after ambulation no SOB    Pt is in need of continued activity in PT to improve strength balance endurance mobility transfers and ambulation with return to maximize LOF  From PT/mobility standpoint, recommendation at time of d/c would be home health rehab in order to promote return to PLOF and independence  The patient's AM-PAC Basic Mobility Inpatient Short Form Raw Score is 20  A Raw score of greater than 16 suggests the patient may benefit from discharge to home  Please also refer to physical therapy recommendation for safe DC planning  Goals   LTG Expiration Date 05/24/22   PT Treatment Day 2   Plan   Treatment/Interventions Functional transfer training;LE strengthening/ROM; Elevations; Therapeutic exercise; Endurance training;Bed mobility;Gait training   Progress Progressing toward goals   PT Frequency 3-5x/wk   Recommendation   PT Discharge Recommendation Home with home health rehabilitation   AM-PAC Basic Mobility Inpatient   Turning in Bed Without Bedrails 4   Lying on Back to Sitting on Edge of Flat Bed 3   Moving Bed to Chair 3   Standing Up From Chair 4   Walk in Room 3   Climb 3-5 Stairs 3   Basic Mobility Inpatient Raw Score 20   Basic Mobility Standardized Score 43 99   Highest Level Of Mobility   JH-HLM Goal 6: Walk 10 steps or more   JH-HLM Highest Level of Mobility 7: Walk 25 feet or more   JH-HLM Goal Achieved Yes   Education   Education Provided Mobility training   Patient Demonstrates acceptance/verbal understanding   End of Consult   Patient Position at End of Consult Bedside chair; All needs within reach   End of Consult Comments discussed POC with PT

## 2022-05-12 NOTE — ASSESSMENT & PLAN NOTE
Wt Readings from Last 3 Encounters:   05/12/22 68 4 kg (150 lb 12 7 oz)   04/22/22 73 5 kg (162 lb)   04/04/22 73 7 kg (162 lb 6 4 oz)   Patient was diuresed  with IV Lasix which helped to improve his respiratory status and volume  Weight is down from 70 9kg to 68 4 kg,  net negative since admission 940 3   Yesterday due to elevated creatinines diuretics were held, renal function improved today, patient will start p o   Torsemide 20 mg daily per cardiology  Cardiology consulted, recommendations appreciated  Patient will have repeat echo

## 2022-05-12 NOTE — ASSESSMENT & PLAN NOTE
Lab Results   Component Value Date    EGFR 33 05/12/2022    EGFR 28 05/11/2022    EGFR 31 05/10/2022    CREATININE 1 81 (H) 05/12/2022    CREATININE 2 04 (H) 05/11/2022    CREATININE 1 90 (H) 05/10/2022   Patient has chronic kidney disease creatinine baseline is 1 65-1 8  Due to mildly elevated creatinine yesterday do a dex were held, renal function improved today will start p o  Torsemide  Follow-up labs tomorrow a m

## 2022-05-12 NOTE — PROGRESS NOTES
Cardiology Progress Note - Elizabeth Richards 82 y o  male MRN: [id number]    Unit/Bed#:  Encounter: [id number]    Assessmsent/Plan:  1  New onset atrial fibrillation with rapid ventricular response              - patient has no prior history of such, presented in rapid atrial fibrillation              - a rate control strategy was pursued with metoprolol tartrate which was increased to 50 mg every 6 hours yesterday   - patient converted to sinus rhythm around midnight last night; currently in sinus rhythm on telemetry with heart rates in the 80's   - will transition to metoprolol tartrate 75 mg BID from this evening              - continue anticoagulation with Eliquis 2 5 mg BID (age, renal function) given elevated [id number] score of 6               - now that he is in sinus rhythm/rates are controlled, will check a limited echocardiogram to assess LV/RV function              - TSH is within normal limits              - patient is to have outpatient sleep study to r/o ARVIN              - he denies alcohol use or excessive caffeine use     2  Acute diastolic congestive heart failure              - most likely in the setting of #1              - was diuresed with IV lasix with improvement in volume/respiratory status   - due to elevated creatinine yesterday diuretics were held   - renal function improved today; will start PO torsemide 20 mg daily              - he should have daily standing weights, low sodium diet, fluid restriction, strict I/O              - monitor daily BMP     3  Acute respiratory failure with hypoxia              - weaned down to 5L NC in my presence today - patient wears 3-4 L NC with exertion at home              - respiratory failure is partially secondary to acute CHF as well as possible pneumonia; he is being treated with antibiotics per pulmonary recommendations              - also with suspected interstitial lung disease which likely is contributing      4   Coronary artery disease             - with prior CABG two separate times and subsequent PCI as discussed in HPI              - continue aspirin, statin, beta-blocker, Imdur              - resumed Ranexa 500 mg BID this admission      5  Hypertension               - controlled on current regimen       Subjective:   Patient seen and examined  He feels well  Ambulated this morning with minimal shortness of breath  Denies recurrent chest pain/heaviness  Review of Systems   Constitutional: Negative for chills and fever  Cardiovascular: Positive for dyspnea on exertion  Negative for chest pain, leg swelling, orthopnea and palpitations  Neurological: Negative for light-headedness  All other systems reviewed and are negative  Objective:   Vitals: Blood pressure 123/56, pulse 82, temperature (!) 97 1 °F (36 2 °C), temperature source Temporal, resp  rate (!) 29, height 5' 6" (1 676 m), weight 68 4 kg (150 lb 12 7 oz), SpO2 96 %  , Body mass index is 24 34 kg/m² ,   Orthostatic Blood Pressures    Flowsheet Row Most Recent Value   Blood Pressure 123/56 filed at 05/12/2022 1015   Patient Position - Orthostatic VS Sitting filed at 05/12/2022 8210         Systolic (17VGC), KZN:695 , Min:95 , HSO:524     Diastolic (39XBW), ZAO:43, Min:51, Max:86      Intake/Output Summary (Last 24 hours) at 5/12/2022 1055  Last data filed at 5/12/2022 0856  Gross per 24 hour   Intake 1450 ml   Output 1265 ml   Net 185 ml     Weight (last 2 days)     Date/Time Weight    05/12/22 0532 68 4 (150 79)    05/11/22 0600 67 9 (149 69)    05/10/22 0544 69 9 (154 1)            Telemetry Review: converted to sinus rhythm overnight  EKG personally reviewed by Dereje Blair PA-C  Physical Exam  Vitals reviewed  Constitutional:       General: He is not in acute distress  Appearance: He is well-developed  He is not diaphoretic  Interventions: Nasal cannula in place  HENT:      Head: Normocephalic and atraumatic     Eyes:      Pupils: Pupils are equal, round, and reactive to light  Neck:      Vascular: No carotid bruit  Cardiovascular:      Rate and Rhythm: Normal rate and regular rhythm  Pulses:           Radial pulses are 2+ on the right side and 2+ on the left side  Heart sounds: S1 normal and S2 normal  Murmur heard  Systolic murmur is present with a grade of 2/6  Comments: 2/6 systolic murmur @ RUSB/left axilla  Pulmonary:      Effort: Pulmonary effort is normal  No respiratory distress  Breath sounds: Examination of the right-lower field reveals rales  Examination of the left-lower field reveals rales  Rales present  No wheezing  Abdominal:      General: There is no distension  Palpations: Abdomen is soft  Tenderness: There is no abdominal tenderness  Musculoskeletal:         General: Normal range of motion  Cervical back: Normal range of motion  Right lower leg: No edema  Left lower leg: No edema  Skin:     General: Skin is warm and dry  Findings: No erythema  Neurological:      General: No focal deficit present  Mental Status: He is alert and oriented to person, place, and time     Psychiatric:         Mood and Affect: Mood normal          Behavior: Behavior normal            Laboratory Results:        CBC with diff:   Results from last 7 days   Lab Units 05/12/22  0443 05/11/22  0524 05/10/22  0441 05/09/22  0827   WBC Thousand/uL 7 36 9 33 9 83 10 90*   HEMOGLOBIN g/dL 12 7 13 2 13 3 12 9   HEMATOCRIT % 38 5 39 7 40 5 39 8   MCV fL 87 86 88 88   PLATELETS Thousands/uL 256 243 216 200   MCH pg 28 5 28 5 28 7 28 6   MCHC g/dL 33 0 33 2 32 8 32 4   RDW % 14 1 14 2 14 4 14 2   MPV fL 9 4 9 6 10 2 9 9   NRBC AUTO /100 WBCs 0 0 0 0         CMP:  Results from last 7 days   Lab Units 05/12/22  0443 05/11/22  0524 05/10/22  0441 05/09/22  0827   POTASSIUM mmol/L 3 8 3 0* 3 8 4 2   CHLORIDE mmol/L 98* 96* 100 101   CO2 mmol/L 32 31 27 27   BUN mg/dL 37* 36* 35* 26*   CREATININE mg/dL 1 81* 2 04* 1 90* 1 85*   CALCIUM mg/dL 9 3 9 5 9 7 9 6   AST U/L 9 11 13 13   ALT U/L 15 15 21 22   ALK PHOS U/L 61 69 66 64   EGFR ml/min/1 73sq m 33 28 31 32         BMP:  Results from last 7 days   Lab Units 22  0443 22  0524 05/10/22  0441 22  0827   POTASSIUM mmol/L 3 8 3 0* 3 8 4 2   CHLORIDE mmol/L 98* 96* 100 101   CO2 mmol/L 32 31 27 27   BUN mg/dL 37* 36* 35* 26*   CREATININE mg/dL 1 81* 2 04* 1 90* 1 85*   CALCIUM mg/dL 9 3 9 5 9 7 9 6       BNP: No results for input(s): BNP in the last 72 hours  Magnesium:   Results from last 7 days   Lab Units 223 22  0524 22  0827   MAGNESIUM mg/dL 2 4 2 3 1 9       Coags:   Results from last 7 days   Lab Units 22  0524   INR  1 55*       TSH:        Hemoglobin A1C       Lipid Profile:       Cardiac testing:   Results for orders placed during the hospital encounter of 21    Echo complete with contrast if indicated    Narrative  [address] [facility] [address] [facility] [address], [facility] [address]  [phone]    Transthoracic Echocardiogram  2D, M-mode, Doppler, and Color Doppler    Study date:  26-Mar-2021    Patient: Shanna Yuen  MR number: [id number]  Account number: [de-identified]  : 1935  Age: 82 years  Gender: Male  Status: Inpatient  Location: Bedside  Height: 70 in  Weight: 168 7 lb  BP:    Indications: dyspnea    Diagnoses: R06 00 - Dyspnea, unspecified    Sonographer:  Rosa Borrero RDCS  Primary Physician:  Nelly Mcfarlane DO  Referring Physician:  Geetha Baker DO  Group:  [facility]  Interpreting Physician:  Armando Hamlin DO    SUMMARY    LEFT VENTRICLE:  Systolic function was normal  Ejection fraction was estimated to be 65 %  There were no regional wall motion abnormalities  Wall thickness was mildly to moderately increased  There was assymetrical hypertrophy of the septum    Doppler parameters were consistent with abnormal left ventricular relaxation (grade 1 diastolic dysfunction)  LEFT ATRIUM:  The atrium was mildly dilated  MITRAL VALVE:  There was moderate annular calcification  There was mild to moderate regurgitation  TRICUSPID VALVE:  There was mild regurgitation  HISTORY: PRIOR HISTORY: coronary artery disease, s/p cbg, diabetes mellitus, mixed hyperlipidemia, chronic kidney disease, MI remote, former smoker, carotid stenosis, hypertension    PROCEDURE: The procedure was performed at the bedside  This was a routine study  The transthoracic approach was used  The study included complete 2D imaging, M-mode, complete spectral Doppler, and color Doppler  Images were obtained from  the parasternal, apical, subcostal, and suprasternal notch acoustic windows  Echocardiographic views were limited due to poor acoustic window availability, decreased penetration, and lung interference  This was a technically difficult  study  LEFT VENTRICLE: Size was normal  Systolic function was normal  Ejection fraction was estimated to be 65 %  There were no regional wall motion abnormalities  Wall thickness was mildly to moderately increased  There was assymetrical  hypertrophy of the septum  DOPPLER: Doppler parameters were consistent with abnormal left ventricular relaxation (grade 1 diastolic dysfunction)  RIGHT VENTRICLE: The size was normal  Systolic function was normal  Wall thickness was normal     LEFT ATRIUM: The atrium was mildly dilated  RIGHT ATRIUM: Size was normal     MITRAL VALVE: There was moderate annular calcification  Valve structure was normal  There was normal leaflet separation  DOPPLER: The transmitral velocity was within the normal range  There was no evidence for stenosis  There was mild to  moderate regurgitation  AORTIC VALVE: The valve was trileaflet  Leaflets exhibited mildly increased thickness and normal cuspal separation  DOPPLER: Transaortic velocity was within the normal range  There was no evidence for stenosis   There was no regurgitation  TRICUSPID VALVE: The valve structure was normal  There was normal leaflet separation  DOPPLER: The transtricuspid velocity was within the normal range  There was no evidence for stenosis  There was mild regurgitation  PULMONIC VALVE: Leaflets exhibited normal thickness, no calcification, and normal cuspal separation  DOPPLER: The transpulmonic velocity was within the normal range  There was no regurgitation  PERICARDIUM: There was no pericardial effusion  The pericardium was normal in appearance  AORTA: The root exhibited normal size  SYSTEMIC VEINS: IVC: The inferior vena cava was normal in size and course  Respirophasic changes were normal     SYSTEM MEASUREMENT TABLES    2D  %FS: 13 05 %  Ao Diam: 2 78 cm  EDV(Teich): 68 6 ml  EF(Teich): 28 45 %  ESV(Teich): 49 08 ml  IVSd: 1 61 cm  LA Area: 18 4 cm2  LA Diam: 4 61 cm  LVEDV MOD A4C: 91 97 ml  LVEF MOD A4C: 66 88 %  LVESV MOD A4C: 30 46 ml  LVIDd: 3 97 cm  LVIDs: 3 45 cm  LVLd A4C: 7 86 cm  LVLs A4C: 6 16 cm  LVOT Diam: 1 62 cm  LVPWd: 1 07 cm  RA Area: 15 9 cm2  RVIDd: 2 96 cm  RWT: 0 54  SV MOD A4C: 61 51 ml  SV(Teich): 19 52 ml    CW  AV Vmax: 1 22 m/s  AV maxP 91 mmHg  MR Vmax: 5 3 m/s  MR maxP 32 mmHg  PV Vmax: 0 86 m/s  PV maxP 96 mmHg  TR Vmax: 2 63 m/s  TR maxP 65 mmHg    MM  TAPSE: 1 75 cm    PW  KONSTANTIN Vmax, Pt: 1 37 cm2  AVAI Vmax, Pt: 0 cm2/m2  E' Lat: 0 1 m/s  E' Sept: 0 03 m/s  E/E' Lat: 4 93  E/E' Sept: 15 42  LVOT Vmax: 0 81 m/s  LVOT maxP 65 mmHg  MV A Zac: 1 28 m/s  MV Dec Morgan: 1 91 m/s2  MV DecT: 267 86 ms  MV E Zac: 0 51 m/s  MV E/A Ratio: 0 4  RVOT Vmax: 0 66 m/s  RVOT maxP 76 mmHg    [facility]    Prepared and electronically signed by    Janie Marin DO  Signed 26-Mar-2021 16:02:29    No results found for this or any previous visit  No results found for this or any previous visit      No results found for this or any previous visit        Meds/Allergies   all current active meds have been reviewed  Medications Prior to Admission   Medication    atorvastatin (LIPITOR) 40 mg tablet    cholecalciferol (VITAMIN D3) 1,000 units tablet    clopidogrel (PLAVIX) 75 mg tablet    escitalopram (LEXAPRO) 10 mg tablet    isosorbide mononitrate (IMDUR) 60 mg 24 hr tablet    linaGLIPtin (Tradjenta) 5 MG TABS    pantoprazole (PROTONIX) 40 mg tablet    sucralfate (CARAFATE) 1 g tablet    tamsulosin (FLOMAX) 0 4 mg          Assessment:  Principal Problem:    Acute on chronic diastolic congestive heart failure (HCC)  Active Problems:    Type 2 diabetes mellitus (HCC)    Stage 3b chronic kidney disease (HCC)    Coronary artery disease    Essential hypertension    Acute respiratory failure with hypoxia ([facility])    Atrial fibrillation ([facility] [address] )

## 2022-05-12 NOTE — PROGRESS NOTES
Progress Note - Pulmonary   Valere Laud 82 y o  male MRN: [id number]  Unit/Bed#:  MPTPXALQ:[id number]    Assessment/Plan:      1  Acute on chronic hypoxic respiratory failure  · Multifactorial in the setting of AFib with RVR, acute on chronic CHF, possible pneumonia, suspected underlying ILD  · Improving- weaned to 3L NC in my presence  Oxygen requirement at baseline is 3-4 L nasal cannula  · Titrate oxygen to maintain SpO2 greater than or equal to 88%  · Pulmonary toilet:  Increase activity as tolerated, out of bed as tolerated cough and deep breathing as encouraged, incentive spirometry q 1 hour  2  Abnormal CT chest secondary to volume overload possible pneumonia superimposed on suspected ILD  · CT findings this admission were negative for PE but did show interlobular septal thickening and small right pleural effusion with stable pleural thickening on left side, reticulations bibasilarly, mild paraseptal emphysema, new patchy ground-glass densities in right middle and right lower lobe and mildly enlarged mediastinal lymph nodes  · The setting of mildly elevated white count on admission and elevated procalcitonin x2 findings could suggest early pneumonia in addition to volume overload and underlying ILD  · Agree with IV antibiotics, currently on cefepime- recommend completing a 7 day course of cephalosporins, can transition to Ceftin at time of discharge if needed   · Sputum culture pending  · Trend WBC, procalcitonin, temps  · IV diuretics per Cardiology recommendations  · Repeat CT chest in 2-3 months  · Previously checked ANCA given CT findings and persistent eospinophilia but this was WNL  3  Acute on chronic diastolic CHF  · Secondary to AFib with RVR  · Cardiology following-diuretics per their recommendations, started po torsemide today   · Monitor I/O and daily weights  4  New onset AFib with RVR  · Converted back to sinus rhythm today    · Medical management per Cardiology-  · Anticoagulation with Eliquis 2 5 mg b i d  Based on [facility] score of 6  5  CKD   · Continue to monitor  · Further treatment per primary team     Patient is stable from a pulmonary standpoint  Will sign off  Call with questions  Chief Complaint:    "I feel fine "    Subjective:    Patient was seen examined today  No overnight events reported  Patient states that he is feeling well from a pulmonary perspective  No worsening shortness breath, cough, sputum production, hemoptysis or wheeze  No chest pain or palpitations  Objective:    Vitals: Blood pressure 123/56, pulse 80, temperature 97 6 °F (36 4 °C), temperature source Temporal, resp  rate 20, height 5' 6" (1 676 m), weight 68 kg (150 lb), SpO2 94 %  3L NC,Body mass index is 24 21 kg/m²  Intake/Output Summary (Last 24 hours) at 5/12/2022 1356  Last data filed at 5/12/2022 1308  Gross per 24 hour   Intake 1090 ml   Output 1045 ml   Net 45 ml       Invasive Devices  Report    Peripheral Intravenous Line  Duration           Peripheral IV 05/11/22 Dorsal (posterior); Right Forearm <1 day                Physical Exam:     Physical Exam  Vitals and nursing note reviewed  Constitutional:       General: He is not in acute distress  Appearance: Normal appearance  HENT:      Head: Normocephalic and atraumatic  Nose: Nose normal       Mouth/Throat:      Mouth: Mucous membranes are moist       Pharynx: Oropharynx is clear  Eyes:      Extraocular Movements: Extraocular movements intact  Conjunctiva/sclera: Conjunctivae normal    Cardiovascular:      Rate and Rhythm: Normal rate and regular rhythm  Pulses: Normal pulses  Heart sounds: No murmur heard  Pulmonary:      Effort: Pulmonary effort is normal       Breath sounds: No wheezing or rhonchi  Abdominal:      General: Bowel sounds are normal       Palpations: Abdomen is soft  Tenderness: There is no abdominal tenderness  Musculoskeletal:         General: Normal range of motion  Cervical back: Normal range of motion and neck supple  No muscular tenderness  Right lower leg: No edema  Left lower leg: No edema  Skin:     General: Skin is warm and dry  Neurological:      General: No focal deficit present  Mental Status: He is alert  Mental status is at baseline  Psychiatric:         Mood and Affect: Mood normal          Behavior: Behavior normal          Labs: I have personally reviewed pertinent lab results  , ABG: No results found for: PHART, GCC0WAO, PO2ART, CNW1SVR, P7QOXMDS, BEART, SOURCE, BNP: No results found for: BNP, CBC:   Lab Results   Component Value Date    WBC 7 36 05/12/2022    HGB 12 7 05/12/2022    HCT 38 5 05/12/2022    MCV 87 05/12/2022     05/12/2022    MCH 28 5 05/12/2022    MCHC 33 0 05/12/2022    RDW 14 1 05/12/2022    MPV 9 4 05/12/2022    NRBC 0 05/12/2022   , CMP:   Lab Results   Component Value Date    SODIUM 136 05/12/2022    K 3 8 05/12/2022    CL 98 (L) 05/12/2022    CO2 32 05/12/2022    BUN 37 (H) 05/12/2022    CREATININE 1 81 (H) 05/12/2022    CALCIUM 9 3 05/12/2022    AST 9 05/12/2022    ALT 15 05/12/2022    ALKPHOS 61 05/12/2022    EGFR 33 05/12/2022   , PT/INR: No results found for: PT, INR, Troponin: No results found for: TROPONINI    Imaging and other studies: none to review today

## 2022-05-12 NOTE — PLAN OF CARE
Problem: DISCHARGE PLANNING  Goal: Discharge to home or other facility with appropriate resources  Description: INTERVENTIONS:  - Identify barriers to discharge w/patient and caregiver  - Arrange for needed discharge resources and transportation as appropriate  - Identify discharge learning needs (meds, wound care, etc )  - Arrange for interpretive services to assist at discharge as needed  - Refer to Case Management Department for coordinating discharge planning if the patient needs post-hospital services based on physician/advanced practitioner order or complex needs related to functional status, cognitive ability, or social support system  Outcome: Progressing     Problem: CARDIOVASCULAR - ADULT  Goal: Maintains optimal cardiac output and hemodynamic stability  Description: INTERVENTIONS:  - Monitor I/O, vital signs and rhythm  - Monitor for S/S and trends of decreased cardiac output  - Administer and titrate ordered vasoactive medications to optimize hemodynamic stability  - Assess quality of pulses, skin color and temperature  - Assess for signs of decreased coronary artery perfusion  - Instruct patient to report change in severity of symptoms  Outcome: Progressing  Goal: Absence of cardiac dysrhythmias or at baseline rhythm  Description: INTERVENTIONS:  - Continuous cardiac monitoring, vital signs, obtain 12 lead EKG if ordered  - Administer antiarrhythmic and heart rate control medications as ordered  - Monitor electrolytes and administer replacement therapy as ordered  Outcome: Progressing

## 2022-05-12 NOTE — ASSESSMENT & PLAN NOTE
Patient oxygen requirement at baseline is 3 L  Overnight patient required 8 L  today a m  He is on 5 L of oxygen     wean as tolerated,   continue IV cefepime given elevated procalcitonin level, follow-up procql    CT on 05/09  LUNGS:  Interlobular septal thickening seen  Left lingular region granuloma seen  Areas of groundglass density seen in the right middle lobe, in the superior segment of the right lower lobe, seen in image 1:15 series 601 and image 135 series 2, new from the previous study there is interlobular septal thickening, more pronounced from   the previous study  Mild mosaic attenuation lung parenchyma seen  Background of mild chronic interstitial lung disease is again noted  Additional calcified granuloma are stable

## 2022-05-12 NOTE — ASSESSMENT & PLAN NOTE
Continue Eliquis 2 5 mg p o  B i d , given chest 2 Vasc score 6, goal rate of less than 100  Metoprolol tartrate 50 mg q 6 will transition to 75 mg b i d   From this evening ,Per Cardiology  Recommendations appreciated  Current the rate is controlled and Cardiology will be doing repeat  Echo  TSH-normal  Outpatient sleep study

## 2022-05-13 VITALS
TEMPERATURE: 97 F | OXYGEN SATURATION: 92 % | DIASTOLIC BLOOD PRESSURE: 71 MMHG | WEIGHT: 150.13 LBS | HEART RATE: 96 BPM | HEIGHT: 66 IN | RESPIRATION RATE: 34 BRPM | BODY MASS INDEX: 24.13 KG/M2 | SYSTOLIC BLOOD PRESSURE: 125 MMHG

## 2022-05-13 LAB
ALBUMIN SERPL BCP-MCNC: 2.7 G/DL (ref 3.5–5)
ALP SERPL-CCNC: 73 U/L (ref 46–116)
ALT SERPL W P-5'-P-CCNC: 13 U/L (ref 12–78)
ANION GAP SERPL CALCULATED.3IONS-SCNC: 9 MMOL/L (ref 4–13)
AST SERPL W P-5'-P-CCNC: 12 U/L (ref 5–45)
BASOPHILS # BLD AUTO: 0.07 THOUSANDS/ΜL (ref 0–0.1)
BASOPHILS NFR BLD AUTO: 1 % (ref 0–1)
BILIRUB SERPL-MCNC: 1.08 MG/DL (ref 0.2–1)
BUN SERPL-MCNC: 37 MG/DL (ref 5–25)
CALCIUM ALBUM COR SERPL-MCNC: 10.6 MG/DL (ref 8.3–10.1)
CALCIUM SERPL-MCNC: 9.6 MG/DL (ref 8.3–10.1)
CHLORIDE SERPL-SCNC: 97 MMOL/L (ref 100–108)
CO2 SERPL-SCNC: 32 MMOL/L (ref 21–32)
CREAT SERPL-MCNC: 1.81 MG/DL (ref 0.6–1.3)
EOSINOPHIL # BLD AUTO: 0.86 THOUSAND/ΜL (ref 0–0.61)
EOSINOPHIL NFR BLD AUTO: 10 % (ref 0–6)
ERYTHROCYTE [DISTWIDTH] IN BLOOD BY AUTOMATED COUNT: 14 % (ref 11.6–15.1)
GFR SERPL CREATININE-BSD FRML MDRD: 33 ML/MIN/1.73SQ M
GLUCOSE SERPL-MCNC: 154 MG/DL (ref 65–140)
GLUCOSE SERPL-MCNC: 170 MG/DL (ref 65–140)
GLUCOSE SERPL-MCNC: 272 MG/DL (ref 65–140)
HCT VFR BLD AUTO: 40.8 % (ref 36.5–49.3)
HGB BLD-MCNC: 13.3 G/DL (ref 12–17)
IMM GRANULOCYTES # BLD AUTO: 0.03 THOUSAND/UL (ref 0–0.2)
IMM GRANULOCYTES NFR BLD AUTO: 0 % (ref 0–2)
LYMPHOCYTES # BLD AUTO: 1.35 THOUSANDS/ΜL (ref 0.6–4.47)
LYMPHOCYTES NFR BLD AUTO: 16 % (ref 14–44)
MAGNESIUM SERPL-MCNC: 2.2 MG/DL (ref 1.6–2.6)
MCH RBC QN AUTO: 28 PG (ref 26.8–34.3)
MCHC RBC AUTO-ENTMCNC: 32.6 G/DL (ref 31.4–37.4)
MCV RBC AUTO: 86 FL (ref 82–98)
MONOCYTES # BLD AUTO: 0.82 THOUSAND/ΜL (ref 0.17–1.22)
MONOCYTES NFR BLD AUTO: 10 % (ref 4–12)
NEUTROPHILS # BLD AUTO: 5.32 THOUSANDS/ΜL (ref 1.85–7.62)
NEUTS SEG NFR BLD AUTO: 63 % (ref 43–75)
NRBC BLD AUTO-RTO: 0 /100 WBCS
PHOSPHATE SERPL-MCNC: 2.8 MG/DL (ref 2.3–4.1)
PLATELET # BLD AUTO: 265 THOUSANDS/UL (ref 149–390)
PMV BLD AUTO: 9.2 FL (ref 8.9–12.7)
POTASSIUM SERPL-SCNC: 3.7 MMOL/L (ref 3.5–5.3)
PROCALCITONIN SERPL-MCNC: 0.27 NG/ML
PROT SERPL-MCNC: 7 G/DL (ref 6.4–8.2)
RBC # BLD AUTO: 4.75 MILLION/UL (ref 3.88–5.62)
SODIUM SERPL-SCNC: 138 MMOL/L (ref 136–145)
WBC # BLD AUTO: 8.45 THOUSAND/UL (ref 4.31–10.16)

## 2022-05-13 PROCEDURE — 99232 SBSQ HOSP IP/OBS MODERATE 35: CPT | Performed by: INTERNAL MEDICINE

## 2022-05-13 PROCEDURE — 84100 ASSAY OF PHOSPHORUS: CPT | Performed by: INTERNAL MEDICINE

## 2022-05-13 PROCEDURE — 82948 REAGENT STRIP/BLOOD GLUCOSE: CPT

## 2022-05-13 PROCEDURE — 99239 HOSP IP/OBS DSCHRG MGMT >30: CPT | Performed by: INTERNAL MEDICINE

## 2022-05-13 PROCEDURE — 80053 COMPREHEN METABOLIC PANEL: CPT | Performed by: INTERNAL MEDICINE

## 2022-05-13 PROCEDURE — 83735 ASSAY OF MAGNESIUM: CPT | Performed by: INTERNAL MEDICINE

## 2022-05-13 PROCEDURE — 84145 PROCALCITONIN (PCT): CPT | Performed by: INTERNAL MEDICINE

## 2022-05-13 PROCEDURE — 85025 COMPLETE CBC W/AUTO DIFF WBC: CPT | Performed by: INTERNAL MEDICINE

## 2022-05-13 RX ORDER — RANOLAZINE 500 MG/1
500 TABLET, EXTENDED RELEASE ORAL EVERY 12 HOURS SCHEDULED
Qty: 60 TABLET | Refills: 1 | Status: SHIPPED | OUTPATIENT
Start: 2022-05-13 | End: 2022-06-02

## 2022-05-13 RX ORDER — TORSEMIDE 20 MG/1
20 TABLET ORAL DAILY
Qty: 30 TABLET | Refills: 1 | Status: SHIPPED | OUTPATIENT
Start: 2022-05-13 | End: 2022-07-01

## 2022-05-13 RX ORDER — ASPIRIN 81 MG/1
81 TABLET ORAL DAILY
Qty: 30 TABLET | Refills: 0 | Status: SHIPPED | OUTPATIENT
Start: 2022-05-14 | End: 2022-06-02

## 2022-05-13 RX ORDER — METOPROLOL TARTRATE 75 MG/1
75 TABLET, FILM COATED ORAL EVERY 12 HOURS SCHEDULED
Qty: 60 TABLET | Refills: 1 | Status: SHIPPED | OUTPATIENT
Start: 2022-05-13 | End: 2022-05-21

## 2022-05-13 RX ADMIN — TORSEMIDE 20 MG: 20 TABLET ORAL at 08:43

## 2022-05-13 RX ADMIN — METOPROLOL TARTRATE 75 MG: 50 TABLET, FILM COATED ORAL at 08:43

## 2022-05-13 RX ADMIN — Medication 1000 UNITS: at 08:43

## 2022-05-13 RX ADMIN — INSULIN LISPRO 5 UNITS: 100 INJECTION, SOLUTION INTRAVENOUS; SUBCUTANEOUS at 08:44

## 2022-05-13 RX ADMIN — ESCITALOPRAM OXALATE 10 MG: 10 TABLET ORAL at 08:43

## 2022-05-13 RX ADMIN — ASPIRIN 81 MG: 81 TABLET, COATED ORAL at 08:43

## 2022-05-13 RX ADMIN — SUCRALFATE 1 G: 1 TABLET ORAL at 08:43

## 2022-05-13 RX ADMIN — PANTOPRAZOLE SODIUM 40 MG: 40 TABLET, DELAYED RELEASE ORAL at 05:35

## 2022-05-13 RX ADMIN — CEFEPIME HYDROCHLORIDE 1000 MG: 1 INJECTION, SOLUTION INTRAVENOUS at 08:44

## 2022-05-13 RX ADMIN — INSULIN LISPRO 1 UNITS: 100 INJECTION, SOLUTION INTRAVENOUS; SUBCUTANEOUS at 08:44

## 2022-05-13 RX ADMIN — ISOSORBIDE MONONITRATE 60 MG: 60 TABLET, EXTENDED RELEASE ORAL at 08:43

## 2022-05-13 RX ADMIN — INSULIN LISPRO 5 UNITS: 100 INJECTION, SOLUTION INTRAVENOUS; SUBCUTANEOUS at 11:25

## 2022-05-13 RX ADMIN — INSULIN LISPRO 2 UNITS: 100 INJECTION, SOLUTION INTRAVENOUS; SUBCUTANEOUS at 11:24

## 2022-05-13 RX ADMIN — RANOLAZINE 500 MG: 500 TABLET, EXTENDED RELEASE ORAL at 08:43

## 2022-05-13 RX ADMIN — APIXABAN 2.5 MG: 2.5 TABLET, FILM COATED ORAL at 08:43

## 2022-05-13 NOTE — ASSESSMENT & PLAN NOTE
Lab Results   Component Value Date    HGBA1C 8 0 (H) 05/12/2022       Recent Labs     05/12/22  1617 05/12/22  2124 05/13/22  0659 05/13/22  1110   POCGLU 164* 151* 154* 272*       Blood Sugar Average: Last 72 hrs:   A1c not a goal , uncontrolled      Continue diet and home medication for now    Initiated Lantus 15 units daily    Plus sliding scale    Follow-up with PCP need repeat hemoglobin A1c

## 2022-05-13 NOTE — PROGRESS NOTES
Cardiology Progress Note - Abdulkadir Whitley 82 y o  male MRN: [id number]    Unit/Bed#: 426-01 Encounter: [id number]      Assessment:  1  Atrial fibrillation, new onset - [id number] = 6   2  Acute diastolic heart failure  3  Acute on chronic respiratory failure with hypoxia  4  Abnormal CT chest possible pneumonia superimposed on suspected ILD  5  CAD status post CABG x2 with subsequent PCI  6  Benign essential hypertension  7  Dyslipidemia    Plan:  1  Off telemetry, regular by exam  2  Continue oral beta-blocker regimen of metoprolol tartrate 75 mg b i d   3   On systemic anticoagulation with Eliquis dosed for age and renal function  4  Appears euvolemic on exam on current diuretic regimen - renal function stable, wean oxygen to home requirements as able  5  Blood pressure controlled  6  Ranexa resumed, continue statin and beta-blocker therapy  7  Clopidogrel discontinued given Eliquis use, aspirin added  8  Repeat echo complete - LV function remains preserved  9  Will arrange outpatient follow-up with Dr Latoya Demarco and will need BMP in one week after discharge  10  [facility] will be available, please call with questions    Subjective:   Patient seen and examined  No significant events overnight  Objective:     Vitals: Blood pressure 128/72, pulse 95, temperature (!) 97 °F (36 1 °C), resp  rate (!) 34, height 5' 6" (1 676 m), weight 68 1 kg (150 lb 2 1 oz), SpO2 95 %  , Body mass index is 24 23 kg/m² ,   Orthostatic Blood Pressures    Flowsheet Row Most Recent Value   Blood Pressure 128/72 filed at 05/12/2022 2137   Patient Position - Orthostatic VS Sitting filed at 05/12/2022 1059            Intake/Output Summary (Last 24 hours) at 5/13/2022 0750  Last data filed at 5/12/2022 2254  Gross per 24 hour   Intake 1290 ml   Output 1255 ml   Net 35 ml         Physical Exam:    GEN: Abdulkadir Whitley appears well, alert and oriented x 3, pleasant and cooperative   HEENT: pupils equal, round, and reactive to light; extraocular muscles intact  NECK: supple, no carotid bruits, no elevated JVP  HEART: regular rhythm, normal S1 and S2, no murmur  LUNGS:  Crackles right base  ABDOMEN: normal bowel sounds, soft, no tenderness, no distention  EXTREMITIES:  No edema  NEURO: no focal findings   SKIN: normal without suspicious lesions on exposed skin    Medications:      Current Facility-Administered Medications:     apixaban (ELIQUIS) tablet 2 5 mg, 2 5 mg, Oral, BID, Maki Sánchez Onesimo, DO, 2 5 mg at 05/12/22 1704    aspirin (ECOTRIN LOW STRENGTH) EC tablet 81 mg, 81 mg, Oral, Daily, Maki Mortensens Onesimo, DO, 81 mg at 05/12/22 0858    atorvastatin (LIPITOR) tablet 40 mg, 40 mg, Oral, Daily With Everlyn Loop Onesimo, DO, 40 mg at 05/12/22 1621    cefepime (MAXIPIME) IVPB (premix in dextrose) 1,000 mg 50 mL, 1,000 mg, Intravenous, Q12H, Maki Sánchez Onesimo, DO, Stopped at 05/12/22 2206    cholecalciferol (VITAMIN D3) tablet 1,000 Units, 1,000 Units, Oral, Daily, Maki Masseyol, DO, 1,000 Units at 05/12/22 0858    escitalopram (LEXAPRO) tablet 10 mg, 10 mg, Oral, Daily, Maki Mortensens Onesimo, DO, 10 mg at 05/12/22 0858    insulin glargine (LANTUS) subcutaneous injection 15 Units 0 15 mL, 15 Units, Subcutaneous, HS, [facility], 15 Units at 05/12/22 2136    insulin lispro (HumaLOG) 100 units/mL subcutaneous injection 1-5 Units, 1-5 Units, Subcutaneous, TID AC, 1 Units at 05/12/22 1621 **AND** Fingerstick Glucose (POCT), , , TID AC, Maki Masseyol, DO    insulin lispro (HumaLOG) 100 units/mL subcutaneous injection 5 Units, 5 Units, Subcutaneous, TID With Meals, Maki Masseyol, DO, 5 Units at 05/12/22 1621    isosorbide mononitrate (IMDUR) 24 hr tablet 60 mg, 60 mg, Oral, Daily, Maki Sánchez Onesimo, DO, 60 mg at 05/12/22 0858    metoprolol tartrate (LOPRESSOR) tablet 75 mg, 75 mg, Oral, Q12H [address], Raygracia Sánchez Onesimo, DO, 75 mg at 05/12/22 2136    pantoprazole (PROTONIX) EC tablet 40 mg, 40 mg, Oral, Early Morning, Haven Sauce Onesimo, DO, 40 mg at 05/13/22 0535    ranolazine (RANEXA) 12 hr tablet 500 mg, 500 mg, Oral, Q12H [address], Haven Sauce Onesimo, DO, 500 mg at 05/12/22 2136    sucralfate (CARAFATE) tablet 1 g, 1 g, Oral, BID, Haven Sauce Onesimo, DO, 1 g at 05/12/22 1705    tamsulosin (FLOMAX) capsule 0 8 mg, 0 8 mg, Oral, Daily With Candance Burke Onesimo, DO, 0 8 mg at 05/12/22 1621    torsemide (DEMADEX) tablet 20 mg, 20 mg, Oral, Daily, Haven Sauce Onesimo, DO, 20 mg at 05/12/22 1203     Labs & Results:        Results from last 7 days   Lab Units 05/13/22  0502 05/12/22  0443 05/11/22  0524   WBC Thousand/uL 8 45 7 36 9 33   HEMOGLOBIN g/dL 13 3 12 7 13 2   HEMATOCRIT % 40 8 38 5 39 7   PLATELETS Thousands/uL 265 256 243         Results from last 7 days   Lab Units 05/13/22  0502 05/12/22  0443 05/11/22  0524   POTASSIUM mmol/L 3 7 3 8 3 0*   CHLORIDE mmol/L 97* 98* 96*   CO2 mmol/L 32 32 31   BUN mg/dL 37* 37* 36*   CREATININE mg/dL 1 81* 1 81* 2 04*   CALCIUM mg/dL 9 6 9 3 9 5   ALK PHOS U/L 73 61 69   ALT U/L 13 15 15   AST U/L 12 9 11     Results from last 7 days   Lab Units 05/11/22  0524   INR  1 55*     Results from last 7 days   Lab Units 05/13/22  0502 05/12/22  0443 05/11/22  0524   MAGNESIUM mg/dL 2 2 2 4 2 3     Counseling / Coordination of Care  Total floor / unit time spent today 25 minutes  Greater than 50% of total time was spent with the patient and / or family counseling and / or coordination of care

## 2022-05-13 NOTE — ASSESSMENT & PLAN NOTE
S/p  CABG two separate times and subsequent PCI   Cardiology consulted, recommendation appreciated, replaced Plavix with aspirin 81 mg  daily continue statin, beta-blockers, imdur, Ranexa restarted  Follow-up outpatient Cardiology within a week

## 2022-05-13 NOTE — DISCHARGE SUMMARY
[address] [place]  Discharge- Joselin Hirsch 1935, 82 y o  male MRN: [id number]  Unit/Bed#: 426-01 Encounter: [id number]  Primary Care Provider: Sheri Salgado DO   Date and time admitted to hospital: 5/9/2022  8:12 AM    * Acute on chronic diastolic congestive heart failure [facility])  Assessment & Plan  Wt Readings from Last 3 Encounters:   05/13/22 68 1 kg (150 lb 2 1 oz)   04/22/22 73 5 kg (162 lb)   04/04/22 73 7 kg (162 lb 6 4 oz)   Patient was diuresed  with IV Lasix which helped to improve his respiratory status and volume  Weight is down from 70 9kg to 68 1 kg,  net negative since admission 1 1L   due to elevated creatinines diuretics were held on 5/11, renal function improved on 05/12, patient  started p o  Torsemide 20 mg daily, per cardiology  Cardiology consulted, recommendations appreciated  Repeat echo 5/12  Interpretation Summary         Left Ventricle: Left ventricular cavity size is normal  Wall thickness is increased  The left ventricular ejection fraction is 65%  Systolic function is normal     Right Ventricle: Right ventricular cavity size is normal  Systolic function is normal     Left Atrium: The atrium is dilated    Tricuspid Valve: There is moderate regurgitation  The right ventricular systolic pressure is mildly elevated  The estimated right ventricular systolic pressure is 94 54 mmHg  Patient will follow-up with cardiology outpatient,  Upon discharge will have torsemide 20 mg daily  Patient needs follow-up BMP within a week       Atrial fibrillation (HCC)  Assessment & Plan  Continue Eliquis 2 5 mg p o  B i d , given [id number] = 6 , goal rate of less than 100  Metoprolol tartrate 50 mg q 6 will transition to 75 mg b i d  From 5/12,Per Cardiology  Recommendations appreciated  Repeat echo 5/12  Interpretation Summary         Left Ventricle: Left ventricular cavity size is normal  Wall thickness is increased  The left ventricular ejection fraction is 65%  Systolic function is normal     Right Ventricle: Right ventricular cavity size is normal  Systolic function is normal     Left Atrium: The atrium is dilated    Tricuspid Valve: There is moderate regurgitation  The right ventricular systolic pressure is mildly elevated  The estimated right ventricular systolic pressure is 37 90 mmHg  TSH-normal  Outpatient sleep study to rule out ARVIN    Upon discharge continue metoprolol, Eliquis  Follow-up with outpatient cardiology within a week, and do BMP    Acute respiratory failure with hypoxia ([address] )  Assessment & Plan  Patient oxygen requirement at baseline is 3 L at rest, and 4 L when he is walking  During hospital stay patient required  8 L  Oxygen NC   Upon discharge patient requires 4 L,SpO2 mid 90s  Initiated IV cefepime given elevated procalcitonin level, procal trended down       CT on 05/09  LUNGS:  Interlobular septal thickening seen  Left lingular region granuloma seen  Areas of groundglass density seen in the right middle lobe, in the superior segment of the right lower lobe, seen in image 1:15 series 601 and image 135 series 2, new from the previous study there is interlobular septal thickening, more pronounced from   the previous study  Mild mosaic attenuation lung parenchyma seen  Background of mild chronic interstitial lung disease is again noted  Additional calcified granuloma are stable    Pulmonology consulted, recommendations appreciated  Patient is to follow-up  outpatient pulmonology within a week  Sputum culture pending  Patient needs repeat CT chest in 2-3 months  Patient had previously checked ANCA given CT findings and persistent eosinophilic but the results were NL    Essential hypertension  Assessment & Plan    Monitor vitals during hospital stay stable    Follow-up with PCP        Coronary artery disease  Assessment & Plan  S/p  CABG two separate times and subsequent PCI   Cardiology consulted, recommendation appreciated, replaced Plavix with aspirin 81 mg  daily continue statin, beta-blockers, imdur, Ranexa restarted  Follow-up outpatient Cardiology within a week                                     Stage 3b chronic kidney disease [facility])  Assessment & Plan  Lab Results   Component Value Date    EGFR 33 05/13/2022    EGFR 33 05/12/2022    EGFR 28 05/11/2022    CREATININE 1 81 (H) 05/13/2022    CREATININE 1 81 (H) 05/12/2022    CREATININE 2 04 (H) 05/11/2022   Patient has chronic kidney disease creatinine baseline is 1 65-1 8  Due to mildly elevated creatinine 5/11 diuretics were held, renal function improved 5/12 will start p o  Torsemide  Follow-up labs outpatient within a week  Torsemide 20 mg daily per Cardiology  Follow-up outpatient cardiology  Follow-up PCP    Type 2 diabetes mellitus [facility])  Assessment & Plan  Lab Results   Component Value Date    HGBA1C 8 0 (H) 05/12/2022       Recent Labs     05/12/22  1617 05/12/22  2124 05/13/22  0659 05/13/22  1110   POCGLU 164* 151* 154* 272*       Blood Sugar Average: Last 72 hrs:   A1c not a goal , uncontrolled      Continue diet and home medication for now    Initiated Lantus 15 units daily    Plus sliding scale    Follow-up with PCP need repeat hemoglobin A1c          Discharging Physician / Practitioner: Mikel Pearce MD  PCP: iAleen Keenan DO  Admission Date:   Admission Orders (From admission, onward)     Ordered        05/09/22 1139  Inpatient Admission  Once                      Discharge Date: 05/13/22    Medical Problems             Resolved Problems  Date Reviewed: 5/11/2022   None                 Consultations During Hospital Stay:  · Pulmonology, Cardiology, case management, physical therapy, respiratory therapy    Procedures Performed:   CTA ED chest PE Study   Final Result   No pulmonary embolism seen   There is a interlobular septal thickening with reticulation, small bilateral effusion and fissural thickening, suggest pulmonary congestion, new from the previous study      There are patchy areas of groundglass density in the right middle lobe area, right lower lobe may be due to infiltrate or related to heart failure  Follow-up suggested in 2-3 months to demonstrate resolution      Mild increasing enlargement of the mediastinal lymph nodes, possibly reactive         No acute airspace consolidation      The study was marked in EPIC for immediate notification  Workstation performed: [id number]         XR chest 1 view portable   ED Interpretation   Diffuse bilateral haziness worse on the left side  Change compared to x-ray from February 2022  Final Result      Vascular congestion                    Workstation performed: [id number]             Significant Findings / Test Results:   Results for orders placed or performed during the hospital encounter of 05/09/22   CBC and differential   Result Value Ref Range    WBC 10 90 (H) 4 31 - 10 16 Thousand/uL    RBC 4 51 3 88 - 5 62 Million/uL    Hemoglobin 12 9 12 0 - 17 0 g/dL    Hematocrit 39 8 36 5 - 49 3 %    MCV 88 82 - 98 fL    MCH 28 6 26 8 - 34 3 pg    MCHC 32 4 31 4 - 37 4 g/dL    RDW 14 2 11 6 - 15 1 %    MPV 9 9 8 9 - 12 7 fL    Platelets 321 991 - 258 Thousands/uL    nRBC 0 /100 WBCs    Neutrophils Relative 81 (H) 43 - 75 %    Immat GRANS % 0 0 - 2 %    Lymphocytes Relative 8 (L) 14 - 44 %    Monocytes Relative 10 4 - 12 %    Eosinophils Relative 0 0 - 6 %    Basophils Relative 1 0 - 1 %    Neutrophils Absolute 8 75 (H) 1 85 - 7 62 Thousands/µL    Immature Grans Absolute 0 04 0 00 - 0 20 Thousand/uL    Lymphocytes Absolute 0 91 0 60 - 4 47 Thousands/µL    Monocytes Absolute 1 11 0 17 - 1 22 Thousand/µL    Eosinophils Absolute 0 03 0 00 - 0 61 Thousand/µL    Basophils Absolute 0 06 0 00 - 0 10 Thousands/µL   Comprehensive metabolic panel   Result Value Ref Range    Sodium 137 136 - 145 mmol/L    Potassium 4 2 3 5 - 5 3 mmol/L    Chloride 101 100 - 108 mmol/L    CO2 27 21 - 32 mmol/L    ANION GAP 9 4 - 13 mmol/L    BUN 26 (H) 5 - 25 mg/dL    Creatinine 1 85 (H) 0 60 - 1 30 mg/dL    Glucose 229 (H) 65 - 140 mg/dL    Calcium 9 6 8 3 - 10 1 mg/dL    Corrected Calcium 10 2 (H) 8 3 - 10 1 mg/dL    AST 13 5 - 45 U/L    ALT 22 12 - 78 U/L    Alkaline Phosphatase 64 46 - 116 U/L    Total Protein 7 3 6 4 - 8 2 g/dL    Albumin 3 2 (L) 3 5 - 5 0 g/dL    Total Bilirubin 2 12 (H) 0 20 - 1 00 mg/dL    eGFR 32 ml/min/1 73sq m   HS Troponin 0hr (reflex protocol)   Result Value Ref Range    hs TnI 0hr 47 "Refer to ACS Flowchart"- see link ng/L   NT-BNP PRO   Result Value Ref Range    NT-proBNP 23,302 (H) <450 pg/mL   Magnesium   Result Value Ref Range    Magnesium 1 9 1 6 - 2 6 mg/dL   D-dimer, quantitative   Result Value Ref Range    D-Dimer, Quant 0 72 (H) <0 50 ug/ml FEU   HS Troponin I 2hr   Result Value Ref Range    hs TnI 2hr 83 (H) "Refer to ACS Flowchart"- see link ng/L    Delta 2hr hsTnI 36 (H) <20 ng/L   HS Troponin I 4hr   Result Value Ref Range    hs TnI 4hr 110 (H) "Refer to ACS Flowchart"- see link ng/L    Delta 4hr hsTnI 63 (H) <20 ng/L   CBC and differential   Result Value Ref Range    WBC 9 83 4 31 - 10 16 Thousand/uL    RBC 4 63 3 88 - 5 62 Million/uL    Hemoglobin 13 3 12 0 - 17 0 g/dL    Hematocrit 40 5 36 5 - 49 3 %    MCV 88 82 - 98 fL    MCH 28 7 26 8 - 34 3 pg    MCHC 32 8 31 4 - 37 4 g/dL    RDW 14 4 11 6 - 15 1 %    MPV 10 2 8 9 - 12 7 fL    Platelets 115 304 - 194 Thousands/uL    nRBC 0 /100 WBCs    Neutrophils Relative 82 (H) 43 - 75 %    Immat GRANS % 0 0 - 2 %    Lymphocytes Relative 8 (L) 14 - 44 %    Monocytes Relative 10 4 - 12 %    Eosinophils Relative 0 0 - 6 %    Basophils Relative 0 0 - 1 %    Neutrophils Absolute 7 99 (H) 1 85 - 7 62 Thousands/µL    Immature Grans Absolute 0 04 0 00 - 0 20 Thousand/uL    Lymphocytes Absolute 0 74 0 60 - 4 47 Thousands/µL    Monocytes Absolute 0 99 0 17 - 1 22 Thousand/µL    Eosinophils Absolute 0 03 0 00 - 0 61 Thousand/µL    Basophils Absolute 0 04 0 00 - 0 10 Thousands/µL Comprehensive metabolic panel   Result Value Ref Range    Sodium 137 136 - 145 mmol/L    Potassium 3 8 3 5 - 5 3 mmol/L    Chloride 100 100 - 108 mmol/L    CO2 27 21 - 32 mmol/L    ANION GAP 10 4 - 13 mmol/L    BUN 35 (H) 5 - 25 mg/dL    Creatinine 1 90 (H) 0 60 - 1 30 mg/dL    Glucose 232 (H) 65 - 140 mg/dL    Calcium 9 7 8 3 - 10 1 mg/dL    Corrected Calcium 10 4 (H) 8 3 - 10 1 mg/dL    AST 13 5 - 45 U/L    ALT 21 12 - 78 U/L    Alkaline Phosphatase 66 46 - 116 U/L    Total Protein 7 2 6 4 - 8 2 g/dL    Albumin 3 1 (L) 3 5 - 5 0 g/dL    Total Bilirubin 2 02 (H) 0 20 - 1 00 mg/dL    eGFR 31 ml/min/1 73sq m   Procalcitonin   Result Value Ref Range    Procalcitonin 0 38 (H) <=0 25 ng/ml   TSH, 3rd generation with Free T4 reflex   Result Value Ref Range    TSH 3RD GENERATON 1 083 0 450 - 4 500 uIU/mL   CBC and differential   Result Value Ref Range    WBC 9 33 4 31 - 10 16 Thousand/uL    RBC 4 63 3 88 - 5 62 Million/uL    Hemoglobin 13 2 12 0 - 17 0 g/dL    Hematocrit 39 7 36 5 - 49 3 %    MCV 86 82 - 98 fL    MCH 28 5 26 8 - 34 3 pg    MCHC 33 2 31 4 - 37 4 g/dL    RDW 14 2 11 6 - 15 1 %    MPV 9 6 8 9 - 12 7 fL    Platelets 159 606 - 812 Thousands/uL    nRBC 0 /100 WBCs    Neutrophils Relative 79 (H) 43 - 75 %    Immat GRANS % 0 0 - 2 %    Lymphocytes Relative 9 (L) 14 - 44 %    Monocytes Relative 10 4 - 12 %    Eosinophils Relative 1 0 - 6 %    Basophils Relative 1 0 - 1 %    Neutrophils Absolute 7 33 1 85 - 7 62 Thousands/µL    Immature Grans Absolute 0 04 0 00 - 0 20 Thousand/uL    Lymphocytes Absolute 0 85 0 60 - 4 47 Thousands/µL    Monocytes Absolute 0 96 0 17 - 1 22 Thousand/µL    Eosinophils Absolute 0 10 0 00 - 0 61 Thousand/µL    Basophils Absolute 0 05 0 00 - 0 10 Thousands/µL   Comprehensive metabolic panel   Result Value Ref Range    Sodium 134 (L) 136 - 145 mmol/L    Potassium 3 0 (L) 3 5 - 5 3 mmol/L    Chloride 96 (L) 100 - 108 mmol/L    CO2 31 21 - 32 mmol/L    ANION GAP 7 4 - 13 mmol/L    BUN 36 (H) 5 - 25 mg/dL    Creatinine 2 04 (H) 0 60 - 1 30 mg/dL    Glucose 294 (H) 65 - 140 mg/dL    Calcium 9 5 8 3 - 10 1 mg/dL    Corrected Calcium 10 4 (H) 8 3 - 10 1 mg/dL    AST 11 5 - 45 U/L    ALT 15 12 - 78 U/L    Alkaline Phosphatase 69 46 - 116 U/L    Total Protein 7 3 6 4 - 8 2 g/dL    Albumin 2 9 (L) 3 5 - 5 0 g/dL    Total Bilirubin 1 76 (H) 0 20 - 1 00 mg/dL    eGFR 28 ml/min/1 73sq m   Magnesium   Result Value Ref Range    Magnesium 2 3 1 6 - 2 6 mg/dL   Phosphorus   Result Value Ref Range    Phosphorus 2 5 2 3 - 4 1 mg/dL   Procalcitonin   Result Value Ref Range    Procalcitonin 0 39 (H) <=0 25 ng/ml   Protime-INR   Result Value Ref Range    Protime 17 8 (H) 11 6 - 14 5 seconds    INR 1 55 (H) 0 84 - 1 19   CBC and differential   Result Value Ref Range    WBC 7 36 4 31 - 10 16 Thousand/uL    RBC 4 45 3 88 - 5 62 Million/uL    Hemoglobin 12 7 12 0 - 17 0 g/dL    Hematocrit 38 5 36 5 - 49 3 %    MCV 87 82 - 98 fL    MCH 28 5 26 8 - 34 3 pg    MCHC 33 0 31 4 - 37 4 g/dL    RDW 14 1 11 6 - 15 1 %    MPV 9 4 8 9 - 12 7 fL    Platelets 553 874 - 031 Thousands/uL    nRBC 0 /100 WBCs    Neutrophils Relative 67 43 - 75 %    Immat GRANS % 1 0 - 2 %    Lymphocytes Relative 15 14 - 44 %    Monocytes Relative 10 4 - 12 %    Eosinophils Relative 6 0 - 6 %    Basophils Relative 1 0 - 1 %    Neutrophils Absolute 5 02 1 85 - 7 62 Thousands/µL    Immature Grans Absolute 0 04 0 00 - 0 20 Thousand/uL    Lymphocytes Absolute 1 08 0 60 - 4 47 Thousands/µL    Monocytes Absolute 0 73 0 17 - 1 22 Thousand/µL    Eosinophils Absolute 0 42 0 00 - 0 61 Thousand/µL    Basophils Absolute 0 07 0 00 - 0 10 Thousands/µL   Comprehensive metabolic panel   Result Value Ref Range    Sodium 136 136 - 145 mmol/L    Potassium 3 8 3 5 - 5 3 mmol/L    Chloride 98 (L) 100 - 108 mmol/L    CO2 32 21 - 32 mmol/L    ANION GAP 6 4 - 13 mmol/L    BUN 37 (H) 5 - 25 mg/dL    Creatinine 1 81 (H) 0 60 - 1 30 mg/dL    Glucose 194 (H) 65 - 140 mg/dL    Calcium 9 3 8 3 - 10 1 mg/dL    Corrected Calcium 10 5 (H) 8 3 - 10 1 mg/dL    AST 9 5 - 45 U/L    ALT 15 12 - 78 U/L    Alkaline Phosphatase 61 46 - 116 U/L    Total Protein 6 7 6 4 - 8 2 g/dL    Albumin 2 5 (L) 3 5 - 5 0 g/dL    Total Bilirubin 1 12 (H) 0 20 - 1 00 mg/dL    eGFR 33 ml/min/1 73sq m   Magnesium   Result Value Ref Range    Magnesium 2 4 1 6 - 2 6 mg/dL   Phosphorus   Result Value Ref Range    Phosphorus 2 2 (L) 2 3 - 4 1 mg/dL   Procalcitonin   Result Value Ref Range    Procalcitonin 0 33 (H) <=0 25 ng/ml   Hemoglobin A1c w/EAG Estimation (Orders if not completed within the last 90 days)   Result Value Ref Range    Hemoglobin A1C 8 0 (H) Normal 3 8-5 6%; PreDiabetic 5 7-6 4%;  Diabetic >=6 5%; Glycemic control for adults with diabetes <7 0% %     mg/dl   CBC and differential   Result Value Ref Range    WBC 8 45 4 31 - 10 16 Thousand/uL    RBC 4 75 3 88 - 5 62 Million/uL    Hemoglobin 13 3 12 0 - 17 0 g/dL    Hematocrit 40 8 36 5 - 49 3 %    MCV 86 82 - 98 fL    MCH 28 0 26 8 - 34 3 pg    MCHC 32 6 31 4 - 37 4 g/dL    RDW 14 0 11 6 - 15 1 %    MPV 9 2 8 9 - 12 7 fL    Platelets 728 185 - 373 Thousands/uL    nRBC 0 /100 WBCs    Neutrophils Relative 63 43 - 75 %    Immat GRANS % 0 0 - 2 %    Lymphocytes Relative 16 14 - 44 %    Monocytes Relative 10 4 - 12 %    Eosinophils Relative 10 (H) 0 - 6 %    Basophils Relative 1 0 - 1 %    Neutrophils Absolute 5 32 1 85 - 7 62 Thousands/µL    Immature Grans Absolute 0 03 0 00 - 0 20 Thousand/uL    Lymphocytes Absolute 1 35 0 60 - 4 47 Thousands/µL    Monocytes Absolute 0 82 0 17 - 1 22 Thousand/µL    Eosinophils Absolute 0 86 (H) 0 00 - 0 61 Thousand/µL    Basophils Absolute 0 07 0 00 - 0 10 Thousands/µL   Comprehensive metabolic panel   Result Value Ref Range    Sodium 138 136 - 145 mmol/L    Potassium 3 7 3 5 - 5 3 mmol/L    Chloride 97 (L) 100 - 108 mmol/L    CO2 32 21 - 32 mmol/L    ANION GAP 9 4 - 13 mmol/L    BUN 37 (H) 5 - 25 mg/dL    Creatinine 1 81 (H) 0 60 - 1 30 mg/dL    Glucose 170 (H) 65 - 140 mg/dL    Calcium 9 6 8 3 - 10 1 mg/dL    Corrected Calcium 10 6 (H) 8 3 - 10 1 mg/dL    AST 12 5 - 45 U/L    ALT 13 12 - 78 U/L    Alkaline Phosphatase 73 46 - 116 U/L    Total Protein 7 0 6 4 - 8 2 g/dL    Albumin 2 7 (L) 3 5 - 5 0 g/dL    Total Bilirubin 1 08 (H) 0 20 - 1 00 mg/dL    eGFR 33 ml/min/1 73sq m   Magnesium   Result Value Ref Range    Magnesium 2 2 1 6 - 2 6 mg/dL   Phosphorus   Result Value Ref Range    Phosphorus 2 8 2 3 - 4 1 mg/dL   Procalcitonin   Result Value Ref Range    Procalcitonin 0 27 (H) <=0 25 ng/ml   ECG 12 lead   Result Value Ref Range    Ventricular Rate 120 BPM    Atrial Rate 86 BPM    VA Interval  ms    QRSD Interval 90 ms    QT Interval 322 ms    QTC Interval 455 ms    P Axis  degrees    QRS Axis 74 degrees    T Wave [place] 200 degrees   Echo follow up/limited w/ contrast if indicated   Result Value Ref Range    Triscuspid Valve Regurgitation Peak Gradient 43 0 mmHg    Tricuspid valve peak regurgitation velocity 3 28 m/s    TR Peak Zac 3 3 m/s    Right Ventricular Peak Systolic Pressure 42 44 mmHg    A4C EF 65 %    Est  RA pres 5 0 mmHg    LV EF 65    Fingerstick Glucose (POCT)   Result Value Ref Range    POC Glucose 435 (H) 65 - 140 mg/dl   Fingerstick Glucose (POCT)   Result Value Ref Range    POC Glucose 239 (H) 65 - 140 mg/dl   Fingerstick Glucose (POCT)   Result Value Ref Range    POC Glucose 199 (H) 65 - 140 mg/dl   Fingerstick Glucose (POCT)   Result Value Ref Range    POC Glucose 190 (H) 65 - 140 mg/dl   Fingerstick Glucose (POCT)   Result Value Ref Range    POC Glucose 404 (H) 65 - 140 mg/dl   Fingerstick Glucose (POCT)   Result Value Ref Range    POC Glucose 164 (H) 65 - 140 mg/dl   Fingerstick Glucose (POCT)   Result Value Ref Range    POC Glucose 151 (H) 65 - 140 mg/dl   Fingerstick Glucose (POCT)   Result Value Ref Range    POC Glucose 154 (H) 65 - 140 mg/dl   Fingerstick Glucose (POCT)   Result Value Ref Range    POC Glucose 272 (H) 65 - 140 mg/dl       Incidental Findings:   · CT chest 05/09/2022     LUNGS:  Interlobular septal thickening seen  Left lingular region granuloma seen  Areas of groundglass density seen in the right middle lobe, in the superior segment of the right lower lobe, seen in image 1:15 series 601 and image 135 series 2, new from the previous study there is interlobular septal thickening, more pronounced from   the previous study  Mild mosaic attenuation lung parenchyma seen  Background of mild chronic interstitial lung disease is again noted  Additional calcified granuloma are stable  PLEURA:  Fissural thickening seen  Pleural thickening seen on the left side  Small right effusion seen       Test Results Pending at Discharge (will require follow up): · Sputum culture     Outpatient Tests Requested:  · BMP within a week          Hospital Course: As per admission:  Patel Sierra is a 82 y  Norman Flavin past medical history of CKD stage IIIB, hypertension, hyperlipidemia, diabetes uncontrolled who presents today with shortness of breath and right knee pain   He reports since Thursday he has been having right knee pain in the knee was swollen, which significantly improved by today  Lakshmi Nicole is not able to ambulate normally because of the pain   Did not take any pain medication  Patient reports that he has chronic shortness of breath but recent couple of days it progressively got worse   He is on home oxygen 3 L tried to increase it to 4 L but it did not help   Shortness of breath gets worse with walking, and alleviating with laying down in his bed  He denies fever, chest pain, headache, nausea, fever, recent URI symptoms chills, vomiting, diarrhea, constipation, abdominal pain, history of PE and DVT,     In the ED given magnesium sulfate, Lasix 20 mg IV Cardizem 10 mg IV, IV fluids bolus    Patient will be admitted to [facility] for the management and evaluation of AFib and acute diastolic CHF    [place] [facility] course:  Freida Larson is a 82 y o  male  upon admission patient started on IV diuretics, Eliquis due to Afib, metoprolol 50 mg q 6 which was transitioned to 75 mg b i d  Per Cardiology  IV Lasix helped significantly to improve his respiratory status and Volume , weight significantly improved from 7 9 kg to 68 1 kg, net negative since admission 1 1 L, diuretics  were held for 1 day during the hospital stay due to decreased kidney function  Renal function improved, which allowed us to give him p o  Torsemide 20 mg daily  Pulmonology was consulted during the hospital stay, patient was initially placed on 8 L of oxygen   upon discharge patient is on 4 L of oxygen which is his baseline  Initiated IV cefepime during hospital stay due to elevated procal level, WBC on admission and abnormal CT scan  Upon discharge patient is hemodynamically stable, clinically improved, has normal p o  Intake, agrees and understands the plan  Upon discharge his medications are torsemide 20 mg daily, metoprolol titrate 75 mg b i d  Eliquis 2 5 mg b i d  Imdur, Ranexa aspirin,      Please see above list of diagnoses and related plan for additional information  Condition at Discharge: stable     Discharge Day Visit / Exam:     Subjective:  Patient feels okay, does not bring any new concerns  Vitals: Blood Pressure: 125/71 (05/13/22 0846)  Pulse: 96 (05/13/22 0846)  Temperature: (!) 97 °F (36 1 °C) (05/12/22 2137)  Temp Source: Temporal (05/12/22 1059)  Respirations: (!) 34 (05/12/22 1615)  Height: 5' 6" (167 6 cm) (05/12/22 1231)  Weight - Scale: 68 1 kg (150 lb 2 1 oz) (05/13/22 0539)  SpO2: 92 % (05/13/22 0846)  Exam:   Physical Exam  Vitals and nursing note reviewed  Constitutional:       Appearance: Normal appearance  He is well-developed  HENT:      Head: Normocephalic and atraumatic        Mouth/Throat:      Mouth: Mucous membranes are moist    Eyes:      Conjunctiva/sclera: Conjunctivae normal    Cardiovascular: Rate and Rhythm: Normal rate and regular rhythm  Pulses: Normal pulses  Heart sounds: Normal heart sounds  Pulmonary:      Effort: Pulmonary effort is normal  No respiratory distress  Breath sounds: Normal breath sounds  No stridor  No wheezing  Abdominal:      General: Abdomen is flat  Bowel sounds are normal       Palpations: Abdomen is soft  Tenderness: There is no abdominal tenderness  Musculoskeletal:      Cervical back: Neck supple  Skin:     General: Skin is warm and dry  Capillary Refill: Capillary refill takes less than 2 seconds  Neurological:      Mental Status: He is alert  Psychiatric:         Mood and Affect: Mood normal          Thought Content: Thought content normal              Discharge instructions/Information to patient and family:   See after visit summary for information provided to patient and family  Provisions for Follow-Up Care:  See after visit summary for information related to follow-up care and any pertinent home health orders  Disposition:     Home    For Discharges to [facility]:   · Not Applicable to this Patient - Not Applicable to this Patient         Discharge Statement:  I spent 60 minutes discharging the patient  This time was spent on the day of discharge  I had direct contact with the patient on the day of discharge  Greater than 50% of the total time was spent examining patient, answering all patient questions, arranging and discussing plan of care with patient as well as directly providing post-discharge instructions  Additional time then spent on discharge activities  Discharge Medications:  See after visit summary for reconciled discharge medications provided to patient and family        ** Please Note: This note has been constructed using a voice recognition system **

## 2022-05-13 NOTE — ASSESSMENT & PLAN NOTE
Wt Readings from Last 3 Encounters:   05/13/22 68 1 kg (150 lb 2 1 oz)   04/22/22 73 5 kg (162 lb)   04/04/22 73 7 kg (162 lb 6 4 oz)   Patient was diuresed  with IV Lasix which helped to improve his respiratory status and volume  Weight is down from 70 9kg to 68 1 kg,  net negative since admission 1 1L   due to elevated creatinines diuretics were held on 5/11, renal function improved on 05/12, patient  started p o  Torsemide 20 mg daily, per cardiology  Cardiology consulted, recommendations appreciated  Repeat echo 5/12  Interpretation Summary         Left Ventricle: Left ventricular cavity size is normal  Wall thickness is increased  The left ventricular ejection fraction is 65%  Systolic function is normal     Right Ventricle: Right ventricular cavity size is normal  Systolic function is normal     Left Atrium: The atrium is dilated    Tricuspid Valve: There is moderate regurgitation  The right ventricular systolic pressure is mildly elevated  The estimated right ventricular systolic pressure is 65 92 mmHg  Patient will follow-up with cardiology outpatient,  Upon discharge will have torsemide 20 mg daily    Patient needs follow-up BMP within a week

## 2022-05-13 NOTE — ASSESSMENT & PLAN NOTE
Continue Eliquis 2 5 mg p o  B i d , given [id number] = 6 , goal rate of less than 100  Metoprolol tartrate 50 mg q 6 will transition to 75 mg b i d  From 5/12,Per Cardiology  Recommendations appreciated  Repeat echo 5/12  Interpretation Summary         Left Ventricle: Left ventricular cavity size is normal  Wall thickness is increased  The left ventricular ejection fraction is 65%  Systolic function is normal     Right Ventricle: Right ventricular cavity size is normal  Systolic function is normal     Left Atrium: The atrium is dilated    Tricuspid Valve: There is moderate regurgitation  The right ventricular systolic pressure is mildly elevated  The estimated right ventricular systolic pressure is 74 18 mmHg         TSH-normal  Outpatient sleep study to rule out ARVIN    Upon discharge continue metoprolol, Eliquis  Follow-up with outpatient cardiology within a week, and do BMP

## 2022-05-13 NOTE — PLAN OF CARE
Problem: PAIN - ADULT  Goal: Verbalizes/displays adequate comfort level or baseline comfort level  Description: Interventions:  - Encourage patient to monitor pain and request assistance  - Assess pain using appropriate pain scale  - Administer analgesics based on type and severity of pain and evaluate response  - Implement non-pharmacological measures as appropriate and evaluate response  - Consider cultural and social influences on pain and pain management  - Notify physician/advanced practitioner if interventions unsuccessful or patient reports new pain  5/13/2022 0730 by Amanda Hodge RN  Outcome: Progressing  5/13/2022 0730 by Amanda Hodge RN  Outcome: Progressing     Problem: INFECTION - ADULT  Goal: Absence or prevention of progression during hospitalization  Description: INTERVENTIONS:  - Assess and monitor for signs and symptoms of infection  - Monitor lab/diagnostic results  - Monitor all insertion sites, i e  indwelling lines, tubes, and drains  - Monitor endotracheal if appropriate and nasal secretions for changes in amount and color  - [place] appropriate cooling/warming therapies per order  - Administer medications as ordered  - Instruct and encourage patient and family to use good hand hygiene technique  - Identify and instruct in appropriate isolation precautions for identified infection/condition  5/13/2022 0730 by Amanda Hodge RN  Outcome: Progressing  5/13/2022 0730 by Amanda Hodge RN  Outcome: Progressing     Problem: SAFETY ADULT  Goal: Patient will remain free of falls  Description: INTERVENTIONS:  - Educate patient/family on patient safety including physical limitations  - Instruct patient to call for assistance with activity   - Consult OT/PT to assist with strengthening/mobility   - Keep Call bell within reach  - Keep bed low and locked with side rails adjusted as appropriate  - Keep care items and personal belongings within reach  - Initiate and maintain comfort rounds  - Make Fall Risk Sign visible to staff  - Offer Toileting every 2 Hours, in advance of need  - Initiate/Maintain bed/chair alarm  - Obtain necessary fall risk management equipment: alarms  - Apply yellow socks and bracelet for high fall risk patients  - Consider moving patient to room near nurses station  5/13/2022 0730 by Mike Lafleur RN  Outcome: Progressing  5/13/2022 0730 by Mike Lafleur RN  Outcome: Progressing  Goal: Maintain or return to baseline ADL function  Description: INTERVENTIONS:  -  Assess patient's ability to carry out ADLs; assess patient's baseline for ADL function and identify physical deficits which impact ability to perform ADLs (bathing, care of mouth/teeth, toileting, grooming, dressing, etc )  - Assess/evaluate cause of self-care deficits   - Assess range of motion  - Assess patient's mobility; develop plan if impaired  - Assess patient's need for assistive devices and provide as appropriate  - Encourage maximum independence but intervene and supervise when necessary  - Involve family in performance of ADLs  - Assess for home care needs following discharge   - Consider OT consult to assist with ADL evaluation and planning for discharge  - Provide patient education as appropriate  5/13/2022 0730 by Mike Lafleur RN  Outcome: Progressing  5/13/2022 0730 by Mike Lafleur RN  Outcome: Progressing  Goal: Maintains/Returns to pre admission functional level  Description: INTERVENTIONS:  - Perform BMAT or MOVE assessment daily    - Set and communicate daily mobility goal to care team and patient/family/caregiver  - Collaborate with rehabilitation services on mobility goals if consulted  - Perform Range of Motion 3 times a day  - Reposition patient every 2 hours    - Dangle patient 3 times a day  - Stand patient 3 times a day  - Ambulate patient 3 times a day  - Out of bed to chair 3 times a day   - Out of bed for meals 3 times a day  - Out of bed for toileting  - Record patient progress and toleration of activity level   5/13/2022 0730 by Tyler Farr RN  Outcome: Progressing  5/13/2022 0730 by Tyler Farr RN  Outcome: Progressing     Problem: DISCHARGE PLANNING  Goal: Discharge to home or other facility with appropriate resources  Description: INTERVENTIONS:  - Identify barriers to discharge w/patient and caregiver  - Arrange for needed discharge resources and transportation as appropriate  - Identify discharge learning needs (meds, wound care, etc )  - Arrange for interpretive services to assist at discharge as needed  - Refer to Case Management Department for coordinating discharge planning if the patient needs post-hospital services based on physician/advanced practitioner order or complex needs related to functional status, cognitive ability, or social support system  5/13/2022 0730 by Tyler Farr RN  Outcome: Progressing  5/13/2022 0730 by Tyler Farr RN  Outcome: Progressing     Problem: Knowledge Deficit  Goal: Patient/family/caregiver demonstrates understanding of disease process, treatment plan, medications, and discharge instructions  Description: Complete learning assessment and assess knowledge base    Interventions:  - Provide teaching at level of understanding  - Provide teaching via preferred learning methods  5/13/2022 0730 by Tyler Farr RN  Outcome: Progressing  5/13/2022 0730 by Tyler Farr RN  Outcome: Progressing     Problem: CARDIOVASCULAR - ADULT  Goal: Maintains optimal cardiac output and hemodynamic stability  Description: INTERVENTIONS:  - Monitor I/O, vital signs and rhythm  - Monitor for S/S and trends of decreased cardiac output  - Administer and titrate ordered vasoactive medications to optimize hemodynamic stability  - Assess quality of pulses, skin color and temperature  - Assess for signs of decreased coronary artery perfusion  - Instruct patient to report change in severity of symptoms  5/13/2022 0730 by Tyler Farr RN  Outcome: Progressing  5/13/2022 0730 by Francisca Arredondo RN  Outcome: Progressing  Goal: Absence of cardiac dysrhythmias or at baseline rhythm  Description: INTERVENTIONS:  - Continuous cardiac monitoring, vital signs, obtain 12 lead EKG if ordered  - Administer antiarrhythmic and heart rate control medications as ordered  - Monitor electrolytes and administer replacement therapy as ordered  5/13/2022 0730 by Francisca Arredondo RN  Outcome: Progressing  5/13/2022 0730 by Francisca Arredondo RN  Outcome: Progressing     Problem: RESPIRATORY - ADULT  Goal: Achieves optimal ventilation and oxygenation  Description: INTERVENTIONS:  - Assess for changes in respiratory status  - Assess for changes in mentation and behavior  - Position to facilitate oxygenation and minimize respiratory effort  - Oxygen administered by appropriate delivery if ordered  - Encourage broncho-pulmonary hygiene including cough, deep breathe, Incentive Spirometry  - Assess and instruct to report SOB or any respiratory difficulty  - Respiratory Therapy support as indicated  5/13/2022 0730 by Francisca Arredondo RN  Outcome: Progressing  5/13/2022 0730 by Francisca Arredondo RN  Outcome: Progressing     Problem: METABOLIC, FLUID AND ELECTROLYTES - ADULT  Goal: Electrolytes maintained within normal limits  Description: INTERVENTIONS:  - Monitor labs and assess patient for signs and symptoms of electrolyte imbalances  - Administer electrolyte replacement as ordered  - Monitor response to electrolyte replacements, including repeat lab results as appropriate  - Instruct patient on fluid and nutrition as appropriate  5/13/2022 0730 by Francisca Arredondo RN  Outcome: Progressing  5/13/2022 0730 by Francisca Arredondo RN  Outcome: Progressing  Goal: Fluid balance maintained  Description: INTERVENTIONS:  - Monitor labs   - Monitor I/O and WT  - Instruct patient on fluid and nutrition as appropriate  - Assess for signs & symptoms of volume excess or deficit  5/13/2022 0730 by Nallely Carmona STUART Prince  Outcome: Progressing  5/13/2022 0730 by Marc Ford RN  Outcome: Progressing  Goal: Glucose maintained within target range  Description: INTERVENTIONS:  - Monitor Blood Glucose as ordered  - Assess for signs and symptoms of hyperglycemia and hypoglycemia  - Administer ordered medications to maintain glucose within target range  - Assess nutritional intake and initiate nutrition service referral as needed  5/13/2022 0730 by Marc Ford RN  Outcome: Progressing  5/13/2022 0730 by Marc Ford RN  Outcome: Progressing     Problem: Potential for Falls  Goal: Patient will remain free of falls  Description: INTERVENTIONS:  - Educate patient/family on patient safety including physical limitations  - Instruct patient to call for assistance with activity   - Consult OT/PT to assist with strengthening/mobility   - Keep Call bell within reach  - Keep bed low and locked with side rails adjusted as appropriate  - Keep care items and personal belongings within reach  - Initiate and maintain comfort rounds  - Make Fall Risk Sign visible to staff  - Offer Toileting every 2 Hours, in advance of need  - Initiate/Maintain bed/chair alarm  - Obtain necessary fall risk management equipment: alarms  - Apply yellow socks and bracelet for high fall risk patients  - Consider moving patient to room near nurses station  5/13/2022 0730 by Marc Ford RN  Outcome: Progressing  5/13/2022 0730 by Marc Ford RN  Outcome: Progressing     Problem: MOBILITY - ADULT  Goal: Maintain or return to baseline ADL function  Description: INTERVENTIONS:  -  Assess patient's ability to carry out ADLs; assess patient's baseline for ADL function and identify physical deficits which impact ability to perform ADLs (bathing, care of mouth/teeth, toileting, grooming, dressing, etc )  - Assess/evaluate cause of self-care deficits   - Assess range of motion  - Assess patient's mobility; develop plan if impaired  - Assess patient's need for assistive devices and provide as appropriate  - Encourage maximum independence but intervene and supervise when necessary  - Involve family in performance of ADLs  - Assess for home care needs following discharge   - Consider OT consult to assist with ADL evaluation and planning for discharge  - Provide patient education as appropriate  5/13/2022 0730 by Prachi Bean RN  Outcome: Progressing  5/13/2022 0730 by Prachi Bean RN  Outcome: Progressing  Goal: Maintains/Returns to pre admission functional level  Description: INTERVENTIONS:  - Perform BMAT or MOVE assessment daily    - Set and communicate daily mobility goal to care team and patient/family/caregiver  - Collaborate with rehabilitation services on mobility goals if consulted  - Perform Range of Motion 3 times a day  - Reposition patient every 2 hours  - Dangle patient 3 times a day  - Stand patient 3 times a day  - Ambulate patient 3 times a day  - Out of bed to chair 3 times a day   - Out of bed for meals 3 times a day  - Out of bed for toileting  - Record patient progress and toleration of activity level   5/13/2022 0730 by Prachi Bean RN  Outcome: Progressing  5/13/2022 0730 by Prachi Bean RN  Outcome: Progressing     Problem: Nutrition/Hydration-ADULT  Goal: Nutrient/Hydration intake appropriate for improving, restoring or maintaining nutritional needs  Description: Monitor and assess patient's nutrition/hydration status for malnutrition  Collaborate with interdisciplinary team and initiate plan and interventions as ordered  Monitor patient's weight and dietary intake as ordered or per policy  Utilize nutrition screening tool and intervene as necessary  Determine patient's food preferences and provide high-protein, high-caloric foods as appropriate       INTERVENTIONS:  - Monitor oral intake, urinary output, labs, and treatment plans  - Assess nutrition and hydration status and recommend course of action  - Evaluate amount of meals eaten  - Assist patient with eating if necessary   - Allow adequate time for meals  - Recommend/ encourage appropriate diets, oral nutritional supplements, and vitamin/mineral supplements  - Order, calculate, and assess calorie counts as needed  - Recommend, monitor, and adjust tube feedings and TPN/PPN based on assessed needs  - Assess need for intravenous fluids  - Provide specific nutrition/hydration education as appropriate  - Include patient/family/caregiver in decisions related to nutrition  5/13/2022 0730 by Janie Pool RN  Outcome: Progressing  5/13/2022 0730 by Jnaie Pool RN  Outcome: Progressing

## 2022-05-13 NOTE — CASE MANAGEMENT
Case Management Discharge Planning Note    Patient name Durga Mulligan /[id number] MRN [id number]  : 1935 Date 2022       Current Admission Date: 2022  Current Admission Diagnosis:Acute on chronic diastolic congestive heart failure [facility])   Patient Active Problem List    Diagnosis Date Noted    Atrial fibrillation ([facility] ) 2022    Chronic respiratory failure with hypoxia ([facility] ) 2022    ILD (interstitial lung disease) ([facility] ) 2022    Pulmonary hypertension ([place] [address] ) 2022    Acute exacerbation of CHF (congestive heart failure) ([place] [address] ) 2022    Acute kidney injury superimposed on chronic kidney disease ([place] [address] ) 2022    Eosinophilia 2022    Nocturnal hypoxia 2022    Pulmonary nodule 2022    Lymphadenopathy 2022    Pulmonic valve insufficiency 2022    Aortic calcification ([place] [address] ) 2022    Hypercalcemia 2022    Biclonal gammopathy 2022    Microalbuminuria 2022    Abnormal EKG 2022    Essential hypertension 2022    Acute respiratory failure with hypoxia ([place] [address] ) 2022    Acute on chronic diastolic congestive heart failure ([place] [address] ) 2022    Closed fracture of multiple ribs of right side with routine healing 2022    Sensation of pressure in bladder area 10/01/2021    Hypertensive left ventricular hypertrophy, without heart failure 2021    Elevated d-dimer 2021    Carotid artery stenosis 2021    Anginal syndrome ([place] [address] ) 2021    Secondary hyperparathyroidism of renal origin ([place] [address] ) 2020    Vitamin D deficiency 2020    Nephrolithiasis 2020    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 ([place] [address] ) 2020    Chronic tubulointerstitial nephritis 2020    Edema 2020    Proteinuria 2020    Coronary artery disease 2020    Stage 3b chronic kidney disease ([place] [address] ) 2019    Hypertensive crisis 2018  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 infarction) 08/26/2018    Mixed hyperlipidemia 08/26/2018    Type 2 diabetes mellitus ([facility] ) 08/26/2018    Coronary artery disease of native artery of native heart with stable angina pectoris ([facility] ) 08/26/2018    Gastroesophageal reflux disease without esophagitis 08/26/2018    Obese 08/26/2018    Presence of aortocoronary bypass graft 08/26/2018    Occlusion and stenosis of bilateral carotid arteries 08/26/2018    Dyslipidemia 08/26/2018      LOS (days): 4  Geometric Mean LOS (GMLOS) (days): 3 80  Days to GMLOS:-0 2     OBJECTIVE:  Risk of Unplanned Readmission Score: 23 11         Current admission status: Inpatient   Preferred Pharmacy:   [facility] [address] [facility] [address] [facility], [address] [place] [address] C/ [facility] [address]  Phone: [phone] Fax: [phone]    Primary Care Provider: Antonio Velarde DO    Primary Insurance: MEDICARE  Secondary Insurance: [employer] [address]:       Requested [address]         Is the patient interested in [address] at discharge?: Yes  [address] requested[de-identified] Nursing, Physical [address] Name[de-identified] [address] [facility] Provider[de-identified] PCP  Home Health Services Needed[de-identified] Gait/ADL Training, Evaluate Functional Status and Safety    DME Referral Provided  Referral made for DME?: No              Treatment Team Recommendation: Home with [facility]  Discharge Destination Plan[de-identified] Home with Tonya at Discharge : Automobile, Family

## 2022-05-13 NOTE — ASSESSMENT & PLAN NOTE
Lab Results   Component Value Date    EGFR 33 05/13/2022    EGFR 33 05/12/2022    EGFR 28 05/11/2022    CREATININE 1 81 (H) 05/13/2022    CREATININE 1 81 (H) 05/12/2022    CREATININE 2 04 (H) 05/11/2022   Patient has chronic kidney disease creatinine baseline is 1 65-1 8  Due to mildly elevated creatinine 5/11 diuretics were held, renal function improved 5/12 will start p o   Torsemide  Follow-up labs outpatient within a week  Torsemide 20 mg daily per Cardiology  Follow-up outpatient cardiology  Follow-up PCP

## 2022-05-13 NOTE — ASSESSMENT & PLAN NOTE
Patient oxygen requirement at baseline is 3 L at rest, and 4 L when he is walking  During hospital stay patient required  8 L  Oxygen NC   Upon discharge patient requires 4 L,SpO2 mid 90s  Initiated IV cefepime given elevated procalcitonin level, procal trended down       CT on 05/09  LUNGS:  Interlobular septal thickening seen  Left lingular region granuloma seen  Areas of groundglass density seen in the right middle lobe, in the superior segment of the right lower lobe, seen in image 1:15 series 601 and image 135 series 2, new from the previous study there is interlobular septal thickening, more pronounced from   the previous study  Mild mosaic attenuation lung parenchyma seen  Background of mild chronic interstitial lung disease is again noted  Additional calcified granuloma are stable    Pulmonology consulted, recommendations appreciated  Patient is to follow-up  outpatient pulmonology within a week  Sputum culture pending  Patient needs repeat CT chest in 2-3 months  Patient had previously checked ANCA given CT findings and persistent eosinophilic but the results were NL

## 2022-05-16 DIAGNOSIS — I48.91 ATRIAL FIBRILLATION WITH RAPID VENTRICULAR RESPONSE (HCC): ICD-10-CM

## 2022-05-17 ENCOUNTER — TELEPHONE (OUTPATIENT)
Dept: FAMILY MEDICINE CLINIC | Facility: CLINIC | Age: 87
End: 2022-05-17

## 2022-05-17 NOTE — TELEPHONE ENCOUNTER
Sara Cardoso Sensor [phone] called with concerns for this patient and the medication that you prescribed while he was inpatient  Please reach out to the pharmacy

## 2022-05-20 ENCOUNTER — APPOINTMENT (OUTPATIENT)
Dept: LAB | Facility: MEDICAL CENTER | Age: 87
End: 2022-05-20
Payer: MEDICARE

## 2022-05-20 DIAGNOSIS — E11.21 DIABETIC GLOMERULOPATHY (HCC): ICD-10-CM

## 2022-05-20 DIAGNOSIS — E11.65 TYPE 2 DIABETES MELLITUS WITH HYPERGLYCEMIA, WITHOUT LONG-TERM CURRENT USE OF INSULIN (HCC): ICD-10-CM

## 2022-05-20 DIAGNOSIS — D47.2 BICLONAL GAMMOPATHY: ICD-10-CM

## 2022-05-20 DIAGNOSIS — I50.30 DIASTOLIC HEART FAILURE, UNSPECIFIED HF CHRONICITY (HCC): ICD-10-CM

## 2022-05-20 DIAGNOSIS — R80.9 PROTEINURIA, UNSPECIFIED TYPE: ICD-10-CM

## 2022-05-20 DIAGNOSIS — N18.32 STAGE 3B CHRONIC KIDNEY DISEASE (HCC): ICD-10-CM

## 2022-05-20 DIAGNOSIS — I48.0 AF (PAROXYSMAL ATRIAL FIBRILLATION) (HCC): ICD-10-CM

## 2022-05-20 LAB
ANION GAP SERPL CALCULATED.3IONS-SCNC: 4 MMOL/L (ref 4–13)
BASOPHILS # BLD AUTO: 0.09 THOUSANDS/ΜL (ref 0–0.1)
BASOPHILS NFR BLD AUTO: 1 % (ref 0–1)
BUN SERPL-MCNC: 30 MG/DL (ref 5–25)
CALCIUM SERPL-MCNC: 10.5 MG/DL (ref 8.3–10.1)
CHLORIDE SERPL-SCNC: 96 MMOL/L (ref 100–108)
CO2 SERPL-SCNC: 34 MMOL/L (ref 21–32)
CREAT SERPL-MCNC: 1.98 MG/DL (ref 0.6–1.3)
EOSINOPHIL # BLD AUTO: 0.62 THOUSAND/ΜL (ref 0–0.61)
EOSINOPHIL NFR BLD AUTO: 6 % (ref 0–6)
ERYTHROCYTE [DISTWIDTH] IN BLOOD BY AUTOMATED COUNT: 13.4 % (ref 11.6–15.1)
GFR SERPL CREATININE-BSD FRML MDRD: 29 ML/MIN/1.73SQ M
GLUCOSE SERPL-MCNC: 407 MG/DL (ref 65–140)
HCT VFR BLD AUTO: 41.9 % (ref 36.5–49.3)
HGB BLD-MCNC: 14.1 G/DL (ref 12–17)
IMM GRANULOCYTES # BLD AUTO: 0.05 THOUSAND/UL (ref 0–0.2)
IMM GRANULOCYTES NFR BLD AUTO: 1 % (ref 0–2)
LYMPHOCYTES # BLD AUTO: 2.06 THOUSANDS/ΜL (ref 0.6–4.47)
LYMPHOCYTES NFR BLD AUTO: 20 % (ref 14–44)
MAGNESIUM SERPL-MCNC: 2.2 MG/DL (ref 1.6–2.6)
MCH RBC QN AUTO: 28.3 PG (ref 26.8–34.3)
MCHC RBC AUTO-ENTMCNC: 33.7 G/DL (ref 31.4–37.4)
MCV RBC AUTO: 84 FL (ref 82–98)
MONOCYTES # BLD AUTO: 0.92 THOUSAND/ΜL (ref 0.17–1.22)
MONOCYTES NFR BLD AUTO: 9 % (ref 4–12)
NEUTROPHILS # BLD AUTO: 6.65 THOUSANDS/ΜL (ref 1.85–7.62)
NEUTS SEG NFR BLD AUTO: 63 % (ref 43–75)
NRBC BLD AUTO-RTO: 0 /100 WBCS
PLATELET # BLD AUTO: 200 THOUSANDS/UL (ref 149–390)
PMV BLD AUTO: 10.2 FL (ref 8.9–12.7)
POTASSIUM SERPL-SCNC: 4.7 MMOL/L (ref 3.5–5.3)
RBC # BLD AUTO: 4.99 MILLION/UL (ref 3.88–5.62)
SODIUM SERPL-SCNC: 134 MMOL/L (ref 136–145)
WBC # BLD AUTO: 10.39 THOUSAND/UL (ref 4.31–10.16)

## 2022-05-20 PROCEDURE — 85025 COMPLETE CBC W/AUTO DIFF WBC: CPT

## 2022-05-20 PROCEDURE — 36415 COLL VENOUS BLD VENIPUNCTURE: CPT

## 2022-05-20 PROCEDURE — 80048 BASIC METABOLIC PNL TOTAL CA: CPT

## 2022-05-20 PROCEDURE — 83735 ASSAY OF MAGNESIUM: CPT

## 2022-05-21 RX ORDER — METOPROLOL TARTRATE 75 MG/1
TABLET, FILM COATED ORAL
Qty: 60 TABLET | Refills: 1 | Status: SHIPPED | OUTPATIENT
Start: 2022-05-21

## 2022-05-23 ENCOUNTER — PATIENT OUTREACH (OUTPATIENT)
Dept: CASE MANAGEMENT | Facility: OTHER | Age: 87
End: 2022-05-23

## 2022-06-02 ENCOUNTER — OFFICE VISIT (OUTPATIENT)
Dept: CARDIOLOGY CLINIC | Facility: CLINIC | Age: 87
End: 2022-06-02
Payer: MEDICARE

## 2022-06-02 VITALS
HEART RATE: 74 BPM | HEIGHT: 66 IN | DIASTOLIC BLOOD PRESSURE: 68 MMHG | OXYGEN SATURATION: 95 % | WEIGHT: 147.8 LBS | SYSTOLIC BLOOD PRESSURE: 122 MMHG | BODY MASS INDEX: 23.75 KG/M2

## 2022-06-02 DIAGNOSIS — I50.32 CHRONIC DIASTOLIC CONGESTIVE HEART FAILURE (HCC): ICD-10-CM

## 2022-06-02 DIAGNOSIS — I48.0 PAROXYSMAL ATRIAL FIBRILLATION (HCC): ICD-10-CM

## 2022-06-02 DIAGNOSIS — I25.10 CORONARY ARTERY DISEASE INVOLVING NATIVE CORONARY ARTERY OF NATIVE HEART WITHOUT ANGINA PECTORIS: Primary | ICD-10-CM

## 2022-06-02 PROCEDURE — 99214 OFFICE O/P EST MOD 30 MIN: CPT | Performed by: INTERNAL MEDICINE

## 2022-06-02 RX ORDER — NITROGLYCERIN 0.4 MG/1
TABLET SUBLINGUAL
COMMUNITY
Start: 2022-04-18

## 2022-06-02 NOTE — PATIENT INSTRUCTIONS
Stop aspirin  Stop Ranexa    Weigh yourself daily  If weight is less than 145 lbs, no torsemide  If weight is between 145-150, take 1 torsemide  If weight is above 150 lbs, take 2 torsemide

## 2022-06-02 NOTE — PROGRESS NOTES
Patient ID: Amita Craft is a 82 y o  male  Plan:      Coronary artery disease  Prior bypass surgery   Continue statin, beta-blocker, nitrates  Aspirin has been discontinued as he is on Eliquis     Chronic diastolic congestive heart failure (HCC)  Wt Readings from Last 3 Encounters:   06/02/22 67 kg (147 lb 12 8 oz)   05/13/22 68 1 kg (150 lb 2 1 oz)   04/22/22 73 5 kg (162 lb)     Euvolemic on exam  Exertional dyspnea out of proportion to cardiac issues, more likely pulmonary  Continue diuretics   Weight <145 lbs, no torsemide   Weight 145-150 lbs, 20 mg    Weight > 150 lbs, 40 mg         Atrial fibrillation (HCC)  Rate controlled  Contrinue metoprolol   Eliquis 2 5 mg bid for stroke risk reduction-dose adjusted for age, renal function        Follow up Plan/Other summary comments:  Aspirin has been discontinued as he is maintained on Eliquis  Ranexa was resumed on discharge from the hospital for unclear reasons  He denies any anginal-like chest pain and therefore, this has been discontinued today  Instructions regarding weight based diuretic regimen were provided to the pt  -see above  Follow up in 6 months  They will call sooner if needed  HPI:  I had the pleasure of seeing Be Eason in the office today for a hospital follow up visit  He was hospitalized at [facility] with dyspnea, felt to be related to acute on chronic HFpEF  He improved with diuretics and noted symptomatic improvement with spontaneous conversion from AF to NSR  Since his discharge, he has been doing well  He reports a stable weigh of 144 lbs on his scale at home  He denies any chest pain, pressure tightness, burning or changes in his breathing  Cardiac history is significant for CAD, htn, hld  He has had ongoing issues with dyspnea, though this appears out of proportion to his cardiac issues  He is currently wearing supplemental O2 and is followed by pulmonary      Most recent or relevant cardiac/vascular testing:    Echo 5/12/2022 EF 65%  Mod TR, elevated RV systolic pressure    Echo 02/21/2022 EF 65% moderate LVH, mildly impaired diastolic function  mild MR, moderate PI     Echo 03/26/2021 EF 65%, mildly impaired diastolic function   Mild to moderate MR, mild TR      Past Surgical History:   Procedure Laterality Date    CARDIAC CATHETERIZATION  10/07/2009    Stent 99% stenosis SVG from the aorta to OM1  Patent mammary graft to the LAD    CORONARY ARTERY BYPASS GRAFT  1992    VG-CX, VG-RCA    CORONARY ARTERY BYPASS GRAFT  11/04/2005    LIMA-D1-LAD, VG-PDA-PLB, Srovnd-LL0-DL0 TMR 9 Lesions    FL EGD TRANSORAL BIOPSY SINGLE/MULTIPLE N/A 1/23/2019    Procedure: ESOPHAGOGASTRODUODENOSCOPY (EGD) with biopsy;  Surgeon: Serena Gooden MD;  Location: [facility] GI LAB; Service: Gastroenterology    TONSILLECTOMY       CMP:   Lab Results   Component Value Date    K 4 7 05/20/2022    CL 96 (L) 05/20/2022    CO2 34 (H) 05/20/2022    BUN 30 (H) 05/20/2022    CREATININE 1 98 (H) 05/20/2022    EGFR 29 05/20/2022       Lipid Profile:   Lab Results   Component Value Date    TRIG 112 11/09/2021    HDL 40 11/09/2021         Review of Systems   10  point ROS  was otherwise non pertinent or negative except as per HPI or as below  Gait: walker        Objective:     /68 (BP Location: Left arm, Patient Position: Sitting, Cuff Size: Standard)   Pulse 74   Ht 5' 6" (1 676 m)   Wt 67 kg (147 lb 12 8 oz)   SpO2 95%   BMI 23 86 kg/m²     PHYSICAL EXAM:    General:  Normal appearance in no distress  Eyes:  Anicteric  Oral mucosa:  Moist   Neck:  No JVD  Carotid upstrokes are brisk without bruits  No masses  Chest:  Coarse rales throughout  Cardiac:  No palpable PMI  Normal S1 and S2  No murmur gallop or rub  Abdomen:  Soft and nontender  No palpable organomegaly or aortic enlargement  Extremities:  No peripheral edema  Musculoskeletal:  Symmetric  Vascular:  Femoral pulses are brisk without bruits    Popliteal pulses are intact bilaterally  Pedal pulses are intact  Neuro:  Grossly symmetric  Psych:  Alert and oriented x3  Current Outpatient Medications:     apixaban (ELIQUIS) 2 5 mg, Take 1 tablet (2 5 mg total) by mouth in the morning and 1 tablet (2 5 mg total) in the evening , Disp: 60 tablet, Rfl: 0    atorvastatin (LIPITOR) 40 mg tablet, Take 1 tablet (40 mg total) by mouth daily with dinner This is an increased dose to better help prevent heart attack and stroke , Disp: 30 tablet, Rfl: 0    cholecalciferol (VITAMIN D3) 1,000 units tablet, Take 1 tablet (1,000 Units total) by mouth daily, Disp: 30 tablet, Rfl: 0    escitalopram (LEXAPRO) 10 mg tablet, Take 10 mg by mouth daily, Disp: , Rfl:     isosorbide mononitrate (IMDUR) 60 mg 24 hr tablet, Take 1 tablet (60 mg total) by mouth daily In the morning, Disp: , Rfl: 0    linaGLIPtin (Tradjenta) 5 MG TABS, Take 5 mg by mouth daily Take this for your diabetes instead of glimepiride, Disp: 30 tablet, Rfl: 0    Metoprolol Tartrate 75 MG TABS, TAKE 1 TABLET BY MOUTH EVERY 12 HOURS, Disp: 60 tablet, Rfl: 1    nitroglycerin (NITROSTAT) 0 4 mg SL tablet, TAKE 1 TABLET UNDER TONGUE FOR ANGINA, MAY REPEAT IN 15 MIN  NO MORE THAN 3 TIMES, Disp: , Rfl:     pantoprazole (PROTONIX) 40 mg tablet, Take 40 mg by mouth daily, Disp: , Rfl:     sucralfate (CARAFATE) 1 g tablet, Take 1 g by mouth 2 (two) times a day, Disp: , Rfl:     tamsulosin (FLOMAX) 0 4 mg, Take 2 capsules (0 8 mg total) by mouth daily with dinner, Disp: 180 capsule, Rfl: 3    torsemide (DEMADEX) 20 mg tablet, Take 1 tablet (20 mg total) by mouth in the morning , Disp: 30 tablet, Rfl: 1  Allergies   Allergen Reactions    Ibuprofen Fatigue     Other reaction(s): decreased kidney function     Past Medical History:   Diagnosis Date    [facility]     Arthritis     Athscl 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     Stent OM1   Patent mammary graft to LAD 10/7/2009; CABG 1992 & 2005    Cancer ([facility] )     skin    CHF (congestive heart failure) ([facility] )     Diabetes mellitus, type II ([facility] )     without complication    Epistaxis     GERD (gastroesophageal reflux disease)     Hx of cardiovascular stress test 2014    Eddie MPI; EF0 52 (52%) Lexiscan, mild LV systolic dysfunction; evidence for prior inferior wall MI; perfusion imaging consistant w mild lat wall ischemia and min torin-infart inferior ischemia   / EF0 51 (51%) evidence for prior inferior wall MI  Mild inferior and mild-moderate lateral wall ischemia  7/29/15    Hx of echocardiogram 2017    2D w/CFD;EF0 55 (55%) mild LVH, mitral valve prolapse with moderate regurgitation  left atrial enlargement  Mild tricuspid regurgitation        Hypertension     Mixed hyperlipidemia     Occlusion and stenosis of bilateral carotid arteries     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 infarction)     Presence of aortocoronary bypass graft            Social History     Tobacco Use   Smoking Status Former Smoker    Packs/day: 1 00    Years: 40 00    Pack years: 40 00    Quit date: 1977    Years since quittin 3   Smokeless Tobacco Never Used

## 2022-06-02 NOTE — ASSESSMENT & PLAN NOTE
Prior bypass surgery   Continue statin, beta-blocker, nitrates    Aspirin has been discontinued as he is on Eliquis

## 2022-06-02 NOTE — ASSESSMENT & PLAN NOTE
Rate controlled  Contrinue metoprolol   Eliquis 2 5 mg bid for stroke risk reduction-dose adjusted for age, renal function

## 2022-06-02 NOTE — ASSESSMENT & PLAN NOTE
Wt Readings from Last 3 Encounters:   06/02/22 67 kg (147 lb 12 8 oz)   05/13/22 68 1 kg (150 lb 2 1 oz)   04/22/22 73 5 kg (162 lb)     Euvolemic on exam  Exertional dyspnea out of proportion to cardiac issues, more likely pulmonary  Continue diuretics   Weight <145 lbs, no torsemide   Weight 145-150 lbs, 20 mg    Weight > 150 lbs, 40 mg

## 2022-06-03 ENCOUNTER — APPOINTMENT (OUTPATIENT)
Dept: LAB | Facility: MEDICAL CENTER | Age: 87
End: 2022-06-03
Payer: MEDICARE

## 2022-06-03 DIAGNOSIS — I10 HYPERTENSION, UNSPECIFIED TYPE: ICD-10-CM

## 2022-06-03 DIAGNOSIS — R53.83 OTHER FATIGUE: ICD-10-CM

## 2022-06-03 DIAGNOSIS — M19.90 ARTHRITIS: ICD-10-CM

## 2022-06-03 DIAGNOSIS — E78.5 HYPERLIPIDEMIA, UNSPECIFIED HYPERLIPIDEMIA TYPE: ICD-10-CM

## 2022-06-03 DIAGNOSIS — I25.10 ASCVD (ARTERIOSCLEROTIC CARDIOVASCULAR DISEASE): ICD-10-CM

## 2022-06-03 LAB
ANION GAP SERPL CALCULATED.3IONS-SCNC: 7 MMOL/L (ref 4–13)
BASOPHILS # BLD AUTO: 0.09 THOUSANDS/ΜL (ref 0–0.1)
BASOPHILS NFR BLD AUTO: 1 % (ref 0–1)
BUN SERPL-MCNC: 37 MG/DL (ref 5–25)
CALCIUM SERPL-MCNC: 10.5 MG/DL (ref 8.3–10.1)
CHLORIDE SERPL-SCNC: 96 MMOL/L (ref 100–108)
CO2 SERPL-SCNC: 31 MMOL/L (ref 21–32)
CREAT SERPL-MCNC: 2.19 MG/DL (ref 0.6–1.3)
EOSINOPHIL # BLD AUTO: 0.86 THOUSAND/ΜL (ref 0–0.61)
EOSINOPHIL NFR BLD AUTO: 7 % (ref 0–6)
ERYTHROCYTE [DISTWIDTH] IN BLOOD BY AUTOMATED COUNT: 13.2 % (ref 11.6–15.1)
GFR SERPL CREATININE-BSD FRML MDRD: 26 ML/MIN/1.73SQ M
GLUCOSE P FAST SERPL-MCNC: 244 MG/DL (ref 65–99)
HCT VFR BLD AUTO: 40.2 % (ref 36.5–49.3)
HGB BLD-MCNC: 14 G/DL (ref 12–17)
IMM GRANULOCYTES # BLD AUTO: 0.06 THOUSAND/UL (ref 0–0.2)
IMM GRANULOCYTES NFR BLD AUTO: 1 % (ref 0–2)
LYMPHOCYTES # BLD AUTO: 1.79 THOUSANDS/ΜL (ref 0.6–4.47)
LYMPHOCYTES NFR BLD AUTO: 15 % (ref 14–44)
MCH RBC QN AUTO: 28.8 PG (ref 26.8–34.3)
MCHC RBC AUTO-ENTMCNC: 34.8 G/DL (ref 31.4–37.4)
MCV RBC AUTO: 83 FL (ref 82–98)
MONOCYTES # BLD AUTO: 0.88 THOUSAND/ΜL (ref 0.17–1.22)
MONOCYTES NFR BLD AUTO: 8 % (ref 4–12)
NEUTROPHILS # BLD AUTO: 8.12 THOUSANDS/ΜL (ref 1.85–7.62)
NEUTS SEG NFR BLD AUTO: 68 % (ref 43–75)
NRBC BLD AUTO-RTO: 0 /100 WBCS
PLATELET # BLD AUTO: 177 THOUSANDS/UL (ref 149–390)
PMV BLD AUTO: 10.2 FL (ref 8.9–12.7)
POTASSIUM SERPL-SCNC: 4.2 MMOL/L (ref 3.5–5.3)
RBC # BLD AUTO: 4.86 MILLION/UL (ref 3.88–5.62)
SODIUM SERPL-SCNC: 134 MMOL/L (ref 136–145)
WBC # BLD AUTO: 11.8 THOUSAND/UL (ref 4.31–10.16)

## 2022-06-03 PROCEDURE — 80048 BASIC METABOLIC PNL TOTAL CA: CPT

## 2022-06-03 PROCEDURE — 36415 COLL VENOUS BLD VENIPUNCTURE: CPT

## 2022-06-03 PROCEDURE — 85025 COMPLETE CBC W/AUTO DIFF WBC: CPT

## 2022-06-07 ENCOUNTER — HOSPITAL ENCOUNTER (OUTPATIENT)
Dept: RADIOLOGY | Facility: HOSPITAL | Age: 87
Discharge: HOME | End: 2022-06-07
Attending: INTERNAL MEDICINE
Payer: MEDICARE

## 2022-06-07 DIAGNOSIS — D47.2 BICLONAL GAMMOPATHY: ICD-10-CM

## 2022-06-07 PROCEDURE — 77075 RADEX OSSEOUS SURVEY COMPL: CPT

## 2022-06-08 ENCOUNTER — HOSPITAL ENCOUNTER (OUTPATIENT)
Dept: CT IMAGING | Facility: HOSPITAL | Age: 87
Discharge: HOME | End: 2022-06-08
Payer: MEDICARE

## 2022-06-08 DIAGNOSIS — J84.9 ILD (INTERSTITIAL LUNG DISEASE) (HCC): ICD-10-CM

## 2022-06-08 PROCEDURE — 71250 CT THORAX DX C-: CPT

## 2022-06-08 PROCEDURE — G1004 CDSM NDSC: HCPCS

## 2022-06-12 ENCOUNTER — TELEPHONE (OUTPATIENT)
Dept: OTHER | Facility: HOSPITAL | Age: 87
End: 2022-06-12

## 2022-06-17 ENCOUNTER — APPOINTMENT (EMERGENCY)
Dept: RADIOLOGY | Facility: HOSPITAL | Age: 87
End: 2022-06-17
Payer: MEDICARE

## 2022-06-17 ENCOUNTER — HOSPITAL ENCOUNTER (EMERGENCY)
Facility: HOSPITAL | Age: 87
Discharge: HOME | End: 2022-06-17
Attending: EMERGENCY MEDICINE
Payer: MEDICARE

## 2022-06-17 VITALS
RESPIRATION RATE: 18 BRPM | SYSTOLIC BLOOD PRESSURE: 153 MMHG | BODY MASS INDEX: 25.55 KG/M2 | OXYGEN SATURATION: 97 % | DIASTOLIC BLOOD PRESSURE: 71 MMHG | WEIGHT: 158.29 LBS | TEMPERATURE: 97 F | HEART RATE: 88 BPM

## 2022-06-17 DIAGNOSIS — S63.289A DISLOCATION OF PROXIMAL INTERPHALANGEAL JOINT OF FINGER, INITIAL ENCOUNTER: Primary | ICD-10-CM

## 2022-06-17 PROCEDURE — 99284 EMERGENCY DEPT VISIT MOD MDM: CPT | Performed by: EMERGENCY MEDICINE

## 2022-06-17 PROCEDURE — 73140 X-RAY EXAM OF FINGER(S): CPT

## 2022-06-17 PROCEDURE — 26770 TREAT FINGER DISLOCATION: CPT | Performed by: EMERGENCY MEDICINE

## 2022-06-17 PROCEDURE — 99283 EMERGENCY DEPT VISIT LOW MDM: CPT

## 2022-06-17 RX ORDER — LIDOCAINE HYDROCHLORIDE 20 MG/ML
5 INJECTION, SOLUTION EPIDURAL; INFILTRATION; INTRACAUDAL; PERINEURAL ONCE
Status: COMPLETED | OUTPATIENT
Start: 2022-06-17 | End: 2022-06-17

## 2022-06-17 RX ADMIN — LIDOCAINE HYDROCHLORIDE 5 ML: 20 INJECTION, SOLUTION EPIDURAL; INFILTRATION; INTRACAUDAL; PERINEURAL at 15:21

## 2022-06-17 NOTE — ED PROVIDER NOTES
History  Chief Complaint   Patient presents with    Finger Injury     Reports trip and fall one wk ago coming down on left hand  Since 5th digit has been swollen and painful  Denies head strike  65-year-old right-handed man presents to the emergency department with pain in left fifth digit since falling one week prior  He states that he fell into a seated position and struck his left hand  No head injury or LOC  He had immediate pain; has had limited range of motion of the left fifth digit since that time  No prior fracture or surgery in the digit  He was convinced to come to the emergency department today by his family because of ongoing pain in the finger and difficulty moving the joint particularly at the proximal and distal interphalangeal joints  No paresthesias or weakness in the digit  Triage x-ray demonstrated obvious dislocation of the fifth digit proximal interphalangeal joint  No neurovascular impairment  Will perform digital block and reduce dislocation  Post-procedure x-ray  History provided by:  Patient, relative and medical records      Prior to Admission Medications   Prescriptions Last Dose Informant Patient Reported? Taking? Metoprolol Tartrate 75 MG TABS   No No   Sig: TAKE 1 TABLET BY MOUTH EVERY 12 HOURS   apixaban (ELIQUIS) 2 5 mg   No No   Sig: Take 1 tablet (2 5 mg total) by mouth in the morning and 1 tablet (2 5 mg total) in the evening  atorvastatin (LIPITOR) 40 mg tablet  Self No No   Sig: Take 1 tablet (40 mg total) by mouth daily with dinner This is an increased dose to better help prevent heart attack and stroke     cholecalciferol (VITAMIN D3) 1,000 units tablet  Self No No   Sig: Take 1 tablet (1,000 Units total) by mouth daily   escitalopram (LEXAPRO) 10 mg tablet  Self Yes No   Sig: Take 10 mg by mouth daily   isosorbide mononitrate (IMDUR) 60 mg 24 hr tablet  Self No No   Sig: Take 1 tablet (60 mg total) by mouth daily In the morning   linaGLIPtin (Tradjenta) 5 MG TABS  Self No No   Sig: Take 5 mg by mouth daily Take this for your diabetes instead of glimepiride   nitroglycerin (NITROSTAT) 0 4 mg SL tablet   Yes No   Sig: TAKE 1 TABLET UNDER TONGUE FOR ANGINA, MAY REPEAT IN 15 MIN  NO MORE THAN 3 TIMES   pantoprazole (PROTONIX) 40 mg tablet   Yes No   Sig: Take 40 mg by mouth daily   sucralfate (CARAFATE) 1 g tablet  Self Yes No   Sig: Take 1 g by mouth 2 (two) times a day   tamsulosin (FLOMAX) 0 4 mg  Self No No   Sig: Take 2 capsules (0 8 mg total) by mouth daily with dinner   torsemide (DEMADEX) 20 mg tablet   No No   Sig: Take 1 tablet (20 mg total) by mouth in the morning  Facility-Administered Medications: None       Past Medical History:   Diagnosis Date    A-fib ([place] [address] )     Arthritis     Athscl 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     Stent OM1  Patent mammary graft to LAD 10/7/2009; CABG 1992 & 2005    Cancer [facility])     skin    CHF (congestive heart failure) ([place] [address] )     Diabetes mellitus, type II ([place] [address] )     without complication    Epistaxis     GERD (gastroesophageal reflux disease)     Hx of cardiovascular stress test 07/23/2014    Eddie MPI; EF0 52 (52%) Lexiscan, mild LV systolic dysfunction; evidence for prior inferior wall MI; perfusion imaging consistant w mild lat wall ischemia and min torin-infart inferior ischemia   / EF0 51 (51%) evidence for prior inferior wall MI  Mild inferior and mild-moderate lateral wall ischemia  7/29/15    Hx of echocardiogram 11/07/2017    2D w/CFD;EF0 55 (55%) mild LVH, mitral valve prolapse with moderate regurgitation  left atrial enlargement  Mild tricuspid regurgitation        Hypertension     Mixed hyperlipidemia     Occlusion and stenosis of bilateral carotid arteries     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 infarction)     Presence of aortocoronary bypass graft        Past Surgical History:   Procedure Laterality Date    CARDIAC CATHETERIZATION  10/07/2009    Stent 99% stenosis SVG from the aorta to OM1  Patent mammary graft to the LAD    CORONARY ARTERY BYPASS GRAFT      VG-CX, VG-RCA    CORONARY ARTERY BYPASS GRAFT  2005    LIMA-D1-LAD, VG-PDA-PLB, Fvljno-TD8-FY5 TMR 9 Lesions    ID EGD TRANSORAL BIOPSY SINGLE/MULTIPLE N/A 2019    Procedure: ESOPHAGOGASTRODUODENOSCOPY (EGD) with biopsy;  Surgeon: Christoph Valenzuela MD;  Location: [address] [place] [address] GI LAB; Service: Gastroenterology    TONSILLECTOMY         Family History   Problem Relation Age of Onset    Heart disease Brother     No Known Problems Mother     Tuberculosis Father      I have reviewed and agree with the history as documented  E-Cigarette/Vaping    E-Cigarette Use Never User      E-Cigarette/Vaping Substances    Nicotine No     THC No     CBD No     Flavoring No     Other No     Unknown No      Social History     Tobacco Use    Smoking status: Former Smoker     Packs/day: 1 00     Years: 40 00     Pack years: 40 00     Quit date: 1977     Years since quittin 4    Smokeless tobacco: Never Used   Vaping Use    Vaping Use: Never used   Substance Use Topics    Alcohol use: Never    Drug use: No       Review of Systems   Constitutional: Negative  Musculoskeletal: Positive for arthralgias, joint swelling and myalgias  Skin: Negative  Neurological: Negative for weakness and numbness  Hematological: Negative  Physical Exam  Physical Exam  Vitals and nursing note reviewed  Constitutional:       General: He is awake  He is not in acute distress  Appearance: Normal appearance  He is not ill-appearing  Interventions: Nasal cannula in place  HENT:      Head: Normocephalic and atraumatic  Right Ear: Hearing and external ear normal       Left Ear: Hearing and external ear normal    Neck:      Trachea: Trachea and phonation normal    Cardiovascular:      Rate and Rhythm: Normal rate and regular rhythm  Pulses:           Radial pulses are 2+ on the right side and 1+ on the left side  Comments: Ulnar pulse 1+ right and 2+ left  Pulmonary:      Effort: Pulmonary effort is normal  No tachypnea, accessory muscle usage or respiratory distress  Musculoskeletal:      Right hand: Normal       Left hand: Deformity, tenderness and bony tenderness present  Decreased range of motion  Comments: Left hand fifth digit is diffusely swollen and tender at the proximal interphalangeal joint  He is able to flex only approximately 60 degrees at the IP joint from an extended position and has only approx 30 degrees of flexion at the distal interphalangeal joint  The MCP is nontender with full range of motion  Capillary refill is less than 2 seconds in the digit  Skin:     General: Skin is warm and dry  Capillary Refill: Capillary refill takes less than 2 seconds  Neurological:      Mental Status: He is alert and oriented to person, place, and time  GCS: GCS eye subscore is 4  GCS verbal subscore is 5  GCS motor subscore is 6  Sensory: Sensation is intact  Motor: Motor function is intact  Comments: Intact sensation in all digits of left hand to light touch  Strength 5/5 left fifth digit at MCP and PIP/DIP in flexion/extension            Vital Signs  ED Triage Vitals [06/17/22 1429]   Temperature Pulse Respirations Blood Pressure SpO2   (!) 97 °F (36 1 °C) 88 18 153/71 97 %      Temp Source Heart Rate Source Patient Position - Orthostatic VS BP Location FiO2 (%)   Temporal Monitor Sitting Left arm --      Pain Score       --           Vitals:    06/17/22 1429   BP: 153/71   Pulse: 88   Patient Position - Orthostatic VS: Sitting         Visual Acuity      ED Medications  Medications   lidocaine (PF) (XYLOCAINE-MPF) 2 % injection 5 mL (5 mL Infiltration Given by Other 6/17/22 1521)       Diagnostic Studies  Results Reviewed     None                 XR finger fifth digit-pinkie LEFT   ED Interpretation by Rachel Doshi DO (06/17 1540)   Reduction of fifth digit proximal interphalangeal joint dislocation  No fracture identified  Hyperextension at the PIP joint suggesting possible flexor tendon injury  Final Result by Gerry Fitzgerald MD (06/17 6514)      Interval reduction of the dislocated 5th proximal interphalangeal joint            Workstation performed: [id number]         XR finger fifth digit - pinkie LEFT   Final Result by Pantera Rm MD (06/17 0939)      Dorsal dislocation of the 5th PIP joint without overt evidence of fracture  Workstation performed: [id number]                    Procedures  Orthopedic injury treatment    Date/Time: 6/17/2022 3:25 PM  Performed by: Eugene Mora DO  Authorized by: Eugene Mora DO   Universal Protocol:  Consent: Verbal consent obtained  Risks and benefits: risks, benefits and alternatives were discussed  Consent given by: patient  Time out: Immediately prior to procedure a "time out" was called to verify the correct patient, procedure, equipment, support staff and site/side marked as required  Timeout called at: 6/17/2022 3:25 PM   Required items: required blood products, implants, devices, and special equipment available  Patient identity confirmed: verbally with patient and arm band      Injury location: Left hand 5th digit PIP joint    Neurovascular status: Neurovascularly intact    Distal perfusion: normal    Neurological function: normal    Range of motion: reduced    Local anesthesia used?: Yes    General anesthesia used?: No    Anesthesia:  Digital block  Local anesthetic:  Lidocaine 2% without epinephrine  Anesthetic total (ml):  2 5  Immobilization:  Splint (Manual, closed reduction metal finger splint)  Supplies used:  Aluminum splint  Neurovascular status: Neurovascularly intact    Distal perfusion: normal    Neurological function: normal    Range of motion: normal    Patient tolerance:  Patient tolerated the procedure well with no immediate complications             ED Course  ED Course as of 06/17/22 1700   Fri Jun 17, 2022   1452 XR finger fifth digit - pinkie LEFT  Dislocation of 5th digit PIP joint  Possible minimally fracture of distal aspect, proximal phalanx of 5th digit  Soft tissue swelling   1529 XR finger fifth digit - pinkie LEFT    FINDINGS:     There is dorsal dislocation of the 5th PIP joint, without convincing evidence of acute fracture based on these images  Repeat imaging after reduction is suggested      Degenerative arthritis of 2nd and 3rd DIP joint and to a lesser extent 5th DIP joint  There is also degenerative arthritis of the 1st Aia 16 joint and radiocarpal joint      No lytic or blastic osseous lesion      5th digit is very swollen      IMPRESSION:     Dorsal dislocation of the 5th PIP joint without overt evidence of fracture  1540 Successful reduction of dislocation  Will place in extension finger splint and direct follow-up with Orthopedic surgery given concern for possible flexor tendon injury  All questions were answered to patient's satisfaction prior to discharge  Expressed understanding and agreed to plan  SBIRT 20yo+    Flowsheet Row Most Recent Value   SBIRT (23 yo +)    In order to provide better care to our patients, we are screening all of our patients for alcohol and drug use  Would it be okay to ask you these screening questions? Yes Filed at: 06/17/2022 1432   Initial Alcohol Screen: US AUDIT-C     1  How often do you have a drink containing alcohol? 0 Filed at: 06/17/2022 1432   2  How many drinks containing alcohol do you have on a typical day you are drinking? 0 Filed at: 06/17/2022 1432   3a  Male UNDER 65: How often do you have five or more drinks on one occasion? 0 Filed at: 06/17/2022 1432   3b  FEMALE Any Age, or MALE 65+: How often do you have 4 or more drinks on one occassion? 0 Filed at: 06/17/2022 1432   Audit-C Score 0 Filed at: 06/17/2022 1432   PERRY: How many times in the past year have you        Used an illegal drug or used a prescription medication for non-medical reasons? Never Filed at: 06/17/2022 1432                    MDM    Disposition  Final diagnoses:   Dislocation of proximal interphalangeal joint of little finger of left hand, initial encounter     Time reflects when diagnosis was documented in both MDM as applicable and the Disposition within this note     Time User Action Codes Description Comment    6/17/2022  3:21 PM Megan Wall Add [C42 [id number]] Dislocation of proximal interphalangeal joint of finger, initial encounter     6/17/2022  3:21 PM Megan Wall Modify [[id number]] Dislocation of proximal interphalangeal joint of little finger of left hand, initial encounter       ED Disposition     ED Disposition   Discharge    Condition   Stable    Date/Time   Fri Jun 17, 2022  3:21 PM    Comment   Durga Conner discharge to home/self care                 Follow-up Information     Follow up With Specialties Details Why Contact Info Additional [address] [facility]   [address] [facility] [address] [phone] [address] [facility] [address] [place], [place], [address]    [address] [facility] Natasha cespedes Orthopedic Surgery   [phone] [facility] [address]  [address] [facility] Natasha cespedes, [address] [place] [address], [facility] [address]    [address] [facility] [place] [facility]   [place] [id number] [address] [facility] [place], [address] [place] [address], [address], [phone]          Discharge Medication List as of 6/17/2022  3:47 PM      CONTINUE these medications which have NOT CHANGED    Details   apixaban (ELIQUIS) 2 5 mg Take 1 tablet (2 5 mg total) by mouth in the morning and 1 tablet (2 5 mg total) in the evening , Starting Fri 5/13/2022, Normal      atorvastatin (LIPITOR) 40 mg tablet Take 1 tablet (40 mg total) by mouth daily with dinner This is an increased dose to better help prevent heart attack and stroke , Starting Tue 2/22/2022, Normal      cholecalciferol (VITAMIN D3) 1,000 units tablet Take 1 tablet (1,000 Units total) by mouth daily, Starting Tue 2/22/2022, Normal      escitalopram (LEXAPRO) 10 mg tablet Take 10 mg by mouth daily, Historical Med      isosorbide mononitrate (IMDUR) 60 mg 24 hr tablet Take 1 tablet (60 mg total) by mouth daily In the morning, Starting Tue 2/22/2022, No Print      linaGLIPtin (Tradjenta) 5 MG TABS Take 5 mg by mouth daily Take this for your diabetes instead of glimepiride, Starting Tue 2/22/2022, Normal      Metoprolol Tartrate 75 MG TABS TAKE 1 TABLET BY MOUTH EVERY 12 HOURS, Normal      nitroglycerin (NITROSTAT) 0 4 mg SL tablet TAKE 1 TABLET UNDER TONGUE FOR ANGINA, MAY REPEAT IN 15 MIN  NO MORE THAN 3 TIMES, Historical Med      pantoprazole (PROTONIX) 40 mg tablet Take 40 mg by mouth daily, Starting Wed 2/23/2022, Historical Med      sucralfate (CARAFATE) 1 g tablet Take 1 g by mouth 2 (two) times a day, Historical Med      tamsulosin (FLOMAX) 0 4 mg Take 2 capsules (0 8 mg total) by mouth daily with dinner, Starting Fri 10/29/2021, Normal      torsemide (DEMADEX) 20 mg tablet Take 1 tablet (20 mg total) by mouth in the morning , Starting Fri 5/13/2022, Until Mon 6/13/2022, Normal             No discharge procedures on file      PDMP Review     None          ED Provider  Electronically Signed by           Maryellen Greene DO  06/17/22 2486

## 2022-06-17 NOTE — DISCHARGE INSTRUCTIONS
Please wear the finger splint at all times  You should be rechecked by any of the orthopedic doctors listed below after about 1 week  If your finger remains numb after several hours, you should be seen again in the ER

## 2022-06-27 ENCOUNTER — TELEPHONE (OUTPATIENT)
Dept: OBGYN CLINIC | Facility: HOSPITAL | Age: 87
End: 2022-06-27

## 2022-06-27 DIAGNOSIS — I50.33 ACUTE ON CHRONIC DIASTOLIC CONGESTIVE HEART FAILURE (HCC): ICD-10-CM

## 2022-06-30 ENCOUNTER — OFFICE VISIT (OUTPATIENT)
Dept: ENDOCRINOLOGY | Facility: OTHER | Age: 87
End: 2022-06-30
Payer: MEDICARE

## 2022-06-30 ENCOUNTER — TELEPHONE (OUTPATIENT)
Dept: OBGYN CLINIC | Facility: CLINIC | Age: 87
End: 2022-06-30

## 2022-06-30 VITALS — BODY MASS INDEX: 24.05 KG/M2 | WEIGHT: 149 LBS

## 2022-06-30 DIAGNOSIS — E11.65 TYPE 2 DIABETES MELLITUS WITH HYPERGLYCEMIA, WITHOUT LONG-TERM CURRENT USE OF INSULIN (HCC): Primary | ICD-10-CM

## 2022-06-30 PROCEDURE — 97803 MED NUTRITION INDIV SUBSEQ: CPT | Performed by: DIETITIAN, REGISTERED

## 2022-06-30 NOTE — TELEPHONE ENCOUNTER
Spoke with patient to schedule an appointment for Tuesday, July 5th in the [facility] office with Dr Rad Christian  Patient stated he will have to call to find out if he could get a ride to the office for the appointment  Offered the PM as he already has an appointment in the AM, and provided the call back number for him to confirm

## 2022-06-30 NOTE — PATIENT INSTRUCTIONS
Follow low sodium diet guidelines  Walk whenever you can  Keep list of blood sugar levels to bring to Dr Rafita Houston

## 2022-06-30 NOTE — TELEPHONE ENCOUNTER
Patient returning call   Patient states he can not come in 07/05 but states he needs to be seen ASAP

## 2022-06-30 NOTE — PROGRESS NOTES
Medical Nutrition Therapy      Assessment    Chief complaint Purvi Ford reports that he is supposed to weigh himself and check his blood sugar every morning  States he did neither today  Reports that his FBG level was 199 yesterday at 6 am  Today's weight is down 9# from previous recent record  Visit Type: Follow-up visit    HPI: Purvi Ford returned for follow-up today with granddaughter, Osiris Villarreal food record reveals both packaged and homemade food  He continues to MVERSE  He includes some vegetables and fruits but little for whole grains  Some choices are very low sodium while others are excessive  He does rinse canned vegetables multiple times  Overall, [employer] meals provide 30-50 grams carbohydrate  Based on meal plan review and recent BG levels provided education about low sodium diet choices, choosing small portions when at family gatherings, and keeping a record of BG levels between now and f/u with endocrinology next week        Ht Readings from Last 1 Encounters:   06/02/22 5' 6" (1 676 m)     Wt Readings from Last 2 Encounters:   06/30/22 67 6 kg (149 lb)   06/17/22 71 8 kg (158 lb 4 6 oz)     Weight Change: Yes -9#    Medical Diagnosis/reason for visit E11 65    Food Log: Completed via the method of food recall     Breakfast: 6 am, 3 toaster waffles w/ sugar free syrup and a little butter, coffee  w/ splenda and a little milk, sugar free jello  Morning Snack:watermelon or other fruit  Lunch:1 Matzo cracker w/ PB, 1/2 "ice" drink  Afternoon Snack: watermelon  Dinner:homemade stuffed pepper w/ rice, ground beef, seasoned tomato sauce, outshine fruit pop  Evening Snack: 1/2 matzo w/ PB and a little bit of sugar free preserves    Beverages: coffee, water, occasional "ice"  Eating out/Take out:may get 2 slices of pizza or a sandwich, may get some rice pudding    Exercise walks with family and care assistants    Calorie needs 1600 kcals/day Carbs: 45 g/meal, 15-30 g/snack     Nutrition Diagnosis:  Inconsistent carbohydrate intake  intake related to Physiological causes requiring careful timing and consistency in the amount of carbohydrate (i e  diabetes mellitus, hypoglycemia) as evidenced by  Estimated carbohydrate intake that is different from recommended types or ingested on an irregular basis    Intervention: monitoring portion control and reduced sodium intake     Treatment Goals: Patient will consume 3 meals a day, Patient will monitor portion control, Patient will monitor blood glucose and limit sodium intake    Monitoring and evaluation:    Term code indicator  FH 1 6 3 Carbohydrate Intake Criteria: Choose 3 carb servings or 30-45 grams per meal  Term code indicator  FH 1 7 2 Mineral/Element Intake Criteria: Reduced salt intake  Term code indicator  CH 1 1 Personal Data Criteria: Record BG levels for the next week and bring into endo visit    Patients Response to Instruction:  Saúl Lorenzo  Expected Compliancefair    Thank you for coming to the [facility] for education today  Please feel free to call with any questions or concerns      [facility]  [phone] [phone]

## 2022-07-01 RX ORDER — TORSEMIDE 20 MG/1
TABLET ORAL
Qty: 30 TABLET | Refills: 1 | Status: SHIPPED | OUTPATIENT
Start: 2022-07-01 | End: 2022-09-06

## 2022-07-05 ENCOUNTER — OFFICE VISIT (OUTPATIENT)
Dept: ENDOCRINOLOGY | Facility: CLINIC | Age: 87
End: 2022-07-05
Payer: MEDICARE

## 2022-07-05 VITALS
SYSTOLIC BLOOD PRESSURE: 130 MMHG | HEIGHT: 66 IN | DIASTOLIC BLOOD PRESSURE: 60 MMHG | WEIGHT: 159.8 LBS | HEART RATE: 79 BPM | BODY MASS INDEX: 25.68 KG/M2

## 2022-07-05 DIAGNOSIS — E11.65 TYPE 2 DIABETES MELLITUS WITH HYPERGLYCEMIA, WITHOUT LONG-TERM CURRENT USE OF INSULIN (HCC): ICD-10-CM

## 2022-07-05 DIAGNOSIS — N18.32 STAGE 3B CHRONIC KIDNEY DISEASE (HCC): ICD-10-CM

## 2022-07-05 DIAGNOSIS — I50.32 CHRONIC DIASTOLIC CONGESTIVE HEART FAILURE (HCC): ICD-10-CM

## 2022-07-05 DIAGNOSIS — E21.3 HYPERPARATHYROIDISM (HCC): Primary | ICD-10-CM

## 2022-07-05 DIAGNOSIS — E83.52 HYPERCALCEMIA: ICD-10-CM

## 2022-07-05 DIAGNOSIS — I10 ESSENTIAL HYPERTENSION: ICD-10-CM

## 2022-07-05 PROCEDURE — 99213 OFFICE O/P EST LOW 20 MIN: CPT | Performed by: STUDENT IN AN ORGANIZED HEALTH CARE EDUCATION/TRAINING PROGRAM

## 2022-07-05 NOTE — ASSESSMENT & PLAN NOTE
Lab Results   Component Value Date    EGFR 26 06/03/2022    EGFR 29 05/20/2022    EGFR 33 05/13/2022    CREATININE 2 19 (H) 06/03/2022    CREATININE 1 98 (H) 05/20/2022    CREATININE 1 81 (H) 05/13/2022     Slight uptrend in creatinine noted  Encouraged follow up with nephrology  Has appointment scheduled for next month  Encouraged patient to obtain existing labwork, including CMP, prior to his visit

## 2022-07-05 NOTE — ASSESSMENT & PLAN NOTE
Due to hyperparathyroidism  PTH elevated with continued elevated calcium levels  Will check DEXA scan and reassess potential referral for parathyroidectomy in future, although given advanced age, not unreasonable to avoid and continue to monitor calcium levels, comorbidities

## 2022-07-05 NOTE — PROGRESS NOTES
Blair Jeffries 82 y o  male MRN: [id number]    Encounter: [id number]      Assessment/Plan     Assessment: This is a 82y o -year-old male with diabetes with hyperglycemia, hypertension, hyperlipidemia and coronary artery disease s/p CABG  He also has a history of hyperparathyroidism  Here for routine follow up primarily for diabetes  Patient was also personally seen and examined by Dr Santosh Garcia during this shared visit  1  Type 2 diabetes mellitus with hyperglycemia, without long-term current use of insulin ([facility])  Assessment & Plan:    Lab Results   Component Value Date    HGBA1C 8 0 (H) 05/12/2022      A1c has improved from 9 5 (2/22) to 8 0 (5/22)  However, hyperglycemia and possible unrecognized hypoglycemia events remain a concern given variable appetite  He is only checking blood glucose once daily, usually in mornings, which does not give a clear picture of glucose curve throughout the day  He is agreeable to checking blood glucose more frequently, varying times throughout the day between: before breakfast, before lunch, before dinner, and at bedtime  Will report back with these values in a few weeks  He is due for another a1c in 1 month, labs already ordered in Epic  Discussed that depending on glucose log, insulin pen may be a convenient and safe option for him given co-morbidities including CKD  He is more agreeable to consider this today  Main concerns would be general compliance with taking medication regularly as well as his visual impairment, however, he appeared to be able to see the dial on the sample pen here in the office well  Summary of plan:   · RTC in 3 months  · To submit BG diary within the next 2-3 weeks time  · Recheck A1c in 1 month (already ordered)  · To consider initiation of Lantus pen pending BG diary  2  Hypercalcemia  Assessment & Plan:  Due to hyperparathyroidism  PTH elevated with continued elevated calcium levels     Will check DEXA scan and reassess potential referral for parathyroidectomy in future, although given advanced age, not unreasonable to avoid and continue to monitor calcium levels, comorbidities  3  Chronic diastolic congestive heart failure (HCC)  Assessment & Plan:  Wt Readings from Last 3 Encounters:   07/05/22 72 5 kg (159 lb 12 8 oz)   06/30/22 67 6 kg (149 lb)   06/17/22 71 8 kg (158 lb 4 6 oz)     Confirmed he is taking torsemide regularly  No increase in oxygen needs, significant weight change, increase in symptoms including SOB or edema  He is happy with his current regimen  Does admit to dietary indiscretion  Low sodium diet was encouraged at last dietician visit  Will continue to follow with cardiology  4  Stage 3b chronic kidney disease [facility])  Assessment & Plan:  Lab Results   Component Value Date    EGFR 26 06/03/2022    EGFR 29 05/20/2022    EGFR 33 05/13/2022    CREATININE 2 19 (H) 06/03/2022    CREATININE 1 98 (H) 05/20/2022    CREATININE 1 81 (H) 05/13/2022     Follows with nephrology regularly  Due for routine CMP before scheduled visit later this month  Encouraged close follow up with new initiation of torsemide  5  Essential hypertension  Assessment & Plan:  blood pressure 130 / 60 today in the office which is at goal   Takes metoprolol           CC: Diabetes    HPI:  History of Present Illness    Patient presents for diabetes follow up  Recently admitted 5/9/22 - 5/13/22 for acute CHF  Per review of discharge summary, it appears it was recommended he nitiate Lantus 15 units daily on discharge, however, he did not wish to start insulin yet so never started this medication  Current diabetic regimen is only Tradjenta 5mg daily  He reports he checks blood glucose once daily due to insurance coverage concerns  Most of the time he is checking fasting values, first thing in the AM  Blood sugars recently have been elevated, although he admits to dietary indiscretion over the holiday weekend    This morning blood glucose was 381  He notes he felt "rotten" this morning  BG values at are on average in 140s - 150s fasting  He denies any BG values below 100  Occasionally checks BG when he feels symptomatic (reports lightheadedness) and it can at times be in the 200s - 300s  He had a recent dietician appointment for which I was present for  There is some dietary noncompliance as well as variable appetite reported  He particularly mentions he is enjoying a large quantity of fresh fruit lately throughout the day  At his previous visit we discussed possible transition to insulin, however, he would like to use this as a last resort  Although, he did inquire today about the possibility of an insulin pen in the future  Follows with Mayuri Mcknight for eye exams  Last exam was over 1 year ago, needed to be rescheduled due to recent hospitalization  Has podiatrist for regular foot exams  Sees Dr Kendra Lara - last appointment 1 month ago  Review of Systems   Constitutional: Negative for activity change and appetite change  Respiratory: Negative for shortness of breath  Cardiovascular: Negative for chest pain  Gastrointestinal: Positive for abdominal pain  Negative for abdominal distention  Endocrine: Negative for polydipsia and polyuria  Genitourinary: Negative for difficulty urinating  Neurological: Negative for dizziness  Psychiatric/Behavioral: Negative for confusion  Historical Information   Past Medical History:   Diagnosis Date    A-fib [facility])     Arthritis     Athscl 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     Stent OM1   Patent mammary graft to LAD 10/7/2009; CABG 1992 & 2005    Cancer [facility])     skin    CHF [facility]congestive heart failure) ([facility] [address] )     Diabetes mellitus, type II ([facility] [address] )     without complication    Epistaxis     GERD (gastroesophageal reflux disease)     Hx of cardiovascular stress test 07/23/2014    Eddie MPI; EF0 52 (52%) Lexiscan, mild LV systolic dysfunction; evidence for prior inferior wall MI; perfusion imaging consistant w mild lat wall ischemia and min torin-infart inferior ischemia   / EF0 51 (51%) evidence for prior inferior wall MI  Mild inferior and mild-moderate lateral wall ischemia  7/29/15    Hx of echocardiogram 2017    2D w/CFD;EF0 55 (55%) mild LVH, mitral valve prolapse with moderate regurgitation  left atrial enlargement  Mild tricuspid regurgitation   Hypertension     Mixed hyperlipidemia     Occlusion and stenosis of bilateral carotid arteries     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 infarction)     Presence of aortocoronary bypass graft      Past Surgical History:   Procedure Laterality Date    CARDIAC CATHETERIZATION  10/07/2009    Stent 99% stenosis SVG from the aorta to OM1  Patent mammary graft to the LAD    CORONARY ARTERY BYPASS GRAFT      VG-CX, VG-RCA    CORONARY ARTERY BYPASS GRAFT  2005    LIMA-D1-LAD, VG-PDA-PLB, [id number] TMR 9 Lesions    WA EGD TRANSORAL BIOPSY SINGLE/MULTIPLE N/A 2019    Procedure: ESOPHAGOGASTRODUODENOSCOPY (EGD) with biopsy;  Surgeon: Rafael Peres MD;  Location[facility] GI [facility]; Service: Gastroenterology    TONSILLECTOMY       Social History   Social History     Substance and Sexual Activity   Alcohol Use Never     Social History     Substance and Sexual Activity   Drug Use No     Social History     Tobacco Use   Smoking Status Former Smoker    Packs/day: 1 00    Years: 40 00    Pack years: 40 00    Quit date: 1977    Years since quittin 4   Smokeless Tobacco Never Used     Family History:   Family History   Problem Relation Age of Onset    Heart disease Brother     No Known Problems Mother     Tuberculosis Father        Meds/Allergies   Current Outpatient Medications   Medication Sig Dispense Refill    apixaban (ELIQUIS) 2 5 mg Take 1 tablet (2 5 mg total) by mouth in the morning and 1 tablet (2 5 mg total) in the evening   60 tablet 0    atorvastatin (LIPITOR) 40 mg tablet Take 1 tablet (40 mg total) by mouth daily with dinner This is an increased dose to better help prevent heart attack and stroke  30 tablet 0    cholecalciferol (VITAMIN D3) 1,000 units tablet Take 1 tablet (1,000 Units total) by mouth daily 30 tablet 0    escitalopram (LEXAPRO) 10 mg tablet Take 10 mg by mouth daily      isosorbide mononitrate (IMDUR) 60 mg 24 hr tablet Take 1 tablet (60 mg total) by mouth daily In the morning  0    linaGLIPtin (Tradjenta) 5 MG TABS Take 5 mg by mouth daily Take this for your diabetes instead of glimepiride 30 tablet 0    Metoprolol Tartrate 75 MG TABS TAKE 1 TABLET BY MOUTH EVERY 12 HOURS 60 tablet 1    nitroglycerin (NITROSTAT) 0 4 mg SL tablet TAKE 1 TABLET UNDER TONGUE FOR ANGINA, MAY REPEAT IN 15 MIN  NO MORE THAN 3 TIMES      pantoprazole (PROTONIX) 40 mg tablet Take 40 mg by mouth daily      sucralfate (CARAFATE) 1 g tablet Take 1 g by mouth 2 (two) times a day      tamsulosin (FLOMAX) 0 4 mg Take 2 capsules (0 8 mg total) by mouth daily with dinner 180 capsule 3    torsemide (DEMADEX) 20 mg tablet TAKE 1 TABLET BY MOUTH EVERY DAY IN THE MORNING 30 tablet 1     No current facility-administered medications for this visit  Allergies   Allergen Reactions    Ibuprofen Fatigue     Other reaction(s): decreased kidney function       Objective   Vitals: Blood pressure 130/60, pulse 79, height 5' 6" (1 676 m), weight 72 5 kg (159 lb 12 8 oz)  Physical Exam  Vitals and nursing note reviewed  Constitutional:       Appearance: Normal appearance  Interventions: Nasal cannula in place  Comments: Wears glasses   HENT:      Head: Normocephalic  Cardiovascular:      Rate and Rhythm: Normal rate and regular rhythm  Pulses: Pulses are weak  Dorsalis pedis pulses are 1+ on the right side and 2+ on the left side  Posterior tibial pulses are 1+ on the right side and 2+ on the left side  Pulmonary:      Breath sounds: Rales (L base) present     Musculoskeletal: Right lower leg: Edema (trace) present  Left lower leg: Edema (trace) present  Feet:      Right foot:      Skin integrity: No ulcer, warmth or dry skin  Left foot:      Skin integrity: No ulcer, warmth or dry skin  Skin:     General: Skin is warm and dry  Neurological:      General: No focal deficit present  Mental Status: He is alert and oriented to person, place, and time  Psychiatric:         Mood and Affect: Mood normal         Diabetic Foot Exam  Patient's shoes and socks removed  Right Foot/Ankle   Right Foot Inspection  Skin Exam: skin intact  No dry skin, no warmth and no ulcer  Sensory   Vibration: intact  Monofilament testing: intact    Vascular  The right DP pulse is 1+  The right PT pulse is 1+  Left Foot/Ankle  Left Foot Inspection  Skin Exam: skin intact  No dry skin, no warmth and no ulcer  Sensory   Vibration: intact  Monofilament testing: intact    Vascular  The left DP pulse is 2+  The left PT pulse is 2+  Assign Risk Category  No deformity present  No loss of protective sensation  Weak pulses  Risk: 0       The history was obtained from the review of the chart, patient      Lab Results:   Lab Results   Component Value Date/Time    Hemoglobin A1C 8 0 (H) 05/12/2022 04:43 AM    Hemoglobin A1C 9 5 (H) 02/19/2022 04:15 AM    Hemoglobin A1C 10 8 (H) 11/09/2021 08:51 AM    WBC 11 80 (H) 06/03/2022 09:52 AM    WBC 10 39 (H) 05/20/2022 11:06 AM    WBC 8 45 05/13/2022 05:02 AM    Hemoglobin 14 0 06/03/2022 09:52 AM    Hemoglobin 14 1 05/20/2022 11:06 AM    Hemoglobin 13 3 05/13/2022 05:02 AM    Hematocrit 40 2 06/03/2022 09:52 AM    Hematocrit 41 9 05/20/2022 11:06 AM    Hematocrit 40 8 05/13/2022 05:02 AM    MCV 83 06/03/2022 09:52 AM    MCV 84 05/20/2022 11:06 AM    MCV 86 05/13/2022 05:02 AM    Platelets 513 72/87/0740 09:52 AM    Platelets 900 91/86/5772 11:06 AM    Platelets 478 02/11/3433 05:02 AM    BUN 37 (H) 06/03/2022 09:52 AM    BUN 30 (H) 05/20/2022 11:06 AM    BUN 37 (H) 05/13/2022 05:02 AM    Potassium 4 2 06/03/2022 09:52 AM    Potassium 4 7 05/20/2022 11:06 AM    Potassium 3 7 05/13/2022 05:02 AM    Chloride 96 (L) 06/03/2022 09:52 AM    Chloride 96 (L) 05/20/2022 11:06 AM    Chloride 97 (L) 05/13/2022 05:02 AM    CO2 31 06/03/2022 09:52 AM    CO2 34 (H) 05/20/2022 11:06 AM    CO2 32 05/13/2022 05:02 AM    Creatinine 2 19 (H) 06/03/2022 09:52 AM    Creatinine 1 98 (H) 05/20/2022 11:06 AM    Creatinine 1 81 (H) 05/13/2022 05:02 AM    AST 12 05/13/2022 05:02 AM    AST 9 05/12/2022 04:43 AM    AST 11 05/11/2022 05:24 AM    ALT 13 05/13/2022 05:02 AM    ALT 15 05/12/2022 04:43 AM    ALT 15 05/11/2022 05:24 AM    Albumin 2 7 (L) 05/13/2022 05:02 AM    Albumin 2 5 (L) 05/12/2022 04:43 AM    Albumin 2 9 (L) 05/11/2022 05:24 AM    HDL, Direct 40 11/09/2021 08:51 AM    Triglycerides 112 11/09/2021 08:51 AM           Imaging Studies: I have personally reviewed pertinent reports  Portions of the record may have been created with voice recognition software  Occasional wrong word or "sound a like" substitutions may have occurred due to the inherent limitations of voice recognition software  Read the chart carefully and recognize, using context, where substitutions have occurred

## 2022-07-05 NOTE — ASSESSMENT & PLAN NOTE
Lab Results   Component Value Date    EGFR 26 06/03/2022    EGFR 29 05/20/2022    EGFR 33 05/13/2022    CREATININE 2 19 (H) 06/03/2022    CREATININE 1 98 (H) 05/20/2022    CREATININE 1 81 (H) 05/13/2022     Follows with nephrology regularly  Due for routine CMP before scheduled visit later this month  Encouraged close follow up with new initiation of torsemide

## 2022-07-05 NOTE — ASSESSMENT & PLAN NOTE
Lab Results   Component Value Date    HGBA1C 8 0 (H) 05/12/2022      A1c has improved from 9 5 (2/22) to 8 0 (5/22)  However, hyperglycemia and possible unrecognized hypoglycemia events remain a concern given variable appetite  He is only checking blood glucose once daily, usually in mornings, which does not give a clear picture of glucose curve throughout the day  He is agreeable to checking blood glucose more frequently, varying times throughout the day between: before breakfast, before lunch, before dinner, and at bedtime  Will report back with these values in a few weeks  He is due for another a1c in 1 month, labs already ordered in Epic  Discussed that depending on glucose log, insulin pen may be a convenient and safe option for him given co-morbidities including CKD  He is more agreeable to consider this today  Main concerns would be general compliance with taking medication regularly as well as his visual impairment, however, he appeared to be able to see the dial on the sample pen here in the office well  Summary of plan:   · RTC in 3 months  · To submit BG diary within the next 2-3 weeks time  · Recheck A1c in 1 month (already ordered)  · To consider initiation of Lantus pen pending BG diary

## 2022-07-05 NOTE — ASSESSMENT & PLAN NOTE
Wt Readings from Last 3 Encounters:   07/05/22 72 5 kg (159 lb 12 8 oz)   06/30/22 67 6 kg (149 lb)   06/17/22 71 8 kg (158 lb 4 6 oz)     Confirmed he is taking torsemide regularly  No increase in oxygen needs, significant weight change, increase in symptoms including SOB or edema  He is happy with his current regimen  Does admit to dietary indiscretion  Low sodium diet was encouraged at last dietician visit  Will continue to follow with cardiology

## 2022-07-05 NOTE — PATIENT INSTRUCTIONS
Keep BG diary and submit for review within 2-3 weeks if possible  Continue labwork as previously ordered by nephrology  DEXA scan ordered - can call central scheduling to order

## 2022-07-05 NOTE — ASSESSMENT & PLAN NOTE
Lab Results   Component Value Date    HGBA1C 8 0 (H) 05/12/2022    A1c has improved from 9 5(2/22) --> 8 0  (5/22)

## 2022-07-20 ENCOUNTER — OFFICE VISIT (OUTPATIENT)
Dept: PULMONOLOGY | Facility: CLINIC | Age: 87
End: 2022-07-20
Payer: MEDICARE

## 2022-07-20 VITALS
OXYGEN SATURATION: 94 % | TEMPERATURE: 97.8 F | DIASTOLIC BLOOD PRESSURE: 43 MMHG | HEIGHT: 66 IN | WEIGHT: 150.2 LBS | BODY MASS INDEX: 24.14 KG/M2 | HEART RATE: 73 BPM | SYSTOLIC BLOOD PRESSURE: 106 MMHG

## 2022-07-20 DIAGNOSIS — J96.11 CHRONIC RESPIRATORY FAILURE WITH HYPOXIA (HCC): ICD-10-CM

## 2022-07-20 DIAGNOSIS — I27.20 PULMONARY HYPERTENSION (HCC): ICD-10-CM

## 2022-07-20 DIAGNOSIS — R91.1 PULMONARY NODULE: ICD-10-CM

## 2022-07-20 DIAGNOSIS — J84.9 ILD (INTERSTITIAL LUNG DISEASE) (HCC): Primary | ICD-10-CM

## 2022-07-20 PROCEDURE — 99214 OFFICE O/P EST MOD 30 MIN: CPT | Performed by: PHYSICIAN ASSISTANT

## 2022-07-20 NOTE — ASSESSMENT & PLAN NOTE
· Recent hospitalizations as PA pressures of 41 mm Hg    · Likely who group 2/3  · Treat underlying cause  · Continue supplemental oxygen

## 2022-07-20 NOTE — ASSESSMENT & PLAN NOTE
· Continue supplemental oxygen to maintain saturations greater than 88%  · Currently on 4 L nasal cannula with adequate oxygen saturations of 94% in the office today  · Increase activity as tolerated

## 2022-07-20 NOTE — ASSESSMENT & PLAN NOTE
· Patient's previous CT imaging that reveals bibasilar reticulations, bibasilar ground-glass opacities, microcystic changes, bronchiectasis favoring in NSIP he   Evaluation of persistent eosinophilia raise concern for vasculitis however previous ANCA was WNL  · Recommend obtaining pulmonary function now  · Will repeat CT chest in December   · Recommend patient follow up with Dr Tucker Soulier at next visit

## 2022-07-20 NOTE — PROGRESS NOTES
Pulmonary Follow Up Note   Yuri Brand 82 y o  male MRN: [id number]  7/20/2022      Assessment:    ILD (interstitial lung disease) ([address] )  · Patient's previous CT imaging that reveals bibasilar reticulations, bibasilar ground-glass opacities, microcystic changes, bronchiectasis favoring in NSIP he   Evaluation of persistent eosinophilia raise concern for vasculitis however previous ANCA was WNL  · Recommend obtaining pulmonary function now  · Will repeat CT chest in December   · Recommend patient follow up with Dr Doug Pagan at next visit  Chronic respiratory failure with hypoxia (HCC)  · Continue supplemental oxygen to maintain saturations greater than 88%  · Currently on 4 L nasal cannula with adequate oxygen saturations of 94% in the office today  · Increase activity as tolerated    Pulmonary hypertension ([address] )  · Recent hospitalizations as [place] pressures of 41 mm Hg  · Likely who group 2/3  · Treat underlying cause  · Continue supplemental oxygen    Pulmonary nodule  · Pulmonary nodules are stable on recent imaging  · Plan to repeat CT chest in December 2022  Plan:    Diagnoses and all orders for this visit:    ILD (interstitial lung disease) ([address] )  -     Complete PFT with post bronchodilator; Future    Chronic respiratory failure with hypoxia (HCC)    Pulmonary hypertension ([address] )    Pulmonary nodule        Return in about 3 months (around 10/20/2022)  History of Present Illness   HPI:  Yuri Brand is a 82 y o  male who presents the office today for routine follow-up/hospital follow-up  Patient's past medical history positive for coronary artery disease status post CABG x2, hypertension, dyslipidemia, chronic diastolic CHF, diabetes mellitus type 2, chronic hypoxic respiratory failure, ILD  Patient was last in our office in March but has since had hospitalization in May  While there treated for heart failure and abnormal CT chest secondary to ILD with possible developing pneumonia    Patient was treated with diuretics and antibiotics  He has now recovered and feels that he is at his baseline  He uses 4 L of oxygen with activities/as needed  At rest he will occasionally not use his supplemental oxygen  During the office visit today is 94% on 4 L  Patient is chief complaint is dyspnea on exertion when walking more than a half a block  He also complains that he is tired much quicker than previously  He reports a seldom cough without sputum production or hemoptysis  Denies wheezing, chest tightness, chest pain  Did have lower extremity edema but this is now resolved  Denies dizziness, headache, fevers or chills  No inhalers or nebulizers  Review of Systems   All other systems reviewed and are negative  Historical Information   Past Medical History:   Diagnosis Date    A-fib [facility])     Arthritis     Athscl 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     Stent OM1  Patent mammary graft to LAD 10/7/2009; CABG 1992 & 2005    Cancer [facility])     skin    [facility]est[facility]     Diabetes mellitus, type II ([facility] )     without complication    Epistaxis     GERD (gastroesophageal reflux disease)     Hx of cardiovascular stress test 07/23/2014    Eddie MPI; EF0 52 (52%) Lexiscan, mild LV systolic dysfunction; evidence for prior inferior wall MI; perfusion imaging consistant w mild lat wall ischemia and min torin-infart inferior ischemia   / EF0 51 (51%) evidence for prior inferior wall MI  Mild inferior and mild-moderate lateral wall ischemia  7/29/15    Hx of echocardiogram 11/07/2017    2D w/CFD;EF0 55 (55%) mild LVH, mitral valve prolapse with moderate regurgitation  left atrial enlargement  Mild tricuspid regurgitation        Hypertension     Mixed hyperlipidemia     Occlusion and stenosis of bilateral carotid arteries     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 infarction)     Presence of aortocoronary bypass graft      Past Surgical History:   Procedure Laterality Date    CARDIAC CATHETERIZATION  10/07/2009    Stent 99% stenosis SVG from the aorta to OM1  Patent mammary graft to the LAD    CORONARY ARTERY BYPASS GRAFT      VG-CX, VG-RCA    CORONARY ARTERY BYPASS GRAFT  2005    LIMA-D1-LAD, VG-PDA-PLB, [id number] TMR 9 Lesions    [place] EGD TRANSORAL BIOPSY SINGLE/MULTIPLE N/A 2019    Procedure: ESOPHAGOGASTRODUODENOSCOPY (EGD) with biopsy;  Surgeon: Nadir Sun MD;  Location: [address] [place] [address] GI LAB; Service: Gastroenterology    TONSILLECTOMY       Family History   Problem Relation Age of Onset    Heart disease Brother     No Known Problems Mother     Tuberculosis Father        Social History     Tobacco Use   Smoking Status Former Smoker    Packs/day:     Years: 40     Pack years: 40     Quit date: 1977    Years since quittin 5   Smokeless Tobacco Never Used         Meds/Allergies     Current Outpatient Medications:     apixaban (ELIQUIS) 2 5 mg, Take 1 tablet (2 5 mg total) by mouth in the morning and 1 tablet (2 5 mg total) in the evening , Disp: 60 tablet, Rfl: 0    atorvastatin (LIPITOR) 40 mg tablet, Take 1 tablet (40 mg total) by mouth daily with dinner This is an increased dose to better help prevent heart attack and stroke , Disp: 30 tablet, Rfl: 0    cholecalciferol (VITAMIN D3) 1,000 units tablet, Take 1 tablet (1,000 Units total) by mouth daily, Disp: 30 tablet, Rfl: 0    escitalopram (LEXAPRO) 10 mg tablet, Take 10 mg by mouth daily, Disp: , Rfl:     isosorbide mononitrate (IMDUR) 60 mg 24 hr tablet, Take 1 tablet (60 mg total) by mouth daily In the morning, Disp: , Rfl: 0    linaGLIPtin (Tradjenta) 5 MG TABS, Take 5 mg by mouth daily Take this for your diabetes instead of glimepiride, Disp: 30 tablet, Rfl: 0    Metoprolol Tartrate 75 MG TABS, TAKE 1 TABLET BY MOUTH EVERY 12 HOURS, Disp: 60 tablet, Rfl: 1    nitroglycerin (NITROSTAT) 0 4 mg SL tablet, TAKE 1 TABLET UNDER TONGUE FOR ANGINA, MAY REPEAT IN 15 MIN   NO MORE THAN 3 TIMES, Disp: , Rfl:     pantoprazole (PROTONIX) 40 mg tablet, Take 40 mg by mouth daily, Disp: , Rfl:     sucralfate (CARAFATE) 1 g tablet, Take 1 g by mouth 2 (two) times a day, Disp: , Rfl:     tamsulosin (FLOMAX) 0 4 mg, Take 2 capsules (0 8 mg total) by mouth daily with dinner, Disp: 180 capsule, Rfl: 3    torsemide (DEMADEX) 20 mg tablet, TAKE 1 TABLET BY MOUTH EVERY DAY IN THE MORNING, Disp: 30 tablet, Rfl: 1  Allergies   Allergen Reactions    Ibuprofen Fatigue     Other reaction(s): decreased kidney function       Vitals: Blood pressure (!) 106/43, pulse 73, temperature 97 8 °F (36 6 °C), temperature source Tympanic, height 5' 6" (1 676 m), weight 68 1 kg (150 lb 3 2 oz), SpO2 94 %  Body mass index is 24 24 kg/m²  Oxygen Therapy  SpO2: 94 %  Oxygen Therapy: Supplemental oxygen  O2 Delivery Method: Nasal cannula  O2 Flow Rate (L/min): 4 L/min    Physical Exam  Physical Exam  Vitals reviewed  Constitutional:       Appearance: Normal appearance  He is well-developed  HENT:      Head: Normocephalic and atraumatic  Nose: Nose normal       Mouth/Throat:      Mouth: Mucous membranes are moist       Pharynx: Oropharynx is clear  Eyes:      Extraocular Movements: Extraocular movements intact  Cardiovascular:      Rate and Rhythm: Normal rate and regular rhythm  Pulses: Normal pulses  Heart sounds: Normal heart sounds  No murmur heard  Pulmonary:      Effort: Pulmonary effort is normal  No respiratory distress  Breath sounds: No wheezing, rhonchi or rales  Abdominal:      Palpations: Abdomen is soft  Tenderness: There is no abdominal tenderness  Musculoskeletal:         General: No swelling or tenderness  Normal range of motion  Cervical back: Normal range of motion and neck supple  Skin:     General: Skin is warm and dry  Neurological:      General: No focal deficit present  Mental Status: He is alert  Mental status is at baseline     Psychiatric:         Mood and Affect: Mood normal          Behavior: Behavior normal          Labs: I have personally reviewed pertinent lab results  , ABG: No results found for: PHART, FDG1DUQ, PO2ART, QFU6QRI, L7TTZHPW, BEART, SOURCE, BNP: No results found for: BNP, CBC: No results found for: WBC, HGB, HCT, MCV, PLT, ADJUSTEDWBC, MCH, MCHC, RDW, MPV, NRBC, CMP: No results found for: SODIUM, K, CL, CO2, ANIONGAP, BUN, CREATININE, GLUCOSE, CALCIUM, AST, ALT, ALKPHOS, PROT, BILITOT, EGFR, PT/INR: No results found for: PT, INR, Troponin: No results found for: TROPONINI  Lab Results   Component Value Date    WBC 11 80 (H) 06/03/2022    HGB 14 0 06/03/2022    HCT 40 2 06/03/2022    MCV 83 06/03/2022     06/03/2022     Lab Results   Component Value Date    CALCIUM 10 5 (H) 06/03/2022    K 4 2 06/03/2022    CO2 31 06/03/2022    CL 96 (L) 06/03/2022    BUN 37 (H) 06/03/2022    CREATININE 2 19 (H) 06/03/2022     No results found for: IGE  Lab Results   Component Value Date    ALT 13 05/13/2022    AST 12 05/13/2022    ALKPHOS 73 05/13/2022       Imaging and other studies: I have personally reviewed pertinent reports  and I have personally reviewed pertinent films in PACS     CT chest without contrast 06/08/2022  Redemonstration of subpleural reticulations, mild micro cystic changes and lower lobe predominant subpleural ground-glass opacities compatible with ILD  Several pulmonary nodules noted and are stable  Severe coronary artery calcifications  Status post CABG noted  Enlargement of the central pulmonary arterial tree suggesting element of pulmonary artery hypertension  Prominent mediastinal lymph nodes  Pulmonary function testing:  None to review     Other Studies: I have personally reviewed pertinent reports  Echocardiogram 02/21/2022  EF 65%  Grade 1 diastolic dysfunction  Estimated right ventricular systolic pressure 41 mm Hg

## 2022-08-08 ENCOUNTER — HOSPITAL ENCOUNTER (OUTPATIENT)
Dept: BONE DENSITY | Facility: HOSPITAL | Age: 87
Discharge: HOME | End: 2022-08-08
Payer: MEDICARE

## 2022-08-08 DIAGNOSIS — E83.52 HYPERCALCEMIA: ICD-10-CM

## 2022-08-08 DIAGNOSIS — E21.3 HYPERPARATHYROIDISM (HCC): ICD-10-CM

## 2022-08-08 PROCEDURE — 77080 DXA BONE DENSITY AXIAL: CPT

## 2022-08-25 ENCOUNTER — OFFICE VISIT (OUTPATIENT)
Dept: OBGYN CLINIC | Facility: CLINIC | Age: 87
End: 2022-08-25
Payer: MEDICARE

## 2022-08-25 ENCOUNTER — APPOINTMENT (OUTPATIENT)
Dept: RADIOLOGY | Facility: MEDICAL CENTER | Age: 87
End: 2022-08-25
Payer: MEDICARE

## 2022-08-25 VITALS
DIASTOLIC BLOOD PRESSURE: 56 MMHG | HEIGHT: 66 IN | WEIGHT: 147 LBS | BODY MASS INDEX: 23.63 KG/M2 | SYSTOLIC BLOOD PRESSURE: 121 MMHG | HEART RATE: 86 BPM

## 2022-08-25 DIAGNOSIS — S63.257A DISLOCATION OF LEFT LITTLE FINGER, INITIAL ENCOUNTER: Primary | ICD-10-CM

## 2022-08-25 DIAGNOSIS — S63.257A DISLOCATION OF LEFT LITTLE FINGER, INITIAL ENCOUNTER: ICD-10-CM

## 2022-08-25 PROCEDURE — 73140 X-RAY EXAM OF FINGER(S): CPT

## 2022-08-25 PROCEDURE — 99213 OFFICE O/P EST LOW 20 MIN: CPT | Performed by: ORTHOPAEDIC SURGERY

## 2022-08-25 NOTE — PROGRESS NOTES
ASSESSMENT/PLAN:    Diagnoses and all orders for this visit:    Dislocation of left little finger, initial encounter  -     XR finger left fifth digit-pinkie; Future        X-rays of the patient's left 5th finger show the finger to be reduced  There are no fractures or dislocations  The patient is continuing to do well since his injury  He is now cleared from orthopedic perspective  He can follow with our office as needed  The patient is acceptable to this plan  Return if symptoms worsen or fail to improve  The patient has full strength full motion along his left small finger  X-ray showed anatomic reduction of the dislocation  Continue home exercise program   Continue stretching  Follow up on an as-needed basis  If his condition changes, he will not hesitate to let us know      _____________________________________________________  CHIEF COMPLAINT:  Chief Complaint   Patient presents with    Left Little Finger - Dislocation         SUBJECTIVE:  Lilly Alicia is a 82 y o  male who presents to our office for follow-up visit  The patient is status post left 5th finger dislocation from approximately 06/17/2022  A successful reduction was performed in the emergency department  Today, he denies any significant pain  He denies any numbness or tingling  He denies any fever or chills  The following portions of the patient's history were reviewed and updated as appropriate: allergies, current medications, past family history, past medical history, past social history, past surgical history and problem list     PAST MEDICAL HISTORY:  Past Medical History:   Diagnosis Date    [facility]fib ([facility] )     Arthritis     Athscl 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     Stent OM1   Patent mammary graft to LAD 10/7/2009; CABG 1992 & 2005    Cancer [facility])     skin    [facility]congestive [facility]) [facility] )     Diabetes mellitus, type II ([facility] )     without complication    Epistaxis     GERD (gastroesophageal reflux disease)     Hx of cardiovascular stress test 2014    Eddie MPI; EF0 52 (52%) Lexiscan, mild LV systolic dysfunction; evidence for prior inferior wall MI; perfusion imaging consistant w mild lat wall ischemia and min torin-infart inferior ischemia   / EF0 51 (51%) evidence for prior inferior wall MI  Mild inferior and mild-moderate lateral wall ischemia  7/29/15    Hx of echocardiogram 2017    2D w/CFD;EF0 55 (55%) mild LVH, mitral valve prolapse with moderate regurgitation  left atrial enlargement  Mild tricuspid regurgitation   Hypertension     Mixed hyperlipidemia     Occlusion and stenosis of bilateral carotid arteries     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 infarction)     Presence of aortocoronary bypass graft        PAST SURGICAL HISTORY:  Past Surgical History:   Procedure Laterality Date    CARDIAC CATHETERIZATION  10/07/2009    Stent 99% stenosis SVG from the aorta to OM1  Patent mammary graft to the LAD    CORONARY ARTERY BYPASS GRAFT      VG-CX, VG-RCA    CORONARY ARTERY BYPASS GRAFT  2005    LIMA-D1-LAD, VG-PDA-PLB, [id number] TMR 9 Lesions    [place] EGD TRANSORAL BIOPSY SINGLE/MULTIPLE N/A 2019    Procedure: ESOPHAGOGASTRODUODENOSCOPY (EGD) with biopsy;  Surgeon: Tarry Galeazzi, MD;  Location: [facility] GI LAB;   Service: Gastroenterology    TONSILLECTOMY         FAMILY HISTORY:  Family History   Problem Relation Age of Onset    Heart disease Brother     No Known Problems Mother     Tuberculosis Father        SOCIAL HISTORY:  Social History     Tobacco Use    Smoking status: Former Smoker     Packs/day: 1 00     Years: 40 00     Pack years: 40 00     Quit date: 1977     Years since quittin 6    Smokeless tobacco: Never Used   Vaping Use    Vaping Use: Never used   Substance Use Topics    Alcohol use: Never    Drug use: No       MEDICATIONS:    Current Outpatient Medications:     apixaban (ELIQUIS) 2 5 mg, Take 1 tablet (2 5 mg total) by mouth in the morning and 1 tablet (2 5 mg total) in the evening , Disp: 60 tablet, Rfl: 0    atorvastatin (LIPITOR) 40 mg tablet, Take 1 tablet (40 mg total) by mouth daily with dinner This is an increased dose to better help prevent heart attack and stroke , Disp: 30 tablet, Rfl: 0    cholecalciferol (VITAMIN D3) 1,000 units tablet, Take 1 tablet (1,000 Units total) by mouth daily, Disp: 30 tablet, Rfl: 0    escitalopram (LEXAPRO) 10 mg tablet, Take 10 mg by mouth daily, Disp: , Rfl:     isosorbide mononitrate (IMDUR) 60 mg 24 hr tablet, Take 1 tablet (60 mg total) by mouth daily In the morning, Disp: , Rfl: 0    linaGLIPtin (Tradjenta) 5 MG TABS, Take 5 mg by mouth daily Take this for your diabetes instead of glimepiride, Disp: 30 tablet, Rfl: 0    Metoprolol Tartrate 75 MG TABS, TAKE 1 TABLET BY MOUTH EVERY 12 HOURS, Disp: 60 tablet, Rfl: 1    nitroglycerin (NITROSTAT) 0 4 mg SL tablet, TAKE 1 TABLET UNDER TONGUE FOR ANGINA, MAY REPEAT IN 15 MIN  NO MORE THAN 3 TIMES, Disp: , Rfl:     pantoprazole (PROTONIX) 40 mg tablet, Take 40 mg by mouth daily, Disp: , Rfl:     sucralfate (CARAFATE) 1 g tablet, Take 1 g by mouth 2 (two) times a day, Disp: , Rfl:     tamsulosin (FLOMAX) 0 4 mg, Take 2 capsules (0 8 mg total) by mouth daily with dinner, Disp: 180 capsule, Rfl: 3    torsemide (DEMADEX) 20 mg tablet, TAKE 1 TABLET BY MOUTH EVERY DAY IN THE MORNING, Disp: 30 tablet, Rfl: 1    ALLERGIES:  Allergies   Allergen Reactions    Ibuprofen Fatigue     Other reaction(s): decreased kidney function       ROS:  Review of Systems     Constitutional: Negative for fatigue, fever or loss of appetite  HENT: Negative  Respiratory: Negative for shortness of breath, dyspnea  Cardiovascular: Negative for chest pain/tightness  Gastrointestinal: Negative for abdominal pain, N/V  Endocrine: Negative for cold/heat intolerance, unexplained weight loss/gain  Genitourinary: Negative for flank pain, dysuria, hematuria  Musculoskeletal:  Negative for arthralgia   Skin: Negative for rash  Neurological: Negative for numbness or tingling  Psychiatric/Behavioral: Negative for agitation  _____________________________________________________  PHYSICAL EXAMINATION:    Blood pressure 121/56, pulse 86, height 5' 6" (1 676 m), weight 66 7 kg (147 lb)  Constitutional: Oriented to person, place, and time  Appears well-developed and well-nourished  No distress  HENT:   Head: Normocephalic  Eyes: Conjunctivae are normal  Right eye exhibits no discharge  Left eye exhibits no discharge  No scleral icterus  Cardiovascular: Normal rate  Pulmonary/Chest: Effort normal    Neurological: Alert and oriented to person, place, and time  Skin: Skin is warm and dry  No rash noted  Not diaphoretic  No erythema  No pallor  Psychiatric: Normal mood and affect  Behavior is normal  Judgment and thought content normal       MUSCULOSKELETAL EXAMINATION:   Physical Exam  Ortho Exam    Left upper extremity is neurovascular intact  Fingers are pink mobile  Compartments are soft  No tenderness to palpation  No rotational deformity  Fingers moving as 1 unit  Brisk cap refill  Sensation intact  No tendon or ligament dysfunction  Objective:  BP Readings from Last 1 Encounters:   08/25/22 121/56      Wt Readings from Last 1 Encounters:   08/25/22 66 7 kg (147 lb)        BMI:   Estimated body mass index is 23 73 kg/m² as calculated from the following:    Height as of this encounter: 5' 6" (1 676 m)  Weight as of this encounter: 66 7 kg (147 lb)          Scribe Attestation    I,:  Corinna Holly PA-C am acting as a scribe while in the presence of the attending physician :       I,:  Rocio Campa DO personally performed the services described in this documentation    as scribed in my presence :

## 2022-08-29 ENCOUNTER — TELEPHONE (OUTPATIENT)
Dept: NEPHROLOGY | Facility: CLINIC | Age: 87
End: 2022-08-29

## 2022-08-31 ENCOUNTER — HOSPITAL ENCOUNTER (EMERGENCY)
Facility: HOSPITAL | Age: 87
Discharge: HOME | End: 2022-08-31
Attending: EMERGENCY MEDICINE
Payer: MEDICARE

## 2022-08-31 ENCOUNTER — APPOINTMENT (OUTPATIENT)
Dept: RADIOLOGY | Facility: HOSPITAL | Age: 87
End: 2022-08-31
Payer: MEDICARE

## 2022-08-31 ENCOUNTER — APPOINTMENT (EMERGENCY)
Dept: CT IMAGING | Facility: HOSPITAL | Age: 87
End: 2022-08-31
Payer: MEDICARE

## 2022-08-31 VITALS
WEIGHT: 142 LBS | OXYGEN SATURATION: 100 % | HEIGHT: 66 IN | BODY MASS INDEX: 22.82 KG/M2 | TEMPERATURE: 97.7 F | SYSTOLIC BLOOD PRESSURE: 158 MMHG | DIASTOLIC BLOOD PRESSURE: 67 MMHG | RESPIRATION RATE: 20 BRPM | HEART RATE: 78 BPM

## 2022-08-31 DIAGNOSIS — N18.32 STAGE 3B CHRONIC KIDNEY DISEASE (HCC): ICD-10-CM

## 2022-08-31 DIAGNOSIS — R79.89 PSEUDOHYPONATREMIA: ICD-10-CM

## 2022-08-31 DIAGNOSIS — R73.9 HYPERGLYCEMIA: ICD-10-CM

## 2022-08-31 DIAGNOSIS — J84.10 PULMONARY FIBROSIS DETERMINED BY HIGH RESOLUTION COMPUTED TOMOGRAPHY (HCC): ICD-10-CM

## 2022-08-31 DIAGNOSIS — R10.9 ABDOMINAL PAIN, UNSPECIFIED ABDOMINAL LOCATION: Primary | ICD-10-CM

## 2022-08-31 LAB
2HR DELTA HS TROPONIN: -1 NG/L
ANION GAP SERPL CALCULATED.3IONS-SCNC: 9 MMOL/L (ref 4–13)
ATRIAL RATE: 82 BPM
ATRIAL RATE: 87 BPM
BASOPHILS # BLD AUTO: 0.07 THOUSANDS/ΜL (ref 0–0.1)
BASOPHILS NFR BLD AUTO: 1 % (ref 0–1)
BUN SERPL-MCNC: 34 MG/DL (ref 5–25)
CALCIUM SERPL-MCNC: 11.2 MG/DL (ref 8.4–10.2)
CARDIAC TROPONIN I PNL SERPL HS: 14 NG/L
CARDIAC TROPONIN I PNL SERPL HS: 15 NG/L
CHLORIDE SERPL-SCNC: 85 MMOL/L (ref 96–108)
CO2 SERPL-SCNC: 33 MMOL/L (ref 21–32)
CREAT SERPL-MCNC: 2.16 MG/DL (ref 0.6–1.3)
EOSINOPHIL # BLD AUTO: 0.35 THOUSAND/ΜL (ref 0–0.61)
EOSINOPHIL NFR BLD AUTO: 4 % (ref 0–6)
ERYTHROCYTE [DISTWIDTH] IN BLOOD BY AUTOMATED COUNT: 11.9 % (ref 11.6–15.1)
GFR SERPL CREATININE-BSD FRML MDRD: 26 ML/MIN/1.73SQ M
GLUCOSE SERPL-MCNC: 493 MG/DL (ref 65–140)
HCT VFR BLD AUTO: 42.7 % (ref 36.5–49.3)
HGB BLD-MCNC: 14.5 G/DL (ref 12–17)
IMM GRANULOCYTES # BLD AUTO: 0.03 THOUSAND/UL (ref 0–0.2)
IMM GRANULOCYTES NFR BLD AUTO: 0 % (ref 0–2)
LIPASE SERPL-CCNC: 21 U/L (ref 11–82)
LYMPHOCYTES # BLD AUTO: 1.79 THOUSANDS/ΜL (ref 0.6–4.47)
LYMPHOCYTES NFR BLD AUTO: 21 % (ref 14–44)
MCH RBC QN AUTO: 29.9 PG (ref 26.8–34.3)
MCHC RBC AUTO-ENTMCNC: 34 G/DL (ref 31.4–37.4)
MCV RBC AUTO: 88 FL (ref 82–98)
MONOCYTES # BLD AUTO: 0.76 THOUSAND/ΜL (ref 0.17–1.22)
MONOCYTES NFR BLD AUTO: 9 % (ref 4–12)
NEUTROPHILS # BLD AUTO: 5.67 THOUSANDS/ΜL (ref 1.85–7.62)
NEUTS SEG NFR BLD AUTO: 65 % (ref 43–75)
NRBC BLD AUTO-RTO: 0 /100 WBCS
P AXIS: 12 DEGREES
P AXIS: 15 DEGREES
PLATELET # BLD AUTO: 198 THOUSANDS/UL (ref 149–390)
PMV BLD AUTO: 9.8 FL (ref 8.9–12.7)
POTASSIUM SERPL-SCNC: 4.3 MMOL/L (ref 3.5–5.3)
PR INTERVAL: 184 MS
PR INTERVAL: 186 MS
QRS AXIS: 64 DEGREES
QRS AXIS: 70 DEGREES
QRSD INTERVAL: 88 MS
QRSD INTERVAL: 94 MS
QT INTERVAL: 366 MS
QT INTERVAL: 368 MS
QTC INTERVAL: 429 MS
QTC INTERVAL: 440 MS
RBC # BLD AUTO: 4.85 MILLION/UL (ref 3.88–5.62)
SODIUM SERPL-SCNC: 127 MMOL/L (ref 135–147)
T WAVE AXIS: 119 DEGREES
T WAVE AXIS: 135 DEGREES
TSH SERPL DL<=0.05 MIU/L-ACNC: 1.85 UIU/ML (ref 0.45–4.5)
VENTRICULAR RATE: 82 BPM
VENTRICULAR RATE: 87 BPM
WBC # BLD AUTO: 8.67 THOUSAND/UL (ref 4.31–10.16)

## 2022-08-31 PROCEDURE — 84484 ASSAY OF TROPONIN QUANT: CPT | Performed by: EMERGENCY MEDICINE

## 2022-08-31 PROCEDURE — 93010 ELECTROCARDIOGRAM REPORT: CPT | Performed by: INTERNAL MEDICINE

## 2022-08-31 PROCEDURE — 80048 BASIC METABOLIC PNL TOTAL CA: CPT | Performed by: EMERGENCY MEDICINE

## 2022-08-31 PROCEDURE — 84443 ASSAY THYROID STIM HORMONE: CPT | Performed by: EMERGENCY MEDICINE

## 2022-08-31 PROCEDURE — 83735 ASSAY OF MAGNESIUM: CPT | Performed by: INTERNAL MEDICINE

## 2022-08-31 PROCEDURE — 74176 CT ABD & PELVIS W/O CONTRAST: CPT

## 2022-08-31 PROCEDURE — 36415 COLL VENOUS BLD VENIPUNCTURE: CPT | Performed by: INTERNAL MEDICINE

## 2022-08-31 PROCEDURE — 93005 ELECTROCARDIOGRAM TRACING: CPT

## 2022-08-31 PROCEDURE — 96360 HYDRATION IV INFUSION INIT: CPT

## 2022-08-31 PROCEDURE — 83690 ASSAY OF LIPASE: CPT | Performed by: EMERGENCY MEDICINE

## 2022-08-31 PROCEDURE — 96372 THER/PROPH/DIAG INJ SC/IM: CPT

## 2022-08-31 PROCEDURE — 84100 ASSAY OF PHOSPHORUS: CPT | Performed by: INTERNAL MEDICINE

## 2022-08-31 PROCEDURE — 99284 EMERGENCY DEPT VISIT MOD MDM: CPT | Performed by: EMERGENCY MEDICINE

## 2022-08-31 PROCEDURE — 85025 COMPLETE CBC W/AUTO DIFF WBC: CPT | Performed by: EMERGENCY MEDICINE

## 2022-08-31 PROCEDURE — 71045 X-RAY EXAM CHEST 1 VIEW: CPT

## 2022-08-31 PROCEDURE — 80053 COMPREHEN METABOLIC PANEL: CPT | Performed by: INTERNAL MEDICINE

## 2022-08-31 PROCEDURE — 99285 EMERGENCY DEPT VISIT HI MDM: CPT

## 2022-08-31 RX ORDER — SODIUM CHLORIDE 9 MG/ML
3 INJECTION INTRAVENOUS
Status: DISCONTINUED | OUTPATIENT
Start: 2022-08-31 | End: 2022-08-31 | Stop reason: HOSPADM

## 2022-08-31 RX ORDER — PANTOPRAZOLE SODIUM 40 MG/1
40 TABLET, DELAYED RELEASE ORAL DAILY
Qty: 10 TABLET | Refills: 0 | Status: SHIPPED | OUTPATIENT
Start: 2022-08-31

## 2022-08-31 RX ORDER — PANTOPRAZOLE SODIUM 40 MG/1
40 TABLET, DELAYED RELEASE ORAL ONCE
Status: COMPLETED | OUTPATIENT
Start: 2022-08-31 | End: 2022-08-31

## 2022-08-31 RX ADMIN — PANTOPRAZOLE SODIUM 40 MG: 40 TABLET, DELAYED RELEASE ORAL at 19:36

## 2022-08-31 RX ADMIN — INSULIN HUMAN 5 UNITS: 100 INJECTION, SOLUTION PARENTERAL at 19:34

## 2022-08-31 RX ADMIN — SODIUM CHLORIDE 500 ML: 0.9 INJECTION, SOLUTION INTRAVENOUS at 18:35

## 2022-08-31 NOTE — ED PROVIDER NOTES
History  Chief Complaint   Patient presents with    Abdominal Pain     Abdominal Pain in the middle of the chest  Describes the pain as a sharp pressure that started about 9 days ago       This is an 79-year-old man who complains of epigastric pain  States it is sharp and has been present for approximately past week  Unclear what makes it worse other than palpation  Nothing seems to make it feel better  He rates the pain is 0 currently but at its worse it is moderate  Timing has had normal urination bowel movements recently  She has extensive cardiac history  EKG was performed at his PCPs office earlier today they recommended that he come for further evaluation  He has not had any syncope, palpitations or overt chest pain  Patient states that he no longer wants any surgeries or catheterizations  Prior to Admission Medications   Prescriptions Last Dose Informant Patient Reported? Taking? Metoprolol Tartrate 75 MG TABS 8/31/2022 at Unknown time  No Yes   Sig: TAKE 1 TABLET BY MOUTH EVERY 12 HOURS   apixaban (ELIQUIS) 2 5 mg 8/31/2022 at Unknown time  No Yes   Sig: Take 1 tablet (2 5 mg total) by mouth in the morning and 1 tablet (2 5 mg total) in the evening  atorvastatin (LIPITOR) 40 mg tablet 8/31/2022 at Unknown time Self No Yes   Sig: Take 1 tablet (40 mg total) by mouth daily with dinner This is an increased dose to better help prevent heart attack and stroke     cholecalciferol (VITAMIN D3) 1,000 units tablet 8/31/2022 at Unknown time Self No Yes   Sig: Take 1 tablet (1,000 Units total) by mouth daily   escitalopram (LEXAPRO) 10 mg tablet 8/31/2022 at Unknown time Self Yes Yes   Sig: Take 10 mg by mouth daily   isosorbide mononitrate (IMDUR) 60 mg 24 hr tablet 8/31/2022 at Unknown time Self No Yes   Sig: Take 1 tablet (60 mg total) by mouth daily In the morning   linaGLIPtin (Tradjenta) 5 MG TABS 8/31/2022 at Unknown time Self No Yes   Sig: Take 5 mg by mouth daily Take this for your diabetes instead of glimepiride   nitroglycerin (NITROSTAT) 0 4 mg SL tablet More than a month at Unknown time  Yes No   Sig: TAKE 1 TABLET UNDER TONGUE FOR ANGINA, MAY REPEAT IN 15 MIN  NO MORE THAN 3 TIMES   pantoprazole (PROTONIX) 40 mg tablet 8/31/2022 at Unknown time  Yes Yes   Sig: Take 40 mg by mouth daily   sucralfate (CARAFATE) 1 g tablet 8/31/2022 at Unknown time Self Yes Yes   Sig: Take 1 g by mouth 2 (two) times a day   tamsulosin (FLOMAX) 0 4 mg 8/30/2022 at Unknown time Self No Yes   Sig: Take 2 capsules (0 8 mg total) by mouth daily with dinner   torsemide (DEMADEX) 20 mg tablet 8/31/2022 at Unknown time  No Yes   Sig: TAKE 1 TABLET BY MOUTH EVERY DAY IN THE MORNING      Facility-Administered Medications: None       Past Medical History:   Diagnosis Date    A-fib ([facility] )     Arthritis     Athscl 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     Stent OM1  Patent mammary graft to LAD 10/7/2009; CABG 1992 & 2005    Cancer [facility])     skin    CHF (congestive heart failure) ([facility] )     Diabetes mellitus, type II ([facility] )     without complication    Epistaxis     GERD (gastroesophageal reflux disease)     Hx of cardiovascular stress test 07/23/2014    Eddie MPI; EF0 52 (52%) Lexiscan, mild LV systolic dysfunction; evidence for prior inferior wall MI; perfusion imaging consistant w mild lat wall ischemia and min torin-infart inferior ischemia   / EF0 51 (51%) evidence for prior inferior wall MI  Mild inferior and mild-moderate lateral wall ischemia  7/29/15    Hx of echocardiogram 11/07/2017    2D w/CFD;EF0 55 (55%) mild LVH, mitral valve prolapse with moderate regurgitation  left atrial enlargement  Mild tricuspid regurgitation        Hypertension     Mixed hyperlipidemia     Occlusion and stenosis of bilateral carotid arteries     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 infarction)     Presence of aortocoronary bypass graft        Past Surgical History:   Procedure Laterality Date    CARDIAC CATHETERIZATION 10/07/2009    Stent 99% stenosis SVG from the aorta to OM1  Patent mammary graft to the LAD    CORONARY ARTERY BYPASS GRAFT      VG-CX, VG-RCA    CORONARY ARTERY BYPASS GRAFT  2005    LIMA-D1-LAD, VG-PDA-PLB, Imqydc-BO5-QD0 TMR 9 Lesions    MA EGD TRANSORAL BIOPSY SINGLE/MULTIPLE N/A 2019    Procedure: ESOPHAGOGASTRODUODENOSCOPY (EGD) with biopsy;  Surgeon: Margarita Healy MD;  Location: [facility] GI LAB; Service: Gastroenterology    TONSILLECTOMY         Family History   Problem Relation Age of Onset    Heart disease Brother     No Known Problems Mother     Tuberculosis Father      I have reviewed and agree with the history as documented  E-Cigarette/Vaping    E-Cigarette Use Never User      E-Cigarette/Vaping Substances    Nicotine No     THC No     CBD No     Flavoring No     Other No     Unknown No      Social History     Tobacco Use    Smoking status: Former Smoker     Packs/day: 1 00     Years: 40 00     Pack years: 40 00     Quit date: 1977     Years since quittin 6    Smokeless tobacco: Never Used   Vaping Use    Vaping Use: Never used   Substance Use Topics    Alcohol use: Never    Drug use: No       Review of Systems   Constitutional: Negative for chills and fever  Eyes: Negative for visual disturbance  Respiratory: Positive for cough ( chronic and unchanged) and shortness of breath (Chronic and unchanged)  Cardiovascular: Negative for chest pain, palpitations and leg swelling  Gastrointestinal: Positive for abdominal pain and nausea  Negative for abdominal distention, diarrhea and vomiting  Endocrine: Negative for polyuria  Genitourinary: Negative for decreased urine volume and dysuria  Neurological: Negative for dizziness, syncope and weakness  Physical Exam  Physical Exam  Constitutional:       General: He is not in acute distress  Appearance: He is well-developed  He is not toxic-appearing or diaphoretic     HENT:      Head: Normocephalic and atraumatic  Eyes:      Conjunctiva/sclera: Conjunctivae normal       Pupils: Pupils are equal, round, and reactive to light  Cardiovascular:      Rate and Rhythm: Normal rate and regular rhythm  Heart sounds: Normal heart sounds  Pulmonary:      Effort: Pulmonary effort is normal  No respiratory distress  Breath sounds: Normal breath sounds  Abdominal:      General: Bowel sounds are normal       Palpations: Abdomen is soft  Tenderness: There is abdominal tenderness in the epigastric area  Musculoskeletal:         General: Normal range of motion  Cervical back: Normal range of motion and neck supple  Skin:     General: Skin is warm and dry  Neurological:      General: No focal deficit present  Mental Status: He is alert and oriented to person, place, and time  Psychiatric:         Mood and Affect: Mood normal  Mood is not anxious or depressed  Behavior: Behavior normal          Thought Content:  Thought content normal          Judgment: Judgment normal          Vital Signs  ED Triage Vitals [08/31/22 1610]   Temperature Pulse Respirations Blood Pressure SpO2   97 7 °F (36 5 °C) 88 18 145/70 98 %      Temp Source Heart Rate Source Patient Position - Orthostatic VS BP Location FiO2 (%)   Oral Monitor Sitting Left arm --      Pain Score       --           Vitals:    08/31/22 1610 08/31/22 1840   BP: 145/70 158/67   Pulse: 88 78   Patient Position - Orthostatic VS: Sitting          Visual Acuity      ED Medications  Medications   sodium chloride 0 9 % bolus 500 mL (0 mL Intravenous Stopped 8/31/22 1952)   insulin regular (HumuLIN R,NovoLIN R) injection 5 Units (5 Units Subcutaneous Given 8/31/22 1934)   pantoprazole (PROTONIX) EC tablet 40 mg (40 mg Oral Given 8/31/22 1936)       Diagnostic Studies  Results Reviewed     Procedure Component Value Units Date/Time    Magnesium [[id number]]  (Normal) Collected: 08/31/22 1633    Lab Status: Final result Specimen: Blood Updated: 09/01/22 1118     Magnesium 1 9 mg/dL     Phosphorus [[id number]]  (Normal) Collected: 08/31/22 1633    Lab Status: Final result Specimen: Blood Updated: 09/01/22 1118     Phosphorus 3 3 mg/dL     Comprehensive metabolic panel [[id number]]  (Abnormal) Collected: 08/31/22 1633    Lab Status: Final result Specimen: Blood Updated: 09/01/22 1117     Sodium 130 mmol/L      Potassium 4 5 mmol/L      Chloride 86 mmol/L      CO2 30 mmol/L      ANION GAP 14 mmol/L      BUN 33 mg/dL      Creatinine 2 11 mg/dL      Glucose 486 mg/dL      Calcium 11 2 mg/dL      AST 26 U/L      ALT 31 U/L      Alkaline Phosphatase 74 U/L      Total Protein 7 4 g/dL      Albumin 4 2 g/dL      Total Bilirubin 1 27 mg/dL      eGFR 27 ml/min/1 73sq m     Narrative:      National Kidney Disease Foundation guidelines for Chronic Kidney Disease (CKD):     Stage 1 with normal or high GFR (GFR > 90 mL/min/1 73 square meters)    Stage 2 Mild CKD (GFR = 60-89 mL/min/1 73 square meters)    Stage 3A Moderate CKD (GFR = 45-59 mL/min/1 73 square meters)    Stage 3B Moderate CKD (GFR = 30-44 mL/min/1 73 square meters)    Stage 4 Severe CKD (GFR = 15-29 mL/min/1 73 square meters)    Stage 5 End Stage CKD (GFR <15 mL/min/1 73 square meters)  Note: GFR calculation is accurate only with a steady state creatinine    HS Troponin I 2hr [[id number]]  (Normal) Collected: 08/31/22 1832    Lab Status: Final result Specimen: Blood from Arm, Right Updated: 08/31/22 1904     hs TnI 2hr 14 ng/L      Delta 2hr hsTnI -1 ng/L     TSH, 3rd generation [[id number]]  (Normal) Collected: 08/31/22 1633    Lab Status: Final result Specimen: Blood from Arm, Right Updated: 08/31/22 1712     TSH 3RD GENERATON 1 849 uIU/mL     Narrative:      Patients undergoing fluorescein dye angiography may retain small amounts of fluorescein in the body for 48-72 hours post procedure  Samples containing fluorescein can produce falsely depressed TSH values   If the patient had this procedure,a specimen should be resubmitted post fluorescein clearance        HS Troponin 0hr (reflex protocol) [[id number]]  (Normal) Collected: 08/31/22 1633    Lab Status: Final result Specimen: Blood from Arm, Right Updated: 08/31/22 1704     hs TnI 0hr 15 ng/L     Lipase [[id number]]  (Normal) Collected: 08/31/22 1633    Lab Status: Final result Specimen: Blood from Arm, Right Updated: 08/31/22 1656     Lipase 21 u/L     Basic metabolic panel [[id number]]  (Abnormal) Collected: 08/31/22 1633    Lab Status: Final result Specimen: Blood from Arm, Right Updated: 08/31/22 1656     Sodium 127 mmol/L      Potassium 4 3 mmol/L      Chloride 85 mmol/L      CO2 33 mmol/L      ANION GAP 9 mmol/L      BUN 34 mg/dL      Creatinine 2 16 mg/dL      Glucose 493 mg/dL      Calcium 11 2 mg/dL      eGFR 26 ml/min/1 73sq m     Narrative:      National Kidney Disease Foundation guidelines for Chronic Kidney Disease (CKD):     Stage 1 with normal or high GFR (GFR > 90 mL/min/1 73 square meters)    Stage 2 Mild CKD (GFR = 60-89 mL/min/1 73 square meters)    Stage 3A Moderate CKD (GFR = 45-59 mL/min/1 73 square meters)    Stage 3B Moderate CKD (GFR = 30-44 mL/min/1 73 square meters)    Stage 4 Severe CKD (GFR = 15-29 mL/min/1 73 square meters)    Stage 5 End Stage CKD (GFR <15 mL/min/1 73 square meters)  Note: GFR calculation is accurate only with a steady state creatinine    CBC and differential [[id number]] Collected: 08/31/22 1633    Lab Status: Final result Specimen: Blood from Arm, Right Updated: 08/31/22 1639     WBC 8 67 Thousand/uL      RBC 4 85 Million/uL      Hemoglobin 14 5 g/dL      Hematocrit 42 7 %      MCV 88 fL      MCH 29 9 pg      MCHC 34 0 g/dL      RDW 11 9 %      MPV 9 8 fL      Platelets 692 Thousands/uL      nRBC 0 /100 WBCs      Neutrophils Relative 65 %      Immat GRANS % 0 %      Lymphocytes Relative 21 %      Monocytes Relative 9 %      Eosinophils Relative 4 %      Basophils Relative 1 % Neutrophils Absolute 5 67 Thousands/µL      Immature Grans Absolute 0 03 Thousand/uL      Lymphocytes Absolute 1 79 Thousands/µL      Monocytes Absolute 0 76 Thousand/µL      Eosinophils Absolute 0 35 Thousand/µL      Basophils Absolute 0 07 Thousands/µL                  CT abdomen pelvis wo contrast   Final Result by Ivelisse Ireland MD (08/31 1821)         1  No evidence of bowel obstruction, colitis or diverticulitis  Normal appendix  2   Mild rectal wall thickening may be due to underdistention, proctitis or solid lesion  Direct inspection recommended  Workstation performed: [id number]         X-ray chest 1 view portable   Final Result by Swati Saunders MD (08/31 1659)         1  Minimal interstitial edema  2   Prominence of the subpleural lung markings, possibly progressed from the CT of 6/8/2022, which may represent progression of known interstitial lung disease  Recommend follow-up CT chest within the next 3 months, or as clinically appropriate, to    assess for interval change  The study was marked in EPIC for significant notification  Workstation performed: [id number]                    Procedures  Procedures         ED Course                               SBIRT 20yo+    Flowsheet Row Most Recent Value   SBIRT (25 yo +)    In order to provide better care to our patients, we are screening all of our patients for alcohol and drug use  Would it be okay to ask you these screening questions? Yes Filed at: 08/31/2022 1730   Initial Alcohol Screen: US AUDIT-C     1  How often do you have a drink containing alcohol? 0 Filed at: 08/31/2022 1730   Audit-C Score 0 Filed at: 08/31/2022 1730   PERRY: How many times in the past year have you    Used an illegal drug or used a prescription medication for non-medical reasons?  Never Filed at: 08/31/2022 1730                    MDM  Number of Diagnoses or Management Options  Abdominal pain, unspecified abdominal location: new and requires workup  Hyperglycemia: new and requires workup  Pseudohyponatremia: new and requires workup  Pulmonary fibrosis determined by high resolution computed tomography [facility]): new and requires workup  Diagnosis management comments: Patient offered observation for gradual electrolyte repletion and rehydration  Patient declined after extensive discussion  Patient not currently having any abdominal pain  Patient presented with a chief complaint of abdominal pain  Labs and CT reassuring at this time  Patient's symptoms significantly improved with treatment in the ED  Vitals remained stable  Patient is completely clinically nontoxic and tolerating p o  without difficulty in the ED  Repeat abdominal exam is benign  Had a lengthy discussion with the patient in regards to specific signs and symptoms to watch out for that warrant prompt return to the ED  I explained that although there does not appear to be any obvious abnormality at this time that would warrant immediate intervention, if their symptoms change or worsen, they should return to the emergency department as certain diagnoses may take time to develop  Patient was informed the risk of diagnostic uncertainty  Patient was given specific instructions for symptomatic relief and follow-up recommendations as discussed in their after visit summary  All of their questions were answered and they were agreeable to plan           Amount and/or Complexity of Data Reviewed  Clinical lab tests: ordered and reviewed  Tests in the radiology section of CPT®: ordered and reviewed  Independent visualization of images, tracings, or specimens: yes        Disposition  Final diagnoses:   Abdominal pain, unspecified abdominal location   Pulmonary fibrosis determined by high resolution computed tomography ([facility] )   Pseudohyponatremia   Hyperglycemia     Time reflects when diagnosis was documented in both MDM as applicable and the Disposition within this note     Time User Action Codes Description Comment    8/31/2022  7:23 PM Cira Fatou Add [R10 9] Abdominal pain, unspecified abdominal location     8/31/2022  7:23 PM Cira Fatou Add [E33 23] Pulmonary fibrosis determined by high resolution computed tomography [facility])     8/31/2022  7:23 PM Lesli Route [R79 89] Pseudohyponatremia     8/31/2022  7:23 PM Cira Fatou Add [R73 9] Hyperglycemia     9/1/2022 10:45 AM Ardell Olp Add [N18 32] Stage 3b chronic kidney disease [facility])       ED Disposition     ED Disposition   Discharge    Condition   Stable    Date/Time   Wed Aug 31, 2022  7:23 PM    Comment   Lakshmi Velasquez discharge to home/self care  Follow-up Information     Follow up With Specialties Details Why [address], DO Emergency Medicine, Internal Medicine In 1 day  [address] [facility] [place]  [phone]            Discharge Medication List as of 8/31/2022  7:50 PM      START taking these medications    Details   ! ! pantoprazole (PROTONIX) 40 mg tablet Take 1 tablet (40 mg total) by mouth daily, Starting Wed 8/31/2022, Normal       !! - Potential duplicate medications found  Please discuss with provider        CONTINUE these medications which have NOT CHANGED    Details   apixaban (ELIQUIS) 2 5 mg Take 1 tablet (2 5 mg total) by mouth in the morning and 1 tablet (2 5 mg total) in the evening , Starting Fri 5/13/2022, Normal      atorvastatin (LIPITOR) 40 mg tablet Take 1 tablet (40 mg total) by mouth daily with dinner This is an increased dose to better help prevent heart attack and stroke , Starting Tue 2/22/2022, Normal      cholecalciferol (VITAMIN D3) 1,000 units tablet Take 1 tablet (1,000 Units total) by mouth daily, Starting Tue 2/22/2022, Normal      escitalopram (LEXAPRO) 10 mg tablet Take 10 mg by mouth daily, Historical Med      isosorbide mononitrate (IMDUR) 60 mg 24 hr tablet Take 1 tablet (60 mg total) by mouth daily In the morning, Starting Tue 2/22/2022, No Print      linaGLIPtin (Tradjenta) 5 MG TABS Take 5 mg by mouth daily Take this for your diabetes instead of glimepiride, Starting Tue 2/22/2022, Normal      Metoprolol Tartrate 75 MG TABS TAKE 1 TABLET BY MOUTH EVERY 12 HOURS, Normal      nitroglycerin (NITROSTAT) 0 4 mg SL tablet TAKE 1 TABLET UNDER TONGUE FOR ANGINA, MAY REPEAT IN 15 MIN  NO MORE THAN 3 TIMES, Historical Med      !! pantoprazole (PROTONIX) 40 mg tablet Take 40 mg by mouth daily, Starting Wed 2/23/2022, Historical Med      sucralfate (CARAFATE) 1 g tablet Take 1 g by mouth 2 (two) times a day, Historical Med      tamsulosin (FLOMAX) 0 4 mg Take 2 capsules (0 8 mg total) by mouth daily with dinner, Starting Fri 10/29/2021, Normal      torsemide (DEMADEX) 20 mg tablet TAKE 1 TABLET BY MOUTH EVERY DAY IN THE MORNING, Normal       !! - Potential duplicate medications found  Please discuss with provider  No discharge procedures on file      PDMP Review     None          ED Provider  Electronically Signed by           Joselin Jama MD  09/01/22 2036

## 2022-08-31 NOTE — DISCHARGE INSTRUCTIONS
Follow Up with your primary doctor regarding progression of your lung disease  Also discuss your hyperglycemia with them  Make sure to continue following up closely with Cardiology

## 2022-08-31 NOTE — ED NOTES
Pt resting comfortably, updated by Dr Charlie Bradshaw on plan and lab/imaging results so far  Rectal inspection done by physician r/t CT findings, pt tolerated without issue  Call bell within reach, denies need for further assistance at this time        Kelsie Cheek RN  08/31/22 [id number]

## 2022-09-01 DIAGNOSIS — N18.32 STAGE 3B CHRONIC KIDNEY DISEASE (HCC): Primary | ICD-10-CM

## 2022-09-01 LAB
ALBUMIN SERPL BCP-MCNC: 4.2 G/DL (ref 3.5–5)
ALP SERPL-CCNC: 74 U/L (ref 34–104)
ALT SERPL W P-5'-P-CCNC: 31 U/L (ref 7–52)
ANION GAP SERPL CALCULATED.3IONS-SCNC: 14 MMOL/L (ref 4–13)
AST SERPL W P-5'-P-CCNC: 26 U/L (ref 13–39)
BILIRUB SERPL-MCNC: 1.27 MG/DL (ref 0.2–1)
BUN SERPL-MCNC: 33 MG/DL (ref 5–25)
CALCIUM SERPL-MCNC: 11.2 MG/DL (ref 8.4–10.2)
CHLORIDE SERPL-SCNC: 86 MMOL/L (ref 96–108)
CO2 SERPL-SCNC: 30 MMOL/L (ref 21–32)
CREAT SERPL-MCNC: 2.11 MG/DL (ref 0.6–1.3)
GFR SERPL CREATININE-BSD FRML MDRD: 27 ML/MIN/1.73SQ M
GLUCOSE SERPL-MCNC: 486 MG/DL (ref 65–140)
MAGNESIUM SERPL-MCNC: 1.9 MG/DL (ref 1.9–2.7)
PHOSPHATE SERPL-MCNC: 3.3 MG/DL (ref 2.3–4.1)
POTASSIUM SERPL-SCNC: 4.5 MMOL/L (ref 3.5–5.3)
PROT SERPL-MCNC: 7.4 G/DL (ref 6.4–8.4)
SODIUM SERPL-SCNC: 130 MMOL/L (ref 135–147)

## 2022-09-06 ENCOUNTER — OFFICE VISIT (OUTPATIENT)
Dept: NEPHROLOGY | Facility: CLINIC | Age: 87
End: 2022-09-06
Payer: MEDICARE

## 2022-09-06 VITALS
WEIGHT: 145 LBS | OXYGEN SATURATION: 92 % | DIASTOLIC BLOOD PRESSURE: 76 MMHG | HEART RATE: 82 BPM | HEIGHT: 66 IN | BODY MASS INDEX: 23.3 KG/M2 | SYSTOLIC BLOOD PRESSURE: 134 MMHG

## 2022-09-06 DIAGNOSIS — E55.9 VITAMIN D DEFICIENCY: ICD-10-CM

## 2022-09-06 DIAGNOSIS — N18.32 STAGE 3B CHRONIC KIDNEY DISEASE (HCC): Primary | ICD-10-CM

## 2022-09-06 DIAGNOSIS — E11.21 DIABETIC NEPHROPATHY ASSOCIATED WITH TYPE 2 DIABETES MELLITUS (HCC): ICD-10-CM

## 2022-09-06 DIAGNOSIS — N18.9 ACUTE KIDNEY INJURY SUPERIMPOSED ON CHRONIC KIDNEY DISEASE (HCC): ICD-10-CM

## 2022-09-06 DIAGNOSIS — E83.52 HYPERCALCEMIA: ICD-10-CM

## 2022-09-06 DIAGNOSIS — N11.9 CHRONIC TUBULOINTERSTITIAL NEPHRITIS: ICD-10-CM

## 2022-09-06 DIAGNOSIS — I11.9 HYPERTENSIVE LEFT VENTRICULAR HYPERTROPHY, WITHOUT HEART FAILURE: ICD-10-CM

## 2022-09-06 DIAGNOSIS — N25.81 SECONDARY HYPERPARATHYROIDISM OF RENAL ORIGIN (HCC): ICD-10-CM

## 2022-09-06 DIAGNOSIS — N17.9 ACUTE KIDNEY INJURY SUPERIMPOSED ON CHRONIC KIDNEY DISEASE (HCC): ICD-10-CM

## 2022-09-06 DIAGNOSIS — N20.0 NEPHROLITHIASIS: ICD-10-CM

## 2022-09-06 DIAGNOSIS — I50.32 CHRONIC DIASTOLIC CONGESTIVE HEART FAILURE (HCC): ICD-10-CM

## 2022-09-06 PROCEDURE — 99215 OFFICE O/P EST HI 40 MIN: CPT | Performed by: INTERNAL MEDICINE

## 2022-09-06 RX ORDER — FUROSEMIDE 20 MG/1
20 TABLET ORAL DAILY
COMMUNITY

## 2022-09-06 NOTE — PROGRESS NOTES
[facility] [place], [place]    Name: Ayan Mckoy  YOB: 1935      Assessment/Plan:    Acute kidney injury superimposed on chronic kidney disease [facility])  Lab Results   Component Value Date    EGFR 26 08/31/2022    EGFR 27 08/31/2022    EGFR 26 06/03/2022    CREATININE 2 16 (H) 08/31/2022    CREATININE 2 11 (H) 08/31/2022    CREATININE 2 19 (H) 06/03/2022     Patient kidney function remains not at goal   At this time, the patient clinically assess is volume deplete, I asked for him to hold his diuretic at this time, and then when he restarted to take only once daily as opposed to twice daily  Will be difficult to have his volume under proper management in the setting of uncontrolled blood sugars, so he was also asked to try to get these under control  Patient to initiate this with diet adjustment in avoid fruit, of which he eats a lot throughout the day  Also recommended he contact his primary care provider, please refer below  Stage 3b chronic kidney disease [facility])  Lab Results   Component Value Date    EGFR 26 08/31/2022    EGFR 27 08/31/2022    EGFR 26 06/03/2022    CREATININE 2 16 (H) 08/31/2022    CREATININE 2 11 (H) 08/31/2022    CREATININE 2 19 (H) 06/03/2022     Patient baseline creatinine likely closer to 1 7-1 8 mg/dL  Will continue to focus on attaining this level of creatinine going forward  Chronic tubulointerstitial nephritis  Most likely etiology of the patient's underlying chronic kidney disease  Continue avoid potential nephrotoxins  Patient will require to continue take pantoprazole      Chronic diastolic congestive heart failure (HCC)  Wt Readings from Last 3 Encounters:   09/06/22 65 8 kg (145 lb)   08/31/22 64 4 kg (142 lb)   08/25/22 66 7 kg (147 lb)     Patient is currently compensated, as mentioned above I advised the patient to avoid excess use of diuretics at this time given uncontrolled blood sugars which are leading to over diuresis  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 ([facility] )  As the patient to contact his endocrinologist were primary care doctor regarding his uncontrolled blood sugars  Patient claims to be taking Tradjenta at this time  With respect to future care, at this point I would avoid SGLT -2 inhibitors due to current volume status  Will defer to his other physicians regarding appropriate management from medications standpoint  From our discussion the patient is aware that he needs to significantly reduced the amount of fruit which he thought was helpful  Lab Results   Component Value Date    HGBA1C 8 0 (H) 05/12/2022       Hypercalcemia  Follow-up PTH levels, hypercalcemia also contributing to the patient's diuresis  Problem List Items Addressed This Visit        Endocrine    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 ([facility] )     As the patient to contact his endocrinologist were primary care doctor regarding his uncontrolled blood sugars  Patient claims to be taking Tradjenta at this time  With respect to future care, at this point I would avoid SGLT -2 inhibitors due to current volume status  Will defer to his other physicians regarding appropriate management from medications standpoint  From our discussion the patient is aware that he needs to significantly reduced the amount of fruit which he thought was helpful      Lab Results   Component Value Date    HGBA1C 8 0 (H) 05/12/2022            Relevant Medications    furosemide (LASIX) 20 mg tablet    Secondary hyperparathyroidism of renal origin [facility])       Cardiovascular and Mediastinum    Hypertensive left ventricular hypertrophy, without heart failure    Chronic diastolic congestive heart failure (HCC)     Wt Readings from Last 3 Encounters:   09/06/22 65 8 kg (145 lb)   08/31/22 64 4 kg (142 lb)   08/25/22 66 7 kg (147 lb)     Patient is currently compensated, as mentioned above I advised the patient to avoid excess use of diuretics at this time given uncontrolled blood sugars which are leading to over diuresis  Genitourinary    Stage 3b chronic kidney disease [facility]) - Primary     Lab Results   Component Value Date    EGFR 26 08/31/2022    EGFR 27 08/31/2022    EGFR 26 06/03/2022    CREATININE 2 16 (H) 08/31/2022    CREATININE 2 11 (H) 08/31/2022    CREATININE 2 19 (H) 06/03/2022     Patient baseline creatinine likely closer to 1 7-1 8 mg/dL  Will continue to focus on attaining this level of creatinine going forward  Relevant Medications    furosemide (LASIX) 20 mg tablet    Other Relevant Orders    Basic metabolic panel    PTH, intact    Nephrolithiasis    Relevant Medications    furosemide (LASIX) 20 mg tablet    Chronic tubulointerstitial nephritis     Most likely etiology of the patient's underlying chronic kidney disease  Continue avoid potential nephrotoxins  Patient will require to continue take pantoprazole  Relevant Medications    furosemide (LASIX) 20 mg tablet    Acute kidney injury superimposed on chronic kidney disease ([facility] )     Lab Results   Component Value Date    EGFR 26 08/31/2022    EGFR 27 08/31/2022    EGFR 26 06/03/2022    CREATININE 2 16 (H) 08/31/2022    CREATININE 2 11 (H) 08/31/2022    CREATININE 2 19 (H) 06/03/2022     Patient kidney function remains not at goal   At this time, the patient clinically assess is volume deplete, I asked for him to hold his diuretic at this time, and then when he restarted to take only once daily as opposed to twice daily  Will be difficult to have his volume under proper management in the setting of uncontrolled blood sugars, so he was also asked to try to get these under control  Patient to initiate this with diet adjustment in avoid fruit, of which he eats a lot throughout the day  Also recommended he contact his primary care provider, please refer below           Relevant Medications    furosemide (LASIX) 20 mg tablet       Other    Vitamin D deficiency    Hypercalcemia     Follow-up PTH levels, hypercalcemia also contributing to the patient's diuresis  Follow-up blood work in next 7-10 days to reassess kidney function  In the meantime, I believe the patient is volume deplete due to osmotic diuresis from uncontrolled blood sugars on top of furosemide use 20 mg twice daily in the setting of hypercalcemia  We will see the patient back in a few months for follow-up, additional labs or imaging as indicated to further assess kidney function  Patient had high risk for potential hospitalization due to above issues  He will be closely monitor in the outpatient setting  Subjective:      Patient ID: Francesca Navarro is a 82 y o  male  Patient presents for follow up appointment  We reviewed the patient's labs in detail, creatinine remains elevated at 2 1-2 2 mg/dL  At the same time, the patient also noted to have significantly elevated serum glucose levels, over 400 mg/dL  Of note these appear to be fasting blood sugars  Patient has been enjoying a lot of fruit as well as other high carbohydrate foods  Patient's serum sodium level also noted to be reduced, however, after correcting for the patient's elevated serum glucose levels they are close to within normal limits  Patient is taking his diuretics twice daily  He denies use of nonsteroidal anti-inflammatory medications  Hypertension  This is a chronic problem  The current episode started more than 1 year ago  The problem is unchanged  The problem is controlled  Associated symptoms include shortness of breath  Pertinent negatives include no chest pain, orthopnea or peripheral edema  There are no associated agents to hypertension  Risk factors for coronary artery disease include diabetes mellitus and male gender  Past treatments include lifestyle changes, diuretics and beta blockers  Compliance problems include diet    Hypertensive end-organ damage includes kidney disease  Identifiable causes of hypertension include chronic renal disease  The following portions of the patient's history were reviewed and updated as appropriate: allergies, current medications, past family history, past medical history, past social history, past surgical history and problem list     Review of Systems   Respiratory: Positive for shortness of breath  Cardiovascular: Negative for chest pain and orthopnea  All other systems reviewed and are negative  Social History     Socioeconomic History    Marital status:      Spouse name: None    Number of children: None    Years of education: None    Highest education level: None   Occupational History    None   Tobacco Use    Smoking status: Former Smoker     Packs/day: 1 00     Years: 40 00     Pack years: 40 00     Quit date: 1977     Years since quittin 6    Smokeless tobacco: Never Used   Vaping Use    Vaping Use: Never used   Substance and Sexual Activity    Alcohol use: Never    Drug use: No    Sexual activity: Never     Partners: Male   Other Topics Concern    None   Social History Narrative    None     Social Determinants of Health     Financial Resource Strain: Not on file   Food Insecurity: No Food Insecurity    Worried About Running Out of Food in the Last Year: Never true    Mello of Food in the Last Year: Never true   Transportation Needs: No Transportation Needs    Lack of Transportation (Medical): No    Lack of Transportation (Non-Medical):  No   Physical Activity: Not on file   Stress: Not on file   Social Connections: Not on file   Intimate Partner Violence: Not on file   Housing Stability: Low Risk     Unable to Pay for Housing in the Last Year: No    Number of Places Lived in the Last Year: 1    Unstable Housing in the Last Year: No     Past Medical History:   Diagnosis Date    A-fib [facility])     Arthritis     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  Patent mammary graft to LAD 10/7/2009; CABG 1992 & 2005    Cancer [facility])     skin    CHF (congestive heart failure) ([facility] )     Diabetes mellitus, type II ([facility] )     without complication    Epistaxis     GERD (gastroesophageal reflux disease)     Hx of cardiovascular stress test 07/23/2014    Eddie MPI; EF0 52 (52%) Lexiscan, mild LV systolic dysfunction; evidence for prior inferior wall MI; perfusion imaging consistant w mild lat wall ischemia and min torin-infart inferior ischemia   / EF0 51 (51%) evidence for prior inferior wall MI  Mild inferior and mild-moderate lateral wall ischemia  7/29/15    Hx of echocardiogram 11/07/2017    2D w/CFD;EF0 55 (55%) mild LVH, mitral valve prolapse with moderate regurgitation  left atrial enlargement  Mild tricuspid regurgitation   Hypertension     Mixed hyperlipidemia     Occlusion and stenosis of bilateral carotid arteries     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 infarction)     Presence of aortocoronary bypass graft      Past Surgical History:   Procedure Laterality Date    CARDIAC CATHETERIZATION  10/07/2009    Stent 99% stenosis SVG from the aorta to OM1  Patent mammary graft to the LAD    CORONARY ARTERY BYPASS GRAFT  1992    VG-CX, VG-RCA    CORONARY ARTERY BYPASS GRAFT  11/04/2005    LIMA-D1-LAD, VG-PDA-PLB, Nprdmj-[id number] TMR 9 Lesions    LA EGD TRANSORAL BIOPSY SINGLE/MULTIPLE N/A 1/23/2019    Procedure: ESOPHAGOGASTRODUODENOSCOPY (EGD) with biopsy;  Surgeon: Sai Ballesteros MD;  Location: [address] [place] [address] GI LAB;   Service: Gastroenterology    TONSILLECTOMY         Current Outpatient Medications:     apixaban (ELIQUIS) 2 5 mg, Take 1 tablet (2 5 mg total) by mouth in the morning and 1 tablet (2 5 mg total) in the evening , Disp: 60 tablet, Rfl: 0    atorvastatin (LIPITOR) 40 mg tablet, Take 1 tablet (40 mg total) by mouth daily with dinner This is an increased dose to better help prevent heart attack and stroke , Disp: 30 tablet, Rfl: 0    cholecalciferol (VITAMIN D3) 1,000 units tablet, Take 1 tablet (1,000 Units total) by mouth daily, Disp: 30 tablet, Rfl: 0    escitalopram (LEXAPRO) 10 mg tablet, Take 10 mg by mouth daily, Disp: , Rfl:     furosemide (LASIX) 20 mg tablet, Take 20 mg by mouth in the morning , Disp: , Rfl:     isosorbide mononitrate (IMDUR) 60 mg 24 hr tablet, Take 1 tablet (60 mg total) by mouth daily In the morning, Disp: , Rfl: 0    linaGLIPtin (Tradjenta) 5 MG TABS, Take 5 mg by mouth daily Take this for your diabetes instead of glimepiride, Disp: 30 tablet, Rfl: 0    nitroglycerin (NITROSTAT) 0 4 mg SL tablet, TAKE 1 TABLET UNDER TONGUE FOR ANGINA, MAY REPEAT IN 15 MIN   NO MORE THAN 3 TIMES, Disp: , Rfl:     pantoprazole (PROTONIX) 40 mg tablet, Take 40 mg by mouth daily, Disp: , Rfl:     pantoprazole (PROTONIX) 40 mg tablet, Take 1 tablet (40 mg total) by mouth daily, Disp: 10 tablet, Rfl: 0    sucralfate (CARAFATE) 1 g tablet, Take 1 g by mouth 2 (two) times a day, Disp: , Rfl:     tamsulosin (FLOMAX) 0 4 mg, Take 2 capsules (0 8 mg total) by mouth daily with dinner, Disp: 180 capsule, Rfl: 3    Metoprolol Tartrate 75 MG TABS, TAKE 1 TABLET BY MOUTH EVERY 12 HOURS, Disp: 60 tablet, Rfl: 1    Lab Results   Component Value Date    SODIUM 127 (L) 08/31/2022    SODIUM 130 (L) 08/31/2022    K 4 3 08/31/2022    K 4 5 08/31/2022    CL 85 (L) 08/31/2022    CL 86 (L) 08/31/2022    CO2 33 (H) 08/31/2022    CO2 30 08/31/2022    AGAP 9 08/31/2022    AGAP 14 (H) 08/31/2022    BUN 34 (H) 08/31/2022    BUN 33 (H) 08/31/2022    CREATININE 2 16 (H) 08/31/2022    CREATININE 2 11 (H) 08/31/2022    GLUC 493 (H) 08/31/2022    GLUC 486 (H) 08/31/2022    GLUF 244 (H) 06/03/2022    CALCIUM 11 2 (H) 08/31/2022    CALCIUM 11 2 (H) 08/31/2022    AST 26 08/31/2022    ALT 31 08/31/2022    ALKPHOS 74 08/31/2022    TP 7 4 08/31/2022    TBILI 1 27 (H) 08/31/2022    EGFR 26 08/31/2022    EGFR 27 08/31/2022     Lab Results   Component Value Date    WBC 8 67 08/31/2022    HGB 14 5 08/31/2022    HCT 42 7 08/31/2022    MCV 88 08/31/2022     08/31/2022     Lab Results   Component Value Date    CHOLESTEROL 121 11/09/2021    CHOLESTEROL 145 05/13/2021    CHOLESTEROL 104 03/26/2021     Lab Results   Component Value Date    HDL 40 11/09/2021    HDL 43 05/13/2021    HDL 34 (L) 03/26/2021     Lab Results   Component Value Date    LDLCALC 59 11/09/2021    LDLCALC 76 05/13/2021    LDLCALC 46 03/26/2021     Lab Results   Component Value Date    TRIG 112 11/09/2021    TRIG 128 05/13/2021    TRIG 119 03/26/2021     No results found for: [place], [place]  Lab Results   Component Value Date    SQT6WJJYRBMV 1 849 08/31/2022     Lab Results   Component Value Date    PTH 91 3 (H) 02/22/2022    CALCIUM 11 2 (H) 08/31/2022    CALCIUM 11 2 (H) 08/31/2022    PHOS 3 3 08/31/2022     Lab Results   Component Value Date    SPEP See Comment 02/24/2022    UPEP See Comment 02/22/2022     No results found for: ANA[facility] [email or web address]        Objective:      /76   Pulse 82   Ht 5' 6" (1 676 m)   Wt 65 8 kg (145 lb)   SpO2 92%   BMI 23 40 kg/m²          Physical Exam  Vitals reviewed  Constitutional:       General: He is not in acute distress  Appearance: He is well-developed  HENT:      Head: Normocephalic and atraumatic  Eyes:      Conjunctiva/sclera: Conjunctivae normal    Cardiovascular:      Rate and Rhythm: Normal rate and regular rhythm  Pulmonary:      Effort: Pulmonary effort is normal       Breath sounds: Normal breath sounds  Abdominal:      Palpations: Abdomen is soft  Musculoskeletal:      Cervical back: Neck supple  Comments: Trace bilateral lower extremity edema   Skin:     General: Skin is warm  Findings: No rash  Neurological:      Mental Status: He is alert and oriented to person, place, and time  Cranial Nerves: No cranial nerve deficit     Psychiatric:         Behavior: Behavior normal

## 2022-09-06 NOTE — ASSESSMENT & PLAN NOTE
Wt Readings from Last 3 Encounters:   09/06/22 65 8 kg (145 lb)   08/31/22 64 4 kg (142 lb)   08/25/22 66 7 kg (147 lb)     Patient is currently compensated, as mentioned above I advised the patient to avoid excess use of diuretics at this time given uncontrolled blood sugars which are leading to over diuresis

## 2022-09-06 NOTE — ASSESSMENT & PLAN NOTE
Most likely etiology of the patient's underlying chronic kidney disease  Continue avoid potential nephrotoxins  Patient will require to continue take pantoprazole

## 2022-09-06 NOTE — ASSESSMENT & PLAN NOTE
Lab Results   Component Value Date    EGFR 26 08/31/2022    EGFR 27 08/31/2022    EGFR 26 06/03/2022    CREATININE 2 16 (H) 08/31/2022    CREATININE 2 11 (H) 08/31/2022    CREATININE 2 19 (H) 06/03/2022     Patient baseline creatinine likely closer to 1 7-1 8 mg/dL  Will continue to focus on attaining this level of creatinine going forward

## 2022-09-07 ENCOUNTER — TELEPHONE (OUTPATIENT)
Dept: NEPHROLOGY | Facility: CLINIC | Age: 87
End: 2022-09-07

## 2022-09-07 NOTE — TELEPHONE ENCOUNTER
Okay, will update his medication list   He should continue to take the diuretic as we discussed yesterday  If he has swelling, he can take a dose of the torsemide, he does not have any swelling, then he can hold the torsemide for now  Ultimately, he may take the torsemide every other day, or may be once every 2 or 3 days    We will see how he does

## 2022-09-07 NOTE — TELEPHONE ENCOUNTER
Left message regarding the below message from Dr Rupert Caballero  Told to call back if any questions

## 2022-09-07 NOTE — TELEPHONE ENCOUNTER
Patient called stating that he is not taking the Lasix it was an outdated list yesterday at his appointment  He was only taking the torsemide  They wanted to know if he should continue on the torsemide

## 2022-10-05 ENCOUNTER — OFFICE VISIT (OUTPATIENT)
Dept: ENDOCRINOLOGY | Facility: CLINIC | Age: 87
End: 2022-10-05
Payer: MEDICARE

## 2022-10-05 VITALS — HEART RATE: 80 BPM | HEIGHT: 66 IN | WEIGHT: 158.6 LBS | BODY MASS INDEX: 25.49 KG/M2

## 2022-10-05 DIAGNOSIS — E21.3 HYPERPARATHYROIDISM (HCC): ICD-10-CM

## 2022-10-05 DIAGNOSIS — M81.0 OSTEOPOROSIS, UNSPECIFIED OSTEOPOROSIS TYPE, UNSPECIFIED PATHOLOGICAL FRACTURE PRESENCE: ICD-10-CM

## 2022-10-05 DIAGNOSIS — E11.65 TYPE 2 DIABETES MELLITUS WITH HYPERGLYCEMIA, WITHOUT LONG-TERM CURRENT USE OF INSULIN (HCC): Primary | ICD-10-CM

## 2022-10-05 DIAGNOSIS — I10 HYPERTENSION, UNSPECIFIED TYPE: ICD-10-CM

## 2022-10-05 DIAGNOSIS — N18.4 STAGE 4 CHRONIC KIDNEY DISEASE (HCC): ICD-10-CM

## 2022-10-05 DIAGNOSIS — E78.2 MIXED HYPERLIPIDEMIA: ICD-10-CM

## 2022-10-05 PROCEDURE — 99214 OFFICE O/P EST MOD 30 MIN: CPT | Performed by: STUDENT IN AN ORGANIZED HEALTH CARE EDUCATION/TRAINING PROGRAM

## 2022-10-05 RX ORDER — INSULIN GLARGINE 100 [IU]/ML
10 INJECTION, SOLUTION SUBCUTANEOUS DAILY
Start: 2022-10-05

## 2022-10-05 RX ORDER — AMLODIPINE BESYLATE 10 MG/1
10 TABLET ORAL DAILY
COMMUNITY

## 2022-10-05 RX ORDER — TORSEMIDE 20 MG/1
20 TABLET ORAL AS NEEDED
COMMUNITY

## 2022-10-05 NOTE — PATIENT INSTRUCTIONS
Denosumab (Brand name: Prolia)  Stronger medicine than the above but must be given every 6 months under the skin  Small risk of skin infections  Once you want to stop this medicine, you need to switch to alendronate or something similar for 6 months to 1 year

## 2022-10-05 NOTE — PROGRESS NOTES
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 - improving on insulin, but remains poorly controlled  Given age and comorbidities, a relaxed A1c target of 8% is reasonable for [facility]  I have asked that he increase his basaglar to 10 units daily & submit SMBG log for my review in 2-weeks for dose adjustment  We did review hypoglycemia management  2  Hypertension - stable  Continue present therapy    3  Hyperlipidemia - continue statin    4  Primary hyperparathyroidism - not an ideal surgical candidate and patient is not interested in pursuing surgical remediation for this condition  Encourage adequate hydration and better optimization of diabetes  Continue to monitor calcium levels  5  Osteoporosis - newly diagnosed  Reviewed pillars of osteoporosis management  Patient is high fall risk  I discussed recommendations for therapy initiation  Would pursue treatment with denosumab  Reviewed its benefits and side effects, including skin infections and monitoring for hypocalcemia  I also advised that this is therapy that once started would need to be continued life-long on account of his degree of CKD and due to risk for bone loss/rebound vertebral fractures with prolonged interruptions to scheduled dosing  Patient reports inclination to pursue treatment, but wishes to discuss first with family  I provided him information on prolia to review at home  He was advised to contact the office with any decisions regarding that therapy     6  CKD 4 - following with nephrology      CC: Diabetes    History of Present Illness     HPI:    [facility] returns today for follow up  He is joined by his granddaughter who is assisting with elements of the history   Since last visit, he was started on basaglar with some improvement of his blood sugars  He is presently taking basaglar 8 units qHS as well as tradjenta 5 mg daily  BG log shows improvement of FBG from 300-500 range to 180-220 range  He denies any hypogylcemia or hyperglycemic symptoms  For hypertension he takes amlodipine 10 mg daily and metoprolol tartrate 75 mg BID  For hyperlipidemia he takes lipitor 40 mg daily  He does take Vit D 1000 U daily  He is not steady on his feet and is a fall risk  He has history of kidney stones (remote) and rib fractures  He also has history of esophageal varices  Review of Systems   Constitutional: Negative for diaphoresis and unexpected weight change  Cardiovascular: Negative for chest pain and palpitations  Gastrointestinal: Negative for nausea and vomiting  Endocrine: Negative for polydipsia and polyuria  Musculoskeletal: Positive for gait problem  Neurological: Negative for tremors  Psychiatric/Behavioral: Negative for agitation and behavioral problems  All other systems reviewed and are negative  Historical Information   Past Medical History:   Diagnosis Date    A-fib [facility])     Arthritis     Athscl 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     Stent OM1  Patent mammary graft to LAD 10/7/2009; CABG 1992 & 2005    Cancer [facility])     skin    [facility]congest[facility]) [facility] [address] [facility]     Diabetes mellitus, type II ([facility] [address] )     without complication    Epistaxis     GERD (gastroesophageal reflux disease)     Hx of cardiovascular stress test 07/23/2014    Eddie MPI; EF0 52 (52%) Lexiscan, mild LV systolic dysfunction; evidence for prior inferior wall MI; perfusion imaging consistant w mild lat wall ischemia and min torin-infart inferior ischemia   / EF0 51 (51%) evidence for prior inferior wall MI  Mild inferior and mild-moderate lateral wall ischemia   7/29/15    Hx of echocardiogram 11/07/2017    2D w/CFD;EF0 55 (55%) mild LVH, mitral valve prolapse with moderate regurgitation  left atrial enlargement  Mild tricuspid regurgitation   Hypertension     Mixed hyperlipidemia     Occlusion and stenosis of bilateral carotid arteries     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 infarction)     Presence of aortocoronary bypass graft      Past Surgical History:   Procedure Laterality Date    CARDIAC CATHETERIZATION  10/07/2009    Stent 99% stenosis SVG from the aorta to OM1  Patent mammary graft to the LAD    CORONARY ARTERY BYPASS GRAFT      VG-CX, VG-RCA    CORONARY ARTERY BYPASS GRAFT  2005    LIMA-D1-LAD, VG-PDA-PLB, [id number] TMR 9 Lesions    [facility] EGD TRANSORAL BIOPSY SINGLE/MULTIPLE N/A 2019    Procedure: ESOPHAGOGASTRODUODENOSCOPY (EGD) with biopsy;  Surgeon: Aundrea Bledsoe MD;  Location: [address] [place] [address] GI LAB; Service: Gastroenterology    TONSILLECTOMY       Social History   Social History     Substance and Sexual Activity   Alcohol Use Never     Social History     Substance and Sexual Activity   Drug Use No     Social History     Tobacco Use   Smoking Status Former Smoker    Packs/day: 1 00    Years: 40 00    Pack years: 40 00    Quit date: 1977    Years since quittin 7   Smokeless Tobacco Never Used     Family History:   Family History   Problem Relation Age of Onset    Heart disease Brother     No Known Problems Mother     Tuberculosis Father        Meds/Allergies   Current Outpatient Medications   Medication Sig Dispense Refill    amLODIPine (NORVASC) 10 mg tablet Take 10 mg by mouth daily      apixaban (ELIQUIS) 2 5 mg Take 1 tablet (2 5 mg total) by mouth in the morning and 1 tablet (2 5 mg total) in the evening  60 tablet 0    atorvastatin (LIPITOR) 40 mg tablet Take 1 tablet (40 mg total) by mouth daily with dinner This is an increased dose to better help prevent heart attack and stroke   30 tablet 0    cholecalciferol (VITAMIN D3) 1,000 units tablet Take 1 tablet (1,000 Units total) by mouth daily 30 tablet 0    Insulin Glargine Solostar (Basaglar KwikPen) 100 UNIT/ML SOPN Inject 0 1 mL (10 Units total) under the skin daily At hs      isosorbide mononitrate (IMDUR) 60 mg 24 hr tablet Take 1 tablet (60 mg total) by mouth daily In the morning  0    linaGLIPtin (Tradjenta) 5 MG TABS Take 5 mg by mouth daily Take this for your diabetes instead of glimepiride 30 tablet 0    Metoprolol Tartrate 75 MG TABS TAKE 1 TABLET BY MOUTH EVERY 12 HOURS 60 tablet 1    nitroglycerin (NITROSTAT) 0 4 mg SL tablet TAKE 1 TABLET UNDER TONGUE FOR ANGINA, MAY REPEAT IN 15 MIN  NO MORE THAN 3 TIMES      pantoprazole (PROTONIX) 40 mg tablet Take 40 mg by mouth daily      sucralfate (CARAFATE) 1 g tablet Take 1 g by mouth 2 (two) times a day      tamsulosin (FLOMAX) 0 4 mg Take 2 capsules (0 8 mg total) by mouth daily with dinner 180 capsule 3    torsemide (DEMADEX) 20 mg tablet Take 20 mg by mouth as needed PRN swollen legs      escitalopram (LEXAPRO) 10 mg tablet Take 10 mg by mouth daily (Patient not taking: Reported on 10/5/2022)      furosemide (LASIX) 20 mg tablet Take 20 mg by mouth in the morning  (Patient not taking: Reported on 10/5/2022)      pantoprazole (PROTONIX) 40 mg tablet Take 1 tablet (40 mg total) by mouth daily 10 tablet 0     No current facility-administered medications for this visit  Allergies   Allergen Reactions    Ibuprofen Fatigue     Other reaction(s): decreased kidney function       Objective   Vitals: Pulse 80, height 5' 6" (1 676 m), weight 71 9 kg (158 lb 9 6 oz)  Physical Exam  Vitals reviewed  Constitutional:       General: He is not in acute distress  Appearance: He is ill-appearing  Interventions: Nasal cannula in place  HENT:      Head: Normocephalic and atraumatic  Nose: Nose normal    Eyes:      General: No scleral icterus  Pulmonary:      Effort: Pulmonary effort is normal    Musculoskeletal:         General: No deformity  Cervical back: Normal range of motion     Skin:     General: Skin is warm and dry  Neurological:      General: No focal deficit present  Mental Status: He is alert  Psychiatric:         Mood and Affect: Mood normal          Behavior: Behavior normal          The history was obtained from the review of the chart, patient and family      Lab Results:   Lab Results   Component Value Date/Time    Hemoglobin A1C 8 0 (H) 05/12/2022 04:43 AM    Hemoglobin A1C 9 5 (H) 02/19/2022 04:15 AM    Hemoglobin A1C 10 8 (H) 11/09/2021 08:51 AM    WBC 8 67 08/31/2022 04:33 PM    WBC 11 80 (H) 06/03/2022 09:52 AM    WBC 10 39 (H) 05/20/2022 11:06 AM    Hemoglobin 14 5 08/31/2022 04:33 PM    Hemoglobin 14 0 06/03/2022 09:52 AM    Hemoglobin 14 1 05/20/2022 11:06 AM    Hematocrit 42 7 08/31/2022 04:33 PM    Hematocrit 40 2 06/03/2022 09:52 AM    Hematocrit 41 9 05/20/2022 11:06 AM    MCV 88 08/31/2022 04:33 PM    MCV 83 06/03/2022 09:52 AM    MCV 84 05/20/2022 11:06 AM    Platelets 952 11/27/6134 04:33 PM    Platelets 858 97/88/3283 09:52 AM    Platelets 065 99/62/3136 11:06 AM    BUN 34 (H) 08/31/2022 04:33 PM    BUN 33 (H) 08/31/2022 04:33 PM    BUN 37 (H) 06/03/2022 09:52 AM    Potassium 4 3 08/31/2022 04:33 PM    Potassium 4 5 08/31/2022 04:33 PM    Potassium 4 2 06/03/2022 09:52 AM    Chloride 85 (L) 08/31/2022 04:33 PM    Chloride 86 (L) 08/31/2022 04:33 PM    Chloride 96 (L) 06/03/2022 09:52 AM    CO2 33 (H) 08/31/2022 04:33 PM    CO2 30 08/31/2022 04:33 PM    CO2 31 06/03/2022 09:52 AM    Creatinine 2 16 (H) 08/31/2022 04:33 PM    Creatinine 2 11 (H) 08/31/2022 04:33 PM    Creatinine 2 19 (H) 06/03/2022 09:52 AM    AST 26 08/31/2022 04:33 PM    AST 12 05/13/2022 05:02 AM    AST 9 05/12/2022 04:43 AM    ALT 31 08/31/2022 04:33 PM    ALT 13 05/13/2022 05:02 AM    ALT 15 05/12/2022 04:43 AM    Albumin 4 2 08/31/2022 04:33 PM    Albumin 2 7 (L) 05/13/2022 05:02 AM    Albumin 2 5 (L) 05/12/2022 04:43 AM    HDL, Direct 40 11/09/2021 08:51 AM    Triglycerides 112 11/09/2021 08:51 AM       Lab Results   Component Value Date    PTH 91 3 (H) 02/22/2022    CALCIUM 11 2 (H) 08/31/2022    CALCIUM 11 2 (H) 08/31/2022    PHOS 3 3 08/31/2022       Imaging Studies: I have personally reviewed pertinent reports  8/8/2022    DXA SCAN     CLINICAL HISTORY:  14-year-old male with history of hyperparathyroidism  OTHER RISK FACTORS:  COPD  PPI therapy  SSRI therapy      PHARMACOLOGIC THERAPY FOR OSTEOPOROSIS:  None      TECHNIQUE: Bone densitometry was performed using a GE Lunar Prodigy   bone densitometer  Regions of interest appear properly placed       COMPARISON: There are no prior DXA studies performed on this unit for comparison      RESULTS:      LUMBAR SPINE L1-L4 :   BMD  1 111  gm/cm2   T-score -1 0         LEFT TOTAL HIP:   BMD: 0 937  gm/cm2   T-score: -1 1      LEFT FEMORAL NECK:   BMD: 0 789  gm/cm2   T score: -2 2      RIGHT TOTAL HIP:   BMD:  0 914  gm/cm2   T-score: -1 3     RIGHT FEMORAL NECK:   BMD:  0 736  gm/cm2  T score: -2 6      LEFT  FOREARM:    33% RADIUS BMD:  0 576  gm/cm2    T-score: -2 8      IMPRESSION:     1  Osteoporosis  [Based on the right femoral neck and left radius]     2  The 10 year risk of hip fracture is 4 7% with the 10 year risk of major osteoporotic fracture being 10 3% as calculated by the [facility]/WHO fracture risk assessment tool (FRAX)     3  The current NOF guidelines recommend treating patients with a T-score of -2 5 or less in the lumbar spine or hips, or in post-menopausal women and men over the age of 48 with low bone mass (osteopenia) and a FRAX 10 year risk score of >3% for hip   fracture and/or >20% for major osteoporotic fracture      4  The NOF recommends follow-up DXA in 1-2 years after initiating therapy for osteoporosis and every 2 years thereafter  More frequent evaluation is appropriate for patients with conditions associated with rapid bone loss, such as glucocorticoid   therapy   The interval between DXA screenings may be longer for individuals without major risk factors and initial T-score in the normal or upper low bone mass range         The FRAX algorithm has certain limitations:  -FRAX has not been validated in patients currently or previously treated with pharmacotherapy for osteoporosis  In such patients, clinical judgment must be exercised in interpreting FRAX scores  -Prior hip, vertebral and humeral fragility fractures appear to confer greater risk of subsequent fracture than fractures at other sites (this is especially true for individuals with severe vertebral fractures), but quantification of this incremental   risk is not possible with FRAX  -FRAX underestimates fracture risk in patients with history of multiple fragility fractures  -FRAX may underestimate fracture risk in patients with history of frequent falls   -It is not appropriate to use FRAX to monitor treatment response  Portions of the record may have been created with voice recognition software  Occasional wrong word or "sound a like" substitutions may have occurred due to the inherent limitations of voice recognition software  Read the chart carefully and recognize, using context, where substitutions have occurred

## 2022-10-21 ENCOUNTER — OFFICE VISIT (OUTPATIENT)
Dept: PULMONOLOGY | Facility: CLINIC | Age: 87
End: 2022-10-21
Payer: MEDICARE

## 2022-10-21 VITALS
TEMPERATURE: 98.2 F | OXYGEN SATURATION: 96 % | SYSTOLIC BLOOD PRESSURE: 126 MMHG | WEIGHT: 149 LBS | BODY MASS INDEX: 23.95 KG/M2 | DIASTOLIC BLOOD PRESSURE: 58 MMHG | HEIGHT: 66 IN | HEART RATE: 77 BPM

## 2022-10-21 DIAGNOSIS — J84.9 ILD (INTERSTITIAL LUNG DISEASE) (HCC): Primary | ICD-10-CM

## 2022-10-21 DIAGNOSIS — J96.11 CHRONIC RESPIRATORY FAILURE WITH HYPOXIA (HCC): ICD-10-CM

## 2022-10-21 DIAGNOSIS — I27.20 PULMONARY HYPERTENSION (HCC): ICD-10-CM

## 2022-10-21 DIAGNOSIS — R91.1 PULMONARY NODULE: ICD-10-CM

## 2022-10-21 PROCEDURE — 99214 OFFICE O/P EST MOD 30 MIN: CPT | Performed by: PHYSICIAN ASSISTANT

## 2022-10-21 NOTE — PROGRESS NOTES
Pulmonary Follow Up Note   Carmela Mendoza 82 y o  male MRN: [id number]  10/21/2022      Assessment:    ILD (interstitial lung disease) ([facility] [address] )  · Hakeem's previous CT he is reveal bibasilar reticulations, bibasilar ground-glass opacities and microcystic changes, bronchiectasis favoring an NSIP pattern  · Unfortunately no pulmonary function tests for me to review  Will help patient reschedule these at the time of his repeat CT chest  · Repeat CT chest in December 2022  · Follow-up to review both PFTs and CT after    Chronic respiratory failure with hypoxia (HCC)  · Seen on 4 L with adequate oxygen saturations today in the office  · Recommend he continue supplemental oxygen to maintain saturations greater than 88%    Pulmonary hypertension (HCC)  · Likely who group 2/3  · Most recent echocardiogram with estimated peak PA pressure 41 mm Hg  · Continue supplemental oxygen  · Continued treatment of underlying cause    Pulmonary nodule  · Several pulmonary nodules noted on last CT from June are stable  · Repeat CT chest in December 2022 for comparison      Plan:    Diagnoses and all orders for this visit:    ILD (interstitial lung disease) ([facility] [address] )  -     CT chest without contrast; Future    Chronic respiratory failure with hypoxia ([address] )    Pulmonary hypertension ([facility] [address] )    Pulmonary nodule        Return in about 3 months (around 1/21/2023)  History of Present Illness   HPI:  Carmela Mendoza is a 82 y o  male who presents the office today for routine follow-up  Patient's past medical history positive for coronary artery disease status post CABG x2, hypertension, dyslipidemia, chronic diastolic CHF, diabetes mellitus type 2, chronic hypoxic respiratory failure, ILD  Patient was last seen in office in July  In that time frame he has not required systemic steroids, antibiotics or been hospitalized for respiratory illness  Reports that his symptoms have been stable    Complains of chronic dyspnea on exertion and occasional dry cough without sputum production or hemoptysis  He denies significant wheezing  He does have epigastric abdominal pain that he reports is been ongoing for many years  Does follow-up with GI in takes care face and Protonix  Patient endorses orthopnea and uses 2-3 pillows at night to help  He does have occasional lower extremity edema at times but today is well controlled  He and his daughter both state that his weights have been stable  Review of Systems   All other systems reviewed and are negative  Historical Information   Past Medical History:   Diagnosis Date   • A-fib [facility])    • Arthritis    • Athscl 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     Stent OM1  Patent mammary graft to LAD 10/7/2009; CABG 1992 & 2005   • Cancer [facility])     skin   • CHF (congestive heart failure) ([facility] [address] )    • Diabetes mellitus ([facility] [address] )    • Diabetes mellitus, type II ([facility] [address] )     without complication   • Epistaxis    • GERD (gastroesophageal reflux disease)    • Hx of cardiovascular stress test 07/23/2014    Eddie MPI; EF0 52 (52%) Lexiscan, mild LV systolic dysfunction; evidence for prior inferior wall MI; perfusion imaging consistant w mild lat wall ischemia and min torin-infart inferior ischemia   / EF0 51 (51%) evidence for prior inferior wall MI  Mild inferior and mild-moderate lateral wall ischemia  7/29/15   • Hx of echocardiogram 11/07/2017    2D w/CFD;EF0 55 (55%) mild LVH, mitral valve prolapse with moderate regurgitation  left atrial enlargement  Mild tricuspid regurgitation  • Hypertension    • Mixed hyperlipidemia    • Occlusion and stenosis of bilateral carotid arteries    • 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 infarction)    • Osteoporosis    • Presence of aortocoronary bypass graft      Past Surgical History:   Procedure Laterality Date   • CARDIAC CATHETERIZATION  10/07/2009    Stent 99% stenosis SVG from the aorta to OM1   Patent mammary graft to the LAD   • CORONARY ARTERY BYPASS GRAFT  1992    VG-CX, VG-RCA   • CORONARY ARTERY BYPASS GRAFT  2005    LIMA-D1-LAD, VG-PDA-PLB, [id number] TMR 9 Lesions   • [place] EGD TRANSORAL BIOPSY SINGLE/MULTIPLE N/A 2019    Procedure: ESOPHAGOGASTRODUODENOSCOPY (EGD) with biopsy;  Surgeon: Eryn Carroll MD;  Location: [address] [place] [address] GI LAB; Service: Gastroenterology   • TONSILLECTOMY       Family History   Problem Relation Age of Onset   • Heart disease Brother    • No Known Problems Mother    • Tuberculosis Father        Social History     Tobacco Use   Smoking Status Former Smoker   • Packs/day: 1 00   • Years: 40 00   • Pack years: 40 00   • Quit date: 1977   • Years since quittin 7   Smokeless Tobacco Never Used         Meds/Allergies     Current Outpatient Medications:   •  amLODIPine (NORVASC) 10 mg tablet, Take 10 mg by mouth daily, Disp: , Rfl:   •  apixaban (ELIQUIS) 2 5 mg, Take 1 tablet (2 5 mg total) by mouth in the morning and 1 tablet (2 5 mg total) in the evening , Disp: 60 tablet, Rfl: 0  •  atorvastatin (LIPITOR) 40 mg tablet, Take 1 tablet (40 mg total) by mouth daily with dinner This is an increased dose to better help prevent heart attack and stroke , Disp: 30 tablet, Rfl: 0  •  cholecalciferol (VITAMIN D3) 1,000 units tablet, Take 1 tablet (1,000 Units total) by mouth daily, Disp: 30 tablet, Rfl: 0  •  Insulin Glargine Solostar (Basaglar KwikPen) 100 UNIT/ML SOPN, Inject 0 1 mL (10 Units total) under the skin daily At hs, Disp: , Rfl:   •  isosorbide mononitrate (IMDUR) 60 mg 24 hr tablet, Take 1 tablet (60 mg total) by mouth daily In the morning, Disp: , Rfl: 0  •  linaGLIPtin (Tradjenta) 5 MG TABS, Take 5 mg by mouth daily Take this for your diabetes instead of glimepiride, Disp: 30 tablet, Rfl: 0  •  Metoprolol Tartrate 75 MG TABS, TAKE 1 TABLET BY MOUTH EVERY 12 HOURS, Disp: 60 tablet, Rfl: 1  •  nitroglycerin (NITROSTAT) 0 4 mg SL tablet, TAKE 1 TABLET UNDER TONGUE FOR ANGINA, MAY REPEAT IN 15 MIN   NO MORE THAN 3 TIMES, Disp: , Rfl:   •  pantoprazole (PROTONIX) 40 mg tablet, Take 40 mg by mouth daily, Disp: , Rfl:   •  sucralfate (CARAFATE) 1 g tablet, Take 1 g by mouth 2 (two) times a day, Disp: , Rfl:   •  tamsulosin (FLOMAX) 0 4 mg, Take 2 capsules (0 8 mg total) by mouth daily with dinner, Disp: 180 capsule, Rfl: 3  •  torsemide (DEMADEX) 20 mg tablet, Take 20 mg by mouth as needed PRN swollen legs, Disp: , Rfl:   •  escitalopram (LEXAPRO) 10 mg tablet, Take 10 mg by mouth daily (Patient not taking: No sig reported), Disp: , Rfl:   •  furosemide (LASIX) 20 mg tablet, Take 20 mg by mouth in the morning  (Patient not taking: No sig reported), Disp: , Rfl:   •  pantoprazole (PROTONIX) 40 mg tablet, Take 1 tablet (40 mg total) by mouth daily (Patient not taking: Reported on 10/21/2022), Disp: 10 tablet, Rfl: 0  Allergies   Allergen Reactions   • Ibuprofen Fatigue     Other reaction(s): decreased kidney function       Vitals: Blood pressure 126/58, pulse 77, temperature 98 2 °F (36 8 °C), temperature source Tympanic, height 5' 6" (1 676 m), weight 67 6 kg (149 lb), SpO2 96 %  Body mass index is 24 05 kg/m²  Oxygen Therapy  SpO2: 96 %  Oxygen Therapy: Supplemental oxygen  O2 Delivery Method: Nasal cannula  O2 Flow Rate (L/min): 4 L/min    Physical Exam  Physical Exam  Vitals reviewed  Constitutional:       Appearance: Normal appearance  He is well-developed  Comments: Reports stable weights   HENT:      Head: Normocephalic and atraumatic  Nose: Nose normal       Mouth/Throat:      Mouth: Mucous membranes are moist       Pharynx: Oropharynx is clear  Eyes:      Extraocular Movements: Extraocular movements intact  Cardiovascular:      Rate and Rhythm: Normal rate and regular rhythm  Heart sounds: Normal heart sounds  Pulmonary:      Effort: Pulmonary effort is normal  No respiratory distress  Breath sounds: No wheezing, rhonchi or rales        Comments: Decreased breath sounds  Musculoskeletal:      Cervical back: Normal range of motion and neck supple  Right lower leg: No edema  Left lower leg: No edema  Comments: No swelling today   Skin:     General: Skin is warm and dry  Neurological:      General: No focal deficit present  Mental Status: He is alert  Mental status is at baseline  Psychiatric:         Mood and Affect: Mood normal          Behavior: Behavior normal          Labs: I have personally reviewed pertinent lab results  , ABG: No results found for: PHART, DFT6EMW, PO2ART, UDR0XSO, D8THAONR, BEART, SOURCE, BNP: No results found for: BNP, CBC: No results found for: WBC, HGB, HCT, MCV, PLT, ADJUSTEDWBC, MCH, MCHC, RDW, MPV, NRBC, CMP: No results found for: SODIUM, K, CL, CO2, ANIONGAP, BUN, CREATININE, GLUCOSE, CALCIUM, AST, ALT, ALKPHOS, PROT, BILITOT, EGFR, PT/INR: No results found for: PT, INR, Troponin: No results found for: TROPONINI  Lab Results   Component Value Date    WBC 8 67 08/31/2022    HGB 14 5 08/31/2022    HCT 42 7 08/31/2022    MCV 88 08/31/2022     08/31/2022     Lab Results   Component Value Date    CALCIUM 11 2 (H) 08/31/2022    CALCIUM 11 2 (H) 08/31/2022    K 4 3 08/31/2022    K 4 5 08/31/2022    CO2 33 (H) 08/31/2022    CO2 30 08/31/2022    CL 85 (L) 08/31/2022    CL 86 (L) 08/31/2022    BUN 34 (H) 08/31/2022    BUN 33 (H) 08/31/2022    CREATININE 2 16 (H) 08/31/2022    CREATININE 2 11 (H) 08/31/2022     No results found for: IGE  Lab Results   Component Value Date    ALT 31 08/31/2022    AST 26 08/31/2022    ALKPHOS 74 08/31/2022       Imaging and other studies: I have personally reviewed pertinent reports  and I have personally reviewed pertinent films in PACS      CT chest without contrast 06/08/2022  Redemonstration of subpleural reticulations, mild micro cystic changes and lower lobe predominant subpleural ground-glass opacities compatible with ILD  Several pulmonary nodules noted and are stable  Severe coronary artery calcifications  Status post CABG noted    Enlargement of the central pulmonary arterial tree suggesting element of pulmonary artery hypertension  Prominent mediastinal lymph nodes      Pulmonary function testing:  None to review      Other Studies: I have personally reviewed pertinent reports  Echocardiogram 02/21/2022  EF 65%  Grade 1 diastolic dysfunction  Estimated right ventricular systolic pressure 41 mm Hg

## 2022-10-21 NOTE — ASSESSMENT & PLAN NOTE
· Several pulmonary nodules noted on last CT from June are stable    · Repeat CT chest in December 2022 for comparison

## 2022-10-21 NOTE — ASSESSMENT & PLAN NOTE
· Hakeem's previous CT he is reveal bibasilar reticulations, bibasilar ground-glass opacities and microcystic changes, bronchiectasis favoring an NSIP pattern  · Unfortunately no pulmonary function tests for me to review    Will help patient reschedule these at the time of his repeat CT chest  · Repeat CT chest in December 2022  · Follow-up to review both PFTs and CT after

## 2022-10-21 NOTE — ASSESSMENT & PLAN NOTE
· Likely who group 2/3  · Most recent echocardiogram with estimated peak PA pressure 41 mm Hg  · Continue supplemental oxygen  · Continued treatment of underlying cause

## 2022-10-21 NOTE — ASSESSMENT & PLAN NOTE
· Seen on 4 L with adequate oxygen saturations today in the office    · Recommend he continue supplemental oxygen to maintain saturations greater than 88%

## 2022-11-22 ENCOUNTER — OFFICE VISIT (OUTPATIENT)
Dept: CARDIOLOGY CLINIC | Facility: CLINIC | Age: 87
End: 2022-11-22

## 2022-11-22 VITALS
BODY MASS INDEX: 24.11 KG/M2 | DIASTOLIC BLOOD PRESSURE: 60 MMHG | WEIGHT: 150 LBS | HEIGHT: 66 IN | SYSTOLIC BLOOD PRESSURE: 112 MMHG | HEART RATE: 80 BPM | OXYGEN SATURATION: 85 %

## 2022-11-22 DIAGNOSIS — I65.23 BILATERAL CAROTID ARTERY STENOSIS: Primary | ICD-10-CM

## 2022-11-22 DIAGNOSIS — J84.9 ILD (INTERSTITIAL LUNG DISEASE) (HCC): Chronic | ICD-10-CM

## 2022-11-22 DIAGNOSIS — I48.11 LONGSTANDING PERSISTENT ATRIAL FIBRILLATION (HCC): ICD-10-CM

## 2022-11-22 DIAGNOSIS — I25.118 CORONARY ARTERY DISEASE OF NATIVE ARTERY OF NATIVE HEART WITH STABLE ANGINA PECTORIS (HCC): Chronic | ICD-10-CM

## 2022-11-22 NOTE — PROGRESS NOTES
Patient ID: Verda Boeck is a 82 y o  male  Plan:      Carotid artery stenosis  Prior Left CEA  Will re image  Coronary artery disease of native artery of native heart with stable angina pectoris ([facility] [address] )  No actual angina requiring NTG of late  Atrial fibrillation (HCC)  Rate controlled  Contrinue metoprolol   Eliquis 2 5 mg bid for stroke risk reduction-dose adjusted for age, renal function     ILD (interstitial lung disease) ([facility] [address] )  This is the biggest functional problem currently  Follow up Plan/Other summary comments:  Return in about 1 year (around 11/22/2023)  HPI:   Patient seen in f/u regarding the above issues  He is now on 4 L of oxygen  No angina  To reiterate, in 1992 he underwent 2 vessel CABG with vein graft to the circumflex and vein graft to the right coronary artery  In November 2005 he underwent repeat heart surgery with left internal mammary to the 1st diagonal and LAD, vein graft to the posterior descending and posterolateral branch, radial artery graft to obtuse marginal to obtuse marginal 1, and 9 TMR lesions were created  In 2009 he had stenting of high-grade stenosis of the graft to the 1st obtuse marginal vessel  The mammary graft was patent  At this point he is not interested in further stress testing for surveillance  There has been a left carotid endarterectomy as well  He is now on chronic O2 related to interstitial lung disease  Most recent or relevant cardiac/vascular testing:    Echo 02/21/2022 EF 65% moderate LVH, mildly impaired diastolic function  mild MR, moderate PI              Past Surgical History:   Procedure Laterality Date   • CARDIAC CATHETERIZATION  10/07/2009    Stent 99% stenosis SVG from the aorta to OM1   Patent mammary graft to the LAD   • CORONARY ARTERY BYPASS GRAFT  1992    VG-CX, VG-RCA   • CORONARY ARTERY BYPASS GRAFT  11/04/2005    LIMA-D1-LAD, VG-PDA-PLB, [id number] TMR 9 Lesions   • [place] EGD TRANSORAL BIOPSY SINGLE/MULTIPLE N/A 1/23/2019    Procedure: ESOPHAGOGASTRODUODENOSCOPY (EGD) with biopsy;  Surgeon: Tawanna Holly MD;  Location: [address] [place] [address] GI LAB; Service: Gastroenterology   • TONSILLECTOMY         Lipid Profile:   Lab Results   Component Value Date    TRIG 112 11/09/2021    HDL 40 11/09/2021         Review of Systems   10  point ROS  was otherwise non pertinent or negative except as per HPI or as below  Gait: Very slow        Objective:     /60   Pulse 80   Ht 5' 6" (1 676 m)   Wt 68 kg (150 lb)   SpO2 (!) 85% Comment: on O2 taken after walking in office  BMI 24 21 kg/m²     PHYSICAL EXAM:       General:  Normal appearance in no distress  Eyes:  Anicteric  Oral mucosa:  Moist   Neck:  No JVD  Carotid upstrokes are brisk without bruits  No masses  Chest:  Coarse rales throughout  Well healed midline sternotomy scar  Cardiac:  Irregularly irregular  No palpable PMI  Normal S1 and S2  No murmur gallop or rub  Abdomen:  Soft and nontender  No palpable organomegaly or aortic enlargement  Extremities:  No peripheral edema  Musculoskeletal:  Symmetric  Vascular:  Femoral pulses are brisk without bruits  Popliteal pulses are intact bilaterally  Pedal pulses are intact  Neuro:  Grossly symmetric  Psych:  Alert and oriented x3            Current Outpatient Medications:   •  amLODIPine (NORVASC) 10 mg tablet, Take 10 mg by mouth daily, Disp: , Rfl:   •  apixaban (ELIQUIS) 2 5 mg, Take 1 tablet (2 5 mg total) by mouth in the morning and 1 tablet (2 5 mg total) in the evening , Disp: 60 tablet, Rfl: 0  •  cholecalciferol (VITAMIN D3) 1,000 units tablet, Take 1 tablet (1,000 Units total) by mouth daily, Disp: 30 tablet, Rfl: 0  •  Insulin Glargine Solostar (Basaglar KwikPen) 100 UNIT/ML SOPN, Inject 0 1 mL (10 Units total) under the skin daily At hs, Disp: , Rfl:   •  isosorbide mononitrate (IMDUR) 60 mg 24 hr tablet, Take 1 tablet (60 mg total) by mouth daily In the morning, Disp: , Rfl: 0  • linaGLIPtin (Tradjenta) 5 MG TABS, Take 5 mg by mouth daily Take this for your diabetes instead of glimepiride, Disp: 30 tablet, Rfl: 0  •  Metoprolol Tartrate 75 MG TABS, TAKE 1 TABLET BY MOUTH EVERY 12 HOURS, Disp: 60 tablet, Rfl: 1  •  nitroglycerin (NITROSTAT) 0 4 mg SL tablet, TAKE 1 TABLET UNDER TONGUE FOR ANGINA, MAY REPEAT IN 15 MIN  NO MORE THAN 3 TIMES, Disp: , Rfl:   •  pantoprazole (PROTONIX) 40 mg tablet, Take 40 mg by mouth daily, Disp: , Rfl:   •  sucralfate (CARAFATE) 1 g tablet, Take 1 g by mouth 2 (two) times a day, Disp: , Rfl:   •  torsemide (DEMADEX) 20 mg tablet, Take 20 mg by mouth every other day and as needed, Disp: , Rfl:   •  atorvastatin (LIPITOR) 40 mg tablet, Take 1 tablet (40 mg total) by mouth daily with dinner This is an increased dose to better help prevent heart attack and stroke , Disp: 30 tablet, Rfl: 0  •  escitalopram (LEXAPRO) 10 mg tablet, Take 10 mg by mouth daily (Patient not taking: Reported on 10/5/2022), Disp: , Rfl:   •  pantoprazole (PROTONIX) 40 mg tablet, Take 1 tablet (40 mg total) by mouth daily, Disp: 10 tablet, Rfl: 0  •  tamsulosin (FLOMAX) 0 4 mg, Take 2 capsules (0 8 mg total) by mouth daily with dinner, Disp: 180 capsule, Rfl: 3  Allergies   Allergen Reactions   • Ibuprofen Fatigue     Other reaction(s): decreased kidney function     Past Medical History:   Diagnosis Date   • A-fib (HCC)    • Arthritis    • Athscl 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     Stent OM1   Patent mammary graft to LAD 10/7/2009; CABG 1992 & 2005   • Cancer [facility])     skin   • CHF (congestive heart failure) ([facility] [address] )    • Diabetes mellitus ([facility] [address] )    • Diabetes mellitus, type II ([facility] [address] )     without complication   • Epistaxis    • GERD (gastroesophageal reflux disease)    • Hx of cardiovascular stress test 07/23/2014    Eddie MPI; EF0 52 (52%) Lexiscan, mild LV systolic dysfunction; evidence for prior inferior wall MI; perfusion imaging consistant w mild lat wall ischemia and min torin-infart inferior ischemia   / EF0 51 (51%) evidence for prior inferior wall MI  Mild inferior and mild-moderate lateral wall ischemia  7/29/15   • Hx of echocardiogram 2017    2D w/CFD;EF0 55 (55%) mild LVH, mitral valve prolapse with moderate regurgitation  left atrial enlargement  Mild tricuspid regurgitation       • Hypertension    • Mixed hyperlipidemia    • Occlusion and stenosis of bilateral carotid arteries    • 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 infarction)    • Osteoporosis    • Presence of aortocoronary bypass graft            Social History     Tobacco Use   Smoking Status Former   • Packs/day: 1 00   • Years: 40 00   • Pack years: 40 00   • Types: Cigarettes   • Quit date: 1977   • Years since quittin 8   Smokeless Tobacco Never

## 2022-12-12 ENCOUNTER — HOSPITAL ENCOUNTER (OUTPATIENT)
Dept: CT IMAGING | Facility: HOSPITAL | Age: 87
Discharge: HOME | End: 2022-12-12

## 2022-12-12 ENCOUNTER — HOSPITAL ENCOUNTER (OUTPATIENT)
Dept: PULMONOLOGY | Facility: HOSPITAL | Age: 87
Discharge: HOME | End: 2022-12-12

## 2022-12-12 DIAGNOSIS — J84.9 ILD (INTERSTITIAL LUNG DISEASE) (HCC): ICD-10-CM

## 2022-12-12 RX ORDER — ALBUTEROL SULFATE 2.5 MG/3ML
2.5 SOLUTION RESPIRATORY (INHALATION) ONCE AS NEEDED
Status: COMPLETED | OUTPATIENT
Start: 2022-12-12 | End: 2022-12-12

## 2022-12-12 RX ADMIN — ALBUTEROL SULFATE 2.5 MG: 2.5 SOLUTION RESPIRATORY (INHALATION) at 13:41

## 2022-12-22 ENCOUNTER — TELEPHONE (OUTPATIENT)
Dept: NEPHROLOGY | Facility: CLINIC | Age: 87
End: 2022-12-22

## 2022-12-28 ENCOUNTER — HOSPITAL ENCOUNTER (OUTPATIENT)
Dept: NON INVASIVE DIAGNOSTICS | Facility: HOSPITAL | Age: 87
Discharge: HOME | End: 2022-12-28
Attending: INTERNAL MEDICINE

## 2022-12-28 DIAGNOSIS — I65.23 BILATERAL CAROTID ARTERY STENOSIS: ICD-10-CM

## 2022-12-30 ENCOUNTER — APPOINTMENT (OUTPATIENT)
Dept: LAB | Facility: MEDICAL CENTER | Age: 87
End: 2022-12-30

## 2022-12-30 DIAGNOSIS — M81.0 OSTEOPOROSIS, UNSPECIFIED OSTEOPOROSIS TYPE, UNSPECIFIED PATHOLOGICAL FRACTURE PRESENCE: ICD-10-CM

## 2022-12-30 DIAGNOSIS — E11.69 TYPE 2 DIABETES MELLITUS WITH OTHER SPECIFIED COMPLICATION, WITH LONG-TERM CURRENT USE OF INSULIN (HCC): ICD-10-CM

## 2022-12-30 DIAGNOSIS — N18.32 STAGE 3B CHRONIC KIDNEY DISEASE (HCC): ICD-10-CM

## 2022-12-30 DIAGNOSIS — Z79.4 TYPE 2 DIABETES MELLITUS WITH OTHER SPECIFIED COMPLICATION, WITH LONG-TERM CURRENT USE OF INSULIN (HCC): ICD-10-CM

## 2022-12-30 DIAGNOSIS — E78.5 HYPERLIPIDEMIA, UNSPECIFIED HYPERLIPIDEMIA TYPE: ICD-10-CM

## 2022-12-30 DIAGNOSIS — I25.10 ASCVD (ARTERIOSCLEROTIC CARDIOVASCULAR DISEASE): ICD-10-CM

## 2022-12-30 DIAGNOSIS — I10 HYPERTENSION, UNSPECIFIED TYPE: ICD-10-CM

## 2022-12-30 DIAGNOSIS — E55.9 VITAMIN D DEFICIENCY: ICD-10-CM

## 2022-12-30 DIAGNOSIS — E11.65 TYPE 2 DIABETES MELLITUS WITH HYPERGLYCEMIA, WITHOUT LONG-TERM CURRENT USE OF INSULIN (HCC): ICD-10-CM

## 2022-12-30 LAB
25(OH)D3 SERPL-MCNC: 30.6 NG/ML (ref 30–100)
ALBUMIN SERPL BCP-MCNC: 3.8 G/DL (ref 3.5–5)
ALP SERPL-CCNC: 82 U/L (ref 46–116)
ALT SERPL W P-5'-P-CCNC: 24 U/L (ref 12–78)
ANION GAP SERPL CALCULATED.3IONS-SCNC: 5 MMOL/L (ref 4–13)
AST SERPL W P-5'-P-CCNC: 14 U/L (ref 5–45)
BACTERIA UR QL AUTO: ABNORMAL /HPF
BASOPHILS # BLD AUTO: 0.06 THOUSANDS/ÂΜL (ref 0–0.1)
BASOPHILS NFR BLD AUTO: 1 % (ref 0–1)
BILIRUB SERPL-MCNC: 0.83 MG/DL (ref 0.2–1)
BILIRUB UR QL STRIP: NEGATIVE
BUN SERPL-MCNC: 21 MG/DL (ref 5–25)
CA-I BLD-SCNC: 1.35 MMOL/L (ref 1.12–1.32)
CALCIUM SERPL-MCNC: 11.1 MG/DL (ref 8.3–10.1)
CHLORIDE SERPL-SCNC: 104 MMOL/L (ref 96–108)
CHOLEST SERPL-MCNC: 115 MG/DL
CLARITY UR: CLEAR
CO2 SERPL-SCNC: 32 MMOL/L (ref 21–32)
COLOR UR: ABNORMAL
CREAT SERPL-MCNC: 1.93 MG/DL (ref 0.6–1.3)
CREAT UR-MCNC: 64.7 MG/DL
CREAT UR-MCNC: 64.7 MG/DL
EOSINOPHIL # BLD AUTO: 0.72 THOUSAND/ÂΜL (ref 0–0.61)
EOSINOPHIL NFR BLD AUTO: 7 % (ref 0–6)
ERYTHROCYTE [DISTWIDTH] IN BLOOD BY AUTOMATED COUNT: 13.1 % (ref 11.6–15.1)
GFR SERPL CREATININE-BSD FRML MDRD: 30 ML/MIN/1.73SQ M
GLUCOSE P FAST SERPL-MCNC: 175 MG/DL (ref 65–99)
GLUCOSE UR STRIP-MCNC: NEGATIVE MG/DL
HCT VFR BLD AUTO: 45.6 % (ref 36.5–49.3)
HDLC SERPL-MCNC: 43 MG/DL
HGB BLD-MCNC: 14 G/DL (ref 12–17)
HGB UR QL STRIP.AUTO: NEGATIVE
HYALINE CASTS #/AREA URNS LPF: ABNORMAL /LPF
IGA SERPL-MCNC: 279 MG/DL (ref 70–400)
IGG SERPL-MCNC: 1410 MG/DL (ref 700–1600)
IGM SERPL-MCNC: 80 MG/DL (ref 40–230)
IMM GRANULOCYTES # BLD AUTO: 0.05 THOUSAND/UL (ref 0–0.2)
IMM GRANULOCYTES NFR BLD AUTO: 1 % (ref 0–2)
KETONES UR STRIP-MCNC: NEGATIVE MG/DL
LDLC SERPL CALC-MCNC: 56 MG/DL (ref 0–100)
LEUKOCYTE ESTERASE UR QL STRIP: NEGATIVE
LYMPHOCYTES # BLD AUTO: 1.75 THOUSANDS/ÂΜL (ref 0.6–4.47)
LYMPHOCYTES NFR BLD AUTO: 18 % (ref 14–44)
MAGNESIUM SERPL-MCNC: 2.1 MG/DL (ref 1.6–2.6)
MCH RBC QN AUTO: 28.6 PG (ref 26.8–34.3)
MCHC RBC AUTO-ENTMCNC: 30.7 G/DL (ref 31.4–37.4)
MCV RBC AUTO: 93 FL (ref 82–98)
MICROALBUMIN UR-MCNC: 9.3 MG/L (ref 0–20)
MICROALBUMIN/CREAT 24H UR: 14 MG/G CREATININE (ref 0–30)
MONOCYTES # BLD AUTO: 0.76 THOUSAND/ÂΜL (ref 0.17–1.22)
MONOCYTES NFR BLD AUTO: 8 % (ref 4–12)
NEUTROPHILS # BLD AUTO: 6.63 THOUSANDS/ÂΜL (ref 1.85–7.62)
NEUTS SEG NFR BLD AUTO: 65 % (ref 43–75)
NITRITE UR QL STRIP: NEGATIVE
NON-SQ EPI CELLS URNS QL MICRO: ABNORMAL /HPF
NONHDLC SERPL-MCNC: 72 MG/DL
NRBC BLD AUTO-RTO: 0 /100 WBCS
PH UR STRIP.AUTO: 5.5 [PH]
PHOSPHATE SERPL-MCNC: 4 MG/DL (ref 2.3–4.1)
PLATELET # BLD AUTO: 207 THOUSANDS/UL (ref 149–390)
PMV BLD AUTO: 10.6 FL (ref 8.9–12.7)
POTASSIUM SERPL-SCNC: 4.4 MMOL/L (ref 3.5–5.3)
PROT SERPL-MCNC: 7.9 G/DL (ref 6.4–8.4)
PROT UR STRIP-MCNC: NEGATIVE MG/DL
PROT UR-MCNC: 13 MG/DL
PROT/CREAT UR: 0.2 MG/G{CREAT} (ref 0–0.1)
PTH-INTACT SERPL-MCNC: 88.8 PG/ML (ref 18.4–80.1)
RBC # BLD AUTO: 4.9 MILLION/UL (ref 3.88–5.62)
RBC #/AREA URNS AUTO: ABNORMAL /HPF
SODIUM SERPL-SCNC: 141 MMOL/L (ref 135–147)
SP GR UR STRIP.AUTO: 1.01 (ref 1–1.03)
TRIGL SERPL-MCNC: 81 MG/DL
UROBILINOGEN UR STRIP-ACNC: <2 MG/DL
WBC # BLD AUTO: 9.97 THOUSAND/UL (ref 4.31–10.16)
WBC #/AREA URNS AUTO: ABNORMAL /HPF

## 2022-12-31 LAB
EST. AVERAGE GLUCOSE BLD GHB EST-MCNC: 166 MG/DL
HBA1C MFR BLD: 7.4 %

## 2023-01-01 ENCOUNTER — TELEPHONE (OUTPATIENT)
Dept: FAMILY MEDICINE CLINIC | Facility: CLINIC | Age: 88
End: 2023-01-01

## 2023-01-01 ENCOUNTER — TELEPHONE (OUTPATIENT)
Dept: ENDOCRINOLOGY | Facility: CLINIC | Age: 88
End: 2023-01-01

## 2023-01-03 LAB
KAPPA LC FREE SER-MCNC: 75.4 MG/L (ref 3.3–19.4)
KAPPA LC FREE/LAMBDA FREE SER: 1.68 {RATIO} (ref 0.26–1.65)
LAMBDA LC FREE SERPL-MCNC: 45 MG/L (ref 5.7–26.3)

## 2023-01-09 DIAGNOSIS — E11.65 TYPE 2 DIABETES MELLITUS WITH HYPERGLYCEMIA, WITHOUT LONG-TERM CURRENT USE OF INSULIN (HCC): ICD-10-CM

## 2023-01-09 RX ORDER — INSULIN GLARGINE 100 [IU]/ML
10 INJECTION, SOLUTION SUBCUTANEOUS DAILY
Qty: 3 ML | Refills: 2 | Status: SHIPPED | OUTPATIENT
Start: 2023-01-09 | End: 2023-02-08

## 2023-01-30 ENCOUNTER — OFFICE VISIT (OUTPATIENT)
Dept: PULMONOLOGY | Facility: CLINIC | Age: 88
End: 2023-01-30

## 2023-01-30 VITALS
BODY MASS INDEX: 26.33 KG/M2 | DIASTOLIC BLOOD PRESSURE: 70 MMHG | HEART RATE: 69 BPM | TEMPERATURE: 97.7 F | OXYGEN SATURATION: 97 % | HEIGHT: 66 IN | SYSTOLIC BLOOD PRESSURE: 143 MMHG | WEIGHT: 163.8 LBS

## 2023-01-30 DIAGNOSIS — R91.1 PULMONARY NODULE: ICD-10-CM

## 2023-01-30 DIAGNOSIS — J96.11 CHRONIC RESPIRATORY FAILURE WITH HYPOXIA (HCC): Primary | ICD-10-CM

## 2023-01-30 DIAGNOSIS — J98.4 RESTRICTIVE LUNG DISEASE: ICD-10-CM

## 2023-01-30 DIAGNOSIS — I27.20 PULMONARY HYPERTENSION (HCC): ICD-10-CM

## 2023-01-30 DIAGNOSIS — J84.9 ILD (INTERSTITIAL LUNG DISEASE) (HCC): ICD-10-CM

## 2023-01-30 NOTE — PROGRESS NOTES
Pulmonary Follow Up Note   Polly Pae 82 y o  male MRN: [id number]  1/30/2023      Assessment:    ILD (interstitial lung disease) ([facility] [address] )  · Reviewed Hakeem's updated CT chest without contrast and pulmonary function test with him and his daughter in the office today  · 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 but do reveal an NSIP pattern of interstitial lung disease  · Pulmonary function test confirm a moderate restriction in the setting of ILD  · Previous autoimmune work-up was within normal limits with the exception of isolated eosinophil elevation  Recommend repeat CBC with differential and ANCA prior to his next appointment with Dr Mateus Boykin  · Recommended pulmonary rehab for which patient and daughter were both agreeable to    Chronic respiratory failure with hypoxia (HCC)  · Seen on 4 L nasal cannula with adequate oxygen saturations today in the office  · Continue supplemental oxygen at 4 L nasal cannula to maintain saturations greater than 88%  · Pulmonary rehab as above  Pulmonary hypertension (HCC)  · Likely who group 2/3  · Most recent echocardiogram with estimated peak PA pressure 41 mm Hg  · Continue supplemental oxygen  · Continued treatment of underlying cause    Restrictive lung disease  · Noted on PFTs 12/12/22  · See plan for ILD    Pulmonary nodule  · No new or suspicious pulmonary nodules on recent CT chest      · 7 mm right lower lobe basilar paramediastinal nodule is stable since March 2021  Plan:    Diagnoses and all orders for this visit:    Chronic respiratory failure with hypoxia [facility])  -     Ambulatory Referral to Pulmonary Rehabilitation; Future    ILD (interstitial lung disease) (HCC)  -     CBC and differential; Future  -     Anti-neutrophilic cytoplasmic antibody; Future  -     Ambulatory Referral to Pulmonary Rehabilitation; Future    Pulmonary hypertension ([facility] )  -     Ambulatory Referral to Pulmonary Rehabilitation;  Future    Restrictive lung disease  -     Ambulatory Referral to Pulmonary Rehabilitation; Future    Pulmonary nodule        Return for Next scheduled follow up  History of Present Illness   HPI:  Fatou Alicia is a 82 y o  male who presents the office today for routine follow-up to go over PFTs/CT chest   Patient has a past medical history positive for CAD status post CABG x2, hypertension, dyslipidemia, chronic diastolic CHF, diabetes mellitus type 2, chronic hypoxic respiratory failure, ILD  Patient last seen in our office 10/21/2022  Since then patient reports having COVID last month but did not require hospitalization  Patient reports that he is recovering well from this most part but does have occasional cough productive of yellow sputum  No hemoptysis  Denies wheezing  He endorses chronic dyspnea on exertion but states that he is able to get by without much difficulty  Overall in the last 3 months he does feel like he is been stable  His daughter he was with him in the office today agrees  Review of Systems   All other systems reviewed and are negative  Historical Information   Past Medical History:   Diagnosis Date   • A-fib [facility])    • Arthritis    • Athscl 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     Stent OM1  Patent mammary graft to LAD 10/7/2009; CABG 1992 & 2005   • Cancer [facility])     skin   • CHF (congestive heart failure) ([facility] [address] )    • Diabetes mellitus ([facility] [address] )    • Diabetes mellitus, type II ([facility] [address] )     without complication   • Epistaxis    • GERD (gastroesophageal reflux disease)    • Hx of cardiovascular stress test 07/23/2014    Eddie MPI; EF0 52 (52%) Lexiscan, mild LV systolic dysfunction; evidence for prior inferior wall MI; perfusion imaging consistant w mild lat wall ischemia and min torin-infart inferior ischemia   / EF0 51 (51%) evidence for prior inferior wall MI  Mild inferior and mild-moderate lateral wall ischemia   7/29/15   • Hx of echocardiogram 11/07/2017    2D w/CFD;EF0 55 (55%) mild LVH, mitral valve prolapse with moderate regurgitation  left atrial enlargement  Mild tricuspid regurgitation  • Hypertension    • Mixed hyperlipidemia    • Occlusion and stenosis of bilateral carotid arteries    • 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 infarction)    • Osteoporosis    • Presence of aortocoronary bypass graft      Past Surgical History:   Procedure Laterality Date   • CARDIAC CATHETERIZATION  10/07/2009    Stent 99% stenosis SVG from the aorta to OM1  Patent mammary graft to the LAD   • CORONARY ARTERY BYPASS GRAFT      VG-CX, VG-RCA   • CORONARY ARTERY BYPASS GRAFT  2005    LIMA-D1-LAD, VG-PDA-PLB, [id number] TMR 9 Lesions   • [place] EGD TRANSORAL BIOPSY SINGLE/MULTIPLE N/A 2019    Procedure: ESOPHAGOGASTRODUODENOSCOPY (EGD) with biopsy;  Surgeon: Bg Parra MD;  Location: [address] [place] [address] GI LAB;   Service: Gastroenterology   • TONSILLECTOMY       Family History   Problem Relation Age of Onset   • Heart disease Brother    • No Known Problems Mother    • Tuberculosis Father        Social History     Tobacco Use   Smoking Status Former   • Packs/day: 1 00   • Years: 40 00   • Pack years: 40 00   • Types: Cigarettes   • Quit date: 1977   • Years since quittin 0   Smokeless Tobacco Never         Meds/Allergies     Current Outpatient Medications:   •  amLODIPine (NORVASC) 10 mg tablet, Take 10 mg by mouth daily, Disp: , Rfl:   •  apixaban (ELIQUIS) 2 5 mg, Take 1 tablet (2 5 mg total) by mouth in the morning and 1 tablet (2 5 mg total) in the evening , Disp: 60 tablet, Rfl: 0  •  atorvastatin (LIPITOR) 40 mg tablet, Take 1 tablet (40 mg total) by mouth daily with dinner This is an increased dose to better help prevent heart attack and stroke , Disp: 30 tablet, Rfl: 0  •  cholecalciferol (VITAMIN D3) 1,000 units tablet, Take 1 tablet (1,000 Units total) by mouth daily, Disp: 30 tablet, Rfl: 0  •  Insulin Glargine Solostar (Basaglar KwikPen) 100 UNIT/ML SOPN, Inject 0 1 mL (10 Units total) under the skin daily At hs, Disp: 3 mL, Rfl: 2  • isosorbide mononitrate (IMDUR) 60 mg 24 hr tablet, Take 1 tablet (60 mg total) by mouth daily In the morning, Disp: , Rfl: 0  •  linaGLIPtin (Tradjenta) 5 MG TABS, Take 5 mg by mouth daily Take this for your diabetes instead of glimepiride, Disp: 30 tablet, Rfl: 0  •  Metoprolol Tartrate 75 MG TABS, TAKE 1 TABLET BY MOUTH EVERY 12 HOURS, Disp: 60 tablet, Rfl: 1  •  nitroglycerin (NITROSTAT) 0 4 mg SL tablet, TAKE 1 TABLET UNDER TONGUE FOR ANGINA, MAY REPEAT IN 15 MIN  NO MORE THAN 3 TIMES, Disp: , Rfl:   •  pantoprazole (PROTONIX) 40 mg tablet, Take 40 mg by mouth daily, Disp: , Rfl:   •  sucralfate (CARAFATE) 1 g tablet, Take 1 g by mouth 2 (two) times a day, Disp: , Rfl:   •  tamsulosin (FLOMAX) 0 4 mg, Take 2 capsules (0 8 mg total) by mouth daily with dinner, Disp: 180 capsule, Rfl: 1  •  torsemide (DEMADEX) 20 mg tablet, Take 20 mg by mouth every other day and as needed, Disp: , Rfl:   •  escitalopram (LEXAPRO) 10 mg tablet, Take 10 mg by mouth daily (Patient not taking: Reported on 10/5/2022), Disp: , Rfl:   •  pantoprazole (PROTONIX) 40 mg tablet, Take 1 tablet (40 mg total) by mouth daily (Patient not taking: Reported on 1/30/2023), Disp: 10 tablet, Rfl: 0  Allergies   Allergen Reactions   • Ibuprofen Fatigue     Other reaction(s): decreased kidney function       Vitals: Blood pressure 143/70, pulse 69, temperature 97 7 °F (36 5 °C), temperature source Tympanic, height 5' 6" (1 676 m), weight 74 3 kg (163 lb 12 8 oz), SpO2 97 %  Body mass index is 26 44 kg/m²  Oxygen Therapy  SpO2: 97 %  Oxygen Therapy: Supplemental oxygen  O2 Delivery Method: Nasal cannula  O2 Flow Rate (L/min): 4 L/min    Physical Exam  Physical Exam  Vitals reviewed  Constitutional:       Appearance: Normal appearance  He is well-developed  HENT:      Head: Normocephalic and atraumatic  Nose: Nose normal       Mouth/Throat:      Mouth: Mucous membranes are moist    Eyes:      Extraocular Movements: Extraocular movements intact  Cardiovascular:      Rate and Rhythm: Normal rate and regular rhythm  Heart sounds: Normal heart sounds  Pulmonary:      Effort: Pulmonary effort is normal  No respiratory distress  Breath sounds: No wheezing, rhonchi or rales  Musculoskeletal:         General: No swelling  Cervical back: Normal range of motion and neck supple  Skin:     General: Skin is warm and dry  Neurological:      General: No focal deficit present  Mental Status: He is alert  Mental status is at baseline  Psychiatric:         Mood and Affect: Mood normal          Behavior: Behavior normal          Labs: I have personally reviewed pertinent lab results  , ABG: No results found for: PHART, CRL6HTG, PO2ART, XIA5CRN, A4EGILBT, BEART, SOURCE, BNP: No results found for: BNP, CBC: No results found for: WBC, HGB, HCT, MCV, PLT, ADJUSTEDWBC, MCH, MCHC, RDW, MPV, NRBC, CMP: No results found for: SODIUM, K, CL, CO2, ANIONGAP, BUN, CREATININE, GLUCOSE, CALCIUM, AST, ALT, ALKPHOS, PROT, BILITOT, EGFR, PT/INR: No results found for: PT, INR, Troponin: No results found for: TROPONINI  Lab Results   Component Value Date    WBC 9 97 12/30/2022    HGB 14 0 12/30/2022    HCT 45 6 12/30/2022    MCV 93 12/30/2022     12/30/2022     Lab Results   Component Value Date    CALCIUM 11 1 (H) 12/30/2022    K 4 4 12/30/2022    CO2 32 12/30/2022     12/30/2022    BUN 21 12/30/2022    CREATININE 1 93 (H) 12/30/2022     No results found for: IGE  Lab Results   Component Value Date    ALT 24 12/30/2022    AST 14 12/30/2022    ALKPHOS 82 12/30/2022       Imaging and other studies: I have personally reviewed pertinent reports  and I have personally reviewed pertinent films in PACS     CT chest without contrast 12/12/2022  7 mm right basilar paramediastinal nodule stable since March 2021  No new concerning pulmonary nodules  Area of benign calcified granulomas, inspissated mucus, linear scarring are all stable    Redemonstration of subpleural reticulation, traction bronchiectasis without honeycombing noted bilaterally    Pulmonary function testing:  Performed 12/12/2022   FEV1/FVC ratio 77%   FEV1 65% predicted  FVC 64% predicted  no response to bronchodilators  TLC 64 % predicted  RV 74 % predicted  DLCO corrected for hemoglobin 45 % predicted  Impression: Moderate restrictive ventilatory flow limitation without significant change after administration of bronchodilator  Restrictive pattern on flow volume loop  Reduced TLC consistent with restriction  Severe reduction in corrected diffusion capacity

## 2023-01-30 NOTE — ASSESSMENT & PLAN NOTE
· Seen on 4 L nasal cannula with adequate oxygen saturations today in the office  · Continue supplemental oxygen at 4 L nasal cannula to maintain saturations greater than 88%  · Pulmonary rehab as above

## 2023-01-30 NOTE — ASSESSMENT & PLAN NOTE
· Reviewed Hakeem's updated CT chest without contrast and pulmonary function test with him and his daughter in the office today  · 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 but do reveal an NSIP pattern of interstitial lung disease  · Pulmonary function test confirm a moderate restriction in the setting of ILD  · Previous autoimmune work-up was within normal limits with the exception of isolated eosinophil elevation  Recommend repeat CBC with differential and ANCA prior to his next appointment with Dr Malathi Razo    · Recommended pulmonary rehab for which patient and daughter were both agreeable to

## 2023-01-30 NOTE — ASSESSMENT & PLAN NOTE
· No new or suspicious pulmonary nodules on recent CT chest      · 7 mm right lower lobe basilar paramediastinal nodule is stable since March 2021

## 2023-02-02 ENCOUNTER — APPOINTMENT (OUTPATIENT)
Dept: LAB | Facility: MEDICAL CENTER | Age: 88
End: 2023-02-02

## 2023-02-02 DIAGNOSIS — N18.32 STAGE 3B CHRONIC KIDNEY DISEASE (HCC): ICD-10-CM

## 2023-02-02 DIAGNOSIS — J84.9 ILD (INTERSTITIAL LUNG DISEASE) (HCC): ICD-10-CM

## 2023-02-02 LAB
BASOPHILS # BLD AUTO: 0.08 THOUSANDS/ÂΜL (ref 0–0.1)
BASOPHILS NFR BLD AUTO: 1 % (ref 0–1)
CRP SERPL QL: <3 MG/L
EOSINOPHIL # BLD AUTO: 0.72 THOUSAND/ÂΜL (ref 0–0.61)
EOSINOPHIL NFR BLD AUTO: 8 % (ref 0–6)
ERYTHROCYTE [DISTWIDTH] IN BLOOD BY AUTOMATED COUNT: 13 % (ref 11.6–15.1)
HCT VFR BLD AUTO: 42.6 % (ref 36.5–49.3)
HGB BLD-MCNC: 13.4 G/DL (ref 12–17)
IMM GRANULOCYTES # BLD AUTO: 0.04 THOUSAND/UL (ref 0–0.2)
IMM GRANULOCYTES NFR BLD AUTO: 0 % (ref 0–2)
LYMPHOCYTES # BLD AUTO: 2.16 THOUSANDS/ÂΜL (ref 0.6–4.47)
LYMPHOCYTES NFR BLD AUTO: 24 % (ref 14–44)
MCH RBC QN AUTO: 28.3 PG (ref 26.8–34.3)
MCHC RBC AUTO-ENTMCNC: 31.5 G/DL (ref 31.4–37.4)
MCV RBC AUTO: 90 FL (ref 82–98)
MONOCYTES # BLD AUTO: 0.68 THOUSAND/ÂΜL (ref 0.17–1.22)
MONOCYTES NFR BLD AUTO: 7 % (ref 4–12)
NEUTROPHILS # BLD AUTO: 5.49 THOUSANDS/ÂΜL (ref 1.85–7.62)
NEUTS SEG NFR BLD AUTO: 60 % (ref 43–75)
NRBC BLD AUTO-RTO: 0 /100 WBCS
PLATELET # BLD AUTO: 210 THOUSANDS/UL (ref 149–390)
PMV BLD AUTO: 10.5 FL (ref 8.9–12.7)
PTH-INTACT SERPL-MCNC: 73.7 PG/ML (ref 18.4–80.1)
RBC # BLD AUTO: 4.73 MILLION/UL (ref 3.88–5.62)
WBC # BLD AUTO: 9.17 THOUSAND/UL (ref 4.31–10.16)

## 2023-02-06 LAB
C-ANCA TITR SER IF: NORMAL TITER
MYELOPEROXIDASE AB SER IA-ACNC: <0.2 UNITS (ref 0–0.9)
P-ANCA ATYPICAL TITR SER IF: NORMAL TITER
P-ANCA TITR SER IF: NORMAL TITER
PROTEINASE3 AB SER IA-ACNC: <0.2 UNITS (ref 0–0.9)

## 2023-02-08 ENCOUNTER — CLINICAL SUPPORT (OUTPATIENT)
Dept: CARDIAC REHAB | Facility: HOSPITAL | Age: 88
End: 2023-02-08

## 2023-02-08 DIAGNOSIS — I25.118 CORONARY ARTERY DISEASE OF NATIVE ARTERY OF NATIVE HEART WITH STABLE ANGINA PECTORIS (HCC): Primary | ICD-10-CM

## 2023-02-08 DIAGNOSIS — I48.0 PAROXYSMAL ATRIAL FIBRILLATION (HCC): ICD-10-CM

## 2023-02-08 DIAGNOSIS — Z98.61 S/P PTCA (PERCUTANEOUS TRANSLUMINAL CORONARY ANGIOPLASTY): ICD-10-CM

## 2023-02-08 DIAGNOSIS — Z95.1 S/P CABG (CORONARY ARTERY BYPASS GRAFT): ICD-10-CM

## 2023-02-08 DIAGNOSIS — I25.118 CORONARY ARTERY DISEASE OF NATIVE ARTERY OF NATIVE HEART WITH STABLE ANGINA PECTORIS (HCC): ICD-10-CM

## 2023-02-08 DIAGNOSIS — Z95.1 HISTORY OF CORONARY ARTERY BYPASS GRAFT: Primary | ICD-10-CM

## 2023-02-08 NOTE — PROGRESS NOTES
Moon Sanchez([facility])   Hi Dr Melanie Haley [birth year] was ordered pulm rehab He had cabg 1992 and 2005 also a stent in 2009  May we have an order for cardiac rehab   He never had it and we will monitor him and get better coverage  Thanks       Put in a referral please   Thanks

## 2023-02-08 NOTE — PROGRESS NOTES
[place]        Dear Dr Fierro Dear,    Emotional well-being and depression is addressed in the Cardiac  rehab evaluation  To assess the severity of depression, patients are given the PHQ-9 Depression Questionnaire  This is to inform you that your patient Prabhu Haddad scored a 12 which is interpreted as 10-14 = Moderate Depression  Your patient was provided contact information for [facility] and has been [facility] [place] [facility]  A repeat PHQ -9 will be administered in 30 days to assess improvement  Thank you for your support of cardiopulmonary rehab      Sincerely,      Brea Akers RN

## 2023-02-08 NOTE — PROGRESS NOTES
Cardiac Rehabilitation Plan of Care   Initial          Today's date: 2023   # of Exercise Sessions Completed:   Patient name: Raphael Easley      : 1935  Age: 82 y o  MRN: [id number]  Referring Physician: Cheryl Neely MD  Cardiologist: Aura Camejo  Provider: [facility]  Clinician: Antonietta Maddxo RN    Dx: CABG history of 9355/3419  Stent / Chronic respiratory failure  with hypoxia ,Interstitial lung disease  Date of onset: 2023      SUMMARY OF PROGRESS: Today is Yuridia Perez evaluation to begin Cardiac Rehab post  cabg in  and stent in   He has been having dyspnea and fatique and wants to get more active  The patient does not currently follow a formal exercise program at home  He has resumed light to moderate ADLs reporting dyspnea with activity and reporting weakness and fatigue  Depression screening using the PHQ-9 interprets the patient's score of  12  as  10-14 = Moderate Depression  JI-7 screening tool for anxiety suggests 11  10-14 = Moderate anxiety  When addressed, the patient does having depression/anxiety  He reports excellent social/emotional support from his 6 daughters and grandchildren His PHQ-9 score will be reassessed in 30 days due to an initial score revealing concern for depression  They rate stress   4/10 with the following stressors: his health issues and finance  Stress management will be reviewed  The patient is a former smoker (quit 50 years ago)     Patient admits to 100% medication compliance  His daughter prepares  his medication for him  He reports the following physical limitations:bilateral knee arthritis  The patient completed an initial 6MWT  Kam Forbes The patient walked  525 feet 1 7 METs  Resting  /60 After walking the moreau his BP was 130/60  Kam Ernesto Blood Pressure will be monitored throughout the program and cardiologist will be notified of elevated trends  Patient reported no symptoms with exercise  Kam Coffrich Telemetry revealed atrial fibrillation with a controlled rate  He was counseled on exercise guidelines to achieve a minimum of 150 mins/wk of moderate intensity (RPE 4-6) exercise and encouraged to add 1-2 days of exercise on opposite days of cardiac rehab as tolerated  We discussed current dietary habits and goals of heart healthy eating for lipid management and diabetes management  The patient has T2D  His personal goals include:  getting his strength back  Birdia Mc His CAD risk factors include:  inactivity, hypertension, hyperlipidemia and diabetes  His education will focus on lifestyle modification/education specific to His needs  Patient will  recieve education classes on heart healthy eating, reading food labels, stress management, risk factor reduction, understanding heart disease and common heart medications    He will attend 35 monitored exercise sessions, 3x/wk for 12-18 weeks beginning 23       Medication compliance: Yes   Comments: Pt reports to be compliant with medications  Fall Risk: High   Comments: Patient uses walking assist device (walker/cane/rollator) and Reports a fall in the past 6 months    EKG Interpretation:  Controlled atrial fibrillation      EXERCISE ASSESSMENT and PLAN    Exercise Prescription:      Frequency: 3days/week   Supplement with home exercise 2+ days/wk as tolerated       Minutes: 6        METS: 1 7            HR: 82-90   RPE: 4-6         Modalities: Room walking      30 Day Goals for Exercise Progression:    Frequency: 3days/week of cardiac rehab       Supplement with home exercise 2+ days/wk as tolerated    Minutes: 30-40                              >150 mins/wk of moderate intensity exercise   METS:    HR: 20-30bpm above resting   RPE: 4-6   Modalities: UBE, NuStep, Recumbent bike and Room walking    Strength trainin-3 days / week  12-15 repetitions  1-2 sets per modality   Will be added following 2-3 weeks of monitored exercise sessions   Modalities: Chest Press, Pull Downs, Lateral Raise, Arm Extension, Arm Curl and Front Raises    Home Exercise: none    Goals: 10% improvement in functional capacity - based on max METs achieved in fitness assessment, Reduced dyspnea with physical activity  0-1/10, improved DASI score by 10%, Increase in exercise capacity by 40% - based on peak METs tolerated in cardiac rehab exercise session, Exercise 5 days/wk, >150mins/wk of moderate intensity exercise, Improved 6MWT distance by 10%, Attend Rehab regularly and Start a walking program    Progression Toward Goals:  Reviewed Pt goals and determined plan of care, Will continue to educate and progress as tolerated  Education: benefit of exercise for CAD risk factors and class: Risk Factors for Heart Disease   Plan:Education class: Risk Factors for Heart Disease  Readiness to change: Pre-Contemplation:   (Not yet acknowledging that there is a problem behavior that needs to change)      NUTRITION ASSESSMENT AND PLAN    Weight control:    Starting weight: 163   Current weight:     Waist circumference:    Starting:    Current:      Diabetes: T2D, on Insulin   A1c: 8    last measured: 05/12/22    Lipid management: Last lipid profile 12/30/22  Chol 115  TRG 81  HDL 43  LDL 56    Goals:improved A1c  < 7 0%, choose lean meat (93-95%), eliminate processed meats, reduce portion sizes of meat to 3oz or less, increase intake of fish, shellfish, cook without added fat or use vegetable oil/spray, increase intake of meatless meals, use low fat dairy, reduce cheese intake or use reduced-fat, eat 3 or more servings of whole grains a day and Eat 4-5 cups of fruits and vegetables daily    Measurable goals were based Rate Your Plate Dietary Self-Assessment  These are the areas in which the patient could score higher on the assessment  Goals include recommendations for a heart healthy diet based on American Heart Association      Progression Toward Goals: Reviewed Pt goals and determined plan of care, Will continue to educate and progress as tolerated  Education: heart healthy eating  low sodium diet  nutrition for  lipid management  nutrition for Improved BG control  healthy choices while dining out  portion control  education class: Heart Healthy Eating  education class:  Label Reading  Plan: Education class: Reading Food Labels, Education Class: Heart Healthy Eating, replace butter with soft spreads made with olive oil, canola or yogurt, replace refined grain bread with whole grain bread, replace unhealthy snacks with fruits & vegs, reduce portion sizes, reduce red meat 1x/wk, switch to skim or 1% milk, eat fewer desserts and sweets, avoid processed foods, eat more home cooked meals and eat out less often, will try new grains like brown rice, quinoa, farro, monitor home blood glucose, drink more water and learn how to read food labels  Readiness to change: Pre-Contemplation:   (Not yet acknowledging that there is a problem behavior that needs to change)      PSYCHOSOCIAL ASSESSMENT AND PLAN    Emotional:  Depression assessment:  PHQ-9 = 12  10-14 = Moderate Depression            Anxiety measure:  JI-7 = 11  10-14 = Moderate anxiety  Self-reported stress level:  6  Social support: Excellent    Goals:  PHQ-9 - reduced severity by one level, Feelings in [place] Score < 3, Physical Fitness in [place] Score < 3, Social Support in [place] Score < 3, Daily Activity in [place] Score < 3, Social Activities in [place] Score < 3 and Pain in [place] Score < 3    Progression Toward Goals: Reviewed Pt goals and determined plan of care, Will continue to educate and progress as tolerated      Education: signs/sxs of depression, benefits of a positive support system, stress management techniques, class:  Stress and Your Health , class:  Relaxation and class:  Stress and Pulmonary Disease  Plan: PHQ-9 >5 will refer to MD, Practice relaxation techniques, Exercise, Spend time outdoors, Enjoy a hobby, Read a Book, Keep a positive mindset, Learn how to relax and slow down, Meet new people and Enjoy family  Readiness to change: Pre-Contemplation:   (Not yet acknowledging that there is a problem behavior that needs to change)      OTHER CORE COMPONENTS     Tobacco:   Social History     Tobacco Use   Smoking Status Former   • Packs/day: 1 00   • Years: 40 00   • Pack years: 40 00   • Types: Cigarettes   • Quit date: 1977   • Years since quittin 0   Smokeless Tobacco Never       Tobacco Use Intervention:   N/A: Pt has a remote history of smoking    Anginal Symptoms:  None   NTG use: No prescription    Blood pressure:    Restin/60   Exercise: 130/60    Goals: consistent BP < 130/80, moderate intensity exercise >150 mins/wk, medication compliance and reduce number of medications  needed for BP control    Progression Toward Goals: Reviewed Pt goals and determined plan of care, Will continue to educate and progress as tolerated      Education:  understanding high blood pressure and it's relationship to CAD, low sodium diet and HTN, Education class:  Common Heart Medications and Education class: Understanding Heart Disease  Plan: Class: Understanding Heart Disease, Class: Common Heart Medications, avoid places with second hand smoke, Avoid Processed foods, engage in regular exercise and check labels for sodium content  Readiness to change: Pre-Contemplation:   (Not yet acknowledging that there is a problem behavior that needs to change)

## 2023-02-08 NOTE — PROGRESS NOTES
CARDIAC REHAB ASSESSMENT    Today's date: 2023  Patient name: Roselyn Alvarenga     : 1935       MRN: [id number]  PCP: Robert Green DO  Referring Physician: Tahira Pavon MD  Cardiologist: Tahira Pavon  Surgeon:   Dx: CABG in  and  cardiac stent in  /Interstitial lung disease/T2 diadetic/Chronic respiratory failure  With hypoxia  Date of onset:   Cultural needs: none    Weight    Wt Readings from Last 1 Encounters:   23 74 3 kg (163 lb 12 8 oz)      Height:   Ht Readings from Last 1 Encounters:   23 5' 6" (1 676 m)     Medical History:   Past Medical History:   Diagnosis Date   • A-fib ([facility] [address] )    • Arthritis    • Athscl 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     Stent OM1  Patent mammary graft to LAD 10/7/2009; CABG  &    • Cancer [facility])     skin   • CHF (congestive heart failure) ([facility] [address] )    • Diabetes mellitus ([facility] [address] )    • Diabetes mellitus, type II ([facility] [address] )     without complication   • Epistaxis    • GERD (gastroesophageal reflux disease)    • Hx of cardiovascular stress test 2014    Eddie MPI; EF0 52 (52%) Lexiscan, mild LV systolic dysfunction; evidence for prior inferior wall MI; perfusion imaging consistant w mild lat wall ischemia and min torin-infart inferior ischemia   / EF0 51 (51%) evidence for prior inferior wall MI  Mild inferior and mild-moderate lateral wall ischemia  7/29/15   • Hx of echocardiogram 2017    2D w/CFD;EF0 55 (55%) mild LVH, mitral valve prolapse with moderate regurgitation  left atrial enlargement  Mild tricuspid regurgitation       • Hypertension    • Mixed hyperlipidemia    • Occlusion and stenosis of bilateral carotid arteries    • 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 infarction)    • Osteoporosis    • Presence of aortocoronary bypass graft          Physical Limitations: arthritis in both knees    Fall Risk: High   Comments: Patient uses walking assist device (walker/cane/rollator) and Reports a fall in the past 6 months    Anginal Equivalent: None/denies angina   NTG use: No prescription    Risk Factors   Cholesterol: Yes  Smoking: Former user  HTN: Yes  DM: insulin  Obesity: No   Inactivity: Yes  Stress:  perceived  stress: 5/10   Stressors: Health issues   Goals for Stress Management:Practice Relaxation Techniques, Read, Exercise, Maintain a stress diary, Keep a positive mindset and Enjoy a hobby    Family History:  Family History   Problem Relation Age of Onset   • Heart disease Brother    • No Known Problems Mother    • Tuberculosis Father        Allergies: Ibuprofen  ETOH:   Social History     Substance and Sexual Activity   Alcohol Use Never         Current Medications:   Current Outpatient Medications   Medication Sig Dispense Refill   • amLODIPine (NORVASC) 10 mg tablet Take 10 mg by mouth daily     • apixaban (ELIQUIS) 2 5 mg Take 1 tablet (2 5 mg total) by mouth in the morning and 1 tablet (2 5 mg total) in the evening  60 tablet 0   • atorvastatin (LIPITOR) 40 mg tablet Take 1 tablet (40 mg total) by mouth daily with dinner This is an increased dose to better help prevent heart attack and stroke  30 tablet 0   • cholecalciferol (VITAMIN D3) 1,000 units tablet Take 1 tablet (1,000 Units total) by mouth daily 30 tablet 0   • escitalopram (LEXAPRO) 10 mg tablet Take 10 mg by mouth daily (Patient not taking: Reported on 10/5/2022)     • Insulin Glargine Solostar (Basaglar KwikPen) 100 UNIT/ML SOPN Inject 0 1 mL (10 Units total) under the skin daily At hs 3 mL 2   • isosorbide mononitrate (IMDUR) 60 mg 24 hr tablet Take 1 tablet (60 mg total) by mouth daily In the morning  0   • linaGLIPtin (Tradjenta) 5 MG TABS Take 5 mg by mouth daily Take this for your diabetes instead of glimepiride 30 tablet 0   • Metoprolol Tartrate 75 MG TABS TAKE 1 TABLET BY MOUTH EVERY 12 HOURS 60 tablet 1   • nitroglycerin (NITROSTAT) 0 4 mg SL tablet TAKE 1 TABLET UNDER TONGUE FOR ANGINA, MAY REPEAT IN 15 MIN   NO MORE THAN 3 TIMES     • pantoprazole (PROTONIX) 40 mg tablet Take 40 mg by mouth daily     • pantoprazole (PROTONIX) 40 mg tablet Take 1 tablet (40 mg total) by mouth daily (Patient not taking: Reported on 1/30/2023) 10 tablet 0   • sucralfate (CARAFATE) 1 g tablet Take 1 g by mouth 2 (two) times a day     • tamsulosin (FLOMAX) 0 4 mg Take 2 capsules (0 8 mg total) by mouth daily with dinner 180 capsule 1   • torsemide (DEMADEX) 20 mg tablet Take 20 mg by mouth every other day and as needed       No current facility-administered medications for this visit  Functional Status Prior to Diagnosis for Treatment   Occupation: retired  Recreation: working with model trains  ADL’s: Capable of performing light to moderate ADLs  [place]: Capable of performing light to moderate ADLs  Exercise: none  Other:     Current Functional Status  Occupation: retired  Recreation: working with model trains    ADL’s:Capable of performing light to moderate ADLs  [place]: Capable of performing light to moderate ADLs  Exercise: none  Other:     Patient Specific Goals:  Return to better health-less tired and shortness of breathe    Short Term Program Goals: increased strength improved energy/stamina with ADLs    Long Term Goals: Improved [place] Activity Status score  Improved functional capacity  Improved Quality of Life - [facility] score reduced  Improved lipid profile  Improved A1c    Ability to reach goals/rehabilitation potential:  Excellent    Projected return to function: 12 weeks  Objective tests: 6 MWT      Nutritional   Reviewed details of Rate your Plate  Discussed key elements of heart healthy eating  Reviewed patient goals for dietary modifications and their clinical implications  Reviewed most recent lipid profile       Goals for dietary modification based on Rate Your Plate Dietary Assessment:  choose lean cuts of meat  poultry without the skin  low fat ground meat and poultry  eliminate processed meats  increase fish intake  more meatless meals  low fat dairy   reduced fat cheese  increase whole grains  increase fruits and vegetables  eliminate butter  low sodium  improved snack choices  more nuts/seeds      Emotional/Social  Patient reports he/she is coping well with good social support and denies depression or anxiety    Marital status:     Domestic Violence Screening: No    Comments:

## 2023-02-08 NOTE — PROGRESS NOTES
PULMONARY REHAB ASSESSMENT    Today's date: 2023  Patient name: Roselyn Alvarenga     : 1935       MRN: [id number]  PCP: Robert Green DO  Referring Physician: Citlalli Gross DO  Pulmonologist: ***    Dx: ***      Date of onset: ***  Cultural needs: ***    Weight:    Wt Readings from Last 1 Encounters:   23 74 3 kg (163 lb 12 8 oz)      Height:   Ht Readings from Last 1 Encounters:   23 5' 6" (1 676 m)     Medical History:   Past Medical History:   Diagnosis Date   • A-fib ([facility] [address] )    • Arthritis    • Athscl 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     Stent OM1  Patent mammary graft to LAD 10/7/2009; CABG  &    • Cancer [facility])     skin   • CHF (congestive heart failure) ([facility] [address] )    • Diabetes mellitus ([facility] [address] )    • Diabetes mellitus, type II ([facility] [address] )     without complication   • Epistaxis    • GERD (gastroesophageal reflux disease)    • Hx of cardiovascular stress test 2014    Eddie MPI; EF0 52 (52%) Lexiscan, mild LV systolic dysfunction; evidence for prior inferior wall MI; perfusion imaging consistant w mild lat wall ischemia and min torin-infart inferior ischemia   / EF0 51 (51%) evidence for prior inferior wall MI  Mild inferior and mild-moderate lateral wall ischemia  7/29/15   • Hx of echocardiogram 2017    2D w/CFD;EF0 55 (55%) mild LVH, mitral valve prolapse with moderate regurgitation  left atrial enlargement  Mild tricuspid regurgitation       • Hypertension    • Mixed hyperlipidemia    • Occlusion and stenosis of bilateral carotid arteries    • 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 infarction)    • Osteoporosis    • Presence of aortocoronary bypass graft          Physical Limitations: ***    Oxygen needs: ***    History of Toxic Exposure:  {Toxic Exposure:77755}    Risk Factors   Cholesterol: {Risk Factor yes/no:70167}  Smoking: {Tobacco Use:59865}  HTN: {Risk Factor yes/no:80426}  DM: {Diabetes history:73036}  Obesity: {Risk Factor yes/no:39549}   Inactivity: {Risk Factor yes/no:34964}  Stress:  perceived  stress: ***/10   Stressors:***   Goals for Stress Management:***    Family History:  Family History   Problem Relation Age of Onset   • Heart disease Brother    • No Known Problems Mother    • Tuberculosis Father        Allergies: Ibuprofen  ETOH:   Social History     Substance and Sexual Activity   Alcohol Use Never         Current Medications:   Current Outpatient Medications   Medication Sig Dispense Refill   • amLODIPine (NORVASC) 10 mg tablet Take 10 mg by mouth daily     • apixaban (ELIQUIS) 2 5 mg Take 1 tablet (2 5 mg total) by mouth in the morning and 1 tablet (2 5 mg total) in the evening  60 tablet 0   • atorvastatin (LIPITOR) 40 mg tablet Take 1 tablet (40 mg total) by mouth daily with dinner This is an increased dose to better help prevent heart attack and stroke  30 tablet 0   • cholecalciferol (VITAMIN D3) 1,000 units tablet Take 1 tablet (1,000 Units total) by mouth daily 30 tablet 0   • escitalopram (LEXAPRO) 10 mg tablet Take 10 mg by mouth daily (Patient not taking: Reported on 10/5/2022)     • Insulin Glargine Solostar (Basaglar KwikPen) 100 UNIT/ML SOPN Inject 0 1 mL (10 Units total) under the skin daily At hs 3 mL 2   • isosorbide mononitrate (IMDUR) 60 mg 24 hr tablet Take 1 tablet (60 mg total) by mouth daily In the morning  0   • linaGLIPtin (Tradjenta) 5 MG TABS Take 5 mg by mouth daily Take this for your diabetes instead of glimepiride 30 tablet 0   • Metoprolol Tartrate 75 MG TABS TAKE 1 TABLET BY MOUTH EVERY 12 HOURS 60 tablet 1   • nitroglycerin (NITROSTAT) 0 4 mg SL tablet TAKE 1 TABLET UNDER TONGUE FOR ANGINA, MAY REPEAT IN 15 MIN   NO MORE THAN 3 TIMES     • pantoprazole (PROTONIX) 40 mg tablet Take 40 mg by mouth daily     • pantoprazole (PROTONIX) 40 mg tablet Take 1 tablet (40 mg total) by mouth daily (Patient not taking: Reported on 1/30/2023) 10 tablet 0   • sucralfate (CARAFATE) 1 g tablet Take 1 g by mouth 2 (two) times a day     • tamsulosin (FLOMAX) 0 4 mg Take 2 capsules (0 8 mg total) by mouth daily with dinner 180 capsule 1   • torsemide (DEMADEX) 20 mg tablet Take 20 mg by mouth every other day and as needed       No current facility-administered medications for this visit  Functional Status Prior to Diagnosis for Treatment   Occupation: {Occupation:91515}  Recreation: ***  ADL’s: {functional status:17311}  [place]: {functional status:07487}  Exercise: ***  Other: ***    Current Functional Status  Occupation: {Occupation:39537}  Recreation: ***  ADL’s:{functional status:28100}  [place]: {functional status:19953}  Exercise: ***  Other: ***    Patient Specific Goals:  ***    Short Term Program Goals: {short term goals:61778}    Long Term Goals: {Long Term Goals:08046}    Oxygen Goals: Maintain SpO2>90% titrating supplemental oxygen as needed     Ability to reach goals/rehabilitation potential:  {Potential to Reach Goals:42304}    Projected return to function: {Return to function:68399}  Objective tests: {Objective tests:87791}      Nutritional   Reviewed details of Rate your Plate  Discussed key elements of heart healthy eating  Reviewed patient goals for dietary modifications and their clinical implications  Reviewed most recent lipid profile       Goals for dietary modification: {goals for BVB:31361}      Emotional/Social  {emotinal/social:32085}    SOCIAL SUPPORT NETWORK    Marital status: {CR Marital Status:36269}      Domestic Violence Screening: {1-5:0410753028}    Comments: ***

## 2023-02-13 ENCOUNTER — OFFICE VISIT (OUTPATIENT)
Dept: NEPHROLOGY | Facility: CLINIC | Age: 88
End: 2023-02-13

## 2023-02-13 ENCOUNTER — CLINICAL SUPPORT (OUTPATIENT)
Dept: CARDIAC REHAB | Facility: HOSPITAL | Age: 88
End: 2023-02-13

## 2023-02-13 VITALS
WEIGHT: 167 LBS | DIASTOLIC BLOOD PRESSURE: 60 MMHG | HEART RATE: 74 BPM | HEIGHT: 66 IN | OXYGEN SATURATION: 97 % | SYSTOLIC BLOOD PRESSURE: 122 MMHG | BODY MASS INDEX: 26.84 KG/M2

## 2023-02-13 DIAGNOSIS — J98.4 RESTRICTIVE LUNG DISEASE: ICD-10-CM

## 2023-02-13 DIAGNOSIS — I50.32 CHRONIC DIASTOLIC CONGESTIVE HEART FAILURE (HCC): ICD-10-CM

## 2023-02-13 DIAGNOSIS — E11.21 DIABETIC NEPHROPATHY ASSOCIATED WITH TYPE 2 DIABETES MELLITUS (HCC): ICD-10-CM

## 2023-02-13 DIAGNOSIS — E83.52 HYPERCALCEMIA: ICD-10-CM

## 2023-02-13 DIAGNOSIS — E21.3 HYPERPARATHYROIDISM (HCC): ICD-10-CM

## 2023-02-13 DIAGNOSIS — Z95.1 HX OF CABG: ICD-10-CM

## 2023-02-13 DIAGNOSIS — N18.32 STAGE 3B CHRONIC KIDNEY DISEASE (HCC): Primary | ICD-10-CM

## 2023-02-13 DIAGNOSIS — N11.9 CHRONIC TUBULOINTERSTITIAL NEPHRITIS: ICD-10-CM

## 2023-02-13 PROBLEM — N18.4 STAGE 4 CHRONIC KIDNEY DISEASE (HCC): Status: ACTIVE | Noted: 2023-02-13

## 2023-02-13 NOTE — PATIENT INSTRUCTIONS
Check your blood sugar and oxygen at night when you get sweaty  If both are normal, ask your family doctor about lexapro and sweating      Continue to watch your legs for swelling    Continue low sodium/salt diet

## 2023-02-13 NOTE — PROGRESS NOTES
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  Continue to avoid potential nephrotoxins including medications implicated in this diagnosis  Patient must be on a PPI for GI reasons  Chronology, goal A1c 8 0%  Volume status is mildly hypervolemic  Patient will continue to check legs and take torsemide as directed daily this time  Lab work to be repeated in 6 months and then return office with primary nephrologist   -     Comprehensive metabolic panel; Future; Expected date: 06/05/2023   -     CBC and differential; Future; Expected date: 06/05/2023   -     Microalbumin / creatinine urine ratio; Future; Expected date: 06/05/2023   -     Urinalysis with microscopic; Future; Expected date: 06/05/2023   -     Phosphorus; Future; Expected date: 06/05/2023  2  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)  Per endocrinology  7  Restrictive lung disease  Per pulmonology        HPI: Milla Saunders is a 82 y o  male who follows with Dr Chelsea Haskins for stage IIIb chronic kidney disease    At last visit, noted to have decreased kidney function secondary to volume depletion from hypercalcemia, diuretic use for was asked to reduce torsemide dose  Has since been evaluated by cardiologist, endocrinologist, pulmonologist   A1c is much better controlled  Kidney function is stable and within typical baseline  Mild hypercalcemia noted and under the care of Dr Annie Pappas  Continue torsemide for lower extremity edema for which there is some today he will take daily until resolved  Continue to encourage a low-sodium diet  Avoid nephrotoxins  Return to office in 6 months with primary nephrologist       ROS:   Review of Systems   Constitutional: Negative for chills and fever  Waking up in the night with sweats   HENT: Negative for ear pain and sore throat  Eyes: Negative for pain and visual disturbance  Respiratory: Positive for shortness of breath  Negative for cough  Dyspnea on exertion   Cardiovascular: Negative for chest pain and palpitations  Gastrointestinal: Negative for abdominal pain and vomiting  Genitourinary: Negative for dysuria and hematuria  Musculoskeletal: Positive for gait problem  Negative for arthralgias and back pain  Skin: Negative for color change and rash  Neurological: Negative for seizures and syncope  All other systems reviewed and are negative        Allergies: Ibuprofen    Medications:   Current Outpatient Medications:   •  amLODIPine (NORVASC) 10 mg tablet, Take 10 mg by mouth daily, Disp: , Rfl:   •  apixaban (ELIQUIS) 2 5 mg, Take 1 tablet (2 5 mg total) by mouth in the morning and 1 tablet (2 5 mg total) in the evening , Disp: 60 tablet, Rfl: 0  •  atorvastatin (LIPITOR) 40 mg tablet, Take 1 tablet (40 mg total) by mouth daily with dinner This is an increased dose to better help prevent heart attack and stroke , Disp: 30 tablet, Rfl: 0  •  cholecalciferol (VITAMIN D3) 1,000 units tablet, Take 1 tablet (1,000 Units total) by mouth daily, Disp: 30 tablet, Rfl: 0  •  isosorbide mononitrate (IMDUR) 60 mg 24 hr tablet, Take 1 tablet (60 mg total) by mouth daily In the morning, Disp: , Rfl: 0  •  linaGLIPtin (Tradjenta) 5 MG TABS, Take 5 mg by mouth daily Take this for your diabetes instead of glimepiride, Disp: 30 tablet, Rfl: 0  •  Metoprolol Tartrate 75 MG TABS, TAKE 1 TABLET BY MOUTH EVERY 12 HOURS, Disp: 60 tablet, Rfl: 1  •  nitroglycerin (NITROSTAT) 0 4 mg SL tablet, TAKE 1 TABLET UNDER TONGUE FOR ANGINA, MAY REPEAT IN 15 MIN  NO MORE THAN 3 TIMES, Disp: , Rfl:   •  pantoprazole (PROTONIX) 40 mg tablet, Take 40 mg by mouth daily, Disp: , Rfl:   •  sucralfate (CARAFATE) 1 g tablet, Take 1 g by mouth 2 (two) times a day, Disp: , Rfl:   •  tamsulosin (FLOMAX) 0 4 mg, Take 2 capsules (0 8 mg total) by mouth daily with dinner, Disp: 180 capsule, Rfl: 1  •  torsemide (DEMADEX) 20 mg tablet, Take 20 mg by mouth every other day and as needed, Disp: , Rfl:   •  escitalopram (LEXAPRO) 10 mg tablet, Take 10 mg by mouth daily (Patient not taking: Reported on 10/5/2022), Disp: , Rfl:   •  pantoprazole (PROTONIX) 40 mg tablet, Take 1 tablet (40 mg total) by mouth daily (Patient not taking: Reported on 1/30/2023), Disp: 10 tablet, Rfl: 0    Past Medical History:   Diagnosis Date   • A-fib ([facility] )    • Arthritis    • Athscl 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     Stent OM1  Patent mammary graft to LAD 10/7/2009; CABG 1992 & 2005   • Cancer [facility])     skin   • CHF (congestive heart failure) ([facility] )    • Diabetes mellitus ([facility] )    • Diabetes mellitus, type II ([facility] )     without complication   • Epistaxis    • GERD (gastroesophageal reflux disease)    • Hx of cardiovascular stress test 07/23/2014    Eddie MPI; EF0 52 (52%) Lexiscan, mild LV systolic dysfunction; evidence for prior inferior wall MI; perfusion imaging consistant w mild lat wall ischemia and min torin-infart inferior ischemia   / EF0 51 (51%) evidence for prior inferior wall MI   Mild inferior and mild-moderate lateral wall ischemia  7/29/15   • Hx of echocardiogram 11/07/2017    2D w/CFD;EF0 55 (55%) mild LVH, mitral valve prolapse with moderate regurgitation  left atrial enlargement  Mild tricuspid regurgitation  • Hypertension    • Mixed hyperlipidemia    • Occlusion and stenosis of bilateral carotid arteries    • 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 infarction)    • Osteoporosis    • Presence of aortocoronary bypass graft      Past Surgical History:   Procedure Laterality Date   • CARDIAC CATHETERIZATION  10/07/2009    Stent 99% stenosis SVG from the aorta to OM1  Patent mammary graft to the LAD   • CORONARY ARTERY BYPASS GRAFT  1992    VG-CX, VG-RCA   • CORONARY ARTERY BYPASS GRAFT  11/04/2005    LIMA-D1-LAD, VG-PDA-PLB, [place]-BP1-UU7 TMR 9 Lesions   • [place] EGD TRANSORAL BIOPSY SINGLE/MULTIPLE N/A 1/23/2019    Procedure: ESOPHAGOGASTRODUODENOSCOPY (EGD) with biopsy;  Surgeon: Kinsey Peoples MD;  Location: [address] [place] [address] GI LAB; Service: Gastroenterology   • TONSILLECTOMY       Family History   Problem Relation Age of Onset   • Heart disease Brother    • No Known Problems Mother    • Tuberculosis Father       reports that he quit smoking about 46 years ago  His smoking use included cigarettes  He has a 40 00 pack-year smoking history  He has never used smokeless tobacco  He reports that he does not drink alcohol and does not use drugs  Physical Exam:   Vitals:    02/13/23 1136   BP: 122/60   BP Location: Left arm   Patient Position: Sitting   Cuff Size: Standard   Pulse: 74   SpO2: 97%   Weight: 75 8 kg (167 lb)   Height: 5' 6" (1 676 m)     Body mass index is 26 95 kg/m²      General: conscious, cooperative, in no acute distress, appears stated age  Eyes: conjunctivae pale, anicteric sclerae  ENT: lips and mucous membranes moist  Neck: supple, no JVD, no masses  Chest:  diminished  breath sounds bilaterally, no crackles, ronchus or wheezings on nasal cannula  CVS: S1 & S2, normal rate, regular rhythm  Abdomen: soft, non-tender, non-distended, normoactive bowel sounds, rounded  Extremities: + 1 edema of both legs  Skin: no rash   Neuro: awake, alert, oriented       Diagnostic Data:  Lab: I have personally reviewed pertinent lab results  ,   CBC:       CMP: No results found for: SODIUM, K, CL, CO2, ANIONGAP, BUN, CREATININE, GLUCOSE, CALCIUM, AST, ALT, ALKPHOS, PROT, BILITOT, EGFR,   PT/INR: No results found for: PT, INR,   Magnesium: No components found for: MAG,  Phosphorous: No results found for: PHOS    Patient Instructions   Check your blood sugar and oxygen at night when you get sweaty  If both are normal, ask your family doctor about lexapro and sweating  Continue to watch your legs for swelling    Continue low sodium/salt diet        Portions of the record may have been created with voice recognition software  Occasional wrong word or "sound a like" substitutions may have occurred due to the inherent limitations of voice recognition software  Read the chart carefully and recognize, using context, where substitutions have occurred  If you have any questions, please contact the dictating provider

## 2023-02-15 ENCOUNTER — CLINICAL SUPPORT (OUTPATIENT)
Dept: CARDIAC REHAB | Facility: HOSPITAL | Age: 88
End: 2023-02-15

## 2023-02-15 DIAGNOSIS — Z95.1 HX OF CABG: ICD-10-CM

## 2023-02-17 ENCOUNTER — CLINICAL SUPPORT (OUTPATIENT)
Dept: CARDIAC REHAB | Facility: HOSPITAL | Age: 88
End: 2023-02-17

## 2023-02-17 DIAGNOSIS — Z95.1 HX OF CABG: ICD-10-CM

## 2023-02-20 ENCOUNTER — CLINICAL SUPPORT (OUTPATIENT)
Dept: CARDIAC REHAB | Facility: HOSPITAL | Age: 88
End: 2023-02-20

## 2023-02-20 DIAGNOSIS — Z95.1 HX OF CABG: ICD-10-CM

## 2023-02-22 ENCOUNTER — APPOINTMENT (OUTPATIENT)
Dept: CARDIAC REHAB | Facility: HOSPITAL | Age: 88
End: 2023-02-22

## 2023-02-24 ENCOUNTER — APPOINTMENT (OUTPATIENT)
Dept: CARDIAC REHAB | Facility: HOSPITAL | Age: 88
End: 2023-02-24

## 2023-02-27 ENCOUNTER — APPOINTMENT (OUTPATIENT)
Dept: CARDIAC REHAB | Facility: HOSPITAL | Age: 88
End: 2023-02-27

## 2023-03-01 ENCOUNTER — APPOINTMENT (OUTPATIENT)
Dept: CARDIAC REHAB | Facility: HOSPITAL | Age: 88
End: 2023-03-01

## 2023-03-03 ENCOUNTER — APPOINTMENT (OUTPATIENT)
Dept: CARDIAC REHAB | Facility: HOSPITAL | Age: 88
End: 2023-03-03

## 2023-03-06 ENCOUNTER — CLINICAL SUPPORT (OUTPATIENT)
Dept: CARDIAC REHAB | Facility: HOSPITAL | Age: 88
End: 2023-03-06

## 2023-03-06 DIAGNOSIS — Z95.1 HX OF CABG: Primary | ICD-10-CM

## 2023-03-06 DIAGNOSIS — Z95.1 S/P CABG (CORONARY ARTERY BYPASS GRAFT): ICD-10-CM

## 2023-03-06 NOTE — PROGRESS NOTES
Cardiac Rehabilitation Plan of Care   30 Day Reassessment      Today's date: 3/6/2023   # of Exercise Sessions Completed:   Patient name: Panfilo Lord      : 1935  Age: 82 y o  MRN: [id number]  Referring Physician: Ibrahima Scott MD  Cardiologist: Ibrahima Scott MD  Provider: [facility]  Clinician: Kayy Li MS    Dx: CABG history of 9546/8463  Stent / Chronic respiratory failure  with hypoxia ,Interstitial lung disease  Date of onset: 2023      SUMMARY OF PROGRESS: Patient has attended  sessions of cardiac rehab  Patient is completing about 35 minutes of aerobic exercise  Resting /62, HR 77 bpm  Exercise /70, -109 bpm  Telemetry reads Afib, NSR with exercise  Patient has been stating that he feels tired and his breathing seems to be more short  He feels SOB with exertion and stated he feels his oxygen is not getting in fast enough  Patient exercises on 4L of O2 and his saturation is above 90%  Patient took a few days off last week due to feeling fatigued  Patient was given the option to do 2x/wk of rehab to give his body more recovery time  Patient stated his balance is improving and his gate is more steady and strong  Patient will continue to receive education appropriate to his needs       Medication compliance: Yes   Comments: Pt reports to be compliant with medications  Fall Risk: High   Comments: Patient uses walking assist device (walker/cane/rollator) and Reports a fall in the past 6 months    EKG Interpretation:  Controlled atrial fibrillation      EXERCISE ASSESSMENT and PLAN    Exercise Prescription:      Frequency: 3 days/week   Supplement with home exercise 2+ days/wk as tolerated       Minutes: 30-35       METS: 1 0-2 55         HR: 102-109 bpm   RPE: 4-6         Modalities: UBE, NuStep and Room walking      30 Day Goals for Exercise Progression:    Frequency: 3days/week of cardiac rehab       Supplement with home exercise 2+ days/wk as tolerated    Minutes: 30-40                              >150 mins/wk of moderate intensity exercise   METS: 2 5-3 0   HR: 20-30bpm above resting   RPE: 4-6   Modalities: UBE, NuStep, Recumbent bike and Room walking    Strength trainin-3 days / week  12-15 repetitions  1-2 sets per modality   Will be added following 2-3 weeks of monitored exercise sessions   Modalities: Chest Press, Pull Downs, Lateral Raise, Arm Extension, Arm Curl and Front Raises    Home Exercise: none    Goals: 10% improvement in functional capacity - based on max METs achieved in fitness assessment, Reduced dyspnea with physical activity  0-10, improved DASI score by 10%, Increase in exercise capacity by 40% - based on peak METs tolerated in cardiac rehab exercise session, Exercise 5 days/wk, >150mins/wk of moderate intensity exercise, Improved 6MWT distance by 10%, Attend Rehab regularly and Start a walking program    Progression Toward Goals:  Reviewed Pt goals and determined plan of care, Will continue to educate and progress as tolerated      Education: benefit of exercise for CAD risk factors and class: Risk Factors for Heart Disease   Plan:Education class: Risk Factors for Heart Disease  Readiness to change: Preparation:  (Getting ready to change)       NUTRITION ASSESSMENT AND PLAN    Weight control:    Starting weight: 163   Current weight:   165    Diabetes: T2D, on Insulin   A1c: 8    last measured: 22    Lipid management: Last lipid profile 22  Chol 115  TRG 81  HDL 43  LDL 56    Goals:improved A1c  < 7 0%, choose lean meat (93-95%), eliminate processed meats, reduce portion sizes of meat to 3oz or less, increase intake of fish, shellfish, cook without added fat or use vegetable oil/spray, increase intake of meatless meals, use low fat dairy, reduce cheese intake or use reduced-fat, eat 3 or more servings of whole grains a day and Eat 4-5 cups of fruits and vegetables daily    Measurable goals were based Rate Your Plate Dietary Self-Assessment  These are the areas in which the patient could score higher on the assessment  Goals include recommendations for a heart healthy diet based on American Heart Association  Progression Toward Goals: Reviewed Pt goals and determined plan of care, Will continue to educate and progress as tolerated  Education: heart healthy eating  low sodium diet  nutrition for  lipid management  nutrition for Improved BG control  healthy choices while dining out  portion control  education class: Heart Healthy Eating  education class:  Label Reading  Plan: Education class: Reading Food Labels, Education Class: Heart Healthy Eating, replace butter with soft spreads made with olive oil, canola or yogurt, replace refined grain bread with whole grain bread, replace unhealthy snacks with fruits & vegs, reduce portion sizes, reduce red meat 1x/wk, switch to skim or 1% milk, eat fewer desserts and sweets, avoid processed foods, eat more home cooked meals and eat out less often, will try new grains like brown rice, quinoa, farro, monitor home blood glucose, drink more water and learn how to read food labels  Readiness to change: Preparation:  (Getting ready to change)       PSYCHOSOCIAL ASSESSMENT AND PLAN    Emotional:  Depression assessment:  PHQ-9 = 12  10-14 = Moderate Depression            Anxiety measure:  JI-7 = 11  10-14 = Moderate anxiety  Self-reported stress level:  6  Social support: Excellent    Goals:  PHQ-9 - reduced severity by one level, Feelings in [place] Score < 3, Physical Fitness in [place] Score < 3, Social Support in [place] Score < 3, Daily Activity in [place] Score < 3, Social Activities in [place] Score < 3 and Pain in [place] Score < 3    Progression Toward Goals: Reviewed Pt goals and determined plan of care, Will continue to educate and progress as tolerated      Education: signs/sxs of depression, benefits of a positive support system, stress management techniques, class:  Stress and Your Health , class:  Relaxation and class:  Stress and Pulmonary Disease  Plan: PHQ-9 >5 will refer to MD, Practice relaxation techniques, Exercise, Spend time outdoors, Enjoy a hobby, Read a Book, Keep a positive mindset, Learn how to relax and slow down, Meet new people and Enjoy family  Readiness to change: Preparation:  (Getting ready to change)       OTHER CORE COMPONENTS     Tobacco:   Social History     Tobacco Use   Smoking Status Former   • Packs/day: 1    • Years: 40 00   • Pack years: 40    • Types: Cigarettes   • Quit date: 1977   • Years since quittin 1   Smokeless Tobacco Never       Tobacco Use Intervention:   N/A: Pt has a remote history of smoking    Anginal Symptoms:  None   NTG use: No prescription    Blood pressure:    Restin/62   Exercise: 150/70   Recovery: 130/60    Goals: consistent BP < 130/80, moderate intensity exercise >150 mins/wk, medication compliance and reduce number of medications  needed for BP control    Progression Toward Goals: Reviewed Pt goals and determined plan of care, Will continue to educate and progress as tolerated      Education:  understanding high blood pressure and it's relationship to CAD, low sodium diet and HTN, Education class:  Common Heart Medications and Education class: Understanding Heart Disease  Plan: Class: Understanding Heart Disease, Class: Common Heart Medications, avoid places with second hand smoke, Avoid Processed foods, engage in regular exercise and check labels for sodium content  Readiness to change: Preparation:  (Getting ready to change)

## 2023-03-07 ENCOUNTER — OFFICE VISIT (OUTPATIENT)
Dept: ENDOCRINOLOGY | Facility: CLINIC | Age: 88
End: 2023-03-07

## 2023-03-07 VITALS
WEIGHT: 168.8 LBS | HEIGHT: 66 IN | BODY MASS INDEX: 27.13 KG/M2 | SYSTOLIC BLOOD PRESSURE: 132 MMHG | DIASTOLIC BLOOD PRESSURE: 62 MMHG | HEART RATE: 76 BPM

## 2023-03-07 DIAGNOSIS — E78.2 MIXED HYPERLIPIDEMIA: ICD-10-CM

## 2023-03-07 DIAGNOSIS — E11.65 TYPE 2 DIABETES MELLITUS WITH HYPERGLYCEMIA, WITHOUT LONG-TERM CURRENT USE OF INSULIN (HCC): Primary | ICD-10-CM

## 2023-03-07 DIAGNOSIS — E21.3 HYPERPARATHYROIDISM (HCC): ICD-10-CM

## 2023-03-07 DIAGNOSIS — M81.0 OSTEOPOROSIS, UNSPECIFIED OSTEOPOROSIS TYPE, UNSPECIFIED PATHOLOGICAL FRACTURE PRESENCE: ICD-10-CM

## 2023-03-07 DIAGNOSIS — I10 HYPERTENSION, UNSPECIFIED TYPE: ICD-10-CM

## 2023-03-07 NOTE — PROGRESS NOTES
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 - diabetes is in goal range according to a1c  Shira Medina will continue his current treatment of basaglar 10 units daily and tradjenta 5 mg daily  He is recommended to continue routine fingerstick monitoring and may report any concerning readings to our office  Will plan to monitor a1c prior to next scheduled follow up visit  2  Hypertension - stable  Continue present therapy    3  Hyperlipidemia - continue statin    4  Primary hyperparathyroidism - not an ideal surgical candidate and patient is not interested in pursuing surgical remediation of this condition  Encourage adequate hydration and better optimization of diabetes  Continue to monitor calcium levels, which should improve with denosumab    5  Osteoporosis - newly diagnosed  Reviewed pillars of osteoporosis management  Patient is high fall risk  I discussed recommendations for therapy initiation  Would pursue treatment with denosumab  Reviewed its benefits and side effects, including skin infections and monitoring for hypocalcemia  I also advised that this is therapy that once started would need to be continued life-long on account of his degree of CKD and due to risk for bone loss/rebound vertebral fractures that may occur with prolonged interruptions to scheduled dosing  Patient agreeable to pursue treatment  Will request up to date labs to confirm vit D replete & will arrange for treatment in office  Last DXA scan 8 8 2022      6  CKD 4 - following with nephrology    Problem List Items Addressed This Visit        Endocrine    Type 2 diabetes mellitus ([address] ) - Primary    Relevant Medications    Insulin Glargine (BASAGLAR KWIKPEN SC)    Hyperparathyroidism ([address] )    Relevant Orders    Comprehensive metabolic panel Lab Collect    Vitamin D 25 hydroxy Lab Collect    PTH, intact Lab Collect Lab Collect Phosphorus Lab Collect       Other    Mixed hyperlipidemia   Other Visit Diagnoses     Osteoporosis, unspecified osteoporosis type, unspecified pathological fracture presence        Relevant Orders    Comprehensive metabolic panel Lab Collect    Vitamin D 25 hydroxy Lab Collect    PTH, intact Lab Collect Lab Collect    Phosphorus Lab Collect    Hypertension, unspecified type            RTC 4-mo    CC: Diabetes    History of Present Illness     HPI:    Fatimah Wagner returns today for follow up  He is joined by his granddaughter who is assisting with elements of the history  No significant interval changes in health since last visit  For diabetes, he is taking basaglar 10 units qHS as well as tradjenta 5 mg daily  He comes with 1-3x daily fingerstick log showing fasting BG in range of  on majority of days  BG values obtained later in the day day typically more elevated in 180-220 range  Fatimah Wagner denies hyperglycemic symptoms, no hypos  His last a1c was improved to <8%  For hypertension he takes amlodipine 10 mg daily and metoprolol tartrate 75 mg BID  For hyperlipidemia he takes lipitor 40 mg daily  He does take Vit D 1000 U daily  He is not steady on his feet and is a fall risk  He has history of kidney stones (remote) and rib fractures  He also has history of esophageal varices  Review of Systems   Constitutional: Negative for diaphoresis and unexpected weight change  Cardiovascular: Negative for chest pain and palpitations  Gastrointestinal: Negative for nausea and vomiting  Endocrine: Negative for polydipsia and polyuria  Musculoskeletal: Positive for gait problem  Neurological: Negative for tremors  Psychiatric/Behavioral: Negative for agitation and behavioral problems  All other systems reviewed and are negative        Historical Information   Past Medical History:   Diagnosis Date   • A-fib [facility])    • Arthritis    • 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  Patent mammary graft to LAD 10/7/2009; CABG  &    • Cancer [facility])     skin   • CHF (congestive heart failure) ([facility] )    • Diabetes mellitus ([facility] )    • Diabetes mellitus, type II ([facility] [facility] )     without complication   • Epistaxis    • GERD (gastroesophageal reflux disease)    • Hx of cardiovascular stress test 2014    Eddie MPI; EF0 52 (52%) Lexiscan, mild LV systolic dysfunction; evidence for prior inferior wall MI; perfusion imaging consistant w mild lat wall ischemia and min torin-infart inferior ischemia   / EF0 51 (51%) evidence for prior inferior wall MI  Mild inferior and mild-moderate lateral wall ischemia  7/29/15   • Hx of echocardiogram 2017    2D w/CFD;EF0 55 (55%) mild LVH, mitral valve prolapse with moderate regurgitation  left atrial enlargement  Mild tricuspid regurgitation  • Hypertension    • Mixed hyperlipidemia    • Occlusion and stenosis of bilateral carotid arteries    • 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 infarction)    • Osteoporosis    • Presence of aortocoronary bypass graft      Past Surgical History:   Procedure Laterality Date   • CARDIAC CATHETERIZATION  10/07/2009    Stent 99% stenosis SVG from the aorta to OM1  Patent mammary graft to the LAD   • CORONARY ARTERY BYPASS GRAFT      VG-CX, VG-RCA   • CORONARY ARTERY BYPASS GRAFT  2005    LIMA-D1-LAD, VG-PDA-PLB, [id number] TMR 9 Lesions   • [place] EGD TRANSORAL BIOPSY SINGLE/MULTIPLE N/A 2019    Procedure: ESOPHAGOGASTRODUODENOSCOPY (EGD) with biopsy;  Surgeon: Brandan Arevalo MD;  Location: [address] [place] [address] GI LAB;   Service: Gastroenterology   • TONSILLECTOMY       Social History   Social History     Substance and Sexual Activity   Alcohol Use Never     Social History     Substance and Sexual Activity   Drug Use No     Social History     Tobacco Use   Smoking Status Former   • Packs/day: 1 00   • Years: 40 00   • Pack years: 40 00   • Types: Cigarettes   • Quit date: 1977   • Years since quittin 1   Smokeless Tobacco Never     Family History:   Family History   Problem Relation Age of Onset   • Heart disease Brother    • No Known Problems Mother    • Tuberculosis Father        Meds/Allergies   Current Outpatient Medications   Medication Sig Dispense Refill   • amLODIPine (NORVASC) 10 mg tablet Take 10 mg by mouth daily     • apixaban (ELIQUIS) 2 5 mg Take 1 tablet (2 5 mg total) by mouth in the morning and 1 tablet (2 5 mg total) in the evening  60 tablet 0   • atorvastatin (LIPITOR) 40 mg tablet Take 1 tablet (40 mg total) by mouth daily with dinner This is an increased dose to better help prevent heart attack and stroke  30 tablet 0   • cholecalciferol (VITAMIN D3) 1,000 units tablet Take 1 tablet (1,000 Units total) by mouth daily 30 tablet 0   • Insulin Glargine (BASAGLAR KWIKPEN SC) Inject 10 Units under the skin     • isosorbide mononitrate (IMDUR) 60 mg 24 hr tablet Take 1 tablet (60 mg total) by mouth daily In the morning  0   • linaGLIPtin (Tradjenta) 5 MG TABS Take 5 mg by mouth daily Take this for your diabetes instead of glimepiride 30 tablet 0   • Metoprolol Tartrate 75 MG TABS TAKE 1 TABLET BY MOUTH EVERY 12 HOURS 60 tablet 1   • nitroglycerin (NITROSTAT) 0 4 mg SL tablet TAKE 1 TABLET UNDER TONGUE FOR ANGINA, MAY REPEAT IN 15 MIN  NO MORE THAN 3 TIMES     • pantoprazole (PROTONIX) 40 mg tablet Take 40 mg by mouth daily     • sucralfate (CARAFATE) 1 g tablet Take 1 g by mouth 2 (two) times a day     • tamsulosin (FLOMAX) 0 4 mg Take 2 capsules (0 8 mg total) by mouth daily with dinner 180 capsule 1   • torsemide (DEMADEX) 20 mg tablet Take 20 mg by mouth in the morning and as needed     • escitalopram (LEXAPRO) 10 mg tablet Take 10 mg by mouth daily (Patient not taking: Reported on 10/5/2022)     • pantoprazole (PROTONIX) 40 mg tablet Take 1 tablet (40 mg total) by mouth daily (Patient not taking: Reported on 1/30/2023) 10 tablet 0     No current facility-administered medications for this visit       Allergies Allergen Reactions   • Ibuprofen Fatigue     Other reaction(s): decreased kidney function       Objective   Vitals: Blood pressure 132/62, pulse 76, height 5' 6" (1 676 m), weight 76 6 kg (168 lb 12 8 oz)  Physical Exam  Vitals reviewed  Constitutional:       General: He is not in acute distress  Appearance: He is ill-appearing  Interventions: Nasal cannula in place  HENT:      Head: Normocephalic and atraumatic  Nose: Nose normal    Eyes:      General: No scleral icterus  Pulmonary:      Effort: Pulmonary effort is normal    Musculoskeletal:         General: No deformity  Cervical back: Normal range of motion  Skin:     General: Skin is warm and dry  Neurological:      General: No focal deficit present  Mental Status: He is alert  Psychiatric:         Mood and Affect: Mood normal          Behavior: Behavior normal          The history was obtained from the review of the chart, patient and family      Lab Results:   Lab Results   Component Value Date/Time    Hemoglobin A1C 7 4 (H) 12/30/2022 10:36 AM    Hemoglobin A1C 8 0 (H) 05/12/2022 04:43 AM    WBC 9 17 02/02/2023 02:24 PM    WBC 9 97 12/30/2022 10:36 AM    WBC 8 67 08/31/2022 04:33 PM    Hemoglobin 13 4 02/02/2023 02:24 PM    Hemoglobin 14 0 12/30/2022 10:36 AM    Hemoglobin 14 5 08/31/2022 04:33 PM    Hematocrit 42 6 02/02/2023 02:24 PM    Hematocrit 45 6 12/30/2022 10:36 AM    Hematocrit 42 7 08/31/2022 04:33 PM    MCV 90 02/02/2023 02:24 PM    MCV 93 12/30/2022 10:36 AM    MCV 88 08/31/2022 04:33 PM    Platelets 882 77/36/4774 02:24 PM    Platelets 410 75/21/5982 10:36 AM    Platelets 128 90/74/2634 04:33 PM    BUN 21 12/30/2022 10:36 AM    BUN 34 (H) 08/31/2022 04:33 PM    BUN 33 (H) 08/31/2022 04:33 PM    Potassium 4 4 12/30/2022 10:36 AM    Potassium 4 3 08/31/2022 04:33 PM    Potassium 4 5 08/31/2022 04:33 PM    Chloride 104 12/30/2022 10:36 AM    Chloride 85 (L) 08/31/2022 04:33 PM    Chloride 86 (L) 08/31/2022 04:33 PM    CO2 32 12/30/2022 10:36 AM    CO2 33 (H) 08/31/2022 04:33 PM    CO2 30 08/31/2022 04:33 PM    Creatinine 1 93 (H) 12/30/2022 10:36 AM    Creatinine 2 16 (H) 08/31/2022 04:33 PM    Creatinine 2 11 (H) 08/31/2022 04:33 PM    AST 14 12/30/2022 10:36 AM    AST 26 08/31/2022 04:33 PM    AST 12 05/13/2022 05:02 AM    ALT 24 12/30/2022 10:36 AM    ALT 31 08/31/2022 04:33 PM    ALT 13 05/13/2022 05:02 AM    Albumin 3 8 12/30/2022 10:36 AM    Albumin 4 2 08/31/2022 04:33 PM    Albumin 2 7 (L) 05/13/2022 05:02 AM    HDL, Direct 43 12/30/2022 10:36 AM    Triglycerides 81 12/30/2022 10:36 AM       Lab Results   Component Value Date    PTH 73 7 02/02/2023    CALCIUM 11 1 (H) 12/30/2022    PHOS 4 0 12/30/2022       Imaging Studies: I have personally reviewed pertinent reports  8/8/2022    DXA SCAN     CLINICAL HISTORY:  82-year-old male with history of hyperparathyroidism  OTHER RISK FACTORS:  COPD  PPI therapy  SSRI therapy      PHARMACOLOGIC THERAPY FOR OSTEOPOROSIS:  None      TECHNIQUE: Bone densitometry was performed using a GE Lunar Prodigy   bone densitometer  Regions of interest appear properly placed       COMPARISON: There are no prior DXA studies performed on this unit for comparison      RESULTS:      LUMBAR SPINE L1-L4 :   BMD  1 111  gm/cm2   T-score -1 0         LEFT TOTAL HIP:   BMD: 0 937  gm/cm2   T-score: -1 1      LEFT FEMORAL NECK:   BMD: 0 789  gm/cm2   T score: -2 2      RIGHT TOTAL HIP:   BMD:  0 914  gm/cm2   T-score: -1 3     RIGHT FEMORAL NECK:   BMD:  0 736  gm/cm2  T score: -2 6      LEFT  FOREARM:    33% RADIUS BMD:  0 576  gm/cm2    T-score: -2 8      IMPRESSION:     1  Osteoporosis  [Based on the right femoral neck and left radius]     2  The 10 year risk of hip fracture is 4 7% with the 10 year risk of major osteoporotic fracture being 10 3% as calculated by the [facility] of [place]/WHO fracture risk assessment tool (FRAX)     3    The current NOF guidelines recommend treating patients with a T-score of -2 5 or less in the lumbar spine or hips, or in post-menopausal women and men over the age of 48 with low bone mass (osteopenia) and a FRAX 10 year risk score of >3% for hip   fracture and/or >20% for major osteoporotic fracture      4  The NOF recommends follow-up DXA in 1-2 years after initiating therapy for osteoporosis and every 2 years thereafter  More frequent evaluation is appropriate for patients with conditions associated with rapid bone loss, such as glucocorticoid   therapy  The interval between DXA screenings may be longer for individuals without major risk factors and initial T-score in the normal or upper low bone mass range         The FRAX algorithm has certain limitations:  -FRAX has not been validated in patients currently or previously treated with pharmacotherapy for osteoporosis  In such patients, clinical judgment must be exercised in interpreting FRAX scores  -Prior hip, vertebral and humeral fragility fractures appear to confer greater risk of subsequent fracture than fractures at other sites (this is especially true for individuals with severe vertebral fractures), but quantification of this incremental   risk is not possible with FRAX  -FRAX underestimates fracture risk in patients with history of multiple fragility fractures  -FRAX may underestimate fracture risk in patients with history of frequent falls   -It is not appropriate to use FRAX to monitor treatment response  Portions of the record may have been created with voice recognition software  Occasional wrong word or "sound a like" substitutions may have occurred due to the inherent limitations of voice recognition software  Read the chart carefully and recognize, using context, where substitutions have occurred

## 2023-03-08 ENCOUNTER — CLINICAL SUPPORT (OUTPATIENT)
Dept: CARDIAC REHAB | Facility: HOSPITAL | Age: 88
End: 2023-03-08

## 2023-03-08 DIAGNOSIS — Z95.1 HX OF CABG: ICD-10-CM

## 2023-03-10 ENCOUNTER — CLINICAL SUPPORT (OUTPATIENT)
Dept: CARDIAC REHAB | Facility: HOSPITAL | Age: 88
End: 2023-03-10

## 2023-03-10 DIAGNOSIS — Z95.1 S/P CABG (CORONARY ARTERY BYPASS GRAFT): ICD-10-CM

## 2023-03-13 ENCOUNTER — CLINICAL SUPPORT (OUTPATIENT)
Dept: CARDIAC REHAB | Facility: HOSPITAL | Age: 88
End: 2023-03-13

## 2023-03-13 DIAGNOSIS — Z95.1 HX OF CABG: ICD-10-CM

## 2023-03-15 ENCOUNTER — CLINICAL SUPPORT (OUTPATIENT)
Dept: CARDIAC REHAB | Facility: HOSPITAL | Age: 88
End: 2023-03-15

## 2023-03-15 DIAGNOSIS — Z95.1 HX OF CABG: ICD-10-CM

## 2023-03-17 ENCOUNTER — APPOINTMENT (OUTPATIENT)
Dept: CARDIAC REHAB | Facility: HOSPITAL | Age: 88
End: 2023-03-17

## 2023-03-20 ENCOUNTER — CLINICAL SUPPORT (OUTPATIENT)
Dept: CARDIAC REHAB | Facility: HOSPITAL | Age: 88
End: 2023-03-20

## 2023-03-20 DIAGNOSIS — Z95.1 HX OF CABG: ICD-10-CM

## 2023-03-22 ENCOUNTER — CLINICAL SUPPORT (OUTPATIENT)
Dept: CARDIAC REHAB | Facility: HOSPITAL | Age: 88
End: 2023-03-22

## 2023-03-22 DIAGNOSIS — Z95.1 HX OF CABG: ICD-10-CM

## 2023-03-24 ENCOUNTER — CLINICAL SUPPORT (OUTPATIENT)
Dept: CARDIAC REHAB | Facility: HOSPITAL | Age: 88
End: 2023-03-24

## 2023-03-24 ENCOUNTER — OFFICE VISIT (OUTPATIENT)
Dept: HEMATOLOGY ONCOLOGY | Facility: CLINIC | Age: 88
End: 2023-03-24

## 2023-03-24 VITALS
OXYGEN SATURATION: 97 % | WEIGHT: 167 LBS | SYSTOLIC BLOOD PRESSURE: 118 MMHG | TEMPERATURE: 98.1 F | BODY MASS INDEX: 26.84 KG/M2 | DIASTOLIC BLOOD PRESSURE: 58 MMHG | HEART RATE: 71 BPM | HEIGHT: 66 IN

## 2023-03-24 DIAGNOSIS — E83.52 HYPERCALCEMIA: ICD-10-CM

## 2023-03-24 DIAGNOSIS — Z95.1 HX OF CABG: Primary | ICD-10-CM

## 2023-03-24 DIAGNOSIS — N18.32 STAGE 3B CHRONIC KIDNEY DISEASE (HCC): ICD-10-CM

## 2023-03-24 DIAGNOSIS — D47.2 BICLONAL GAMMOPATHY: Primary | ICD-10-CM

## 2023-03-24 NOTE — PROGRESS NOTES
[facility] [place]  [id number] [place] [address]  [facility] [id number] [phone]  [address] DOC Daniels,1935, [id number]  03/24/23    Discussion:   In summary, this is an 42-year-old male with a history of triclonal gammopathy  Clinically he is stable  He has dyspnea on exertion  He reports some epigastric pain with walking  This is not clearly associated with eating, however  No nausea, vomiting, melena or hematochezia  Most recent CBC and differential are normal, CMP shows creatinine 1 9, calcium 11 1, glucose 175, otherwise normal   Quantitative immunoglobulins are normal   Serum free kappa 75 4, lambda 45 0, ratio 1 68  SPEP was not accomplished for unclear reasons  He will be receiving Xgeva for osteoporosis  This will likely be helpful for hypercalcemia secondary to hyperparathyroidism as well  Gammopathy appears stable  Follow-up in 1 year  I discussed the above with the patient  The patient and his daughter voiced understanding and agreement   ______________________________________________________________________    No chief complaint on file  HPI:  Oncology History    No history exists  Interval History: Clinically stable  ECOG-  1 - Symptomatic but completely ambulatory    Review of Systems   Constitutional: Positive for fatigue  Negative for chills and fever  HENT: Negative for nosebleeds  Eyes: Negative for discharge  Respiratory: Positive for shortness of breath  Negative for cough  Cardiovascular: Negative for chest pain  Gastrointestinal: Positive for abdominal pain  Negative for constipation and diarrhea  Endocrine: Negative for polydipsia  Genitourinary: Negative for hematuria  Musculoskeletal: Negative for arthralgias  Skin: Negative for color change  Allergic/Immunologic: Negative for immunocompromised state  Neurological: Negative for dizziness and headaches  Hematological: Negative for adenopathy  Psychiatric/Behavioral: Negative for agitation  Past Medical History:   Diagnosis Date   • A-fib [facility])    • Arthritis    • Athscl 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     Stent OM1  Patent mammary graft to LAD 10/7/2009; CABG 1992 & 2005   • Cancer [facility])     skin   • CHF (congestive heart failure) ([place] [address] )    • Diabetes mellitus ([place] [address] )    • Diabetes mellitus, type II ([place] [address] )     without complication   • Epistaxis    • GERD (gastroesophageal reflux disease)    • Hx of cardiovascular stress test 07/23/2014    Eddie MPI; EF0 52 (52%) Lexiscan, mild LV systolic dysfunction; evidence for prior inferior wall MI; perfusion imaging consistant w mild lat wall ischemia and min torin-infart inferior ischemia   / EF0 51 (51%) evidence for prior inferior wall MI  Mild inferior and mild-moderate lateral wall ischemia  7/29/15   • Hx of echocardiogram 11/07/2017    2D w/CFD;EF0 55 (55%) mild LVH, mitral valve prolapse with moderate regurgitation  left atrial enlargement  Mild tricuspid regurgitation       • Hypertension    • Mixed hyperlipidemia    • Occlusion and stenosis of bilateral carotid arteries    • 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 infarction)    • Osteoporosis    • Presence of aortocoronary bypass graft      Patient Active Problem List   Diagnosis   • Hypertensive crisis   • 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 infarction)   • Mixed hyperlipidemia   • Type 2 diabetes mellitus ([place] [address] )   • Coronary artery disease of native artery of native heart with stable angina pectoris (HCC)   • Gastroesophageal reflux disease without esophagitis   • Obese   • Presence of aortocoronary bypass graft   • Occlusion and stenosis of bilateral carotid arteries   • Dyslipidemia   • Stage 3b chronic kidney disease (HCC)   • Nephrolithiasis   • 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 (HCC)   • Chronic tubulointerstitial nephritis   • Edema   • Proteinuria   • Coronary artery disease   • Secondary hyperparathyroidism of renal origin ([place] [address] )   • Vitamin D deficiency   • Anginal syndrome (HCC)   • Carotid artery stenosis   • Elevated d-dimer   • Hypertensive left ventricular hypertrophy, without heart failure   • Sensation of pressure in bladder area   • Chronic diastolic congestive heart failure (HCC)   • Closed fracture of multiple ribs of right side with routine healing   • Essential hypertension   • Acute respiratory failure with hypoxia (HCC)   • Aortic calcification (HCC)   • Hypercalcemia   • Biclonal gammopathy   • Microalbuminuria   • Abnormal EKG   • Eosinophilia   • Nocturnal hypoxia   • Pulmonary nodule   • Lymphadenopathy   • Pulmonic valve insufficiency   • Acute exacerbation of CHF (congestive heart failure) (HCC)   • Acute kidney injury superimposed on chronic kidney disease (HCC)   • Chronic respiratory failure with hypoxia (HCC)   • ILD (interstitial lung disease) (HCC)   • Pulmonary hypertension (HCC)   • Atrial fibrillation (HCC)   • Restrictive lung disease   • Stage 4 chronic kidney disease (HCC)   • Hyperparathyroidism ([facility] )       Current Outpatient Medications:   •  amLODIPine (NORVASC) 10 mg tablet, Take 10 mg by mouth daily, Disp: , Rfl:   •  apixaban (ELIQUIS) 2 5 mg, Take 1 tablet (2 5 mg total) by mouth in the morning and 1 tablet (2 5 mg total) in the evening , Disp: 60 tablet, Rfl: 0  •  atorvastatin (LIPITOR) 40 mg tablet, Take 1 tablet (40 mg total) by mouth daily with dinner This is an increased dose to better help prevent heart attack and stroke , Disp: 30 tablet, Rfl: 0  •  cholecalciferol (VITAMIN D3) 1,000 units tablet, Take 1 tablet (1,000 Units total) by mouth daily, Disp: 30 tablet, Rfl: 0  •  Insulin Glargine (BASAGLAR KWIKPEN SC), Inject 10 Units under the skin, Disp: , Rfl:   •  isosorbide mononitrate (IMDUR) 60 mg 24 hr tablet, Take 1 tablet (60 mg total) by mouth daily In the morning, Disp: , Rfl: 0  •  linaGLIPtin (Tradjenta) 5 MG TABS, Take 5 mg by mouth daily Take this for your diabetes instead of glimepiride, Disp: 30 tablet, Rfl: 0  •  Metoprolol Tartrate 75 MG TABS, TAKE 1 TABLET BY MOUTH EVERY 12 HOURS, Disp: 60 tablet, Rfl: 1  •  nitroglycerin (NITROSTAT) 0 4 mg SL tablet, TAKE 1 TABLET UNDER TONGUE FOR ANGINA, MAY REPEAT IN 15 MIN  NO MORE THAN 3 TIMES, Disp: , Rfl:   •  pantoprazole (PROTONIX) 40 mg tablet, Take 40 mg by mouth daily, Disp: , Rfl:   •  sucralfate (CARAFATE) 1 g tablet, Take 1 g by mouth 2 (two) times a day, Disp: , Rfl:   •  tamsulosin (FLOMAX) 0 4 mg, Take 2 capsules (0 8 mg total) by mouth daily with dinner, Disp: 180 capsule, Rfl: 1  •  torsemide (DEMADEX) 20 mg tablet, Take 20 mg by mouth in the morning and as needed, Disp: , Rfl:   •  escitalopram (LEXAPRO) 10 mg tablet, Take 10 mg by mouth daily (Patient not taking: Reported on 10/5/2022), Disp: , Rfl:   •  pantoprazole (PROTONIX) 40 mg tablet, Take 1 tablet (40 mg total) by mouth daily (Patient not taking: Reported on 1/30/2023), Disp: 10 tablet, Rfl: 0  Allergies   Allergen Reactions   • Ibuprofen Fatigue     Other reaction(s): decreased kidney function     Past Surgical History:   Procedure Laterality Date   • CARDIAC CATHETERIZATION  10/07/2009    Stent 99% stenosis SVG from the aorta to OM1  Patent mammary graft to the LAD   • CORONARY ARTERY BYPASS GRAFT  1992    VG-CX, VG-RCA   • CORONARY ARTERY BYPASS GRAFT  11/04/2005    LIMA-D1-LAD, VG-PDA-PLB, [id number] TMR 9 Lesions   • [place] EGD TRANSORAL BIOPSY SINGLE/MULTIPLE N/A 1/23/2019    Procedure: ESOPHAGOGASTRODUODENOSCOPY (EGD) with biopsy;  Surgeon: Little Forbes MD;  Location: [address] [place] [address] GI LAB; Service: Gastroenterology   • TONSILLECTOMY       Social History     Objective:  Vitals:    03/24/23 1441   BP: 118/58   BP Location: Left arm   Patient Position: Sitting   Cuff Size: Standard   Pulse: 71   Temp: 98 1 °F (36 7 °C)   TempSrc: Tympanic   SpO2: 97%   Weight: 75 8 kg (167 lb)   Height: 5' 6" (1 676 m)     Physical Exam  Constitutional:       Appearance: He is well-developed     HENT: Head: Normocephalic and atraumatic  Eyes:      Pupils: Pupils are equal, round, and reactive to light  Cardiovascular:      Rate and Rhythm: Normal rate and regular rhythm  Heart sounds: No murmur heard  Pulmonary:      Breath sounds: Normal breath sounds  No wheezing or rales  Abdominal:      Palpations: Abdomen is soft  Tenderness: There is no abdominal tenderness  Musculoskeletal:         General: No tenderness  Normal range of motion  Cervical back: Neck supple  Lymphadenopathy:      Cervical: No cervical adenopathy  Skin:     Findings: No erythema or rash  Neurological:      Mental Status: He is alert and oriented to person, place, and time  Cranial Nerves: No cranial nerve deficit  Deep Tendon Reflexes: Reflexes are normal and symmetric  Psychiatric:         Behavior: Behavior normal            Labs: I personally reviewed the labs and imaging pertinent to this patient care

## 2023-03-27 ENCOUNTER — APPOINTMENT (OUTPATIENT)
Dept: CARDIAC REHAB | Facility: HOSPITAL | Age: 88
End: 2023-03-27

## 2023-03-29 ENCOUNTER — APPOINTMENT (OUTPATIENT)
Dept: LAB | Facility: MEDICAL CENTER | Age: 88
End: 2023-03-29

## 2023-03-29 ENCOUNTER — APPOINTMENT (OUTPATIENT)
Dept: CARDIAC REHAB | Facility: HOSPITAL | Age: 88
End: 2023-03-29

## 2023-03-29 DIAGNOSIS — E21.3 HYPERPARATHYROIDISM (HCC): ICD-10-CM

## 2023-03-29 DIAGNOSIS — M81.0 OSTEOPOROSIS, UNSPECIFIED OSTEOPOROSIS TYPE, UNSPECIFIED PATHOLOGICAL FRACTURE PRESENCE: ICD-10-CM

## 2023-03-29 LAB
25(OH)D3 SERPL-MCNC: 38.5 NG/ML (ref 30–100)
ALBUMIN SERPL BCP-MCNC: 3.9 G/DL (ref 3.5–5)
ALP SERPL-CCNC: 61 U/L (ref 46–116)
ALT SERPL W P-5'-P-CCNC: 28 U/L (ref 12–78)
ANION GAP SERPL CALCULATED.3IONS-SCNC: 2 MMOL/L (ref 4–13)
AST SERPL W P-5'-P-CCNC: 21 U/L (ref 5–45)
BILIRUB SERPL-MCNC: 1.28 MG/DL (ref 0.2–1)
BUN SERPL-MCNC: 29 MG/DL (ref 5–25)
CALCIUM SERPL-MCNC: 11.2 MG/DL (ref 8.3–10.1)
CHLORIDE SERPL-SCNC: 104 MMOL/L (ref 96–108)
CO2 SERPL-SCNC: 28 MMOL/L (ref 21–32)
CREAT SERPL-MCNC: 1.83 MG/DL (ref 0.6–1.3)
GFR SERPL CREATININE-BSD FRML MDRD: 32 ML/MIN/1.73SQ M
GLUCOSE P FAST SERPL-MCNC: 183 MG/DL (ref 65–99)
PHOSPHATE SERPL-MCNC: 2.8 MG/DL (ref 2.3–4.1)
POTASSIUM SERPL-SCNC: 4.4 MMOL/L (ref 3.5–5.3)
PROT SERPL-MCNC: 7.7 G/DL (ref 6.4–8.4)
PTH-INTACT SERPL-MCNC: 70.9 PG/ML (ref 18.4–80.1)
SODIUM SERPL-SCNC: 134 MMOL/L (ref 135–147)

## 2023-03-31 ENCOUNTER — CLINICAL SUPPORT (OUTPATIENT)
Dept: CARDIAC REHAB | Facility: HOSPITAL | Age: 88
End: 2023-03-31

## 2023-03-31 DIAGNOSIS — Z95.1 HX OF CABG: ICD-10-CM

## 2023-03-31 NOTE — PROGRESS NOTES
Cardiac Rehabilitation Plan of Care   60 Day Reassessment      Today's date: 3/31/2023   # of Exercise Sessions Completed:   Patient name: Ramos Glez      : 1935  Age: 82 y o  MRN: [id number]  Referring Physician: Lesia Valadez MD  Cardiologist: Lesia Valadez MD  Provider: [facility]  Clinician: Lucian Barrios MS    Dx: CABG history of [id number]/[id number]  Stent 2009/ Chronic respiratory failure  with hypoxia ,Interstitial lung disease  Date of onset: 2023      SUMMARY OF PROGRESS: Patient has attended  sessions of cardiac rehab  Patient is completing about 35 minutes of aerobic exercise and has started light strength training  Resting /68, HR 81 bpm  Exercise /70, HR  bpm  Telemetry reads NSR with exercise  Patient continues to attend rehab receiving rides from his daughter  Patient's daughter takes him grocery shopping and helps him with his errands  Patient continues to walk with a cane and gets around very well  Patient exercises on 4L of O2 and is on 4L at all times  Patient occasionally states that he feels SOB with exertion  Patient is gaining strength to become more independent  Patient does not do any structured exercise outside of rehab  Patient is very pleasant  Patient will continue to receive education       Medication compliance: Yes   Comments: Pt reports to be compliant with medications  Fall Risk: High   Comments: Patient uses walking assist device (walker/cane/rollator) and Reports a fall in the past 6 months    EKG Interpretation:  Controlled atrial fibrillation      EXERCISE ASSESSMENT and PLAN    Exercise Prescription:      Frequency: 3 days/week   Supplement with home exercise 2+ days/wk as tolerated       Minutes: 30-35       METS: 1 0-2 63         HR:  bpm   RPE: 4-6         Modalities: UBE, NuStep and Room walking      30 Day Goals for Exercise Progression:    Frequency: 3days/week of cardiac rehab       Supplement with home exercise 2+ days/wk as tolerated    Minutes: 30-40                              >150 mins/wk of moderate intensity exercise   METS: 2 5-3 0   HR: 20-30bpm above resting   RPE: 4-6   Modalities: UBE, NuStep, Recumbent bike and Room walking    Strength trainin-3 days / week  12-15 repetitions  1-2 sets per modality   Will be added following 2-3 weeks of monitored exercise sessions   Modalities: Chest Press, Pull Downs, Lateral Raise, Arm Extension, Arm Curl and Front Raises    Home Exercise: none    Goals: 10% improvement in functional capacity - based on max METs achieved in fitness assessment, Reduced dyspnea with physical activity  0-1/10, improved DASI score by 10%, Increase in exercise capacity by 40% - based on peak METs tolerated in cardiac rehab exercise session, Exercise 5 days/wk, >150mins/wk of moderate intensity exercise, Improved 6MWT distance by 10%, Attend Rehab regularly and Start a walking program    Progression Toward Goals:  Reviewed Pt goals and determined plan of care, Will continue to educate and progress as tolerated      Education: benefit of exercise for CAD risk factors and class: Risk Factors for Heart Disease   Plan:Education class: Risk Factors for Heart Disease  Readiness to change: Preparation:  (Getting ready to change)       NUTRITION ASSESSMENT AND PLAN    Weight control:    Starting weight: 163   Current weight:   165    Diabetes: T2D, on Insulin   A1c: 8    last measured: 22    Lipid management: Last lipid profile 22  Chol 115  TRG 81  HDL 43  LDL 56    Goals:improved A1c  < 7 0%, choose lean meat (93-95%), eliminate processed meats, reduce portion sizes of meat to 3oz or less, increase intake of fish, shellfish, cook without added fat or use vegetable oil/spray, increase intake of meatless meals, use low fat dairy, reduce cheese intake or use reduced-fat, eat 3 or more servings of whole grains a day and Eat 4-5 cups of fruits and vegetables daily    Measurable goals were based Rate Your Plate Dietary Self-Assessment  These are the areas in which the patient could score higher on the assessment  Goals include recommendations for a heart healthy diet based on American Heart Association  Progression Toward Goals: Reviewed Pt goals and determined plan of care, Will continue to educate and progress as tolerated  Education: heart healthy eating  low sodium diet  nutrition for  lipid management  nutrition for Improved BG control  healthy choices while dining out  portion control  education class: Heart Healthy Eating  education class:  Label Reading  Plan: Education class: Reading Food Labels, Education Class: Heart Healthy Eating, replace butter with soft spreads made with olive oil, canola or yogurt, replace refined grain bread with whole grain bread, replace unhealthy snacks with fruits & vegs, reduce portion sizes, reduce red meat 1x/wk, switch to skim or 1% milk, eat fewer desserts and sweets, avoid processed foods, eat more home cooked meals and eat out less often, will try new grains like brown rice, quinoa, farro, monitor home blood glucose, drink more water and learn how to read food labels  Readiness to change: Action:  (Changing behavior)      PSYCHOSOCIAL ASSESSMENT AND PLAN    Emotional:  Depression assessment:  PHQ-9 = 3  1-4 = Minimal Depression            Anxiety measure:  JI-7 = 6  5-9 = Mild anxiety  Self-reported stress level:  6  Social support: Excellent    Goals:  PHQ-9 - reduced severity by one level, Feelings in [place] Score < 3, Physical Fitness in [place] Score < 3, Social Support in [place] Score < 3, Daily Activity in [place] Score < 3, Social Activities in [place] Score < 3 and Pain in [place] Score < 3    Progression Toward Goals: Reviewed Pt goals and determined plan of care, Will continue to educate and progress as tolerated      Education: signs/sxs of depression, benefits of a positive support system, stress management techniques, class:  Stress and Your Health , class:  Relaxation and class:  Stress and Pulmonary Disease  Plan: PHQ-9 >5 will refer to MD, Practice relaxation techniques, Exercise, Spend time outdoors, Enjoy a hobby, Read a Book, Keep a positive mindset, Learn how to relax and slow down, Meet new people and Enjoy family  Readiness to change: Action:  (Changing behavior)      OTHER CORE COMPONENTS     Tobacco:   Social History     Tobacco Use   Smoking Status Former   • Packs/day: 1 00   • Years: 40 00   • Pack years: 40    • Types: Cigarettes   • Quit date: 1977   • Years since quittin 2   Smokeless Tobacco Never       Tobacco Use Intervention:   N/A: Pt has a remote history of smoking    Anginal Symptoms:  None   NTG use: No prescription    Blood pressure:    Restin/68   Exercise: 120/70   Recovery: 118/70    Goals: consistent BP < 130/80, moderate intensity exercise >150 mins/wk, medication compliance and reduce number of medications  needed for BP control    Progression Toward Goals: Reviewed Pt goals and determined plan of care, Will continue to educate and progress as tolerated      Education:  understanding high blood pressure and it's relationship to CAD, low sodium diet and HTN, Education class:  Common Heart Medications and Education class: Understanding Heart Disease  Plan: Class: Understanding Heart Disease, Class: Common Heart Medications, avoid places with second hand smoke, Avoid Processed foods, engage in regular exercise and check labels for sodium content  Readiness to change: Preparation:  (Getting ready to change)

## 2023-04-03 ENCOUNTER — CLINICAL SUPPORT (OUTPATIENT)
Dept: CARDIAC REHAB | Facility: HOSPITAL | Age: 88
End: 2023-04-03

## 2023-04-03 DIAGNOSIS — Z95.1 HISTORY OF CORONARY ARTERY BYPASS GRAFT: ICD-10-CM

## 2023-04-07 ENCOUNTER — CLINICAL SUPPORT (OUTPATIENT)
Dept: CARDIAC REHAB | Facility: HOSPITAL | Age: 88
End: 2023-04-07

## 2023-04-07 ENCOUNTER — OFFICE VISIT (OUTPATIENT)
Dept: ENDOCRINOLOGY | Facility: CLINIC | Age: 88
End: 2023-04-07

## 2023-04-07 VITALS
BODY MASS INDEX: 27.41 KG/M2 | OXYGEN SATURATION: 92 % | SYSTOLIC BLOOD PRESSURE: 126 MMHG | DIASTOLIC BLOOD PRESSURE: 78 MMHG | WEIGHT: 169.8 LBS | HEART RATE: 78 BPM

## 2023-04-07 DIAGNOSIS — E78.2 MIXED HYPERLIPIDEMIA: ICD-10-CM

## 2023-04-07 DIAGNOSIS — M81.0 OSTEOPOROSIS, UNSPECIFIED OSTEOPOROSIS TYPE, UNSPECIFIED PATHOLOGICAL FRACTURE PRESENCE: ICD-10-CM

## 2023-04-07 DIAGNOSIS — E21.0 PRIMARY HYPERPARATHYROIDISM (HCC): ICD-10-CM

## 2023-04-07 DIAGNOSIS — N18.4 STAGE 4 CHRONIC KIDNEY DISEASE (HCC): ICD-10-CM

## 2023-04-07 DIAGNOSIS — Z95.1 HX OF CABG: ICD-10-CM

## 2023-04-07 DIAGNOSIS — E11.65 TYPE 2 DIABETES MELLITUS WITH HYPERGLYCEMIA, WITHOUT LONG-TERM CURRENT USE OF INSULIN (HCC): Primary | ICD-10-CM

## 2023-04-07 NOTE — Clinical Note
Can we initiate orders for prolia for this patient and have him scheduled for office visit to initiate treatment?

## 2023-04-07 NOTE — PROGRESS NOTES
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 - stable  May continue current Rx insulin and tradjenta  2  Hypertension - stable  Continue current Rx    3  Hyperlipidemia - continue statin Rx    4  Primary hyperparathyroidism - not an ideal surgical candidate and patient is not interested in pursuing surgical remediation of this condition  Encourage adequate hydration  Will monitor calcium levels following initiation of denosumab    5  Osteoporosis - Patient is high fall risk  Patient wished to discuss treatment with denosumab again prior to its initiation  I reviewed its benefits and side effects, including skin infections and need for monitoring for hypocalcemia  He was reminded that this therapy will need to be continued throughout the remainder of his life due to concern for rebound phenomenon, which he understands  He is vit D replete  He is now agreeable to pursue treatment, which my office will arrange q6-mo  Last DXA 8/2022    6  CKD 4 - stable  Patient is established with nephrology    Problem List Items Addressed This Visit        Endocrine    Type 2 diabetes mellitus ([facility] [address] ) - Primary    Hyperparathyroidism ([facility] [address] )       Genitourinary    Stage 4 chronic kidney disease ([facility] [address] )       Other    Mixed hyperlipidemia   Other Visit Diagnoses     Osteoporosis, unspecified osteoporosis type, unspecified pathological fracture presence            RTC next scheduled follow up July 2023    CC: Diabetes    History of Present Illness     HPI:    Birgit Walden returns today for follow up  He is joined by his granddaughter who is assisting with elements of the history  No significant interval changes in health since last visit  Birgit Win requested a visit today to review denosumab treatment one more time prior to starting treatment  For diabetes, he is taking basaglar 10 units qHS as well as tradjenta 5 mg daily   No meter or log today, no reported concerns  No hyperglycemic symptoms, no hypoglycemia  For hypertension he takes metoprolol tartrate 75 mg BID  For hyperlipidemia he takes lipitor 40 mg daily  He does take Vit D 1000 U daily  He is not steady on his feet and is a fall risk  He has history of kidney stones (remote) and rib fractures  He also has history of esophageal varices  Review of Systems   Constitutional: Negative for diaphoresis and unexpected weight change  Cardiovascular: Negative for chest pain and palpitations  Gastrointestinal: Negative for nausea and vomiting  Endocrine: Negative for polydipsia and polyuria  Musculoskeletal: Positive for gait problem  Neurological: Negative for tremors  Psychiatric/Behavioral: Negative for agitation and behavioral problems  All other systems reviewed and are negative  Historical Information   Past Medical History:   Diagnosis Date   • A-fib [facility])    • Arthritis    • Athscl 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     Stent OM1  Patent mammary graft to LAD 10/7/2009; CABG 1992 & 2005   • Cancer [facility])     skin   • CHF (congestive heart failure) ([facility] [address] )    • Diabetes mellitus ([facility] [address] )    • Diabetes mellitus, type II ([facility] [address] )     without complication   • Epistaxis    • GERD (gastroesophageal reflux disease)    • Hx of cardiovascular stress test 07/23/2014    Eddie MPI; EF0 52 (52%) Lexiscan, mild LV systolic dysfunction; evidence for prior inferior wall MI; perfusion imaging consistant w mild lat wall ischemia and min torin-infart inferior ischemia   / EF0 51 (51%) evidence for prior inferior wall MI  Mild inferior and mild-moderate lateral wall ischemia  7/29/15   • Hx of echocardiogram 11/07/2017    2D w/CFD;EF0 55 (55%) mild LVH, mitral valve prolapse with moderate regurgitation  left atrial enlargement  Mild tricuspid regurgitation       • Hypertension    • Mixed hyperlipidemia    • Occlusion and stenosis of bilateral carotid arteries • 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 infarction)    • Osteoporosis    • Presence of aortocoronary bypass graft      Past Surgical History:   Procedure Laterality Date   • CARDIAC CATHETERIZATION  10/07/2009    Stent 99% stenosis SVG from the aorta to OM1  Patent mammary graft to the LAD   • CORONARY ARTERY BYPASS GRAFT      VG-CX, VG-RCA   • CORONARY ARTERY BYPASS GRAFT  2005    LIMA-D1-LAD, VG-PDA-PLB, [id number] TMR 9 Lesions   • MN EGD TRANSORAL BIOPSY SINGLE/MULTIPLE N/A 2019    Procedure: ESOPHAGOGASTRODUODENOSCOPY (EGD) with biopsy;  Surgeon: Jimmy Matias MD;  Location: [address] [place] [address] GI LAB; Service: Gastroenterology   • TONSILLECTOMY       Social History   Social History     Substance and Sexual Activity   Alcohol Use Never     Social History     Substance and Sexual Activity   Drug Use No     Social History     Tobacco Use   Smoking Status Former   • Packs/day: 1 00   • Years: 40 00   • Pack years: 40 00   • Types: Cigarettes   • Quit date: 1977   • Years since quittin 2   Smokeless Tobacco Never     Family History:   Family History   Problem Relation Age of Onset   • Heart disease Brother    • No Known Problems Mother    • Tuberculosis Father        Meds/Allergies   Current Outpatient Medications   Medication Sig Dispense Refill   • apixaban (ELIQUIS) 2 5 mg Take 1 tablet (2 5 mg total) by mouth in the morning and 1 tablet (2 5 mg total) in the evening  60 tablet 0   • atorvastatin (LIPITOR) 40 mg tablet Take 1 tablet (40 mg total) by mouth daily with dinner This is an increased dose to better help prevent heart attack and stroke   30 tablet 0   • cholecalciferol (VITAMIN D3) 1,000 units tablet Take 1 tablet (1,000 Units total) by mouth daily 30 tablet 0   • escitalopram (LEXAPRO) 10 mg tablet Take 10 mg by mouth daily     • Insulin Glargine (BASAGLAR KWIKPEN SC) Inject 10 Units under the skin     • isosorbide mononitrate (IMDUR) 60 mg 24 hr tablet Take 1 tablet (60 mg total) by mouth daily In the morning  0   • linaGLIPtin (Tradjenta) 5 MG TABS Take 5 mg by mouth daily Take this for your diabetes instead of glimepiride 30 tablet 0   • Metoprolol Tartrate 75 MG TABS TAKE 1 TABLET BY MOUTH EVERY 12 HOURS 60 tablet 1   • nitroglycerin (NITROSTAT) 0 4 mg SL tablet TAKE 1 TABLET UNDER TONGUE FOR ANGINA, MAY REPEAT IN 15 MIN  NO MORE THAN 3 TIMES     • pantoprazole (PROTONIX) 40 mg tablet Take 40 mg by mouth daily     • pantoprazole (PROTONIX) 40 mg tablet Take 1 tablet (40 mg total) by mouth daily 10 tablet 0   • sucralfate (CARAFATE) 1 g tablet Take 1 g by mouth 2 (two) times a day     • tamsulosin (FLOMAX) 0 4 mg Take 2 capsules (0 8 mg total) by mouth daily with dinner 180 capsule 1   • torsemide (DEMADEX) 20 mg tablet Take 20 mg by mouth in the morning and as needed     • amLODIPine (NORVASC) 10 mg tablet Take 10 mg by mouth daily (Patient not taking: Reported on 4/7/2023)       No current facility-administered medications for this visit  Allergies   Allergen Reactions   • Ibuprofen Fatigue     Other reaction(s): decreased kidney function       Objective   Vitals: Blood pressure 126/78, pulse 78, weight 77 kg (169 lb 12 8 oz), SpO2 92 %  Physical Exam  Vitals reviewed  Constitutional:       General: He is not in acute distress  Interventions: Nasal cannula in place  HENT:      Head: Normocephalic and atraumatic  Nose: Nose normal    Eyes:      General: No scleral icterus  Pulmonary:      Effort: Pulmonary effort is normal    Musculoskeletal:      Cervical back: Normal range of motion  Skin:     General: Skin is warm and dry  Neurological:      General: No focal deficit present  Mental Status: He is alert  Psychiatric:         Mood and Affect: Mood normal          Behavior: Behavior normal          The history was obtained from the review of the chart, patient and family      Lab Results:   Lab Results   Component Value Date/Time    Hemoglobin A1C 7 4 (H) 12/30/2022 10:36 AM Hemoglobin A1C 8 0 (H) 05/12/2022 04:43 AM    WBC 9 17 02/02/2023 02:24 PM    WBC 9 97 12/30/2022 10:36 AM    WBC 8 67 08/31/2022 04:33 PM    Hemoglobin 13 4 02/02/2023 02:24 PM    Hemoglobin 14 0 12/30/2022 10:36 AM    Hemoglobin 14 5 08/31/2022 04:33 PM    Hematocrit 42 6 02/02/2023 02:24 PM    Hematocrit 45 6 12/30/2022 10:36 AM    Hematocrit 42 7 08/31/2022 04:33 PM    MCV 90 02/02/2023 02:24 PM    MCV 93 12/30/2022 10:36 AM    MCV 88 08/31/2022 04:33 PM    Platelets 387 00/25/7663 02:24 PM    Platelets 824 04/59/0147 10:36 AM    Platelets 701 92/84/3868 04:33 PM    BUN 29 (H) 03/29/2023 10:16 AM    BUN 21 12/30/2022 10:36 AM    BUN 34 (H) 08/31/2022 04:33 PM    BUN 33 (H) 08/31/2022 04:33 PM    Potassium 4 4 03/29/2023 10:16 AM    Potassium 4 4 12/30/2022 10:36 AM    Potassium 4 3 08/31/2022 04:33 PM    Potassium 4 5 08/31/2022 04:33 PM    Chloride 104 03/29/2023 10:16 AM    Chloride 104 12/30/2022 10:36 AM    Chloride 85 (L) 08/31/2022 04:33 PM    Chloride 86 (L) 08/31/2022 04:33 PM    CO2 28 03/29/2023 10:16 AM    CO2 32 12/30/2022 10:36 AM    CO2 33 (H) 08/31/2022 04:33 PM    CO2 30 08/31/2022 04:33 PM    Creatinine 1 83 (H) 03/29/2023 10:16 AM    Creatinine 1 93 (H) 12/30/2022 10:36 AM    Creatinine 2 16 (H) 08/31/2022 04:33 PM    Creatinine 2 11 (H) 08/31/2022 04:33 PM    AST 21 03/29/2023 10:16 AM    AST 14 12/30/2022 10:36 AM    AST 26 08/31/2022 04:33 PM    ALT 28 03/29/2023 10:16 AM    ALT 24 12/30/2022 10:36 AM    ALT 31 08/31/2022 04:33 PM    Albumin 3 9 03/29/2023 10:16 AM    Albumin 3 8 12/30/2022 10:36 AM    Albumin 4 2 08/31/2022 04:33 PM    HDL, Direct 43 12/30/2022 10:36 AM    Triglycerides 81 12/30/2022 10:36 AM       Lab Results   Component Value Date    PTH 70 9 03/29/2023    CALCIUM 11 2 (H) 03/29/2023    PHOS 2 8 03/29/2023       Imaging Studies: I have personally reviewed pertinent reports        8/8/2022    DXA SCAN     CLINICAL HISTORY:  82-year-old male with history of hyperparathyroidism  OTHER RISK FACTORS:  COPD  PPI therapy  SSRI therapy      PHARMACOLOGIC THERAPY FOR OSTEOPOROSIS:  None      TECHNIQUE: Bone densitometry was performed using a GE Lunar Prodigy   bone densitometer  Regions of interest appear properly placed       COMPARISON: There are no prior DXA studies performed on this unit for comparison      RESULTS:      LUMBAR SPINE L1-L4 :   BMD  1 111  gm/cm2   T-score -1 0         LEFT TOTAL HIP:   BMD: 0 937  gm/cm2   T-score: -1 1      LEFT FEMORAL NECK:   BMD: 0 789  gm/cm2   T score: -2 2      RIGHT TOTAL HIP:   BMD:  0 914  gm/cm2   T-score: -1 3     RIGHT FEMORAL NECK:   BMD:  0 736  gm/cm2  T score: -2 6      LEFT  FOREARM:    33% RADIUS BMD:  0 576  gm/cm2    T-score: -2 8      IMPRESSION:     1  Osteoporosis  [Based on the right femoral neck and left radius]     2  The 10 year risk of hip fracture is 4 7% with the 10 year risk of major osteoporotic fracture being 10 3% as calculated by the [facility]/WHO fracture risk assessment tool (FRAX)     3  The current NOF guidelines recommend treating patients with a T-score of -2 5 or less in the lumbar spine or hips, or in post-menopausal women and men over the age of 48 with low bone mass (osteopenia) and a FRAX 10 year risk score of >3% for hip   fracture and/or >20% for major osteoporotic fracture      4  The NOF recommends follow-up DXA in 1-2 years after initiating therapy for osteoporosis and every 2 years thereafter  More frequent evaluation is appropriate for patients with conditions associated with rapid bone loss, such as glucocorticoid   therapy  The interval between DXA screenings may be longer for individuals without major risk factors and initial T-score in the normal or upper low bone mass range         The FRAX algorithm has certain limitations:  -FRAX has not been validated in patients currently or previously treated with pharmacotherapy for osteoporosis    In such patients, "clinical judgment must be exercised in interpreting FRAX scores  -Prior hip, vertebral and humeral fragility fractures appear to confer greater risk of subsequent fracture than fractures at other sites (this is especially true for individuals with severe vertebral fractures), but quantification of this incremental   risk is not possible with FRAX  -FRAX underestimates fracture risk in patients with history of multiple fragility fractures  -FRAX may underestimate fracture risk in patients with history of frequent falls   -It is not appropriate to use FRAX to monitor treatment response  Portions of the record may have been created with voice recognition software  Occasional wrong word or \"sound a like\" substitutions may have occurred due to the inherent limitations of voice recognition software  Read the chart carefully and recognize, using context, where substitutions have occurred    "

## 2023-04-07 NOTE — PROGRESS NOTES
Sent Prolia verification through [facility]  Had to use [place]'s provider Dr Dominique Navarro  Unable to us Dr Arslan Neal

## 2023-04-10 ENCOUNTER — APPOINTMENT (OUTPATIENT)
Dept: CARDIAC REHAB | Facility: HOSPITAL | Age: 88
End: 2023-04-10

## 2023-04-12 ENCOUNTER — APPOINTMENT (OUTPATIENT)
Dept: CARDIAC REHAB | Facility: HOSPITAL | Age: 88
End: 2023-04-12

## 2023-04-17 ENCOUNTER — APPOINTMENT (OUTPATIENT)
Dept: CARDIAC REHAB | Facility: HOSPITAL | Age: 88
End: 2023-04-17

## 2023-04-19 ENCOUNTER — APPOINTMENT (OUTPATIENT)
Dept: CARDIAC REHAB | Facility: HOSPITAL | Age: 88
End: 2023-04-19

## 2023-04-21 ENCOUNTER — APPOINTMENT (OUTPATIENT)
Dept: CARDIAC REHAB | Facility: HOSPITAL | Age: 88
End: 2023-04-21

## 2023-04-24 ENCOUNTER — CLINICAL SUPPORT (OUTPATIENT)
Dept: CARDIAC REHAB | Facility: HOSPITAL | Age: 88
End: 2023-04-24

## 2023-04-24 DIAGNOSIS — Z95.1 HX OF CABG: ICD-10-CM

## 2023-04-26 ENCOUNTER — CLINICAL SUPPORT (OUTPATIENT)
Dept: CARDIAC REHAB | Facility: HOSPITAL | Age: 88
End: 2023-04-26

## 2023-04-26 DIAGNOSIS — Z95.1 HX OF CABG: ICD-10-CM

## 2023-04-26 NOTE — PROGRESS NOTES
[employer] faxed [phone]. Confirmation received. Document placed in bin for centralized scanning. Exercise Session Details

## 2023-04-28 ENCOUNTER — APPOINTMENT (OUTPATIENT)
Dept: CARDIAC REHAB | Facility: HOSPITAL | Age: 88
End: 2023-04-28

## 2023-05-01 ENCOUNTER — CLINICAL SUPPORT (OUTPATIENT)
Dept: CARDIAC REHAB | Facility: HOSPITAL | Age: 88
End: 2023-05-01

## 2023-05-01 ENCOUNTER — OFFICE VISIT (OUTPATIENT)
Dept: PULMONOLOGY | Facility: CLINIC | Age: 88
End: 2023-05-01

## 2023-05-01 VITALS
DIASTOLIC BLOOD PRESSURE: 66 MMHG | BODY MASS INDEX: 27.35 KG/M2 | HEART RATE: 96 BPM | SYSTOLIC BLOOD PRESSURE: 134 MMHG | HEIGHT: 66 IN | WEIGHT: 170.2 LBS | TEMPERATURE: 98.7 F | OXYGEN SATURATION: 94 %

## 2023-05-01 DIAGNOSIS — M25.511 ACUTE PAIN OF RIGHT SHOULDER: Primary | ICD-10-CM

## 2023-05-01 DIAGNOSIS — Z95.1 HX OF CABG: ICD-10-CM

## 2023-05-01 DIAGNOSIS — J84.9 ILD (INTERSTITIAL LUNG DISEASE) (HCC): ICD-10-CM

## 2023-05-01 NOTE — PROGRESS NOTES
Progress Note - Pulmonary   Suzy Lynn 82 y o  male MRN: [id number]   Encounter: [id number]      Assessment/Plan:  Patient is an 56-year-old male with past medical history significant interstitial lung disease of unclear etiology, chronic hypoxic respiratory failure who presents for routine follow-up  Overall, patient is relatively stable since last visit  Discussed extensively the work-up for his interstitial lung disease  The etiology is unclear but we will check autoimmune work-up  Patient may continue current supplemental oxygen  Interstitial Lung Disease  -Unclear etiology of interstitial lung disease although there were significant findings on CT scan  The role of the eosinophil is unclear given negative work-up  Given the severity of disease as well as diffusion defect will check basic autoimmune work-up  If there is progression of his disease, would consider empiric steroids  -  Pulmonary rehab    Chronic hypoxemic respiratory failure  -  4L  NC at all times  -  Maintain SpO2 >88%    Pulmonary hypertension  -Appears euvolemic  -  No diuresis necessary    Pulmonary nodule  - stable    1  Acute pain of right shoulder  -     XR shoulder 2+ vw right; Future; Expected date: 05/01/2023    2  ILD (interstitial lung disease) ([address] )  -     RF Screen w/ Reflex to Titer; Future; Expected date: 04/44/2411  -     Cyclic citrul peptide antibody, IgG; Future; Expected date: 05/01/2023  -     Aldolase; Future; Expected date: 05/01/2023  -     JUNE Screen w/ Reflex to Titer/Pattern; Future; Expected date: 05/01/2023  -     Hypersensitivity pneumonitis profile; Future; Expected date: 05/01/2023  -     Sjogren's Antibodies (SS-A, SS-B); Future; Expected date: 05/01/2023  -     PM/SCL ANTIBODIES; Future; Expected date: 05/01/2023  -     CK;  Future; Expected date: 05/01/2023      Greater than 40 minutes was spent with the patient discussing the work-up and management of his interstitial lung disease as well as chronic "hypoxemic respiratory failure  Patient may follow up in 2-3 months or sooner as necessary  Orders:  Orders Placed This Encounter   Procedures    XR shoulder 2+ vw right     Standing Status:   Future     Standing Expiration Date:   5/1/2027     Scheduling Instructions:      Bring along any outside films relating to this procedure   RF Screen w/ Reflex to Titer     Standing Status:   Future     Standing Expiration Date:   2/1/8860    Cyclic citrul peptide antibody, IgG     Standing Status:   Future     Standing Expiration Date:   5/1/2024    Aldolase     Standing Status:   Future     Standing Expiration Date:   5/1/2024    JUNE Screen w/ Reflex to Titer/Pattern     Standing Status:   Future     Standing Expiration Date:   5/1/2024    Hypersensitivity pneumonitis profile     Standing Status:   Future     Standing Expiration Date:   5/1/2024    Sjogren's Antibodies (SS-A, SS-B)     Standing Status:   Future     Standing Expiration Date:   5/1/2024    PM/SCL ANTIBODIES     Standing Status:   Future     Standing Expiration Date:   5/1/2024    CK     Standing Status:   Future     Standing Expiration Date:   5/1/2024       Subjective: The patient notes occasional trouble with his breathing  He is currently using 4L NC  He denies cough, congestion, fevers or chills  He notes some \"gurglling\" in the morning  He denies nausea  He does not do much exercise in there winter time  He is doing some now  He is using his oxygen at night  He is using a diuretic as needed for swelling in his legs  He denies rashes/lesions  He does have dry eyes for which he sees the eye doctor  He reports right shoulder pain after recently falling into a wall  He is persistent pain in the right shoulder  Inhaler Regimen:  None    Remainder of review of systems negative except as described in HPI        The following portions of the patient's history were reviewed and updated as appropriate: " "allergies, current medications, past family history, past medical history, past social history, past surgical history and problem list      Objective:   Vitals: Blood pressure 134/66, pulse 96, temperature 98 7 °F (37 1 °C), temperature source Temporal, height 5' 6\" (1 676 m), weight 77 2 kg (170 lb 3 2 oz), SpO2 94 %  , 4L NC, Body mass index is 27 47 kg/m²  Physical Exam  Gen: pleasant, awake, alert, oriented x 3, no acute distress  HEENT: Mucous membranes moist, no oral lesions, no thrush  NECK: No accessory muscle use, JVP not elevated  Cardiac: RRR, single S1, single S2, no murmurs, no rubs, no gallops  Lungs: diffuse crackles at base  Abdomen: normoactive bowel sounds, soft nontender, nondistended, no rebound or rigidity, no guarding  Extremities: no cyanosis, no clubbing, no LE edema  MSK:  Strength equal in all extremities  Derm:  No rashes/lesions noted  Neuro:  Appropriate mood/affect    Labs: I have personally reviewed pertinent lab results  Lab Results   Component Value Date    WBC 9 17 02/02/2023    HGB 13 4 02/02/2023     02/02/2023     Lab Results   Component Value Date    CREATININE 1 83 (H) 03/29/2023        Imaging and other studies: I have personally reviewed pertinent reports  and I have personally reviewed pertinent films in PACS  12/12/2022  My interpretation:  NSIP vs possible UIP    Radiology findings:  LUNGS:  7 mm right basilar paramediastinal nodule is stable since March 2021 (series 3, image 86)  No new concerning pulmonary nodules  Areas of benign calcified granulomas, inspissated mucous, and linear scarring are all stable (all annotated on series 3)  Redemonstrated is a NSIP pattern of pulmonary fibrosis, manifested by subpleural reticulation, traction bronchiectasis, and volume loss  No honeycombing  Central airways are normal   PLEURA:  Unremarkable  HEART/GREAT VESSELS: Heart is enlarged  No pericardial effusion  Sequela of coronary arterial bypass grafting    " Coronary arterial atherosclerotic calcifications  No thoracic aortic aneurysm  There is enlargement of the central pulmonary arterial tree   suggesting some element of pulmonary artery hypertension  MEDIASTINUM AND RENARD:  Redemonstrated mediastinal lymph nodes which are top limits of normal in size, likely reactive, unchanged  CHEST WALL AND LOWER NECK:  Unremarkable  Pulmonary Function Testing: I have personally reviewed pertinent reports  and I have personally reviewed pertinent films in PACS  12/12/2022: Moderate restriction  Spirometry:  FEV1/FVC Ratio is 77%  FEV1 is 1 57 L, 65% predicted  FVC is 2 05 L, 63% predicted  No significant change after the administration of bronchodilator  Flow volume loop:  Restrictive appearance  Lung volumes: Total lung capacity 64% predicted  Thoracic gas volume 80% predicted  Diffusing capacity:  Corrected diffusing capacity 45% predicted    Brett Bonilla

## 2023-05-03 ENCOUNTER — APPOINTMENT (OUTPATIENT)
Dept: CARDIAC REHAB | Facility: HOSPITAL | Age: 88
End: 2023-05-03
Payer: MEDICARE

## 2023-05-05 ENCOUNTER — APPOINTMENT (OUTPATIENT)
Dept: CARDIAC REHAB | Facility: HOSPITAL | Age: 88
End: 2023-05-05
Payer: MEDICARE

## 2023-05-08 ENCOUNTER — APPOINTMENT (OUTPATIENT)
Dept: CARDIAC REHAB | Facility: HOSPITAL | Age: 88
End: 2023-05-08
Payer: MEDICARE

## 2023-05-10 ENCOUNTER — CLINICAL SUPPORT (OUTPATIENT)
Dept: CARDIAC REHAB | Facility: HOSPITAL | Age: 88
End: 2023-05-10

## 2023-05-10 DIAGNOSIS — Z95.1 HX OF CABG: ICD-10-CM

## 2023-05-12 ENCOUNTER — CLINICAL SUPPORT (OUTPATIENT)
Dept: CARDIAC REHAB | Facility: HOSPITAL | Age: 88
End: 2023-05-12

## 2023-05-12 DIAGNOSIS — Z95.1 HX OF CABG: ICD-10-CM

## 2023-05-15 ENCOUNTER — TELEPHONE (OUTPATIENT)
Dept: PULMONOLOGY | Facility: HOSPITAL | Age: 88
End: 2023-05-15

## 2023-05-15 ENCOUNTER — HOSPITAL ENCOUNTER (OUTPATIENT)
Dept: RADIOLOGY | Facility: HOSPITAL | Age: 88
Discharge: HOME | End: 2023-05-15
Attending: INTERNAL MEDICINE

## 2023-05-15 ENCOUNTER — CLINICAL SUPPORT (OUTPATIENT)
Dept: CARDIAC REHAB | Facility: HOSPITAL | Age: 88
End: 2023-05-15

## 2023-05-15 DIAGNOSIS — M25.511 ACUTE PAIN OF RIGHT SHOULDER: ICD-10-CM

## 2023-05-15 DIAGNOSIS — Z95.1 HX OF CABG: Primary | ICD-10-CM

## 2023-05-17 ENCOUNTER — CLINICAL SUPPORT (OUTPATIENT)
Dept: CARDIAC REHAB | Facility: HOSPITAL | Age: 88
End: 2023-05-17

## 2023-05-17 DIAGNOSIS — Z95.1 HX OF CABG: ICD-10-CM

## 2023-05-17 NOTE — PROGRESS NOTES
Cardiac Rehabilitation Plan of Care   120 Day Reassessment    Today's date: 2023   # of Exercise Sessions Completed:   Patient name: Alton Erazo      : 1935  Age: 82 y o  MRN: [id number]  Referring Physician: Maki Trinidad MD  Cardiologist: Maki Trinidad MD  Provider: [facility]  Clinician: Anibal Elder RN    Dx: CABG history of /9007  Stent 2009/ Chronic respiratory failure  with hypoxia ,Interstitial lung disease  Date of onset: 2023      SUMMARY OF PROGRESS: Emelyn Lund has attended  sessions of cardiac rehab  He is completing 35-40minutes of aerobic exercise and light strength training  Resting /64, HR 84 bpm  Exercise /60, HR  bpm He has missed several sessions because of stomach discomfort  He states he has had stomach discomfort a long time and no one can figure out why  He does take carafate and Protonix  Telemetry reads NSR with exercise  He  continues to attend rehab receiving rides from his daughter  Daughter also attends rehab with him  Mary Giovana He does seem to be stronger since starting rehab  Patient's SOB is still slight-moderate  Patient continues to walk with a cane and gets around very well  susanne exercises on 4L of O2 and is on 4L at all times  Patient does not do any structured exercise outside of rehab  Patient was encouraged to walk around the house during television commercials  Patient is very pleasant  He has had  Education on Understanding Heart Disease       Medication compliance: Yes   Comments: Pt reports to be compliant with medications  Fall Risk: High   Comments: Patient uses walking assist device (walker/cane/rollator) and Reports a fall in the past 6 months    EKG Interpretation:  Controlled atrial fibrillation      EXERCISE ASSESSMENT and PLAN    Exercise Prescription:      Frequency: 3 days/week   Supplement with home exercise 2+ days/wk as tolerated       Minutes: 35-40      METS: 1 0-2 63         HR:  bpm   RPE: 4-6         Modalities: UBE, NuStep and Room walking      30 Day Goals for Exercise Progression:    Frequency: 3days/week of cardiac rehab       Supplement with home exercise 2+ days/wk as tolerated    Minutes: 30-40                              >150 mins/wk of moderate intensity exercise   METS: 2 5-3 0   HR: 20-30bpm above resting   RPE: 4-6   Modalities: UBE, NuStep, Recumbent bike and Room walking    Strength trainin-3 days / week  12-15 repetitions  1-2 sets per modality   Will be added following 2-3 weeks of monitored exercise sessions   Modalities: Chest Press, Pull Downs, Lateral Raise, Arm Extension, Arm Curl and Front Raises    Home Exercise: none    Goals: 10% improvement in functional capacity - based on max METs achieved in fitness assessment, Reduced dyspnea with physical activity  0-1/10, improved DASI score by 10%, Increase in exercise capacity by 40% - based on peak METs tolerated in cardiac rehab exercise session, Exercise 5 days/wk, >150mins/wk of moderate intensity exercise, Improved 6MWT distance by 10%, Attend Rehab regularly and Start a walking program    Progression Toward Goals:  Reviewed Pt goals and determined plan of care, Will continue to educate and progress as tolerated      Education: benefit of exercise for CAD risk factors and class: Risk Factors for Heart Disease   Plan:Education class: Risk Factors for Heart Disease  Readiness to change: Action:  (Changing behavior)      NUTRITION ASSESSMENT AND PLAN    Weight control:    Starting weight: 163   Current weight:   165    Diabetes: T2D, on Insulin   A1c: 8    last measured: 22    Lipid management: Last lipid profile 22  Chol 115  TRG 81  HDL 43  LDL 56    Goals:improved A1c  < 7 0%, choose lean meat (93-95%), eliminate processed meats, reduce portion sizes of meat to 3oz or less, increase intake of fish, shellfish, cook without added fat or use vegetable oil/spray, increase intake of meatless meals, use low fat dairy, reduce cheese intake or use reduced-fat, eat 3 or more servings of whole grains a day and Eat 4-5 cups of fruits and vegetables daily    Measurable goals were based Rate Your Plate Dietary Self-Assessment  These are the areas in which the patient could score higher on the assessment  Goals include recommendations for a heart healthy diet based on American Heart Association  Progression Toward Goals: Reviewed Pt goals and determined plan of care, Will continue to educate and progress as tolerated      Education: heart healthy eating  low sodium diet  nutrition for  lipid management  nutrition for Improved BG control  healthy choices while dining out  portion control  education class: Heart Healthy Eating  education class:  Label Reading  Plan: Education class: Reading Food Labels, Education Class: Heart Healthy Eating, replace butter with soft spreads made with olive oil, canola or yogurt, replace refined grain bread with whole grain bread, replace unhealthy snacks with fruits & vegs, reduce portion sizes, reduce red meat 1x/wk, switch to skim or 1% milk, eat fewer desserts and sweets, avoid processed foods, eat more home cooked meals and eat out less often, will try new grains like brown rice, quinoa, farro, monitor home blood glucose, drink more water and learn how to read food labels  Readiness to change: Action:  (Changing behavior)      PSYCHOSOCIAL ASSESSMENT AND PLAN    Emotional:  Depression assessment:  PHQ-9 = 3  1-4 = Minimal Depression            Anxiety measure:  JI-7 = 6  5-9 = Mild anxiety  Self-reported stress level:  6  Social support: Excellent    Goals:  PHQ-9 - reduced severity by one level, Feelings in [place] Score < 3, Physical Fitness in [place] Score < 3, Social Support in [place] Score < 3, Daily Activity in [place] Score < 3, Social Activities in [place] Score < 3 and Pain in [place] Score < 3    Progression Toward Goals: Reviewed Pt goals and determined plan of care, Will continue to educate and progress as tolerated  Education: signs/sxs of depression, benefits of a positive support system, stress management techniques, class:  Stress and Your Health , class:  Relaxation and class:  Stress and Pulmonary Disease  Plan: PHQ-9 >5 will refer to MD, Practice relaxation techniques, Exercise, Spend time outdoors, Enjoy a hobby, Read a Book, Keep a positive mindset, Learn how to relax and slow down, Meet new people and Enjoy family  Readiness to change: Action:  (Changing behavior)      OTHER CORE COMPONENTS     Tobacco:   Social History     Tobacco Use   Smoking Status Former   • Packs/day: 1 00   • Years: 40 00   • Pack years: 40 00   • Types: Cigarettes   • Quit date: 1977   • Years since quittin 3   Smokeless Tobacco Never       Tobacco Use Intervention:   N/A: Pt has a remote history of smoking    Anginal Symptoms:  None   NTG use: No prescription    Blood pressure:    Restin/62   Exercise: 122/60   Recovery: 128/62    Goals: consistent BP < 130/80, moderate intensity exercise >150 mins/wk, medication compliance and reduce number of medications  needed for BP control    Progression Toward Goals: Reviewed Pt goals and determined plan of care, Will continue to educate and progress as tolerated      Education:  understanding high blood pressure and it's relationship to CAD, low sodium diet and HTN, Education class:  Common Heart Medications and Education class: Understanding Heart Disease  Plan: Class: Understanding Heart Disease, Class: Common Heart Medications, avoid places with second hand smoke, Avoid Processed foods, engage in regular exercise and check labels for sodium content  Readiness to change: Action:  (Changing behavior)

## 2023-05-18 ENCOUNTER — APPOINTMENT (OUTPATIENT)
Dept: LAB | Facility: MEDICAL CENTER | Age: 88
End: 2023-05-18

## 2023-05-18 DIAGNOSIS — N18.9 CHRONIC KIDNEY DISEASE, UNSPECIFIED CKD STAGE: ICD-10-CM

## 2023-05-18 DIAGNOSIS — N18.32 STAGE 3B CHRONIC KIDNEY DISEASE (HCC): ICD-10-CM

## 2023-05-18 DIAGNOSIS — M25.50 PAIN IN JOINT, MULTIPLE SITES: ICD-10-CM

## 2023-05-18 DIAGNOSIS — J84.9 ILD (INTERSTITIAL LUNG DISEASE) (HCC): ICD-10-CM

## 2023-05-18 DIAGNOSIS — R53.83 OTHER FATIGUE: ICD-10-CM

## 2023-05-18 DIAGNOSIS — E11.9 TYPE 2 DIABETES MELLITUS WITHOUT COMPLICATION, UNSPECIFIED WHETHER LONG TERM INSULIN USE (HCC): ICD-10-CM

## 2023-05-18 DIAGNOSIS — I10 ESSENTIAL HYPERTENSION, MALIGNANT: ICD-10-CM

## 2023-05-18 DIAGNOSIS — R60.9 EDEMA, UNSPECIFIED TYPE: ICD-10-CM

## 2023-05-18 DIAGNOSIS — E78.5 HYPERLIPIDEMIA, UNSPECIFIED HYPERLIPIDEMIA TYPE: ICD-10-CM

## 2023-05-18 LAB
ALBUMIN SERPL BCP-MCNC: 3.6 G/DL (ref 3.5–5)
ALP SERPL-CCNC: 86 U/L (ref 46–116)
ALT SERPL W P-5'-P-CCNC: 31 U/L (ref 12–78)
ANA SER QL IA: NEGATIVE
ANION GAP SERPL CALCULATED.3IONS-SCNC: 3 MMOL/L (ref 4–13)
AST SERPL W P-5'-P-CCNC: 19 U/L (ref 5–45)
BACTERIA UR QL AUTO: ABNORMAL /HPF
BASOPHILS # BLD AUTO: 0.07 THOUSANDS/ÂΜL (ref 0–0.1)
BASOPHILS NFR BLD AUTO: 1 % (ref 0–1)
BILIRUB SERPL-MCNC: 0.99 MG/DL (ref 0.2–1)
BILIRUB UR QL STRIP: NEGATIVE
BUN SERPL-MCNC: 28 MG/DL (ref 5–25)
CALCIUM SERPL-MCNC: 10.3 MG/DL (ref 8.3–10.1)
CAOX CRY URNS QL MICRO: ABNORMAL /HPF
CHLORIDE SERPL-SCNC: 105 MMOL/L (ref 96–108)
CHOLEST SERPL-MCNC: 108 MG/DL
CK SERPL-CCNC: 23 U/L (ref 39–308)
CLARITY UR: CLEAR
CO2 SERPL-SCNC: 29 MMOL/L (ref 21–32)
COLOR UR: ABNORMAL
CREAT SERPL-MCNC: 1.95 MG/DL (ref 0.6–1.3)
EOSINOPHIL # BLD AUTO: 0.67 THOUSAND/ÂΜL (ref 0–0.61)
EOSINOPHIL NFR BLD AUTO: 9 % (ref 0–6)
ERYTHROCYTE [DISTWIDTH] IN BLOOD BY AUTOMATED COUNT: 12.6 % (ref 11.6–15.1)
EST. AVERAGE GLUCOSE BLD GHB EST-MCNC: 192 MG/DL
GFR SERPL CREATININE-BSD FRML MDRD: 30 ML/MIN/1.73SQ M
GLUCOSE P FAST SERPL-MCNC: 176 MG/DL (ref 65–99)
GLUCOSE UR STRIP-MCNC: ABNORMAL MG/DL
HBA1C MFR BLD: 8.3 %
HCT VFR BLD AUTO: 42.2 % (ref 36.5–49.3)
HDLC SERPL-MCNC: 41 MG/DL
HGB BLD-MCNC: 13.1 G/DL (ref 12–17)
HGB UR QL STRIP.AUTO: NEGATIVE
HYALINE CASTS #/AREA URNS LPF: ABNORMAL /LPF
IMM GRANULOCYTES # BLD AUTO: 0.03 THOUSAND/UL (ref 0–0.2)
IMM GRANULOCYTES NFR BLD AUTO: 0 % (ref 0–2)
KETONES UR STRIP-MCNC: NEGATIVE MG/DL
LDLC SERPL CALC-MCNC: 52 MG/DL (ref 0–100)
LEUKOCYTE ESTERASE UR QL STRIP: NEGATIVE
LYMPHOCYTES # BLD AUTO: 1.9 THOUSANDS/ÂΜL (ref 0.6–4.47)
LYMPHOCYTES NFR BLD AUTO: 25 % (ref 14–44)
MCH RBC QN AUTO: 28.4 PG (ref 26.8–34.3)
MCHC RBC AUTO-ENTMCNC: 31 G/DL (ref 31.4–37.4)
MCV RBC AUTO: 91 FL (ref 82–98)
MONOCYTES # BLD AUTO: 0.64 THOUSAND/ÂΜL (ref 0.17–1.22)
MONOCYTES NFR BLD AUTO: 8 % (ref 4–12)
NEUTROPHILS # BLD AUTO: 4.37 THOUSANDS/ÂΜL (ref 1.85–7.62)
NEUTS SEG NFR BLD AUTO: 57 % (ref 43–75)
NITRITE UR QL STRIP: NEGATIVE
NON-SQ EPI CELLS URNS QL MICRO: ABNORMAL /HPF
NONHDLC SERPL-MCNC: 67 MG/DL
NRBC BLD AUTO-RTO: 0 /100 WBCS
PH UR STRIP.AUTO: 6 [PH]
PHOSPHATE SERPL-MCNC: 3.1 MG/DL (ref 2.3–4.1)
PLATELET # BLD AUTO: 173 THOUSANDS/UL (ref 149–390)
PMV BLD AUTO: 10.3 FL (ref 8.9–12.7)
POTASSIUM SERPL-SCNC: 4.3 MMOL/L (ref 3.5–5.3)
PROT SERPL-MCNC: 7.7 G/DL (ref 6.4–8.4)
PROT UR STRIP-MCNC: ABNORMAL MG/DL
RBC # BLD AUTO: 4.62 MILLION/UL (ref 3.88–5.62)
RBC #/AREA URNS AUTO: ABNORMAL /HPF
RHEUMATOID FACT SER QL LA: NEGATIVE
SODIUM SERPL-SCNC: 137 MMOL/L (ref 135–147)
SP GR UR STRIP.AUTO: 1.02 (ref 1–1.03)
TRIGL SERPL-MCNC: 76 MG/DL
UROBILINOGEN UR STRIP-ACNC: <2 MG/DL
WBC # BLD AUTO: 7.68 THOUSAND/UL (ref 4.31–10.16)
WBC #/AREA URNS AUTO: ABNORMAL /HPF

## 2023-05-19 ENCOUNTER — CLINICAL SUPPORT (OUTPATIENT)
Dept: CARDIAC REHAB | Facility: HOSPITAL | Age: 88
End: 2023-05-19

## 2023-05-19 DIAGNOSIS — Z95.1 HX OF CABG: ICD-10-CM

## 2023-05-19 LAB
ALDOLASE SERPL-CCNC: 7.2 U/L (ref 3.3–10.3)
CCP AB SER IA-ACNC: 1.1
ENA SS-A AB SER-ACNC: <0.2 AI (ref 0–0.9)
ENA SS-B AB SER-ACNC: <0.2 AI (ref 0–0.9)

## 2023-05-22 ENCOUNTER — CLINICAL SUPPORT (OUTPATIENT)
Dept: CARDIAC REHAB | Facility: HOSPITAL | Age: 88
End: 2023-05-22

## 2023-05-22 DIAGNOSIS — Z95.1 HX OF CABG: ICD-10-CM

## 2023-05-24 ENCOUNTER — CLINICAL SUPPORT (OUTPATIENT)
Dept: CARDIAC REHAB | Facility: HOSPITAL | Age: 88
End: 2023-05-24

## 2023-05-24 DIAGNOSIS — Z95.1 HX OF CABG: ICD-10-CM

## 2023-05-26 ENCOUNTER — HOSPITAL ENCOUNTER (EMERGENCY)
Facility: HOSPITAL | Age: 88
Discharge: HOME | End: 2023-05-26
Attending: EMERGENCY MEDICINE

## 2023-05-26 ENCOUNTER — APPOINTMENT (EMERGENCY)
Dept: RADIOLOGY | Facility: HOSPITAL | Age: 88
End: 2023-05-26

## 2023-05-26 ENCOUNTER — APPOINTMENT (OUTPATIENT)
Dept: CARDIAC REHAB | Facility: HOSPITAL | Age: 88
End: 2023-05-26
Payer: MEDICARE

## 2023-05-26 ENCOUNTER — APPOINTMENT (EMERGENCY)
Dept: CT IMAGING | Facility: HOSPITAL | Age: 88
End: 2023-05-26

## 2023-05-26 VITALS
BODY MASS INDEX: 27.32 KG/M2 | OXYGEN SATURATION: 92 % | SYSTOLIC BLOOD PRESSURE: 115 MMHG | HEIGHT: 66 IN | HEART RATE: 117 BPM | TEMPERATURE: 97.8 F | DIASTOLIC BLOOD PRESSURE: 81 MMHG | WEIGHT: 170 LBS | RESPIRATION RATE: 20 BRPM

## 2023-05-26 DIAGNOSIS — L98.9 LESION OF SKIN OF SCALP: ICD-10-CM

## 2023-05-26 DIAGNOSIS — I50.9 CHF (CONGESTIVE HEART FAILURE) (HCC): Primary | ICD-10-CM

## 2023-05-26 LAB
2HR DELTA HS TROPONIN: 1 NG/L
ALBUMIN SERPL BCP-MCNC: 3.8 G/DL (ref 3.5–5)
ALP SERPL-CCNC: 61 U/L (ref 34–104)
ALT SERPL W P-5'-P-CCNC: 10 U/L (ref 7–52)
ANION GAP SERPL CALCULATED.3IONS-SCNC: 4 MMOL/L (ref 4–13)
APTT PPP: 34 SECONDS (ref 23–37)
AST SERPL W P-5'-P-CCNC: 10 U/L (ref 13–39)
ATRIAL RATE: 340 BPM
ATRIAL RATE: 92 BPM
BASOPHILS # BLD AUTO: 0.04 THOUSANDS/ÂΜL (ref 0–0.1)
BASOPHILS NFR BLD AUTO: 1 % (ref 0–1)
BILIRUB SERPL-MCNC: 1.28 MG/DL (ref 0.2–1)
BNP SERPL-MCNC: 858 PG/ML (ref 0–100)
BUN SERPL-MCNC: 29 MG/DL (ref 5–25)
CALCIUM SERPL-MCNC: 10.1 MG/DL (ref 8.4–10.2)
CARDIAC TROPONIN I PNL SERPL HS: 14 NG/L
CARDIAC TROPONIN I PNL SERPL HS: 15 NG/L
CHLORIDE SERPL-SCNC: 101 MMOL/L (ref 96–108)
CO2 SERPL-SCNC: 31 MMOL/L (ref 21–32)
CREAT SERPL-MCNC: 1.81 MG/DL (ref 0.6–1.3)
D DIMER PPP FEU-MCNC: 0.32 UG/ML FEU
ENA PM/SCL AB SER-ACNC: <20 UNITS
EOSINOPHIL # BLD AUTO: 0.22 THOUSAND/ÂΜL (ref 0–0.61)
EOSINOPHIL NFR BLD AUTO: 3 % (ref 0–6)
ERYTHROCYTE [DISTWIDTH] IN BLOOD BY AUTOMATED COUNT: 12.6 % (ref 11.6–15.1)
GFR SERPL CREATININE-BSD FRML MDRD: 33 ML/MIN/1.73SQ M
GLUCOSE SERPL-MCNC: 289 MG/DL (ref 65–140)
HCT VFR BLD AUTO: 39 % (ref 36.5–49.3)
HGB BLD-MCNC: 12.3 G/DL (ref 12–17)
IMM GRANULOCYTES # BLD AUTO: 0.03 THOUSAND/UL (ref 0–0.2)
IMM GRANULOCYTES NFR BLD AUTO: 0 % (ref 0–2)
INR PPP: 1.32 (ref 0.84–1.19)
LYMPHOCYTES # BLD AUTO: 0.97 THOUSANDS/ÂΜL (ref 0.6–4.47)
LYMPHOCYTES NFR BLD AUTO: 12 % (ref 14–44)
MCH RBC QN AUTO: 28.9 PG (ref 26.8–34.3)
MCHC RBC AUTO-ENTMCNC: 31.5 G/DL (ref 31.4–37.4)
MCV RBC AUTO: 92 FL (ref 82–98)
MONOCYTES # BLD AUTO: 0.81 THOUSAND/ÂΜL (ref 0.17–1.22)
MONOCYTES NFR BLD AUTO: 10 % (ref 4–12)
NEUTROPHILS # BLD AUTO: 6.32 THOUSANDS/ÂΜL (ref 1.85–7.62)
NEUTS SEG NFR BLD AUTO: 74 % (ref 43–75)
NRBC BLD AUTO-RTO: 0 /100 WBCS
PLATELET # BLD AUTO: 156 THOUSANDS/UL (ref 149–390)
PMV BLD AUTO: 9.8 FL (ref 8.9–12.7)
POTASSIUM SERPL-SCNC: 4.4 MMOL/L (ref 3.5–5.3)
PROT SERPL-MCNC: 7 G/DL (ref 6.4–8.4)
PROTHROMBIN TIME: 16.4 SECONDS (ref 11.6–14.5)
QRS AXIS: 75 DEGREES
QRS AXIS: 84 DEGREES
QRSD INTERVAL: 84 MS
QRSD INTERVAL: 84 MS
QT INTERVAL: 288 MS
QT INTERVAL: 356 MS
QTC INTERVAL: 389 MS
QTC INTERVAL: 435 MS
RBC # BLD AUTO: 4.26 MILLION/UL (ref 3.88–5.62)
SODIUM SERPL-SCNC: 136 MMOL/L (ref 135–147)
T WAVE AXIS: -27 DEGREES
T WAVE AXIS: -37 DEGREES
VENTRICULAR RATE: 110 BPM
VENTRICULAR RATE: 90 BPM
WBC # BLD AUTO: 8.39 THOUSAND/UL (ref 4.31–10.16)

## 2023-05-26 RX ORDER — FENTANYL CITRATE 50 UG/ML
50 INJECTION, SOLUTION INTRAMUSCULAR; INTRAVENOUS ONCE
Status: COMPLETED | OUTPATIENT
Start: 2023-05-26 | End: 2023-05-26

## 2023-05-26 RX ORDER — TORSEMIDE 20 MG/1
40 TABLET ORAL ONCE
Status: COMPLETED | OUTPATIENT
Start: 2023-05-26 | End: 2023-05-26

## 2023-05-26 RX ADMIN — TORSEMIDE 40 MG: 20 TABLET ORAL at 15:06

## 2023-05-26 RX ADMIN — FENTANYL CITRATE 50 MCG: 50 INJECTION, SOLUTION INTRAMUSCULAR; INTRAVENOUS at 13:06

## 2023-05-26 NOTE — ED PROVIDER NOTES
History  Chief Complaint   Patient presents with   • Shortness of Breath     Increased shortness of breath  On home o2 at 4L      HPI    This is a very pleasant, nontoxic-appearing, 62-year-old male presents emergency department history of type 2 diabetes without long-term use of insulin, hypertension, hyperlipidemia, hyperparathyroidism, osteoarthritis, stage IV chronic kidney disease follows with nephrology, acute respiratory failure with hypoxemia, chronic diastolic heart failure, pretense of left ventricular hypertrophy MI, TIA, CABG, atrial fibrillation Emergency Department increasing shortness of breath over the last few days, patient is on 4 L nasal cannula at home and arrived here via private vehicle with his grand-daughter  Patient describes it as a abdominal fullness that comes up from his bellybutton up to his chest   Patient offers no other complaints at this time, denies fever, chills, back pain, syncope  Prior to Admission Medications   Prescriptions Last Dose Informant Patient Reported? Taking? Insulin Glargine (BASAGLAR KWIKPEN SC)   Yes No   Sig: Inject 10 Units under the skin   Metoprolol Tartrate 75 MG TABS  Self No No   Sig: TAKE 1 TABLET BY MOUTH EVERY 12 HOURS   amLODIPine (NORVASC) 10 mg tablet  Self Yes No   Sig: Take 10 mg by mouth daily   apixaban (ELIQUIS) 2 5 mg  Self No No   Sig: Take 1 tablet (2 5 mg total) by mouth in the morning and 1 tablet (2 5 mg total) in the evening  atorvastatin (LIPITOR) 40 mg tablet  Self No No   Sig: Take 1 tablet (40 mg total) by mouth daily with dinner This is an increased dose to better help prevent heart attack and stroke     cholecalciferol (VITAMIN D3) 1,000 units tablet  Self No No   Sig: Take 1 tablet (1,000 Units total) by mouth daily   escitalopram (LEXAPRO) 10 mg tablet  Self Yes No   Sig: Take 10 mg by mouth daily   isosorbide mononitrate (IMDUR) 60 mg 24 hr tablet  Self No No   Sig: Take 1 tablet (60 mg total) by mouth daily In the morning linaGLIPtin (Tradjenta) 5 MG TABS  Self No No   Sig: Take 5 mg by mouth daily Take this for your diabetes instead of glimepiride   nitroglycerin (NITROSTAT) 0 4 mg SL tablet  Self Yes No   Sig: TAKE 1 TABLET UNDER TONGUE FOR ANGINA, MAY REPEAT IN 15 MIN  NO MORE THAN 3 TIMES   pantoprazole (PROTONIX) 40 mg tablet  Self Yes No   Sig: Take 40 mg by mouth daily   sucralfate (CARAFATE) 1 g tablet  Self Yes No   Sig: Take 1 g by mouth 2 (two) times a day   tamsulosin (FLOMAX) 0 4 mg  Self No No   Sig: Take 2 capsules (0 8 mg total) by mouth daily with dinner   torsemide (DEMADEX) 20 mg tablet  Self Yes No   Sig: Take 20 mg by mouth in the morning and as needed      Facility-Administered Medications: None       Past Medical History:   Diagnosis Date   • A-fib ([facility])    • Arthritis    • Athscl 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     Stent OM1  Patent mammary graft to LAD 10/7/2009; CABG 1992 & 2005   • Cancer [facility])     skin   • CHF (congestive heart failure) ([facility] [address] )    • Diabetes mellitus ([facility] [address] )    • Diabetes mellitus, type II ([facility] [address] )     without complication   • Epistaxis    • GERD (gastroesophageal reflux disease)    • Hx of cardiovascular stress test 07/23/2014    Eddie MPI; EF0 52 (52%) Lexiscan, mild LV systolic dysfunction; evidence for prior inferior wall MI; perfusion imaging consistant w mild lat wall ischemia and min torin-infart inferior ischemia   / EF0 51 (51%) evidence for prior inferior wall MI  Mild inferior and mild-moderate lateral wall ischemia  7/29/15   • Hx of echocardiogram 11/07/2017    2D w/CFD;EF0 55 (55%) mild LVH, mitral valve prolapse with moderate regurgitation  left atrial enlargement  Mild tricuspid regurgitation       • Hypertension    • Mixed hyperlipidemia    • Occlusion and stenosis of bilateral carotid arteries    • 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 infarction)    • Osteoporosis    • Presence of aortocoronary bypass graft        Past Surgical History:   Procedure Laterality Date   • CARDIAC CATHETERIZATION  10/07/2009    Stent 99% stenosis SVG from the aorta to OM1  Patent mammary graft to the LAD   • CORONARY ARTERY BYPASS GRAFT      VG-CX, VG-RCA   • CORONARY ARTERY BYPASS GRAFT  2005    LIMA-D1-LAD, VG-PDA-PLB, Ybhqtu-VS2-FQ8 TMR 9 Lesions   • MI EGD TRANSORAL BIOPSY SINGLE/MULTIPLE N/A 2019    Procedure: ESOPHAGOGASTRODUODENOSCOPY (EGD) with biopsy;  Surgeon: Surya Grimaldo MD;  Location: [address] [place] [address] GI LAB; Service: Gastroenterology   • TONSILLECTOMY         Family History   Problem Relation Age of Onset   • Heart disease Brother    • No Known Problems Mother    • Tuberculosis Father      I have reviewed and agree with the history as documented  E-Cigarette/Vaping   • E-Cigarette Use Never User      E-Cigarette/Vaping Substances   • Nicotine No    • THC No    • CBD No    • Flavoring No    • Other No    • Unknown No      Social History     Tobacco Use   • Smoking status: Former     Packs/day: 1 00     Years: 40 00     Total pack years: 40 00     Types: Cigarettes     Quit date: 1977     Years since quittin 3   • Smokeless tobacco: Never   Vaping Use   • Vaping Use: Never used   Substance Use Topics   • Alcohol use: Never   • Drug use: No       Review of Systems   Constitutional: Negative  HENT: Negative  Eyes: Negative  Respiratory: Positive for shortness of breath  Cardiovascular: Negative  Negative for chest pain, palpitations and leg swelling  Gastrointestinal: Positive for abdominal pain  Negative for diarrhea, nausea and vomiting  Endocrine: Negative  Genitourinary: Negative  Musculoskeletal: Negative  Allergic/Immunologic: Negative  Neurological: Negative  Hematological: Negative  Psychiatric/Behavioral: Negative  Physical Exam  Physical Exam  Vitals and nursing note reviewed  Constitutional:       Appearance: He is well-developed and normal weight  HENT:      Head: Normocephalic and atraumatic          Comments: Small callous growth in vertex of skull, no induration or erythema noted  Mouth/Throat:      Mouth: Mucous membranes are moist       Pharynx: Oropharynx is clear  Eyes:      Extraocular Movements: Extraocular movements intact  Pupils: Pupils are equal, round, and reactive to light  Cardiovascular:      Rate and Rhythm: Normal rate and regular rhythm  Pulmonary:      Effort: Pulmonary effort is normal       Breath sounds: Examination of the right-lower field reveals rales  Examination of the left-lower field reveals rales  Rales present  Genitourinary:     Rectum: Guaiac result negative  Musculoskeletal:         General: Normal range of motion  Cervical back: Normal range of motion and neck supple  Skin:     General: Skin is warm  Capillary Refill: Capillary refill takes less than 2 seconds  Neurological:      General: No focal deficit present  Mental Status: He is alert and oriented to person, place, and time     Psychiatric:         Mood and Affect: Mood normal          Behavior: Behavior normal          Vital Signs  ED Triage Vitals [05/26/23 1025]   Temperature Pulse Respirations Blood Pressure SpO2   97 8 °F (36 6 °C) (!) 117 20 115/81 94 %      Temp Source Heart Rate Source Patient Position - Orthostatic VS BP Location FiO2 (%)   Temporal Monitor Lying Left arm --      Pain Score       5           Vitals:    05/26/23 1025   BP: 115/81   Pulse: (!) 117   Patient Position - Orthostatic VS: Lying         Visual Acuity      ED Medications  Medications   fentanyl citrate (PF) 100 MCG/2ML 50 mcg (50 mcg Intravenous Given 5/26/23 1306)   torsemide (DEMADEX) tablet 40 mg (40 mg Oral Given 5/26/23 1506)       Diagnostic Studies  Results Reviewed     Procedure Component Value Units Date/Time    HS Troponin I 2hr [[id number]]  (Normal) Collected: 05/26/23 1420    Lab Status: Final result Specimen: Blood from Arm, Right Updated: 05/26/23 1451     hs TnI 2hr 15 ng/L      Delta 2hr hsTnI 1 ng/L     HS Troponin 0hr (reflex protocol) [[id number]]  (Normal) Collected: 05/26/23 1133    Lab Status: Final result Specimen: Blood from Arm, Right Updated: 05/26/23 1206     hs TnI 0hr 14 ng/L     HS Troponin I 4hr [[id number]]     Lab Status: No result Specimen: Blood     B-Type Natriuretic Peptide(BNP) [[id number]]  (Abnormal) Collected: 05/26/23 1133    Lab Status: Final result Specimen: Blood from Arm, Right Updated: 05/26/23 1205      pg/mL     Protime-INR [[id number]]  (Abnormal) Collected: 05/26/23 1133    Lab Status: Final result Specimen: Blood from Arm, Right Updated: 05/26/23 1159     Protime 16 4 seconds      INR 1 32    APTT [[id number]]  (Normal) Collected: 05/26/23 1133    Lab Status: Final result Specimen: Blood from Arm, Right Updated: 05/26/23 1159     PTT 34 seconds     Comprehensive metabolic panel [[id number]]  (Abnormal) Collected: 05/26/23 1133    Lab Status: Final result Specimen: Blood from Arm, Right Updated: 05/26/23 1157     Sodium 136 mmol/L      Potassium 4 4 mmol/L      Chloride 101 mmol/L      CO2 31 mmol/L      ANION GAP 4 mmol/L      BUN 29 mg/dL      Creatinine 1 81 mg/dL      Glucose 289 mg/dL      Calcium 10 1 mg/dL      AST 10 U/L      ALT 10 U/L      Alkaline Phosphatase 61 U/L      Total Protein 7 0 g/dL      Albumin 3 8 g/dL      Total Bilirubin 1 28 mg/dL      eGFR 33 ml/min/1 73sq m     Narrative:      [place] guidelines for Chronic Kidney Disease (CKD):   •  Stage 1 with normal or high GFR (GFR > 90 mL/min/1 73 square meters)  •  Stage 2 Mild CKD (GFR = 60-89 mL/min/1 73 square meters)  •  Stage 3A Moderate CKD (GFR = 45-59 mL/min/1 73 square meters)  •  Stage 3B Moderate CKD (GFR = 30-44 mL/min/1 73 square meters)  •  Stage 4 Severe CKD (GFR = 15-29 mL/min/1 73 square meters)  •  Stage 5 End Stage CKD (GFR <15 mL/min/1 73 square meters)  Note: GFR calculation is accurate only with a steady state creatinine    D-Dimer [[id number]]  (Normal) Collected: 05/26/23 1133    Lab Status: Final result Specimen: Blood from Arm, Right Updated: 05/26/23 1157     D-Dimer, Quant 0 32 ug/ml FEU     Narrative: In the evaluation for possible pulmonary embolism, in the appropriate (Well's Score of 4 or less) patient, the age adjusted d-dimer cutoff for this patient can be calculated as:    Age x 0 01 (in ug/mL) for Age-adjusted D-dimer exclusion threshold for a patient over 50 years  CBC and differential [[id number]]  (Abnormal) Collected: 05/26/23 1133    Lab Status: Final result Specimen: Blood from Arm, Right Updated: 05/26/23 1143     WBC 8 39 Thousand/uL      RBC 4 26 Million/uL      Hemoglobin 12 3 g/dL      Hematocrit 39 0 %      MCV 92 fL      MCH 28 9 pg      MCHC 31 5 g/dL      RDW 12 6 %      MPV 9 8 fL      Platelets 153 Thousands/uL      nRBC 0 /100 WBCs      Neutrophils Relative 74 %      Immat GRANS % 0 %      Lymphocytes Relative 12 %      Monocytes Relative 10 %      Eosinophils Relative 3 %      Basophils Relative 1 %      Neutrophils Absolute 6 32 Thousands/µL      Immature Grans Absolute 0 03 Thousand/uL      Lymphocytes Absolute 0 97 Thousands/µL      Monocytes Absolute 0 81 Thousand/µL      Eosinophils Absolute 0 22 Thousand/µL      Basophils Absolute 0 04 Thousands/µL                  CT chest abdomen pelvis wo contrast   Final Result by Edwige Rodrigez MD (05/26 1938)         1  No acute abnormality identified in the chest, abdomen, or pelvis  2  Diverticulosis without acute diverticulitis  3  Stable 7 mm right lower lobe nodule, unchanged since 3/4/2021, for which no additional follow-up is required  4  Additional stable findings as noted  Workstation performed: [id number]         XR chest 1 view portable   Final Result by Hailee De Dios MD (05/26 5387)      Mild pulmonary fibrosis with probable superimposed mild pulmonary venous congestion                 Workstation performed: [id number] Procedures  ECG 12 Lead Documentation Only    Date/Time: 5/26/2023 11:30 AM    Performed by: Enedelia Berman DO  Authorized by: Hussain Alarcon III, DO    Indications / Diagnosis:  Abodminal pain  ECG reviewed by me, the ED Provider: yes    Patient location:  ED  Comments:      I personally reviewed this EKG that was performed the patient May 26, 2023, EKG was completed 1129, interpreted by me at 1130, a flutter, ventricular rate of 110 bpm, remaining portion intervals within normal limits, and has diffuse flipped T waves in 4, V5, V6, leads III and aVF  This was compared to prior tracings as of August 31, 2022, there was no evidence of flipped T waves in leads III, aVF and V4  No diffuse elevations to indicate pericarditis  No coved ST elevations greater than 2mm with negative T waves in V1-3 to indicate concern for brugada  No biphasic T waves in V2, V3 to indicate Wellens (critical stenosis of LAD)  No elevation in aVR or deviation when compared to V1 (can be associated with ST depression in I,II, V4-6 when left main occlusion is present)  ED Course  ED Course as of 05/26/23 1534   Fri May 26, 2023   1106 Patient seen and evaluated, orders placed  1225 D-dimer negative, low risk wells criteria, low risk Linden score, low risk PERC score, low risk ADD-RS  1225 BNP level of 858    1427 Noted CT imaging of the abdomen pelvis negative, no findings in the chest, patient does have some volume overload based on the BNP level  Patient has not been taking his Demadex, noted that he took it a few days ago because he was only instructed to take it when he has lower extremity swelling  Will give his dose of Demadex now and reassess     1524 She was able to ambulate out difficulty with his oxygen which is 4 L at baseline for approximately 100 feet, O2 saturation was the 93 to 94%, lowest was 92%, patient felt great he did not have any shortness of breath had full "conversational sentences upon reassessment, patient stable for discharge  HEART Risk Score    Flowsheet Row Most Recent Value   Heart Score Risk Calculator    History 0 Filed at: 05/26/2023 1456   ECG 0 Filed at: 05/26/2023 1456   Age 2 Filed at: 05/26/2023 1456   Risk Factors 1 Filed at: 05/26/2023 1456   Troponin 1 Filed at: 05/26/2023 1456   HEART Score 4 Filed at: 05/26/2023 1456                                      Medical Decision Making  This is a very pleasant, 51-year-old gentleman presents emergency department with breath, patient at baseline is on 4 L of nasal cannula oxygen, patient has some mild CHF, has not been taking his diuretic pill for at least 3 to 4 days, given his normal daily dose today, patient was able to ambulate without difficulty, see ED course for documentation, cardiac enzymes unremarkable, patient was complaining of abdominal pain for CT was ordered of the abdomen pelvis showed no acute pathology, patient stable for discharge  Patient incidentally has a growth in the top of his head, will need to have a follow-up with general surgery for excision if needed  Portions of the record may have been created with voice recognition software  Occasional wrong word or \"sound a like\" substitutions may have occurred due to the inherent limitations of voice recognition software  Read the chart carefully and recognize, using context, where substitutions have occurred  Counseling: I had a detailed discussion with the patient and/or guardian regarding: the historical points, exam findings, and any diagnostic results supporting the discharge diagnosis, lab results, radiology results, discharge instructions reviewed with patient and/or family/caregiver and understanding was verbalized   Instructions given to return to the emergency department if symptoms worsen or persist, or if there are any questions or concerns that arise at home      Amount and/or Complexity of Data " Reviewed  Labs: ordered  Radiology: ordered  Risk  Prescription drug management  Disposition  Final diagnoses:   CHF (congestive heart failure) ([facility] [address] )   Lesion of skin of scalp     Time reflects when diagnosis was documented in both MDM as applicable and the Disposition within this note     Time User Action Codes Description Comment    5/26/2023  3:25 PM Kaleb Wasserman Add [I50 9] CHF (congestive heart failure) ([facility] [address] )     5/26/2023  3:25 PM Kaleb Wasserman Add [L98 9] Lesion of skin of scalp       ED Disposition     ED Disposition   Discharge    Condition   Stable    Date/Time   Fri May 26, 2023  3:25 PM    [facility] discharge to home/self care                 Follow-up Information     Follow up With Specialties Details Why [address] [facility], Internal Medicine   [address] [address]  [address], [place] General Surgery   [address] [place] [address] [address] [facility]  [phone]            Patient's Medications   Discharge Prescriptions    No medications on file           PDMP Review     None          ED Provider  Electronically Signed by           Martin Sandoval III, DO  05/26/23 153

## 2023-05-30 ENCOUNTER — VBI (OUTPATIENT)
Dept: ADMINISTRATIVE | Facility: OTHER | Age: 88
End: 2023-05-30

## 2023-05-30 ENCOUNTER — TELEPHONE (OUTPATIENT)
Dept: SURGERY | Facility: CLINIC | Age: 88
End: 2023-05-30

## 2023-05-30 LAB
A FUMIGATUS1 AB SER QL ID: NEGATIVE
A PULLULANS AB SER QL: NEGATIVE
LACEYELLA SACCHARI AB SER QL: NEGATIVE
PIGEON SERUM AB QL ID: NEGATIVE
S RECTIVIRGULA AB SER QL ID: NEGATIVE
T VULGARIS AB SER QL ID: NEGATIVE

## 2023-05-30 NOTE — TELEPHONE ENCOUNTER
Fede Moses    ED Visit Information     Ed visit date: 5/26/2023  Diagnosis Description: SOB   In Network? Yes, [facility]   Discharge status: Home  Discharged with meds ? No  Number of ED visits to date: 1  ED Severity:3     Outreach Information    Outreach successful: Yes 1  Date letter mailed: n/a  Date Finalized:5/30/2023    Care Coordination    Follow up appointment with pcp: yes this week   Transportation issues ? No    [facility] [employer] type: 7 Day Outreach  Emergent necessity warranted by diagnosis: No  [facility] PCP: Yes  Transportation: Friend/Family Transport  Called PCP first?: No  Feels able to call PCP for urgent problems ?: Yes  Understands what emergencies can be handled by PCP ?: Yes  Ever any problems getting appointment with PCP for minor emergency/urgency problems?: No  Practice Contacted Patient ?: No  Pt had ED follow up with pcp/staff ?: No    Reason Patient went to ED instead of Urgent Care or PCP?: Perceived Severity of Illness  05/30/2023 11:46 AM EDT by Edwin Loyd MA 05/30/2023 11:46 AM EDT by MELANIE MorenoI Shanna Hazel (Self) [phone] (WOEV)[phone] (Home)  [phone] (Home) Remove  - Communicated - Reviewed Ed questions with patient   Patient s/w pcp office today and no transportation concerns

## 2023-05-30 NOTE — TELEPHONE ENCOUNTER
----- Message from Kathie Keen MD sent at 5/30/2023  2:55 PM EDT -----  Regarding: RE: Blood thinner  Asked patient to hold Eliquis for 1 day prior to office appointment  ----- Message -----  From: Ld Tapia  Sent: 5/30/2023   2:36 PM EDT  To: Kathie Keen MD; Fabian Reedmiguel  Subject: Blood thinner                                    Patient was seen in the ER by Dr Lorena Ramos in [facility] for a mild CHF, during the visit he noticed a lesion and referred the patient to our practice for removal  Patient is on elquis 2 5 and is schedule for an appt with you on 06/28/23  Should we wait until we see him to see wether he should stop it for a couple of days or not? Fe Bailey from Dr Denny Seen office (his pcp) is calling to see if this will be a procedure or not but I did let her know that we wouldn't know until we see the pt

## 2023-05-30 NOTE — TELEPHONE ENCOUNTER
Called patient and his granddaughter June and relay the message about holding the eliquis 1 day prior to his appt

## 2023-05-31 ENCOUNTER — CLINICAL SUPPORT (OUTPATIENT)
Dept: CARDIAC REHAB | Facility: HOSPITAL | Age: 88
End: 2023-05-31

## 2023-05-31 DIAGNOSIS — Z95.1 HX OF CABG: ICD-10-CM

## 2023-06-02 ENCOUNTER — CLINICAL SUPPORT (OUTPATIENT)
Dept: CARDIAC REHAB | Facility: HOSPITAL | Age: 88
End: 2023-06-02

## 2023-06-02 DIAGNOSIS — Z95.1 HX OF CABG: ICD-10-CM

## 2023-06-05 ENCOUNTER — CLINICAL SUPPORT (OUTPATIENT)
Dept: CARDIAC REHAB | Facility: HOSPITAL | Age: 88
End: 2023-06-05
Payer: MEDICARE

## 2023-06-05 DIAGNOSIS — Z95.1 HX OF CABG: ICD-10-CM

## 2023-06-05 PROCEDURE — 93798 PHYS/QHP OP CAR RHAB W/ECG: CPT

## 2023-06-07 ENCOUNTER — CLINICAL SUPPORT (OUTPATIENT)
Dept: CARDIAC REHAB | Facility: HOSPITAL | Age: 88
End: 2023-06-07
Payer: MEDICARE

## 2023-06-07 DIAGNOSIS — Z95.1 HX OF CABG: ICD-10-CM

## 2023-06-07 PROCEDURE — 93798 PHYS/QHP OP CAR RHAB W/ECG: CPT

## 2023-06-09 ENCOUNTER — CLINICAL SUPPORT (OUTPATIENT)
Dept: CARDIAC REHAB | Facility: HOSPITAL | Age: 88
End: 2023-06-09
Payer: MEDICARE

## 2023-06-09 DIAGNOSIS — I48.0 PAROXYSMAL ATRIAL FIBRILLATION (HCC): ICD-10-CM

## 2023-06-09 DIAGNOSIS — Z95.1 HX OF CABG: ICD-10-CM

## 2023-06-09 PROCEDURE — 93798 PHYS/QHP OP CAR RHAB W/ECG: CPT

## 2023-06-12 ENCOUNTER — CLINICAL SUPPORT (OUTPATIENT)
Dept: CARDIAC REHAB | Facility: HOSPITAL | Age: 88
End: 2023-06-12
Payer: MEDICARE

## 2023-06-12 DIAGNOSIS — Z95.1 HX OF CABG: Primary | ICD-10-CM

## 2023-06-12 PROCEDURE — 93798 PHYS/QHP OP CAR RHAB W/ECG: CPT

## 2023-06-12 NOTE — PROGRESS NOTES
Cardiac Rehabilitation Plan of Care   150 Day Reassessment    Today's date: 2023   # of Exercise Sessions Completed:   Patient name: Katrina Castañeda      : 1935  Age: 82 y o  MRN: [id number]  Referring Physician: Melanie Caldwell MD  Cardiologist: Melanie Caldwell MD  Provider: [facility]  Clinician: Nevaeh Palmer MS    Dx: CABG history of 8482/9782  Stent 2009/ Chronic respiratory failure  with hypoxia, Interstitial lung disease  Date of onset: 2023      SUMMARY OF PROGRESS: Melba Miner has attended 33/36 sessions of cardiac rehab  He is completing 35-40 minutes of aerobic exercise and light strength training  Resting /62, HR 81 bpm  Exercise /68, -124 bpm  Telemetry reads Afib, but it is controlled  Patient continues to have stomach discomfort and has SOB some days worse than others  Patient exercises on 4L of O2 and at rest he is on 4L  Patient has been seen by doctor about stomach pain and does not seem to know what is causing it  Patient continues to receive rides to rehab from his daughter and his daughter attends rehab with him  Daughter is a good motivator for Melba Miner to push himself to come to rehab  Patient continues to walk around the house when he is up for it  Patient will be discharged from rehab soon and will continue exercise by doing light hand weights at home and by walking around the house for at least 15 minutes daily  Melba Miner has a good social life with family and is very pleasant to be around       Medication compliance: Yes   Comments: Pt reports to be compliant with medications  Fall Risk: High   Comments: Patient uses walking assist device (walker/cane/rollator) and Reports a fall in the past 6 months    EKG Interpretation:  Controlled atrial fibrillation      EXERCISE ASSESSMENT and PLAN    Exercise Prescription:      Frequency: 3 days/week   Supplement with home exercise 2+ days/wk as tolerated       Minutes: 35-40      METS: 1 0-2 63         HR:  bpm   RPE: 4-6         Modalities: UBE, NuStep and Room walking      30 Day Goals for Exercise Progression:    Frequency: 3days/week of cardiac rehab       Supplement with home exercise 2+ days/wk as tolerated    Minutes: 30-40                              >150 mins/wk of moderate intensity exercise   METS: 2 5-3 0   HR: 20-30bpm above resting   RPE: 4-6   Modalities: UBE, NuStep, Recumbent bike and Room walking    Strength trainin-3 days / week  12-15 repetitions  1-2 sets per modality   Will be added following 2-3 weeks of monitored exercise sessions   Modalities: Chest Press, Pull Downs, Lateral Raise, Arm Extension, Arm Curl and Front Raises    Home Exercise: none    Goals: 10% improvement in functional capacity - based on max METs achieved in fitness assessment, Reduced dyspnea with physical activity  0-1/10, improved DASI score by 10%, Increase in exercise capacity by 40% - based on peak METs tolerated in cardiac rehab exercise session, Exercise 5 days/wk, >150mins/wk of moderate intensity exercise, Improved 6MWT distance by 10%, Attend Rehab regularly and Start a walking program    Progression Toward Goals:  Reviewed Pt goals and determined plan of care, Will continue to educate and progress as tolerated      Education: benefit of exercise for CAD risk factors and class: Risk Factors for Heart Disease   Plan:Education class: Risk Factors for Heart Disease  Readiness to change: Action:  (Changing behavior)      NUTRITION ASSESSMENT AND PLAN    Weight control:    Starting weight: 163   Current weight:   165    Diabetes: T2D, on Insulin   A1c: 8    last measured: 22    Lipid management: Last lipid profile 22  Chol 115  TRG 81  HDL 43  LDL 56    Goals:improved A1c  < 7 0%, choose lean meat (93-95%), eliminate processed meats, reduce portion sizes of meat to 3oz or less, increase intake of fish, shellfish, cook without added fat or use vegetable oil/spray, increase intake of meatless meals, use low fat dairy, reduce cheese intake or use reduced-fat, eat 3 or more servings of whole grains a day and Eat 4-5 cups of fruits and vegetables daily    Measurable goals were based Rate Your Plate Dietary Self-Assessment  These are the areas in which the patient could score higher on the assessment  Goals include recommendations for a heart healthy diet based on American Heart Association  Progression Toward Goals: Reviewed Pt goals and determined plan of care, Will continue to educate and progress as tolerated      Education: heart healthy eating  low sodium diet  nutrition for  lipid management  nutrition for Improved BG control  healthy choices while dining out  portion control  education class: Heart Healthy Eating  education class:  Label Reading  Plan: Education class: Reading Food Labels, Education Class: Heart Healthy Eating, replace butter with soft spreads made with olive oil, canola or yogurt, replace refined grain bread with whole grain bread, replace unhealthy snacks with fruits & vegs, reduce portion sizes, reduce red meat 1x/wk, switch to skim or 1% milk, eat fewer desserts and sweets, avoid processed foods, eat more home cooked meals and eat out less often, will try new grains like brown rice, quinoa, farro, monitor home blood glucose, drink more water and learn how to read food labels  Readiness to change: Action:  (Changing behavior)      PSYCHOSOCIAL ASSESSMENT AND PLAN    Emotional:  Depression assessment:  PHQ-9 = 3  1-4 = Minimal Depression            Anxiety measure:  JI-7 = 6  5-9 = Mild anxiety  Self-reported stress level:  6  Social support: Excellent    Goals:  PHQ-9 - reduced severity by one level, Feelings in [place] Score < 3, Physical Fitness in [place] Score < 3, Social Support in [place] Score < 3, Daily Activity in [place] Score < 3, Social Activities in [place] Score < 3 and Pain in [place] Score < 3    Progression Toward Goals: Reviewed Pt goals and determined plan of care, Will continue to educate and progress as tolerated  Education: signs/sxs of depression, benefits of a positive support system, stress management techniques, class:  Stress and Your Health , class:  Relaxation and class:  Stress and Pulmonary Disease  Plan: PHQ-9 >5 will refer to MD, Practice relaxation techniques, Exercise, Spend time outdoors, Enjoy a hobby, Read a Book, Keep a positive mindset, Learn how to relax and slow down, Meet new people and Enjoy family  Readiness to change: Action:  (Changing behavior)      OTHER CORE COMPONENTS     Tobacco:   Social History     Tobacco Use   Smoking Status Former   • Packs/day: 1    • Years: 40 00   • Total pack years: 40 00   • Types: Cigarettes   • Quit date: 1977   • Years since quittin 4   Smokeless Tobacco Never       Tobacco Use Intervention:   N/A: Pt has a remote history of smoking    Anginal Symptoms:  None   NTG use: No prescription    Blood pressure:    Restin/62   Exercise: 120/68   Recovery: 120/64    Goals: consistent BP < 130/80, moderate intensity exercise >150 mins/wk, medication compliance and reduce number of medications  needed for BP control    Progression Toward Goals: Reviewed Pt goals and determined plan of care, Will continue to educate and progress as tolerated      Education:  understanding high blood pressure and it's relationship to CAD, low sodium diet and HTN, Education class:  Common Heart Medications and Education class: Understanding Heart Disease  Plan: Class: Understanding Heart Disease, Class: Common Heart Medications, avoid places with second hand smoke, Avoid Processed foods, engage in regular exercise and check labels for sodium content  Readiness to change: Action:  (Changing behavior)

## 2023-06-14 ENCOUNTER — CLINICAL SUPPORT (OUTPATIENT)
Dept: CARDIAC REHAB | Facility: HOSPITAL | Age: 88
End: 2023-06-14
Payer: MEDICARE

## 2023-06-14 DIAGNOSIS — Z95.1 HX OF CABG: ICD-10-CM

## 2023-06-14 PROCEDURE — 93798 PHYS/QHP OP CAR RHAB W/ECG: CPT

## 2023-06-16 ENCOUNTER — CLINICAL SUPPORT (OUTPATIENT)
Dept: CARDIAC REHAB | Facility: HOSPITAL | Age: 88
End: 2023-06-16
Payer: MEDICARE

## 2023-06-16 DIAGNOSIS — Z95.1 HX OF CABG: ICD-10-CM

## 2023-06-16 PROCEDURE — 93798 PHYS/QHP OP CAR RHAB W/ECG: CPT

## 2023-06-16 RX ORDER — METOPROLOL TARTRATE 50 MG/1
TABLET, FILM COATED ORAL
COMMUNITY
Start: 2023-05-06

## 2023-06-19 ENCOUNTER — CLINICAL SUPPORT (OUTPATIENT)
Dept: CARDIAC REHAB | Facility: HOSPITAL | Age: 88
End: 2023-06-19
Payer: MEDICARE

## 2023-06-19 DIAGNOSIS — Z95.1 HX OF CABG: Primary | ICD-10-CM

## 2023-06-19 PROCEDURE — 93798 PHYS/QHP OP CAR RHAB W/ECG: CPT

## 2023-06-19 NOTE — PROGRESS NOTES
Cardiac Rehabilitation Plan of Care   Discharge    Today's date: 2023   # of Exercise Sessions Completed:   Patient name: Yojana Santos      : 1935  Age: 82 y o  MRN: [id number]  Referring Physician: Beth Santana MD  Cardiologist: Beth Santana MD  Provider: [facility]  Clinician: Denisse Martin MS    Dx: CABG history of 8564/0753  Stent 2009/ Chronic respiratory failure  with hypoxia, Interstitial lung disease  Date of onset: 2023      SUMMARY OF PROGRESS: Chuck Torres has attended 36/36 sessions of cardiac rehab  He was completing about 35-40 minutes of aerobic exercise and completed light strength training  No cardiac complaints  Resting /62, HR 93 bpm  Exercise /62, -145 bpm  Telemetry reads Afib, but has it under control  Patient was encouraged to do as much walking as he can upon discharge from rehab  Patient has a hard time with SOB with exertion  Patient stated he does some exercise while sitting on his chair, such as arm curls, sit to stand, etc  Patient was encouraged to hold small bottles of water to lift light resistance  Patient will continue to make dietary changes  Patient is very close with family and has help when needed  Patient has a great social life  Patient is on 4L of oxygen at rest and with exertion  Patient was pleasant at rehab  Patient made an improvement in his weight and his walk test that he did       Medication compliance: Yes   Comments: Pt reports to be compliant with medications  Fall Risk: High   Comments: Patient uses walking assist device (walker/cane/rollator) and Reports a fall in the past 6 months    EKG Interpretation:  Controlled atrial fibrillation      EXERCISE ASSESSMENT and PLAN    Exercise Prescription:      Frequency: 3 days/week   Supplement with home exercise 2+ days/wk as tolerated       Minutes: 35-40      METS: 1 0-2 63         HR: 104-145 bpm   RPE: 4-6         Modalities: UBE, NuStep and Room walking      Exercise Progression after Discharge:    Frequency: 3-5 days of gym or home exercise   Minutes: 30-50  >150 mins/wk of moderate intensity exercise   METS: 1 5 - 2 5   HR: 20 - 30 beats above RHR    RPE: 4-6   Modalities: UBE, NuStep and Room walking      Strength trainin-3 days / week  12-15 repetitions  1-2 sets per modality   Will be added following 2-3 weeks of monitored exercise sessions   Modalities: Chest Press, Pull Downs, Lateral Raise, Arm Extension, Arm Curl and Front Raises    Home Exercise: none    Goals: 10% improvement in functional capacity - based on max METs achieved in fitness assessment, Reduced dyspnea with physical activity  0-1/10, improved DASI score by 10%, Increase in exercise capacity by 40% - based on peak METs tolerated in cardiac rehab exercise session, Exercise 5 days/wk, >150mins/wk of moderate intensity exercise, Improved 6MWT distance by 10%, Attend Rehab regularly and Start a walking program    Progression Toward Goals:  Reviewed Pt goals and determined plan of care, Will continue to educate and progress as tolerated      Education: benefit of exercise for CAD risk factors and class: Risk Factors for Heart Disease   Plan:Education class: Risk Factors for Heart Disease  Readiness to change: Action:  (Changing behavior)      NUTRITION ASSESSMENT AND PLAN    Weight control:    Starting weight: 163   Current weight:   166    Diabetes: T2D, on Insulin   A1c: 8    last measured: 22    Lipid management: Last lipid profile 22  Chol 115  TRG 81  HDL 43  LDL 56    Goals:improved A1c  < 7 0%, choose lean meat (93-95%), eliminate processed meats, reduce portion sizes of meat to 3oz or less, increase intake of fish, shellfish, cook without added fat or use vegetable oil/spray, increase intake of meatless meals, use low fat dairy, reduce cheese intake or use reduced-fat, eat 3 or more servings of whole grains a day and Eat 4-5 cups of fruits and vegetables daily    Measurable goals were based Rate Your Plate Dietary Self-Assessment  These are the areas in which the patient could score higher on the assessment  Goals include recommendations for a heart healthy diet based on American Heart Association  Progression Toward Goals: Reviewed Pt goals and determined plan of care, Will continue to educate and progress as tolerated  Education: heart healthy eating  low sodium diet  nutrition for  lipid management  nutrition for Improved BG control  healthy choices while dining out  portion control  education class: Heart Healthy Eating  education class:  Label Reading  Plan: Education class: Reading Food Labels, Education Class: Heart Healthy Eating, replace butter with soft spreads made with olive oil, canola or yogurt, replace refined grain bread with whole grain bread, replace unhealthy snacks with fruits & vegs, reduce portion sizes, reduce red meat 1x/wk, switch to skim or 1% milk, eat fewer desserts and sweets, avoid processed foods, eat more home cooked meals and eat out less often, will try new grains like brown rice, quinoa, farro, monitor home blood glucose, drink more water and learn how to read food labels  Readiness to change: Action:  (Changing behavior)      PSYCHOSOCIAL ASSESSMENT AND PLAN    Emotional:  Depression assessment:  PHQ-9 = 3  1-4 = Minimal Depression            Anxiety measure:  JI-7 = 6  5-9 = Mild anxiety  Self-reported stress level:  6  Social support: Excellent    Goals:  PHQ-9 - reduced severity by one level, Feelings in [place] Score < 3, Physical Fitness in [place] Score < 3, Social Support in [place] Score < 3, Daily Activity in [place] Score < 3, Social Activities in [place] Score < 3 and Pain in [place] Score < 3    Progression Toward Goals: Reviewed Pt goals and determined plan of care, Will continue to educate and progress as tolerated      Education: signs/sxs of depression, benefits of a positive support system, stress management techniques, class:  Stress and Your Health , class:  Relaxation and class:  Stress and Pulmonary Disease  Plan: PHQ-9 >5 will refer to MD, Practice relaxation techniques, Exercise, Spend time outdoors, Enjoy a hobby, Read a Book, Keep a positive mindset, Learn how to relax and slow down, Meet new people and Enjoy family  Readiness to change: Action:  (Changing behavior)      OTHER CORE COMPONENTS     Tobacco:   Social History     Tobacco Use   Smoking Status Former   • Packs/day: 1    • Years: 40 00   • Total pack years: 40    • Types: Cigarettes   • Quit date: 1977   • Years since quittin 4   Smokeless Tobacco Never       Tobacco Use Intervention:   N/A: Pt has a remote history of smoking    Anginal Symptoms:  None   NTG use: No prescription    Blood pressure:    Restin/62   Exercise: 130/62   Recovery: 118/64    Goals: consistent BP < 130/80, moderate intensity exercise >150 mins/wk, medication compliance and reduce number of medications  needed for BP control    Progression Toward Goals: Reviewed Pt goals and determined plan of care, Will continue to educate and progress as tolerated      Education:  understanding high blood pressure and it's relationship to CAD, low sodium diet and HTN, Education class:  Common Heart Medications and Education class: Understanding Heart Disease  Plan: Class: Understanding Heart Disease, Class: Common Heart Medications, avoid places with second hand smoke, Avoid Processed foods, engage in regular exercise and check labels for sodium content  Readiness to change: Action:  (Changing behavior)

## 2023-06-20 ENCOUNTER — OFFICE VISIT (OUTPATIENT)
Dept: FAMILY MEDICINE CLINIC | Facility: CLINIC | Age: 88
End: 2023-06-20
Payer: MEDICARE

## 2023-06-20 VITALS
HEART RATE: 101 BPM | SYSTOLIC BLOOD PRESSURE: 108 MMHG | TEMPERATURE: 98.2 F | HEIGHT: 66 IN | WEIGHT: 170 LBS | OXYGEN SATURATION: 98 % | DIASTOLIC BLOOD PRESSURE: 60 MMHG | BODY MASS INDEX: 27.32 KG/M2

## 2023-06-20 DIAGNOSIS — I70.0 AORTIC CALCIFICATION (HCC): ICD-10-CM

## 2023-06-20 DIAGNOSIS — I65.23 OCCLUSION AND STENOSIS OF BILATERAL CAROTID ARTERIES: ICD-10-CM

## 2023-06-20 DIAGNOSIS — J84.9 INTERSTITIAL LUNG DISEASE (HCC): Primary | ICD-10-CM

## 2023-06-20 DIAGNOSIS — Z23 ENCOUNTER FOR IMMUNIZATION: ICD-10-CM

## 2023-06-20 DIAGNOSIS — K21.9 GASTROESOPHAGEAL REFLUX DISEASE WITHOUT ESOPHAGITIS: ICD-10-CM

## 2023-06-20 DIAGNOSIS — I10 ESSENTIAL HYPERTENSION: ICD-10-CM

## 2023-06-20 DIAGNOSIS — I48.0 PAROXYSMAL ATRIAL FIBRILLATION (HCC): ICD-10-CM

## 2023-06-20 DIAGNOSIS — N25.81 SECONDARY HYPERPARATHYROIDISM OF RENAL ORIGIN (HCC): ICD-10-CM

## 2023-06-20 DIAGNOSIS — I25.118 CORONARY ARTERY DISEASE OF NATIVE ARTERY OF NATIVE HEART WITH STABLE ANGINA PECTORIS (HCC): ICD-10-CM

## 2023-06-20 DIAGNOSIS — I85.00 IDIOPATHIC ESOPHAGEAL VARICES WITHOUT BLEEDING (HCC): ICD-10-CM

## 2023-06-20 DIAGNOSIS — R91.1 PULMONARY NODULE: ICD-10-CM

## 2023-06-20 PROCEDURE — 90677 PCV20 VACCINE IM: CPT

## 2023-06-20 PROCEDURE — 99214 OFFICE O/P EST MOD 30 MIN: CPT | Performed by: INTERNAL MEDICINE

## 2023-06-20 PROCEDURE — G0009 ADMIN PNEUMOCOCCAL VACCINE: HCPCS

## 2023-06-20 NOTE — ASSESSMENT & PLAN NOTE
Getting follow-up CAT scans  Follows with pulmonary  Consideration for chronic prednisone being given  Etiology of his interstitial lung disease remains at large  Autoimmune evaluation per pulmonary

## 2023-06-20 NOTE — PROGRESS NOTES
Name: Jim Vigil      : 1935      MRN: [id number]  Encounter Provider: Ladan Waller DO  Encounter Date: 2023   Encounter department: [facility] PRIMARY [facility]    Assessment & Plan     1  [facility]  Assessment & Plan:  Getting follow-up CAT scans  Follows with pulmonary  Consideration for chronic prednisone being given  Etiology of his interstitial lung disease remains at large  Autoimmune evaluation per pulmonary  2  Encounter for immunization  -     Pneumococcal Conjugate Vaccine 20-valent (Pcv20)    3  Paroxysmal atrial fibrillation (HCC)  Assessment & Plan:  Condition occurs during acute illness  Risk of blood thinners concerning, remains on 2 5 mg twice daily of Eliquis  Educated on need for evaluation for even minor head trauma      4  Essential hypertension  Assessment & Plan:  Continue current medications and management of this condition  5  Aortic calcification (HCC)  Assessment & Plan:  Follows with cardiology      6  Occlusion and stenosis of bilateral carotid arteries  Assessment & Plan:  Continue to monitor      7  Idiopathic esophageal varices without bleeding (HCC)  Assessment & Plan:  These were seen on prior EGD, no complications arising from these  Pulmonary pretension is now noted  8  Coronary artery disease of native artery of native heart with stable angina pectoris ([facility] )    9  Gastroesophageal reflux disease without esophagitis  Assessment & Plan:  Remains on chronic PPI      10  Secondary hyperparathyroidism of renal origin [facility])  Assessment & Plan:  Related to diabetic nephropathy, patient has underlying hypercalcemia and hyperparathyroidism  11  Pulmonary nodule  Assessment & Plan:  Condition also monitored by pulmonary             Tyson Luna is now 82years of age  He has been diagnosed with interstitial lung disease remains on 4 L nasal cannula  He otherwise looks well feels reasonably well today  He has had some significant weight loss  His sugars have been on the climb 2, with last A1c of 8 2  He denies fever chills or cough  Just chronically short of breath  He follows with several specialists including endocrine, pulmonary, cardiology, and recently completed a course of cardio pulmonary rehab  He is in good spirits  Denies chest pain  Gets occasional left and middle quadrant pain in his abdomen, typically when he is trying to exert himself  He has a known incisional hernia there  Easily reducible  Gets occasional chest pain with atypical features including duration and persistence  Reviewed his diet with daughter in the room  Review of Systems   Constitutional: Negative for chills and fever  HENT: Negative for ear pain, rhinorrhea and sore throat  Eyes: Negative for pain, redness and visual disturbance  Respiratory: Positive for chest tightness and shortness of breath  Negative for cough  Cardiovascular: Negative for chest pain and leg swelling  Gastrointestinal: Positive for abdominal pain  Negative for diarrhea, nausea and vomiting  Genitourinary: Negative for dysuria, flank pain, frequency and urgency  Musculoskeletal: Negative for back pain, myalgias and neck pain  Skin: Negative for rash  Neurological: Positive for dizziness and light-headedness  Negative for weakness and headaches  Hematological: Negative  Psychiatric/Behavioral: Negative for agitation, confusion and suicidal ideas  The patient is not nervous/anxious  Daytime somnolence   All other systems reviewed and are negative        Current Outpatient Medications on File Prior to Visit   Medication Sig   • amLODIPine (NORVASC) 10 mg tablet Take 10 mg by mouth daily   • apixaban (ELIQUIS) 2 5 mg Take 1 tablet (2 5 mg total) by mouth 2 (two) times a day   • atorvastatin (LIPITOR) 40 mg tablet Take 1 tablet (40 mg total) by mouth daily with dinner This is an increased dose to better help prevent heart "attack and stroke  • escitalopram (LEXAPRO) 10 mg tablet Take 10 mg by mouth daily   • Insulin Glargine (BASAGLAR KWIKPEN SC) Inject 10 Units under the skin   • isosorbide mononitrate (IMDUR) 60 mg 24 hr tablet Take 1 tablet (60 mg total) by mouth daily In the morning   • linaGLIPtin (Tradjenta) 5 MG TABS Take 5 mg by mouth daily Take this for your diabetes instead of glimepiride   • metoprolol tartrate (LOPRESSOR) 50 mg tablet TAKE 1 1/2 TABLET BY ORAL ROUTE 2 TIMES EVERY DAY WITH MEALS   • nitroglycerin (NITROSTAT) 0 4 mg SL tablet TAKE 1 TABLET UNDER TONGUE FOR ANGINA, MAY REPEAT IN 15 MIN  NO MORE THAN 3 TIMES   • pantoprazole (PROTONIX) 40 mg tablet Take 40 mg by mouth daily   • sucralfate (CARAFATE) 1 g tablet Take 1 g by mouth 2 (two) times a day   • tamsulosin (FLOMAX) 0 4 mg Take 2 capsules (0 8 mg total) by mouth daily with dinner   • torsemide (DEMADEX) 20 mg tablet Take 20 mg by mouth in the morning and as needed   • cholecalciferol (VITAMIN D3) 1,000 units tablet Take 1 tablet (1,000 Units total) by mouth daily (Patient not taking: Reported on 6/20/2023)   • Metoprolol Tartrate 75 MG TABS TAKE 1 TABLET BY MOUTH EVERY 12 HOURS (Patient not taking: Reported on 6/20/2023)       Objective     /60   Pulse 101   Temp 98 2 °F (36 8 °C)   Ht 5' 6\" (1 676 m)   Wt 77 1 kg (170 lb)   SpO2 98%   BMI 27 44 kg/m²     Physical Exam  Constitutional:       General: He is not in acute distress  Appearance: Normal appearance  Comments: Chronically ill-appearing   HENT:      Head: Normocephalic and atraumatic  Nose: No congestion or rhinorrhea  Eyes:      Extraocular Movements: Extraocular movements intact  Pupils: Pupils are equal, round, and reactive to light  Neck:      Vascular: No carotid bruit  Cardiovascular:      Rate and Rhythm: Normal rate and regular rhythm  Heart sounds: Murmur heard        Comments: Periods regular with frequent ectopy, 2/6 systolic " murmur     Pulmonary:      Effort: No respiratory distress  Breath sounds: Rales present  No wheezing  Comments: Coarse rales  Chest:      Chest wall: No tenderness  Abdominal:      General: There is no distension  Tenderness: There is abdominal tenderness  Hernia: No hernia is present  Comments: Minimal tenderness on palpation of the left periumbilical hernia   Musculoskeletal:         General: No swelling or tenderness  Right lower leg: No edema  Left lower leg: No edema  Lymphadenopathy:      Cervical: No cervical adenopathy  Skin:     Findings: No rash  Neurological:      General: No focal deficit present  Mental Status: He is alert and oriented to person, place, and time  Sensory: No sensory deficit     Psychiatric:         Mood and Affect: Mood normal          Behavior: Behavior normal        Zetttayo Promise, DO

## 2023-06-20 NOTE — ASSESSMENT & PLAN NOTE
Patient has follow-up in the near future with endocrine  We will continue current regimen as listed, with the exception of may be increasing his Basaglar by 2 units every few days, trying to get his baseline down below 150  His hemoglobin A1c is reasonable at 8 3 given his age and comorbidities    Lab Results   Component Value Date    HGBA1C 8 3 (H) 05/18/2023

## 2023-06-20 NOTE — ASSESSMENT & PLAN NOTE
These were seen on prior EGD, no complications arising from these  Pulmonary pretension is now noted

## 2023-06-20 NOTE — ASSESSMENT & PLAN NOTE
Condition occurs during acute illness  Risk of blood thinners concerning, remains on 2 5 mg twice daily of Eliquis    Educated on need for evaluation for even minor head trauma

## 2023-06-21 DIAGNOSIS — K21.9 GASTROESOPHAGEAL REFLUX DISEASE WITHOUT ESOPHAGITIS: Primary | ICD-10-CM

## 2023-06-21 DIAGNOSIS — I16.9 HYPERTENSIVE CRISIS: ICD-10-CM

## 2023-06-21 RX ORDER — ISOSORBIDE MONONITRATE 60 MG/1
60 TABLET, EXTENDED RELEASE ORAL DAILY
Qty: 30 TABLET | Refills: 5 | Status: SHIPPED | OUTPATIENT
Start: 2023-06-21

## 2023-06-21 RX ORDER — PANTOPRAZOLE SODIUM 40 MG/1
40 TABLET, DELAYED RELEASE ORAL DAILY
Qty: 30 TABLET | Refills: 5 | Status: SHIPPED | OUTPATIENT
Start: 2023-06-21

## 2023-06-23 ENCOUNTER — OFFICE VISIT (OUTPATIENT)
Dept: CARDIOLOGY CLINIC | Facility: CLINIC | Age: 88
End: 2023-06-23
Payer: MEDICARE

## 2023-06-23 VITALS
HEIGHT: 66 IN | OXYGEN SATURATION: 93 % | BODY MASS INDEX: 26.68 KG/M2 | DIASTOLIC BLOOD PRESSURE: 60 MMHG | HEART RATE: 88 BPM | SYSTOLIC BLOOD PRESSURE: 110 MMHG | WEIGHT: 166 LBS

## 2023-06-23 DIAGNOSIS — J96.11 CHRONIC RESPIRATORY FAILURE WITH HYPOXIA (HCC): Primary | ICD-10-CM

## 2023-06-23 DIAGNOSIS — I27.20 PULMONARY HYPERTENSION (HCC): ICD-10-CM

## 2023-06-23 DIAGNOSIS — I25.118 CORONARY ARTERY DISEASE OF NATIVE ARTERY OF NATIVE HEART WITH STABLE ANGINA PECTORIS (HCC): Chronic | ICD-10-CM

## 2023-06-23 PROCEDURE — 99214 OFFICE O/P EST MOD 30 MIN: CPT | Performed by: NURSE PRACTITIONER

## 2023-06-23 NOTE — PROGRESS NOTES
Patient ID: Ang Crane is a 82 y o  male  Plan:      Chronic respiratory failure with hypoxia (HCC)  Continue supplemental oxygen    Coronary artery disease of native artery of native heart with stable angina pectoris (HCC)  No symptoms suggestive of angina  Continue statin, beta-blocker  He is on Eliquis and therefore not on aspirin    Essential hypertension  Blood pressure is well controlled  Continue amlodipine 10 mg daily, metoprolol 75 mg twice daily    Pulmonary hypertension (HCC)  Continue Demadex 20 mg as needed and supplemental oxygen       Follow up Plan/Summary Comments:  Jim Burnham is doing well from a cardiac perspective  I have not recommended any medication changes  He is not interested in additional surveillance testing as previously documented  Follow-up in November with Dr Danielle Pacheco as previously arranged  He will notify us sooner if needed  HPI: Jim Burnham is seen in the office today for a hospital follow-up visit  Jim Burnham was evaluated in the ER at [facility] on 5/26/2023 for shortness of breath  BNP was level was noted to be elevated and reported he had not been taking his diuretics as they were to be taken on an as-needed basis  He received his home dose of Demadex and was feeling better  He was discharged home for outpatient follow-up  Since his discharge from the hospital, Jim Burnham has been doing well  He denies any changes in his breathing and continues to use supplemental oxygen  He denies any chest discomfort or palpitations, dizziness, lightheadedness, syncope  He denies any bleeding problems on Eliquis and denies any recent falls  Cardiac history is significant for two-vessel CABG in 1992 (SVG-CX, SVG-RCA)  He had repeat bypass surgery in 11/2005 (LIMA-D1 and LAD, SVG-PDA and PL branch, radial artery to OM 1)  Review of Systems   10  point ROS  was otherwise non pertinent or negative except as per HPI or as below     Gait: Chelle Echeverria      Most recent or relevant "cardiac/vascular testing:    Echo 5/12/2022 (limited)  EF 65%  Mild LAE  Moderate TR    Echo 2/21/2022  EF 65%, moderate LVH  Mild LAE  Mild MR, moderate PI    Objective:     /60 (BP Location: Left arm, Patient Position: Sitting, Cuff Size: Standard)   Pulse 88   Ht 5' 6\" (1 676 m)   Wt 75 3 kg (166 lb)   SpO2 93% Comment: On O2  BMI 26 79 kg/m²     PHYSICAL EXAM:    General:  Normal appearance, no acute distress  Wearing supplemental oxygen  Eyes:  Anicteric  Oral mucosa:  Moist   Neck:  No JVD  Carotid upstrokes are brisk without bruits  No masses  Chest: Coarse Rales in the bases bilaterally   cardiac: Irregularly irregular  No palpable PMI  Normal S1 and S2   Murmur noted  Abdomen:  Soft and nontender  No palpable organomegaly or aortic enlargement  Extremities:  No peripheral edema  Musculoskeletal:  Symmetric  Vascular:  Pedal pulses are intact  Neuro:  Grossly symmetric  Psych:  Alert and oriented x3      Allergies   Allergen Reactions   • Ibuprofen Fatigue     Other reaction(s): decreased kidney function       Current Outpatient Medications:   •  amLODIPine (NORVASC) 10 mg tablet, Take 10 mg by mouth daily, Disp: , Rfl:   •  apixaban (ELIQUIS) 2 5 mg, Take 1 tablet (2 5 mg total) by mouth 2 (two) times a day, Disp: 60 tablet, Rfl: 3  •  atorvastatin (LIPITOR) 40 mg tablet, Take 1 tablet (40 mg total) by mouth daily with dinner This is an increased dose to better help prevent heart attack and stroke , Disp: 30 tablet, Rfl: 0  •  escitalopram (LEXAPRO) 10 mg tablet, Take 10 mg by mouth daily, Disp: , Rfl:   •  Insulin Glargine (BASAGLAR KWIKPEN SC), Inject 10 Units under the skin, Disp: , Rfl:   •  isosorbide mononitrate (IMDUR) 60 mg 24 hr tablet, Take 1 tablet (60 mg total) by mouth daily In the morning, Disp: 30 tablet, Rfl: 5  •  linaGLIPtin (Tradjenta) 5 MG TABS, Take 5 mg by mouth daily Take this for your diabetes instead of glimepiride, Disp: 30 tablet, Rfl: 0  •  metoprolol " tartrate (LOPRESSOR) 50 mg tablet, TAKE 1 1/2 TABLET BY ORAL ROUTE 2 TIMES EVERY DAY WITH MEALS, Disp: , Rfl:   •  nitroglycerin (NITROSTAT) 0 4 mg SL tablet, TAKE 1 TABLET UNDER TONGUE FOR ANGINA, MAY REPEAT IN 15 MIN  NO MORE THAN 3 TIMES, Disp: , Rfl:   •  pantoprazole (PROTONIX) 40 mg tablet, Take 1 tablet (40 mg total) by mouth daily, Disp: 30 tablet, Rfl: 5  •  sucralfate (CARAFATE) 1 g tablet, Take 1 g by mouth 2 (two) times a day, Disp: , Rfl:   •  tamsulosin (FLOMAX) 0 4 mg, Take 2 capsules (0 8 mg total) by mouth daily with dinner, Disp: 180 capsule, Rfl: 1  •  torsemide (DEMADEX) 20 mg tablet, Take 20 mg by mouth in the morning and as needed, Disp: , Rfl:   •  cholecalciferol (VITAMIN D3) 1,000 units tablet, Take 1 tablet (1,000 Units total) by mouth daily (Patient not taking: Reported on 6/20/2023), Disp: 30 tablet, Rfl: 0  •  Metoprolol Tartrate 75 MG TABS, TAKE 1 TABLET BY MOUTH EVERY 12 HOURS (Patient not taking: Reported on 6/20/2023), Disp: 60 tablet, Rfl: 1  Past Medical History:   Diagnosis Date   • A-fib ([facility] [address] )    • Arthritis    • Athscl 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     Stent OM1  Patent mammary graft to LAD 10/7/2009; CABG 1992 & 2005   • Cancer [facility])     skin   • CHF (congestive heart failure) ([facility] [address] )    • Diabetes mellitus ([facility] [address] )    • Diabetes mellitus, type II ([facility] [address] )     without complication   • Epistaxis    • GERD (gastroesophageal reflux disease)    • Hx of cardiovascular stress test 07/23/2014    Eddie MPI; EF0 52 (52%) Lexiscan, mild LV systolic dysfunction; evidence for prior inferior wall MI; perfusion imaging consistant w mild lat wall ischemia and min torin-infart inferior ischemia   / EF0 51 (51%) evidence for prior inferior wall MI  Mild inferior and mild-moderate lateral wall ischemia  7/29/15   • Hx of echocardiogram 11/07/2017    2D w/CFD;EF0 55 (55%) mild LVH, mitral valve prolapse with moderate regurgitation  left atrial enlargement   Mild tricuspid regurgitation  • Hypertension    • Mixed hyperlipidemia    • Occlusion and stenosis of bilateral carotid arteries    • 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 infarction)    • Osteoporosis    • Presence of aortocoronary bypass graft      Past Surgical History:   Procedure Laterality Date   • CARDIAC CATHETERIZATION  10/07/2009    Stent 99% stenosis SVG from the aorta to OM1  Patent mammary graft to the LAD   • CORONARY ARTERY BYPASS GRAFT      VG-CX, VG-RCA   • CORONARY ARTERY BYPASS GRAFT  2005    LIMA-D1-LAD, VG-PDA-PLB, Bnowcn-[id number] TMR 9 Lesions   • FL EGD TRANSORAL BIOPSY SINGLE/MULTIPLE N/A 2019    Procedure: ESOPHAGOGASTRODUODENOSCOPY (EGD) with biopsy;  Surgeon: Chiquita Maldonado MD;  Location: [facility] GI LAB;   Service: Gastroenterology   • TONSILLECTOMY         CMP:   Lab Results   Component Value Date    K 4 4 2023     2023    CO2 31 2023    BUN 29 (H) 2023    CREATININE 1 81 (H) 2023    EGFR 33 2023     Lipid Profile:    Lab Results   Component Value Date    TRIG 76 2023    HDL 41 2023         Social History     Tobacco Use   Smoking Status Former   • Packs/day: 1 00   • Years: 40 00   • Total pack years: 40 00   • Types: Cigarettes   • Quit date: 1977   • Years since quittin 4   Smokeless Tobacco Never

## 2023-06-23 NOTE — ASSESSMENT & PLAN NOTE
No symptoms suggestive of angina  Continue statin, beta-blocker    He is on Eliquis and therefore not on aspirin

## 2023-06-28 ENCOUNTER — CONSULT (OUTPATIENT)
Dept: SURGERY | Facility: CLINIC | Age: 88
End: 2023-06-28

## 2023-06-28 VITALS
BODY MASS INDEX: 26.2 KG/M2 | SYSTOLIC BLOOD PRESSURE: 140 MMHG | HEIGHT: 66 IN | OXYGEN SATURATION: 98 % | RESPIRATION RATE: 18 BRPM | TEMPERATURE: 97.6 F | DIASTOLIC BLOOD PRESSURE: 60 MMHG | WEIGHT: 163 LBS | HEART RATE: 84 BPM

## 2023-06-28 DIAGNOSIS — L98.9 LESION OF SKIN OF SCALP: ICD-10-CM

## 2023-06-28 DIAGNOSIS — D48.5 NEOPLASM OF UNCERTAIN BEHAVIOR OF SCALP: Primary | ICD-10-CM

## 2023-06-28 NOTE — PROGRESS NOTES
"Assessment/Plan:    Neoplasm of uncertain behavior of scalp  Patient presents with a bothersome lesion at the top of his scalp for which definitive diagnosis and treatment by excisional biopsy is now indicated  Technical details of the procedure explained and informed verbal consent obtained  Subjective:      Patient ID: Hayder Amaro is a 82 y o  male  Patient came in today for a scalp lesion that he was been dealing with for 4-5 years  Patient states it started off as a skin tag and then over time it got larger  Patient denies drainage or fevers/chills  The lesion only hurts when he brushes his hair  Patient has a med hx of a skin cancer being removed from his chest and a brother who also had skin cancer  Patient is on blood thinners but didn't take it today or yesterday  Chu MADSEN MA      The following portions of the patient's history were reviewed and updated as appropriate: allergies, current medications, past family history, past medical history, past social history, past surgical history and problem list     Review of Systems   Constitutional: Negative for chills and fever  HENT: Negative for ear pain and sore throat  Eyes: Negative for pain and visual disturbance  Respiratory: Negative for cough and shortness of breath  Cardiovascular: Negative for chest pain and palpitations  Gastrointestinal: Negative for abdominal pain and vomiting  Genitourinary: Negative for dysuria and hematuria  Musculoskeletal: Negative for arthralgias and back pain  Skin: Negative for color change and rash  Neurological: Negative for seizures and syncope  All other systems reviewed and are negative  Objective:      /60 (BP Location: Left arm, Patient Position: Sitting, Cuff Size: Standard)   Pulse 84   Temp 97 6 °F (36 4 °C) (Temporal)   Resp 18   Ht 5' 6\" (1 676 m)   Wt 73 9 kg (163 lb)   SpO2 98%   BMI 26 31 kg/m²            Physical Exam  Vitals and nursing note reviewed   " "  Constitutional:       Appearance: He is well-developed  HENT:      Head: Normocephalic and atraumatic  Eyes:      Conjunctiva/sclera: Conjunctivae normal       Pupils: Pupils are equal, round, and reactive to light  Cardiovascular:      Rate and Rhythm: Normal rate and regular rhythm  Pulmonary:      Effort: Pulmonary effort is normal       Breath sounds: Normal breath sounds  Abdominal:      General: Bowel sounds are normal       Palpations: Abdomen is soft  Musculoskeletal:         General: Normal range of motion  Cervical back: Normal range of motion and neck supple  Skin:     General: Skin is warm and dry  Comments: 11 mm parietal scalp keratinaceous horn   Neurological:      Mental Status: He is alert and oriented to person, place, and time  Psychiatric:         Behavior: Behavior normal          Thought Content: Thought content normal          Judgment: Judgment normal        Skin excision    Date/Time: 6/28/2023 10:00 AM    Performed by: Emily Ricardo MD  Authorized by: Emily Ricardo MD  Universal Protocol:  Consent: Verbal consent obtained  Risks and benefits: risks, benefits and alternatives were discussed  Consent given by: patient  Time out: Immediately prior to procedure a \"time out\" was called to verify the correct patient, procedure, equipment, support staff and site/side marked as required    Timeout called at: 6/28/2023 10:14 AM   Patient understanding: patient states understanding of the procedure being performed  Patient consent: the patient's understanding of the procedure matches consent given  Site marked: the operative site was marked  Patient identity confirmed: verbally with patient      Procedure Details - Skin Excision:     Number of Lesions:  1  Lesion 1:     Body area:  Head/neck    Head/neck location:  Scalp    Malignancy: malignancy unknown            Final defect size (mm):  11    Repair type:  Linear closure    Closure complexity: simple       " Repair Comments: Proc Note: With informed consent under sterile conditions using aseptic technique using 1% lidocaine with epi and bicard the 11mm scalp lesion was excised and the wound closed primarily with simple interrupted 3-0 nylon

## 2023-06-28 NOTE — ASSESSMENT & PLAN NOTE
Patient presents with a bothersome lesion at the top of his scalp for which definitive diagnosis and treatment by excisional biopsy is now indicated  Technical details of the procedure explained and informed verbal consent obtained

## 2023-07-05 ENCOUNTER — OFFICE VISIT (OUTPATIENT)
Dept: SURGERY | Facility: CLINIC | Age: 88
End: 2023-07-05

## 2023-07-05 VITALS — TEMPERATURE: 97.9 F

## 2023-07-05 DIAGNOSIS — D48.5 NEOPLASM OF UNCERTAIN BEHAVIOR OF SCALP: Primary | ICD-10-CM

## 2023-07-05 PROCEDURE — 99024 POSTOP FOLLOW-UP VISIT: CPT | Performed by: PHYSICIAN ASSISTANT

## 2023-07-05 NOTE — ASSESSMENT & PLAN NOTE
Doing well 1 week post excision. No problems with bleeding. Well healed on exam and sutures removed. Path not yet available, will call with results. Tentatively set up for 6 month follow-up skin check.

## 2023-07-05 NOTE — PROGRESS NOTES
Post-Op Note - General Surgery   Alexandre Junior 82 y.o. male MRN: [id number]  Encounter: [id number]    Assessment/Plan    Neoplasm of uncertain behavior of scalp  Doing well 1 week post excision. No problems with bleeding. Well healed on exam and sutures removed. Path not yet available, will call with results. Tentatively set up for 6 month follow-up skin check. Diagnoses and all orders for this visit:    Neoplasm of uncertain behavior of scalp        Subjective      Chief Complaint   Patient presents with   • Post-op     1wk suture removal on scalp exc     82year-old male on oxygen here for suture removal after excision of the lesion from his scalp. Review of Systems   All other systems reviewed and are negative. The following portions of the patient's history were reviewed and updated as appropriate: allergies, current medications, past family history, past medical history, past social history, past surgical history and problem list.    Objective      Temperature 97.9 °F (36.6 °C), temperature source Temporal.   Physical Exam  Vitals and nursing note reviewed. Constitutional:       General: He is not in acute distress. Appearance: He is well-developed. He is not diaphoretic. HENT:      Head: Normocephalic and atraumatic. Eyes:      Conjunctiva/sclera: Conjunctivae normal.      Pupils: Pupils are equal, round, and reactive to light. Pulmonary:      Effort: No respiratory distress. Musculoskeletal:         General: Normal range of motion. Cervical back: Normal range of motion. Skin:     General: Skin is warm and dry. Capillary Refill: Capillary refill takes less than 2 seconds. Comments: Incision well-healed, sutures removed uneventfully. Neurological:      Mental Status: He is alert and oriented to person, place, and time.    Psychiatric:         Behavior: Behavior normal.         Signature:  Vance Howard PA-C  Date: 7/5/2023 Time: 2:26 PM

## 2023-07-06 DIAGNOSIS — I48.0 PAROXYSMAL ATRIAL FIBRILLATION (HCC): ICD-10-CM

## 2023-07-11 ENCOUNTER — OFFICE VISIT (OUTPATIENT)
Dept: ENDOCRINOLOGY | Facility: CLINIC | Age: 88
End: 2023-07-11
Payer: MEDICARE

## 2023-07-11 VITALS
BODY MASS INDEX: 26.23 KG/M2 | SYSTOLIC BLOOD PRESSURE: 120 MMHG | HEIGHT: 66 IN | WEIGHT: 163.2 LBS | DIASTOLIC BLOOD PRESSURE: 65 MMHG | HEART RATE: 72 BPM

## 2023-07-11 DIAGNOSIS — E21.0 PRIMARY HYPERPARATHYROIDISM (HCC): ICD-10-CM

## 2023-07-11 DIAGNOSIS — M81.0 OSTEOPOROSIS, UNSPECIFIED OSTEOPOROSIS TYPE, UNSPECIFIED PATHOLOGICAL FRACTURE PRESENCE: ICD-10-CM

## 2023-07-11 DIAGNOSIS — N18.4 STAGE 4 CHRONIC KIDNEY DISEASE (HCC): ICD-10-CM

## 2023-07-11 DIAGNOSIS — E11.65 TYPE 2 DIABETES MELLITUS WITH HYPERGLYCEMIA, WITHOUT LONG-TERM CURRENT USE OF INSULIN (HCC): Primary | ICD-10-CM

## 2023-07-11 PROCEDURE — 99214 OFFICE O/P EST MOD 30 MIN: CPT | Performed by: STUDENT IN AN ORGANIZED HEALTH CARE EDUCATION/TRAINING PROGRAM

## 2023-07-11 NOTE — PROGRESS NOTES
Lavern Jang 82 y.o. male MRN: [id number]    Encounter: [id number]      Assessment/Plan     1. 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 Recommend increase basaglar 14 units daily, continue tradjenta. Contact provider in 1-week with SMBG data, call earlier if having sustained hyperglycemia >250 or hypoglycemia. May need to have introduction of prandial insulin due to hyperglycemia. 2. Hypertension - stable. Continue current Rx    3. Hyperlipidemia - continue statin Rx    4. Primary hyperparathyroidism - not an ideal surgical candidate and patient is not interested in pursuing surgical remediation of this condition. Encourage adequate hydration. Will monitor calcium levels following initiation of denosumab    5. Osteoporosis - patient agreeable to starting prolia, which was discussed last visit. Will follow up with ordering process, and arrange for q6 mo treatment. Pro/cons of therapy reviewed again with patient. Will follow. 6. CKD 4 - stable. Patient is established with nephrology    Problem List Items Addressed This Visit        Endocrine    Type 2 diabetes mellitus ([address]) - Primary    Relevant Orders    Basic metabolic panel Lab Collect    HEMOGLOBIN A1C W/ EAG ESTIMATION Lab Collect       Genitourinary    Stage 4 chronic kidney disease ([address])   Other Visit Diagnoses     Primary hyperparathyroidism ([address])        Osteoporosis, unspecified osteoporosis type, unspecified pathological fracture presence            RTC 3-mo    CC: Diabetes    History of Present Illness     HPI:    Mar Gooden returns today for follow up. He is joined by his daughter who is assisting with elements of the history. Mar Gooden reports stable sense of wellbeing, but notes recent worsening glycemic control. He denies any lifestyle or dietary contributors. He denies any changes in treatment. For diabetes, he is taking basaglar 10 units qHS as well as tradjenta 5 mg daily.  He is on mouna 2, but I am unable to download his . On manual review, blood sugars frequently >250-300. There is no hypoglycemia. Therese Scott denies any hyperglycemic symptoms. For hypertension he takes amlodipine 10 mg daily, metoprolol tartrate 75 mg BID. For hyperlipidemia he takes lipitor 40 mg daily. Therese Scott takes Vit D 1000 U daily. He is not steady on his feet and is a fall risk. He has history of kidney stones (remote) and rib fractures. He also has history of esophageal varices. He was planned to receive prolia, but states he has not yet heard from office for arranging treatment. Review of Systems   Constitutional: Negative for diaphoresis and unexpected weight change. Cardiovascular: Negative for chest pain and palpitations. Gastrointestinal: Negative for nausea and vomiting. Endocrine: Negative for polydipsia and polyuria. Neurological: Negative for tremors. Psychiatric/Behavioral: Negative for agitation and behavioral problems. All other systems reviewed and are negative. Historical Information   Past Medical History:   Diagnosis Date   • A-fib [facility])    • Arthritis    • Athscl 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     Stent OM1. Patent mammary graft to LAD 10/7/2009; CABG 1992 & 2005   • Cancer [facility])     skin   • CHF (congestive heart failure) ([address])    • Diabetes mellitus ([address])    • Diabetes mellitus, type II ([address])     without complication   • Epistaxis    • GERD (gastroesophageal reflux disease)    • Hx of cardiovascular stress test 07/23/2014    Eddie MPI; EF0.52 (52%) Lexiscan, mild LV systolic dysfunction; evidence for prior inferior wall MI; perfusion imaging consistant w mild lat wall ischemia and min torin-infart inferior ischemia.  / EF0.51 (51%) evidence for prior inferior wall MI. Mild inferior and mild-moderate lateral wall ischemia. 7/29/15   • Hx of echocardiogram 11/07/2017    2D w/CFD;EF0.55 (55%) mild LVH, mitral valve prolapse with moderate regurgitation.  left atrial enlargement. Mild tricuspid regurgitation. • Hypertension    • Mixed hyperlipidemia    • Occlusion and stenosis of bilateral carotid arteries    • 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 infarction)    • Osteoporosis    • Presence of aortocoronary bypass graft      Past Surgical History:   Procedure Laterality Date   • CARDIAC CATHETERIZATION  10/07/2009    Stent 99% stenosis SVG from the aorta to OM1. Patent mammary graft to the LAD   • CORONARY ARTERY BYPASS GRAFT      VG-CX, VG-RCA   • CORONARY ARTERY BYPASS GRAFT  2005    LIMA-D1-LAD, VG-PDA-PLB, Tdollx-WW1-TD6 TMR 9 Lesions   • CO EGD TRANSORAL BIOPSY SINGLE/MULTIPLE N/A 2019    Procedure: ESOPHAGOGASTRODUODENOSCOPY (EGD) with biopsy;  Surgeon: Milvia Adair MD;  Location: [address] [place] [address] GI LAB; Service: Gastroenterology   • TONSILLECTOMY       Social History   Social History     Substance and Sexual Activity   Alcohol Use Never     Social History     Substance and Sexual Activity   Drug Use No     Social History     Tobacco Use   Smoking Status Former   • Packs/day: 1.00   • Years: 40.00   • Total pack years: 40.00   • Types: Cigarettes   • Quit date: 1977   • Years since quittin.4   Smokeless Tobacco Never     Family History:   Family History   Problem Relation Age of Onset   • Heart disease Brother    • No Known Problems Mother    • Tuberculosis Father        Meds/Allergies   Current Outpatient Medications   Medication Sig Dispense Refill   • amLODIPine (NORVASC) 10 mg tablet Take 10 mg by mouth daily     • apixaban (ELIQUIS) 2.5 mg Take 1 tablet (2.5 mg total) by mouth 2 (two) times a day 60 tablet 5   • atorvastatin (LIPITOR) 40 mg tablet Take 1 tablet (40 mg total) by mouth daily with dinner This is an increased dose to better help prevent heart attack and stroke.  30 tablet 0   • cholecalciferol (VITAMIN D3) 1,000 units tablet Take 1 tablet (1,000 Units total) by mouth daily 30 tablet 0   • escitalopram (LEXAPRO) 10 mg tablet Take 10 mg by mouth daily • Insulin Glargine (BASAGLAR KWIKPEN SC) Inject 10 Units under the skin     • isosorbide mononitrate (IMDUR) 60 mg 24 hr tablet Take 1 tablet (60 mg total) by mouth daily In the morning 30 tablet 5   • linaGLIPtin (Tradjenta) 5 MG TABS Take 5 mg by mouth daily Take this for your diabetes instead of glimepiride 30 tablet 0   • metoprolol tartrate (LOPRESSOR) 50 mg tablet Take 75 mg by mouth every 12 (twelve) hours     • Metoprolol Tartrate 75 MG TABS TAKE 1 TABLET BY MOUTH EVERY 12 HOURS (Patient not taking: Reported on 6/20/2023) 60 tablet 1   • nitroglycerin (NITROSTAT) 0.4 mg SL tablet TAKE 1 TABLET UNDER TONGUE FOR ANGINA, MAY REPEAT IN 15 MIN. NO MORE THAN 3 TIMES     • pantoprazole (PROTONIX) 40 mg tablet Take 1 tablet (40 mg total) by mouth daily 30 tablet 5   • sucralfate (CARAFATE) 1 g tablet Take 1 g by mouth 2 (two) times a day     • tamsulosin (FLOMAX) 0.4 mg Take 2 capsules (0.8 mg total) by mouth daily with dinner 180 capsule 1   • torsemide (DEMADEX) 20 mg tablet Take 20 mg by mouth in the morning and as needed       No current facility-administered medications for this visit. Allergies   Allergen Reactions   • Ibuprofen Fatigue     Other reaction(s): decreased kidney function       Objective   Vitals: Blood pressure 120/65, pulse 72, height 5' 6" (1.676 m), weight 74 kg (163 lb 3.2 oz). Physical Exam  Vitals reviewed. Constitutional:       General: He is not in acute distress. Interventions: Nasal cannula in place. HENT:      Head: Normocephalic and atraumatic. Nose: Nose normal.   Eyes:      General: No scleral icterus. Cardiovascular:      Pulses: no weak pulses          Dorsalis pedis pulses are 2+ on the right side and 2+ on the left side. Pulmonary:      Effort: Pulmonary effort is normal.   Musculoskeletal:      Cervical back: Normal range of motion. Feet:      Right foot:      Skin integrity: No ulcer, skin breakdown, erythema, warmth, callus or dry skin.       Left foot:      Skin integrity: No ulcer, skin breakdown, erythema, warmth, callus or dry skin. Skin:     General: Skin is warm and dry. Neurological:      General: No focal deficit present. Mental Status: He is alert. Psychiatric:         Mood and Affect: Mood normal.         Behavior: Behavior normal.     Diabetic Foot Exam    Patient's shoes and socks removed. Right Foot/Ankle   Right Foot Inspection  Skin Exam: skin normal and skin intact. No dry skin, no warmth, no callus, no erythema, no maceration, no abnormal color, no pre-ulcer, no ulcer and no callus. Sensory   Vibration: diminished  Monofilament testing: intact    Vascular  The right DP pulse is 2+. Left Foot/Ankle  Left Foot Inspection  Skin Exam: skin normal and skin intact. No dry skin, no warmth, no erythema, no maceration, normal color, no pre-ulcer, no ulcer and no callus. Sensory   Vibration: intact  Monofilament testing: intact    Vascular  The left DP pulse is 2+. Assign Risk Category  No deformity present  No loss of protective sensation  No weak pulses  Risk: 0      The history was obtained from the review of the chart, patient and family.     Lab Results:   Lab Results   Component Value Date/Time    Hemoglobin A1C 8.3 (H) 05/18/2023 09:21 AM    Hemoglobin A1C 7.4 (H) 12/30/2022 10:36 AM    WBC 8.39 05/26/2023 11:33 AM    WBC 7.68 05/18/2023 09:21 AM    WBC 9.17 02/02/2023 02:24 PM    Hemoglobin 12.3 05/26/2023 11:33 AM    Hemoglobin 13.1 05/18/2023 09:21 AM    Hemoglobin 13.4 02/02/2023 02:24 PM    Hematocrit 39.0 05/26/2023 11:33 AM    Hematocrit 42.2 05/18/2023 09:21 AM    Hematocrit 42.6 02/02/2023 02:24 PM    MCV 92 05/26/2023 11:33 AM    MCV 91 05/18/2023 09:21 AM    MCV 90 02/02/2023 02:24 PM    Platelets 681 03/17/7220 11:33 AM    Platelets 885 77/87/8251 09:21 AM    Platelets 435 05/35/8374 02:24 PM    BUN 29 (H) 05/26/2023 11:33 AM    BUN 28 (H) 05/18/2023 09:21 AM    BUN 29 (H) 03/29/2023 10:16 AM    Potassium 4.4 05/26/2023 11:33 AM    Potassium 4.3 05/18/2023 09:21 AM    Potassium 4.4 03/29/2023 10:16 AM    Chloride 101 05/26/2023 11:33 AM    Chloride 105 05/18/2023 09:21 AM    Chloride 104 03/29/2023 10:16 AM    CO2 31 05/26/2023 11:33 AM    CO2 29 05/18/2023 09:21 AM    CO2 28 03/29/2023 10:16 AM    Creatinine 1.81 (H) 05/26/2023 11:33 AM    Creatinine 1.95 (H) 05/18/2023 09:21 AM    Creatinine 1.83 (H) 03/29/2023 10:16 AM    AST 10 (L) 05/26/2023 11:33 AM    AST 19 05/18/2023 09:21 AM    AST 21 03/29/2023 10:16 AM    ALT 10 05/26/2023 11:33 AM    ALT 31 05/18/2023 09:21 AM    ALT 28 03/29/2023 10:16 AM    Total Protein 7.0 05/26/2023 11:33 AM    Total Protein 7.7 05/18/2023 09:21 AM    Total Protein 7.7 03/29/2023 10:16 AM    Albumin 3.8 05/26/2023 11:33 AM    Albumin 3.6 05/18/2023 09:21 AM    Albumin 3.9 03/29/2023 10:16 AM    HDL, Direct 41 05/18/2023 09:21 AM    HDL, Direct 43 12/30/2022 10:36 AM    Triglycerides 76 05/18/2023 09:21 AM    Triglycerides 81 12/30/2022 10:36 AM       Lab Results   Component Value Date    PTH 70.9 03/29/2023    CALCIUM 10.1 05/26/2023    PHOS 3.1 05/18/2023       Imaging Studies: I have personally reviewed pertinent reports. 8/8/2022    DXA SCAN     CLINICAL HISTORY:  61-year-old male with history of hyperparathyroidism. OTHER RISK FACTORS:  COPD. PPI therapy. SSRI therapy.     PHARMACOLOGIC THERAPY FOR OSTEOPOROSIS:  None.     TECHNIQUE: Bone densitometry was performed using a GE Lunar Prodigy   bone densitometer.   Regions of interest appear properly placed.      COMPARISON: There are no prior DXA studies performed on this unit for comparison.     RESULTS:      LUMBAR SPINE L1-L4 :   BMD  1.111  gm/cm2   T-score -1.0         LEFT TOTAL HIP:   BMD: 0.937  gm/cm2   T-score: -1.1      LEFT FEMORAL NECK:   BMD: 0.789  gm/cm2   T score: -2.2      RIGHT TOTAL HIP:   BMD:  0.914  gm/cm2   T-score: -1.3     RIGHT FEMORAL NECK:   BMD:  0.736  gm/cm2  T score: -2.6      LEFT FOREARM:    33% RADIUS BMD:  0.576  gm/cm2    T-score: -2.8      IMPRESSION:     1. Osteoporosis. [Based on the right femoral neck and left radius]     2. The 10 year risk of hip fracture is 4.7% with the 10 year risk of major osteoporotic fracture being 10.3% as calculated by the [facility]/WHO fracture risk assessment tool (FRAX).    3. The current NOF guidelines recommend treating patients with a T-score of -2.5 or less in the lumbar spine or hips, or in post-menopausal women and men over the age of 48 with low bone mass (osteopenia) and a FRAX 10 year risk score of >3% for hip   fracture and/or >20% for major osteoporotic fracture.     4. The NOF recommends follow-up DXA in 1-2 years after initiating therapy for osteoporosis and every 2 years thereafter. More frequent evaluation is appropriate for patients with conditions associated with rapid bone loss, such as glucocorticoid   therapy. The interval between DXA screenings may be longer for individuals without major risk factors and initial T-score in the normal or upper low bone mass range.        The FRAX algorithm has certain limitations:  -FRAX has not been validated in patients currently or previously treated with pharmacotherapy for osteoporosis. In such patients, clinical judgment must be exercised in interpreting FRAX scores. -Prior hip, vertebral and humeral fragility fractures appear to confer greater risk of subsequent fracture than fractures at other sites (this is especially true for individuals with severe vertebral fractures), but quantification of this incremental   risk is not possible with FRAX. -FRAX underestimates fracture risk in patients with history of multiple fragility fractures. -FRAX may underestimate fracture risk in patients with history of frequent falls.  -It is not appropriate to use FRAX to monitor treatment response. Portions of the record may have been created with voice recognition software. Occasional wrong word or "sound a like" substitutions may have occurred due to the inherent limitations of voice recognition software. Read the chart carefully and recognize, using context, where substitutions have occurred.

## 2023-07-11 NOTE — PATIENT INSTRUCTIONS
Increase Basaglar 14 units daily. You may increase Basaglar by 2 units once a week for a fasting blood sugar goal of less than 140. Do not increase basaglar beyond 30 units a day.  You may send me your blood sugars weekly as well, and I can provide you guidance

## 2023-07-14 DIAGNOSIS — K21.9 GASTROESOPHAGEAL REFLUX DISEASE WITHOUT ESOPHAGITIS: ICD-10-CM

## 2023-07-14 RX ORDER — PANTOPRAZOLE SODIUM 40 MG/1
TABLET, DELAYED RELEASE ORAL
Qty: 90 TABLET | Refills: 2 | Status: SHIPPED | OUTPATIENT
Start: 2023-07-14

## 2023-07-17 ENCOUNTER — TELEPHONE (OUTPATIENT)
Dept: ENDOCRINOLOGY | Facility: CLINIC | Age: 88
End: 2023-07-17

## 2023-07-18 DIAGNOSIS — E11.65 TYPE 2 DIABETES MELLITUS WITH HYPERGLYCEMIA, WITHOUT LONG-TERM CURRENT USE OF INSULIN (HCC): Primary | ICD-10-CM

## 2023-07-18 PROCEDURE — 88342 IMHCHEM/IMCYTCHM 1ST ANTB: CPT | Performed by: PATHOLOGY

## 2023-07-18 PROCEDURE — 88341 IMHCHEM/IMCYTCHM EA ADD ANTB: CPT | Performed by: PATHOLOGY

## 2023-07-18 PROCEDURE — 88305 TISSUE EXAM BY PATHOLOGIST: CPT | Performed by: PATHOLOGY

## 2023-07-19 ENCOUNTER — TELEPHONE (OUTPATIENT)
Dept: SURGERY | Facility: CLINIC | Age: 88
End: 2023-07-19

## 2023-07-19 NOTE — TELEPHONE ENCOUNTER
Patient contacted by phone. Results of pathology reviewed with the patient and all questions answered to their satisfaction.

## 2023-07-21 ENCOUNTER — TELEPHONE (OUTPATIENT)
Dept: ENDOCRINOLOGY | Facility: CLINIC | Age: 88
End: 2023-07-21

## 2023-07-30 ENCOUNTER — APPOINTMENT (EMERGENCY)
Dept: CT IMAGING | Facility: HOSPITAL | Age: 88
End: 2023-07-30
Payer: MEDICARE

## 2023-07-30 ENCOUNTER — HOSPITAL ENCOUNTER (EMERGENCY)
Facility: HOSPITAL | Age: 88
Discharge: HOME | End: 2023-07-30
Attending: EMERGENCY MEDICINE
Payer: MEDICARE

## 2023-07-30 VITALS
RESPIRATION RATE: 21 BRPM | HEART RATE: 82 BPM | SYSTOLIC BLOOD PRESSURE: 138 MMHG | OXYGEN SATURATION: 100 % | TEMPERATURE: 97.5 F | DIASTOLIC BLOOD PRESSURE: 79 MMHG

## 2023-07-30 DIAGNOSIS — E87.6 HYPOKALEMIA: ICD-10-CM

## 2023-07-30 DIAGNOSIS — Z99.81 ON HOME OXYGEN THERAPY: ICD-10-CM

## 2023-07-30 DIAGNOSIS — N18.9 ACUTE KIDNEY INJURY SUPERIMPOSED ON CHRONIC KIDNEY DISEASE (HCC): ICD-10-CM

## 2023-07-30 DIAGNOSIS — J96.10 CHRONIC RESPIRATORY FAILURE (HCC): ICD-10-CM

## 2023-07-30 DIAGNOSIS — E11.65 HYPERGLYCEMIA DUE TO DIABETES MELLITUS (HCC): ICD-10-CM

## 2023-07-30 DIAGNOSIS — R91.1 NODULE OF LEFT LUNG: ICD-10-CM

## 2023-07-30 DIAGNOSIS — K57.90 DIVERTICULOSIS: ICD-10-CM

## 2023-07-30 DIAGNOSIS — E83.52 HYPERCALCEMIA: ICD-10-CM

## 2023-07-30 DIAGNOSIS — N17.9 ACUTE KIDNEY INJURY SUPERIMPOSED ON CHRONIC KIDNEY DISEASE (HCC): ICD-10-CM

## 2023-07-30 DIAGNOSIS — R10.13 EPIGASTRIC ABDOMINAL PAIN: Primary | ICD-10-CM

## 2023-07-30 DIAGNOSIS — J90 PLEURAL EFFUSION, RIGHT: ICD-10-CM

## 2023-07-30 DIAGNOSIS — I25.10 CAD (CORONARY ARTERY DISEASE): ICD-10-CM

## 2023-07-30 LAB
2HR DELTA HS TROPONIN: -2 NG/L
ALBUMIN SERPL BCP-MCNC: 4.6 G/DL (ref 3.5–5)
ALP SERPL-CCNC: 85 U/L (ref 34–104)
ALT SERPL W P-5'-P-CCNC: 12 U/L (ref 7–52)
ANION GAP SERPL CALCULATED.3IONS-SCNC: 10 MMOL/L
APTT PPP: 35 SECONDS (ref 23–37)
AST SERPL W P-5'-P-CCNC: 14 U/L (ref 13–39)
BASOPHILS # BLD AUTO: 0.07 THOUSANDS/ÂΜL (ref 0–0.1)
BASOPHILS NFR BLD AUTO: 1 % (ref 0–1)
BILIRUB SERPL-MCNC: 1.3 MG/DL (ref 0.2–1)
BILIRUB UR QL STRIP: NEGATIVE
BNP SERPL-MCNC: 471 PG/ML (ref 0–100)
BUN SERPL-MCNC: 34 MG/DL (ref 5–25)
CALCIUM SERPL-MCNC: 11.2 MG/DL (ref 8.4–10.2)
CARDIAC TROPONIN I PNL SERPL HS: 10 NG/L
CARDIAC TROPONIN I PNL SERPL HS: 12 NG/L
CHLORIDE SERPL-SCNC: 91 MMOL/L (ref 96–108)
CLARITY UR: CLEAR
CO2 SERPL-SCNC: 32 MMOL/L (ref 21–32)
COLOR UR: ABNORMAL
CREAT SERPL-MCNC: 2.2 MG/DL (ref 0.6–1.3)
D DIMER PPP FEU-MCNC: 0.34 UG/ML FEU
EOSINOPHIL # BLD AUTO: 0.63 THOUSAND/ÂΜL (ref 0–0.61)
EOSINOPHIL NFR BLD AUTO: 7 % (ref 0–6)
ERYTHROCYTE [DISTWIDTH] IN BLOOD BY AUTOMATED COUNT: 12.8 % (ref 11.6–15.1)
GFR SERPL CREATININE-BSD FRML MDRD: 26 ML/MIN/1.73SQ M
GLUCOSE SERPL-MCNC: 297 MG/DL (ref 65–140)
GLUCOSE UR STRIP-MCNC: ABNORMAL MG/DL
HCT VFR BLD AUTO: 42 % (ref 36.5–49.3)
HGB BLD-MCNC: 13.1 G/DL (ref 12–17)
HGB UR QL STRIP.AUTO: NEGATIVE
IMM GRANULOCYTES # BLD AUTO: 0.03 THOUSAND/UL (ref 0–0.2)
IMM GRANULOCYTES NFR BLD AUTO: 0 % (ref 0–2)
INR PPP: 1.34 (ref 0.84–1.19)
KETONES UR STRIP-MCNC: NEGATIVE MG/DL
LACTATE SERPL-SCNC: 1.8 MMOL/L (ref 0.5–2)
LEUKOCYTE ESTERASE UR QL STRIP: NEGATIVE
LIPASE SERPL-CCNC: 26 U/L (ref 11–82)
LYMPHOCYTES # BLD AUTO: 1.97 THOUSANDS/ÂΜL (ref 0.6–4.47)
LYMPHOCYTES NFR BLD AUTO: 22 % (ref 14–44)
MAGNESIUM SERPL-MCNC: 2 MG/DL (ref 1.9–2.7)
MCH RBC QN AUTO: 27.5 PG (ref 26.8–34.3)
MCHC RBC AUTO-ENTMCNC: 31.2 G/DL (ref 31.4–37.4)
MCV RBC AUTO: 88 FL (ref 82–98)
MONOCYTES # BLD AUTO: 0.79 THOUSAND/ÂΜL (ref 0.17–1.22)
MONOCYTES NFR BLD AUTO: 9 % (ref 4–12)
NEUTROPHILS # BLD AUTO: 5.45 THOUSANDS/ÂΜL (ref 1.85–7.62)
NEUTS SEG NFR BLD AUTO: 61 % (ref 43–75)
NITRITE UR QL STRIP: NEGATIVE
NRBC BLD AUTO-RTO: 0 /100 WBCS
PH UR STRIP.AUTO: 5.5 [PH]
PLATELET # BLD AUTO: 199 THOUSANDS/UL (ref 149–390)
PMV BLD AUTO: 9.6 FL (ref 8.9–12.7)
POTASSIUM SERPL-SCNC: 3.3 MMOL/L (ref 3.5–5.3)
PROT SERPL-MCNC: 9 G/DL (ref 6.4–8.4)
PROT UR STRIP-MCNC: NEGATIVE MG/DL
PROTHROMBIN TIME: 16.8 SECONDS (ref 11.6–14.5)
RBC # BLD AUTO: 4.76 MILLION/UL (ref 3.88–5.62)
SODIUM SERPL-SCNC: 133 MMOL/L (ref 135–147)
SP GR UR STRIP.AUTO: 1.01 (ref 1–1.03)
UROBILINOGEN UR QL STRIP.AUTO: 0.2 E.U./DL
WBC # BLD AUTO: 8.94 THOUSAND/UL (ref 4.31–10.16)

## 2023-07-30 PROCEDURE — 81003 URINALYSIS AUTO W/O SCOPE: CPT | Performed by: PHYSICIAN ASSISTANT

## 2023-07-30 PROCEDURE — 99285 EMERGENCY DEPT VISIT HI MDM: CPT | Performed by: PHYSICIAN ASSISTANT

## 2023-07-30 PROCEDURE — 96365 THER/PROPH/DIAG IV INF INIT: CPT

## 2023-07-30 PROCEDURE — 85379 FIBRIN DEGRADATION QUANT: CPT | Performed by: PHYSICIAN ASSISTANT

## 2023-07-30 PROCEDURE — 85610 PROTHROMBIN TIME: CPT | Performed by: PHYSICIAN ASSISTANT

## 2023-07-30 PROCEDURE — 80053 COMPREHEN METABOLIC PANEL: CPT | Performed by: PHYSICIAN ASSISTANT

## 2023-07-30 PROCEDURE — 96368 THER/DIAG CONCURRENT INF: CPT

## 2023-07-30 PROCEDURE — 83690 ASSAY OF LIPASE: CPT | Performed by: PHYSICIAN ASSISTANT

## 2023-07-30 PROCEDURE — 83735 ASSAY OF MAGNESIUM: CPT | Performed by: PHYSICIAN ASSISTANT

## 2023-07-30 PROCEDURE — 93005 ELECTROCARDIOGRAM TRACING: CPT

## 2023-07-30 PROCEDURE — 85730 THROMBOPLASTIN TIME PARTIAL: CPT | Performed by: PHYSICIAN ASSISTANT

## 2023-07-30 PROCEDURE — 96366 THER/PROPH/DIAG IV INF ADDON: CPT

## 2023-07-30 PROCEDURE — 71250 CT THORAX DX C-: CPT

## 2023-07-30 PROCEDURE — 85025 COMPLETE CBC W/AUTO DIFF WBC: CPT | Performed by: PHYSICIAN ASSISTANT

## 2023-07-30 PROCEDURE — 74176 CT ABD & PELVIS W/O CONTRAST: CPT

## 2023-07-30 PROCEDURE — G1004 CDSM NDSC: HCPCS

## 2023-07-30 PROCEDURE — 36415 COLL VENOUS BLD VENIPUNCTURE: CPT | Performed by: PHYSICIAN ASSISTANT

## 2023-07-30 PROCEDURE — 83605 ASSAY OF LACTIC ACID: CPT | Performed by: PHYSICIAN ASSISTANT

## 2023-07-30 PROCEDURE — 83880 ASSAY OF NATRIURETIC PEPTIDE: CPT | Performed by: PHYSICIAN ASSISTANT

## 2023-07-30 PROCEDURE — 84484 ASSAY OF TROPONIN QUANT: CPT | Performed by: PHYSICIAN ASSISTANT

## 2023-07-30 PROCEDURE — 99285 EMERGENCY DEPT VISIT HI MDM: CPT

## 2023-07-30 RX ORDER — POTASSIUM CHLORIDE 750 MG/1
10 TABLET, EXTENDED RELEASE ORAL ONCE
Status: COMPLETED | OUTPATIENT
Start: 2023-07-30 | End: 2023-07-30

## 2023-07-30 RX ORDER — SODIUM CHLORIDE, SODIUM GLUCONATE, SODIUM ACETATE, POTASSIUM CHLORIDE, MAGNESIUM CHLORIDE, SODIUM PHOSPHATE, DIBASIC, AND POTASSIUM PHOSPHATE .53; .5; .37; .037; .03; .012; .00082 G/100ML; G/100ML; G/100ML; G/100ML; G/100ML; G/100ML; G/100ML
75 INJECTION, SOLUTION INTRAVENOUS CONTINUOUS
Status: DISCONTINUED | OUTPATIENT
Start: 2023-07-30 | End: 2023-07-30 | Stop reason: HOSPADM

## 2023-07-30 RX ORDER — POTASSIUM CHLORIDE 14.9 MG/ML
20 INJECTION INTRAVENOUS ONCE
Status: DISCONTINUED | OUTPATIENT
Start: 2023-07-30 | End: 2023-07-30 | Stop reason: ALTCHOICE

## 2023-07-30 RX ADMIN — SODIUM CHLORIDE, SODIUM GLUCONATE, SODIUM ACETATE, POTASSIUM CHLORIDE, MAGNESIUM CHLORIDE, SODIUM PHOSPHATE, DIBASIC, AND POTASSIUM PHOSPHATE 75 ML/HR: .53; .5; .37; .037; .03; .012; .00082 INJECTION, SOLUTION INTRAVENOUS at 14:03

## 2023-07-30 RX ADMIN — POTASSIUM CHLORIDE 10 MEQ: 750 TABLET, EXTENDED RELEASE ORAL at 15:46

## 2023-07-30 RX ADMIN — POTASSIUM CHLORIDE 20 MEQ: 14.9 INJECTION, SOLUTION INTRAVENOUS at 14:03

## 2023-07-30 NOTE — DISCHARGE INSTRUCTIONS
Would recommend continuation of home medications as prescribed. If there is any persistent or worsening pain, return to ER for re-evaluation. Follow up with your PCP for recheck or return to ER as needed. Follow up with your specialist as scheduled. Return to ER as needed.

## 2023-07-30 NOTE — ED PROVIDER NOTES
History  Chief Complaint   Patient presents with   • Abdominal Pain     Upper abdominal pain for the past couple of weeks      82year old male with PMH DM without long-term use of insulin, hypertension, hyperlipidemia, hyperparathyroidism, osteoarthritis, stage IV chronic kidney disease follows with nephrology, acute respiratory failure with hypoxemia, chronic diastolic heart failure, prior MI, TIA, CABG, chronic atrial fibrillation on eliquis presents ambulatory from home for evaluation. Pt reports having upper abdominal pain over the past 2 weeks. He reports this radiates into chest.  Pain does not radiate into back. He reports symptoms comes and go. Reports pain mild at this time and states it is a 3/10. He states he had one episode of nausea but none since. Denies vomiting or diarrhea. He notes history of constipation but states he has been regular since taking his miralax. He reports having chronic shortness of breath, uses his home oxygen daily. Denies cough, congestion. Denies recent illness. Denies fever, chills. Denies any urinary complaints. No reported aggravating or alleviating factors. No specific treatments tried. No prior evaluation since onset. No prior abdominal surgeries. No reported weight changes. No reported leg swelling. He reports taking his diuretic every other day.       History provided by:  Patient and medical records   used: No    Abdominal Pain  Pain location:  Epigastric  Pain quality: aching    Pain radiates to:  Chest  Timing:  Intermittent  Chronicity:  Recurrent  Context: not previous surgeries, not recent illness, not retching, not sick contacts, not suspicious food intake and not trauma    Relieved by:  Nothing  Worsened by:  Nothing  Associated symptoms: chest pain, nausea and shortness of breath    Associated symptoms: no chills, no constipation, no cough, no diarrhea, no dysuria, no fatigue, no fever, no hematuria, no sore throat and no vomiting    Risk factors: being elderly    Risk factors: has not had multiple surgeries and no recent hospitalization        Prior to Admission Medications   Prescriptions Last Dose Informant Patient Reported? Taking? Continuous Blood Gluc Sensor (FreeStyle Kori 2 Sensor) MISC   No No   Sig: Change sensor every 14 days   Insulin Glargine (BASAGLAR KWIKPEN SC)   Yes No   Sig: Inject 10 Units under the skin   Metoprolol Tartrate 75 MG TABS  Self No No   Sig: TAKE 1 TABLET BY MOUTH EVERY 12 HOURS   Patient not taking: Reported on 6/20/2023   amLODIPine (NORVASC) 10 mg tablet  Self Yes No   Sig: Take 10 mg by mouth daily   apixaban (ELIQUIS) 2.5 mg   No No   Sig: Take 1 tablet (2.5 mg total) by mouth 2 (two) times a day   atorvastatin (LIPITOR) 40 mg tablet  Self No No   Sig: Take 1 tablet (40 mg total) by mouth daily with dinner This is an increased dose to better help prevent heart attack and stroke. cholecalciferol (VITAMIN D3) 1,000 units tablet  Self No No   Sig: Take 1 tablet (1,000 Units total) by mouth daily   escitalopram (LEXAPRO) 10 mg tablet  Self Yes No   Sig: Take 10 mg by mouth daily   isosorbide mononitrate (IMDUR) 60 mg 24 hr tablet   No No   Sig: Take 1 tablet (60 mg total) by mouth daily In the morning   linaGLIPtin (Tradjenta) 5 MG TABS  Self No No   Sig: Take 5 mg by mouth daily Take this for your diabetes instead of glimepiride   metoprolol tartrate (LOPRESSOR) 50 mg tablet   Yes No   Sig: Take 75 mg by mouth every 12 (twelve) hours   nitroglycerin (NITROSTAT) 0.4 mg SL tablet  Self Yes No   Sig: TAKE 1 TABLET UNDER TONGUE FOR ANGINA, MAY REPEAT IN 15 MIN.  NO MORE THAN 3 TIMES   pantoprazole (PROTONIX) 40 mg tablet   No No   Sig: TAKE 1 TABLET BY MOUTH EVERY DAY   sucralfate (CARAFATE) 1 g tablet  Self Yes No   Sig: Take 1 g by mouth 2 (two) times a day   tamsulosin (FLOMAX) 0.4 mg  Self No No   Sig: Take 2 capsules (0.8 mg total) by mouth daily with dinner   torsemide (DEMADEX) 20 mg tablet Self Yes No   Sig: Take 20 mg by mouth in the morning and as needed      Facility-Administered Medications: None       Past Medical History:   Diagnosis Date   • A-fib ([address])    • Arthritis    • Athscl 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     Stent OM1. Patent mammary graft to LAD 10/7/2009; CABG 1992 & 2005   • Cancer [facility])     skin   • CHF (congestive heart failure) ([address])    • Diabetes mellitus ([address])    • Diabetes mellitus, type II ([address])     without complication   • Epistaxis    • GERD (gastroesophageal reflux disease)    • Hx of cardiovascular stress test 07/23/2014    Eddie MPI; EF0.52 (52%) Lexiscan, mild LV systolic dysfunction; evidence for prior inferior wall MI; perfusion imaging consistant w mild lat wall ischemia and min torin-infart inferior ischemia.  / EF0.51 (51%) evidence for prior inferior wall MI. Mild inferior and mild-moderate lateral wall ischemia. 7/29/15   • Hx of echocardiogram 11/07/2017    2D w/CFD;EF0.55 (55%) mild LVH, mitral valve prolapse with moderate regurgitation. left atrial enlargement. Mild tricuspid regurgitation. • Hypertension    • Mixed hyperlipidemia    • Occlusion and stenosis of bilateral carotid arteries    • 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 infarction)    • Osteoporosis    • Presence of aortocoronary bypass graft        Past Surgical History:   Procedure Laterality Date   • CARDIAC CATHETERIZATION  10/07/2009    Stent 99% stenosis SVG from the aorta to OM1. Patent mammary graft to the LAD   • CORONARY ARTERY BYPASS GRAFT  1992    VG-CX, VG-RCA   • CORONARY ARTERY BYPASS GRAFT  11/04/2005    LIMA-D1-LAD, VG-PDA-PLB, Dghnie-VQ0-KD5 TMR 9 Lesions   • NV EGD TRANSORAL BIOPSY SINGLE/MULTIPLE N/A 1/23/2019    Procedure: ESOPHAGOGASTRODUODENOSCOPY (EGD) with biopsy;  Surgeon: Armando Barrera MD;  Location: [address] [place] [address] GI LAB;   Service: Gastroenterology   • TONSILLECTOMY         Family History   Problem Relation Age of Onset   • Heart disease Brother    • No Known Problems Mother • Tuberculosis Father      I have reviewed and agree with the history as documented. E-Cigarette/Vaping   • E-Cigarette Use Never User      E-Cigarette/Vaping Substances   • Nicotine No    • THC No    • CBD No    • Flavoring No    • Other No    • Unknown No      Social History     Tobacco Use   • Smoking status: Former     Packs/day: 1.00     Years: 40.00     Total pack years: 40.00     Types: Cigarettes     Quit date: 1977     Years since quittin.5   • Smokeless tobacco: Never   Vaping Use   • Vaping Use: Never used   Substance Use Topics   • Alcohol use: Never   • Drug use: No       Review of Systems   Constitutional: Negative. Negative for chills, fatigue and fever. HENT: Negative. Negative for congestion, rhinorrhea and sore throat. Eyes: Negative. Negative for visual disturbance. Respiratory: Positive for shortness of breath. Negative for cough and wheezing. Cardiovascular: Positive for chest pain. Negative for palpitations and leg swelling. Gastrointestinal: Positive for abdominal pain and nausea. Negative for constipation, diarrhea and vomiting. Genitourinary: Negative. Negative for dysuria, flank pain, frequency and hematuria. Musculoskeletal: Negative. Negative for back pain, myalgias and neck pain. Skin: Negative. Negative for rash. Neurological: Negative. Negative for dizziness, syncope, light-headedness, numbness and headaches. Psychiatric/Behavioral: Negative. Negative for confusion. All other systems reviewed and are negative. Physical Exam  Physical Exam  Vitals and nursing note reviewed. Constitutional:       General: He is awake. He is not in acute distress. Appearance: Normal appearance. He is well-developed. He is not toxic-appearing or diaphoretic. Interventions: Nasal cannula in place. Comments: Elderly male. HENT:      Head: Normocephalic and atraumatic.       Right Ear: Hearing and external ear normal.      Left Ear: Hearing and external ear normal.      Mouth/Throat:      Mouth: Mucous membranes are moist.      Pharynx: Oropharynx is clear. Uvula midline. Eyes:      General: Lids are normal. No scleral icterus. Conjunctiva/sclera: Conjunctivae normal.   Neck:      Trachea: Trachea and phonation normal.   Cardiovascular:      Rate and Rhythm: Normal rate. Rhythm irregularly irregular. Pulses: Normal pulses. Radial pulses are 2+ on the right side and 2+ on the left side. Dorsalis pedis pulses are 2+ on the right side and 2+ on the left side. Posterior tibial pulses are 2+ on the right side and 2+ on the left side. Heart sounds: Normal heart sounds, S1 normal and S2 normal.   Pulmonary:      Effort: Pulmonary effort is normal. No tachypnea or respiratory distress. Breath sounds: Normal breath sounds. No wheezing, rhonchi or rales. Abdominal:      General: Bowel sounds are normal. There is no distension. Palpations: Abdomen is soft. Tenderness: There is no abdominal tenderness. There is no guarding. Musculoskeletal:         General: No tenderness. Normal range of motion. Right lower leg: No edema. Left lower leg: No edema. Skin:     General: Skin is warm and dry. Capillary Refill: Capillary refill takes less than 2 seconds. Findings: No rash. Nails: There is no clubbing. Neurological:      General: No focal deficit present. Mental Status: He is alert and oriented to person, place, and time. GCS: GCS eye subscore is 4. GCS verbal subscore is 5. GCS motor subscore is 6. Gait: Gait normal.   Psychiatric:         Mood and Affect: Mood normal.         Speech: Speech normal.         Behavior: Behavior normal. Behavior is cooperative.          Vital Signs  ED Triage Vitals [07/30/23 1140]   Temperature Pulse Respirations Blood Pressure SpO2   97.5 °F (36.4 °C) 94 18 122/91 (!) 85 %      Temp Source Heart Rate Source Patient Position - Orthostatic VS BP Location FiO2 (%)   Temporal Monitor Sitting Right arm --      Pain Score       6           Vitals:    07/30/23 1300 07/30/23 1330 07/30/23 1400 07/30/23 1500   BP: 125/75 131/98 150/74 138/79   Pulse: 80 84 82 82   Patient Position - Orthostatic VS:             Visual Acuity      ED Medications  Medications   multi-electrolyte (PLASMALYTE-A/ISOLYTE-S PH 7.4) IV solution (0 mL/hr Intravenous Stopped 7/30/23 1539)   potassium chloride (K-DUR,KLOR-CON) CR tablet 10 mEq (10 mEq Oral Given 7/30/23 1546)       Diagnostic Studies  Results Reviewed     Procedure Component Value Units Date/Time    HS Troponin I 2hr [[id number]]  (Normal) Collected: 07/30/23 1359    Lab Status: Final result Specimen: Blood from Arm, Right Updated: 07/30/23 1452     hs TnI 2hr 10 ng/L      Delta 2hr hsTnI -2 ng/L     HS Troponin I 4hr [[id number]]     Lab Status: No result Specimen: Blood     HS Troponin 0hr (reflex protocol) [[id number]]  (Normal) Collected: 07/30/23 1208    Lab Status: Final result Specimen: Blood from Arm, Right Updated: 07/30/23 1254     hs TnI 0hr 12 ng/L     B-Type Natriuretic Peptide(BNP) [[id number]]  (Abnormal) Collected: 07/30/23 1208    Lab Status: Final result Specimen: Blood from Arm, Right Updated: 07/30/23 1254      pg/mL     Lactic acid, plasma (w/reflex if result > 2.0) [[id number]]  (Normal) Collected: 07/30/23 1208    Lab Status: Final result Specimen: Blood from Arm, Right Updated: 07/30/23 1253     LACTIC ACID 1.8 mmol/L     Narrative:      Result may be elevated if tourniquet was used during collection.     Comprehensive metabolic panel [[id number]]  (Abnormal) Collected: 07/30/23 1208    Lab Status: Final result Specimen: Blood from Arm, Right Updated: 07/30/23 1251     Sodium 133 mmol/L      Potassium 3.3 mmol/L      Chloride 91 mmol/L      CO2 32 mmol/L      ANION GAP 10 mmol/L      BUN 34 mg/dL      Creatinine 2.20 mg/dL      Glucose 297 mg/dL      Calcium 11.2 mg/dL      AST 14 U/L      ALT 12 U/L      Alkaline Phosphatase 85 U/L      Total Protein 9.0 g/dL      Albumin 4.6 g/dL      Total Bilirubin 1.30 mg/dL      eGFR 26 ml/min/1.73sq m     Narrative:      [place] guidelines for Chronic Kidney Disease (CKD):   •  Stage 1 with normal or high GFR (GFR > 90 mL/min/1.73 square meters)  •  Stage 2 Mild CKD (GFR = 60-89 mL/min/1.73 square meters)  •  Stage 3A Moderate CKD (GFR = 45-59 mL/min/1.73 square meters)  •  Stage 3B Moderate CKD (GFR = 30-44 mL/min/1.73 square meters)  •  Stage 4 Severe CKD (GFR = 15-29 mL/min/1.73 square meters)  •  Stage 5 End Stage CKD (GFR <15 mL/min/1.73 square meters)  Note: GFR calculation is accurate only with a steady state creatinine    Lipase [[id number]]  (Normal) Collected: 07/30/23 1208    Lab Status: Final result Specimen: Blood from Arm, Right Updated: 07/30/23 1251     Lipase 26 u/L     Magnesium [[id number]]  (Normal) Collected: 07/30/23 1208    Lab Status: Final result Specimen: Blood from Arm, Right Updated: 07/30/23 1251     Magnesium 2.0 mg/dL     Protime-INR [[id number]]  (Abnormal) Collected: 07/30/23 1208    Lab Status: Final result Specimen: Blood from Arm, Right Updated: 07/30/23 1247     Protime 16.8 seconds      INR 1.34    APTT [[id number]]  (Normal) Collected: 07/30/23 1208    Lab Status: Final result Specimen: Blood from Arm, Right Updated: 07/30/23 1247     PTT 35 seconds     D-Dimer [[id number]]  (Normal) Collected: 07/30/23 1208    Lab Status: Final result Specimen: Blood from Arm, Right Updated: 07/30/23 1246     D-Dimer, Quant 0.34 ug/ml FEU     Narrative: In the evaluation for possible pulmonary embolism, in the appropriate (Well's Score of 4 or less) patient, the age adjusted d-dimer cutoff for this patient can be calculated as:    Age x 0.01 (in ug/mL) for Age-adjusted D-dimer exclusion threshold for a patient over 50 years.     UA w Reflex to Microscopic w Reflex to Culture [[id number]]  (Abnormal) Collected: 07/30/23 1218    Lab Status: Final result Specimen: Urine, Clean Catch Updated: 07/30/23 1234     Color, UA Light Yellow     Clarity, UA Clear     Specific Gravity, UA 1.010     pH, UA 5.5     Leukocytes, UA Negative     Nitrite, UA Negative     Protein, UA Negative mg/dl      Glucose,  (1/10%) mg/dl      Ketones, UA Negative mg/dl      Urobilinogen, UA 0.2 E.U./dl      Bilirubin, UA Negative     Occult Blood, UA Negative    CBC and differential [[id number]]  (Abnormal) Collected: 07/30/23 1208    Lab Status: Final result Specimen: Blood from Arm, Right Updated: 07/30/23 1232     WBC 8.94 Thousand/uL      RBC 4.76 Million/uL      Hemoglobin 13.1 g/dL      Hematocrit 42.0 %      MCV 88 fL      MCH 27.5 pg      MCHC 31.2 g/dL      RDW 12.8 %      MPV 9.6 fL      Platelets 216 Thousands/uL      nRBC 0 /100 WBCs      Neutrophils Relative 61 %      Immat GRANS % 0 %      Lymphocytes Relative 22 %      Monocytes Relative 9 %      Eosinophils Relative 7 %      Basophils Relative 1 %      Neutrophils Absolute 5.45 Thousands/µL      Immature Grans Absolute 0.03 Thousand/uL      Lymphocytes Absolute 1.97 Thousands/µL      Monocytes Absolute 0.79 Thousand/µL      Eosinophils Absolute 0.63 Thousand/µL      Basophils Absolute 0.07 Thousands/µL                  CT chest abdomen pelvis wo contrast   Final Result by Magdi Valverde MD (07/30 1410)         1. Stable cardiomegaly with adenopathy, pulmonary fibrosis and trace pleural effusion/pleural thickening. 2. Colonic diverticulosis without associated diverticulitis. 3. Stable multiple incidental findings described in the body of the report.                   Workstation performed: [id number]                    Procedures  ECG 12 Lead Documentation Only    Date/Time: 7/30/2023 11:56 AM    Performed by: Haydee Pendleton PA-C  Authorized by: Haydee Pendleton PA-C    Indications / Diagnosis:  Abd pain, chest pain  ECG reviewed by me, the ED Provider: yes    Patient location: ED  Previous ECG:     Previous ECG:  Compared to current    Comparison ECG info:  5/26/23    Comparison to cardiac monitor: Yes    Interpretation:     Interpretation: abnormal    Rate:     ECG rate:  103    ECG rate assessment: tachycardic    Rhythm:     Rhythm: atrial fibrillation    Ectopy:     Ectopy: none    Conduction:     Conduction: normal    ST segments:     ST segments:  Abnormal  T waves:     T waves: non-specific and inverted      Inverted:  V4, V5 and V6  Comments:      [place] *, , QT/QTc 336/440; compared to EKG on 5/26/23, similar findings. afib is chronic. ST and T wave changes appear similar. ED Course  ED Course as of 07/30/23 1719   Sun Jul 30, 2023   1200 On 4L O2 chronically. 92% on recheck. 1236 WBC: 8.94   1236 Hemoglobin: 13.1   1236 Platelet Count: 986   1236 Glucose, UA(!): 100 (1/10%)  UA shows glucose but otherwise negative and not suggestive of infection. 1247 D-Dimer, Quant: 0.34  Negative; wells' low risk, doubt PE; in addition on chronic anticoagulation therapy making PE felt less likely. 1247 POCT INR(!): 1.34  Nonspecific elevation likely secondary to eliquis   1248 PROTIME(!): 16.8   1248 PTT: 35   1252 Magnesium: 2.0   1252 Lipase: 26  Not c/w pancreatitis   1252 Glucose, Random(!): 297   1252 Creatinine(!): 2.20  Known CKD, has worsened, prior 1.81 two months ago; will provide gentle fluids   1253 BUN(!): 34   1253 Sodium(!): 133   1253 Potassium(!): 3.3  Will replete   1253 Chloride(!): 91   1253 CO2: 32   1253 Anion Gap: 10   1253 Calcium(!): 11.2  Similar to prior   1253 AST: 14   1253 ALT: 12   1253 Alkaline Phosphatase: 85   1253 Total Protein(!): 9.0   1253 Albumin: 4.6   1253 TOTAL BILIRUBIN(!): 1.30  Similar to prior values over the past four months   1253 eGFR: 26   1253 LACTIC ACID: 1.8   1256 hs TnI 0hr: 12  Similar to prior values; probably baseline. Not diagnostic of ACS however will require delta to exclude.    1256 BNP(!): 471  Similar to a year ago; improved from two months ago [phone] CT chest abdomen pelvis wo contrast  IMPRESSION:        1. Stable cardiomegaly with adenopathy, pulmonary fibrosis and trace pleural effusion/pleural thickening. 2. Colonic diverticulosis without associated diverticulitis. 3. Stable multiple incidental findings described in the body of the report. 67 Pt and granddaughter updated on results. Reviewed incidentals and body of CT report. Pt notes he is feeling better and pain has resolved. Repeat troponin in process. Offered admission which pt respectively declines and states he would rather go home than be admitted. Will await results of repeat troponin prior to dispo. 1200 [facility] hsTnI: -2   1523 Pt again reassessed. Daughter now at bedside. Updated on results. Pt notes potassium is burning. Finished about half bag. Pt is wanting to go home. Again offered admission which he declines. Will provide PO dose of potassium then discharge home with strict return precautions. HEART Risk Score    Flowsheet Row Most Recent Value   Heart Score Risk Calculator    History 0 Filed at: 07/30/2023 1224   ECG 1 Filed at: 07/30/2023 1224   Age 2 Filed at: 07/30/2023 1224   Risk Factors 2 Filed at: 07/30/2023 1224   Troponin 0 Filed at: 07/30/2023 1224   HEART Score 5 Filed at: 07/30/2023 1224                        SBIRT 22yo+    Flowsheet Row Most Recent Value   Initial Alcohol Screen: US AUDIT-C     1. How often do you have a drink containing alcohol? 0 Filed at: 07/30/2023 1143   2. How many drinks containing alcohol do you have on a typical day you are drinking? 0 Filed at: 07/30/2023 1143   3a. Male UNDER 65: How often do you have five or more drinks on one occasion? 0 Filed at: 07/30/2023 1143   Audit-C Score 0 Filed at: 07/30/2023 1143   PERRY: How many times in the past year have you. .. Used an illegal drug or used a prescription medication for non-medical reasons?  Never Filed at: 07/30/2023 1143          Wells' Criteria for PE    Flowsheet Row Most Recent Value   Wells' Criteria for PE    Clinical signs and symptoms of DVT 0 Filed at: 07/30/2023 1224   PE is primary diagnosis or equally likely 0 Filed at: 07/30/2023 1224   HR >100 1.5 Filed at: 07/30/2023 1224   Immobilization at least 3 days or Surgery in the previous 4 weeks 0 Filed at: 07/30/2023 1224   Previous, objectively diagnosed PE or DVT 0 Filed at: 07/30/2023 1224   Hemoptysis 0 Filed at: 07/30/2023 1224   Malignancy with treatment within 6 months or palliative 0 Filed at: 07/30/2023 1224   Wells' Criteria Total 1.5 Filed at: 07/30/2023 1224                Medical Decision Making  81 yo male presenting for evaluation of abdominal pain, chest pain. Pt has longstanding cardiac history. Will obtain EKG, obtains labs, urine and CT imaging to further evaluate. Work up obtained as noted above. EKG and serial troponins not c/w ACS. CT does show evidence of cardiomegaly and coronary artery calcifications. Afib is chronic and rate controlled. He is on chronic anticoagulation. There is noted pneumonia. No pneumothorax. Trace right pleural effusion which has been noted previously. Does not appear in acute CHF although has chronic CHF. D dimer negative, wells' low risk, doubt PE. Pattern of pain not c/w a dissection. No noted leukocytosis, no anemia. No infectious concerns. Mild hyperglycemia c/w diabetes. Pt noted to have worsening renal function on top of chronic kidney disease. Gentle fluids provided. Mild hypokalemia which was repleted. UA not suggestive of infection. CT scan without acute findings. Incidental findings reviewed with pt and family. Pt was offered admission due to BECKI on top of CKD as well as age/risk factors/comorbid conditions. Pt respectively declined indicating he felt improved and would prefer to go home to his cats.   He does not numerous upcoming appts with the various specialists he sees including appt with pulmonary tomorrow. Please refer to above ER course for further details/discussion. Acute kidney injury superimposed on chronic kidney disease [facility]): acute illness or injury     Details: Recommended continued outpatient follow up with nephrology. CAD (coronary artery disease): chronic illness or injury     Details: Recommended outpatient follow up with his cardiologist.  Chronic respiratory failure [facility]): chronic illness or injury     Details: stable on home oxygen. has appt with pulmonary tomorrow. CT scan shows stable lung nodule. mild pulmonary fibrosis. Epigastric abdominal pain: acute illness or injury     Details: Could be related to GERD vs gastritis. Advised to continue PPI. Hypercalcemia: chronic illness or injury     Details: stable. Hyperglycemia due to diabetes mellitus [facility]): chronic illness or injury     Details: This is not DKA. Advised to continue to monitor and f/u with his endocrinologist.  Hypokalemia: acute illness or injury     Details: this was mild and supplemented. Nodule of left lung: chronic illness or injury     Details: appears stable and per radiology appears benign. On home oxygen therapy: chronic illness or injury     Details: stable respiratory status on home oxygen requirement. Pleural effusion, right: chronic illness or injury     Details: trace  Amount and/or Complexity of Data Reviewed  Independent Historian:      Details: daughter, granddaughter  External Data Reviewed: labs, radiology, ECG and notes. Labs: ordered. Decision-making details documented in ED Course. Radiology: ordered and independent interpretation performed. Decision-making details documented in ED Course. ECG/medicine tests: ordered and independent interpretation performed. Decision-making details documented in ED Course. Risk  Prescription drug management. Decision regarding hospitalization.           Disposition  Final diagnoses:   Epigastric abdominal pain Hypokalemia   Acute kidney injury superimposed on chronic kidney disease (HCC)   Hyperglycemia due to diabetes mellitus ([address])   Hypercalcemia   Chronic respiratory failure ([address])   On home oxygen therapy   Nodule of left lung   Pleural effusion, right - trace   CAD (coronary artery disease)   Diverticulosis     Time reflects when diagnosis was documented in both MDM as applicable and the Disposition within this note     Time User Action Codes Description Comment    7/30/2023  3:05 PM Madelyn Pro Add [R10.13] Epigastric abdominal pain     7/30/2023  3:05 PM Madelyn Pro Add [E87.6] Hypokalemia     7/30/2023  3:05 PM Madelyn Pro Add [N17.9,  N18.9] Acute kidney injury superimposed on chronic kidney disease ([address])     7/30/2023  3:06 PM Madelyn Pro Add [E11.65] Hyperglycemia due to diabetes mellitus ([address])     7/30/2023  3:06 PM Madelyn Pro Add [E83.52] Hypercalcemia     7/30/2023  3:09 PM Madelyn Pro Add [J96.10] Chronic respiratory failure ([address])     7/30/2023  3:09 PM Madelyn Pro Add [Z99.81] On home oxygen therapy     7/30/2023  3:09 PM Madelyn Pro Add [R91.1] Nodule of left lung     7/30/2023  3:09 PM Madelyn Pro Add [J90] Pleural effusion, right     7/30/2023  3:09 PM Madelyn Pro Modify [J90] Pleural effusion, right trace    7/30/2023  3:10 PM Madelyn Pro Add [I25.10] CAD (coronary artery disease)     7/30/2023  3:10 PM Madelyn Pro Add [K57.90] Diverticulosis       ED Disposition     ED Disposition   Discharge    Condition   Stable    Date/Time   Sun Jul 30, 2023  3:05 PM    [address] discharge to home/self care.                Follow-up Information     Follow up With Specialties Details Why Contact Info Additional [id number], DO Family Medicine, Internal Medicine Schedule an appointment as soon as possible for a visit   [address] [facility] [phone] Lennie Jenkins Department Emergency Medicine  As needed [id number] [facility] [id number]  300 [facility] Emergency Department, [address] [facility], [place], [place], [address]          Discharge Medication List as of 7/30/2023  3:33 PM      CONTINUE these medications which have NOT CHANGED    Details   amLODIPine (NORVASC) 10 mg tablet Take 10 mg by mouth daily, Historical Med      apixaban (ELIQUIS) 2.5 mg Take 1 tablet (2.5 mg total) by mouth 2 (two) times a day, Starting Thu 7/6/2023, Normal      atorvastatin (LIPITOR) 40 mg tablet Take 1 tablet (40 mg total) by mouth daily with dinner This is an increased dose to better help prevent heart attack and stroke., Starting Tue 2/22/2022, Normal      cholecalciferol (VITAMIN D3) 1,000 units tablet Take 1 tablet (1,000 Units total) by mouth daily, Starting Tue 2/22/2022, Normal      Continuous Blood Gluc Sensor (FreeStyle Kori 2 Sensor) MISC Change sensor every 14 days, Normal      escitalopram (LEXAPRO) 10 mg tablet Take 10 mg by mouth daily, Historical Med      Insulin Glargine (BASAGLAR KWIKPEN SC) Inject 10 Units under the skin, Historical Med      isosorbide mononitrate (IMDUR) 60 mg 24 hr tablet Take 1 tablet (60 mg total) by mouth daily In the morning, Starting Wed 6/21/2023, Normal      linaGLIPtin (Tradjenta) 5 MG TABS Take 5 mg by mouth daily Take this for your diabetes instead of glimepiride, Starting Tue 2/22/2022, Normal      !! metoprolol tartrate (LOPRESSOR) 50 mg tablet Take 75 mg by mouth every 12 (twelve) hours, Starting Sat 5/6/2023, Historical Med      !! Metoprolol Tartrate 75 MG TABS TAKE 1 TABLET BY MOUTH EVERY 12 HOURS, Normal      nitroglycerin (NITROSTAT) 0.4 mg SL tablet TAKE 1 TABLET UNDER TONGUE FOR ANGINA, MAY REPEAT IN 15 MIN.  NO MORE THAN 3 TIMES, Historical Med      pantoprazole (PROTONIX) 40 mg tablet TAKE 1 TABLET BY MOUTH EVERY DAY, Normal      sucralfate (CARAFATE) 1 g tablet Take 1 g by mouth 2 (two) times a day, Historical Med      tamsulosin (FLOMAX) 0.4 mg Take 2 capsules (0.8 mg total) by mouth daily with dinner, Starting Wed 1/4/2023, Normal      torsemide (DEMADEX) 20 mg tablet Take 20 mg by mouth in the morning and as needed, Historical Med       !! - Potential duplicate medications found. Please discuss with provider. No discharge procedures on file.     PDMP Review     None          ED Provider  Electronically Signed by           Nadege Ta PA-C  07/30/23 [id number]

## 2023-07-31 ENCOUNTER — VBI (OUTPATIENT)
Dept: FAMILY MEDICINE CLINIC | Facility: CLINIC | Age: 88
End: 2023-07-31

## 2023-07-31 ENCOUNTER — OFFICE VISIT (OUTPATIENT)
Dept: PULMONOLOGY | Facility: CLINIC | Age: 88
End: 2023-07-31
Payer: MEDICARE

## 2023-07-31 VITALS
WEIGHT: 161 LBS | HEIGHT: 66 IN | OXYGEN SATURATION: 90 % | HEART RATE: 72 BPM | SYSTOLIC BLOOD PRESSURE: 119 MMHG | BODY MASS INDEX: 25.88 KG/M2 | DIASTOLIC BLOOD PRESSURE: 62 MMHG | TEMPERATURE: 96.4 F

## 2023-07-31 DIAGNOSIS — J96.11 CHRONIC RESPIRATORY FAILURE WITH HYPOXIA (HCC): ICD-10-CM

## 2023-07-31 DIAGNOSIS — R91.1 PULMONARY NODULE: ICD-10-CM

## 2023-07-31 DIAGNOSIS — J98.4 RESTRICTIVE LUNG DISEASE: ICD-10-CM

## 2023-07-31 DIAGNOSIS — J84.9 INTERSTITIAL LUNG DISEASE (HCC): Primary | ICD-10-CM

## 2023-07-31 LAB
ATRIAL RATE: 85 BPM
QRS AXIS: 83 DEGREES
QRSD INTERVAL: 102 MS
QT INTERVAL: 336 MS
QTC INTERVAL: 440 MS
T WAVE AXIS: 268 DEGREES
VENTRICULAR RATE: 103 BPM

## 2023-07-31 PROCEDURE — 99214 OFFICE O/P EST MOD 30 MIN: CPT | Performed by: INTERNAL MEDICINE

## 2023-07-31 PROCEDURE — 93010 ELECTROCARDIOGRAM REPORT: CPT | Performed by: INTERNAL MEDICINE

## 2023-07-31 RX ORDER — FLUTICASONE FUROATE AND VILANTEROL 100; 25 UG/1; UG/1
1 POWDER RESPIRATORY (INHALATION) DAILY
Qty: 60 BLISTER | Refills: 3 | Status: SHIPPED | OUTPATIENT
Start: 2023-07-31 | End: 2023-08-30

## 2023-07-31 NOTE — TELEPHONE ENCOUNTER
07/31/23 2:30 PM    Patient contacted post ED visit, first outreach attempt made. Message was left for patient to return a call to the VBI Department at [facility]: Phone [phone]. Thank you.   Alma Oliveira  [facility] VALUE BASED VIR

## 2023-07-31 NOTE — PROGRESS NOTES
Progress Note - Pulmonary   Ariel Webster 82 y.o. male MRN: [id number]   Encounter: [id number]      Assessment/Plan:  Patient is an 61-year-old male presenting for routine follow-up. Overall, the patient is using 4 L per reports his respiratory status is approximately stable. He was evaluated in the ED for abdominal pain yesterday which she relates to hypocalcemia and notes this is fully resolved at the current time. His breathing is stable. We will check PFTs to further assess stability given his last ones were checked in December. No acute indication for therapy at this time; we did discuss the side effects of both immunosuppressants and antifibrotics. The etiology of the interstitial lung disease unclear. The patient does have persistent elevated eosinophils of unclear etiology. Given his shortness of breath empirically try a Breo for this. Discussed with patient and daughter, they are in agreement to hold on additional therapy at this time    Interstitial Lung Disease  -  Significant ILD but no progression noted on recent imaging  -  Check PFTs to assess progression  -  Negative basic autoimmune workup  -  At this time, consistent with IPF but would hold on antifibrotics given side effect profile  -  Pulmonary rehab    Peripheral eosinophilia  -  Unclear cause  -  Negative ANCA  -  Trial Breo     Chronic hypoxemic respiratory failure  -  4L  NC at all times   -  Started in about 2021  -  Maintain SpO2 >88%     Pulmonary hypertension  -Appears euvolemic  -  No diuresis necessary     Pulmonary nodule  - stable    1. Interstitial lung disease ([address])  -     Spirometry with diffusing capacity; Future  -     Fluticasone Furoate-Vilanterol (Breo Ellipta) 100-25 mcg/actuation inhaler; Inhale 1 puff daily Rinse mouth after use. 2. Chronic respiratory failure with hypoxia (HCC)    3. Pulmonary nodule    4. Restrictive lung disease        Patient may follow up in 3 months or sooner as necessary. Orders:  Orders Placed This Encounter   Procedures   • Spirometry with diffusing capacity     Standing Status:   Future     Standing Expiration Date:   7/31/2024     Order Specific Question:   Would you like to add MVV, MIP, MEP into this order? Answer:   No       Subjective:   He was in ER yesterday. He attributed it to low calcium. He feels better today. He has snot in his left nose which is dried. He notes this has been present for an extended periods of time. He tolerated pulmonary rehab. He did note benefit. He notes a dry mouth but is a mouth breather in the setting of sinus congestion. He had minor surgery for skin cancer. He notes some chills, he does associate this with blood thinners. Inhaler Regimen:  none    Remainder of review of systems negative except as described in HPI. The following portions of the patient's history were reviewed and updated as appropriate: allergies, current medications, past family history, past medical history, past social history, past surgical history and problem list.     Objective:   Vitals: Blood pressure 119/62, pulse 72, temperature (!) 96.4 °F (35.8 °C), temperature source Tympanic, height 5' 6" (1.676 m), weight 73 kg (161 lb), SpO2 90 %. , 4L NC, Body mass index is 25.99 kg/m². Physical Exam  Gen: Pleasant, awake, alert, oriented x 3, no acute distress  HEENT: Mucous membranes moist, no oral lesions, no thrush  NECK: No accessory muscle use, JVP not elevated  Cardiac: irregularly irregular, single S1, single S2, no murmurs, no rubs, no gallops  Lungs: crackles at lung bases  Abdomen: normoactive bowel sounds, soft nontender, nondistended, no rebound or rigidity, no guarding  Extremities: no cyanosis, no clubbing, no LE edema  MSK:  Strength equal in all extremities  Derm:  No rashes/lesions noted  Neuro:  Appropriate mood/affect    Labs: I have personally reviewed pertinent lab results.   Lab Results   Component Value Date    WBC 8.94 07/30/2023    HGB 13.1 07/30/2023     07/30/2023     Lab Results   Component Value Date    CREATININE 2.20 (H) 07/30/2023        Imaging and other studies: I have personally reviewed pertinent reports. and I have personally reviewed pertinent films in PACS  CT C/A/P 7/30/2023  My interpretation:  Mild subpleural reticular changes    Radiology findings:  LUNGS:  Diffuse bilateral subpleural reticular interstitial thickening is seen with associated honeycombing at the level of the left upper lobe. Bronchiectasis is stable. Findings are consistent with underlying mild pulmonary fibrosis. There is image degradation due to respiratory motion especially at the level of the lung bases. Left lower lobe lung nodule is again seen series 4/91 measuring 7 mm. It has been stable for over 2 years suggesting a benign etiology. There is no tracheal or endobronchial lesion. PLEURA: There is trace right pleural effusion with bilateral pleural thickening unchanged. Fissural thickening. HEART/GREAT VESSELS: Cardiomegaly is seen with coronary artery calcifications and atherosclerotic disease of the thoracic aorta. No thoracic aortic aneurysm. There is enlargement of the central pulmonary arterial tree suggesting some element of pulmonary artery hypertension. MEDIASTINUM AND RENARD:  Anterior mediastinal, prevascular and paratracheal lymph node enlargement is stable with a right lower paratracheal node measuring 1.4 cm in the short axis and prevascular node measuring 1.3 cm series 2/45. CHEST WALL AND LOWER NECK:  Unremarkable. Pulmonary Function Testing: I have personally reviewed pertinent reports. and I have personally reviewed pertinent films in PACS  12/12/2022: Moderate restriction  Spirometry:  FEV1/FVC Ratio is 77%.  FEV1 is 1.57 L, 65% predicted.  FVC is 2.05 L, 63% predicted.   No significant change after the administration of bronchodilator  Flow volume loop:  Restrictive appearance  Lung volumes: Total lung capacity 64% predicted.  Thoracic gas volume 80% predicted  Diffusing capacity:  Corrected diffusing capacity 45% predicted    Wojciech Rudd, [address] [place] [address]

## 2023-08-03 NOTE — TELEPHONE ENCOUNTER
08/03/23 2:17 PM    Patient contacted post ED visit, VBI department spoke with patient/caregiver and outreach was successful. Thank you.   Alma Oliveira  [facility] VALUE BASED VIR

## 2023-08-08 ENCOUNTER — HOSPITAL ENCOUNTER (OUTPATIENT)
Dept: PULMONOLOGY | Facility: HOSPITAL | Age: 88
Discharge: HOME | End: 2023-08-08
Attending: INTERNAL MEDICINE
Payer: MEDICARE

## 2023-08-08 DIAGNOSIS — J84.9 INTERSTITIAL LUNG DISEASE (HCC): ICD-10-CM

## 2023-08-08 PROCEDURE — 94060 EVALUATION OF WHEEZING: CPT | Performed by: INTERNAL MEDICINE

## 2023-08-08 PROCEDURE — 94729 DIFFUSING CAPACITY: CPT

## 2023-08-08 PROCEDURE — 94760 N-INVAS EAR/PLS OXIMETRY 1: CPT

## 2023-08-08 PROCEDURE — 94726 PLETHYSMOGRAPHY LUNG VOLUMES: CPT

## 2023-08-08 PROCEDURE — 94060 EVALUATION OF WHEEZING: CPT

## 2023-08-08 PROCEDURE — 94726 PLETHYSMOGRAPHY LUNG VOLUMES: CPT | Performed by: INTERNAL MEDICINE

## 2023-08-08 PROCEDURE — 94729 DIFFUSING CAPACITY: CPT | Performed by: INTERNAL MEDICINE

## 2023-08-08 RX ORDER — ALBUTEROL SULFATE 2.5 MG/3ML
2.5 SOLUTION RESPIRATORY (INHALATION) ONCE AS NEEDED
Status: COMPLETED | OUTPATIENT
Start: 2023-08-08 | End: 2023-08-08

## 2023-08-08 RX ADMIN — ALBUTEROL SULFATE 2.5 MG: 2.5 SOLUTION RESPIRATORY (INHALATION) at 14:01

## 2023-08-16 DIAGNOSIS — E11.65 TYPE 2 DIABETES MELLITUS WITH HYPERGLYCEMIA, WITHOUT LONG-TERM CURRENT USE OF INSULIN (HCC): Primary | ICD-10-CM

## 2023-08-16 RX ORDER — INSULIN GLARGINE 100 [IU]/ML
14 INJECTION, SOLUTION SUBCUTANEOUS DAILY
Qty: 15 ML | Refills: 1 | Status: SHIPPED | OUTPATIENT
Start: 2023-08-16 | End: 2023-11-14

## 2023-08-22 ENCOUNTER — OFFICE VISIT (OUTPATIENT)
Dept: NEPHROLOGY | Facility: CLINIC | Age: 88
End: 2023-08-22
Payer: MEDICARE

## 2023-08-22 VITALS
HEART RATE: 97 BPM | HEIGHT: 66 IN | SYSTOLIC BLOOD PRESSURE: 118 MMHG | DIASTOLIC BLOOD PRESSURE: 60 MMHG | OXYGEN SATURATION: 91 % | WEIGHT: 167 LBS | BODY MASS INDEX: 26.84 KG/M2

## 2023-08-22 DIAGNOSIS — I50.32 CHRONIC DIASTOLIC CONGESTIVE HEART FAILURE (HCC): Primary | ICD-10-CM

## 2023-08-22 DIAGNOSIS — E83.52 HYPERCALCEMIA: ICD-10-CM

## 2023-08-22 DIAGNOSIS — N18.32 STAGE 3B CHRONIC KIDNEY DISEASE (HCC): Primary | ICD-10-CM

## 2023-08-22 DIAGNOSIS — D47.2 MONOCLONAL GAMMOPATHY: ICD-10-CM

## 2023-08-22 DIAGNOSIS — I50.32 CHRONIC DIASTOLIC CONGESTIVE HEART FAILURE (HCC): ICD-10-CM

## 2023-08-22 DIAGNOSIS — E11.21 DIABETIC NEPHROPATHY ASSOCIATED WITH TYPE 2 DIABETES MELLITUS (HCC): ICD-10-CM

## 2023-08-22 PROCEDURE — 99214 OFFICE O/P EST MOD 30 MIN: CPT | Performed by: INTERNAL MEDICINE

## 2023-08-22 RX ORDER — TORSEMIDE 20 MG/1
20 TABLET ORAL DAILY
Qty: 90 TABLET | Refills: 3 | Status: SHIPPED | OUTPATIENT
Start: 2023-08-22

## 2023-08-22 NOTE — PROGRESS NOTES
[facility] of [address] [place] [address]    Name: Caity Geiger  YOB: 1935      Assessment/Plan:    Stage 3b chronic kidney disease [facility])  Lab Results   Component Value Date    EGFR 26 07/30/2023    EGFR 33 05/26/2023    EGFR 30 05/18/2023    CREATININE 2.20 (H) 07/30/2023    CREATININE 1.81 (H) 05/26/2023    CREATININE 1.95 (H) 05/18/2023     Kidney function slightly lower than usual, will reassess labs in October with regular labs pending for endocrinology. Patient may benefit from reducing weight at home, please refer below. Chronic diastolic congestive heart failure (HCC)  Wt Readings from Last 3 Encounters:   08/22/23 75.8 kg (167 lb)   07/31/23 73 kg (161 lb)   07/11/23 74 kg (163 lb 3.2 oz)     Patient appears compensated at this time, continue with current care. From the estimated dry weight standpoint, we will look to reduce the patient's dry weight by 2 pounds from 160 down to 158 pounds according to his home scale. Follow blood work in October. Hypercalcemia  Continue to monitor for now, serum calcium level was slightly elevated at this time. Patient on Prolia. Monoclonal gammopathy  Continue follow-up with hematology, repeat serum electrophoresis in 2024. 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 From the estimated dry weight standpoint, we will look to reduce the patient's dry weight by 2 pounds from 160 down to 158 pounds according to his home scale. Follow blood work in October.                 Genitourinary    Stage 3b chronic kidney disease ([address]) - Primary Lab Results   Component Value Date    EGFR 26 07/30/2023    EGFR 33 05/26/2023    EGFR 30 05/18/2023    CREATININE 2.20 (H) 07/30/2023    CREATININE 1.81 (H) 05/26/2023    CREATININE 1.95 (H) 05/18/2023     Kidney function slightly lower than usual, will reassess labs in October with regular labs pending for endocrinology. Patient may benefit from reducing weight at home, please refer below. Relevant Orders    Comprehensive metabolic panel    CBC and differential    Albumin / creatinine urine ratio    Urinalysis with microscopic    Magnesium    Phosphorus    PTH, intact       Other    Hypercalcemia     Continue to monitor for now, serum calcium level was slightly elevated at this time. Patient on Prolia. Monoclonal gammopathy     Continue follow-up with hematology, repeat serum electrophoresis in 2024. Patient kidney function slightly lower than usual, will reassess in October. Continue avoid nephrotoxins and optimize the patient's overall hemodynamics. We will see him back for regular appointment in approximately 4 months. Subjective:      Patient ID: Shawn Nicole is a 82 y.o. male. Patient presents for follow-up appoint. Reviewed the patient's labs in detail, most recent creatinine is 2.2 mg/dL, this was associate with a mild hypokalemia of 3.3 mmol/L as well as a mild hyponatremia of 133 mmol/L. Serum calcium level was noted to be elevated at 11.2 mg/dL. Estimated GFR noted to be 26 mL/min. Patient home weight is about 160 lbs    Hypertension  This is a chronic problem. The current episode started more than 1 year ago. The problem is unchanged. The problem is controlled. Pertinent negatives include no chest pain, orthopnea or peripheral edema. There are no associated agents to hypertension. Risk factors for coronary artery disease include male gender. Past treatments include lifestyle changes, diuretics, calcium channel blockers and beta blockers.  There are no compliance problems. Hypertensive end-organ damage includes kidney disease. Identifiable causes of hypertension include chronic renal disease. The following portions of the patient's history were reviewed and updated as appropriate: allergies, current medications, past family history, past medical history, past social history, past surgical history and problem list.    Review of Systems   Cardiovascular: Negative for chest pain and orthopnea. All other systems reviewed and are negative. Social History     Socioeconomic History   • Marital status:      Spouse name: None   • Number of children: None   • Years of education: None   • Highest education level: None   Occupational History   • None   Tobacco Use   • Smoking status: Former     Packs/day: 1.00     Years: 40.00     Total pack years: 40.00     Types: Cigarettes     Quit date: 1977     Years since quittin.6   • Smokeless tobacco: Never   Vaping Use   • Vaping Use: Never used   Substance and Sexual Activity   • Alcohol use: Never   • Drug use: No   • Sexual activity: Never     Partners: Male   Other Topics Concern   • None   Social History Narrative   • None     Social Determinants of Health     Financial Resource Strain: Not on file   Food Insecurity: No Food Insecurity (5/10/2022)    Hunger Vital Sign    • Worried About Running Out of Food in the Last Year: Never true    • Ran Out of Food in the Last Year: Never true   Transportation Needs: No Transportation Needs (5/10/2022)    PRAPARE - Transportation    • Lack of Transportation (Medical): No    • Lack of Transportation (Non-Medical):  No   Physical Activity: Not on file   Stress: Not on file   Social Connections: Not on file   Intimate Partner Violence: Not on file   Housing Stability: Low Risk  (5/10/2022)    Housing Stability Vital Sign    • Unable to Pay for Housing in the Last Year: No    • Number of Places Lived in the Last Year: 1    • Unstable Housing in the Last Year: No     Past Medical History:   Diagnosis Date   • A-fib [facility])    • Arthritis    • Athscl 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     Stent OM1. Patent mammary graft to LAD 10/7/2009; CABG 1992 & 2005   • Cancer [facility])     skin   • CHF (congestive heart failure) ([address])    • Diabetes mellitus ([address])    • Diabetes mellitus, type II ([address])     without complication   • Epistaxis    • GERD (gastroesophageal reflux disease)    • Hx of cardiovascular stress test 07/23/2014    Eddie MPI; EF0.52 (52%) Lexiscan, mild LV systolic dysfunction; evidence for prior inferior wall MI; perfusion imaging consistant w mild lat wall ischemia and min torin-infart inferior ischemia.  / EF0.51 (51%) evidence for prior inferior wall MI. Mild inferior and mild-moderate lateral wall ischemia. 7/29/15   • Hx of echocardiogram 11/07/2017    2D w/CFD;EF0.55 (55%) mild LVH, mitral valve prolapse with moderate regurgitation. left atrial enlargement. Mild tricuspid regurgitation. • Hypertension    • Mixed hyperlipidemia    • Occlusion and stenosis of bilateral carotid arteries    • 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 infarction)    • Osteoporosis    • Presence of aortocoronary bypass graft      Past Surgical History:   Procedure Laterality Date   • CARDIAC CATHETERIZATION  10/07/2009    Stent 99% stenosis SVG from the aorta to OM1. Patent mammary graft to the LAD   • CORONARY ARTERY BYPASS GRAFT  1992    VG-CX, VG-RCA   • CORONARY ARTERY BYPASS GRAFT  11/04/2005    LIMA-D1-LAD, VG-PDA-PLB, Xwxezf-US3-EO1 TMR 9 Lesions   • AK EGD TRANSORAL BIOPSY SINGLE/MULTIPLE N/A 1/23/2019    Procedure: ESOPHAGOGASTRODUODENOSCOPY (EGD) with biopsy;  Surgeon: Woody Melara MD;  Location: [address] [place] [address] GI LAB;   Service: Gastroenterology   • TONSILLECTOMY         Current Outpatient Medications:   •  amLODIPine (NORVASC) 10 mg tablet, Take 10 mg by mouth daily, Disp: , Rfl:   •  apixaban (ELIQUIS) 2.5 mg, Take 1 tablet (2.5 mg total) by mouth 2 (two) times a day, Disp: 60 tablet, Rfl: 5  •  atorvastatin (LIPITOR) 40 mg tablet, Take 1 tablet (40 mg total) by mouth daily with dinner This is an increased dose to better help prevent heart attack and stroke., Disp: 30 tablet, Rfl: 0  •  Continuous Blood Gluc Sensor (FreeStyle Kori 2 Sensor) MISC, Change sensor every 14 days, Disp: 6 each, Rfl: 1  •  denosumab (PROLIA) 60 mg/mL, Inject 60 mg under the skin once, Disp: , Rfl:   •  escitalopram (LEXAPRO) 10 mg tablet, Take 10 mg by mouth daily, Disp: , Rfl:   •  Fluticasone Furoate-Vilanterol (Breo Ellipta) 100-25 mcg/actuation inhaler, Inhale 1 puff daily Rinse mouth after use., Disp: 60 blister, Rfl: 3  •  Insulin Glargine Solostar (Basaglar KwikPen) 100 UNIT/ML SOPN, Inject 0.14 mL (14 Units total) under the skin daily, Disp: 15 mL, Rfl: 1  •  Insulin Pen Needle 32G X 4 MM MISC, Use daily, Disp: 30 each, Rfl: 2  •  isosorbide mononitrate (IMDUR) 60 mg 24 hr tablet, Take 1 tablet (60 mg total) by mouth daily In the morning, Disp: 30 tablet, Rfl: 5  •  linaGLIPtin (Tradjenta) 5 MG TABS, Take 5 mg by mouth daily Take this for your diabetes instead of glimepiride, Disp: 30 tablet, Rfl: 0  •  metoprolol tartrate (LOPRESSOR) 50 mg tablet, Take 75 mg by mouth every 12 (twelve) hours, Disp: , Rfl:   •  nitroglycerin (NITROSTAT) 0.4 mg SL tablet, TAKE 1 TABLET UNDER TONGUE FOR ANGINA, MAY REPEAT IN 15 MIN.  NO MORE THAN 3 TIMES, Disp: , Rfl:   •  pantoprazole (PROTONIX) 40 mg tablet, TAKE 1 TABLET BY MOUTH EVERY DAY, Disp: 90 tablet, Rfl: 2  •  sucralfate (CARAFATE) 1 g tablet, Take 1 g by mouth 2 (two) times a day, Disp: , Rfl:   •  tamsulosin (FLOMAX) 0.4 mg, Take 2 capsules (0.8 mg total) by mouth daily with dinner, Disp: 180 capsule, Rfl: 1  •  torsemide (DEMADEX) 20 mg tablet, Take 20 mg by mouth in the morning and as needed, Disp: , Rfl:     Lab Results   Component Value Date    SODIUM 133 (L) 07/30/2023    K 3.3 (L) 07/30/2023    CL 91 (L) 07/30/2023    CO2 32 07/30/2023    AGAP 10 07/30/2023 BUN 34 (H) 07/30/2023    CREATININE 2.20 (H) 07/30/2023    GLUC 297 (H) 07/30/2023    GLUF 176 (H) 05/18/2023    CALCIUM 11.2 (H) 07/30/2023    AST 14 07/30/2023    ALT 12 07/30/2023    ALKPHOS 85 07/30/2023    TP 9.0 (H) 07/30/2023    TBILI 1.30 (H) 07/30/2023    EGFR 26 07/30/2023     Lab Results   Component Value Date    WBC 8.94 07/30/2023    HGB 13.1 07/30/2023    HCT 42.0 07/30/2023    MCV 88 07/30/2023     07/30/2023     Lab Results   Component Value Date    CHOLESTEROL 108 05/18/2023    CHOLESTEROL 115 12/30/2022    CHOLESTEROL 121 11/09/2021     Lab Results   Component Value Date    HDL 41 05/18/2023    HDL 43 12/30/2022    HDL 40 11/09/2021     Lab Results   Component Value Date    LDLCALC 52 05/18/2023    LDLCALC 56 12/30/2022    LDLCALC 59 11/09/2021     Lab Results   Component Value Date    TRIG 76 05/18/2023    TRIG 81 12/30/2022    TRIG 112 11/09/2021     No results found for: "CHOLHDL"  Lab Results   Component Value Date    [id number] 1.849 08/31/2022     Lab Results   Component Value Date    PTH 70.9 03/29/2023    CALCIUM 11.2 (H) 07/30/2023    PHOS 3.1 05/18/2023     Lab Results   Component Value Date    SPEP See Comment 02/24/2022    UPEP See Comment 02/22/2022     No results found for: "Lennox Good", "[id number]"        Objective:      /60 (BP Location: Left arm, Patient Position: Sitting, Cuff Size: Standard)   Pulse 97   Ht 5' 6" (1.676 m)   Wt 75.8 kg (167 lb)   SpO2 91% Comment: 91% on 4 L of O2  BMI 26.95 kg/m²          Physical Exam  Vitals reviewed. Constitutional:       General: He is not in acute distress. Appearance: He is well-developed. HENT:      Head: Normocephalic and atraumatic. Eyes:      Conjunctiva/sclera: Conjunctivae normal.   Cardiovascular:      Rate and Rhythm: Normal rate and regular rhythm. Pulmonary:      Effort: Pulmonary effort is normal.      Breath sounds: Normal breath sounds. Abdominal:      Palpations: Abdomen is soft. Musculoskeletal:      Cervical back: Neck supple. Skin:     General: Skin is warm. Findings: No rash. Neurological:      Mental Status: He is alert and oriented to person, place, and time. Cranial Nerves: No cranial nerve deficit.    Psychiatric:         Behavior: Behavior normal.

## 2023-08-22 NOTE — ASSESSMENT & PLAN NOTE
Wt Readings from Last 3 Encounters:   08/22/23 75.8 kg (167 lb)   07/31/23 73 kg (161 lb)   07/11/23 74 kg (163 lb 3.2 oz)     Patient appears compensated at this time, continue with current care. From the estimated dry weight standpoint, we will look to reduce the patient's dry weight by 2 pounds from 160 down to 158 pounds according to his home scale. Follow blood work in October.

## 2023-08-22 NOTE — ASSESSMENT & PLAN NOTE
Continue to monitor for now, serum calcium level was slightly elevated at this time. Patient on Prolia.

## 2023-08-24 ENCOUNTER — TELEPHONE (OUTPATIENT)
Dept: ADMINISTRATIVE | Facility: OTHER | Age: 88
End: 2023-08-24

## 2023-08-24 NOTE — TELEPHONE ENCOUNTER
----- Message from Samira Coughlin MA sent at 8/22/2023 10:00 AM EDT -----  Regarding: AWV  08/22/23 10:00 AM    Berenice, our patient Tony Elizondo has had Medicare AWV completed/performed. Please assist in updating the patient chart by pulling the document from ENCOUNTER Tab ( SWITCHBACK)  within Chart Review. The date of service is 11/15/2022.      Thank you,  MELANIE Mccurdy PG [facility]

## 2023-08-30 NOTE — TELEPHONE ENCOUNTER
Upon review of the In Basket request we were able to locate, review, and update the patient chart as requested for Medicare AW. Any additional questions or concerns should be emailed to the Practice Liaisons via the appropriate education email address, please do not reply via In Basket.     Thank you  Rancho Mendoza

## 2023-09-05 NOTE — TELEPHONE ENCOUNTER
Aida Lawson would like to speak with Dr Vahe Barrios about Sunita Ward.  He can be reached at [phone]

## 2023-09-06 NOTE — TELEPHONE ENCOUNTER
Spoke with dept  yesterday. . by hx likely sudden cardiac death complicated by his chf/pulm htn and known ASCVD and CKD.

## 2023-09-20 NOTE — ED NOTES
Patient's daughter provided contact information for any necessary updates overnight  States she will be back in the morning, but lives an hour away       Cali Swift RN  09/19/19 [address], RN  09/19/19 [id number]
SLIM provider at bedside with patient       Kita Pham RN  09/19/19 [id number]
No indicators present

## 2023-10-28 NOTE — PROGRESS NOTES
Pulmonary Follow-Up Note   Dotty Husbands 82 y o  male MRN: [id number]  3/8/2022      Assessment/Plan:    Problem List Items Addressed This Visit        Respiratory    Acute respiratory failure with hypoxia (HCC)    Relevant Orders    POCT 6 minute walk (Completed)    Nocturnal hypoxia     -  patient only requires oxygen therapy upon ambulation  -  patient is concerned about his overall saturations q h s   As his nasal cannula has tendency to follow off  -  I did recommend taping nasal cannula to cheeks or wearing nasal cannula in his nares and around his head as opposed to behind his ears  -  will order overnight pulse ox on room air to determine severity of hypoxia         Relevant Orders    Pulse oximetry overnight    Chronic respiratory failure with hypoxia ([facility] [address] )     -  I do feel patient has chronic hypoxia is likely multifaceted:  Grade 1 diastolic CHF, moderate pulmonary hypertension, ILD  -  patient completed 6 minute walk- room air at rest 93%, patient required 3 L upon ambulation  -  patient has home pulse ox, will monitor oxygen requirement at home and keep a log to present to provider upon follow-up  -  will continue maintain saturations greater than 87%         Relevant Orders    Home Oxygen with Portability    ILD (interstitial lung disease) ([facility] [address] ) - Primary     -  previous imaging is noted to have pleural thickening and bronchiectasis changes concerning for I LD  -  given that patient had chronically elevated eosinophils since 2019 further inflammatory markers were obtained- all inflammatory markers unremarkable  -  no indication for steroids at this time  -  etiology remains somewhat unclear  -  given that patient is overall stable from a respiratory standpoint- will repeat chest CT in 3 months if fibrosis/bronchiectasis worsening will consider further workup           Relevant Orders    CT chest wo contrast       Cardiovascular and Mediastinum    Acute exacerbation of CHF (congestive heart failure) ([facility] [address] ) Pulmonary hypertension ([address] )     -  recent hospitalization- echo did show new pulmonary hypertension than the previous year  -  PA pressure currently 41 mmHg  -  likely WHO group II & III  -  if hypoxia becomes worse would recommend close cardiology follow-up with likely daily diuretic therapy  -  may consider repeat echo in 1 year's time            Other    Pulmonary nodule     -  bilateral right upper lobe and left upper lobe pulmonary nodule  -  lab repeat imaging in 3 months  -  determined at that time the stability of the nodules and if further imaging needs to be maintained on an annual basis               Education provided at this visit:   Need for Vaccination:  Patient reports up-to-date   Pulmonary Rehab:  Not indicated   Smoking Cessation:  Quit 1977   Lung Cancer Screening:  Annual surveillance   Inhaler Use: NA    No follow-ups on file  All of Susan Raymundo questions were answered prior to leaving the office today  Susan Raymundo will follow-up with Dr Beba Watson in 3 months or sooner should the need arise  Susan Raymundo is aware to call our office with any further questions or concerns  History of Present Illness   Reason for Visit:  Hospital follow-up  Chief Complaint: "I feel a lot better"  HPI: Lucero Rivera is a 82 y o  male who presents to the office today for hospital follow-up  Patient was hospitalized from 2/18/2022- 2/22/2022 for acute respiratory failure likely secondary to CHF exacerbation and possible community-acquired pneumonia  Patient completed 5 days of Rocephin and upon discharge required 2 L at rest and 3 L upon ambulation  Today patient presents in overall stable respiratory health  Patient reports that his overall respiratory status has significantly improved since admission  Patient was able to perform 6 minute walk today in office he was 93% on room air however upon ambulation required 3 L  Patient is concerned that his oxygen may fall off at night    Am unsure of his actual oxygen requirements right nocturnal so he is agreeable to overnight pulse ox  As far as patient interstitial lung disease current etiology is unclear  Given that he is overall relatively stable from a respiratory standpoint and previous inflammatory markers were unremarkable will repeat chest CT in 3 months  From pulmonary standpoint, patient will follow Dr Alfred Chen for his interstitial lung changes  Patient does report approximately 1 pack per day 40 year smoking history  Patient reports he quit smoking in 19 77  Patient is currently not maintained on any inhaled therapies  Patient does report utilizing 2 L at rest and 3 L upon exertion  Patient does report history of GERD in which he is maintained on Carafate and Protonix  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  Patient denies history of ARVIN, dysphagia, seasonal allergies, or postnasal drip  Patient reports having to cats  Patient denies any recent acute exposures to dust, mold, asbestos, or silica  Review of Systems   Constitutional: Negative for activity change and appetite change  HENT: Negative for congestion and postnasal drip  Respiratory: Positive for cough and shortness of breath  Negative for apnea, choking, chest tightness, wheezing and stridor  Cardiovascular: Negative for chest pain, palpitations and leg swelling  Gastrointestinal: Negative for abdominal distention and abdominal pain  Genitourinary: Negative for difficulty urinating and dysuria  Musculoskeletal: Negative for arthralgias and back pain  Skin: Negative for color change and pallor  Neurological: Negative for dizziness and facial asymmetry  Psychiatric/Behavioral: Negative for agitation and behavioral problems  Historical Information   Past Medical History:   Diagnosis Date    Arthritis     Athscl 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     Stent OM1   Patent mammary graft to LAD 10/7/2009; CABG 1992 & 2005    Cancer ([facility] )     skin    Coronary artery disease     Diabetes mellitus ([facility] )     Diabetes mellitus, type II ([place] [address] )     without complication    Epistaxis     Essential (primary) hypertension     GERD (gastroesophageal reflux disease)     Hx of cardiovascular stress test 07/23/2014    Eddie MPI; EF0 52 (52%) Lexiscan, mild LV systolic dysfunction; evidence for prior inferior wall MI; perfusion imaging consistant w mild lat wall ischemia and min torin-infart inferior ischemia   / EF0 51 (51%) evidence for prior inferior wall MI  Mild inferior and mild-moderate lateral wall ischemia  7/29/15    Hx of echocardiogram 11/07/2017    2D w/CFD;EF0 55 (55%) mild LVH, mitral valve prolapse with moderate regurgitation  left atrial enlargement  Mild tricuspid regurgitation   Hypertension     Mixed hyperlipidemia     Occlusion and stenosis of bilateral carotid arteries     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 infarction)     Presence of aortocoronary bypass graft      Past Surgical History:   Procedure Laterality Date    CARDIAC CATHETERIZATION  10/07/2009    Stent 99% stenosis SVG from the aorta to OM1  Patent mammary graft to the LAD    CORONARY ARTERY BYPASS GRAFT  1992    VG-CX, VG-RCA    CORONARY ARTERY BYPASS GRAFT  11/04/2005    LIMA-D1-LAD, VG-PDA-PLB, Foiphw-FW3-PU3 TMR 9 Lesions    NV EGD TRANSORAL BIOPSY SINGLE/MULTIPLE N/A 1/23/2019    Procedure: ESOPHAGOGASTRODUODENOSCOPY (EGD) with biopsy;  Surgeon: Herlinda Miller MD;  Location: [facility];   Service: Gastroenterology    TONSILLECTOMY       Family History   Problem Relation Age of Onset    Heart disease Brother     No Known Problems Mother     Tuberculosis Father      Social History   Social History     Substance and Sexual Activity   Alcohol Use Never     Social History     Substance and Sexual Activity   Drug Use No     Social History     Tobacco Use   Smoking Status Former Smoker    Packs/day: 1 00    Years: 40 00    Pack years: 40 00    Quit date: 1/22/1977   Prosper Handy Years since quittin 1   Smokeless Tobacco Never Used     E-Cigarette/Vaping    E-Cigarette Use Never User      E-Cigarette/Vaping Substances    Nicotine No     THC No     CBD No     Flavoring No     Other No     Unknown No        Meds/Allergies     Current Outpatient Medications:     atorvastatin (LIPITOR) 40 mg tablet, Take 1 tablet (40 mg total) by mouth daily with dinner This is an increased dose to better help prevent heart attack and stroke , Disp: 30 tablet, Rfl: 0    cholecalciferol (VITAMIN D3) 1,000 units tablet, Take 1 tablet (1,000 Units total) by mouth daily, Disp: 30 tablet, Rfl: 0    clopidogrel (PLAVIX) 75 mg tablet, Take 1 tablet (75 mg total) by mouth daily Hold until seen by ENT on 19 , Disp: , Rfl:     escitalopram (LEXAPRO) 10 mg tablet, Take 10 mg by mouth daily, Disp: , Rfl:     furosemide (LASIX) 20 mg tablet, Take 1 tablet (20 mg total) by mouth 2 (two) times a day, Disp: 60 tablet, Rfl: 0    isosorbide mononitrate (IMDUR) 60 mg 24 hr tablet, Take 1 tablet (60 mg total) by mouth daily In the morning, Disp: , Rfl: 0    linaGLIPtin (Tradjenta) 5 MG TABS, Take 5 mg by mouth daily Take this for your diabetes instead of glimepiride, Disp: 30 tablet, Rfl: 0    pantoprazole (PROTONIX) 40 mg tablet, Take 40 mg by mouth daily, Disp: , Rfl:     propranolol (INDERAL LA) 120 mg 24 hr capsule, Take 120 mg by mouth daily, Disp: , Rfl:     sucralfate (CARAFATE) 1 g tablet, Take 1 g by mouth 2 (two) times a day, Disp: , Rfl:     tamsulosin (FLOMAX) 0 4 mg, Take 2 capsules (0 8 mg total) by mouth daily with dinner, Disp: 180 capsule, Rfl: 3  Allergies   Allergen Reactions    Ibuprofen Fatigue     Other reaction(s): decreased kidney function       Vitals: Blood pressure 115/73, pulse 80, temperature 98 3 °F (36 8 °C), temperature source Tympanic, height 5' 7" (1 702 m), weight 69 5 kg (153 lb 3 2 oz), SpO2 96 %  Body mass index is 23 99 kg/m²   Oxygen Therapy  SpO2: 96 %  Oxygen Therapy: None (Room air) /73   Pulse 80   Temp 98 3 °F (36 8 °C) (Tympanic)   Ht 5' 7" (1 702 m)   Wt 69 5 kg (153 lb 3 2 oz)   SpO2 96%   BMI 23 99 kg/m²       Physical Exam:  Physical Exam  Constitutional:       General: He is not in acute distress  Appearance: Normal appearance  He is normal weight  He is not ill-appearing  HENT:      Head: Normocephalic and atraumatic  Nose: Nose normal  No congestion or rhinorrhea  Mouth/Throat:      Mouth: Mucous membranes are dry  Pharynx: No oropharyngeal exudate or posterior oropharyngeal erythema  Cardiovascular:      Rate and Rhythm: Normal rate and regular rhythm  Pulses: Normal pulses  Heart sounds: Normal heart sounds  No murmur heard  No friction rub  No gallop  Pulmonary:      Effort: Pulmonary effort is normal  No tachypnea, bradypnea or respiratory distress  Breath sounds: Decreased air movement present  No stridor or transmitted upper airway sounds  Decreased breath sounds and rales present  No wheezing or rhonchi  Comments: Lower lobe rales  Chest:      Chest wall: No tenderness  Abdominal:      General: Abdomen is flat  Bowel sounds are normal  There is no distension  Palpations: Abdomen is soft  There is no mass  Musculoskeletal:         General: No swelling or tenderness  Normal range of motion  Cervical back: Normal range of motion and neck supple  No rigidity or tenderness  Skin:     General: Skin is warm and dry  Coloration: Skin is not jaundiced or pale  Neurological:      General: No focal deficit present  Mental Status: He is alert and oriented to person, place, and time  Mental status is at baseline     Psychiatric:         Mood and Affect: Mood normal          Behavior: Behavior normal            Imaging and other studies: I have personally reviewed pertinent films in PACS     V/Q scan- 2/21/2022- probability of PE is low    Pulmonary Results (PFTs, PSG): I have personally reviewed pertinent films in PACS     No PFTs recorded      6 Minute Walk:  The patient's starting pulse ox was 91% on RA with a heart rate of 86 and Mehran dyspnea score of 2/10  Patient completed 39 m at which time he desatted at 86% and was placed on 3 L nasal cannula    They walked a total of 120m in 6minutes without stopping  At completion the pulse ox was 91% on 3L with a heart rate of 84 and Mehran dyspnea of score of 4/10  Impression  Patient requires 3 L only upon ambulation        [address]        Portions of the record may have been created with voice recognition software  Occasional wrong word or "sound a like" substitutions may have occurred due to the inherent limitations of voice recognition software  Read the chart carefully and recognize, using context, where substitutions have occurred or contact the dictating provider  47.6

## (undated) DEVICE — DISPOSABLE BIOPSY FORCEPS: Brand: DISPOSABLE BIOPSY FORCEPS

## (undated) DEVICE — THE SINGLE USE BIOGUARD AIR WATER VALVE, OLYMPUS IS USED TO CONTROL THE AIR WATER FUNCTION OF AN ENDOSCOPE DURING GI ENDOSCOPIC PROCEDURES.: Brand: BIOGUARD

## (undated) DEVICE — THE BIOSHIELD BIOPSY VALVE - STERILE IS USED TO COVER THE OPENING TO THE BIOPSY/SUCTION CHANNEL INLET OF A GASTROINTESTINAL ENDOSCOPE. IT PROVIDES ACCESS FOR ENDOSCOPIC DEVICE PASSAGE AND EXCHANGE, HELPS MAINTAIN INSUFFLATION AND MINIMIZES LEAKAGE OF BIOMATERIAL FROM THE BIOPSY PORT THROUGHOUT THE GASTROINTESTINAL ENDOSCOPIC PROCEDURE.: Brand: BIOSHIELD

## (undated) DEVICE — TRANSPOSAL ULTRAFLEX DUO/QUAD ULTRA CART MANIFOLD

## (undated) DEVICE — BITE BLOCK MAXI 60FR LF STRAP

## (undated) DEVICE — ALL PURPOSE SPONGES,NON-WOVEN, 4 PLY: Brand: CURITY